# Patient Record
Sex: FEMALE | Race: WHITE | NOT HISPANIC OR LATINO | Employment: OTHER | URBAN - METROPOLITAN AREA
[De-identification: names, ages, dates, MRNs, and addresses within clinical notes are randomized per-mention and may not be internally consistent; named-entity substitution may affect disease eponyms.]

---

## 2017-05-31 ENCOUNTER — TRANSCRIBE ORDERS (OUTPATIENT)
Dept: ADMINISTRATIVE | Facility: HOSPITAL | Age: 57
End: 2017-05-31

## 2017-05-31 DIAGNOSIS — Z12.31 VISIT FOR SCREENING MAMMOGRAM: Primary | ICD-10-CM

## 2017-08-18 ENCOUNTER — HOSPITAL ENCOUNTER (OUTPATIENT)
Dept: MAMMOGRAPHY | Facility: HOSPITAL | Age: 57
Discharge: HOME | End: 2017-08-18
Admitting: OBSTETRICS & GYNECOLOGY

## 2017-08-18 DIAGNOSIS — Z12.31 VISIT FOR SCREENING MAMMOGRAM: ICD-10-CM

## 2017-08-18 PROCEDURE — 77063 BREAST TOMOSYNTHESIS BI: CPT | Performed by: RADIOLOGY

## 2017-08-18 PROCEDURE — 77067 SCR MAMMO BI INCL CAD: CPT | Performed by: RADIOLOGY

## 2017-08-18 PROCEDURE — 77063 BREAST TOMOSYNTHESIS BI: CPT

## 2017-08-18 PROCEDURE — G0202 SCR MAMMO BI INCL CAD: HCPCS

## 2018-08-06 ENCOUNTER — TRANSCRIBE ORDERS (OUTPATIENT)
Dept: ADMINISTRATIVE | Facility: HOSPITAL | Age: 58
End: 2018-08-06

## 2018-08-06 DIAGNOSIS — Z12.31 VISIT FOR SCREENING MAMMOGRAM: Primary | ICD-10-CM

## 2018-09-13 ENCOUNTER — HOSPITAL ENCOUNTER (OUTPATIENT)
Dept: MAMMOGRAPHY | Facility: HOSPITAL | Age: 58
Discharge: HOME | End: 2018-09-13
Admitting: OBSTETRICS & GYNECOLOGY

## 2018-09-13 DIAGNOSIS — Z12.31 VISIT FOR SCREENING MAMMOGRAM: ICD-10-CM

## 2018-09-13 PROCEDURE — 77063 BREAST TOMOSYNTHESIS BI: CPT | Performed by: RADIOLOGY

## 2018-09-13 PROCEDURE — 77067 SCR MAMMO BI INCL CAD: CPT | Performed by: RADIOLOGY

## 2018-09-13 PROCEDURE — 77067 SCR MAMMO BI INCL CAD: CPT

## 2018-09-13 PROCEDURE — 77063 BREAST TOMOSYNTHESIS BI: CPT

## 2019-05-23 ENCOUNTER — APPOINTMENT (OUTPATIENT)
Dept: PREADMISSION TESTING | Facility: HOSPITAL | Age: 59
End: 2019-05-23

## 2019-05-23 VITALS — BODY MASS INDEX: 23.85 KG/M2 | HEIGHT: 66 IN | WEIGHT: 148.4 LBS

## 2019-05-23 RX ORDER — OMEPRAZOLE 40 MG/1
40 CAPSULE, DELAYED RELEASE ORAL DAILY
COMMUNITY
End: 2022-04-23

## 2019-05-23 RX ORDER — METFORMIN HYDROCHLORIDE 500 MG/1
500 TABLET, EXTENDED RELEASE ORAL 2 TIMES DAILY
COMMUNITY
End: 2020-11-03 | Stop reason: SDUPTHER

## 2019-05-23 RX ORDER — HYDROCODONE BITARTRATE AND ACETAMINOPHEN 7.5; 325 MG/1; MG/1
1 TABLET ORAL DAILY
COMMUNITY
End: 2019-05-24 | Stop reason: HOSPADM

## 2019-05-23 RX ORDER — GABAPENTIN 600 MG/1
600 TABLET ORAL NIGHTLY
COMMUNITY
End: 2020-01-15

## 2019-05-23 RX ORDER — ASPIRIN 81 MG/1
81 TABLET ORAL DAILY
COMMUNITY
End: 2020-01-15

## 2019-05-24 ENCOUNTER — HOSPITAL ENCOUNTER (OUTPATIENT)
Facility: HOSPITAL | Age: 59
Setting detail: HOSPITAL OUTPATIENT SURGERY
Discharge: HOME | End: 2019-05-24
Attending: ORTHOPAEDIC SURGERY | Admitting: ORTHOPAEDIC SURGERY

## 2019-05-24 ENCOUNTER — APPOINTMENT (OUTPATIENT)
Dept: GENERAL RADIOLOGY | Facility: HOSPITAL | Age: 59
End: 2019-05-24

## 2019-05-24 ENCOUNTER — ANESTHESIA EVENT (OUTPATIENT)
Dept: PERIOP | Facility: HOSPITAL | Age: 59
End: 2019-05-24

## 2019-05-24 ENCOUNTER — ANESTHESIA (OUTPATIENT)
Dept: PERIOP | Facility: HOSPITAL | Age: 59
End: 2019-05-24

## 2019-05-24 VITALS
HEART RATE: 88 BPM | OXYGEN SATURATION: 97 % | SYSTOLIC BLOOD PRESSURE: 121 MMHG | RESPIRATION RATE: 18 BRPM | WEIGHT: 148 LBS | HEIGHT: 66 IN | DIASTOLIC BLOOD PRESSURE: 84 MMHG | BODY MASS INDEX: 23.78 KG/M2 | TEMPERATURE: 97.5 F

## 2019-05-24 LAB
GLUCOSE BLDC GLUCOMTR-MCNC: 137 MG/DL (ref 70–130)
GLUCOSE BLDC GLUCOMTR-MCNC: 146 MG/DL (ref 70–130)

## 2019-05-24 PROCEDURE — 25010000002 PHENYLEPHRINE PER 1 ML: Performed by: NURSE ANESTHETIST, CERTIFIED REGISTERED

## 2019-05-24 PROCEDURE — 25010000002 PROPOFOL 10 MG/ML EMULSION: Performed by: NURSE ANESTHETIST, CERTIFIED REGISTERED

## 2019-05-24 PROCEDURE — 72020 X-RAY EXAM OF SPINE 1 VIEW: CPT

## 2019-05-24 PROCEDURE — 25010000002 FENTANYL CITRATE (PF) 100 MCG/2ML SOLUTION: Performed by: NURSE ANESTHETIST, CERTIFIED REGISTERED

## 2019-05-24 PROCEDURE — 25010000002 VANCOMYCIN PER 500 MG: Performed by: ORTHOPAEDIC SURGERY

## 2019-05-24 PROCEDURE — 25010000002 DEXAMETHASONE PER 1 MG: Performed by: NURSE ANESTHETIST, CERTIFIED REGISTERED

## 2019-05-24 PROCEDURE — 82962 GLUCOSE BLOOD TEST: CPT

## 2019-05-24 PROCEDURE — 25010000002 MIDAZOLAM PER 1 MG: Performed by: NURSE ANESTHETIST, CERTIFIED REGISTERED

## 2019-05-24 PROCEDURE — 25010000002 NEOSTIGMINE 10 MG/10ML SOLUTION: Performed by: NURSE ANESTHETIST, CERTIFIED REGISTERED

## 2019-05-24 PROCEDURE — 25010000002 ONDANSETRON PER 1 MG: Performed by: NURSE ANESTHETIST, CERTIFIED REGISTERED

## 2019-05-24 PROCEDURE — 25010000002 HYDROMORPHONE PER 4 MG: Performed by: NURSE ANESTHETIST, CERTIFIED REGISTERED

## 2019-05-24 RX ORDER — BUPIVACAINE HYDROCHLORIDE AND EPINEPHRINE 2.5; 5 MG/ML; UG/ML
INJECTION, SOLUTION EPIDURAL; INFILTRATION; INTRACAUDAL; PERINEURAL AS NEEDED
Status: DISCONTINUED | OUTPATIENT
Start: 2019-05-24 | End: 2019-05-24 | Stop reason: HOSPADM

## 2019-05-24 RX ORDER — HYDROMORPHONE HYDROCHLORIDE 1 MG/ML
0.5 INJECTION, SOLUTION INTRAMUSCULAR; INTRAVENOUS; SUBCUTANEOUS
Status: DISCONTINUED | OUTPATIENT
Start: 2019-05-24 | End: 2019-05-24 | Stop reason: HOSPADM

## 2019-05-24 RX ORDER — SODIUM CHLORIDE 0.9 % (FLUSH) 0.9 %
3 SYRINGE (ML) INJECTION EVERY 12 HOURS SCHEDULED
Status: DISCONTINUED | OUTPATIENT
Start: 2019-05-24 | End: 2019-05-24 | Stop reason: HOSPADM

## 2019-05-24 RX ORDER — PREGABALIN 75 MG/1
75 CAPSULE ORAL ONCE
Status: COMPLETED | OUTPATIENT
Start: 2019-05-24 | End: 2019-05-24

## 2019-05-24 RX ORDER — PROPOFOL 10 MG/ML
VIAL (ML) INTRAVENOUS AS NEEDED
Status: DISCONTINUED | OUTPATIENT
Start: 2019-05-24 | End: 2019-05-24 | Stop reason: SURG

## 2019-05-24 RX ORDER — MIDAZOLAM HYDROCHLORIDE 1 MG/ML
INJECTION INTRAMUSCULAR; INTRAVENOUS AS NEEDED
Status: DISCONTINUED | OUTPATIENT
Start: 2019-05-24 | End: 2019-05-24 | Stop reason: SURG

## 2019-05-24 RX ORDER — OXYCODONE HCL 10 MG/1
10 TABLET, FILM COATED, EXTENDED RELEASE ORAL ONCE
Status: COMPLETED | OUTPATIENT
Start: 2019-05-24 | End: 2019-05-24

## 2019-05-24 RX ORDER — DEXAMETHASONE SODIUM PHOSPHATE 10 MG/ML
INJECTION INTRAMUSCULAR; INTRAVENOUS AS NEEDED
Status: DISCONTINUED | OUTPATIENT
Start: 2019-05-24 | End: 2019-05-24 | Stop reason: SURG

## 2019-05-24 RX ORDER — ACETAMINOPHEN 500 MG
1000 TABLET ORAL ONCE
Status: COMPLETED | OUTPATIENT
Start: 2019-05-24 | End: 2019-05-24

## 2019-05-24 RX ORDER — FAMOTIDINE 10 MG/ML
20 INJECTION, SOLUTION INTRAVENOUS ONCE
Status: DISCONTINUED | OUTPATIENT
Start: 2019-05-24 | End: 2019-05-24

## 2019-05-24 RX ORDER — FENTANYL CITRATE 50 UG/ML
INJECTION, SOLUTION INTRAMUSCULAR; INTRAVENOUS AS NEEDED
Status: DISCONTINUED | OUTPATIENT
Start: 2019-05-24 | End: 2019-05-24 | Stop reason: SURG

## 2019-05-24 RX ORDER — NEOSTIGMINE METHYLSULFATE 1 MG/ML
INJECTION, SOLUTION INTRAVENOUS AS NEEDED
Status: DISCONTINUED | OUTPATIENT
Start: 2019-05-24 | End: 2019-05-24 | Stop reason: SURG

## 2019-05-24 RX ORDER — ONDANSETRON 2 MG/ML
4 INJECTION INTRAMUSCULAR; INTRAVENOUS ONCE AS NEEDED
Status: DISCONTINUED | OUTPATIENT
Start: 2019-05-24 | End: 2019-05-24 | Stop reason: HOSPADM

## 2019-05-24 RX ORDER — ONDANSETRON 4 MG/1
4 TABLET, FILM COATED ORAL ONCE AS NEEDED
Status: DISCONTINUED | OUTPATIENT
Start: 2019-05-24 | End: 2019-05-24 | Stop reason: HOSPADM

## 2019-05-24 RX ORDER — PROMETHAZINE HYDROCHLORIDE 25 MG/1
25 SUPPOSITORY RECTAL ONCE AS NEEDED
Status: DISCONTINUED | OUTPATIENT
Start: 2019-05-24 | End: 2019-05-24 | Stop reason: HOSPADM

## 2019-05-24 RX ORDER — SODIUM CHLORIDE 0.9 % (FLUSH) 0.9 %
3-10 SYRINGE (ML) INJECTION AS NEEDED
Status: DISCONTINUED | OUTPATIENT
Start: 2019-05-24 | End: 2019-05-24 | Stop reason: HOSPADM

## 2019-05-24 RX ORDER — GLYCOPYRROLATE 0.2 MG/ML
INJECTION INTRAMUSCULAR; INTRAVENOUS AS NEEDED
Status: DISCONTINUED | OUTPATIENT
Start: 2019-05-24 | End: 2019-05-24 | Stop reason: SURG

## 2019-05-24 RX ORDER — LIDOCAINE HYDROCHLORIDE 10 MG/ML
0.5 INJECTION, SOLUTION EPIDURAL; INFILTRATION; INTRACAUDAL; PERINEURAL ONCE AS NEEDED
Status: COMPLETED | OUTPATIENT
Start: 2019-05-24 | End: 2019-05-24

## 2019-05-24 RX ORDER — VANCOMYCIN HYDROCHLORIDE 1 G/200ML
1000 INJECTION, SOLUTION INTRAVENOUS ONCE
Status: COMPLETED | OUTPATIENT
Start: 2019-05-24 | End: 2019-05-24

## 2019-05-24 RX ORDER — SODIUM CHLORIDE, SODIUM LACTATE, POTASSIUM CHLORIDE, CALCIUM CHLORIDE 600; 310; 30; 20 MG/100ML; MG/100ML; MG/100ML; MG/100ML
9 INJECTION, SOLUTION INTRAVENOUS CONTINUOUS
Status: DISCONTINUED | OUTPATIENT
Start: 2019-05-24 | End: 2019-05-24 | Stop reason: HOSPADM

## 2019-05-24 RX ORDER — PROMETHAZINE HYDROCHLORIDE 25 MG/1
25 TABLET ORAL ONCE AS NEEDED
Status: DISCONTINUED | OUTPATIENT
Start: 2019-05-24 | End: 2019-05-24 | Stop reason: HOSPADM

## 2019-05-24 RX ORDER — HYDROCODONE BITARTRATE AND ACETAMINOPHEN 7.5; 325 MG/1; MG/1
1 TABLET ORAL ONCE AS NEEDED
Status: COMPLETED | OUTPATIENT
Start: 2019-05-24 | End: 2019-05-24

## 2019-05-24 RX ORDER — MELOXICAM 15 MG/1
15 TABLET ORAL ONCE
Status: COMPLETED | OUTPATIENT
Start: 2019-05-24 | End: 2019-05-24

## 2019-05-24 RX ORDER — PROMETHAZINE HYDROCHLORIDE 25 MG/ML
6.25 INJECTION, SOLUTION INTRAMUSCULAR; INTRAVENOUS ONCE AS NEEDED
Status: DISCONTINUED | OUTPATIENT
Start: 2019-05-24 | End: 2019-05-24 | Stop reason: HOSPADM

## 2019-05-24 RX ORDER — FAMOTIDINE 20 MG/1
20 TABLET, FILM COATED ORAL ONCE
Status: COMPLETED | OUTPATIENT
Start: 2019-05-24 | End: 2019-05-24

## 2019-05-24 RX ORDER — ROCURONIUM BROMIDE 10 MG/ML
INJECTION, SOLUTION INTRAVENOUS AS NEEDED
Status: DISCONTINUED | OUTPATIENT
Start: 2019-05-24 | End: 2019-05-24 | Stop reason: SURG

## 2019-05-24 RX ORDER — ONDANSETRON 2 MG/ML
INJECTION INTRAMUSCULAR; INTRAVENOUS AS NEEDED
Status: DISCONTINUED | OUTPATIENT
Start: 2019-05-24 | End: 2019-05-24 | Stop reason: SURG

## 2019-05-24 RX ORDER — MAGNESIUM HYDROXIDE 1200 MG/15ML
LIQUID ORAL AS NEEDED
Status: DISCONTINUED | OUTPATIENT
Start: 2019-05-24 | End: 2019-05-24 | Stop reason: HOSPADM

## 2019-05-24 RX ORDER — FENTANYL CITRATE 50 UG/ML
50 INJECTION, SOLUTION INTRAMUSCULAR; INTRAVENOUS
Status: DISCONTINUED | OUTPATIENT
Start: 2019-05-24 | End: 2019-05-24 | Stop reason: HOSPADM

## 2019-05-24 RX ORDER — VECURONIUM BROMIDE 1 MG/ML
INJECTION, POWDER, LYOPHILIZED, FOR SOLUTION INTRAVENOUS AS NEEDED
Status: DISCONTINUED | OUTPATIENT
Start: 2019-05-24 | End: 2019-05-24 | Stop reason: SURG

## 2019-05-24 RX ORDER — LIDOCAINE HYDROCHLORIDE 10 MG/ML
INJECTION, SOLUTION EPIDURAL; INFILTRATION; INTRACAUDAL; PERINEURAL AS NEEDED
Status: DISCONTINUED | OUTPATIENT
Start: 2019-05-24 | End: 2019-05-24 | Stop reason: SURG

## 2019-05-24 RX ADMIN — PHENYLEPHRINE HYDROCHLORIDE 80 MCG: 10 INJECTION INTRAVENOUS at 08:42

## 2019-05-24 RX ADMIN — NEOSTIGMINE METHYLSULFATE 2.5 MG: 1 INJECTION, SOLUTION INTRAVENOUS at 09:46

## 2019-05-24 RX ADMIN — MUPIROCIN 1 APPLICATION: 20 OINTMENT TOPICAL at 07:22

## 2019-05-24 RX ADMIN — FENTANYL CITRATE 50 MCG: 50 INJECTION, SOLUTION INTRAMUSCULAR; INTRAVENOUS at 10:25

## 2019-05-24 RX ADMIN — FAMOTIDINE 20 MG: 20 TABLET ORAL at 07:23

## 2019-05-24 RX ADMIN — LIDOCAINE HYDROCHLORIDE 0.3 ML: 10 INJECTION, SOLUTION EPIDURAL; INFILTRATION; INTRACAUDAL; PERINEURAL at 07:22

## 2019-05-24 RX ADMIN — MIDAZOLAM HYDROCHLORIDE 2 MG: 1 INJECTION, SOLUTION INTRAMUSCULAR; INTRAVENOUS at 07:51

## 2019-05-24 RX ADMIN — FENTANYL CITRATE 50 MCG: 50 INJECTION, SOLUTION INTRAMUSCULAR; INTRAVENOUS at 08:00

## 2019-05-24 RX ADMIN — FENTANYL CITRATE 50 MCG: 50 INJECTION, SOLUTION INTRAMUSCULAR; INTRAVENOUS at 10:15

## 2019-05-24 RX ADMIN — PHENYLEPHRINE HYDROCHLORIDE 80 MCG: 10 INJECTION INTRAVENOUS at 08:21

## 2019-05-24 RX ADMIN — OXYCODONE HYDROCHLORIDE 10 MG: 10 TABLET, FILM COATED, EXTENDED RELEASE ORAL at 07:23

## 2019-05-24 RX ADMIN — SODIUM CHLORIDE, POTASSIUM CHLORIDE, SODIUM LACTATE AND CALCIUM CHLORIDE: 600; 310; 30; 20 INJECTION, SOLUTION INTRAVENOUS at 09:46

## 2019-05-24 RX ADMIN — PHENYLEPHRINE HYDROCHLORIDE 80 MCG: 10 INJECTION INTRAVENOUS at 08:51

## 2019-05-24 RX ADMIN — ROCURONIUM BROMIDE 40 MG: 10 INJECTION INTRAVENOUS at 08:00

## 2019-05-24 RX ADMIN — FENTANYL CITRATE 50 MCG: 50 INJECTION, SOLUTION INTRAMUSCULAR; INTRAVENOUS at 10:08

## 2019-05-24 RX ADMIN — PROPOFOL 200 MG: 10 INJECTION, EMULSION INTRAVENOUS at 08:00

## 2019-05-24 RX ADMIN — GLYCOPYRROLATE 0.4 MG: 0.2 INJECTION, SOLUTION INTRAMUSCULAR; INTRAVENOUS at 09:46

## 2019-05-24 RX ADMIN — MELOXICAM 15 MG: 15 TABLET ORAL at 07:23

## 2019-05-24 RX ADMIN — MIDAZOLAM HYDROCHLORIDE 2 MG: 1 INJECTION, SOLUTION INTRAMUSCULAR; INTRAVENOUS at 07:49

## 2019-05-24 RX ADMIN — VECURONIUM BROMIDE 2 MG: 1 INJECTION, POWDER, LYOPHILIZED, FOR SOLUTION INTRAVENOUS at 09:02

## 2019-05-24 RX ADMIN — SODIUM CHLORIDE, POTASSIUM CHLORIDE, SODIUM LACTATE AND CALCIUM CHLORIDE 9 ML/HR: 600; 310; 30; 20 INJECTION, SOLUTION INTRAVENOUS at 07:22

## 2019-05-24 RX ADMIN — LIDOCAINE HYDROCHLORIDE 50 MG: 10 INJECTION, SOLUTION EPIDURAL; INFILTRATION; INTRACAUDAL; PERINEURAL at 08:00

## 2019-05-24 RX ADMIN — HYDROCODONE BITARTRATE AND ACETAMINOPHEN 1 TABLET: 7.5; 325 TABLET ORAL at 10:50

## 2019-05-24 RX ADMIN — ONDANSETRON 4 MG: 2 INJECTION INTRAMUSCULAR; INTRAVENOUS at 09:46

## 2019-05-24 RX ADMIN — ACETAMINOPHEN 1000 MG: 500 TABLET ORAL at 07:23

## 2019-05-24 RX ADMIN — ROCURONIUM BROMIDE 10 MG: 10 INJECTION INTRAVENOUS at 09:02

## 2019-05-24 RX ADMIN — DEXAMETHASONE SODIUM PHOSPHATE 8 MG: 10 INJECTION INTRAMUSCULAR; INTRAVENOUS at 08:09

## 2019-05-24 RX ADMIN — VANCOMYCIN HYDROCHLORIDE 1000 MG: 1 INJECTION, SOLUTION INTRAVENOUS at 07:52

## 2019-05-24 RX ADMIN — HYDROMORPHONE HYDROCHLORIDE 0.5 MG: 1 INJECTION, SOLUTION INTRAMUSCULAR; INTRAVENOUS; SUBCUTANEOUS at 10:40

## 2019-05-24 RX ADMIN — PREGABALIN 75 MG: 75 CAPSULE ORAL at 07:23

## 2019-05-24 NOTE — ANESTHESIA PROCEDURE NOTES
Airway  Urgency: elective    Airway not difficult    General Information and Staff    Patient location during procedure: OR  CRNA: Tracey White CRNA    Indications and Patient Condition  Indications for airway management: airway protection    Preoxygenated: yes  MILS not maintained throughout  Mask difficulty assessment: 1 - vent by mask    Final Airway Details  Final airway type: endotracheal airway      Successful airway: ETT  Cuffed: yes   Successful intubation technique: direct laryngoscopy  Endotracheal tube insertion site: oral  Blade: Blanca  Blade size: 3  ETT size (mm): 7.0  Cormack-Lehane Classification: grade I - full view of glottis  Placement verified by: chest auscultation and capnometry   Measured from: lips  ETT to lips (cm): 20  Number of attempts at approach: 1    Additional Comments  Negative epigastric sounds, Breath sound equal bilaterally with symmetric chest rise and fall

## 2019-05-24 NOTE — ANESTHESIA POSTPROCEDURE EVALUATION
Patient: Luana Chawla    Procedure Summary     Date:  05/24/19 Room / Location:   [facility] [address] [place] [address] [facility]    Anesthesia Start:  0756 Anesthesia Stop:      Procedure:  LUMBAR LAMINECTOMY L4-5 AND L5-S1 (N/A Spine Lumbar) Diagnosis:      Surgeon:  Hipolito Kahn MD Provider:  Eugene Kruse MD    Anesthesia Type:  general ASA Status:  3          Anesthesia Type: general  Last vitals  BP   110/67   Temp   97.8   Pulse   92   Resp   18   SpO2   98%     Post Anesthesia Care and Evaluation    Patient location during evaluation: PACU  Patient participation: complete - patient participated  Post-procedure mental status: asleep.  Pain management: adequate  Airway patency: patent  Anesthetic complications: No anesthetic complications  PONV Status: none  Cardiovascular status: acceptable  Respiratory status: acceptable  Hydration status: acceptable

## 2019-05-24 NOTE — ANESTHESIA PREPROCEDURE EVALUATION
Anesthesia Evaluation     Patient summary reviewed and Nursing notes reviewed                Airway   Mallampati: II  TM distance: >3 FB  Neck ROM: full  No difficulty expected  Dental - normal exam         Pulmonary - normal exam   (+) a smoker Current,   Cardiovascular - negative cardio ROS and normal exam        Neuro/Psych- negative ROS  GI/Hepatic/Renal/Endo    (+)  GERD,  diabetes mellitus,     Musculoskeletal (-) negative ROS    Abdominal  - normal exam    Bowel sounds: normal.   Substance History - negative use     OB/GYN negative ob/gyn ROS         Other                      Anesthesia Plan    ASA 3     general     intravenous induction   Anesthetic plan, all risks, benefits, and alternatives have been provided, discussed and informed consent has been obtained with: patient.    Plan discussed with CRNA.

## 2019-08-08 ENCOUNTER — TRANSCRIBE ORDERS (OUTPATIENT)
Dept: ADMINISTRATIVE | Facility: HOSPITAL | Age: 59
End: 2019-08-08

## 2019-08-08 DIAGNOSIS — Z12.31 VISIT FOR SCREENING MAMMOGRAM: Primary | ICD-10-CM

## 2019-10-15 ENCOUNTER — HOSPITAL ENCOUNTER (OUTPATIENT)
Dept: MAMMOGRAPHY | Facility: HOSPITAL | Age: 59
Discharge: HOME | End: 2019-10-15
Admitting: OBSTETRICS & GYNECOLOGY

## 2019-10-15 DIAGNOSIS — Z12.31 VISIT FOR SCREENING MAMMOGRAM: ICD-10-CM

## 2019-10-15 PROCEDURE — 77063 BREAST TOMOSYNTHESIS BI: CPT | Performed by: RADIOLOGY

## 2019-10-15 PROCEDURE — 77067 SCR MAMMO BI INCL CAD: CPT

## 2019-10-15 PROCEDURE — 77067 SCR MAMMO BI INCL CAD: CPT | Performed by: RADIOLOGY

## 2019-10-15 PROCEDURE — 77063 BREAST TOMOSYNTHESIS BI: CPT

## 2019-11-22 ENCOUNTER — HOSPITAL ENCOUNTER (OUTPATIENT)
Dept: MAMMOGRAPHY | Facility: HOSPITAL | Age: 59
Discharge: HOME | End: 2019-11-22
Admitting: RADIOLOGY

## 2019-11-22 ENCOUNTER — HOSPITAL ENCOUNTER (OUTPATIENT)
Dept: ULTRASOUND IMAGING | Facility: HOSPITAL | Age: 59
Discharge: HOME | End: 2019-11-22

## 2019-11-22 ENCOUNTER — HOSPITAL ENCOUNTER (OUTPATIENT)
Dept: MAMMOGRAPHY | Facility: HOSPITAL | Age: 59
Discharge: HOME | End: 2019-11-22

## 2019-11-22 DIAGNOSIS — R92.8 ABNORMAL MAMMOGRAM: ICD-10-CM

## 2019-11-22 PROCEDURE — 77065 DX MAMMO INCL CAD UNI: CPT | Performed by: RADIOLOGY

## 2019-11-22 PROCEDURE — G0279 TOMOSYNTHESIS, MAMMO: HCPCS

## 2019-11-22 PROCEDURE — 77065 DX MAMMO INCL CAD UNI: CPT

## 2019-11-22 PROCEDURE — 19083 BX BREAST 1ST LESION US IMAG: CPT | Performed by: RADIOLOGY

## 2019-11-22 PROCEDURE — 76642 ULTRASOUND BREAST LIMITED: CPT

## 2019-11-22 PROCEDURE — 25010000003 LIDOCAINE 1 % SOLUTION: Performed by: RADIOLOGY

## 2019-11-22 PROCEDURE — A4648 IMPLANTABLE TISSUE MARKER: HCPCS

## 2019-11-22 PROCEDURE — 77061 BREAST TOMOSYNTHESIS UNI: CPT | Performed by: RADIOLOGY

## 2019-11-22 PROCEDURE — 76642 ULTRASOUND BREAST LIMITED: CPT | Performed by: RADIOLOGY

## 2019-11-22 PROCEDURE — 88360 TUMOR IMMUNOHISTOCHEM/MANUAL: CPT | Performed by: RADIOLOGY

## 2019-11-22 PROCEDURE — 88305 TISSUE EXAM BY PATHOLOGIST: CPT | Performed by: RADIOLOGY

## 2019-11-22 RX ORDER — LIDOCAINE HYDROCHLORIDE 10 MG/ML
5 INJECTION, SOLUTION INFILTRATION; PERINEURAL ONCE
Status: COMPLETED | OUTPATIENT
Start: 2019-11-22 | End: 2019-11-22

## 2019-11-22 RX ORDER — LIDOCAINE HYDROCHLORIDE AND EPINEPHRINE 10; 10 MG/ML; UG/ML
10 INJECTION, SOLUTION INFILTRATION; PERINEURAL ONCE
Status: COMPLETED | OUTPATIENT
Start: 2019-11-22 | End: 2019-11-22

## 2019-11-22 RX ADMIN — LIDOCAINE HYDROCHLORIDE 2 ML: 10 INJECTION, SOLUTION INFILTRATION; PERINEURAL at 11:20

## 2019-11-22 RX ADMIN — LIDOCAINE HYDROCHLORIDE,EPINEPHRINE BITARTRATE 1 ML: 10; .01 INJECTION, SOLUTION INFILTRATION; PERINEURAL at 11:20

## 2019-11-25 ENCOUNTER — TELEPHONE (OUTPATIENT)
Dept: MAMMOGRAPHY | Facility: HOSPITAL | Age: 59
End: 2019-11-25

## 2019-11-25 NOTE — TELEPHONE ENCOUNTER
Pt called in, requests Dr Howard for surgical consult. Pt will be notified when an appointment is scheduled.

## 2019-11-25 NOTE — TELEPHONE ENCOUNTER
Pt notified of surgical consult appointment with requested surgeon, Dr Howard on 12.4.19 @ 0900/0930 . Told to bring photo ID, list of prescription & OTC medications and insurance information. Pt given office contact information. Encouraged pt to call back or contact surgeon's office with further questions. Verbalized understanding.

## 2019-11-25 NOTE — TELEPHONE ENCOUNTER
Referring provider's office notified pathology returned as cancer & patient will be notified. Pt notified of pathology results and recommendation. Verbalizes understanding. Denies discomfort. Denies signs and symptoms of infection.     Patient desires to research surgeon choice.  Patient is to call back with decision; an appointment will then be scheduled and she will be notified.  Reviewed what would be discussed at surgical consult visit, including detailed explanation of pathology report & imaging reports; treatment options & pros/cons, availability of nurse navigator. Patient encouraged to call back or contact AR Hahn RN, JARRETN, Breast Navigator, with any questions or concerns.  Pt information sent to AR Hahn RN, EDWIGE, Breast Navigator for evaluation & referral for genetic counseling. Breast cancer information packet offered and accepted. Patient verbalized understanding.

## 2019-11-30 LAB
CYTO UR: NORMAL
DX PRELIMINARY: NORMAL
LAB AP CASE REPORT: NORMAL
LAB AP CLINICAL INFORMATION: NORMAL
LAB AP DIAGNOSIS COMMENT: NORMAL
LAB AP SPECIAL STAINS: NORMAL
PATH REPORT.FINAL DX SPEC: NORMAL
PATH REPORT.GROSS SPEC: NORMAL

## 2019-12-04 ENCOUNTER — CLINICAL SUPPORT (OUTPATIENT)
Dept: GENETICS | Facility: HOSPITAL | Age: 59
End: 2019-12-04

## 2019-12-04 ENCOUNTER — APPOINTMENT (OUTPATIENT)
Dept: LAB | Facility: HOSPITAL | Age: 59
End: 2019-12-04

## 2019-12-04 ENCOUNTER — DOCUMENTATION (OUTPATIENT)
Dept: ONCOLOGY | Facility: CLINIC | Age: 59
End: 2019-12-04

## 2019-12-04 DIAGNOSIS — Z13.79 GENETIC TESTING: Primary | ICD-10-CM

## 2019-12-04 DIAGNOSIS — Z80.3 FAMILY HISTORY OF BREAST CANCER: ICD-10-CM

## 2019-12-04 DIAGNOSIS — Z17.0 MALIGNANT NEOPLASM OF RIGHT BREAST IN FEMALE, ESTROGEN RECEPTOR POSITIVE, UNSPECIFIED SITE OF BREAST (HCC): Primary | ICD-10-CM

## 2019-12-04 DIAGNOSIS — Z13.79 ENCOUNTER FOR GENETIC SCREENING: ICD-10-CM

## 2019-12-04 DIAGNOSIS — C50.911 MALIGNANT NEOPLASM OF RIGHT BREAST IN FEMALE, ESTROGEN RECEPTOR POSITIVE, UNSPECIFIED SITE OF BREAST (HCC): Primary | ICD-10-CM

## 2019-12-04 PROCEDURE — 96040: CPT | Performed by: GENETIC COUNSELOR, MS

## 2019-12-04 NOTE — PROGRESS NOTES
I saw patient with Dr ELLIS and patients son. Dr ELLIS reviewed the pathology report and surgical/treatment options- Stage IA, IG IDC, ER 5% positive, OR positive 50% and HER2 positive. Dr ELLIS discussed treatment with Herceptin and Hormone blocking agent plus or minus chemo pending surgical pathology. The patient will see genetic today and an MRI has been ordered- Educational and Supportive materials were given and reviewed.

## 2019-12-05 NOTE — PROGRESS NOTES
Luana Chawla is a 58-year-old female who was seen for genetic counseling due to a personal history of breast cancer.  Ms. Chawla was recently diagnosed with a triple positive breast cancer of the right breast at age 58. She is making a surgical decision. She had a hysterectomy at age 41; she retains her ovaries.  Ms. Chawla was interested in discussing her risk for a hereditary cancer syndrome, and decided to pursue genetic testing.   Ms. Chawla opted to pursue comprehensive testing via the CancerNext panel ordered through [employer] which includes BRCA1/2 and 32 additional genes associated with an increased risk of breast cancer or other cancers (APC, VIVIANE, BARD1, BMPR1A, BRCA1, BRCA2, BRIP1, CDH1, CDK4, CDKN2A, CHEK2, EPCAM, GREM1, MLH1, MRE11A, MSH2, MSH6, MUTYH, NBN, NF1, PALB2, PMS2, POLD1, POLE, PTEN, RAD50, RAD51C, RAD51D, SMAD4, SMARCA4, STK11, and TP53). Results are expected in 2-3 weeks.      PERTINENT FAMILY HISTORY:  Mother:  Breast cancer, 70  Mat. Grandmother: Breast cancer, 65    RISK ASSESSMENT:  Ms. Chawla’s personal and family history of breast cancer raises the question of a hereditary cancer syndrome.  Ms. Chawla meets NCCN guidelines criteria for BRCA1/2 testing based on her personal and family history of breast cancer. We discussed that the standard approach to BRCA1/2 testing is via a multigene panel that evaluates BRCA1/2 and multiple other genes known to impact cancer risk.  This risk assessment is based on the family history information provided at the time of the appointment.  The assessment could change in the future should new information be obtained.     GENETIC COUNSELING: (30 minutes)  We reviewed the family history information in detail.  Cases of breast cancer follow three general patterns: sporadic, familial, and hereditary.  While most cancer is sporadic, some cases appear to occur in family clusters.  These cases are said to be familial and account for 10-20%  of breast cancer cases.  Familial cases may be due to a combination of shared genes and environmental factors among family members.  In even fewer families, the cancer is said to be inherited, and the genes responsible for the cancer are known.       Family histories typical of hereditary cancer syndromes usually include multiple first- and second-degree relatives diagnosed with cancer types that define a syndrome.  These cases tend to be diagnosed at younger-than-expected ages and can be bilateral or multifocal.  The cancer in these families follows an autosomal dominant inheritance pattern, which indicates the likely presence of a mutation in a cancer susceptibility gene.  Children and siblings of an individual believed to carry this mutation have a 50% chance of inheriting that mutation, thereby inheriting the increased risk to develop cancer.  These mutations can be passed down from the maternal or the paternal lineage.     Hereditary breast cancer accounts for 5-10% of all cases of breast cancer.  A significant proportion of hereditary breast cancer can be attributed to mutations in the BRCA1 and BRCA2 genes.  Mutations in these genes confer an increased risk for breast cancer, ovarian cancer, male breast cancer, prostate cancer and pancreatic cancer. Women with a BRCA1 or BRCA2 mutation who have already been diagnosed with breast cancer have a 40-60% lifetime risk of a second breast cancer. Women with a BRCA1 or BRCA2 mutation have up to a 44% risk of ovarian cancer. There are other genes that are known to be associated with an increased risk for breast cancer and other cancers. Based on Ms. Chawla’s desire to get as much information as possible regarding her personal risks and potential risks for her family, she opted to pursue testing through a panel that would look at several other genes known to increase the risk for cancer.     GENETIC TESTING:  The risks, benefits and limitations of genetic testing  and implications for clinical management following testing were reviewed.  DNA test results can influence decisions regarding screening, prevention and surgical management.  Genetic testing can have significant psychological implications for both individuals and families.  Also discussed was the possibility of insurance discrimination based on genetic test results and the laws ([place]) in place to prevent this.     We discussed panel testing, which would involve testing for BRCA1/2 as well as several other cancer susceptibility genes at the same time.  The benefits and limitations of genetic testing were discussed and Ms. Chawla decided to pursue testing. The implications of a positive or negative test result were discussed. We discussed the possibility that, in some cases, genetic test results may be uninformative due to the identification of a genetic variant. These variants may or may not be associated with an increased cancer risk.  Given her personal history, a negative test result would not eliminate all breast cancer risk to her relatives, although the risk would not be as high as it would with positive genetic testing.       PLAN: Results are expected in 2-3 weeks, and the patient will be contacted by telephone at that time.  Genetic counseling remains available to Ms. Chawla and her family in the future, and she is welcome to contact us at [phone] with any questions she may have.        Samaria Ash MS, [facility] [place], formerly [facility]  Licensed Certified Genetic Counselor

## 2019-12-13 ENCOUNTER — DOCUMENTATION (OUTPATIENT)
Dept: GENETICS | Facility: HOSPITAL | Age: 59
End: 2019-12-13

## 2019-12-13 ENCOUNTER — HOSPITAL ENCOUNTER (OUTPATIENT)
Dept: MRI IMAGING | Facility: HOSPITAL | Age: 59
Discharge: HOME | End: 2019-12-13
Admitting: SURGERY

## 2019-12-13 ENCOUNTER — DOCUMENTATION (OUTPATIENT)
Dept: ONCOLOGY | Facility: CLINIC | Age: 59
End: 2019-12-13

## 2019-12-13 DIAGNOSIS — C50.919 BREAST CANCER IN FEMALE (HCC): ICD-10-CM

## 2019-12-13 LAB — CREAT BLDA-MCNC: 0.5 MG/DL (ref 0.6–1.3)

## 2019-12-13 PROCEDURE — 0 GADOBENATE DIMEGLUMINE 529 MG/ML SOLUTION: Performed by: SURGERY

## 2019-12-13 PROCEDURE — 82565 ASSAY OF CREATININE: CPT

## 2019-12-13 PROCEDURE — A9577 INJ MULTIHANCE: HCPCS | Performed by: SURGERY

## 2019-12-13 PROCEDURE — 77049 MRI BREAST C-+ W/CAD BI: CPT

## 2019-12-13 PROCEDURE — 77049 MRI BREAST C-+ W/CAD BI: CPT | Performed by: RADIOLOGY

## 2019-12-13 RX ADMIN — GADOBENATE DIMEGLUMINE 15 ML: 529 INJECTION, SOLUTION INTRAVENOUS at 09:55

## 2019-12-13 NOTE — PROGRESS NOTES
Patient called to ask about when she would see Dr ELLIS again- that her genetics was back negative and her MRI was done today pending result. I explained that when her MRI result comes back she can talk to Dr ELLIS on the phone that he does not need to see her unless she needs or wants to see him. She verbalized understanding

## 2019-12-13 NOTE — PROGRESS NOTES
Luana Chawla is a 58-year-old female who was seen for genetic counseling due to a personal history of breast cancer.  Ms. Chawla was recently diagnosed with a triple positive breast cancer of the right breast at age 58. She is making a surgical decision. She had a hysterectomy at age 41; she retains her ovaries.  Ms. Chawla was interested in discussing her risk for a hereditary cancer syndrome, and decided to pursue genetic testing.   Ms. Chawla opted to pursue comprehensive testing via the CancerNext panel ordered through [employer] which includes BRCA1/2 and 32 additional genes associated with an increased risk of breast cancer or other cancers (APC, VIVIANE, BARD1, BMPR1A, BRCA1, BRCA2, BRIP1, CDH1, CDK4, CDKN2A, CHEK2, EPCAM, GREM1, MLH1, MRE11A, MSH2, MSH6, MUTYH, NBN, NF1, PALB2, PMS2, POLD1, POLE, PTEN, RAD50, RAD51C, RAD51D, SMAD4, SMARCA4, STK11, and TP53). Genetic testing was negative for pathogenic mutations in BRCA1/2 and 32 additional genes on the CancerNext panel.  These normal results were discussed with Ms. Chawla by telephone on 12/13/19.       PERTINENT FAMILY HISTORY:  Mother:  Breast cancer, 70  Mat. Grandmother: Breast cancer, 65    RISK ASSESSMENT:  Ms. Chawla’s personal and family history of breast cancer raises the question of a hereditary cancer syndrome.  Ms. Chawla meets NCCN guidelines criteria for BRCA1/2 testing based on her personal and family history of breast cancer. We discussed that the standard approach to BRCA1/2 testing is via a multigene panel that evaluates BRCA1/2 and multiple other genes known to impact cancer risk.  This risk assessment is based on the family history information provided at the time of the appointment.  The assessment could change in the future should new information be obtained.     GENETIC COUNSELING:  We reviewed the family history information in detail.  Cases of breast cancer follow three general patterns: sporadic, familial, and  hereditary.  While most cancer is sporadic, some cases appear to occur in family clusters.  These cases are said to be familial and account for 10-20% of breast cancer cases.  Familial cases may be due to a combination of shared genes and environmental factors among family members.  In even fewer families, the cancer is said to be inherited, and the genes responsible for the cancer are known.       Family histories typical of hereditary cancer syndromes usually include multiple first- and second-degree relatives diagnosed with cancer types that define a syndrome.  These cases tend to be diagnosed at younger-than-expected ages and can be bilateral or multifocal.  The cancer in these families follows an autosomal dominant inheritance pattern, which indicates the likely presence of a mutation in a cancer susceptibility gene.  Children and siblings of an individual believed to carry this mutation have a 50% chance of inheriting that mutation, thereby inheriting the increased risk to develop cancer.  These mutations can be passed down from the maternal or the paternal lineage.     Hereditary breast cancer accounts for 5-10% of all cases of breast cancer.  A significant proportion of hereditary breast cancer can be attributed to mutations in the BRCA1 and BRCA2 genes.  Mutations in these genes confer an increased risk for breast cancer, ovarian cancer, male breast cancer, prostate cancer and pancreatic cancer. Women with a BRCA1 or BRCA2 mutation who have already been diagnosed with breast cancer have a 40-60% lifetime risk of a second breast cancer. Women with a BRCA1 or BRCA2 mutation have up to a 44% risk of ovarian cancer. There are other genes that are known to be associated with an increased risk for breast cancer and other cancers. Based on Ms. Chawla’s desire to get as much information as possible regarding her personal risks and potential risks for her family, she opted to pursue testing through a panel that  would look at several other genes known to increase the risk for cancer.     GENETIC TESTING:  The risks, benefits and limitations of genetic testing and implications for clinical management following testing were reviewed.  DNA test results can influence decisions regarding screening, prevention and surgical management.  Genetic testing can have significant psychological implications for both individuals and families.  Also discussed was the possibility of insurance discrimination based on genetic test results and the laws ([place]) in place to prevent this.     We discussed panel testing, which would involve testing for BRCA1/2 as well as several other cancer susceptibility genes at the same time.  The benefits and limitations of genetic testing were discussed and Ms. Chawla decided to pursue testing. The implications of a positive or negative test result were discussed. We discussed the possibility that, in some cases, genetic test results may be uninformative due to the identification of a genetic variant. These variants may or may not be associated with an increased cancer risk.  Given her personal history, a negative test result would not eliminate all breast cancer risk to her relatives, although the risk would not be as high as it would with positive genetic testing.       TEST RESULTS:  Genetic testing was negative for known pathogenic mutations by sequencing and rearrangement testing for the genes on the CancerNext panel (see attached results).    This negative result greatly lowers but does not eliminate the risk of a hereditary cancer syndrome for Ms. Chawla. This assessment is based on the information provided at time of consultation.    CANCER SCREENING: Ms. Chawla’s surveillance and management should be determined by her oncology team. Despite the negative genetic test results, Ms. Chawla’s female relatives may have a somewhat increased lifetime risk for breast cancer based on family history.   Given the increased risk, options available to individuals with a high lifetime risk for breast cancer were briefly discussed.  This includes increased surveillance and chemoprevention.     Surveillance for individuals with a high lifetime risk of breast cancer (>20%, versus the average risk of 12%), based on NCCN guidelines, would consist of semi-annual clinical breast exams and monthly self-breast exams starting by age 18 and annual mammography starting 10 years younger than the earliest diagnosis in a close relative, or starting by age 40, whichever is earliest.  According to an American Cancer Society expert panel, annual breast MRI should be offered to women whose lifetime risk of breast cancer is 20-25 percent or more, also starting by age 40 or earlier if indicated by family history.  Female relatives could have a risk assessment performed using a family history-based model, such as the Tyrer-Cuzick model, to determine their individual risks.      PLAN: Genetic counseling remains available to Ms. Chawla and her family. She is welcome to contact our office with any questions or concerns at [phone].       Samaria Ash MS, [facility] [place], [facility]  Licensed Certified Genetic Counselor      Cc: Luana Howard MD

## 2019-12-17 ENCOUNTER — TELEPHONE (OUTPATIENT)
Dept: MRI IMAGING | Facility: HOSPITAL | Age: 59
End: 2019-12-17

## 2019-12-17 ENCOUNTER — DOCUMENTATION (OUTPATIENT)
Dept: ONCOLOGY | Facility: CLINIC | Age: 59
End: 2019-12-17

## 2019-12-17 NOTE — PROGRESS NOTES
Patient called to ask about her MRI result - we reviewed it and she states that she would like to speak to Dr ELLIS about her options - I told her I would ask him to call her and that it might be Thursday - she verbalized understanding

## 2019-12-17 NOTE — TELEPHONE ENCOUNTER
Called pt with Breast MRI results. Recommended 2 MRI guided biopsies. Pt considering veronica mast and is going to talk with Clara/the surgeon and make a decision. Pt was then transferred to Clara after she expressed verbal understanding. She will call with her decision about the biopsies. She is tentatively on the schedule for Dec 26th at 8:00.

## 2019-12-19 ENCOUNTER — TELEPHONE (OUTPATIENT)
Dept: MRI IMAGING | Facility: HOSPITAL | Age: 59
End: 2019-12-19

## 2019-12-19 NOTE — TELEPHONE ENCOUNTER
Pt called to say she is having bilateral mastectomy and will not need the recommended MRI Biopsy(s).

## 2019-12-24 ENCOUNTER — TRANSCRIBE ORDERS (OUTPATIENT)
Dept: ADMINISTRATIVE | Facility: HOSPITAL | Age: 59
End: 2019-12-24

## 2019-12-24 DIAGNOSIS — Z40.01 PROPHYLACTIC BREAST REMOVAL: ICD-10-CM

## 2019-12-24 DIAGNOSIS — C50.411 MALIGNANT NEOPLASM OF UPPER-OUTER QUADRANT OF RIGHT FEMALE BREAST, UNSPECIFIED ESTROGEN RECEPTOR STATUS (HCC): Primary | ICD-10-CM

## 2020-01-15 ENCOUNTER — APPOINTMENT (OUTPATIENT)
Dept: PREADMISSION TESTING | Facility: HOSPITAL | Age: 60
End: 2020-01-15

## 2020-01-15 VITALS — HEIGHT: 66 IN | BODY MASS INDEX: 24.16 KG/M2 | WEIGHT: 150.35 LBS

## 2020-01-15 LAB
ALBUMIN SERPL-MCNC: 4.5 G/DL (ref 3.5–5.2)
ALBUMIN/GLOB SERPL: 2.1 G/DL
ALP SERPL-CCNC: 82 U/L (ref 39–117)
ALT SERPL W P-5'-P-CCNC: 16 U/L (ref 1–33)
ANION GAP SERPL CALCULATED.3IONS-SCNC: 12 MMOL/L (ref 5–15)
AST SERPL-CCNC: 16 U/L (ref 1–32)
BILIRUB SERPL-MCNC: 0.2 MG/DL (ref 0.2–1.2)
BUN BLD-MCNC: 12 MG/DL (ref 8–23)
BUN/CREAT SERPL: 18.2 (ref 7–25)
CALCIUM SPEC-SCNC: 9.8 MG/DL (ref 8.6–10.5)
CHLORIDE SERPL-SCNC: 100 MMOL/L (ref 98–107)
CO2 SERPL-SCNC: 28 MMOL/L (ref 22–29)
CREAT BLD-MCNC: 0.66 MG/DL (ref 0.57–1)
DEPRECATED RDW RBC AUTO: 44.5 FL (ref 37–54)
ERYTHROCYTE [DISTWIDTH] IN BLOOD BY AUTOMATED COUNT: 13.7 % (ref 12.3–15.4)
GFR SERPL CREATININE-BSD FRML MDRD: 91 ML/MIN/1.73
GLOBULIN UR ELPH-MCNC: 2.1 GM/DL
GLUCOSE BLD-MCNC: 200 MG/DL (ref 65–99)
HCT VFR BLD AUTO: 32.8 % (ref 34–46.6)
HGB BLD-MCNC: 10.6 G/DL (ref 12–15.9)
MCH RBC QN AUTO: 28.9 PG (ref 26.6–33)
MCHC RBC AUTO-ENTMCNC: 32.3 G/DL (ref 31.5–35.7)
MCV RBC AUTO: 89.4 FL (ref 79–97)
PLATELET # BLD AUTO: 336 10*3/MM3 (ref 140–450)
PMV BLD AUTO: 9.1 FL (ref 6–12)
POTASSIUM BLD-SCNC: 4.2 MMOL/L (ref 3.5–5.2)
PROT SERPL-MCNC: 6.6 G/DL (ref 6–8.5)
RBC # BLD AUTO: 3.67 10*6/MM3 (ref 3.77–5.28)
SODIUM BLD-SCNC: 140 MMOL/L (ref 136–145)
WBC NRBC COR # BLD: 8.43 10*3/MM3 (ref 3.4–10.8)

## 2020-01-15 PROCEDURE — 93010 ELECTROCARDIOGRAM REPORT: CPT | Performed by: INTERNAL MEDICINE

## 2020-01-15 PROCEDURE — 36415 COLL VENOUS BLD VENIPUNCTURE: CPT

## 2020-01-15 PROCEDURE — 85027 COMPLETE CBC AUTOMATED: CPT | Performed by: SURGERY

## 2020-01-15 PROCEDURE — 93005 ELECTROCARDIOGRAM TRACING: CPT

## 2020-01-15 PROCEDURE — 80053 COMPREHEN METABOLIC PANEL: CPT | Performed by: SURGERY

## 2020-01-15 RX ORDER — CETIRIZINE HYDROCHLORIDE 10 MG/1
10 TABLET ORAL DAILY
COMMUNITY
End: 2020-06-02 | Stop reason: ALTCHOICE

## 2020-01-15 RX ORDER — ESCITALOPRAM OXALATE 10 MG/1
10 TABLET ORAL DAILY
COMMUNITY
End: 2021-09-15 | Stop reason: ALTCHOICE

## 2020-01-15 NOTE — DISCHARGE INSTRUCTIONS
The following information and instructions were given:    Do not eat, drink, smoke or chew gum after midnight the night before surgery. This also includes no mints.  Take all routine, prescribed medications including heart and blood pressure medicines with a sip of water unless otherwise instructed by your physician.   Do NOT take diabetic medication unless instructed by your physician.    DO NOT shave for two days before your procedure.  Do not wear makeup.      DO NOT wear fingernail polish (gel/regular) and/or acrylic/artificial nails on the day of surgery.   If you have had a recent manicure and would rather not remove polish or artificial nails, then the minimum requirement is that the polish/artificial nails must be removed from the middle finger on each hand.      If you are having surgery/procedure on an upper extremity, then fingernail polish/artificial fingernails must be removed for surgery.  NO EXCEPTIONS.      If you are having surgery/procedure on a lower extremity, then toenail polish on both extremities must be removed for surgery.  NO EXCEPTIONS.    Remove all jewelry (advise to go to jeweler if unable to remove).  Jewelry, especially rings, can no longer be taped for surgery.    Leave anything you consider valuable at home.    Leave your suitcase in the car until after your surgery.    Bring the following with you the day of your procedure (when applicable)       -picture ID and insurance cards       -Co-pay/deductible required by insurance       -Medications in the original bottles (not a list) including all over-the-counter medications if not brought to PAT       -Copy of advance directive, living will or power of  documents if not brought to PAT       -CPAP or BIPAP mask and tubing (do not bring machine)       -Skin prep instruction(s) sheet       -PAT Pass    Education booklet, brochure, handout or binder related to procedure given to patient.  Education booklet also includes general  information related to their recovery that mentions signs and symptoms of infection and when to call the doctor.    When applicable, an ERAS booklet was given to patient.    Pain Control After Surgery handout given to patient.    Respirex use (handout given to patient) and pneumonia prevention education provided.    Signs and Symptoms of infection were discussed with patient in Pre Admission testing.  Patient instructed to call their doctor if any of the following symptoms are noted during recovery:  fever of 100.4 F or higher, incision that is warm or has increasing bleeding, redness, or drainage.    DVT Prevention instructions given verbally during Pre Admission Testing appointment that stressed the importance of ambulation to improve blood circulation.  Also encouraged patient to perform foot exercises when in bed and that the application of a sequential device might be applied to lower extremities to improve circulation.      Please apply Chlorhexadine wipes to surgical area (if instructed) the night before procedure and the AM of procedure and document date/time of applications on skin prep instruction sheet.        Patient instructed to drink 20 ounces (or until full) of Gatorade and it needs to be completed 1 hour before given arrival time for procedure (NO RED Gatorade)    Patient verbalized understanding.

## 2020-01-20 ENCOUNTER — ANESTHESIA EVENT (OUTPATIENT)
Dept: PERIOP | Facility: HOSPITAL | Age: 60
End: 2020-01-20

## 2020-01-20 RX ORDER — FAMOTIDINE 10 MG/ML
20 INJECTION, SOLUTION INTRAVENOUS ONCE
Status: CANCELLED | OUTPATIENT
Start: 2020-01-20 | End: 2020-01-20

## 2020-01-21 ENCOUNTER — HOSPITAL ENCOUNTER (OUTPATIENT)
Facility: HOSPITAL | Age: 60
Discharge: HOME | End: 2020-01-22
Attending: SURGERY | Admitting: SURGERY

## 2020-01-21 ENCOUNTER — HOSPITAL ENCOUNTER (OUTPATIENT)
Dept: NUCLEAR MEDICINE | Facility: HOSPITAL | Age: 60
Discharge: HOME | End: 2020-01-21

## 2020-01-21 ENCOUNTER — ANESTHESIA (OUTPATIENT)
Dept: PERIOP | Facility: HOSPITAL | Age: 60
End: 2020-01-21

## 2020-01-21 DIAGNOSIS — Z40.01 PROPHYLACTIC BREAST REMOVAL: ICD-10-CM

## 2020-01-21 DIAGNOSIS — C50.411 MALIGNANT NEOPLASM OF UPPER-OUTER QUADRANT OF RIGHT FEMALE BREAST, UNSPECIFIED ESTROGEN RECEPTOR STATUS (HCC): ICD-10-CM

## 2020-01-21 DIAGNOSIS — C50.411 MALIGNANT NEOPLASM OF UPPER-OUTER QUADRANT OF RIGHT FEMALE BREAST (HCC): ICD-10-CM

## 2020-01-21 LAB
GLUCOSE BLDC GLUCOMTR-MCNC: 133 MG/DL (ref 70–130)
GLUCOSE BLDC GLUCOMTR-MCNC: 147 MG/DL (ref 70–130)
GLUCOSE BLDC GLUCOMTR-MCNC: 295 MG/DL (ref 70–130)
GLUCOSE BLDC GLUCOMTR-MCNC: 309 MG/DL (ref 70–130)

## 2020-01-21 PROCEDURE — 88305 TISSUE EXAM BY PATHOLOGIST: CPT | Performed by: SURGERY

## 2020-01-21 PROCEDURE — A9541 TC99M SULFUR COLLOID: HCPCS | Performed by: SURGERY

## 2020-01-21 PROCEDURE — 25010000002 BUPRENORPHINE PER 0.1 MG: Performed by: ANESTHESIOLOGY

## 2020-01-21 PROCEDURE — 38792 RA TRACER ID OF SENTINL NODE: CPT

## 2020-01-21 PROCEDURE — 25010000002 ONDANSETRON PER 1 MG: Performed by: NURSE ANESTHETIST, CERTIFIED REGISTERED

## 2020-01-21 PROCEDURE — 25010000002 NEOSTIGMINE 10 MG/10ML SOLUTION: Performed by: NURSE ANESTHETIST, CERTIFIED REGISTERED

## 2020-01-21 PROCEDURE — 25010000003 CEFAZOLIN IN DEXTROSE 2-4 GM/100ML-% SOLUTION: Performed by: SURGERY

## 2020-01-21 PROCEDURE — 0 TECHNETIUM FILTERED SULFUR COLLOID: Performed by: SURGERY

## 2020-01-21 PROCEDURE — 25010000002 DEXAMETHASONE SODIUM PHOSPHATE 10 MG/ML SOLUTION: Performed by: ANESTHESIOLOGY

## 2020-01-21 PROCEDURE — 88331 PATH CONSLTJ SURG 1 BLK 1SPC: CPT | Performed by: PATHOLOGY

## 2020-01-21 PROCEDURE — 82962 GLUCOSE BLOOD TEST: CPT

## 2020-01-21 PROCEDURE — 88307 TISSUE EXAM BY PATHOLOGIST: CPT | Performed by: SURGERY

## 2020-01-21 PROCEDURE — 25010000002 FENTANYL CITRATE (PF) 100 MCG/2ML SOLUTION: Performed by: NURSE ANESTHETIST, CERTIFIED REGISTERED

## 2020-01-21 PROCEDURE — 25010000002 PROPOFOL 10 MG/ML EMULSION: Performed by: NURSE ANESTHETIST, CERTIFIED REGISTERED

## 2020-01-21 PROCEDURE — 25010000002 DEXAMETHASONE SODIUM PHOSPHATE 10 MG/ML SOLUTION: Performed by: NURSE ANESTHETIST, CERTIFIED REGISTERED

## 2020-01-21 PROCEDURE — 63710000001 INSULIN LISPRO (HUMAN) PER 5 UNITS: Performed by: SURGERY

## 2020-01-21 PROCEDURE — 88333 PATH CONSLTJ SURG CYTO XM 1: CPT | Performed by: PATHOLOGY

## 2020-01-21 RX ORDER — DEXTROSE MONOHYDRATE 25 G/50ML
25 INJECTION, SOLUTION INTRAVENOUS
Status: DISCONTINUED | OUTPATIENT
Start: 2020-01-21 | End: 2020-01-22 | Stop reason: HOSPADM

## 2020-01-21 RX ORDER — SCOLOPAMINE TRANSDERMAL SYSTEM 1 MG/1
1 PATCH, EXTENDED RELEASE TRANSDERMAL CONTINUOUS
Status: DISCONTINUED | OUTPATIENT
Start: 2020-01-21 | End: 2020-01-22 | Stop reason: HOSPADM

## 2020-01-21 RX ORDER — HYDROMORPHONE HYDROCHLORIDE 1 MG/ML
0.5 INJECTION, SOLUTION INTRAMUSCULAR; INTRAVENOUS; SUBCUTANEOUS
Status: DISCONTINUED | OUTPATIENT
Start: 2020-01-21 | End: 2020-01-21 | Stop reason: HOSPADM

## 2020-01-21 RX ORDER — SODIUM CHLORIDE 0.9 % (FLUSH) 0.9 %
10 SYRINGE (ML) INJECTION AS NEEDED
Status: DISCONTINUED | OUTPATIENT
Start: 2020-01-21 | End: 2020-01-21 | Stop reason: HOSPADM

## 2020-01-21 RX ORDER — LIDOCAINE HYDROCHLORIDE 10 MG/ML
INJECTION, SOLUTION EPIDURAL; INFILTRATION; INTRACAUDAL; PERINEURAL AS NEEDED
Status: DISCONTINUED | OUTPATIENT
Start: 2020-01-21 | End: 2020-01-21 | Stop reason: SURG

## 2020-01-21 RX ORDER — FENTANYL CITRATE 50 UG/ML
50 INJECTION, SOLUTION INTRAMUSCULAR; INTRAVENOUS
Status: DISCONTINUED | OUTPATIENT
Start: 2020-01-21 | End: 2020-01-21 | Stop reason: HOSPADM

## 2020-01-21 RX ORDER — PROPOFOL 10 MG/ML
VIAL (ML) INTRAVENOUS CONTINUOUS PRN
Status: DISCONTINUED | OUTPATIENT
Start: 2020-01-21 | End: 2020-01-21 | Stop reason: SURG

## 2020-01-21 RX ORDER — FAMOTIDINE 20 MG/1
20 TABLET, FILM COATED ORAL ONCE
Status: COMPLETED | OUTPATIENT
Start: 2020-01-21 | End: 2020-01-21

## 2020-01-21 RX ORDER — CEFAZOLIN SODIUM 2 G/100ML
2 INJECTION, SOLUTION INTRAVENOUS ONCE
Status: COMPLETED | OUTPATIENT
Start: 2020-01-21 | End: 2020-01-21

## 2020-01-21 RX ORDER — PANTOPRAZOLE SODIUM 40 MG/1
40 TABLET, DELAYED RELEASE ORAL EVERY MORNING
Status: DISCONTINUED | OUTPATIENT
Start: 2020-01-22 | End: 2020-01-22 | Stop reason: HOSPADM

## 2020-01-21 RX ORDER — CEFAZOLIN SODIUM 2 G/100ML
2 INJECTION, SOLUTION INTRAVENOUS EVERY 8 HOURS
Status: COMPLETED | OUTPATIENT
Start: 2020-01-21 | End: 2020-01-22

## 2020-01-21 RX ORDER — LABETALOL HYDROCHLORIDE 5 MG/ML
5 INJECTION, SOLUTION INTRAVENOUS
Status: DISCONTINUED | OUTPATIENT
Start: 2020-01-21 | End: 2020-01-21 | Stop reason: HOSPADM

## 2020-01-21 RX ORDER — DEXAMETHASONE SODIUM PHOSPHATE 10 MG/ML
INJECTION, SOLUTION INTRAMUSCULAR; INTRAVENOUS AS NEEDED
Status: DISCONTINUED | OUTPATIENT
Start: 2020-01-21 | End: 2020-01-21 | Stop reason: SURG

## 2020-01-21 RX ORDER — SODIUM CHLORIDE, SODIUM LACTATE, POTASSIUM CHLORIDE, CALCIUM CHLORIDE 600; 310; 30; 20 MG/100ML; MG/100ML; MG/100ML; MG/100ML
9 INJECTION, SOLUTION INTRAVENOUS CONTINUOUS
Status: DISCONTINUED | OUTPATIENT
Start: 2020-01-21 | End: 2020-01-22 | Stop reason: HOSPADM

## 2020-01-21 RX ORDER — ESCITALOPRAM OXALATE 10 MG/1
10 TABLET ORAL DAILY
Status: DISCONTINUED | OUTPATIENT
Start: 2020-01-22 | End: 2020-01-22 | Stop reason: HOSPADM

## 2020-01-21 RX ORDER — DEXAMETHASONE SODIUM PHOSPHATE 10 MG/ML
INJECTION, SOLUTION INTRAMUSCULAR; INTRAVENOUS
Status: COMPLETED | OUTPATIENT
Start: 2020-01-21 | End: 2020-01-21

## 2020-01-21 RX ORDER — ONDANSETRON 2 MG/ML
INJECTION INTRAMUSCULAR; INTRAVENOUS AS NEEDED
Status: DISCONTINUED | OUTPATIENT
Start: 2020-01-21 | End: 2020-01-21 | Stop reason: SURG

## 2020-01-21 RX ORDER — ACETAMINOPHEN 500 MG
1000 TABLET ORAL ONCE
Status: COMPLETED | OUTPATIENT
Start: 2020-01-21 | End: 2020-01-21

## 2020-01-21 RX ORDER — MAGNESIUM HYDROXIDE 1200 MG/15ML
LIQUID ORAL AS NEEDED
Status: DISCONTINUED | OUTPATIENT
Start: 2020-01-21 | End: 2020-01-21 | Stop reason: HOSPADM

## 2020-01-21 RX ORDER — ONDANSETRON 2 MG/ML
4 INJECTION INTRAMUSCULAR; INTRAVENOUS ONCE AS NEEDED
Status: DISCONTINUED | OUTPATIENT
Start: 2020-01-21 | End: 2020-01-21 | Stop reason: HOSPADM

## 2020-01-21 RX ORDER — MELOXICAM 15 MG/1
15 TABLET ORAL ONCE
Status: COMPLETED | OUTPATIENT
Start: 2020-01-21 | End: 2020-01-21

## 2020-01-21 RX ORDER — SODIUM CHLORIDE 9 MG/ML
50 INJECTION, SOLUTION INTRAVENOUS CONTINUOUS
Status: DISCONTINUED | OUTPATIENT
Start: 2020-01-21 | End: 2020-01-22 | Stop reason: HOSPADM

## 2020-01-21 RX ORDER — MELOXICAM 7.5 MG/1
15 TABLET ORAL DAILY
Status: DISCONTINUED | OUTPATIENT
Start: 2020-01-22 | End: 2020-01-22 | Stop reason: HOSPADM

## 2020-01-21 RX ORDER — PROPOFOL 10 MG/ML
VIAL (ML) INTRAVENOUS AS NEEDED
Status: DISCONTINUED | OUTPATIENT
Start: 2020-01-21 | End: 2020-01-21 | Stop reason: SURG

## 2020-01-21 RX ORDER — LIDOCAINE HYDROCHLORIDE 10 MG/ML
0.5 INJECTION, SOLUTION EPIDURAL; INFILTRATION; INTRACAUDAL; PERINEURAL ONCE AS NEEDED
Status: COMPLETED | OUTPATIENT
Start: 2020-01-21 | End: 2020-01-21

## 2020-01-21 RX ORDER — DIAZEPAM 2 MG/1
2 TABLET ORAL EVERY 8 HOURS PRN
Status: DISCONTINUED | OUTPATIENT
Start: 2020-01-21 | End: 2020-01-22 | Stop reason: HOSPADM

## 2020-01-21 RX ORDER — GLYCOPYRROLATE 0.2 MG/ML
INJECTION INTRAMUSCULAR; INTRAVENOUS AS NEEDED
Status: DISCONTINUED | OUTPATIENT
Start: 2020-01-21 | End: 2020-01-21 | Stop reason: SURG

## 2020-01-21 RX ORDER — OXYCODONE HYDROCHLORIDE 5 MG/1
5 TABLET ORAL EVERY 4 HOURS PRN
Status: DISCONTINUED | OUTPATIENT
Start: 2020-01-21 | End: 2020-01-22 | Stop reason: HOSPADM

## 2020-01-21 RX ORDER — PROMETHAZINE HYDROCHLORIDE 25 MG/ML
6.25 INJECTION, SOLUTION INTRAMUSCULAR; INTRAVENOUS EVERY 6 HOURS PRN
Status: DISCONTINUED | OUTPATIENT
Start: 2020-01-21 | End: 2020-01-22 | Stop reason: HOSPADM

## 2020-01-21 RX ORDER — ONDANSETRON 2 MG/ML
4 INJECTION INTRAMUSCULAR; INTRAVENOUS EVERY 6 HOURS PRN
Status: DISCONTINUED | OUTPATIENT
Start: 2020-01-21 | End: 2020-01-22 | Stop reason: HOSPADM

## 2020-01-21 RX ORDER — BUPRENORPHINE HYDROCHLORIDE 0.32 MG/ML
INJECTION INTRAMUSCULAR; INTRAVENOUS
Status: COMPLETED | OUTPATIENT
Start: 2020-01-21 | End: 2020-01-21

## 2020-01-21 RX ORDER — NICOTINE POLACRILEX 4 MG
15 LOZENGE BUCCAL
Status: DISCONTINUED | OUTPATIENT
Start: 2020-01-21 | End: 2020-01-22 | Stop reason: HOSPADM

## 2020-01-21 RX ORDER — PREGABALIN 75 MG/1
75 CAPSULE ORAL ONCE
Status: COMPLETED | OUTPATIENT
Start: 2020-01-21 | End: 2020-01-21

## 2020-01-21 RX ORDER — ACETAMINOPHEN 500 MG
1000 TABLET ORAL EVERY 6 HOURS
Status: DISCONTINUED | OUTPATIENT
Start: 2020-01-21 | End: 2020-01-22 | Stop reason: HOSPADM

## 2020-01-21 RX ORDER — ROCURONIUM BROMIDE 10 MG/ML
INJECTION, SOLUTION INTRAVENOUS AS NEEDED
Status: DISCONTINUED | OUTPATIENT
Start: 2020-01-21 | End: 2020-01-21 | Stop reason: SURG

## 2020-01-21 RX ORDER — NEOSTIGMINE METHYLSULFATE 1 MG/ML
INJECTION, SOLUTION INTRAVENOUS AS NEEDED
Status: DISCONTINUED | OUTPATIENT
Start: 2020-01-21 | End: 2020-01-21 | Stop reason: SURG

## 2020-01-21 RX ORDER — SODIUM CHLORIDE 0.9 % (FLUSH) 0.9 %
10 SYRINGE (ML) INJECTION EVERY 12 HOURS SCHEDULED
Status: DISCONTINUED | OUTPATIENT
Start: 2020-01-21 | End: 2020-01-21 | Stop reason: HOSPADM

## 2020-01-21 RX ORDER — DIPHENHYDRAMINE HYDROCHLORIDE 50 MG/ML
12.5 INJECTION INTRAMUSCULAR; INTRAVENOUS EVERY 6 HOURS PRN
Status: DISCONTINUED | OUTPATIENT
Start: 2020-01-21 | End: 2020-01-22 | Stop reason: HOSPADM

## 2020-01-21 RX ORDER — NALOXONE HCL 0.4 MG/ML
0.1 VIAL (ML) INJECTION
Status: DISCONTINUED | OUTPATIENT
Start: 2020-01-21 | End: 2020-01-22 | Stop reason: HOSPADM

## 2020-01-21 RX ORDER — BUPIVACAINE HYDROCHLORIDE 2.5 MG/ML
INJECTION, SOLUTION EPIDURAL; INFILTRATION; INTRACAUDAL
Status: COMPLETED | OUTPATIENT
Start: 2020-01-21 | End: 2020-01-21

## 2020-01-21 RX ADMIN — INSULIN LISPRO 7 UNITS: 100 INJECTION, SOLUTION INTRAVENOUS; SUBCUTANEOUS at 22:03

## 2020-01-21 RX ADMIN — SCOPALAMINE 1 PATCH: 1 PATCH, EXTENDED RELEASE TRANSDERMAL at 07:48

## 2020-01-21 RX ADMIN — BUPIVACAINE HYDROCHLORIDE 60 ML: 2.5 INJECTION, SOLUTION EPIDURAL; INFILTRATION; INTRACAUDAL; PERINEURAL at 09:15

## 2020-01-21 RX ADMIN — ROCURONIUM BROMIDE 50 MG: 10 INJECTION INTRAVENOUS at 09:13

## 2020-01-21 RX ADMIN — SODIUM CHLORIDE 50 ML/HR: 9 INJECTION, SOLUTION INTRAVENOUS at 15:01

## 2020-01-21 RX ADMIN — DEXAMETHASONE SODIUM PHOSPHATE 4 MG: 10 INJECTION INTRAMUSCULAR; INTRAVENOUS at 09:15

## 2020-01-21 RX ADMIN — ACETAMINOPHEN 1000 MG: 500 TABLET, FILM COATED ORAL at 20:00

## 2020-01-21 RX ADMIN — BUPRENORPHINE HYDROCHLORIDE 0.3 MG: 0.32 INJECTION INTRAMUSCULAR; INTRAVENOUS at 09:15

## 2020-01-21 RX ADMIN — ACETAMINOPHEN 1000 MG: 500 TABLET, FILM COATED ORAL at 15:01

## 2020-01-21 RX ADMIN — SODIUM CHLORIDE, POTASSIUM CHLORIDE, SODIUM LACTATE AND CALCIUM CHLORIDE: 600; 310; 30; 20 INJECTION, SOLUTION INTRAVENOUS at 11:34

## 2020-01-21 RX ADMIN — OXYCODONE HYDROCHLORIDE 5 MG: 5 TABLET ORAL at 12:57

## 2020-01-21 RX ADMIN — LIDOCAINE HYDROCHLORIDE 50 MG: 10 INJECTION, SOLUTION EPIDURAL; INFILTRATION; INTRACAUDAL; PERINEURAL at 09:13

## 2020-01-21 RX ADMIN — LIDOCAINE HYDROCHLORIDE 0.2 ML: 10 INJECTION, SOLUTION EPIDURAL; INFILTRATION; INTRACAUDAL; PERINEURAL at 07:29

## 2020-01-21 RX ADMIN — PROPOFOL 200 MG: 10 INJECTION, EMULSION INTRAVENOUS at 09:13

## 2020-01-21 RX ADMIN — OXYCODONE HYDROCHLORIDE 5 MG: 5 TABLET ORAL at 15:02

## 2020-01-21 RX ADMIN — PREGABALIN 75 MG: 75 CAPSULE ORAL at 07:46

## 2020-01-21 RX ADMIN — GLYCOPYRROLATE 0.4 MG: 0.2 INJECTION, SOLUTION INTRAMUSCULAR; INTRAVENOUS at 11:29

## 2020-01-21 RX ADMIN — EPHEDRINE SULFATE 10 MG: 50 INJECTION INTRAMUSCULAR; INTRAVENOUS; SUBCUTANEOUS at 10:07

## 2020-01-21 RX ADMIN — CEFAZOLIN SODIUM 2 G: 2 INJECTION, SOLUTION INTRAVENOUS at 09:13

## 2020-01-21 RX ADMIN — SODIUM CHLORIDE, POTASSIUM CHLORIDE, SODIUM LACTATE AND CALCIUM CHLORIDE 9 ML/HR: 600; 310; 30; 20 INJECTION, SOLUTION INTRAVENOUS at 07:29

## 2020-01-21 RX ADMIN — FENTANYL CITRATE 50 MCG: 0.05 INJECTION, SOLUTION INTRAMUSCULAR; INTRAVENOUS at 12:13

## 2020-01-21 RX ADMIN — MELOXICAM 15 MG: 15 TABLET ORAL at 07:46

## 2020-01-21 RX ADMIN — DEXAMETHASONE SODIUM PHOSPHATE 6 MG: 10 INJECTION INTRAMUSCULAR; INTRAVENOUS at 09:17

## 2020-01-21 RX ADMIN — ACETAMINOPHEN 1000 MG: 500 TABLET ORAL at 07:46

## 2020-01-21 RX ADMIN — OXYCODONE HYDROCHLORIDE 5 MG: 5 TABLET ORAL at 19:33

## 2020-01-21 RX ADMIN — FAMOTIDINE 20 MG: 20 TABLET ORAL at 07:46

## 2020-01-21 RX ADMIN — TECHNETIUM TC 99M SULFUR COLLOID 1 DOSE: KIT at 09:20

## 2020-01-21 RX ADMIN — FENTANYL CITRATE 50 MCG: 0.05 INJECTION, SOLUTION INTRAMUSCULAR; INTRAVENOUS at 12:05

## 2020-01-21 RX ADMIN — PROPOFOL 25 MCG/KG/MIN: 10 INJECTION, EMULSION INTRAVENOUS at 09:17

## 2020-01-21 RX ADMIN — NEOSTIGMINE METHYLSULFATE 3 MG: 1 INJECTION, SOLUTION INTRAVENOUS at 11:29

## 2020-01-21 RX ADMIN — INSULIN LISPRO 6 UNITS: 100 INJECTION, SOLUTION INTRAVENOUS; SUBCUTANEOUS at 17:58

## 2020-01-21 RX ADMIN — ONDANSETRON 4 MG: 2 INJECTION INTRAMUSCULAR; INTRAVENOUS at 11:28

## 2020-01-21 RX ADMIN — CEFAZOLIN SODIUM 2 G: 2 INJECTION, SOLUTION INTRAVENOUS at 17:57

## 2020-01-21 NOTE — ANESTHESIA PREPROCEDURE EVALUATION
Anesthesia Evaluation     Patient summary reviewed and Nursing notes reviewed   no history of anesthetic complications:  NPO Solid Status: > 8 hours  NPO Liquid Status: > 2 hours           Airway   Mallampati: II  TM distance: >3 FB  Neck ROM: full  No difficulty expected  Dental - normal exam     Pulmonary - normal exam    breath sounds clear to auscultation  (+) a smoker (vapes) Current,   Cardiovascular - negative cardio ROS and normal exam    ECG reviewed  Rhythm: regular  Rate: normal        Neuro/Psych- negative ROS  GI/Hepatic/Renal/Endo    (+)  GERD well controlled,  diabetes mellitus type 2 using insulin,     Musculoskeletal     Abdominal    Substance History      OB/GYN          Other      history of cancer (Breast ca)                    Anesthesia Plan    ASA 3     general with block   (Bilateral PECS I&II blocks post-induction for post-operative analgesia per request of Dr. ELLIS)  intravenous induction     Anesthetic plan, all risks, benefits, and alternatives have been provided, discussed and informed consent has been obtained with: patient.    Plan discussed with CRNA.

## 2020-01-21 NOTE — ANESTHESIA PROCEDURE NOTES
Peripheral Block      Patient reassessed immediately prior to procedure    Patient location during procedure: OR  Reason for block: at surgeon's request and post-op pain management  Performed by  Anesthesiologist: Eliud Smith MD  Preanesthetic Checklist  Completed: patient identified, site marked, surgical consent, pre-op evaluation, timeout performed, IV checked, risks and benefits discussed and monitors and equipment checked  Prep:  Pt Position: supine  Sterile barriers:cap, gloves, gown and mask  Prep: ChloraPrep  Patient monitoring: blood pressure monitoring, continuous pulse oximetry and EKG  Procedure  Performed under: general  Guidance:ultrasound guided and landmark technique  Images:still images obtained, printed/placed on chart    Laterality:Bilateral  Block Type:PECS I and PECS II  Injection Technique:single-shot  Needle Type:short-bevel  Needle Gauge:20 G  Resistance on Injection: none    Medications Used: buprenorphine (BUPRENEX) injection, 0.3 mg  dexamethasone sodium phosphate injection, 4 mg  bupivacaine PF (MARCAINE) 0.25 % injection, 60 mL  Med admintered at 1/21/2020 9:15 AM      Medications  Preservative Free Saline:10ml  Comment:Block Injection:  Total volume of LA divided between Right and Left sided blocks         Post Assessment  Injection Assessment: negative aspiration for heme and incremental injection  Patient Tolerance:comfortable throughout block  Complications:no  Additional Notes  The pt. Was placed in the Supine Position and GA was induced     The insertion site was prepped with CHG and Ultrasound guidance with In-Plane techniquewas  a 4inch BBraun 360 degree echogenic needle was visualized.  Normal Saline PSF was  utilized for hydrodissection of tissue. PECS 1 Block- Pectoralis Major and Minor where identified and LA was injected between PMM and PmM at the level of the 3rd Rib(10ml),  PECS 2-  Pectoralis Minor and Serratus muscle where identified and the needle was advanced  laterally in-plane with the 4th rib as a backstop, pleura was monitored.  LA was injected between SA and PmM at the level of 4th rib( 20ml).  LA injection spread was visualized, injection was incremental 1-5ml, normal or low injection pressure, no intravascular injection, no pneumothorax appreciated.  Thank You.

## 2020-01-21 NOTE — ANESTHESIA PROCEDURE NOTES
Airway  Urgency: elective    Date/Time: 1/21/2020 9:16 AM  Airway not difficult    General Information and Staff    Patient location during procedure: OR  CRNA: Gaor Arana CRNA    Indications and Patient Condition  Indications for airway management: airway protection    Preoxygenated: yes  MILS not maintained throughout  Mask difficulty assessment: 1 - vent by mask    Final Airway Details  Final airway type: endotracheal airway      Successful airway: ETT  Cuffed: yes   Successful intubation technique: direct laryngoscopy  Facilitating devices/methods: Bougie  Endotracheal tube insertion site: oral  Blade: Mike  Blade size: 2  ETT size (mm): 7.0  Cormack-Lehane Classification: grade I - full view of glottis  Placement verified by: chest auscultation and capnometry   Cuff volume (mL): 5  Measured from: lips  ETT/EBT  to lips (cm): 21  Number of attempts at approach: 2  Assessment: lips, teeth, and gum same as pre-op and atraumatic intubation    Additional Comments  Negative epigastric sounds, Breath sound equal bilaterally with symmetric chest rise and fall

## 2020-01-21 NOTE — ANESTHESIA POSTPROCEDURE EVALUATION
Patient: Luana Chawla    Procedure Summary     Date:  01/21/20 Room / [facility]:   [facility]    Anesthesia Start:  0908 Anesthesia Stop:      Procedure:  SKIN SPARING MASTECTOMIES BILATERAL, SENTINEL NODE BIOPSY RIGHT (Bilateral Breast) Diagnosis:  Malignant neoplasm of upper-outer quadrant of right female breast (CMS/HCC)    Surgeon:  Silvio Howard MD Provider:  Eliud Smith MD    Anesthesia Type:  general with block ASA Status:  3          Anesthesia Type: general with block    Vitals  Vitals Value Taken Time   /52 1/21/2020 11:49 AM   Temp     Pulse     Resp     SpO2 92 % 1/21/2020 11:51 AM   Vitals shown include unvalidated device data.        Post Anesthesia Care and Evaluation    Patient location during evaluation: PACU  Patient participation: complete - patient participated  Level of consciousness: awake and awake and alert  Pain score: 0  Pain management: adequate  Airway patency: patent  Anesthetic complications: No anesthetic complications  PONV Status: none  Cardiovascular status: acceptable and stable  Respiratory status: nasal cannula, unassisted, acceptable and spontaneous ventilation  Hydration status: acceptable

## 2020-01-22 VITALS
OXYGEN SATURATION: 93 % | HEART RATE: 71 BPM | BODY MASS INDEX: 23.14 KG/M2 | RESPIRATION RATE: 18 BRPM | WEIGHT: 144 LBS | TEMPERATURE: 97.8 F | HEIGHT: 66 IN | SYSTOLIC BLOOD PRESSURE: 133 MMHG | DIASTOLIC BLOOD PRESSURE: 90 MMHG

## 2020-01-22 LAB — GLUCOSE BLDC GLUCOMTR-MCNC: 162 MG/DL (ref 70–130)

## 2020-01-22 PROCEDURE — 82962 GLUCOSE BLOOD TEST: CPT

## 2020-01-22 PROCEDURE — 25010000003 CEFAZOLIN IN DEXTROSE 2-4 GM/100ML-% SOLUTION: Performed by: SURGERY

## 2020-01-22 RX ORDER — OXYCODONE HYDROCHLORIDE 5 MG/1
5 TABLET ORAL EVERY 6 HOURS PRN
Qty: 12 TABLET | Refills: 0 | Status: SHIPPED | OUTPATIENT
Start: 2020-01-22 | End: 2020-01-31

## 2020-01-22 RX ADMIN — ESCITALOPRAM OXALATE 10 MG: 10 TABLET ORAL at 09:05

## 2020-01-22 RX ADMIN — PANTOPRAZOLE SODIUM 40 MG: 40 TABLET, DELAYED RELEASE ORAL at 06:01

## 2020-01-22 RX ADMIN — ACETAMINOPHEN 1000 MG: 500 TABLET, FILM COATED ORAL at 01:25

## 2020-01-22 RX ADMIN — MELOXICAM 15 MG: 7.5 TABLET ORAL at 09:04

## 2020-01-22 RX ADMIN — ACETAMINOPHEN 1000 MG: 500 TABLET, FILM COATED ORAL at 09:04

## 2020-01-22 RX ADMIN — OXYCODONE HYDROCHLORIDE 5 MG: 5 TABLET ORAL at 10:18

## 2020-01-22 RX ADMIN — OXYCODONE HYDROCHLORIDE 5 MG: 5 TABLET ORAL at 01:26

## 2020-01-22 RX ADMIN — CEFAZOLIN SODIUM 2 G: 2 INJECTION, SOLUTION INTRAVENOUS at 01:27

## 2020-01-22 RX ADMIN — OXYCODONE HYDROCHLORIDE 5 MG: 5 TABLET ORAL at 05:58

## 2020-01-28 ENCOUNTER — TELEPHONE (OUTPATIENT)
Dept: ONCOLOGY | Facility: CLINIC | Age: 60
End: 2020-01-28

## 2020-01-28 NOTE — TELEPHONE ENCOUNTER
Miguelina in phone room has rescheduled the patient already for 2/11/20 with Dr. Gomez in [place].

## 2020-01-28 NOTE — TELEPHONE ENCOUNTER
Patient needs to reschedule NP appt for 1/29.  She has an appt with surgeon to have drains removed at 9:00 and can't do both.  Also, she would like to schedule with Dr. Gomez in [place] if possible, it is closer to her home.    [phone]

## 2020-01-29 LAB
CYTO UR: NORMAL
LAB AP CASE REPORT: NORMAL
LAB AP CLINICAL INFORMATION: NORMAL
Lab: NORMAL
PATH REPORT.FINAL DX SPEC: NORMAL
PATH REPORT.GROSS SPEC: NORMAL

## 2020-02-11 ENCOUNTER — CONSULT (OUTPATIENT)
Dept: ONCOLOGY | Facility: CLINIC | Age: 60
End: 2020-02-11

## 2020-02-11 VITALS
RESPIRATION RATE: 18 BRPM | TEMPERATURE: 97.8 F | DIASTOLIC BLOOD PRESSURE: 91 MMHG | HEIGHT: 66 IN | WEIGHT: 144 LBS | BODY MASS INDEX: 23.14 KG/M2 | HEART RATE: 90 BPM | SYSTOLIC BLOOD PRESSURE: 149 MMHG

## 2020-02-11 DIAGNOSIS — C50.411 MALIGNANT NEOPLASM OF UPPER-OUTER QUADRANT OF RIGHT BREAST IN FEMALE, ESTROGEN RECEPTOR POSITIVE (HCC): Primary | ICD-10-CM

## 2020-02-11 DIAGNOSIS — Z17.0 MALIGNANT NEOPLASM OF UPPER-OUTER QUADRANT OF RIGHT BREAST IN FEMALE, ESTROGEN RECEPTOR POSITIVE (HCC): Primary | ICD-10-CM

## 2020-02-11 DIAGNOSIS — Z51.11 ENCOUNTER FOR ANTINEOPLASTIC CHEMOTHERAPY: ICD-10-CM

## 2020-02-11 PROCEDURE — 99205 OFFICE O/P NEW HI 60 MIN: CPT | Performed by: INTERNAL MEDICINE

## 2020-02-11 RX ORDER — ONDANSETRON HYDROCHLORIDE 8 MG/1
8 TABLET, FILM COATED ORAL 3 TIMES DAILY PRN
Qty: 30 TABLET | Refills: 5 | Status: SHIPPED | OUTPATIENT
Start: 2020-02-11 | End: 2020-08-27 | Stop reason: ALTCHOICE

## 2020-02-11 RX ORDER — LIDOCAINE AND PRILOCAINE 25; 25 MG/G; MG/G
CREAM TOPICAL AS NEEDED
Qty: 30 G | Refills: 3 | Status: SHIPPED | OUTPATIENT
Start: 2020-02-11 | End: 2020-03-06 | Stop reason: HOSPADM

## 2020-02-11 NOTE — PROGRESS NOTES
CHIEF COMPLAINT: Adjuvant therapy breast cancer discussion    REASON FOR REFERRAL: Same      RECORDS OBTAINED  Records of the patients history including those obtained from Dr. Santiago were reviewed and summarized in detail.    Oncology/Hematology History    1. 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  Negative cancer next panel    Oncology history timeline:  -5/24/2019 Dr. Garcia saw for spinal stenosis with radiculopathy and left foot drop due to herniated disc and performed decompressive laminectomy, L4-5 and L5-S1 discectomy and medial facetectomy  -10/15/2019 mammogram BI-RADS 0  -11/22/2019 right mammogram/ultrasound BI-RADS 4 with ultrasound-guided core biopsy showing invasive ductal carcinoma Cory score 6 out of 9 grade 2, ER 5% 1+ positive ID 50% 1-2+ positive, HER-2/ashley 100% 3+ positive.  -12/13/2019 MRI breasts revealed 2.2 cm right breast mass consistent with biopsy proven cancer with unsuspected adjacent area's of non-mass enhancement worrisome for DCIS.  Non-mass enhancement in the subareolar left breast worrisome for DCIS.  Dominant focus left central portion left breast indeterminate BI-RADS 4.  Cancer next panel negative  -1/15/2020 CMP unremarkable save for glucose 200 with hemoglobin 10.6 normochromic normocytic indices  -1/21/2020 right skin sparing mastectomy with right sentinel lymph node injection and right deep subfascial sentinel lymph node biopsy with contralateral prophylactic left skin sparing mastectomy.  Right breast 1.8 cm Bloom Dias 8 out of 9, total of 4 lymph nodes examined 2 sentinel nodes all negative.  Pathological T1 cN0 M0 stage Ia.  Left breast benign with sclerosing adenosis.        Malignant neoplasm of upper-outer quadrant of right female breast (CMS/HCC)    1/16/2019 Cancer Staged     Staging form: Breast, AJCC  8th Edition  - Clinical stage from 1/16/2019: Stage IB (cT2, cN0, cM0, G2, ER+, NY+, HER2+) - Signed by Lisa Herman MD on 1/27/2020 1/21/2020 Initial Diagnosis     Malignant neoplasm of upper-outer quadrant of right female breast (CMS/HCC)      2/11/2020 Cancer Staged     Staging form: Breast, AJCC 8th Edition  - Pathologic: Stage IA (pT1c, pN0, cM0, G3, ER+, NY+, HER2+) - Signed by Hero Gomez MD on 2/11/2020 2/26/2020 -  Chemotherapy     OP BREAST PACLitaxel / Trastuzumab-anns (Weekly X 12)      5/20/2020 -  Chemotherapy     OP BREAST Trastuzumab-anns Q21D (maintenance)         HISTORY OF PRESENT ILLNESS:  The patient is a 59 y.o.  female, referred for adjuvant therapy of breast cancer status post bilateral mastectomies healing well.    REVIEW OF SYSTEMS:  A 14 point review of systems was performed and is negative except as noted above.    Past Medical History:   Diagnosis Date   • Diabetes mellitus (CMS/HCC)    • Diverticulitis    • GERD (gastroesophageal reflux disease)    • Kidney stone    • Malignant neoplasm of upper-outer quadrant of right female breast (CMS/HCC) 1/21/2020   • Sciatica    • Wears contact lenses      Past Surgical History:   Procedure Laterality Date   • APPENDECTOMY     • BREAST BIOPSY Right    • BREAST EXCISIONAL BIOPSY Right    • CHOLECYSTECTOMY     • COLON SURGERY     • COLONOSCOPY     • HYSTERECTOMY     • LUMBAR LAMINECTOMY DISCECTOMY DECOMPRESSION N/A 5/24/2019    Procedure: LUMBAR LAMINECTOMY L4-5 AND L5-S1;  Surgeon: Hipolito Kahn MD;  Location:  [facility];  Service: Orthopedic Spine   • MASTECTOMY W/ SENTINEL NODE BIOPSY Bilateral 1/21/2020    Procedure: SKIN SPARING MASTECTOMIES BILATERAL, SENTINEL NODE BIOPSY RIGHT;  Surgeon: Silvio Howard MD;  Location:  [facility];  Service: General       Current Outpatient Medications on File Prior to Visit   Medication Sig Dispense Refill   • cetirizine (zyrTEC) 10 MG tablet Take 10 mg by mouth Daily.     •  "escitalopram (LEXAPRO) 10 MG tablet Take 10 mg by mouth Daily.     • Insulin Glargine (BASAGLAR KWIKPEN SC) Inject 30 Units under the skin into the appropriate area as directed Every Night.     • metFORMIN ER (GLUCOPHAGE-XR) 500 MG 24 hr tablet Take 500 mg by mouth 2 (Two) Times a Day.     • omeprazole (priLOSEC) 40 MG capsule Take 40 mg by mouth Daily.     • Semaglutide (OZEMPIC) 0.25 or 0.5 MG/DOSE solution pen-injector Inject 1 mg under the skin into the appropriate area as directed 1 (One) Time Per Week.       No current facility-administered medications on file prior to visit.        Allergies   Allergen Reactions   • Bactrim [Sulfamethoxazole-Trimethoprim] Rash     RASH, SKIN PEELING        Social History     Socioeconomic History   • Marital status: Single     Spouse name: Not on file   • Number of children: Not on file   • Years of education: Not on file   • Highest education level: Not on file   Tobacco Use   • Smoking status: Former Smoker     Types: Cigarettes, Electronic Cigarette     Last attempt to quit:      Years since quittin.1   • Smokeless tobacco: Never Used   • Tobacco comment: currently use nicotine e-cig.   Substance and Sexual Activity   • Alcohol use: No     Frequency: Never   • Drug use: No   • Sexual activity: Defer       Family History   Problem Relation Age of Onset   • Breast cancer Mother 70   • Ovarian cancer Neg Hx        PHYSICAL EXAM:    /91   Pulse 90   Temp 97.8 °F (36.6 °C)   Resp 18   Ht 167.6 cm (66\")   Wt 65.3 kg (144 lb)   BMI 23.24 kg/m²     ECOG: (0) Fully Active - Able to Carry On All Pre-disease Performance Without Restriction  General: well appearing female in no acute distress  HEENT: sclera anicteric, oropharynx clear  Lymphatics: no cervical, supraclavicular, inguinal, or axillary adenopathy  Cardiovascular: regular rate and rhythm, no murmurs  Neck: Supple; No thyromegaly  Lungs: clear to auscultation bilaterally. No respiratory distress. "   Abdomen: soft, nontender, nondistended.  No palpable organomegaly  Extremities: no cyanosis, clubbing, edema, or cords  Skin: no rashes, lesions, bruising, or petechiae  Neuro: Alert and oriented x 4; Moving all extremities.  Psych: No anxiety or depression    Lab Results   Component Value Date    HGB 10.6 (L) 01/15/2020    HCT 32.8 (L) 01/15/2020    MCV 89.4 01/15/2020     01/15/2020    WBC 8.43 01/15/2020     Lab Results   Component Value Date    GLUCOSE 200 (H) 01/15/2020    BUN 12 01/15/2020    CREATININE 0.66 01/15/2020     01/15/2020    K 4.2 01/15/2020     01/15/2020    CO2 28.0 01/15/2020    CALCIUM 9.8 01/15/2020    PROTEINTOT 6.6 01/15/2020    ALBUMIN 4.50 01/15/2020    BILITOT 0.2 01/15/2020    ALKPHOS 82 01/15/2020    AST 16 01/15/2020    ALT 16 01/15/2020           Assessment/Plan     1. 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  Negative cancer next panel  2. Significant diabetes with predating neuropathy due to spinal stenosis status post laminectomy 5/24/2019 with persistent neuropathy dorsum left foot  3. Anemia:    Oncology history timeline:  -5/24/2019 Dr. Garcia saw for spinal stenosis with radiculopathy and left foot drop due to herniated disc and performed decompressive laminectomy, L4-5 and L5-S1 discectomy and medial facetectomy  -10/15/2019 mammogram BI-RADS 0  -11/22/2019 right mammogram/ultrasound BI-RADS 4 with ultrasound-guided core biopsy showing invasive ductal carcinoma Revere score 6 out of 9 grade 2, ER 5% 1+ positive SD 50% 1-2+ positive, HER-2/ashley 100% 3+ positive.  -12/13/2019 MRI breasts revealed 2.2 cm right breast mass consistent with biopsy proven cancer with unsuspected adjacent area's of non-mass enhancement worrisome for DCIS.  Non-mass enhancement in the subareolar left breast worrisome for DCIS.   Dominant focus left central portion left breast indeterminate BI-RADS 4.  Cancer next panel negative  -1/15/2020 CMP unremarkable save for glucose 200 with hemoglobin 10.6 normochromic normocytic indices  -1/21/2020 right skin sparing mastectomy with right sentinel lymph node injection and right deep subfascial sentinel lymph node biopsy with contralateral prophylactic left skin sparing mastectomy.  Right breast 1.8 cm Bloom Dias 8 out of 9, total of 4 lymph nodes examined 2 sentinel nodes all negative.  Pathological T1 cN0 M0 stage Ia.  Left breast benign with sclerosing adenosis.    Discussion: Based on the APT trial results, I would suggest Taxol Herceptin weekly x12 followed by Herceptin every 3 weeks x 13.  I would not give additional HER-2/ashley directed drugs nor additional chemotherapy.   She will get a port with Dr. ELLIS.  We will get ejection fraction on echocardiogram and monitor that quarterly.  She will get a chemo preparation visit before start of TH.  Following the Taxol she will take at least 5 years of Arimidex though she is not strongly ER positive but is fairly WA positive.  She will need to have her bone densities watched by her primary care at least every couple of years.  We discussed the side effects including neuropathy, allergic reactions, decreased ejection fraction, etc. as well as myelosuppression and risk of infection among many other potentials.  With her anemia I will repeat her CBC as that needs further evaluation but would not get in the way of our chemotherapy plans as above.  Discussed with patient face-to-face for 1 hour greater than 50% spent counseling regarding this plan as outlined above.    Hero Gomez MD    2/11/2020

## 2020-02-13 ENCOUNTER — TRANSCRIBE ORDERS (OUTPATIENT)
Dept: GENERAL RADIOLOGY | Facility: HOSPITAL | Age: 60
End: 2020-02-13

## 2020-02-13 DIAGNOSIS — C50.411 MALIGNANT NEOPLASM OF UPPER-OUTER QUADRANT OF RIGHT FEMALE BREAST, UNSPECIFIED ESTROGEN RECEPTOR STATUS (HCC): Primary | ICD-10-CM

## 2020-02-14 ENCOUNTER — HOSPITAL ENCOUNTER (OUTPATIENT)
Dept: GENERAL RADIOLOGY | Facility: HOSPITAL | Age: 60
Discharge: HOME | End: 2020-02-14

## 2020-02-14 DIAGNOSIS — C50.411 MALIGNANT NEOPLASM OF UPPER-OUTER QUADRANT OF RIGHT FEMALE BREAST, UNSPECIFIED ESTROGEN RECEPTOR STATUS (HCC): ICD-10-CM

## 2020-02-14 PROCEDURE — 71045 X-RAY EXAM CHEST 1 VIEW: CPT

## 2020-02-18 ENCOUNTER — HOSPITAL ENCOUNTER (OUTPATIENT)
Dept: CARDIOLOGY | Facility: HOSPITAL | Age: 60
Discharge: HOME | End: 2020-02-18
Admitting: INTERNAL MEDICINE

## 2020-02-18 VITALS — WEIGHT: 144 LBS | HEIGHT: 66 IN | BODY MASS INDEX: 23.14 KG/M2

## 2020-02-18 DIAGNOSIS — C50.411 MALIGNANT NEOPLASM OF UPPER-OUTER QUADRANT OF RIGHT BREAST IN FEMALE, ESTROGEN RECEPTOR POSITIVE (HCC): ICD-10-CM

## 2020-02-18 DIAGNOSIS — Z17.0 MALIGNANT NEOPLASM OF UPPER-OUTER QUADRANT OF RIGHT BREAST IN FEMALE, ESTROGEN RECEPTOR POSITIVE (HCC): ICD-10-CM

## 2020-02-18 DIAGNOSIS — Z51.11 ENCOUNTER FOR ANTINEOPLASTIC CHEMOTHERAPY: ICD-10-CM

## 2020-02-18 LAB
BH CV ECHO MEAS - AO MAX PG (FULL): 4.5 MMHG
BH CV ECHO MEAS - AO MAX PG: 8 MMHG
BH CV ECHO MEAS - AO MEAN PG (FULL): 2 MMHG
BH CV ECHO MEAS - AO MEAN PG: 4 MMHG
BH CV ECHO MEAS - AO V2 MAX: 138 CM/SEC
BH CV ECHO MEAS - AO V2 MEAN: 91.6 CM/SEC
BH CV ECHO MEAS - AO V2 VTI: 25.1 CM
BH CV ECHO MEAS - AVA(I,A): 2.3 CM^2
BH CV ECHO MEAS - AVA(I,D): 2.3 CM^2
BH CV ECHO MEAS - AVA(V,A): 2.1 CM^2
BH CV ECHO MEAS - AVA(V,D): 2.1 CM^2
BH CV ECHO MEAS - EDV(CUBED): 75.2 ML
BH CV ECHO MEAS - EDV(TEICH): 79.5 ML
BH CV ECHO MEAS - EF(CUBED): 67.2 %
BH CV ECHO MEAS - EF(MOD-BP): 57 %
BH CV ECHO MEAS - EF(TEICH): 59.1 %
BH CV ECHO MEAS - ESV(CUBED): 24.6 ML
BH CV ECHO MEAS - ESV(TEICH): 32.5 ML
BH CV ECHO MEAS - FS: 31 %
BH CV ECHO MEAS - IVS/LVPW: 0.88
BH CV ECHO MEAS - IVSD: 0.81 CM
BH CV ECHO MEAS - LA DIMENSION: 2.7 CM
BH CV ECHO MEAS - LAD MAJOR: 3.6 CM
BH CV ECHO MEAS - LAT PEAK E' VEL: 12.2 CM/SEC
BH CV ECHO MEAS - LATERAL E/E' RATIO: 5.4
BH CV ECHO MEAS - LV MASS(C)D: 113.7 GRAMS
BH CV ECHO MEAS - LV MAX PG: 3.5 MMHG
BH CV ECHO MEAS - LV MEAN PG: 2 MMHG
BH CV ECHO MEAS - LV V1 MAX: 93.2 CM/SEC
BH CV ECHO MEAS - LV V1 MEAN: 65 CM/SEC
BH CV ECHO MEAS - LV V1 VTI: 18.1 CM
BH CV ECHO MEAS - LVIDD: 4.2 CM
BH CV ECHO MEAS - LVIDS: 2.9 CM
BH CV ECHO MEAS - LVOT AREA (M): 3.1 CM^2
BH CV ECHO MEAS - LVOT AREA: 3.1 CM^2
BH CV ECHO MEAS - LVOT DIAM: 2 CM
BH CV ECHO MEAS - LVPWD: 0.92 CM
BH CV ECHO MEAS - MED PEAK E' VEL: 8.2 CM/SEC
BH CV ECHO MEAS - MEDIAL E/E' RATIO: 8
BH CV ECHO MEAS - MV A MAX VEL: 83.9 CM/SEC
BH CV ECHO MEAS - MV DEC TIME: 0.18 SEC
BH CV ECHO MEAS - MV E MAX VEL: 65.6 CM/SEC
BH CV ECHO MEAS - MV E/A: 0.78
BH CV ECHO MEAS - PA ACC SLOPE: 505 CM/SEC^2
BH CV ECHO MEAS - PA ACC TIME: 0.14 SEC
BH CV ECHO MEAS - PA MAX PG: 3.4 MMHG
BH CV ECHO MEAS - PA PR(ACCEL): 15.6 MMHG
BH CV ECHO MEAS - PA V2 MAX: 92.3 CM/SEC
BH CV ECHO MEAS - SV(CUBED): 50.5 ML
BH CV ECHO MEAS - SV(LVOT): 56.9 ML
BH CV ECHO MEAS - SV(TEICH): 47 ML
BH CV ECHO MEASUREMENTS AVERAGE E/E' RATIO: 6.43
BH CV VAS BP RIGHT ARM: NORMAL MMHG
LEFT ATRIUM VOLUME: 21 ML
LV EF 2D ECHO EST: 55 %

## 2020-02-18 PROCEDURE — 93356 MYOCRD STRAIN IMG SPCKL TRCK: CPT | Performed by: INTERNAL MEDICINE

## 2020-02-18 PROCEDURE — 93306 TTE W/DOPPLER COMPLETE: CPT | Performed by: INTERNAL MEDICINE

## 2020-02-18 PROCEDURE — 93356 MYOCRD STRAIN IMG SPCKL TRCK: CPT

## 2020-02-18 PROCEDURE — 93306 TTE W/DOPPLER COMPLETE: CPT

## 2020-02-20 ENCOUNTER — OFFICE VISIT (OUTPATIENT)
Dept: ONCOLOGY | Facility: CLINIC | Age: 60
End: 2020-02-20

## 2020-02-20 VITALS
SYSTOLIC BLOOD PRESSURE: 147 MMHG | TEMPERATURE: 98.2 F | WEIGHT: 147 LBS | HEIGHT: 66 IN | BODY MASS INDEX: 23.63 KG/M2 | RESPIRATION RATE: 18 BRPM | DIASTOLIC BLOOD PRESSURE: 80 MMHG | HEART RATE: 87 BPM

## 2020-02-20 DIAGNOSIS — Z17.0 MALIGNANT NEOPLASM OF UPPER-OUTER QUADRANT OF RIGHT BREAST IN FEMALE, ESTROGEN RECEPTOR POSITIVE (HCC): Primary | ICD-10-CM

## 2020-02-20 DIAGNOSIS — Z51.11 ENCOUNTER FOR ANTINEOPLASTIC CHEMOTHERAPY: ICD-10-CM

## 2020-02-20 DIAGNOSIS — C50.411 MALIGNANT NEOPLASM OF UPPER-OUTER QUADRANT OF RIGHT BREAST IN FEMALE, ESTROGEN RECEPTOR POSITIVE (HCC): Primary | ICD-10-CM

## 2020-02-20 PROCEDURE — 99215 OFFICE O/P EST HI 40 MIN: CPT | Performed by: NURSE PRACTITIONER

## 2020-02-20 NOTE — PROGRESS NOTES
CHEMOTHERAPY PREPARATION    Luana Chawla  [id number]  1960    Chief Complaint: Chemotherapy preparation    History of present illness:  Luana Chawla is a 59 y.o. year old female who is here today for chemotherapy preparation and needs assessment. The patient has been diagnosed with right breast cancer and is scheduled to begin treatment with Taxol and Herceptin.     Oncology History:    Oncology/Hematology History    1. 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  Negative cancer next panel  2. Significant diabetes with predating neuropathy due to spinal stenosis status post laminectomy 5/24/2019 with persistent neuropathy dorsum left foot    Oncology history timeline:  -5/24/2019 Dr. Garcia saw for spinal stenosis with radiculopathy and left foot drop due to herniated disc and performed decompressive laminectomy, L4-5 and L5-S1 discectomy and medial facetectomy  -10/15/2019 mammogram BI-RADS 0  -11/22/2019 right mammogram/ultrasound BI-RADS 4 with ultrasound-guided core biopsy showing invasive ductal carcinoma [place] score 6 out of 9 grade 2, ER 5% 1+ positive SC 50% 1-2+ positive, HER-2/ashley 100% 3+ positive.  -12/13/2019 MRI breasts revealed 2.2 cm right breast mass consistent with biopsy proven cancer with unsuspected adjacent area's of non-mass enhancement worrisome for DCIS.  Non-mass enhancement in the subareolar left breast worrisome for DCIS.  Dominant focus left central portion left breast indeterminate BI-RADS 4.  Cancer next panel negative  -1/15/2020 CMP unremarkable save for glucose 200 with hemoglobin 10.6 normochromic normocytic indices  -1/21/2020 right skin sparing mastectomy with right sentinel lymph node injection and right deep subfascial sentinel lymph node biopsy with contralateral prophylactic left skin sparing  mastectomy.  Right breast 1.8 cm Bloom Dias 8 out of 9, total of 4 lymph nodes examined 2 sentinel nodes all negative.  Pathological T1 cN0 M0 stage Ia.  Left breast benign with sclerosing adenosis.        Malignant neoplasm of upper-outer quadrant of right female breast (CMS/HCC)    1/16/2019 Cancer Staged     Staging form: Breast, AJCC 8th Edition  - Clinical stage from 1/16/2019: Stage IB (cT2, cN0, cM0, G2, ER+, MI+, HER2+) - Signed by Lisa Herman MD on 1/27/2020 1/21/2020 Initial Diagnosis     Malignant neoplasm of upper-outer quadrant of right female breast (CMS/HCC)      2/11/2020 Cancer Staged     Staging form: Breast, AJCC 8th Edition  - Pathologic: Stage IA (pT1c, pN0, cM0, G3, ER+, MI+, HER2+) - Signed by Hero Gomez MD on 2/11/2020 2/18/2020 -  Other Event     Echocardiogram  · Left ventricular systolic function is normal. Estimated EF = 55%.  · The global longitudinal strain was -19.5%.      2/26/2020 -  Chemotherapy     OP BREAST PACLitaxel / Trastuzumab-anns (Weekly X 12)      5/20/2020 -  Chemotherapy     OP BREAST Trastuzumab-anns Q21D (maintenance)         Past Medical History:   Diagnosis Date   • Diabetes mellitus (CMS/HCC)    • Diverticulitis    • GERD (gastroesophageal reflux disease)    • Kidney stone    • Malignant neoplasm of upper-outer quadrant of right female breast (CMS/HCC) 1/21/2020   • Sciatica    • Wears contact lenses        Past Surgical History:   Procedure Laterality Date   • APPENDECTOMY     • BREAST BIOPSY Right    • BREAST EXCISIONAL BIOPSY Right    • CHOLECYSTECTOMY     • COLON SURGERY     • COLONOSCOPY     • HYSTERECTOMY     • LUMBAR LAMINECTOMY DISCECTOMY DECOMPRESSION N/A 5/24/2019    Procedure: LUMBAR LAMINECTOMY L4-5 AND L5-S1;  Surgeon: Hipolito Kahn MD;  Location: [facility];  Service: Orthopedic Spine   • MASTECTOMY W/ SENTINEL NODE BIOPSY Bilateral 1/21/2020    Procedure: SKIN SPARING MASTECTOMIES BILATERAL, SENTINEL NODE BIOPSY RIGHT;   "Surgeon: Silvio Howard MD;  Location: [facility];  Service: General       MEDICATIONS: The current medication list was reviewed and reconciled.     Allergies:  is allergic to bactrim [sulfamethoxazole-trimethoprim].    Family History   Problem Relation Age of Onset   • Breast cancer Mother 70   • Ovarian cancer Neg Hx          Review of Systems   Constitutional: Negative for fatigue, fever and unexpected weight change.   HENT: Negative for congestion, hearing loss, sore throat and trouble swallowing.    Eyes: Negative for visual disturbance.   Respiratory: Negative for cough, shortness of breath and wheezing.    Cardiovascular: Negative for chest pain and leg swelling.   Gastrointestinal: Negative for abdominal distention, abdominal pain, constipation, diarrhea, nausea and vomiting.   Endocrine: Negative.    Genitourinary: Negative.    Musculoskeletal: Negative for arthralgias, back pain and gait problem.   Skin: Negative.    Allergic/Immunologic: Negative.    Neurological: Negative for dizziness, weakness, numbness and headaches.   Hematological: Negative for adenopathy. Does not bruise/bleed easily.   Psychiatric/Behavioral: Negative.    All other systems reviewed and are negative.      Physical Exam  Vital Signs: /80 Comment: LEFT ANKLE  Pulse 87   Temp 98.2 °F (36.8 °C)   Resp 18   Ht 167.6 cm (66\")   Wt 66.7 kg (147 lb)   BMI 23.73 kg/m²    General Appearance:  alert, cooperative, no apparent distress, appears stated age and normal weight   Neurologic/Psychiatric: A&O x 3, gait steady, appropriate affect   HEENT:  Normocephalic, without obvious abnormality, mucous membranes moist   Lungs:   Clear to auscultation bilaterally; respirations regular, even, and unlabored bilaterally   Heart:  Regular rate and rhythm, no murmurs appreciated   Extremities: Normal, atraumatic; no clubbing, cyanosis, or edema    Skin: No rashes, lesions, or abnormal coloration noted     ECOG Performance Status: (1) " Restricted in Physically Strenuous Activity, Ambulatory & Able to Do Work of Light Nature          NEEDS ASSESSMENTS    Genetics  The patient's new diagnosis and family history have been reviewed for genetic counseling needs. A genetic referral is not recommended.     Psychosocial  The patient has completed a PHQ-9 Depression Screening and the Distress Thermometer (DT) today.   PHQ-9 results show 5-9 (Mild Depression). The patient scored their distress today as 3 on a scale of 0-10 with 0 being no distress and 10 being extreme distress.   Problems marked by the patient as being an issue for them within the last week include practical problems.   Results were reviewed along with psychosocial resources offered by our cancer center. Our oncology social worker will be flagged for a DT score of 4 or above, and a same day call will be made for a score of 9 or 10. A mental health referral is not recommended at this time. The patient is not accepting of a referral to [facility].   Copies of patient's questionnaires will be scanned into EMR for details and further reference.    Barriers to care  A barriers form was also completed by the patient today. We discussed services offered by our facility to help her have adequate access to care. The patient was given the name and card for our Oncology Social Worker, Bea Fisher. Based upon barriers assessment today, the patient will not require a follow-up call from the  to further discuss needs.   A copy of the barriers form will also be scanned into EMR for details and further reference.     VAD Assessment  The patient and I discussed planned intervenous chemotherapy as well as other IV treatments that are often needed throughout the course of treatment. These may include, but are not limited to blood transfusions, antibiotics, and IV hydration. The vasculature does not appear to be adequate for multiple peripheral IVs throughout their treatment course.  "Discussed risks and benefits of VADs. The patient would like to pursue Port-A-Cath insertion prior to initiation of treatment.  Port is in place in the left chest wall.    Advance Care Planning   Advance Care Planning Discussion:  Advanced Care Planning  The patient and I discussed advanced care planning, \"Conversations that Matter\".   This service was offered, free of charge, for development of advance directives with a certified ACP facilitator.  The patient does have an up-to-date advanced directive. This document is on file with our office. The patient is not interested in an appointment with one of our facilitators to create or update their advanced directives.              Palliative Care  The patient and I discussed palliative care services. Palliative care is not the same as Hospice care. This is specialized medical care for people living with serious illness with the goal of improving quality of life for the patient and their family. [facility] has partnered with [facility] to offer our patients outpatient palliative care early along with their treatment to assist in coordination of care, symptom management, pain management, and medical decision making.  Oncology criteria for palliative care referral is not met at this time. The patient is not interested in a palliative care consultation.     Additional Referral needs  none      CHEMOTHERAPY EDUCATION    Booklets Given: Chemotherapy and You [x]  Eating Hints [x]    Sexuality/Fertility Books []      Chemotherapy/Biotherapy Education Sheets: (list all that apply)  nausea management, acid reflux management, diarrhea management, Cancer resourse contacts information, skin and mouth care and vaccination information                                                                                                                                                                 Chemotherapy Regimen:   Treatment Plans     Name Type Plan dates Plan Provider   "       Active    OP BREAST PACLitaxel / Trastuzumab-anns (Weekly X 12) ONCOLOGY TREATMENT  2/25/2020 - Present Hero Gomez MD                    TOPICS EDUCATION PROVIDED COMMENTS   ANEMIA:  role of RBC, cause, s/s, ways to manage, role of transfusion [x]    THROMBOCYTOPENIA:  role of platelet, cause, s/s, ways to prevent bleeding, things to avoid, when to seek help [x]    NEUTROPENIA:  role of WBC, cause, infection precautions, s/s of infection, when to call MD [x]    NUTRITION & APPETITE CHANGES:  importance of maintaining healthy diet & weight, ways to manage to improve intake, dietary consult, exercise regimen [x]    DIARRHEA:  causes, s/s of dehydration, ways to manage, dietary changes, when to call MD [x]    CONSTIPATION:  causes, ways to manage, dietary changes, when to call MD [x]    NAUSEA & VOMITING:  cause, use of antiemetics, dietary changes, when to call MD [x]    MOUTH SORES:  causes, oral care, ways to manage [x]    ALOPECIA:  cause, ways to manage, resources [x]    INFERTILITY & SEXUALITY:  causes, fertility preservation options, sexuality changes, ways to manage, importance of birth control []    NERVOUS SYSTEM CHANGES:  causes, s/s, neuropathies, cognitive changes, ways to manage [x]    PAIN:  causes, ways to manage [x] ????   SKIN & NAIL CHANGES:  cause, s/s, ways to manage [x]    ORGAN TOXICITIES:  cause, s/s, need for diagnostic tests, labs, when to notify MD [x]    HOME CARE:  use of spill kits, storing of PO chemo, how to manage bodily fluids [x]    MISCELLANEOUS:  drug interactions, administration, vesicant, et [x]        Assessment and Plan:    Diagnoses and all orders for this visit:    Malignant neoplasm of upper-outer quadrant of right breast in female, estrogen receptor positive (CMS/HCC)  -     Provider communication    Encounter for antineoplastic chemotherapy  -     Provider communication        This was a 50 minute face-to-face visit with 45 minutes spent in  counseling and  coordination of care as documented above.   The patient and I have reviewed their new cancer diagnosis and scheduled treatment plan. Needs assessment was completed including genetics, psychosocial needs, barriers to care, VAD evaluation, advanced care planning, and palliative care services. Referrals have been ordered as appropriate based upon our evaluation and patient desires.     Chemotherapy teaching was also completed today as documented above. Adequate time was given to answer all questions to her satisfaction. Patient and family are aware of their care team members and contact information if they have questions or problems throughout the treatment course. Needs assessments and education has been completed. The patient is adequately prepared to begin treatment as scheduled.       Almita Pruitt, APRN    02/20/2020

## 2020-02-25 ENCOUNTER — OFFICE VISIT (OUTPATIENT)
Dept: ONCOLOGY | Facility: CLINIC | Age: 60
End: 2020-02-25

## 2020-02-25 ENCOUNTER — INFUSION (OUTPATIENT)
Dept: ONCOLOGY | Facility: HOSPITAL | Age: 60
End: 2020-02-25

## 2020-02-25 VITALS
RESPIRATION RATE: 14 BRPM | DIASTOLIC BLOOD PRESSURE: 112 MMHG | HEIGHT: 66 IN | SYSTOLIC BLOOD PRESSURE: 186 MMHG | BODY MASS INDEX: 23.63 KG/M2 | WEIGHT: 147 LBS | HEART RATE: 78 BPM | TEMPERATURE: 99 F | OXYGEN SATURATION: 99 %

## 2020-02-25 DIAGNOSIS — C50.411 MALIGNANT NEOPLASM OF UPPER-OUTER QUADRANT OF RIGHT BREAST IN FEMALE, ESTROGEN RECEPTOR POSITIVE (HCC): ICD-10-CM

## 2020-02-25 DIAGNOSIS — Z17.0 MALIGNANT NEOPLASM OF UPPER-OUTER QUADRANT OF RIGHT BREAST IN FEMALE, ESTROGEN RECEPTOR POSITIVE (HCC): ICD-10-CM

## 2020-02-25 DIAGNOSIS — C50.411 MALIGNANT NEOPLASM OF UPPER-OUTER QUADRANT OF RIGHT BREAST IN FEMALE, ESTROGEN RECEPTOR POSITIVE (HCC): Primary | ICD-10-CM

## 2020-02-25 DIAGNOSIS — Z45.2 ENCOUNTER FOR CARE RELATED TO VASCULAR ACCESS PORT: Primary | ICD-10-CM

## 2020-02-25 DIAGNOSIS — Z17.0 MALIGNANT NEOPLASM OF UPPER-OUTER QUADRANT OF RIGHT BREAST IN FEMALE, ESTROGEN RECEPTOR POSITIVE (HCC): Primary | ICD-10-CM

## 2020-02-25 PROCEDURE — 36591 DRAW BLOOD OFF VENOUS DEVICE: CPT

## 2020-02-25 PROCEDURE — 99214 OFFICE O/P EST MOD 30 MIN: CPT | Performed by: INTERNAL MEDICINE

## 2020-02-25 PROCEDURE — 25010000003 HEPARIN LOCK FLUCH PER 10 UNITS: Performed by: INTERNAL MEDICINE

## 2020-02-25 RX ORDER — FAMOTIDINE 10 MG/ML
20 INJECTION, SOLUTION INTRAVENOUS AS NEEDED
Status: CANCELLED | OUTPATIENT
Start: 2020-03-11

## 2020-02-25 RX ORDER — FAMOTIDINE 10 MG/ML
20 INJECTION, SOLUTION INTRAVENOUS ONCE
Status: CANCELLED | OUTPATIENT
Start: 2020-02-26

## 2020-02-25 RX ORDER — HEPARIN SODIUM (PORCINE) LOCK FLUSH IV SOLN 100 UNIT/ML 100 UNIT/ML
500 SOLUTION INTRAVENOUS AS NEEDED
Status: CANCELLED | OUTPATIENT
Start: 2020-02-25

## 2020-02-25 RX ORDER — FAMOTIDINE 10 MG/ML
20 INJECTION, SOLUTION INTRAVENOUS ONCE
Status: CANCELLED | OUTPATIENT
Start: 2020-03-11

## 2020-02-25 RX ORDER — DIPHENHYDRAMINE HYDROCHLORIDE 50 MG/ML
50 INJECTION INTRAMUSCULAR; INTRAVENOUS AS NEEDED
Status: CANCELLED | OUTPATIENT
Start: 2020-02-26

## 2020-02-25 RX ORDER — DIPHENHYDRAMINE HYDROCHLORIDE 50 MG/ML
50 INJECTION INTRAMUSCULAR; INTRAVENOUS AS NEEDED
Status: CANCELLED | OUTPATIENT
Start: 2020-03-11

## 2020-02-25 RX ORDER — HEPARIN SODIUM (PORCINE) LOCK FLUSH IV SOLN 100 UNIT/ML 100 UNIT/ML
500 SOLUTION INTRAVENOUS AS NEEDED
Status: DISCONTINUED | OUTPATIENT
Start: 2020-02-25 | End: 2020-02-25 | Stop reason: HOSPADM

## 2020-02-25 RX ORDER — SODIUM CHLORIDE 9 MG/ML
250 INJECTION, SOLUTION INTRAVENOUS ONCE
Status: CANCELLED | OUTPATIENT
Start: 2020-03-11

## 2020-02-25 RX ORDER — FAMOTIDINE 10 MG/ML
20 INJECTION, SOLUTION INTRAVENOUS AS NEEDED
Status: CANCELLED | OUTPATIENT
Start: 2020-02-26

## 2020-02-25 RX ORDER — SODIUM CHLORIDE 9 MG/ML
250 INJECTION, SOLUTION INTRAVENOUS ONCE
Status: CANCELLED | OUTPATIENT
Start: 2020-02-26

## 2020-02-25 RX ADMIN — HEPARIN SODIUM (PORCINE) LOCK FLUSH IV SOLN 100 UNIT/ML 500 UNITS: 100 SOLUTION at 09:45

## 2020-02-25 NOTE — PROGRESS NOTES
CHIEF COMPLAINT: Adjuvant therapy of breast cancer.    Problem List:  Oncology/Hematology History    1. 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  Negative cancer next panel  2. Significant diabetes with predating neuropathy due to spinal stenosis status post laminectomy 5/24/2019 with persistent neuropathy dorsum left foot    Oncology history timeline:  -5/24/2019 Dr. Garcia saw for spinal stenosis with radiculopathy and left foot drop due to herniated disc and performed decompressive laminectomy, L4-5 and L5-S1 discectomy and medial facetectomy  -10/15/2019 mammogram BI-RADS 0  -11/22/2019 right mammogram/ultrasound BI-RADS 4 with ultrasound-guided core biopsy showing invasive ductal carcinoma [place] score 6 out of 9 grade 2, ER 5% 1+ positive MO 50% 1-2+ positive, HER-2/ashley 100% 3+ positive.  -12/13/2019 MRI breasts revealed 2.2 cm right breast mass consistent with biopsy proven cancer with unsuspected adjacent area's of non-mass enhancement worrisome for DCIS.  Non-mass enhancement in the subareolar left breast worrisome for DCIS.  Dominant focus left central portion left breast indeterminate BI-RADS 4.  Cancer next panel negative  -1/15/2020 CMP unremarkable save for glucose 200 with hemoglobin 10.6 normochromic normocytic indices  -1/21/2020 right skin sparing mastectomy with right sentinel lymph node injection and right deep subfascial sentinel lymph node biopsy with contralateral prophylactic left skin sparing mastectomy.  Right breast 1.8 cm Bloom Dias 8 out of 9, total of 4 lymph nodes examined 2 sentinel nodes all negative.  Pathological T1 cN0 M0 stage Ia.  Left breast benign with sclerosing adenosis.        Malignant neoplasm of upper-outer quadrant of right female breast (CMS/HCC)    1/16/2019 Cancer Staged     Staging form: Breast, AJCC 8th  Edition  - Clinical stage from 1/16/2019: Stage IB (cT2, cN0, cM0, G2, ER+, TN+, HER2+) - Signed by Lisa Herman MD on 1/27/2020 1/21/2020 Initial Diagnosis     Malignant neoplasm of upper-outer quadrant of right female breast (CMS/HCC)      2/11/2020 Cancer Staged     Staging form: Breast, AJCC 8th Edition  - Pathologic: Stage IA (pT1c, pN0, cM0, G3, ER+, TN+, HER2+) - Signed by Hero Gomez MD on 2/11/2020 2/18/2020 -  Other Event     Echocardiogram  · Left ventricular systolic function is normal. Estimated EF = 55%.  · The global longitudinal strain was -19.5%.      2/26/2020 -  Chemotherapy     OP BREAST PACLitaxel / Trastuzumab-anns (Weekly X 12)      5/20/2020 -  Chemotherapy     OP BREAST Trastuzumab-anns Q21D (maintenance)         HISTORY OF PRESENT ILLNESS:  The patient is a 59 y.o. female, here for follow up on management of adjuvant therapy of breast cancer.  Ejection fraction 57%.  Port in place.  Patient did not want any labs drawn until today when she has EMLA cream in place.  We will get labs today for treatment in [place] tomorrow and then subsequently follow-up March 11 with my nurse practitioner for week 3 of 12 of Herceptin Taxol with plans to follow that with a year's worth of Herceptin and Arimidex.      Past Medical History:   Diagnosis Date   • Diabetes mellitus (CMS/HCC)    • Diverticulitis    • GERD (gastroesophageal reflux disease)    • Kidney stone    • Malignant neoplasm of upper-outer quadrant of right female breast (CMS/HCC) 1/21/2020   • Sciatica    • Wears contact lenses      Past Surgical History:   Procedure Laterality Date   • APPENDECTOMY     • BREAST BIOPSY Right    • BREAST EXCISIONAL BIOPSY Right    • CHOLECYSTECTOMY     • COLON SURGERY     • COLONOSCOPY     • HYSTERECTOMY     • LUMBAR LAMINECTOMY DISCECTOMY DECOMPRESSION N/A 5/24/2019    Procedure: LUMBAR LAMINECTOMY L4-5 AND L5-S1;  Surgeon: Hipolito Kahn MD;  Location: [facility];  Service: Orthopedic  "Spine   • MASTECTOMY W/ SENTINEL NODE BIOPSY Bilateral 1/21/2020    Procedure: SKIN SPARING MASTECTOMIES BILATERAL, SENTINEL NODE BIOPSY RIGHT;  Surgeon: Silvio Howard MD;  Location: [facility] OR;  Service: General       Allergies   Allergen Reactions   • Bactrim [Sulfamethoxazole-Trimethoprim] Rash     RASH, SKIN PEELING        Family History and Social History reviewed and changed as necessary      REVIEW OF SYSTEM:   Review of Systems   Constitutional: Negative for appetite change, chills, diaphoresis, fatigue, fever and unexpected weight change.   HENT:   Negative for mouth sores, sore throat and trouble swallowing.    Eyes: Negative for icterus.   Respiratory: Negative for cough, hemoptysis and shortness of breath.    Cardiovascular: Negative for chest pain, leg swelling and palpitations.   Gastrointestinal: Negative for abdominal distention, abdominal pain, blood in stool, constipation, diarrhea, nausea and vomiting.   Endocrine: Negative for hot flashes.   Genitourinary: Negative for bladder incontinence, difficulty urinating, dysuria, frequency and hematuria.    Musculoskeletal: Negative for gait problem, neck pain and neck stiffness.   Skin: Negative for rash.   Neurological: Negative for dizziness, gait problem, headaches, light-headedness and numbness.   Hematological: Negative for adenopathy. Does not bruise/bleed easily.   Psychiatric/Behavioral: Negative for depression. The patient is not nervous/anxious.    All other systems reviewed and are negative.       PHYSICAL EXAM    Vitals:    02/25/20 0907   BP: (!) 186/112   Pulse: 78   Resp: 14   Temp: 99 °F (37.2 °C)   SpO2: 99%   Weight: 66.7 kg (147 lb)   Height: 167.6 cm (66\")     Constitutional: Appears well-developed and well-nourished. No distress.   ECOG: (0) Fully Active - Able to Carry On All Pre-disease Performance Without Restriction  HENT:   Head: Normocephalic.   Mouth/Throat: Oropharynx is clear and moist.   Eyes: Conjunctivae are " normal. Pupils are equal, round, and reactive to light. No scleral icterus.   Neck: Neck supple. No JVD present. No thyromegaly present.   Cardiovascular: Normal rate, regular rhythm and normal heart sounds.    Pulmonary/Chest: Breath sounds normal. No respiratory distress.   Abdominal: Soft. Exhibits no distension and no mass. There is no hepatosplenomegaly. There is no tenderness. There is no rebound and no guarding.   Musculoskeletal:Exhibits no edema, tenderness or deformity.   Neurological: Alert and oriented to person, place, and time. Exhibits normal muscle tone.   Skin: No ecchymosis, no petechiae and no rash noted. Not diaphoretic. No cyanosis. Nails show no clubbing.   Psychiatric: Normal mood and affect.   Vitals reviewed.      Lab Results   Component Value Date    HGB 10.6 (L) 01/15/2020    HCT 32.8 (L) 01/15/2020    MCV 89.4 01/15/2020     01/15/2020    WBC 8.43 01/15/2020       Lab Results   Component Value Date    GLUCOSE 200 (H) 01/15/2020    BUN 12 01/15/2020    CREATININE 0.66 01/15/2020     01/15/2020    K 4.2 01/15/2020     01/15/2020    CO2 28.0 01/15/2020    CALCIUM 9.8 01/15/2020    PROTEINTOT 6.6 01/15/2020    ALBUMIN 4.50 01/15/2020    BILITOT 0.2 01/15/2020    ALKPHOS 82 01/15/2020    AST 16 01/15/2020    ALT 16 01/15/2020                   ASSESSMENT & PLAN:    1. 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  Negative cancer next panel  2. Significant diabetes with predating neuropathy due to spinal stenosis status post laminectomy 5/24/2019 with persistent neuropathy dorsum left foot  3. Anemia:    Discussion: She will get her labs I previously ordered to work-up her anemia today with her pretreatment labs.  Ejection fraction 57%.  Port in place.  Patient did not want any labs drawn until today when she has EMLA cream in  place.  We will get labs today for treatment in [place] tomorrow and then subsequently follow-up March 11 with my nurse practitioner for week 3 of 12 of Herceptin Taxol with plans to follow that with a year's worth of Herceptin and Arimidex.  Given small node-negative tumor with bilateral mastectomies, she will not need radiation or serial mammography.  Following Taxol we will start her on at least 5 years of Arimidex though she is not strongly ER positive but is fairly MT positive.  She will need to have bone densities watched by primary care at least every couple of years especially while on aromatase inhibitors.  She will see my nurse practitioner back to go over her anemia work-up and if iron deficient she will need treatment thereof as well as follow-up colonoscopy and EGD once we are done with Taxol.  I discussed with patient face-to-face 25 minutes greater than 50% spent counseling regarding this plan.        Hero Gomez MD    02/25/2020

## 2020-02-26 ENCOUNTER — EDUCATION (OUTPATIENT)
Dept: ONCOLOGY | Facility: HOSPITAL | Age: 60
End: 2020-02-26

## 2020-02-26 ENCOUNTER — DOCUMENTATION (OUTPATIENT)
Dept: NUTRITION | Facility: HOSPITAL | Age: 60
End: 2020-02-26

## 2020-02-26 ENCOUNTER — HOSPITAL ENCOUNTER (OUTPATIENT)
Dept: ONCOLOGY | Facility: HOSPITAL | Age: 60
Setting detail: INFUSION SERIES
Discharge: HOME | End: 2020-02-26

## 2020-02-26 VITALS
WEIGHT: 145 LBS | BODY MASS INDEX: 23.3 KG/M2 | HEIGHT: 66 IN | SYSTOLIC BLOOD PRESSURE: 167 MMHG | RESPIRATION RATE: 18 BRPM | TEMPERATURE: 98.7 F | DIASTOLIC BLOOD PRESSURE: 96 MMHG | HEART RATE: 82 BPM

## 2020-02-26 DIAGNOSIS — C50.411 MALIGNANT NEOPLASM OF UPPER-OUTER QUADRANT OF RIGHT BREAST IN FEMALE, ESTROGEN RECEPTOR POSITIVE (HCC): ICD-10-CM

## 2020-02-26 DIAGNOSIS — Z17.0 MALIGNANT NEOPLASM OF UPPER-OUTER QUADRANT OF RIGHT BREAST IN FEMALE, ESTROGEN RECEPTOR POSITIVE (HCC): ICD-10-CM

## 2020-02-26 DIAGNOSIS — Z45.2 ENCOUNTER FOR CARE RELATED TO VASCULAR ACCESS PORT: Primary | ICD-10-CM

## 2020-02-26 LAB
ALBUMIN SERPL-MCNC: 4.1 G/DL (ref 3.5–5.2)
ALBUMIN SERPL-MCNC: 4.1 G/DL (ref 3.8–4.9)
ALBUMIN/GLOB SERPL: 1.6 G/DL
ALBUMIN/GLOB SERPL: 1.7 {RATIO} (ref 1.2–2.2)
ALP SERPL-CCNC: 67 U/L (ref 39–117)
ALP SERPL-CCNC: 74 IU/L (ref 39–117)
ALT SERPL W P-5'-P-CCNC: 16 U/L (ref 1–33)
ALT SERPL-CCNC: 18 IU/L (ref 0–32)
ANION GAP SERPL CALCULATED.3IONS-SCNC: 13 MMOL/L (ref 5–15)
AST SERPL-CCNC: 16 U/L (ref 1–32)
AST SERPL-CCNC: 17 IU/L (ref 0–40)
BASOPHILS # BLD AUTO: 0 X10E3/UL (ref 0–0.2)
BASOPHILS NFR BLD AUTO: 1 %
BILIRUB SERPL-MCNC: 0.3 MG/DL (ref 0–1.2)
BILIRUB SERPL-MCNC: 0.4 MG/DL (ref 0.2–1.2)
BUN BLD-MCNC: 11 MG/DL (ref 8–23)
BUN SERPL-MCNC: 12 MG/DL (ref 8–27)
BUN/CREAT SERPL: 15.9 (ref 7–25)
BUN/CREAT SERPL: 18 (ref 12–28)
CALCIUM SERPL-MCNC: 9.1 MG/DL (ref 8.7–10.3)
CALCIUM SPEC-SCNC: 8.8 MG/DL (ref 8.6–10.5)
CHLORIDE SERPL-SCNC: 100 MMOL/L (ref 96–106)
CHLORIDE SERPL-SCNC: 101 MMOL/L (ref 98–107)
CO2 SERPL-SCNC: 22 MMOL/L (ref 20–29)
CO2 SERPL-SCNC: 27 MMOL/L (ref 22–29)
CREAT BLD-MCNC: 0.69 MG/DL (ref 0.57–1)
CREAT SERPL-MCNC: 0.68 MG/DL (ref 0.57–1)
EOSINOPHIL # BLD AUTO: 0.3 X10E3/UL (ref 0–0.4)
EOSINOPHIL NFR BLD AUTO: 4 %
ERYTHROCYTE [DISTWIDTH] IN BLOOD BY AUTOMATED COUNT: 13.6 % (ref 11.7–15.4)
ERYTHROCYTE [DISTWIDTH] IN BLOOD BY AUTOMATED COUNT: 14.8 % (ref 12.3–15.4)
FERRITIN SERPL-MCNC: 11 NG/ML (ref 15–150)
FOLATE SERPL-MCNC: 11 NG/ML
GFR SERPL CREATININE-BSD FRML MDRD: 87 ML/MIN/1.73
GLOBULIN SER CALC-MCNC: 2.4 G/DL (ref 1.5–4.5)
GLOBULIN UR ELPH-MCNC: 2.5 GM/DL
GLUCOSE BLD-MCNC: 143 MG/DL (ref 65–99)
GLUCOSE SERPL-MCNC: 138 MG/DL (ref 65–99)
HCT VFR BLD AUTO: 32.8 % (ref 34–46.6)
HCT VFR BLD AUTO: 33.3 % (ref 34–46.6)
HGB BLD-MCNC: 10.4 G/DL (ref 12–15.9)
HGB BLD-MCNC: 10.7 G/DL (ref 11.1–15.9)
IMM GRANULOCYTES # BLD AUTO: 0 X10E3/UL (ref 0–0.1)
IMM GRANULOCYTES NFR BLD AUTO: 0 %
IRON SATN MFR SERPL: 9 % (ref 15–55)
IRON SERPL-MCNC: 32 UG/DL (ref 27–159)
LYMPHOCYTES # BLD AUTO: 1.7 X10E3/UL (ref 0.7–3.1)
LYMPHOCYTES # BLD AUTO: 1.9 10*3/MM3 (ref 0.7–3.1)
LYMPHOCYTES NFR BLD AUTO: 25 %
LYMPHOCYTES NFR BLD AUTO: 26.7 % (ref 19.6–45.3)
MCH RBC QN AUTO: 28.1 PG (ref 26.6–33)
MCH RBC QN AUTO: 28.1 PG (ref 26.6–33)
MCHC RBC AUTO-ENTMCNC: 31.9 G/DL (ref 31.5–35.7)
MCHC RBC AUTO-ENTMCNC: 32.1 G/DL (ref 31.5–35.7)
MCV RBC AUTO: 87 FL (ref 79–97)
MCV RBC AUTO: 88.2 FL (ref 79–97)
MONOCYTES # BLD AUTO: 0.6 10*3/MM3 (ref 0.1–0.9)
MONOCYTES # BLD AUTO: 0.7 X10E3/UL (ref 0.1–0.9)
MONOCYTES NFR BLD AUTO: 10 %
MONOCYTES NFR BLD AUTO: 8.2 % (ref 5–12)
NEUTROPHILS # BLD AUTO: 4.2 X10E3/UL (ref 1.4–7)
NEUTROPHILS # BLD AUTO: 4.8 10*3/MM3 (ref 1.7–7)
NEUTROPHILS NFR BLD AUTO: 60 %
NEUTROPHILS NFR BLD AUTO: 65.1 % (ref 42.7–76)
PLATELET # BLD AUTO: 349 10*3/MM3 (ref 140–450)
PLATELET # BLD AUTO: 351 X10E3/UL (ref 150–450)
PMV BLD AUTO: 6.4 FL (ref 6–12)
POTASSIUM BLD-SCNC: 4.1 MMOL/L (ref 3.5–5.2)
POTASSIUM SERPL-SCNC: 4.2 MMOL/L (ref 3.5–5.2)
PROT SERPL-MCNC: 6.5 G/DL (ref 6–8.5)
PROT SERPL-MCNC: 6.6 G/DL (ref 6–8.5)
RBC # BLD AUTO: 3.71 10*6/MM3 (ref 3.77–5.28)
RBC # BLD AUTO: 3.81 X10E6/UL (ref 3.77–5.28)
SODIUM BLD-SCNC: 141 MMOL/L (ref 136–145)
SODIUM SERPL-SCNC: 141 MMOL/L (ref 134–144)
TIBC SERPL-MCNC: 358 UG/DL (ref 250–450)
UIBC SERPL-MCNC: 326 UG/DL (ref 131–425)
VIT B12 SERPL-MCNC: 740 PG/ML (ref 232–1245)
WBC # BLD AUTO: 6.9 X10E3/UL (ref 3.4–10.8)
WBC NRBC COR # BLD: 7.3 10*3/MM3 (ref 3.4–10.8)

## 2020-02-26 PROCEDURE — 83550 IRON BINDING TEST: CPT | Performed by: INTERNAL MEDICINE

## 2020-02-26 PROCEDURE — 25010000002 PACLITAXEL PER 30 MG: Performed by: INTERNAL MEDICINE

## 2020-02-26 PROCEDURE — 80053 COMPREHEN METABOLIC PANEL: CPT | Performed by: INTERNAL MEDICINE

## 2020-02-26 PROCEDURE — 25010000002 DEXAMETHASONE PER 1 MG: Performed by: INTERNAL MEDICINE

## 2020-02-26 PROCEDURE — 96413 CHEMO IV INFUSION 1 HR: CPT

## 2020-02-26 PROCEDURE — 25010000003 HEPARIN LOCK FLUCH PER 10 UNITS: Performed by: INTERNAL MEDICINE

## 2020-02-26 PROCEDURE — 25010000002 DIPHENHYDRAMINE PER 50 MG: Performed by: INTERNAL MEDICINE

## 2020-02-26 PROCEDURE — 25010000002 TRASTUZUMAB-ANNS 420 MG RECONSTITUTED SOLUTION 1 EACH VIAL: Performed by: INTERNAL MEDICINE

## 2020-02-26 PROCEDURE — 85025 COMPLETE CBC W/AUTO DIFF WBC: CPT | Performed by: INTERNAL MEDICINE

## 2020-02-26 PROCEDURE — 96375 TX/PRO/DX INJ NEW DRUG ADDON: CPT

## 2020-02-26 PROCEDURE — 83540 ASSAY OF IRON: CPT | Performed by: INTERNAL MEDICINE

## 2020-02-26 PROCEDURE — 96417 CHEMO IV INFUS EACH ADDL SEQ: CPT

## 2020-02-26 PROCEDURE — 82728 ASSAY OF FERRITIN: CPT | Performed by: INTERNAL MEDICINE

## 2020-02-26 PROCEDURE — 96415 CHEMO IV INFUSION ADDL HR: CPT

## 2020-02-26 PROCEDURE — 82607 VITAMIN B-12: CPT | Performed by: INTERNAL MEDICINE

## 2020-02-26 PROCEDURE — 82746 ASSAY OF FOLIC ACID SERUM: CPT | Performed by: INTERNAL MEDICINE

## 2020-02-26 RX ORDER — HEPARIN SODIUM (PORCINE) LOCK FLUSH IV SOLN 100 UNIT/ML 100 UNIT/ML
500 SOLUTION INTRAVENOUS AS NEEDED
Status: DISCONTINUED | OUTPATIENT
Start: 2020-02-26 | End: 2020-02-27 | Stop reason: HOSPADM

## 2020-02-26 RX ORDER — HEPARIN SODIUM (PORCINE) LOCK FLUSH IV SOLN 100 UNIT/ML 100 UNIT/ML
500 SOLUTION INTRAVENOUS AS NEEDED
Status: CANCELLED | OUTPATIENT
Start: 2020-02-26

## 2020-02-26 RX ORDER — SODIUM CHLORIDE 9 MG/ML
250 INJECTION, SOLUTION INTRAVENOUS ONCE
Status: COMPLETED | OUTPATIENT
Start: 2020-02-26 | End: 2020-02-26

## 2020-02-26 RX ORDER — FAMOTIDINE 10 MG/ML
20 INJECTION, SOLUTION INTRAVENOUS ONCE
Status: COMPLETED | OUTPATIENT
Start: 2020-02-26 | End: 2020-02-26

## 2020-02-26 RX ADMIN — FAMOTIDINE 20 MG: 10 INJECTION, SOLUTION INTRAVENOUS at 10:55

## 2020-02-26 RX ADMIN — DIPHENHYDRAMINE HYDROCHLORIDE 50 MG: 50 INJECTION INTRAMUSCULAR; INTRAVENOUS at 10:58

## 2020-02-26 RX ADMIN — TRASTUZUMAB-ANNS 260 MG: 420 INJECTION, POWDER, LYOPHILIZED, FOR SOLUTION INTRAVENOUS at 12:30

## 2020-02-26 RX ADMIN — HEPARIN 500 UNITS: 100 SYRINGE at 14:13

## 2020-02-26 RX ADMIN — SODIUM CHLORIDE 250 ML: 9 INJECTION, SOLUTION INTRAVENOUS at 10:55

## 2020-02-26 RX ADMIN — SODIUM CHLORIDE 140 MG: 9 INJECTION, SOLUTION INTRAVENOUS at 11:25

## 2020-02-26 RX ADMIN — DEXAMETHASONE SODIUM PHOSPHATE 12 MG: 4 INJECTION, SOLUTION INTRAMUSCULAR; INTRAVENOUS at 10:58

## 2020-02-26 NOTE — PLAN OF CARE
Outpatient Infusion • [address] [facility] • [address] • [place] [id number] • [phone]      CHEMOTHERAPY EDUCATION SHEET    NAME:  Luana Chawla      : 1960           DATE: 20    Booklets Given: Chemotherapy and You []  Eating Hints []    Sexuality/Fertility Books []     Chemotherapy/Biotherapy Education Sheets: (list all that apply)    [facility] information provided for Paclitaxel (Taxol) and Trastuzumab-anns (Kanjinti)    Chemotherapy Regimen:  Paclitaxel (Taxol) + Trastuzumab-anns (Kanjinti) weekly for 12 cycles    TOPICS EDUCATION PROVIDED EDUCATION REINFORCED COMMENTS   ANEMIA:  role of RBC, cause, s/s, ways to manage, role of transfusion [x] [] Discussed that labs will be collected before each chemotherapy session to assess how she is tolerating the medication.   THROMBOCYTOPENIA:  role of platelet, cause, s/s, ways to prevent bleeding, things to avoid, when to seek help [x] [] Informed patient that these labs will also be collected before each infusion session.   NEUTROPENIA:  role of WBC, cause, infection precautions, s/s of infection, when to call MD [x] [] Informed patient that her WBC can be low with chemotherapy. Discussed that these labs will be collected prior to each session.   NUTRITION & APPETITE CHANGES:  importance of maintaining healthy diet & weight, ways to manage to improve intake, dietary consult, exercise regimen [x] [] Informed patient that she may have a loss of appetite while receiving her infusions.    DIARRHEA:  causes, s/s of dehydration, ways to manage, dietary changes, when to call MD [x] [] Patient is aware that she may experience diarrhea and that she can take Imodium to help with these symptoms.   CONSTIPATION:  causes, ways to manage, dietary changes, when to call MD [] []    NAUSEA & VOMITING:  cause, use of antiemetics, dietary changes, when to call MD [x] [] Discussed with the patient the possibility of having nausea and vomiting. She has Zofran to  help with these symptoms   MOUTH SORES:  causes, oral care, ways to manage [] []    ALOPECIA:  cause, ways to manage, resources [x] [] Patient is aware of possible hair loss with medication.   INFERTILITY & SEXUALITY:  causes, fertility preservation options, sexuality changes, ways to manage, importance of birth control [x] [] Discussed the importance of protection during intercourse.   NERVOUS SYSTEM CHANGES:  causes, s/s, neuropathies, cognitive changes, ways to manage [x] [] Discussed potential side effect of peripheral neuropathy with patient and when to call the doctor.   PAIN:  causes, ways to manage [x] [] Discussed potential for arthralgia/myalgia and how to manage at home   SKIN & NAIL CHANGES:  cause, s/s, ways to manage [] []    ORGAN TOXICITIES:  cause, s/s, need for diagnostic tests, labs, when to notify MD [] []    SURVIVORSHIP:  distress, distress assessment, secondary malignancies, early/late effects, follow-up, social issues, social support [] []    HOME CARE:  use of spill kits, storing of PO chemo, how to manage bodily fluids [x] [] Discussed how to manage body fluids at home.   MISCELLANEOUS:  drug interactions, administration, vesicant, et [x] [] Upon chart review did not find any current medication interactions. Patient has pepcid, benadryl and decadron as pre-medicationsto help with infusion reaction.      Notes: Discussed aforementioned material with patient in clinic. All questions and concerns addressed. Provided patient with my contact information and instructions to call should additional questions arise. Obtained signed consent. Provided patient with personalized treatment calendar.

## 2020-02-27 LAB
FERRITIN SERPL-MCNC: 10 NG/ML (ref 15–150)
FOLATE SERPL-MCNC: 10.1 NG/ML
IRON SATN MFR SERPL: 12 % (ref 15–55)
IRON SERPL-MCNC: 39 UG/DL (ref 27–159)
LOPINAVIR ISLT PHENOTYP: 288 UG/DL (ref 131–425)
TIBC SERPL-MCNC: 327 UG/DL (ref 250–450)
VIT B12 SERPL-MCNC: 604 PG/ML (ref 232–1245)

## 2020-02-27 NOTE — PROGRESS NOTES
Oncology Nutrition Screening    Patient Name:  Luana Chawla  YOB: 1960  MRN: [id number]  Date:  02/27/20  Physician:  Dr. Gomez    Type of Cancer Treatment:   Surgery: bilateral mastectomy (1/21/20)  Chemotherapy: Taxol / Kanjinti - weekly x 12 followed by Kanjinti - every 21 days     Patient Active Problem List   Diagnosis   • Malignant neoplasm of upper-outer quadrant of right female breast (CMS/HCC)   • Encounter for antineoplastic chemotherapy   • Encounter for care related to vascular access port       Current Outpatient Medications   Medication Sig Dispense Refill   • cetirizine (zyrTEC) 10 MG tablet Take 10 mg by mouth Daily.     • escitalopram (LEXAPRO) 10 MG tablet Take 10 mg by mouth Daily.     • Insulin Glargine (BASAGLAR KWIKPEN SC) Inject 30 Units under the skin into the appropriate area as directed Every Night.     • lidocaine-prilocaine (EMLA) 2.5-2.5 % cream Apply  topically to the appropriate area as directed As Needed (45-60 minutes prior to port access.  Cover with saran/plastic wrap.). 30 g 3   • metFORMIN ER (GLUCOPHAGE-XR) 500 MG 24 hr tablet Take 500 mg by mouth 2 (Two) Times a Day.     • omeprazole (priLOSEC) 40 MG capsule Take 40 mg by mouth Daily.     • ondansetron (ZOFRAN) 8 MG tablet Take 1 tablet by mouth 3 (Three) Times a Day As Needed for Nausea or Vomiting. 30 tablet 5   • Semaglutide (OZEMPIC) 0.25 or 0.5 MG/DOSE solution pen-injector Inject 1 mg under the skin into the appropriate area as directed 1 (One) Time Per Week.       No current facility-administered medications for this visit.        Glycemic Risk:   IDDM    Weight:   Height: 66 inches  Weight: 145 lbs.  Usual Body Weight: same lbs.   BMI: 23.4  Normal  Weight has been stable    Oral Food Intake:  Special Diet Restrictions: Diabetes    Hydration Status:   How many 8 ounce glass of water of fluid do you drink per day?  Patient reports drinking water throughout the day.    Enteral Feeding:  "  n/a    Nutrition Symptoms:   No Problems with Eating    Activity:   Normal with no limitations     reports that she quit smoking about 5 years ago. Her smoking use included cigarettes and electronic cigarette. She has never used smokeless tobacco. She reports that she does not drink alcohol or use drugs.    Evaluation of Nutritional Risk:   Patient is not at nutritional risk at time of screening but will be seen for nutrition education.  Met with patient and her son during her initial chemotherapy infusion appointment.  Patient reports her appetite has been normal; states her weight has been stable; and denies significant nutritional complaints at this time.    Discussed her history of diabetes.  She reports checking her blood sugars regularly and that her last HgbA1c was 7.2. Reviewed the effects of steroids on blood sugar levels.  Encouraged her to continue checking her blood sugars and to consume consistent carbohydrates at each meal / snack.     Also discussed the importance of good nutrition during her treatment course focusing on adequate calorie, protein, nutrient and fluid intake.  Advised her to be consuming smaller more frequent meals/snacks throughout the day to aid with potential nausea management.  Emphasized the importance of protein and its role in the diet; reviewed high protein foods; and recommended she have a protein source at each meal/snack.  Also emphasized the importance of hydration; reviewed good hydrating fluid options; and recommended she drink at least 64 ounces daily.  Briefly discussed nutritional supplements and their role in the diet and patient is not interested in those at this time.  Reviewed the ACS nutrition booklet and suggested she use it for symptom management as needed.  Provided and reviewed written diet material \"Nutritional Considerations in Breast Cancer\".    Answered their questions and both voiced understanding of information discussed.  RD's contact information " provided and encouraged to call with questions.  Will follow up as indicated.    Electronically signed by:  Yolande Draper RD  9:28 AM

## 2020-03-02 ENCOUNTER — APPOINTMENT (OUTPATIENT)
Dept: GENERAL RADIOLOGY | Facility: HOSPITAL | Age: 60
End: 2020-03-02

## 2020-03-02 ENCOUNTER — HOSPITAL ENCOUNTER (INPATIENT)
Facility: HOSPITAL | Age: 60
LOS: 4 days | Discharge: HOME HEALTH | End: 2020-03-06
Attending: FAMILY MEDICINE | Admitting: INTERNAL MEDICINE

## 2020-03-02 ENCOUNTER — TELEPHONE (OUTPATIENT)
Dept: ONCOLOGY | Facility: CLINIC | Age: 60
End: 2020-03-02

## 2020-03-02 DIAGNOSIS — Z78.9 IMPAIRED MOBILITY AND ACTIVITIES OF DAILY LIVING: Primary | ICD-10-CM

## 2020-03-02 DIAGNOSIS — T80.219A INFECTION OF VENOUS ACCESS PORT, INITIAL ENCOUNTER: ICD-10-CM

## 2020-03-02 DIAGNOSIS — Z17.0 MALIGNANT NEOPLASM OF UPPER-OUTER QUADRANT OF RIGHT BREAST IN FEMALE, ESTROGEN RECEPTOR POSITIVE (HCC): ICD-10-CM

## 2020-03-02 DIAGNOSIS — M46.46 SEPTIC DISCITIS OF LUMBAR REGION: ICD-10-CM

## 2020-03-02 DIAGNOSIS — Z74.09 IMPAIRED MOBILITY AND ACTIVITIES OF DAILY LIVING: Primary | ICD-10-CM

## 2020-03-02 DIAGNOSIS — T80.212A PORT OR RESERVOIR INFECTION: ICD-10-CM

## 2020-03-02 DIAGNOSIS — M54.42 ACUTE BILATERAL LOW BACK PAIN WITH LEFT-SIDED SCIATICA: ICD-10-CM

## 2020-03-02 DIAGNOSIS — C50.411 MALIGNANT NEOPLASM OF UPPER-OUTER QUADRANT OF RIGHT BREAST IN FEMALE, ESTROGEN RECEPTOR POSITIVE (HCC): ICD-10-CM

## 2020-03-02 PROBLEM — R78.81 BACTEREMIA: Status: ACTIVE | Noted: 2020-03-02

## 2020-03-02 PROBLEM — K21.9 GERD (GASTROESOPHAGEAL REFLUX DISEASE): Status: ACTIVE | Noted: 2020-03-02

## 2020-03-02 PROBLEM — M54.9 BACK PAIN: Status: ACTIVE | Noted: 2020-03-02

## 2020-03-02 PROBLEM — E11.9 TYPE 2 DIABETES MELLITUS (HCC): Status: ACTIVE | Noted: 2020-03-02

## 2020-03-02 LAB
ALBUMIN SERPL-MCNC: 3.5 G/DL (ref 3.5–5.2)
ALBUMIN/GLOB SERPL: 1.3 G/DL
ALP SERPL-CCNC: 66 U/L (ref 39–117)
ALT SERPL W P-5'-P-CCNC: 44 U/L (ref 1–33)
ANION GAP SERPL CALCULATED.3IONS-SCNC: 15 MMOL/L (ref 5–15)
AST SERPL-CCNC: 36 U/L (ref 1–32)
BACTERIA UR QL AUTO: ABNORMAL /HPF
BASOPHILS # BLD MANUAL: 0 10*3/MM3 (ref 0–0.2)
BASOPHILS NFR BLD AUTO: 0 % (ref 0–1.5)
BILIRUB SERPL-MCNC: 0.4 MG/DL (ref 0.2–1.2)
BILIRUB UR QL STRIP: NEGATIVE
BUN BLD-MCNC: 11 MG/DL (ref 8–23)
BUN/CREAT SERPL: 20 (ref 7–25)
CALCIUM SPEC-SCNC: 8.6 MG/DL (ref 8.6–10.5)
CHLORIDE SERPL-SCNC: 88 MMOL/L (ref 98–107)
CLARITY UR: CLEAR
CO2 SERPL-SCNC: 24 MMOL/L (ref 22–29)
COLOR UR: YELLOW
CREAT BLD-MCNC: 0.55 MG/DL (ref 0.57–1)
CRP SERPL-MCNC: 36.03 MG/DL (ref 0–0.5)
DEPRECATED RDW RBC AUTO: 41.6 FL (ref 37–54)
EOSINOPHIL # BLD MANUAL: 0 10*3/MM3 (ref 0–0.4)
EOSINOPHIL NFR BLD MANUAL: 0 % (ref 0.3–6.2)
ERYTHROCYTE [DISTWIDTH] IN BLOOD BY AUTOMATED COUNT: 13.4 % (ref 12.3–15.4)
ERYTHROCYTE [SEDIMENTATION RATE] IN BLOOD: 41 MM/HR (ref 0–30)
GFR SERPL CREATININE-BSD FRML MDRD: 113 ML/MIN/1.73
GLOBULIN UR ELPH-MCNC: 2.8 GM/DL
GLUCOSE BLD-MCNC: 329 MG/DL (ref 65–99)
GLUCOSE BLDC GLUCOMTR-MCNC: 286 MG/DL (ref 70–130)
GLUCOSE BLDC GLUCOMTR-MCNC: 316 MG/DL (ref 70–130)
GLUCOSE UR STRIP-MCNC: ABNORMAL MG/DL
HCT VFR BLD AUTO: 27.2 % (ref 34–46.6)
HGB BLD-MCNC: 9.2 G/DL (ref 12–15.9)
HGB UR QL STRIP.AUTO: NEGATIVE
HYALINE CASTS UR QL AUTO: ABNORMAL /LPF
INR PPP: 1.04 (ref 0.85–1.16)
KETONES UR QL STRIP: ABNORMAL
LARGE PLATELETS: ABNORMAL
LEUKOCYTE ESTERASE UR QL STRIP.AUTO: NEGATIVE
LYMPHOCYTES # BLD MANUAL: 0.66 10*3/MM3 (ref 0.7–3.1)
LYMPHOCYTES NFR BLD MANUAL: 12 % (ref 19.6–45.3)
LYMPHOCYTES NFR BLD MANUAL: 4 % (ref 5–12)
MCH RBC QN AUTO: 29 PG (ref 26.6–33)
MCHC RBC AUTO-ENTMCNC: 33.8 G/DL (ref 31.5–35.7)
MCV RBC AUTO: 85.8 FL (ref 79–97)
METAMYELOCYTES NFR BLD MANUAL: 4 % (ref 0–0)
MONOCYTES # BLD AUTO: 0.22 10*3/MM3 (ref 0.1–0.9)
NEUTROPHILS # BLD AUTO: 4.27 10*3/MM3 (ref 1.7–7)
NEUTROPHILS NFR BLD MANUAL: 56 % (ref 42.7–76)
NEUTS BAND NFR BLD MANUAL: 22 % (ref 0–5)
NITRITE UR QL STRIP: NEGATIVE
PH UR STRIP.AUTO: 6 [PH] (ref 5–8)
PLATELET # BLD AUTO: 146 10*3/MM3 (ref 140–450)
PMV BLD AUTO: 10.6 FL (ref 6–12)
POTASSIUM BLD-SCNC: 2.9 MMOL/L (ref 3.5–5.2)
PROCALCITONIN SERPL-MCNC: 0.66 NG/ML (ref 0.1–0.25)
PROLYMPHOCYTES NFR BLD MANUAL: 1 % (ref 0–0)
PROT SERPL-MCNC: 6.3 G/DL (ref 6–8.5)
PROT UR QL STRIP: ABNORMAL
PROTHROMBIN TIME: 13.3 SECONDS (ref 11.5–14)
RBC # BLD AUTO: 3.17 10*6/MM3 (ref 3.77–5.28)
RBC # UR: ABNORMAL /HPF
RBC MORPH BLD: NORMAL
REF LAB TEST METHOD: ABNORMAL
SODIUM BLD-SCNC: 127 MMOL/L (ref 136–145)
SP GR UR STRIP: 1.01 (ref 1–1.03)
SQUAMOUS #/AREA URNS HPF: ABNORMAL /HPF
UROBILINOGEN UR QL STRIP: ABNORMAL
VARIANT LYMPHS NFR BLD MANUAL: 1 % (ref 0–5)
WBC MORPH BLD: NORMAL
WBC NRBC COR # BLD: 5.48 10*3/MM3 (ref 3.4–10.8)
WBC UR QL AUTO: ABNORMAL /HPF

## 2020-03-02 PROCEDURE — 87147 CULTURE TYPE IMMUNOLOGIC: CPT | Performed by: INTERNAL MEDICINE

## 2020-03-02 PROCEDURE — 80053 COMPREHEN METABOLIC PANEL: CPT | Performed by: INTERNAL MEDICINE

## 2020-03-02 PROCEDURE — 85025 COMPLETE CBC W/AUTO DIFF WBC: CPT | Performed by: INTERNAL MEDICINE

## 2020-03-02 PROCEDURE — 83735 ASSAY OF MAGNESIUM: CPT | Performed by: INTERNAL MEDICINE

## 2020-03-02 PROCEDURE — 87040 BLOOD CULTURE FOR BACTERIA: CPT | Performed by: INTERNAL MEDICINE

## 2020-03-02 PROCEDURE — 25010000002 CEFTRIAXONE PER 250 MG

## 2020-03-02 PROCEDURE — 63710000001 INSULIN LISPRO (HUMAN) PER 5 UNITS: Performed by: NURSE PRACTITIONER

## 2020-03-02 PROCEDURE — 25010000002 DAPTOMYCIN PER 1 MG

## 2020-03-02 PROCEDURE — 63710000001 DIPHENHYDRAMINE PER 50 MG: Performed by: PHYSICIAN ASSISTANT

## 2020-03-02 PROCEDURE — 85652 RBC SED RATE AUTOMATED: CPT | Performed by: INTERNAL MEDICINE

## 2020-03-02 PROCEDURE — 81001 URINALYSIS AUTO W/SCOPE: CPT | Performed by: INTERNAL MEDICINE

## 2020-03-02 PROCEDURE — 25010000002 HYDROMORPHONE 1 MG/ML SOLUTION: Performed by: INTERNAL MEDICINE

## 2020-03-02 PROCEDURE — 99223 1ST HOSP IP/OBS HIGH 75: CPT | Performed by: INTERNAL MEDICINE

## 2020-03-02 PROCEDURE — 85007 BL SMEAR W/DIFF WBC COUNT: CPT | Performed by: INTERNAL MEDICINE

## 2020-03-02 PROCEDURE — 87150 DNA/RNA AMPLIFIED PROBE: CPT | Performed by: INTERNAL MEDICINE

## 2020-03-02 PROCEDURE — 82962 GLUCOSE BLOOD TEST: CPT

## 2020-03-02 PROCEDURE — 71045 X-RAY EXAM CHEST 1 VIEW: CPT

## 2020-03-02 PROCEDURE — 63710000001 INSULIN DETEMIR PER 5 UNITS: Performed by: NURSE PRACTITIONER

## 2020-03-02 PROCEDURE — 86140 C-REACTIVE PROTEIN: CPT | Performed by: INTERNAL MEDICINE

## 2020-03-02 PROCEDURE — 87186 SC STD MICRODIL/AGAR DIL: CPT | Performed by: INTERNAL MEDICINE

## 2020-03-02 PROCEDURE — 85610 PROTHROMBIN TIME: CPT | Performed by: INTERNAL MEDICINE

## 2020-03-02 PROCEDURE — 99253 IP/OBS CNSLTJ NEW/EST LOW 45: CPT | Performed by: INTERNAL MEDICINE

## 2020-03-02 PROCEDURE — 25010000002 DIAZEPAM PER 5 MG: Performed by: INTERNAL MEDICINE

## 2020-03-02 PROCEDURE — 25010000002 HYDROMORPHONE PER 4 MG: Performed by: INTERNAL MEDICINE

## 2020-03-02 PROCEDURE — 84145 PROCALCITONIN (PCT): CPT | Performed by: INTERNAL MEDICINE

## 2020-03-02 RX ORDER — POTASSIUM CHLORIDE 1.5 G/1.77G
40 POWDER, FOR SOLUTION ORAL AS NEEDED
Status: DISCONTINUED | OUTPATIENT
Start: 2020-03-02 | End: 2020-03-06 | Stop reason: HOSPADM

## 2020-03-02 RX ORDER — SODIUM CHLORIDE 0.9 % (FLUSH) 0.9 %
10 SYRINGE (ML) INJECTION EVERY 12 HOURS SCHEDULED
Status: DISCONTINUED | OUTPATIENT
Start: 2020-03-02 | End: 2020-03-06 | Stop reason: HOSPADM

## 2020-03-02 RX ORDER — AMOXICILLIN 250 MG
2 CAPSULE ORAL 2 TIMES DAILY PRN
Status: DISCONTINUED | OUTPATIENT
Start: 2020-03-02 | End: 2020-03-06 | Stop reason: HOSPADM

## 2020-03-02 RX ORDER — ACETAMINOPHEN 650 MG/1
650 SUPPOSITORY RECTAL EVERY 4 HOURS PRN
Status: DISCONTINUED | OUTPATIENT
Start: 2020-03-02 | End: 2020-03-06 | Stop reason: HOSPADM

## 2020-03-02 RX ORDER — SODIUM CHLORIDE 0.9 % (FLUSH) 0.9 %
10 SYRINGE (ML) INJECTION AS NEEDED
Status: DISCONTINUED | OUTPATIENT
Start: 2020-03-02 | End: 2020-03-06 | Stop reason: HOSPADM

## 2020-03-02 RX ORDER — CYCLOBENZAPRINE HCL 10 MG
5 TABLET ORAL EVERY 8 HOURS SCHEDULED
Status: DISCONTINUED | OUTPATIENT
Start: 2020-03-02 | End: 2020-03-06 | Stop reason: HOSPADM

## 2020-03-02 RX ORDER — BISACODYL 10 MG
10 SUPPOSITORY, RECTAL RECTAL DAILY PRN
Status: DISCONTINUED | OUTPATIENT
Start: 2020-03-02 | End: 2020-03-06 | Stop reason: HOSPADM

## 2020-03-02 RX ORDER — DEXTROSE MONOHYDRATE 25 G/50ML
25 INJECTION, SOLUTION INTRAVENOUS
Status: DISCONTINUED | OUTPATIENT
Start: 2020-03-02 | End: 2020-03-06 | Stop reason: HOSPADM

## 2020-03-02 RX ORDER — NALOXONE HCL 0.4 MG/ML
0.4 VIAL (ML) INJECTION
Status: DISCONTINUED | OUTPATIENT
Start: 2020-03-02 | End: 2020-03-06 | Stop reason: HOSPADM

## 2020-03-02 RX ORDER — DIAZEPAM 5 MG/ML
2.5 INJECTION, SOLUTION INTRAMUSCULAR; INTRAVENOUS ONCE
Status: COMPLETED | OUTPATIENT
Start: 2020-03-02 | End: 2020-03-02

## 2020-03-02 RX ORDER — SODIUM CHLORIDE 9 MG/ML
50 INJECTION, SOLUTION INTRAVENOUS CONTINUOUS
Status: DISCONTINUED | OUTPATIENT
Start: 2020-03-02 | End: 2020-03-05

## 2020-03-02 RX ORDER — HYDROMORPHONE HYDROCHLORIDE 1 MG/ML
0.5 INJECTION, SOLUTION INTRAMUSCULAR; INTRAVENOUS; SUBCUTANEOUS
Status: DISCONTINUED | OUTPATIENT
Start: 2020-03-02 | End: 2020-03-06 | Stop reason: HOSPADM

## 2020-03-02 RX ORDER — MAGNESIUM SULFATE HEPTAHYDRATE 40 MG/ML
2 INJECTION, SOLUTION INTRAVENOUS AS NEEDED
Status: DISCONTINUED | OUTPATIENT
Start: 2020-03-02 | End: 2020-03-06 | Stop reason: HOSPADM

## 2020-03-02 RX ORDER — ESCITALOPRAM OXALATE 10 MG/1
10 TABLET ORAL DAILY
Status: DISCONTINUED | OUTPATIENT
Start: 2020-03-02 | End: 2020-03-06 | Stop reason: HOSPADM

## 2020-03-02 RX ORDER — ONDANSETRON 2 MG/ML
4 INJECTION INTRAMUSCULAR; INTRAVENOUS EVERY 6 HOURS PRN
Status: DISCONTINUED | OUTPATIENT
Start: 2020-03-02 | End: 2020-03-06 | Stop reason: HOSPADM

## 2020-03-02 RX ORDER — POTASSIUM CHLORIDE 7.45 MG/ML
10 INJECTION INTRAVENOUS
Status: DISCONTINUED | OUTPATIENT
Start: 2020-03-02 | End: 2020-03-06 | Stop reason: HOSPADM

## 2020-03-02 RX ORDER — ONDANSETRON 4 MG/1
4 TABLET, FILM COATED ORAL EVERY 6 HOURS PRN
Status: DISCONTINUED | OUTPATIENT
Start: 2020-03-02 | End: 2020-03-06 | Stop reason: HOSPADM

## 2020-03-02 RX ORDER — MAGNESIUM SULFATE HEPTAHYDRATE 40 MG/ML
4 INJECTION, SOLUTION INTRAVENOUS AS NEEDED
Status: DISCONTINUED | OUTPATIENT
Start: 2020-03-02 | End: 2020-03-06 | Stop reason: HOSPADM

## 2020-03-02 RX ORDER — POTASSIUM CHLORIDE 750 MG/1
40 CAPSULE, EXTENDED RELEASE ORAL AS NEEDED
Status: DISCONTINUED | OUTPATIENT
Start: 2020-03-02 | End: 2020-03-06 | Stop reason: HOSPADM

## 2020-03-02 RX ORDER — ACETAMINOPHEN 325 MG/1
650 TABLET ORAL EVERY 4 HOURS PRN
Status: DISCONTINUED | OUTPATIENT
Start: 2020-03-02 | End: 2020-03-06 | Stop reason: HOSPADM

## 2020-03-02 RX ORDER — BISACODYL 5 MG/1
5 TABLET, DELAYED RELEASE ORAL DAILY PRN
Status: DISCONTINUED | OUTPATIENT
Start: 2020-03-02 | End: 2020-03-06 | Stop reason: HOSPADM

## 2020-03-02 RX ORDER — PANTOPRAZOLE SODIUM 40 MG/1
40 TABLET, DELAYED RELEASE ORAL EVERY MORNING
Status: DISCONTINUED | OUTPATIENT
Start: 2020-03-03 | End: 2020-03-06 | Stop reason: HOSPADM

## 2020-03-02 RX ORDER — ACETAMINOPHEN 160 MG/5ML
650 SOLUTION ORAL EVERY 4 HOURS PRN
Status: DISCONTINUED | OUTPATIENT
Start: 2020-03-02 | End: 2020-03-06 | Stop reason: HOSPADM

## 2020-03-02 RX ORDER — OXYCODONE HYDROCHLORIDE AND ACETAMINOPHEN 5; 325 MG/1; MG/1
1 TABLET ORAL EVERY 4 HOURS PRN
Status: DISCONTINUED | OUTPATIENT
Start: 2020-03-02 | End: 2020-03-06 | Stop reason: HOSPADM

## 2020-03-02 RX ORDER — MAGNESIUM SULFATE 1 G/100ML
1 INJECTION INTRAVENOUS AS NEEDED
Status: DISCONTINUED | OUTPATIENT
Start: 2020-03-02 | End: 2020-03-06 | Stop reason: HOSPADM

## 2020-03-02 RX ORDER — CETIRIZINE HYDROCHLORIDE 10 MG/1
10 TABLET ORAL DAILY
Status: DISCONTINUED | OUTPATIENT
Start: 2020-03-02 | End: 2020-03-06 | Stop reason: HOSPADM

## 2020-03-02 RX ORDER — DIPHENHYDRAMINE HCL 25 MG
25 CAPSULE ORAL EVERY 6 HOURS PRN
Status: DISCONTINUED | OUTPATIENT
Start: 2020-03-02 | End: 2020-03-06 | Stop reason: HOSPADM

## 2020-03-02 RX ORDER — NICOTINE POLACRILEX 4 MG
15 LOZENGE BUCCAL
Status: DISCONTINUED | OUTPATIENT
Start: 2020-03-02 | End: 2020-03-06 | Stop reason: HOSPADM

## 2020-03-02 RX ADMIN — CETIRIZINE HYDROCHLORIDE 10 MG: 10 TABLET, FILM COATED ORAL at 18:15

## 2020-03-02 RX ADMIN — INSULIN LISPRO 5 UNITS: 100 INJECTION, SOLUTION INTRAVENOUS; SUBCUTANEOUS at 18:17

## 2020-03-02 RX ADMIN — INSULIN LISPRO 4 UNITS: 100 INJECTION, SOLUTION INTRAVENOUS; SUBCUTANEOUS at 20:50

## 2020-03-02 RX ADMIN — CYCLOBENZAPRINE HYDROCHLORIDE 5 MG: 10 TABLET, FILM COATED ORAL at 20:49

## 2020-03-02 RX ADMIN — ESCITALOPRAM OXALATE 10 MG: 10 TABLET ORAL at 18:15

## 2020-03-02 RX ADMIN — DAPTOMYCIN 350 MG: 500 INJECTION, POWDER, LYOPHILIZED, FOR SOLUTION INTRAVENOUS at 20:49

## 2020-03-02 RX ADMIN — DIPHENHYDRAMINE HYDROCHLORIDE 25 MG: 25 CAPSULE ORAL at 22:33

## 2020-03-02 RX ADMIN — INSULIN DETEMIR 10 UNITS: 100 INJECTION, SOLUTION SUBCUTANEOUS at 21:53

## 2020-03-02 RX ADMIN — HYDROMORPHONE HYDROCHLORIDE 0.5 MG: 1 INJECTION, SOLUTION INTRAMUSCULAR; INTRAVENOUS; SUBCUTANEOUS at 20:02

## 2020-03-02 RX ADMIN — CEFTRIAXONE 2 G: 2 INJECTION, POWDER, FOR SOLUTION INTRAMUSCULAR; INTRAVENOUS at 20:49

## 2020-03-02 RX ADMIN — SENNOSIDES AND DOCUSATE SODIUM 2 TABLET: 8.6; 5 TABLET ORAL at 21:53

## 2020-03-02 RX ADMIN — DIAZEPAM 2.5 MG: 5 INJECTION, SOLUTION INTRAMUSCULAR; INTRAVENOUS at 21:52

## 2020-03-02 RX ADMIN — HYDROMORPHONE HYDROCHLORIDE 0.5 MG: 1 INJECTION, SOLUTION INTRAMUSCULAR; INTRAVENOUS; SUBCUTANEOUS at 23:02

## 2020-03-02 RX ADMIN — SODIUM CHLORIDE 75 ML/HR: 9 INJECTION, SOLUTION INTRAVENOUS at 18:04

## 2020-03-02 RX ADMIN — OXYCODONE HYDROCHLORIDE AND ACETAMINOPHEN 1 TABLET: 5; 325 TABLET ORAL at 21:53

## 2020-03-02 RX ADMIN — HYDROMORPHONE HYDROCHLORIDE 0.5 MG: 1 INJECTION, SOLUTION INTRAMUSCULAR; INTRAVENOUS; SUBCUTANEOUS at 20:04

## 2020-03-02 RX ADMIN — SODIUM CHLORIDE 1000 ML: 9 INJECTION, SOLUTION INTRAVENOUS at 17:03

## 2020-03-02 NOTE — PLAN OF CARE
Problem: Patient Care Overview  Goal: Plan of Care Review  Outcome: Ongoing (interventions implemented as appropriate)  Flowsheets (Taken 3/2/2020 1540)  Progress: no change  Plan of Care Reviewed With: patient; family  Outcome Summary: Pleasant patient. VSS. R/A. A&O*4. Direct admit. Pt had first chemo therapy on 2/26/2020 and has had functional decline since. Acute pain located in lower back pain lateral to laminectomy that started yesterday. Skin around port looks red/irritated. Awaiting on orders. Will continue to monitor and reassess.  Goal: Individualization and Mutuality  Outcome: Ongoing (interventions implemented as appropriate)  Goal: Discharge Needs Assessment  Outcome: Ongoing (interventions implemented as appropriate)  Goal: Interprofessional Rounds/Family Conf  Outcome: Ongoing (interventions implemented as appropriate)     Problem: Fall Risk (Adult)  Goal: Identify Related Risk Factors and Signs and Symptoms  Outcome: Ongoing (interventions implemented as appropriate)  Goal: Absence of Fall  Outcome: Ongoing (interventions implemented as appropriate)     Problem: Chemotherapy Effects (Adult)  Goal: Signs and Symptoms of Listed Potential Problems Will be Absent, Minimized or Managed (Chemotherapy Effects)  Outcome: Ongoing (interventions implemented as appropriate)     Problem: Pain, Acute (Adult)  Goal: Identify Related Risk Factors and Signs and Symptoms  Outcome: Ongoing (interventions implemented as appropriate)  Goal: Acceptable Pain Control/Comfort Level  Outcome: Ongoing (interventions implemented as appropriate)     Problem: Skin and Soft Tissue Infection (Adult)  Goal: Signs and Symptoms of Listed Potential Problems Will be Absent, Minimized or Managed (Skin and Soft Tissue Infection)  Outcome: Ongoing (interventions implemented as appropriate)

## 2020-03-02 NOTE — TELEPHONE ENCOUNTER
Patient needs to cancel appointment for 03/03/20 and 03/04/20. Patient is in the hospital      Patient phone number [phone]

## 2020-03-02 NOTE — H&P
[facility] Medicine Services  HISTORY AND PHYSICAL    Patient Name: Luana Chawla  : 1960  MRN: [id number]  Primary Care Physician: Bree Lantigua APRN  Date of admission: 3/2/2020      Subjective   Subjective     Chief Complaint:  Infected port + blood cultures     HPI:  Luana Chawla is a 59 y.o. female with PMH of DM, GERD, Diverticulitis, hyst, mastectomy bilateral, lumbar laminectomy discectomy decompression. Patient was a direct admit from Dr. Chávez office for bacteremia possibly from port. Patient had her port placed within the last month and had her first chemo treatment on Wednesday. Since her treatment she has had N/V, fever, and chills.  her temp was 102 and she went to the local ER and they noticed her port had erythema  And edema and had some pus when needle was removed. They wanted to keep patient but she requested to go home and follow up with Dr ELLIS today who did her surgery. Her back started hurting yesterday and increased today in the lower lumbar region. Patient states pain is sharp with muscle spasms. Pain has been 10/10 with activity and patient has had difficulty walking. At rest pain does improve but usually does not go completely away. She has had some urinary incontinence from not being able to get to bathroom in time. She can feel that she needs to go and denies any saddle anesthesia.      Review of Systems   Constitutional: Positive for activity change, appetite change, chills and fever.   HENT: Negative for mouth sores.    Respiratory: Negative for chest tightness and shortness of breath.    Gastrointestinal: Positive for nausea and vomiting. Negative for abdominal pain and diarrhea.   Genitourinary: Negative for dysuria, urgency and vaginal bleeding.   Musculoskeletal: Positive for back pain.   Psychiatric/Behavioral: Negative for behavioral problems, self-injury and sleep disturbance.        All other systems reviewed and are  01/19/18 1130   Group 1   Start Time 1030   Stop Time 1130   Length (min) 61 Min   Group Name group psychotherapy   Focus of Group Coping Skills   Attendance Present   Group Notes Pt participated in an education coping skills group. Pt was educated on the four categories of coping skills. Pt was encouraged to identify their primary coping skills as well as their skills of interest to work on their stressors. negative.     Personal History     Past Medical History:   Diagnosis Date   • Diabetes mellitus (CMS/HCC)    • Diverticulitis    • GERD (gastroesophageal reflux disease)    • Kidney stone    • Malignant neoplasm of upper-outer quadrant of right female breast (CMS/HCC) 1/21/2020   • Sciatica    • Wears contact lenses        Past Surgical History:   Procedure Laterality Date   • APPENDECTOMY     • BREAST BIOPSY Right    • BREAST EXCISIONAL BIOPSY Right    • CHOLECYSTECTOMY     • COLON SURGERY     • COLONOSCOPY     • HYSTERECTOMY     • LUMBAR LAMINECTOMY DISCECTOMY DECOMPRESSION N/A 5/24/2019    Procedure: LUMBAR LAMINECTOMY L4-5 AND L5-S1;  Surgeon: Hipolito Kahn MD;  Location: [facility] OR;  Service: Orthopedic Spine   • MASTECTOMY W/ SENTINEL NODE BIOPSY Bilateral 1/21/2020    Procedure: SKIN SPARING MASTECTOMIES BILATERAL, SENTINEL NODE BIOPSY RIGHT;  Surgeon: Silvio Howard MD;  Location: [facility] OR;  Service: General       Family History: family history includes Breast cancer (age of onset: 70) in her mother. Otherwise pertinent FHx was reviewed and unremarkable.     Social History:  reports that she quit smoking about 5 years ago. Her smoking use included cigarettes and electronic cigarette. She has never used smokeless tobacco. She reports that she does not drink alcohol or use drugs.  Social History     Social History Narrative    Lives at home indendently with [facility]'s        Medications:  Available home medication information reviewed.  Medications Prior to Admission   Medication Sig Dispense Refill Last Dose   • cetirizine (zyrTEC) 10 MG tablet Take 10 mg by mouth Daily.   Taking   • escitalopram (LEXAPRO) 10 MG tablet Take 10 mg by mouth Daily.   Taking   • Insulin Glargine (BASAGLAR KWIKPEN SC) Inject 30 Units under the skin into the appropriate area as directed Every Night.   Taking   • lidocaine-prilocaine (EMLA) 2.5-2.5 % cream Apply  topically to the appropriate area as directed As Needed (45-60  minutes prior to port access.  Cover with saran/plastic wrap.). 30 g 3 Taking   • metFORMIN ER (GLUCOPHAGE-XR) 500 MG 24 hr tablet Take 500 mg by mouth 2 (Two) Times a Day.   Taking   • omeprazole (priLOSEC) 40 MG capsule Take 40 mg by mouth Daily.   Taking   • ondansetron (ZOFRAN) 8 MG tablet Take 1 tablet by mouth 3 (Three) Times a Day As Needed for Nausea or Vomiting. 30 tablet 5 Taking   • Semaglutide (OZEMPIC) 0.25 or 0.5 MG/DOSE solution pen-injector Inject 1 mg under the skin into the appropriate area as directed 1 (One) Time Per Week.   Taking       Allergies   Allergen Reactions   • Bactrim [Sulfamethoxazole-Trimethoprim] Rash     RASH, SKIN PEELING        Objective   Objective     Vital Signs:   Temp:  [99.1 °F (37.3 °C)] 99.1 °F (37.3 °C)  Heart Rate:  [81] 81  Resp:  [16] 16  BP: (133)/(82) 133/82        Physical Exam   Constitutional: Awake, alert  Eyes: PERRLA, sclerae anicteric, no conjunctival injection  HENT: NCAT, mucous membranes moist  Neck: Supple, trachea midline  Respiratory: Clear to auscultation bilaterally, nonlabored respirations room air 95%  Cardiovascular: RRR, no murmurs, rubs, or gallops, palpable pedal pulses bilaterally  Gastrointestinal: Positive bowel sounds, soft, nontender, nondistended  Musculoskeletal: No bilateral ankle edema, no clubbing or cyanosis to extremities, lower back pain with movement of veronica legs. Strength equal   Psychiatric: Appropriate affect, cooperative  Neurologic: Oriented x 3, strength symmetric in all extremities, Cranial Nerves grossly intact to confrontation, speech clear  Skin: No rashes, left chest port with erythema and edema no drainage noted       Results Reviewed:  I have personally reviewed current lab and radiology data.    Results from last 7 days   Lab Units 03/02/20  1622 02/26/20  1042   WBC 10*3/mm3  --  7.30   HEMOGLOBIN g/dL  --  10.4*   HEMATOCRIT %  --  32.8*   PLATELETS 10*3/mm3  --  349   INR  1.04  --      Results from last 7 days    Lab Units 03/02/20  1622   SODIUM mmol/L 127*   POTASSIUM mmol/L 2.9*   CHLORIDE mmol/L 88*   CO2 mmol/L 24.0   BUN mg/dL 11   CREATININE mg/dL 0.55*   GLUCOSE mg/dL 329*   CALCIUM mg/dL 8.6   ALT (SGPT) U/L 44*   AST (SGOT) U/L 36*   PROCALCITONIN ng/mL 0.66*     Estimated Creatinine Clearance: 113 mL/min (A) (by C-G formula based on SCr of 0.55 mg/dL (L)).  Brief Urine Lab Results     None        Imaging Results (Last 24 Hours)     Procedure Component Value Units Date/Time    XR Chest 1 View [[id number]] Collected:  03/02/20 1655     Updated:  03/02/20 1703    Narrative:          EXAMINATION: XR CHEST 1 VW-      INDICATION: fever      COMPARISON: 02/14/2020     FINDINGS: Portable chest reveals portacatheter identified on the left  tip in the SVC. Emphysematous and chronic changes identified within the  lung fields. Degenerative changes seen within the spine. No pleural  effusion or pneumothorax. Vascular calcification seen within the  thoracic aorta.           Impression:       Chronic and emphysematous changes seen diffusely throughout  the lung fields. Portacatheter identified left tip in the SVC.  Degenerative changes seen within the spine. No evidence of pneumothorax.              Results for orders placed during the hospital encounter of 02/18/20   Adult Transthoracic Echo Complete W/ Cont if Necessary Per Protocol    Narrative · Left ventricular systolic function is normal. Estimated EF = 55%.  · The global longitudinal strain was -19.5%.          Assessment/Plan   Assessment & Plan     Active Hospital Problems    Diagnosis POA   • Infected venous access port [T80.219A] Yes   • Type 2 diabetes mellitus (CMS/HCC) [E11.9] Yes   • GERD (gastroesophageal reflux disease) [K21.9] Yes   • Back pain [M54.9] Yes   • Bacteremia [R78.81] Yes         Bacteremia  Infected access port  --consult ID  --repeat Blood cultures and check UA  -was seen in Dr. Chávez office 3/2, admitted from his office  -- Daptomycin and  taye   -- consult Dr. ELLIS for possible port removal   -- Iv bolus and IVF's     Back pain  Hx of discectomy  -- MRI of back to rule out discitis   -- pain meds IV and oral and muscle relaxer's       DM  --  Hold home meds  -- SSI insulin and low dose basal 10 units pt takes 30 units at home   -- check A1C    GERD  -- cont PPI     Hx Right invasive ductal Carcinoma , previous bilateral mastectomy  Mastectomy   -- s/p first dose of chemotherapy on 2/26  -- follows with Dr Gomez courtesy consult     --------------------------------------------  Plan:  D/w Dr. Chávez, zosyn & daptomycin  -follow cultures  -Dr. ELLIS aware, he likely to remove port-a-cath        DVT prophylaxis:  [facility]     CODE STATUS:    There are no questions and answers to display.       Admission Status:  I believe this patient meets inpatient status due to need for iv antibiotics, probable port-a-cath removal and expected stay > 2 midnights  Electronically signed by CECI Agudelo, 03/02/20, 4:17 PM.      Attending   Admission Attestation       I have seen and examined the patient, performing an independent face-to-face diagnostic evaluation with plan of care reviewed and developed with the advanced practice clinician (APC).      Brief Summary Statement:   Luana Chawla is a 59 y.o. female w/ dm, previous lumbar laminecomy, breast cancer (previous mastectomy w/ subsequent port-a-cath insertion and chemotherapy recently initiated). Patient presented to outside hospital w/ n/v and fever. The port-a-cath site was noted erythematous purulence was apparently noted. Blood cultures were drawn and patient apparently declined admission at local hospital and instead opted to follow up at Dr. Chávez (infectious disease) clinic today. Per discussion w/ Dr. Chávez, outside cultures growing gram positive chains. Due to port-site erythema & tenderness concerning for infection patient was admitted to hospitalist service.  Remainder of detailed HPI is  as noted by APC and has been reviewed and/or edited by me for completeness.    Attending Physical Exam:  Constitutional:Alert, oriented x 3, nontoxic appearing  Psych:Normal/appropriate affect  HEENT:Ncat, oroph clear  Neck: neck supple, full range of motion  Neuro: Face symmetric, speech clear, equal , moves all extremities  Cardiac: Rrr; No pretibial pitting edema  Resp: Ctab, normal effort  GI: abd soft, nontender  Skin: left chest wall erythema/warmth over the insertion site  Musculoskeletal/extremities: no cyanosis extremities; no significant ankle edema            Brief Assessment/Plan :  See detailed assessment and plan developed with APC which I have reviewed and/or edited for completeness.    Electronically signed by Ish Leach MD, 03/02/20, 11:46 PM.

## 2020-03-03 ENCOUNTER — ANESTHESIA EVENT (OUTPATIENT)
Dept: PERIOP | Facility: HOSPITAL | Age: 60
End: 2020-03-03

## 2020-03-03 ENCOUNTER — ANESTHESIA (OUTPATIENT)
Dept: PERIOP | Facility: HOSPITAL | Age: 60
End: 2020-03-03

## 2020-03-03 LAB
ANION GAP SERPL CALCULATED.3IONS-SCNC: 10 MMOL/L (ref 5–15)
BACTERIA BLD CULT: ABNORMAL
BASOPHILS # BLD MANUAL: 0 10*3/MM3 (ref 0–0.2)
BASOPHILS NFR BLD AUTO: 0 % (ref 0–1.5)
BUN BLD-MCNC: 7 MG/DL (ref 8–23)
BUN/CREAT SERPL: 17.5 (ref 7–25)
CALCIUM SPEC-SCNC: 8.2 MG/DL (ref 8.6–10.5)
CHLORIDE SERPL-SCNC: 97 MMOL/L (ref 98–107)
CO2 SERPL-SCNC: 28 MMOL/L (ref 22–29)
CREAT BLD-MCNC: 0.4 MG/DL (ref 0.57–1)
DEPRECATED RDW RBC AUTO: 43.9 FL (ref 37–54)
EOSINOPHIL # BLD MANUAL: 0.06 10*3/MM3 (ref 0–0.4)
EOSINOPHIL NFR BLD MANUAL: 1 % (ref 0.3–6.2)
ERYTHROCYTE [DISTWIDTH] IN BLOOD BY AUTOMATED COUNT: 13.6 % (ref 12.3–15.4)
GFR SERPL CREATININE-BSD FRML MDRD: >150 ML/MIN/1.73
GLUCOSE BLD-MCNC: 98 MG/DL (ref 65–99)
GLUCOSE BLDC GLUCOMTR-MCNC: 193 MG/DL (ref 70–130)
GLUCOSE BLDC GLUCOMTR-MCNC: 70 MG/DL (ref 70–130)
GLUCOSE BLDC GLUCOMTR-MCNC: 80 MG/DL (ref 70–130)
GLUCOSE BLDC GLUCOMTR-MCNC: 91 MG/DL (ref 70–130)
GLUCOSE BLDC GLUCOMTR-MCNC: 94 MG/DL (ref 70–130)
HBA1C MFR BLD: 7.7 % (ref 4.8–5.6)
HCT VFR BLD AUTO: 25.6 % (ref 34–46.6)
HGB BLD-MCNC: 8.5 G/DL (ref 12–15.9)
LYMPHOCYTES # BLD MANUAL: 0.62 10*3/MM3 (ref 0.7–3.1)
LYMPHOCYTES NFR BLD MANUAL: 11 % (ref 19.6–45.3)
LYMPHOCYTES NFR BLD MANUAL: 6 % (ref 5–12)
MAGNESIUM SERPL-MCNC: 1.4 MG/DL (ref 1.6–2.4)
MCH RBC QN AUTO: 29.5 PG (ref 26.6–33)
MCHC RBC AUTO-ENTMCNC: 33.2 G/DL (ref 31.5–35.7)
MCV RBC AUTO: 88.9 FL (ref 79–97)
MONOCYTES # BLD AUTO: 0.34 10*3/MM3 (ref 0.1–0.9)
NEUTROPHILS # BLD AUTO: 4.59 10*3/MM3 (ref 1.7–7)
NEUTROPHILS NFR BLD MANUAL: 67 % (ref 42.7–76)
NEUTS BAND NFR BLD MANUAL: 15 % (ref 0–5)
PLAT MORPH BLD: NORMAL
PLATELET # BLD AUTO: 146 10*3/MM3 (ref 140–450)
PMV BLD AUTO: 10.3 FL (ref 6–12)
POTASSIUM BLD-SCNC: 3.4 MMOL/L (ref 3.5–5.2)
RBC # BLD AUTO: 2.88 10*6/MM3 (ref 3.77–5.28)
RBC MORPH BLD: NORMAL
SODIUM BLD-SCNC: 135 MMOL/L (ref 136–145)
WBC MORPH BLD: NORMAL
WBC NRBC COR # BLD: 5.6 10*3/MM3 (ref 3.4–10.8)

## 2020-03-03 PROCEDURE — 25010000002 FENTANYL CITRATE (PF) 100 MCG/2ML SOLUTION: Performed by: NURSE ANESTHETIST, CERTIFIED REGISTERED

## 2020-03-03 PROCEDURE — 82962 GLUCOSE BLOOD TEST: CPT

## 2020-03-03 PROCEDURE — 87206 SMEAR FLUORESCENT/ACID STAI: CPT | Performed by: SURGERY

## 2020-03-03 PROCEDURE — 63710000001 INSULIN DETEMIR PER 5 UNITS: Performed by: SURGERY

## 2020-03-03 PROCEDURE — 87205 SMEAR GRAM STAIN: CPT | Performed by: FAMILY MEDICINE

## 2020-03-03 PROCEDURE — 83036 HEMOGLOBIN GLYCOSYLATED A1C: CPT | Performed by: NURSE PRACTITIONER

## 2020-03-03 PROCEDURE — 25010000002 MAGNESIUM SULFATE IN D5W 1G/100ML (PREMIX) 1-5 GM/100ML-% SOLUTION: Performed by: INTERNAL MEDICINE

## 2020-03-03 PROCEDURE — 25010000002 HEPARIN (PORCINE) PER 1000 UNITS: Performed by: NURSE PRACTITIONER

## 2020-03-03 PROCEDURE — 25010000002 CEFTAROLINE FOSAMIL PER 10 MG: Performed by: SURGERY

## 2020-03-03 PROCEDURE — 25010000002 DAPTOMYCIN PER 1 MG: Performed by: SURGERY

## 2020-03-03 PROCEDURE — 25010000002 KETOROLAC TROMETHAMINE PER 15 MG: Performed by: NURSE PRACTITIONER

## 2020-03-03 PROCEDURE — 87075 CULTR BACTERIA EXCEPT BLOOD: CPT | Performed by: SURGERY

## 2020-03-03 PROCEDURE — 87070 CULTURE OTHR SPECIMN AEROBIC: CPT | Performed by: FAMILY MEDICINE

## 2020-03-03 PROCEDURE — 0JPT0WZ REMOVAL OF TOTALLY IMPLANTABLE VASCULAR ACCESS DEVICE FROM TRUNK SUBCUTANEOUS TISSUE AND FASCIA, OPEN APPROACH: ICD-10-PCS | Performed by: SURGERY

## 2020-03-03 PROCEDURE — 80048 BASIC METABOLIC PNL TOTAL CA: CPT | Performed by: NURSE PRACTITIONER

## 2020-03-03 PROCEDURE — 85007 BL SMEAR W/DIFF WBC COUNT: CPT | Performed by: NURSE PRACTITIONER

## 2020-03-03 PROCEDURE — 25010000002 PROPOFOL 10 MG/ML EMULSION: Performed by: NURSE ANESTHETIST, CERTIFIED REGISTERED

## 2020-03-03 PROCEDURE — 85025 COMPLETE CBC W/AUTO DIFF WBC: CPT | Performed by: NURSE PRACTITIONER

## 2020-03-03 PROCEDURE — 87116 MYCOBACTERIA CULTURE: CPT | Performed by: SURGERY

## 2020-03-03 PROCEDURE — 87147 CULTURE TYPE IMMUNOLOGIC: CPT | Performed by: FAMILY MEDICINE

## 2020-03-03 PROCEDURE — 99233 SBSQ HOSP IP/OBS HIGH 50: CPT | Performed by: NURSE PRACTITIONER

## 2020-03-03 PROCEDURE — 87186 SC STD MICRODIL/AGAR DIL: CPT | Performed by: FAMILY MEDICINE

## 2020-03-03 PROCEDURE — 25010000002 HYDROMORPHONE PER 4 MG: Performed by: INTERNAL MEDICINE

## 2020-03-03 PROCEDURE — 87102 FUNGUS ISOLATION CULTURE: CPT | Performed by: SURGERY

## 2020-03-03 RX ORDER — KETOROLAC TROMETHAMINE 15 MG/ML
15 INJECTION, SOLUTION INTRAMUSCULAR; INTRAVENOUS EVERY 6 HOURS PRN
Status: COMPLETED | OUTPATIENT
Start: 2020-03-03 | End: 2020-03-03

## 2020-03-03 RX ORDER — DOCUSATE SODIUM 100 MG/1
100 CAPSULE, LIQUID FILLED ORAL 2 TIMES DAILY
Status: DISCONTINUED | OUTPATIENT
Start: 2020-03-03 | End: 2020-03-06 | Stop reason: HOSPADM

## 2020-03-03 RX ORDER — SODIUM CHLORIDE 0.9 % (FLUSH) 0.9 %
10 SYRINGE (ML) INJECTION AS NEEDED
Status: CANCELLED | OUTPATIENT
Start: 2020-03-03

## 2020-03-03 RX ORDER — PROPOFOL 10 MG/ML
VIAL (ML) INTRAVENOUS AS NEEDED
Status: DISCONTINUED | OUTPATIENT
Start: 2020-03-03 | End: 2020-03-03 | Stop reason: SURG

## 2020-03-03 RX ORDER — HEPARIN SODIUM 5000 [USP'U]/ML
5000 INJECTION, SOLUTION INTRAVENOUS; SUBCUTANEOUS EVERY 8 HOURS SCHEDULED
Status: DISCONTINUED | OUTPATIENT
Start: 2020-03-03 | End: 2020-03-06

## 2020-03-03 RX ORDER — PROMETHAZINE HYDROCHLORIDE 25 MG/1
25 SUPPOSITORY RECTAL ONCE AS NEEDED
Status: DISCONTINUED | OUTPATIENT
Start: 2020-03-03 | End: 2020-03-03 | Stop reason: HOSPADM

## 2020-03-03 RX ORDER — BISACODYL 10 MG
10 SUPPOSITORY, RECTAL RECTAL DAILY
Status: DISCONTINUED | OUTPATIENT
Start: 2020-03-03 | End: 2020-03-04

## 2020-03-03 RX ORDER — ONDANSETRON 2 MG/ML
4 INJECTION INTRAMUSCULAR; INTRAVENOUS ONCE AS NEEDED
Status: DISCONTINUED | OUTPATIENT
Start: 2020-03-03 | End: 2020-03-03 | Stop reason: HOSPADM

## 2020-03-03 RX ORDER — SODIUM CHLORIDE, SODIUM LACTATE, POTASSIUM CHLORIDE, CALCIUM CHLORIDE 600; 310; 30; 20 MG/100ML; MG/100ML; MG/100ML; MG/100ML
9 INJECTION, SOLUTION INTRAVENOUS CONTINUOUS
Status: DISCONTINUED | OUTPATIENT
Start: 2020-03-03 | End: 2020-03-05

## 2020-03-03 RX ORDER — FENTANYL CITRATE 50 UG/ML
50 INJECTION, SOLUTION INTRAMUSCULAR; INTRAVENOUS
Status: DISCONTINUED | OUTPATIENT
Start: 2020-03-03 | End: 2020-03-03 | Stop reason: HOSPADM

## 2020-03-03 RX ORDER — PROMETHAZINE HYDROCHLORIDE 25 MG/1
25 TABLET ORAL ONCE AS NEEDED
Status: DISCONTINUED | OUTPATIENT
Start: 2020-03-03 | End: 2020-03-03 | Stop reason: HOSPADM

## 2020-03-03 RX ORDER — PROMETHAZINE HYDROCHLORIDE 25 MG/ML
6.25 INJECTION, SOLUTION INTRAMUSCULAR; INTRAVENOUS ONCE AS NEEDED
Status: DISCONTINUED | OUTPATIENT
Start: 2020-03-03 | End: 2020-03-03 | Stop reason: HOSPADM

## 2020-03-03 RX ORDER — LIDOCAINE HYDROCHLORIDE 10 MG/ML
0.5 INJECTION, SOLUTION EPIDURAL; INFILTRATION; INTRACAUDAL; PERINEURAL ONCE AS NEEDED
Status: CANCELLED | OUTPATIENT
Start: 2020-03-03

## 2020-03-03 RX ORDER — LIDOCAINE HYDROCHLORIDE AND EPINEPHRINE 10; 10 MG/ML; UG/ML
INJECTION, SOLUTION INFILTRATION; PERINEURAL AS NEEDED
Status: DISCONTINUED | OUTPATIENT
Start: 2020-03-03 | End: 2020-03-03 | Stop reason: HOSPADM

## 2020-03-03 RX ORDER — ESMOLOL HYDROCHLORIDE 10 MG/ML
INJECTION INTRAVENOUS AS NEEDED
Status: DISCONTINUED | OUTPATIENT
Start: 2020-03-03 | End: 2020-03-03 | Stop reason: SURG

## 2020-03-03 RX ORDER — FENTANYL CITRATE 50 UG/ML
INJECTION, SOLUTION INTRAMUSCULAR; INTRAVENOUS AS NEEDED
Status: DISCONTINUED | OUTPATIENT
Start: 2020-03-03 | End: 2020-03-03

## 2020-03-03 RX ORDER — SODIUM CHLORIDE 0.9 % (FLUSH) 0.9 %
10 SYRINGE (ML) INJECTION EVERY 12 HOURS SCHEDULED
Status: CANCELLED | OUTPATIENT
Start: 2020-03-03

## 2020-03-03 RX ORDER — HYDROXYZINE HYDROCHLORIDE 25 MG/1
25 TABLET, FILM COATED ORAL 3 TIMES DAILY PRN
Status: DISCONTINUED | OUTPATIENT
Start: 2020-03-03 | End: 2020-03-06 | Stop reason: HOSPADM

## 2020-03-03 RX ORDER — FENTANYL CITRATE 50 UG/ML
INJECTION, SOLUTION INTRAMUSCULAR; INTRAVENOUS AS NEEDED
Status: DISCONTINUED | OUTPATIENT
Start: 2020-03-03 | End: 2020-03-03 | Stop reason: SURG

## 2020-03-03 RX ORDER — HYDROMORPHONE HYDROCHLORIDE 1 MG/ML
0.5 INJECTION, SOLUTION INTRAMUSCULAR; INTRAVENOUS; SUBCUTANEOUS
Status: DISCONTINUED | OUTPATIENT
Start: 2020-03-03 | End: 2020-03-03 | Stop reason: HOSPADM

## 2020-03-03 RX ADMIN — OXYCODONE HYDROCHLORIDE AND ACETAMINOPHEN 1 TABLET: 5; 325 TABLET ORAL at 03:17

## 2020-03-03 RX ADMIN — SODIUM CHLORIDE, PRESERVATIVE FREE 10 ML: 5 INJECTION INTRAVENOUS at 21:10

## 2020-03-03 RX ADMIN — PANTOPRAZOLE SODIUM 40 MG: 40 TABLET, DELAYED RELEASE ORAL at 06:39

## 2020-03-03 RX ADMIN — BISACODYL 5 MG: 5 TABLET, COATED ORAL at 17:50

## 2020-03-03 RX ADMIN — INSULIN DETEMIR 10 UNITS: 100 INJECTION, SOLUTION SUBCUTANEOUS at 21:12

## 2020-03-03 RX ADMIN — OXYCODONE HYDROCHLORIDE AND ACETAMINOPHEN 1 TABLET: 5; 325 TABLET ORAL at 16:52

## 2020-03-03 RX ADMIN — HEPARIN SODIUM 5000 UNITS: 5000 INJECTION, SOLUTION INTRAVENOUS; SUBCUTANEOUS at 21:07

## 2020-03-03 RX ADMIN — ESMOLOL HYDROCHLORIDE 10 MG: 10 INJECTION, SOLUTION INTRAVENOUS at 15:04

## 2020-03-03 RX ADMIN — PROPOFOL 30 MG: 10 INJECTION, EMULSION INTRAVENOUS at 15:06

## 2020-03-03 RX ADMIN — HYDROMORPHONE HYDROCHLORIDE 0.5 MG: 1 INJECTION, SOLUTION INTRAMUSCULAR; INTRAVENOUS; SUBCUTANEOUS at 06:39

## 2020-03-03 RX ADMIN — MAGNESIUM SULFATE HEPTAHYDRATE 1 G: 1 INJECTION, SOLUTION INTRAVENOUS at 03:17

## 2020-03-03 RX ADMIN — HYDROMORPHONE HYDROCHLORIDE 0.5 MG: 1 INJECTION, SOLUTION INTRAMUSCULAR; INTRAVENOUS; SUBCUTANEOUS at 04:01

## 2020-03-03 RX ADMIN — POLYETHYLENE GLYCOL 3350 17 G: 17 POWDER, FOR SOLUTION ORAL at 21:18

## 2020-03-03 RX ADMIN — OXYCODONE HYDROCHLORIDE AND ACETAMINOPHEN 1 TABLET: 5; 325 TABLET ORAL at 20:57

## 2020-03-03 RX ADMIN — CYCLOBENZAPRINE HYDROCHLORIDE 5 MG: 10 TABLET, FILM COATED ORAL at 21:07

## 2020-03-03 RX ADMIN — HYDROMORPHONE HYDROCHLORIDE 0.5 MG: 1 INJECTION, SOLUTION INTRAMUSCULAR; INTRAVENOUS; SUBCUTANEOUS at 01:31

## 2020-03-03 RX ADMIN — MAGNESIUM SULFATE HEPTAHYDRATE 1 G: 1 INJECTION, SOLUTION INTRAVENOUS at 01:31

## 2020-03-03 RX ADMIN — SODIUM CHLORIDE, PRESERVATIVE FREE 10 ML: 5 INJECTION INTRAVENOUS at 09:06

## 2020-03-03 RX ADMIN — CEFTAROLINE FOSAMIL 600 MG: 600 POWDER, FOR SOLUTION INTRAVENOUS at 17:41

## 2020-03-03 RX ADMIN — DAPTOMYCIN 500 MG: 500 INJECTION, POWDER, LYOPHILIZED, FOR SOLUTION INTRAVENOUS at 17:50

## 2020-03-03 RX ADMIN — CYCLOBENZAPRINE HYDROCHLORIDE 5 MG: 10 TABLET, FILM COATED ORAL at 06:40

## 2020-03-03 RX ADMIN — POTASSIUM CHLORIDE 40 MEQ: 750 CAPSULE, EXTENDED RELEASE ORAL at 09:05

## 2020-03-03 RX ADMIN — FENTANYL CITRATE 50 MCG: 50 INJECTION, SOLUTION INTRAMUSCULAR; INTRAVENOUS at 15:06

## 2020-03-03 RX ADMIN — ESCITALOPRAM OXALATE 10 MG: 10 TABLET ORAL at 09:05

## 2020-03-03 RX ADMIN — FENTANYL CITRATE 50 MCG: 50 INJECTION, SOLUTION INTRAMUSCULAR; INTRAVENOUS at 14:49

## 2020-03-03 RX ADMIN — SODIUM CHLORIDE, POTASSIUM CHLORIDE, SODIUM LACTATE AND CALCIUM CHLORIDE 9 ML/HR: 600; 310; 30; 20 INJECTION, SOLUTION INTRAVENOUS at 14:03

## 2020-03-03 RX ADMIN — CYCLOBENZAPRINE HYDROCHLORIDE 5 MG: 10 TABLET, FILM COATED ORAL at 16:52

## 2020-03-03 RX ADMIN — PROPOFOL 50 MG: 10 INJECTION, EMULSION INTRAVENOUS at 14:52

## 2020-03-03 RX ADMIN — MAGNESIUM SULFATE HEPTAHYDRATE 1 G: 1 INJECTION, SOLUTION INTRAVENOUS at 00:23

## 2020-03-03 RX ADMIN — POTASSIUM CHLORIDE 40 MEQ: 750 CAPSULE, EXTENDED RELEASE ORAL at 00:23

## 2020-03-03 RX ADMIN — POTASSIUM CHLORIDE 40 MEQ: 750 CAPSULE, EXTENDED RELEASE ORAL at 04:01

## 2020-03-03 RX ADMIN — CETIRIZINE HYDROCHLORIDE 10 MG: 10 TABLET, FILM COATED ORAL at 09:05

## 2020-03-03 RX ADMIN — INSULIN LISPRO 2 UNITS: 100 INJECTION, SOLUTION INTRAVENOUS; SUBCUTANEOUS at 21:06

## 2020-03-03 RX ADMIN — HYDROXYZINE HYDROCHLORIDE 25 MG: 25 TABLET, FILM COATED ORAL at 20:57

## 2020-03-03 RX ADMIN — KETOROLAC TROMETHAMINE 15 MG: 15 INJECTION, SOLUTION INTRAMUSCULAR; INTRAVENOUS at 09:09

## 2020-03-03 RX ADMIN — PROPOFOL 30 MG: 10 INJECTION, EMULSION INTRAVENOUS at 14:57

## 2020-03-03 NOTE — ANESTHESIA PREPROCEDURE EVALUATION
Anesthesia Evaluation                  Airway   Mallampati: I  TM distance: >3 FB  Neck ROM: full  No difficulty expected  Dental - normal exam     Pulmonary - normal exam   Cardiovascular - normal exam        Neuro/Psych  (+) numbness,     GI/Hepatic/Renal/Endo    (+)  GERD,  renal disease stones, diabetes mellitus,     Musculoskeletal     (+) back pain,   Abdominal  - normal exam    Bowel sounds: normal.   Substance History      OB/GYN          Other      history of cancer (BREAST CA)                    Anesthesia Plan    ASA 3     general   (PROPOFOL)  intravenous induction     Anesthetic plan, all risks, benefits, and alternatives have been provided, discussed and informed consent has been obtained with: patient.    Plan discussed with CRNA.

## 2020-03-03 NOTE — BRIEF OP NOTE
REMOVAL VENOUS ACCESS DEVICE  Progress Note    Luana Chawla  3/3/2020    Pre-op Diagnosis:   Infected Port-A-Cath       Post-Op Diagnosis Codes:     * Infection due to Port-A-Cath [T80.219A]    Procedure/CPT® Codes:      Procedure(s):  REMOVAL PORT    Surgeon(s):  Silvio Howard MD    Anesthesia: Monitored Anesthesia Care    Staff:   Circulator: Lisette Justin RN  Scrub Person: Kelvin Quijano RN    Estimated Blood Loss: none    Urine Voided: * No values recorded between 3/3/2020  2:46 PM and 3/3/2020  3:09 PM *    Specimens:                A: Tip of catheter and culture sent          Drains:   External Urinary Catheter (Active)   Site Assessment Skin intact 3/3/2020  8:00 AM   Application/Removal no longer indicated 3/3/2020 12:00 PM   Collection Container Wall suction 3/3/2020  8:00 AM   Wall suction (mmHG) 80 mmHG 3/3/2020  8:00 AM   Securement Method Securing device 3/3/2020  8:00 AM   Output (mL) 800 mL 3/3/2020 10:58 AM       [REMOVED] Closed/Suction Drain 1 Right Breast Bulb 15 Fr. (Removed)       [REMOVED] Closed/Suction Drain 1 Left Breast Bulb 15 Fr. (Removed)       Findings: Purulence noted    Complications: None      Silvio Howard MD     Date: 3/3/2020  Time: 3:12 PM

## 2020-03-03 NOTE — PLAN OF CARE
Problem: Patient Care Overview  Goal: Plan of Care Review  Outcome: Ongoing (interventions implemented as appropriate)  Flowsheets (Taken 3/3/2020 1257)  Progress: improving  Outcome Summary: VSS. R/A while awake. 2LNC @HS. A&O*4. Family at bedside. Added Toradol for pain which pt states has helped tremendously in her functional ability. Dr Back removed infected port today. Will continue to monitor and assess  Goal: Individualization and Mutuality  Outcome: Ongoing (interventions implemented as appropriate)  Goal: Discharge Needs Assessment  Outcome: Ongoing (interventions implemented as appropriate)  Goal: Interprofessional Rounds/Family Conf  Outcome: Ongoing (interventions implemented as appropriate)     Problem: Fall Risk (Adult)  Goal: Identify Related Risk Factors and Signs and Symptoms  Outcome: Ongoing (interventions implemented as appropriate)  Goal: Absence of Fall  Outcome: Ongoing (interventions implemented as appropriate)     Problem: Chemotherapy Effects (Adult)  Goal: Signs and Symptoms of Listed Potential Problems Will be Absent, Minimized or Managed (Chemotherapy Effects)  Outcome: Ongoing (interventions implemented as appropriate)     Problem: Pain, Acute (Adult)  Goal: Identify Related Risk Factors and Signs and Symptoms  Outcome: Ongoing (interventions implemented as appropriate)  Goal: Acceptable Pain Control/Comfort Level  Outcome: Ongoing (interventions implemented as appropriate)     Problem: Skin and Soft Tissue Infection (Adult)  Goal: Signs and Symptoms of Listed Potential Problems Will be Absent, Minimized or Managed (Skin and Soft Tissue Infection)  Outcome: Ongoing (interventions implemented as appropriate)

## 2020-03-03 NOTE — PROGRESS NOTES
[facility] Medicine Services  Short Stay Unit  PROGRESS NOTE    Patient Name: Luana Torres  : 1960  MRN: [id number]    Admission Date and Time: 3/2/2020  2:35 PM  Primary Care Physician: Bree Lantigua APRN    Subjective   Subjective     CC:  Back pain, port infection     HPI:  Resting in bed, family in room. NAD. NPO currently waiting to have port removed today. Since receiving Toradol her back pain has resolved today, and she is able to pull herself up in bed without pain or muscle spasms. She occasionally feels SOA, but not hypoxic, and not in distress. Not having pain in left chest port. Has not had a BM in 4 days.     Review of Systems  Gen- No fevers, chills  CV- No chest pain, palpitations  Resp- No cough, dyspnea  GI- No N/V/D, abd pain       Objective   Objective     Vital Signs:   Temp:  [97.7 °F (36.5 °C)-99.2 °F (37.3 °C)] 98.7 °F (37.1 °C)  Heart Rate:  [] 117  Resp:  [16-18] 18  BP: (113-144)/(60-92) 133/91        Physical Exam:  Constitutional: No acute distress, awake, alert  HENT: NCAT, mucous membranes moist  Respiratory: Clear to auscultation bilaterally, respiratory effort normal on RA  Cardiovascular: Tachycardic, no murmurs, rubs, or gallops, palpable pedal pulses bilaterally  Gastrointestinal: Positive bowel sounds, soft, nontender, mildly distended/ rounded without guarding or rebound tenderness  Musculoskeletal: No bilateral ankle edema  Psychiatric: Appropriate affect, cooperative  Neurologic: Oriented x 3, strength symmetric in all extremities, Cranial Nerves grossly intact to confrontation, speech clear  Skin: left anterior chest wall with circular erythema surrounding port with mild induration, no drainage, nontender     Results Reviewed:  Results for LUANA TORRES (MRN [id number]) as of 3/3/2020 18:30   Ref. Range 3/3/2020 06:39   Potassium Latest Ref Range: 3.5 - 5.2 mmol/L 3.4 (L)   CO2 Latest Ref Range: 22.0 - 29.0  mmol/L 28.0   Chloride Latest Ref Range: 98 - 107 mmol/L 97 (L)   Anion Gap Latest Ref Range: 5.0 - 15.0 mmol/L 10.0   Creatinine Latest Ref Range: 0.57 - 1.00 mg/dL 0.40 (L)   BUN Latest Ref Range: 8 - 23 mg/dL 7 (L)   BUN/Creatinine Ratio Latest Ref Range: 7.0 - 25.0  17.5   Calcium Latest Ref Range: 8.6 - 10.5 mg/dL 8.2 (L)   eGFR Non  Am Latest Ref Range: >60 mL/min/1.73 >150   Hemoglobin A1C Latest Ref Range: 4.80 - 5.60 % 7.70 (H)   WBC Latest Ref Range: 3.40 - 10.80 10*3/mm3 5.60   RBC Latest Ref Range: 3.77 - 5.28 10*6/mm3 2.88 (L)   Hemoglobin Latest Ref Range: 12.0 - 15.9 g/dL 8.5 (L)   Hematocrit Latest Ref Range: 34.0 - 46.6 % 25.6 (L)   RDW Latest Ref Range: 12.3 - 15.4 % 13.6   MCV Latest Ref Range: 79.0 - 97.0 fL 88.9   MCH Latest Ref Range: 26.6 - 33.0 pg 29.5   MCHC Latest Ref Range: 31.5 - 35.7 g/dL 33.2   MPV Latest Ref Range: 6.0 - 12.0 fL 10.3   Platelets Latest Ref Range: 140 - 450 10*3/mm3 146     Blood cultures   Aerobic Bottle Gram positive cocci in groups      Anaerobic Bottle Gram positive cocci in groups     Staphylococcus aureus. mecA (methicillin resistance gene) detected. Identification by BCID PCR.Critical     Results for YVON TORRES (MRN [id number]) as of 3/3/2020 18:30   Ref. Range 3/2/2020 18:04   Color, UA Latest Ref Range: Yellow, Straw  Yellow   Appearance, UA Latest Ref Range: Clear  Clear   Specific Nahma, UA Latest Ref Range: 1.001 - 1.030  1.013   pH, UA Latest Ref Range: 5.0 - 8.0  6.0   Glucose Latest Ref Range: Negative  >=1000 mg/dL (3+) (A)   Ketones, UA Latest Ref Range: Negative  15 mg/dL (1+) (A)   Blood, UA Latest Ref Range: Negative  Negative   Nitrite, UA Latest Ref Range: Negative  Negative   Leukocytes, UA Latest Ref Range: Negative  Negative   Protein, UA Latest Ref Range: Negative  30 mg/dL (1+) (A)   Bilirubin, UA Latest Ref Range: Negative  Negative   Urobilinogen, UA Latest Ref Range: 0.2 - 1.0 E.U./dL  0.2 E.U./dL   RBC, UA Latest Ref  Range: None Seen, 0-2 /HPF None Seen   WBC, UA Latest Ref Range: None Seen, 0-2 /HPF 0-2   Bacteria, UA Latest Ref Range: None Seen, Trace /HPF 1+ (A)   Squamous Epithelial Cells, UA Latest Ref Range: None Seen, 0-2 /HPF 3-6 (A)   Hyaline Casts, UA Latest Ref Range: 0 - 6 /LPF None Seen   Methodology: Unknown Manual Light Microscopy     Assessment/Plan   Assessment / Plan     Active Hospital Problems    Diagnosis  POA   • Infected venous access port [T80.219A]  Yes   • Type 2 diabetes mellitus (CMS/HCC) [E11.9]  Yes   • GERD (gastroesophageal reflux disease) [K21.9]  Yes   • Back pain [M54.9]  Yes   • Bacteremia [R78.81]  Yes      Resolved Hospital Problems   No resolved problems to display.        Brief course to date:  Luana Chawla is a 59 y.o. female  female w/ dm, previous lumbar laminecomy, breast cancer (previous mastectomy w/ subsequent port-a-cath insertion and chemotherapy recently initiated). Patient presented to outside hospital w/ n/v and fever. The port-a-cath site was noted erythematous purulence was apparently noted. Blood cultures were drawn and patient apparently declined admission at local hospital and instead opted to follow up at Dr. Chávez (infectious disease) clinic today, and was sent as direct admit to [facility] to Hospital medicine     Plan:  Bacteremia  Infected access port  --consult ID--added teflaro today   -- Daptomycin and rocephin   -- Dr. ELLIS with port removal today, tip cultured   -- blood cultures with positive MRSA bacteremia, repeat blood cultures will be drawn tomorrow per ID  --UA unremarkable  --Echo in a.m.     Back pain  Hx of discectomy  -- MRI of back to rule out discitis, has not been negative MRI yet  -- pain meds IV and oral and muscle relaxer's   --Added Toradol today and it has helped with pain, need to monitor creatinine and any risk for bleeding        DM  --  Hold home meds  -- SSI insulin and low dose basal 10 units pt takes 30 units at home   --  A1C  7.7     GERD  -- cont PPI      Hx Right invasive ductal Carcinoma , previous bilateral mastectomy  Mastectomy   -- s/p first dose of chemotherapy on 2/26  --  Dr Gomez courtesy consult, has moved next chemo back scheduled for 3/11 for now, but patient will need to complete antibiotics prior to new port being placed     DVT prophylaxis: Heparin SQ added tonight, refusing SCDS    CODE STATUS:    Code Status and Medical Interventions:   Ordered at: 03/02/20 1758     Level Of Support Discussed With:    Patient     Code Status:    CPR     Medical Interventions (Level of Support Prior to Arrest):    Full         Discharge Blueprint:   Removal of infected port  Vital signs remain stable  Normalizing of labs, K/ Na/ Mag  Obtain recommendations from ID for duration of antibiotic therapy  MRI for lower back pain   ECHO     Electronically signed by CECI Juárez, 03/03/20, 6:21 PM.

## 2020-03-03 NOTE — CONSULTS
HEMATOLOGY/ONCOLOGY INPATIENT CONSULT    REASON FOR CONSULT: Port infection    Subjective   HISTORY OF PRESENT ILLNESS; I am asked to see this 59 y.o.  female, referred for port infection.  She has a stage Ia ER weakly positive NC positive HER-2/ashley strongly positive breast cancer status post course #1 of Taxotere Herceptin adjuvant therapy.  Now admitted with purulence coming from her port.  Was due for cycle 2 of treatment on 3/4/2020.      Past Medical History:   Diagnosis Date   • Diabetes mellitus (CMS/HCC)    • Diverticulitis    • GERD (gastroesophageal reflux disease)    • Kidney stone    • Malignant neoplasm of upper-outer quadrant of right female breast (CMS/HCC) 1/21/2020   • Sciatica    • Wears contact lenses      Past Surgical History:   Procedure Laterality Date   • APPENDECTOMY     • BREAST BIOPSY Right    • BREAST EXCISIONAL BIOPSY Right    • CHOLECYSTECTOMY     • COLON SURGERY     • COLONOSCOPY     • HYSTERECTOMY     • LUMBAR LAMINECTOMY DISCECTOMY DECOMPRESSION N/A 5/24/2019    Procedure: LUMBAR LAMINECTOMY L4-5 AND L5-S1;  Surgeon: Hipolito Kahn MD;  Location:  [facility];  Service: Orthopedic Spine   • MASTECTOMY W/ SENTINEL NODE BIOPSY Bilateral 1/21/2020    Procedure: SKIN SPARING MASTECTOMIES BILATERAL, SENTINEL NODE BIOPSY RIGHT;  Surgeon: Silvio Howard MD;  Location:  [facility];  Service: General       No current facility-administered medications on file prior to encounter.      Current Outpatient Medications on File Prior to Encounter   Medication Sig Dispense Refill   • cetirizine (zyrTEC) 10 MG tablet Take 10 mg by mouth Daily.     • escitalopram (LEXAPRO) 10 MG tablet Take 10 mg by mouth Daily.     • Insulin Glargine (BASAGLAR KWIKPEN SC) Inject 30 Units under the skin into the appropriate area as directed Every Night.     • lidocaine-prilocaine (EMLA) 2.5-2.5 % cream Apply  topically to the appropriate area as directed As Needed (45-60 minutes prior to port access.  Cover with  "saran/plastic wrap.). 30 g 3   • metFORMIN ER (GLUCOPHAGE-XR) 500 MG 24 hr tablet Take 500 mg by mouth 2 (Two) Times a Day.     • omeprazole (priLOSEC) 40 MG capsule Take 40 mg by mouth Daily.     • ondansetron (ZOFRAN) 8 MG tablet Take 1 tablet by mouth 3 (Three) Times a Day As Needed for Nausea or Vomiting. 30 tablet 5   • Semaglutide (OZEMPIC) 0.25 or 0.5 MG/DOSE solution pen-injector Inject 1 mg under the skin into the appropriate area as directed 1 (One) Time Per Week.         Allergies   Allergen Reactions   • Bactrim [Sulfamethoxazole-Trimethoprim] Rash     RASH, SKIN PEELING        Social History     Socioeconomic History   • Marital status: Single     Spouse name: Not on file   • Number of children: Not on file   • Years of education: Not on file   • Highest education level: Not on file   Tobacco Use   • Smoking status: Former Smoker     Types: Cigarettes, Electronic Cigarette     Last attempt to quit:      Years since quittin.1   • Smokeless tobacco: Never Used   • Tobacco comment: currently use nicotine e-cig.   Substance and Sexual Activity   • Alcohol use: No     Frequency: Never   • Drug use: No   • Sexual activity: Defer   Social History Narrative    Lives at home indendently with adl's        Family History   Problem Relation Age of Onset   • Breast cancer Mother 70   • Ovarian cancer Neg Hx          REVIEW OF SYSTEMS:  A 14 point review of systems was performed and is negative except as noted above.    Objective   PHYSICAL EXAM:    /82 (BP Location: Right leg, Patient Position: Lying)   Pulse 81   Temp 99.1 °F (37.3 °C) (Oral)   Resp 16   Ht 167.6 cm (65.98\")   Wt 65.8 kg (145 lb 1 oz)   SpO2 98%   BMI 23.43 kg/m²     ECOG: (0) Fully Active - Able to Carry On All Pre-disease Performance Without Restriction  General: well appearing female in no acute distress  HEENT: sclerae anicteric, oropharynx clear  Lymphatics: no cervical, supraclavicular, inguinal, or axillary " adenopathy  Neck: Supple. No thyromegaly.  Cardiovascular: regular rate and rhythm, no murmurs  Lungs: clear to auscultation bilaterally. No respiratory distress  Abdomen: soft, nontender, nondistended.  No palpable organomegaly  Extremities: no lower extremity edema, cyanosis, or clubbing  Skin: no rashes, lesions, bruising, or petechiae  Neuro: Alert and oriented x3. Moves all extremities.    Results:    Results from last 7 days   Lab Units 03/02/20  1622 02/26/20  1042 02/25/20  0945   WBC 10*3/mm3 5.48 7.30 6.9   HEMOGLOBIN g/dL 9.2* 10.4* 10.7*   PLATELETS 10*3/mm3 146 349 351     Results from last 7 days   Lab Units 03/02/20  1622 02/26/20  1021 02/25/20  0000   SODIUM mmol/L 127* 141 141   POTASSIUM mmol/L 2.9* 4.1 4.2   TOTAL CO2 mmol/L  --   --  22   CO2 mmol/L 24.0 27.0  --    BUN mg/dL 11 11 12   CREATININE mg/dL 0.55* 0.69 0.68   GLUCOSE mg/dL 329* 143*  --      Results from last 7 days   Lab Units 03/02/20  1622 02/26/20  1021 02/25/20  0000   AST (SGOT) U/L 36* 16 17   ALT (SGPT) U/L 44* 16 18   BILIRUBIN mg/dL 0.4 0.4 0.3   ALK PHOS U/L 66 67 74         Xr Chest 1 View    Result Date: 3/2/2020  Chronic and emphysematous changes seen diffusely throughout the lung fields. Portacatheter identified left tip in the SVC. Degenerative changes seen within the spine. No evidence of pneumothorax.         Assessment    ASSESSMENT & PLAN:    1. Purulent port infection  2. HER-2 positive breast cancer status post 1 cycle of adjuvant Taxotere Herceptin    Discussion: She is going to need a port as we just started her treatments adjuvantly and she will need more.  Hopefully we can salvage the port but if not she will need to be on antibiotics long enough to be able to get a second port put in.  I will defer to Dr. ELLIS and infectious disease to sort through this.  She was due for course #2 of her Taxotere Herceptin on 3/4/2020.  I will move that to 3/11/2020 and I will check back Wednesday or Thursday to see how we  are doing.  Britt with patient face-to-face 30 minutes greater than 50% spent counseling regarding this plan.  Have spoken with Dr. ELLIS today.      Hero Gomez MD    3/2/2020

## 2020-03-03 NOTE — CONSULTS
INFECTIOUS DISEASE CONSULT/INITIAL HOSPITAL VISIT    Luana Chawla  1960  [id number]    Date of Consult: 3/3/2020    Admission Date: 3/2/2020      Requesting Provider: Jenifer Perales MD  Evaluating Physician: Dutch Chávez MD    Reason for Consultation: Bacteremia, Mediport infection    History of present illness:    Patient is a 59 y.o. female with breast cancer status post bilateral mastectomy has had mid port and chemotherapy and steroids now has positive blood cultures fevers chills and purulent expressible drainage from port.  I saw patient in the office yesterday recommend admission to the hospital patient he is being scheduled for Mediport removal today.  Patient is started on IV antibiotics daptomycin and ceftriaxone.  Patient received Toradol and has improvement in back pain.  Patient does have some cramping of her lower ankles and calves.  Patient denies fevers shaking chills denies bladder incontinence denies dysuria denies diarrhea denies rash.    Is feeling better        Past Medical History:   Diagnosis Date   • Diabetes mellitus (CMS/[facility])    • Diverticulitis    • GERD (gastroesophageal reflux disease)    • Kidney stone    • Malignant neoplasm of upper-outer quadrant of right female breast (CMS/[facility]) 1/21/2020   • Sciatica    • Wears contact lenses        Past Surgical History:   Procedure Laterality Date   • APPENDECTOMY     • BREAST BIOPSY Right    • BREAST EXCISIONAL BIOPSY Right    • CHOLECYSTECTOMY     • COLON SURGERY     • COLONOSCOPY     • HYSTERECTOMY     • LUMBAR LAMINECTOMY DISCECTOMY DECOMPRESSION N/A 5/24/2019    Procedure: LUMBAR LAMINECTOMY L4-5 AND L5-S1;  Surgeon: Hipolito Kahn MD;  Location:  [facility];  Service: Orthopedic Spine   • MASTECTOMY W/ SENTINEL NODE BIOPSY Bilateral 1/21/2020    Procedure: SKIN SPARING MASTECTOMIES BILATERAL, SENTINEL NODE BIOPSY RIGHT;  Surgeon: Silvio Howard MD;  Location:  [facility];  Service: General       Family History    Problem Relation Age of Onset   • Breast cancer Mother 70   • Ovarian cancer Neg Hx        Social History     Socioeconomic History   • Marital status: Single     Spouse name: Not on file   • Number of children: Not on file   • Years of education: Not on file   • Highest education level: Not on file   Tobacco Use   • Smoking status: Former Smoker     Types: Cigarettes, Electronic Cigarette     Last attempt to quit:      Years since quittin.1   • Smokeless tobacco: Never Used   • Tobacco comment: currently use nicotine e-cig.   Substance and Sexual Activity   • Alcohol use: No     Frequency: Never   • Drug use: No   • Sexual activity: Defer   Social History Narrative    Lives at home indendently with adl's        Allergies   Allergen Reactions   • Bactrim [Sulfamethoxazole-Trimethoprim] Rash     RASH, SKIN PEELING          Medication:    Current Facility-Administered Medications:   •  [MAR Hold] acetaminophen (TYLENOL) tablet 650 mg, 650 mg, Oral, Q4H PRN **OR** [MAR Hold] acetaminophen (TYLENOL) 160 MG/5ML solution 650 mg, 650 mg, Oral, Q4H PRN **OR** [MAR Hold] acetaminophen (TYLENOL) suppository 650 mg, 650 mg, Rectal, Q4H PRN, Francesca Menendez, APRN  •  [MAR Hold] bisacodyl (DULCOLAX) EC tablet 5 mg, 5 mg, Oral, Daily PRN, Francesca Menendez, APRN  •  [MAR Hold] bisacodyl (DULCOLAX) suppository 10 mg, 10 mg, Rectal, Daily PRN, Francesca Menendez, APRN  •  [MAR Hold] cefTRIAXone (ROCEPHIN) 1 g/100 mL 0.9% NS (MBP), 1 g, Intravenous, Q24H, Ish Leach MD  •  [MAR Hold] cetirizine (zyrTEC) tablet 10 mg, 10 mg, Oral, Daily, Francesca Menendez, APRN, 10 mg at 20 0905  •  [MAR Hold] cyclobenzaprine (FLEXERIL) tablet 5 mg, 5 mg, Oral, Q8H, Francesca Menendez, APRN, 5 mg at 20 0640  •  [MAR Hold] DAPTOmycin (CUBICIN) 350 mg in sodium chloride 0.9 % 50 mL IVPB, 6 mg/kg (Adjusted), Intravenous, Q24H, Vanessa Mims, [facility], Last Rate: 100 mL/hr at 20, 350 mg at 20  •   [MAR Hold] dextrose (D50W) 25 g/ 50mL Intravenous Solution 25 g, 25 g, Intravenous, Q15 Min PRN, Francesca Menendez, APRN  •  [MAR Hold] dextrose (GLUTOSE) oral gel 15 g, 15 g, Oral, Q15 Min PRN, Francesca Menendez, APRN  •  [MAR Hold] diphenhydrAMINE (BENADRYL) capsule 25 mg, 25 mg, Oral, Q6H PRN, Lydia Gresham PA, 25 mg at 03/02/20 2233  •  [MAR Hold] escitalopram (LEXAPRO) tablet 10 mg, 10 mg, Oral, Daily, Francesca Menendez, APRN, 10 mg at 03/03/20 0905  •  [MAR Hold] glucagon (human recombinant) (GLUCAGEN DIAGNOSTIC) injection 1 mg, 1 mg, Subcutaneous, Q15 Min PRN, Francesca Menendez, APRN  •  [MAR Hold] HYDROmorphone (DILAUDID) injection 0.5 mg, 0.5 mg, Intravenous, Q2H PRN, 0.5 mg at 03/03/20 0639 **AND** [MAR Hold] naloxone (NARCAN) injection 0.4 mg, 0.4 mg, Intravenous, Q5 Min PRN, Ish Leach MD  •  [MAR Hold] insulin detemir (LEVEMIR) injection 10 Units, 10 Units, Subcutaneous, Nightly, Francesca Menendez, APRN, 10 Units at 03/02/20 2153  •  [MAR Hold] insulin lispro (humaLOG) injection 0-7 Units, 0-7 Units, Subcutaneous, 4x Daily With Meals & Nightly, Francesca Menendez, APRN, 4 Units at 03/02/20 2050  •  [MAR Hold] magnesium sulfate 4 gram infusion - Mg less than or equal to 1mg/dL, 4 g, Intravenous, PRN **OR** [MAR Hold] magnesium sulfate 3 gram infusion (1gm x 3) - Mg 1.1 - 1.5 mg/dL, 1 g, Intravenous, PRN, Last Rate: 100 mL/hr at 03/03/20 0317, 1 g at 03/03/20 0317 **OR** [MAR Hold] Magnesium Sulfate 2 gram infusion- Mg 1.6 - 1.9 mg/dL, 2 g, Intravenous, PRN, Ish Leach MD  •  [MAR Hold] ondansetron (ZOFRAN) tablet 4 mg, 4 mg, Oral, Q6H PRN **OR** [MAR Hold] ondansetron (ZOFRAN) injection 4 mg, 4 mg, Intravenous, Q6H PRN, Francesca Menendez, APRN  •  [MAR Hold] oxyCODONE-acetaminophen (PERCOCET) 5-325 MG per tablet 1 tablet, 1 tablet, Oral, Q4H PRN, Ish Leach MD, 1 tablet at 03/03/20 0317  •  [MAR Hold] pantoprazole (PROTONIX) EC tablet 40 mg, 40 mg, Oral, QAM,  Francesca Menendez APRN, 40 mg at 03/03/20 0639  •  Pharmacy Consult - Pharmacy to dose, , Does not apply, Continuous PRN, Ish Leach MD  •  potassium chloride (MICRO-K) CR capsule 40 mEq, 40 mEq, Oral, PRN, 40 mEq at 03/03/20 0905 **OR** potassium chloride (KLOR-CON) packet 40 mEq, 40 mEq, Oral, PRN **OR** potassium chloride 10 mEq in 100 mL IVPB, 10 mEq, Intravenous, Q1H PRN, Ish Leach MD  •  [MAR Hold] sennosides-docusate (PERICOLACE) 8.6-50 MG per tablet 2 tablet, 2 tablet, Oral, BID PRN, Francesca Menendez APRN, 2 tablet at 03/02/20 2153  •  [MAR Hold] sodium chloride 0.9 % flush 10 mL, 10 mL, Intravenous, Q12H, Francesca Menendez APRN, 10 mL at 03/03/20 0906  •  [MAR Hold] sodium chloride 0.9 % flush 10 mL, 10 mL, Intravenous, PRN, Francesca Menendez APRN  •  sodium chloride 0.9 % infusion, 75 mL/hr, Intravenous, Continuous, Ish Leach MD, Last Rate: 75 mL/hr at 03/02/20 1804, 75 mL/hr at 03/02/20 1804    Antibiotics:  Anti-Infectives (From admission, onward)    Ordered     Dose/Rate Route Frequency Start Stop    03/02/20 1611  [MAR Hold]  cefTRIAXone (ROCEPHIN) 1 g/100 mL 0.9% NS (MBP)     (MAR Hold since Tue 3/3/2020 at 1356. Reason: Unreviewed Transfer Orders.)   Ordering Provider:  Ish Leach MD    1 g  over 30 Minutes Intravenous Every 24 Hours 03/03/20 1800 03/08/20 1759 03/02/20 1928  cefTRIAXone (ROCEPHIN) 2 g/100 mL 0.9% NS VTB (BRIAN)     Note to Pharmacy:  After blood cultures   Ordering Provider:  Vanessa Mims, DAYANA    2 g  over 30 Minutes Intravenous Once 03/02/20 2015 03/02/20 2119 03/02/20 1928  [MAR Hold]  DAPTOmycin (CUBICIN) 350 mg in sodium chloride 0.9 % 50 mL IVPB  Review   (MAR Hold since Tue 3/3/2020 at 1356. Reason: Unreviewed Transfer Orders.)   Ordering Provider:  Vanessa Mims RPH    6 mg/kg × 61.9 kg (Adjusted)  100 mL/hr over 30 Minutes Intravenous Every 24 Hours Scheduled 03/02/20 1928 03/06/20 3693            Review of  Systems:  Remarkable for chest wall pain, activity change, chills, fever, nausea, vomiting, back pain    Rest of review of systems were reviewed and were unremarkable    Physical Exam:   Vital Signs  Temp (24hrs), Av.3 °F (36.8 °C), Min:97.7 °F (36.5 °C), Max:99.2 °F (37.3 °C)    Temp  Min: 97.7 °F (36.5 °C)  Max: 99.2 °F (37.3 °C)  BP  Min: 117/92  Max: 144/67  Pulse  Min: 81  Max: 109  Resp  Min: 16  Max: 18  SpO2  Min: 91 %  Max: 98 %    GENERAL: Awake and alert, in no acute distress.  Appears more comfortable  HEENT: Normocephalic, atraumatic.  PERRL. EOMI. No conjunctival injection. No icterus. Oropharynx clear without evidence of thrush or exudate.  Some erythema of the posterior pharynx no white patches seen    HEART: RRR; No murmur, rubs, gallops.   LUNGS: Clear to auscultation bilaterally   ABDOMEN: Soft, nontender, nondistended.   EXT:  No cyanosis, clubbing or edema.   :  Without Sotomayor catheter.  MSK: No joint deformity 4 out of 5 lower extremity strength  SKIN: Warm and dry without cutaneous eruptions on Inspection/palpation.    NEURO: Oriented to PPT.  PSYCHIATRIC: Normal insight and judgement. Cooperative with PE    Laboratory Data    Results from last 7 days   Lab Units 20  0639 20  1622 20  1042   WBC 10*3/mm3 5.60 5.48 7.30   HEMOGLOBIN g/dL 8.5* 9.2* 10.4*   HEMATOCRIT % 25.6* 27.2* 32.8*   PLATELETS 10*3/mm3 146 146 349     Results from last 7 days   Lab Units 20  0639   SODIUM mmol/L 135*   POTASSIUM mmol/L 3.4*   CHLORIDE mmol/L 97*   CO2 mmol/L 28.0   BUN mg/dL 7*   CREATININE mg/dL 0.40*   GLUCOSE mg/dL 98   CALCIUM mg/dL 8.2*     Results from last 7 days   Lab Units 20  1622   ALK PHOS U/L 66   BILIRUBIN mg/dL 0.4   ALT (SGPT) U/L 44*   AST (SGOT) U/L 36*     Results from last 7 days   Lab Units 20  1622   SED RATE mm/hr 41*     Results from last 7 days   Lab Units 20  1622   CRP mg/dL 36.03*                 Estimated Creatinine Clearance: 155.4  mL/min (A) (by C-G formula based on SCr of 0.4 mg/dL (L)).      Microbiology:  Blood Culture   Date Value Ref Range Status   03/02/2020 Abnormal Stain  Preliminary     BCID, PCR   Date Value Ref Range Status   03/02/2020 (C) No organism detected by BCID PCR. Final    Staphylococcus aureus. mecA (methicillin resistance gene) detected. Identification by BCID PCR.     No results found for: CULTURES, HSVCX, URCX  No results found for: EYECULTURE, GCCX, LABHSV  No results found for: LEGIONELLA, MRSACX, MUMPSCX, MYCOPLASCX  No results found for: NOCARDIACX, STOOLCX  No results found for: THROATCX, UNSTIMCULT, URINECX, CULTURE, VZVCULTUR  No results found for: VIRALCULTU, WOUNDCX        Radiology:  Imaging Results (Last 72 Hours)     Procedure Component Value Units Date/Time    XR Chest 1 View [[id number]] Collected:  03/02/20 1655     Updated:  03/03/20 1057    Narrative:       EXAMINATION: XR CHEST 1 VW-03/02/2020:      INDICATION: Fever.      COMPARISON: 02/14/2020.     FINDINGS: Portable chest reveals Port-A-Catheter identified on the left  with tip in the SVC. Emphysematous and chronic changes identified within  the lung fields. Degenerative changes seen within the spine. No pleural  effusion or pneumothorax. Vascular calcification seen within the  thoracic aorta.           Impression:       Chronic and emphysematous changes seen diffusely throughout  the lung fields. Port-A-Catheter identified on the left with tip in the  SVC. Degenerative changes seen within the spine. No evidence of  pneumothorax.     D:  03/02/2020  E:  03/03/2020                     Impression:   MRSA bacteremia  Medport infection  Fevers, chills  Back pain improving  Diabetes mellitus  Recent steroids with chemotherapy  Cramps of bilateral legs  Elevated procalcitonin level    PLAN/RECOMMENDATIONS:   Thank you for asking us to see Luana Chawla, I recommend the following:  I have discussed the case with Dr. ELLIS and agree with Lizy  removal.  MRSA has propensity to spread to heart as well as the vertebral spine or large weightbearing joints at this point would prefer to remove any focus of infection in hopes of reducing chances of hematogenous spread.    Back pain is better.  I had mentioned yesterday to do an MRI of the lumbar spine.  Patient's back pain is better patient has improvement of leg pain as well we probably can hold off on this for now.    Continue daptomycin will dose of 8 mg/kg IV every 24 hours    Discontinue ceftriaxone  startTeflaro 6 mg every 12 hours  repeat blood cultures tomorrow    Consider transesophageal echocardiogram/ MRI of lower back     D/w family  Dutch Chávez MD  3/3/2020  2:20 PM

## 2020-03-03 NOTE — PROGRESS NOTES
Discharge Planning Assessment  [facility]     Patient Name: Luana Chawla  MRN: [id number]  Today's Date: 3/3/2020    Admit Date: 3/2/2020    Discharge Needs Assessment     Row Name 03/03/20 1023       Living Environment    Lives With  alone Pt resides in [facility]    Current Living Arrangements  home/apartment/condo    Primary Care Provided by  self    Provides Primary Care For  no one    Family Caregiver if Needed  child(vijay), adult;sibling(s)    Family Caregiver Names  Son- Hipolito daughters- Monica and Summer sister Татьяна Baugh    Quality of Family Relationships  helpful;involved;supportive    Able to Return to Prior Arrangements  yes       Resource/Environmental Concerns    Resource/Environmental Concerns  none       Transition Planning    Patient/Family Anticipates Transition to  home with help/services    Patient/Family Anticipated Services at Transition  home health care    Transportation Anticipated  family or friend will provide       Discharge Needs Assessment    Readmission Within the Last 30 Days  no previous admission in last 30 days    Concerns to be Addressed  discharge planning    Equipment Currently Used at Home  none    Anticipated Changes Related to Illness  none    Equipment Needed After Discharge  none    Outpatient/Agency/Support Group Needs  homecare agency    Discharge Facility/Level of Care Needs  home with home health    Patient's Choice of Community Agency(s)  will decide prior to discharge if she wants home health services    Current Discharge Risk  lives alone        Discharge Plan     Row Name 03/03/20 1025       Plan    Plan  home vs home with home health    Provided Post Acute Provider List?  N/A    N/A Provider List Comment  CM will provide home health list prior to discharge and arrange if pt decided she wants HH services    Provided Post Acute Provider Quality & Resource List?  N/A    Patient/Family in Agreement with Plan  yes    Plan Comments  CM spoke with pt at  bedside. Pt resides alone in [facility]. Pt has a son Hipolito and 2 daughters cintia and Janelle that can assist as needed. Pt reports she is independent of adls and denies use of DME. Pt is not current with home health at this time, pt is receiving chemo thrapy as next does was scheduled on 3/4 per MD notes. Pt reports she may be interested in home health services at time of discharge. CM explained what home health is and what services can be arranged, pt is going to think about her options and CM will provide list of Home Health agencies that services [facility] and arrange prior to discharge if services are warrented. Pt denies additional needs, CM will continue to follow.     Final Discharge Disposition Code  30 - still a patient        Destination      Coordination has not been started for this encounter.      Durable Medical Equipment      Coordination has not been started for this encounter.      Dialysis/Infusion      Coordination has not been started for this encounter.      Home Medical Care      Coordination has not been started for this encounter.      Therapy      Coordination has not been started for this encounter.      Community Resources      Coordination has not been started for this encounter.          Demographic Summary     Row Name 03/03/20 1022       General Information    Referral Source  admission list    Reason for Consult  discharge planning    Preferred Language  English    General Information Comments  PCP- CECI Huynh       Contact Information    Permission Granted to Share Info With      Contact Information Comments  [phone]        Functional Status     Row Name 03/03/20 1023       Functional Status    Usual Activity Tolerance  good    Current Activity Tolerance  moderate       Functional Status, IADL    Medications  independent    Meal Preparation  independent    Housekeeping  independent    Laundry  independent    Shopping  independent        Employment/    Employment/ Comments  Pt confirms she has [employer], denies concerns or disruption in coverage. Pt has prescription drug coverage and denies issues obtaining or affording current medications.         Psychosocial    No documentation.       Abuse/Neglect    No documentation.       Legal    No documentation.       Substance Abuse    No documentation.       Patient Forms    No documentation.           Laya Benavidez RN

## 2020-03-03 NOTE — PROGRESS NOTES
"Luana [facility]  1960  [id number]    Surgery Progress Note    Date of visit: 3/3/2020    Subjective: Complaining of back pain    Objective:    /67 (BP Location: Right leg, Patient Position: Lying)   Pulse 109   Temp 99.2 °F (37.3 °C) (Temporal)   Resp 18   Ht 167.6 cm (65.98\")   Wt 65.8 kg (145 lb 1 oz)   SpO2 94%   Breastfeeding No   BMI 23.43 kg/m²     Intake/Output Summary (Last 24 hours) at 3/3/2020 1423  Last data filed at 3/3/2020 1058  Gross per 24 hour   Intake --   Output 1600 ml   Net -1600 ml       CV: Regular rate and rhythm  L: normal air entry  BREAST: Bilateral incisions are intact  Left subclavian port site with overlying erythema and tenderness        LABS:    Results from last 7 days   Lab Units 03/03/20  0639   WBC 10*3/mm3 5.60   HEMOGLOBIN g/dL 8.5*   HEMATOCRIT % 25.6*   PLATELETS 10*3/mm3 146     Results from last 7 days   Lab Units 03/03/20  0639 03/02/20  1622   SODIUM mmol/L 135* 127*   POTASSIUM mmol/L 3.4* 2.9*   CHLORIDE mmol/L 97* 88*   CO2 mmol/L 28.0 24.0   BUN mg/dL 7* 11   CREATININE mg/dL 0.40* 0.55*   CALCIUM mg/dL 8.2* 8.6   BILIRUBIN mg/dL  --  0.4   ALK PHOS U/L  --  66   ALT (SGPT) U/L  --  44*   AST (SGOT) U/L  --  36*   GLUCOSE mg/dL 98 329*     Results from last 7 days   Lab Units 03/03/20  0639   SODIUM mmol/L 135*   POTASSIUM mmol/L 3.4*   CHLORIDE mmol/L 97*   CO2 mmol/L 28.0   BUN mg/dL 7*   CREATININE mg/dL 0.40*   GLUCOSE mg/dL 98   CALCIUM mg/dL 8.2*     No results found for: LIPASE      Assessment/ Plan: Patient admitted with left subclavian port infection  She has uncontrolled diabetes mellitus and received 1 dose of adjuvant chemotherapy last week  Plan to remove the Port-A-Cath today  Continue with IV antibiotics per ID  Control blood sugar    Problem List Items Addressed This Visit     None            Silvio Howard MD  3/3/2020  2:23 PM    "

## 2020-03-03 NOTE — OP NOTE
Operative Note    Date of Surgery:  3/3/2020    Pre-Operative Diagnosis: Infected Port-A-Cath    Post-Operative Diagnosis: Infected Port-A-Cath    Procedure: Removal of infected Port-A-Cath    Anesthesia: Local/MAC    Surgeon:  Silvio Howard MD    Estimated Blood Loss: None    Findings: Purulence noted                    Cultures were sent as well as the tip of the catheter    Complications: None      Indication for Procedure: Ms. Chawla is a pleasant 59 years old lady with a history of diabetes mellitus who underwent bilateral mastectomies secondary to right breast malignancy.  She also underwent left subclavian port placement.  She started adjuvant chemotherapy this past week and few days later developed infection at the site with high fever and concern about bacteremia.  She was admitted to the hospital yesterday and has been on IV antibiotics.  She is taken to the operating today to remove the Port-A-Cath.    Procedure: Patient was taken to the operating by anesthesia and placed supine on the table.  Following adequate IV sedation the chest and neck were prepped and draped in a sterile fashion.  Timeout was observed.  Xylocaine 1% with epinephrine was injected in the port site and a small incision was made at the previous scar.  Dissection was carried to expose the port at which time we noted a pocket of purulence.  Cultures were sent including aerobic and anaerobic for Gram stain, C&S.  We then proceeded by removing the catheter and sending the tip for culture as well.  No bleeding was noted.  The wound was irrigated with water with clear return fluid noted.  Few 3-0 Vicryl sutures subcutaneous sutures were placed on either side of the incision and the wound was then packed with half an inch iodoform gauze.  Sterile dressing was applied.  The patient tolerated the procedure well with no complications.  She was taken to the recovery room in stable condition.  Sponge count and needle count were correct  at the end of the procedure.    Silvio Howard MD  03/03/20  3:38 PM

## 2020-03-03 NOTE — ANESTHESIA POSTPROCEDURE EVALUATION
Patient: Luana Chawla    Procedure Summary     Date:  03/03/20 [facility] / [facility]:   [facility]    Anesthesia Start:  1446 Anesthesia Stop:      Procedure:  REMOVAL PORT (N/A ) Diagnosis:  Infection due to Port-A-Cath    Surgeon:  Silvio Howard MD Provider:  Timbo Tinajero MD    Anesthesia Type:  general ASA Status:  3          Anesthesia Type: general    Vitals  No vitals data found for the desired time range.          Post Anesthesia Care and Evaluation    Patient location during evaluation: PACU  Patient participation: complete - patient participated  Level of consciousness: awake and alert  Pain score: 0  Pain management: adequate  Airway patency: patent  Anesthetic complications: No anesthetic complications  PONV Status: none  Cardiovascular status: hemodynamically stable and acceptable  Respiratory status: nonlabored ventilation, acceptable and nasal cannula  Hydration status: acceptable

## 2020-03-03 NOTE — PLAN OF CARE
Patient's VSS, UOP adequate.  Patient has c/o pain throughout shift, barely tolerable with both IV and PO medication.  Pain is in lumbar back.  Magnesium replaced.  One more dose of K+ 40 meQ @0800, and electrolytes will be replaced. Refuses SCD's Daughter at bedside.  Will continue to monitor.   dentures/eyeglasses

## 2020-03-04 ENCOUNTER — APPOINTMENT (OUTPATIENT)
Dept: CARDIOLOGY | Facility: HOSPITAL | Age: 60
End: 2020-03-04

## 2020-03-04 ENCOUNTER — APPOINTMENT (OUTPATIENT)
Dept: MRI IMAGING | Facility: HOSPITAL | Age: 60
End: 2020-03-04

## 2020-03-04 PROBLEM — A41.9 SEPSIS (HCC): Status: ACTIVE | Noted: 2020-03-04

## 2020-03-04 PROBLEM — B95.62 BACTEREMIA DUE TO METHICILLIN RESISTANT STAPHYLOCOCCUS AUREUS: Status: ACTIVE | Noted: 2020-03-02

## 2020-03-04 LAB
ANION GAP SERPL CALCULATED.3IONS-SCNC: 12 MMOL/L (ref 5–15)
BASOPHILS # BLD AUTO: 0.02 10*3/MM3 (ref 0–0.2)
BASOPHILS NFR BLD AUTO: 0.3 % (ref 0–1.5)
BUN BLD-MCNC: 8 MG/DL (ref 8–23)
BUN/CREAT SERPL: 20 (ref 7–25)
CALCIUM SPEC-SCNC: 8.3 MG/DL (ref 8.6–10.5)
CHLORIDE SERPL-SCNC: 94 MMOL/L (ref 98–107)
CK SERPL-CCNC: 77 U/L (ref 20–180)
CO2 SERPL-SCNC: 26 MMOL/L (ref 22–29)
CREAT BLD-MCNC: 0.4 MG/DL (ref 0.57–1)
DEPRECATED RDW RBC AUTO: 45.8 FL (ref 37–54)
EOSINOPHIL # BLD AUTO: 0.14 10*3/MM3 (ref 0–0.4)
EOSINOPHIL NFR BLD AUTO: 2.2 % (ref 0.3–6.2)
ERYTHROCYTE [DISTWIDTH] IN BLOOD BY AUTOMATED COUNT: 14.1 % (ref 12.3–15.4)
GFR SERPL CREATININE-BSD FRML MDRD: >150 ML/MIN/1.73
GLUCOSE BLD-MCNC: 265 MG/DL (ref 65–99)
GLUCOSE BLDC GLUCOMTR-MCNC: 105 MG/DL (ref 70–130)
GLUCOSE BLDC GLUCOMTR-MCNC: 190 MG/DL (ref 70–130)
GLUCOSE BLDC GLUCOMTR-MCNC: 223 MG/DL (ref 70–130)
GLUCOSE BLDC GLUCOMTR-MCNC: 223 MG/DL (ref 70–130)
HCT VFR BLD AUTO: 25.3 % (ref 34–46.6)
HGB BLD-MCNC: 8.2 G/DL (ref 12–15.9)
IMM GRANULOCYTES # BLD AUTO: 0.07 10*3/MM3 (ref 0–0.05)
IMM GRANULOCYTES NFR BLD AUTO: 1.1 % (ref 0–0.5)
LYMPHOCYTES # BLD AUTO: 0.69 10*3/MM3 (ref 0.7–3.1)
LYMPHOCYTES NFR BLD AUTO: 11 % (ref 19.6–45.3)
MCH RBC QN AUTO: 28.9 PG (ref 26.6–33)
MCHC RBC AUTO-ENTMCNC: 32.4 G/DL (ref 31.5–35.7)
MCV RBC AUTO: 89.1 FL (ref 79–97)
MONOCYTES # BLD AUTO: 0.84 10*3/MM3 (ref 0.1–0.9)
MONOCYTES NFR BLD AUTO: 13.4 % (ref 5–12)
NEUTROPHILS # BLD AUTO: 4.51 10*3/MM3 (ref 1.7–7)
NEUTROPHILS NFR BLD AUTO: 72 % (ref 42.7–76)
NRBC BLD AUTO-RTO: 0 /100 WBC (ref 0–0.2)
PLATELET # BLD AUTO: 205 10*3/MM3 (ref 140–450)
PMV BLD AUTO: 10.6 FL (ref 6–12)
POTASSIUM BLD-SCNC: 3.3 MMOL/L (ref 3.5–5.2)
RBC # BLD AUTO: 2.84 10*6/MM3 (ref 3.77–5.28)
SODIUM BLD-SCNC: 132 MMOL/L (ref 136–145)
WBC NRBC COR # BLD: 6.27 10*3/MM3 (ref 3.4–10.8)

## 2020-03-04 PROCEDURE — 25010000002 DAPTOMYCIN PER 1 MG: Performed by: SURGERY

## 2020-03-04 PROCEDURE — C1894 INTRO/SHEATH, NON-LASER: HCPCS

## 2020-03-04 PROCEDURE — 99232 SBSQ HOSP IP/OBS MODERATE 35: CPT | Performed by: NURSE PRACTITIONER

## 2020-03-04 PROCEDURE — A9577 INJ MULTIHANCE: HCPCS | Performed by: FAMILY MEDICINE

## 2020-03-04 PROCEDURE — 25010000002 HEPARIN (PORCINE) PER 1000 UNITS: Performed by: NURSE PRACTITIONER

## 2020-03-04 PROCEDURE — C1751 CATH, INF, PER/CENT/MIDLINE: HCPCS

## 2020-03-04 PROCEDURE — 80048 BASIC METABOLIC PNL TOTAL CA: CPT | Performed by: NURSE PRACTITIONER

## 2020-03-04 PROCEDURE — 25010000002 CEFTAROLINE FOSAMIL PER 10 MG: Performed by: SURGERY

## 2020-03-04 PROCEDURE — 63710000001 INSULIN LISPRO (HUMAN) PER 5 UNITS: Performed by: SURGERY

## 2020-03-04 PROCEDURE — 82962 GLUCOSE BLOOD TEST: CPT

## 2020-03-04 PROCEDURE — 85025 COMPLETE CBC W/AUTO DIFF WBC: CPT | Performed by: NURSE PRACTITIONER

## 2020-03-04 PROCEDURE — 63710000001 INSULIN DETEMIR PER 5 UNITS: Performed by: SURGERY

## 2020-03-04 PROCEDURE — 0 GADOBENATE DIMEGLUMINE 529 MG/ML SOLUTION: Performed by: FAMILY MEDICINE

## 2020-03-04 PROCEDURE — 72158 MRI LUMBAR SPINE W/O & W/DYE: CPT

## 2020-03-04 PROCEDURE — 25010000002 ONDANSETRON PER 1 MG: Performed by: SURGERY

## 2020-03-04 PROCEDURE — 99232 SBSQ HOSP IP/OBS MODERATE 35: CPT | Performed by: INTERNAL MEDICINE

## 2020-03-04 PROCEDURE — 82550 ASSAY OF CK (CPK): CPT | Performed by: SURGERY

## 2020-03-04 RX ORDER — SODIUM CHLORIDE 0.9 % (FLUSH) 0.9 %
10 SYRINGE (ML) INJECTION EVERY 12 HOURS SCHEDULED
Status: DISCONTINUED | OUTPATIENT
Start: 2020-03-04 | End: 2020-03-06 | Stop reason: HOSPADM

## 2020-03-04 RX ORDER — BISACODYL 10 MG
10 SUPPOSITORY, RECTAL RECTAL DAILY PRN
Status: DISCONTINUED | OUTPATIENT
Start: 2020-03-04 | End: 2020-03-06 | Stop reason: HOSPADM

## 2020-03-04 RX ORDER — LIDOCAINE 50 MG/G
2 PATCH TOPICAL
Status: DISCONTINUED | OUTPATIENT
Start: 2020-03-04 | End: 2020-03-06 | Stop reason: HOSPADM

## 2020-03-04 RX ORDER — LORAZEPAM 0.5 MG/1
0.5 TABLET ORAL ONCE
Status: COMPLETED | OUTPATIENT
Start: 2020-03-04 | End: 2020-03-04

## 2020-03-04 RX ORDER — SODIUM CHLORIDE 0.9 % (FLUSH) 0.9 %
10 SYRINGE (ML) INJECTION AS NEEDED
Status: DISCONTINUED | OUTPATIENT
Start: 2020-03-04 | End: 2020-03-06 | Stop reason: HOSPADM

## 2020-03-04 RX ADMIN — SODIUM CHLORIDE 75 ML/HR: 9 INJECTION, SOLUTION INTRAVENOUS at 13:01

## 2020-03-04 RX ADMIN — CETIRIZINE HYDROCHLORIDE 10 MG: 10 TABLET, FILM COATED ORAL at 09:06

## 2020-03-04 RX ADMIN — INSULIN LISPRO 4 UNITS: 100 INJECTION, SOLUTION INTRAVENOUS; SUBCUTANEOUS at 12:59

## 2020-03-04 RX ADMIN — DAPTOMYCIN 500 MG: 500 INJECTION, POWDER, LYOPHILIZED, FOR SOLUTION INTRAVENOUS at 18:01

## 2020-03-04 RX ADMIN — INSULIN LISPRO 2 UNITS: 100 INJECTION, SOLUTION INTRAVENOUS; SUBCUTANEOUS at 20:16

## 2020-03-04 RX ADMIN — PANTOPRAZOLE SODIUM 40 MG: 40 TABLET, DELAYED RELEASE ORAL at 06:51

## 2020-03-04 RX ADMIN — CYCLOBENZAPRINE HYDROCHLORIDE 5 MG: 10 TABLET, FILM COATED ORAL at 06:52

## 2020-03-04 RX ADMIN — SODIUM CHLORIDE, PRESERVATIVE FREE 10 ML: 5 INJECTION INTRAVENOUS at 20:17

## 2020-03-04 RX ADMIN — LIDOCAINE 2 PATCH: 50 PATCH CUTANEOUS at 11:50

## 2020-03-04 RX ADMIN — LORAZEPAM 0.5 MG: 0.5 TABLET ORAL at 05:21

## 2020-03-04 RX ADMIN — POTASSIUM CHLORIDE 40 MEQ: 750 CAPSULE, EXTENDED RELEASE ORAL at 22:07

## 2020-03-04 RX ADMIN — CEFTAROLINE FOSAMIL 600 MG: 600 POWDER, FOR SOLUTION INTRAVENOUS at 06:52

## 2020-03-04 RX ADMIN — OXYCODONE HYDROCHLORIDE AND ACETAMINOPHEN 1 TABLET: 5; 325 TABLET ORAL at 09:06

## 2020-03-04 RX ADMIN — CYCLOBENZAPRINE HYDROCHLORIDE 5 MG: 10 TABLET, FILM COATED ORAL at 15:08

## 2020-03-04 RX ADMIN — ACETAMINOPHEN 650 MG: 325 TABLET, FILM COATED ORAL at 20:16

## 2020-03-04 RX ADMIN — CYCLOBENZAPRINE HYDROCHLORIDE 5 MG: 10 TABLET, FILM COATED ORAL at 22:07

## 2020-03-04 RX ADMIN — HEPARIN SODIUM 5000 UNITS: 5000 INJECTION, SOLUTION INTRAVENOUS; SUBCUTANEOUS at 15:07

## 2020-03-04 RX ADMIN — DOCUSATE SODIUM 100 MG: 100 CAPSULE ORAL at 09:06

## 2020-03-04 RX ADMIN — OXYCODONE HYDROCHLORIDE AND ACETAMINOPHEN 1 TABLET: 5; 325 TABLET ORAL at 01:48

## 2020-03-04 RX ADMIN — POLYETHYLENE GLYCOL 3350 17 G: 17 POWDER, FOR SOLUTION ORAL at 09:07

## 2020-03-04 RX ADMIN — ONDANSETRON 4 MG: 2 INJECTION INTRAMUSCULAR; INTRAVENOUS at 09:18

## 2020-03-04 RX ADMIN — INSULIN LISPRO 3 UNITS: 100 INJECTION, SOLUTION INTRAVENOUS; SUBCUTANEOUS at 18:01

## 2020-03-04 RX ADMIN — INSULIN DETEMIR 10 UNITS: 100 INJECTION, SOLUTION SUBCUTANEOUS at 20:17

## 2020-03-04 RX ADMIN — GADOBENATE DIMEGLUMINE 13 ML: 529 INJECTION, SOLUTION INTRAVENOUS at 07:30

## 2020-03-04 RX ADMIN — CEFTAROLINE FOSAMIL 600 MG: 600 POWDER, FOR SOLUTION INTRAVENOUS at 18:01

## 2020-03-04 RX ADMIN — ESCITALOPRAM OXALATE 10 MG: 10 TABLET ORAL at 09:06

## 2020-03-04 NOTE — PROGRESS NOTES
[facility] Medicine Services  Short Stay Unit  PROGRESS NOTE    Patient Name: Luana Torres  : 1960  MRN: [id number]    Admission Date and Time: 3/2/2020  2:35 PM  Primary Care Physician: Bree Lantigua APRN    Subjective   Subjective     CC:  Back pain, port infection     HPI:  Drowsy, falls asleep during exam. Having more back pain and spasms today, asking if she can have more Toradol. Had BM overnight, and abd feels less distended. No other issues currently.     Review of Systems  Gen- No fevers, chills  CV- No chest pain, palpitations  Resp- No cough, dyspnea  GI- No N/V/D, abd pain       Objective   Objective     Vital Signs:   Temp:  [97.7 °F (36.5 °C)-99.2 °F (37.3 °C)] 97.9 °F (36.6 °C)  Heart Rate:  [] 105  Resp:  [16-18] 18  BP: (101-142)/(60-91) 123/75        Physical Exam:  Constitutional: No acute distress, drowsy, falls asleep in conversation   HENT: NCAT, mucous membranes moist  Respiratory: Clear to auscultation bilaterally, respiratory effort normal on RA  Cardiovascular: Tachycardic, no murmurs, rubs, or gallops, palpable pedal pulses bilaterally  Gastrointestinal: Positive bowel sounds, soft, nontender, mildly distended/ rounded without guarding or rebound tenderness  Musculoskeletal: No bilateral ankle edema  Psychiatric: Appropriate affect, cooperative  Neurologic: Oriented x 3, strength symmetric in all extremities, Cranial Nerves grossly intact to confrontation, speech clear  Skin: left anterior chest wall tender to touch, CDI dressing- not removed     Results Reviewed:    Results for LUANA TORRES (MRN [id number]) as of 3/4/2020 13:12   Ref. Range 3/4/2020 12:02   Sodium Latest Ref Range: 136 - 145 mmol/L 132 (L)   Potassium Latest Ref Range: 3.5 - 5.2 mmol/L 3.3 (L)   CO2 Latest Ref Range: 22.0 - 29.0 mmol/L 26.0   Chloride Latest Ref Range: 98 - 107 mmol/L 94 (L)   Anion Gap Latest Ref Range: 5.0 - 15.0 mmol/L 12.0   Creatinine  Latest Ref Range: 0.57 - 1.00 mg/dL 0.40 (L)   BUN Latest Ref Range: 8 - 23 mg/dL 8   BUN/Creatinine Ratio Latest Ref Range: 7.0 - 25.0  20.0   Calcium Latest Ref Range: 8.6 - 10.5 mg/dL 8.3 (L)   eGFR Non  Am Latest Ref Range: >60 mL/min/1.73 >150   WBC Latest Ref Range: 3.40 - 10.80 10*3/mm3 6.27   RBC Latest Ref Range: 3.77 - 5.28 10*6/mm3 2.84 (L)   Hemoglobin Latest Ref Range: 12.0 - 15.9 g/dL 8.2 (L)   Hematocrit Latest Ref Range: 34.0 - 46.6 % 25.3 (L)   RDW Latest Ref Range: 12.3 - 15.4 % 14.1   MCV Latest Ref Range: 79.0 - 97.0 fL 89.1   MCH Latest Ref Range: 26.6 - 33.0 pg 28.9   MCHC Latest Ref Range: 31.5 - 35.7 g/dL 32.4   MPV Latest Ref Range: 6.0 - 12.0 fL 10.6   Platelets Latest Ref Range: 140 - 450 10*3/mm3 205       Blood cultures   Aerobic Bottle Gram positive cocci in groups      Anaerobic Bottle Gram positive cocci in groups     Staphylococcus aureus. mecA (methicillin resistance gene) detected. Identification by BCID PCR.Critical         Assessment/Plan   Assessment / Plan     Active Hospital Problems    Diagnosis  POA   • Infected venous access port [T80.219A]  Yes   • Type 2 diabetes mellitus (CMS/HCC) [E11.9]  Yes   • GERD (gastroesophageal reflux disease) [K21.9]  Yes   • Back pain [M54.9]  Yes   • Bacteremia [R78.81]  Yes      Resolved Hospital Problems   No resolved problems to display.        Brief course to date:  Luana Chawla is a 59 y.o. female  female w/ dm, previous lumbar laminecomy, breast cancer (previous mastectomy w/ subsequent port-a-cath insertion and chemotherapy recently initiated). Patient presented to outside hospital w/ n/v and fever. The port-a-cath site was noted erythematous purulence was apparently noted. Blood cultures were drawn and patient apparently declined admission at local hospital and instead opted to follow up at Dr. Chávez (infectious disease) clinic today, and was sent as direct admit to [facility] to [facility] medicine      Plan:  Bacteremia  Infected access port  --ID follows   -- Daptomycin and Teflaro  -- Dr. ELLIS with port removal 3/3, tip cultured   -- blood cultures with positive MRSA bacteremia, repeat blood cultures pending  --UA unremarkable  --NAIMA in am, will be NPO after MN      Back pain  Hx of discectomy  -- MRI of back to rule out discitis, MRI results pending   -- pain meds IV and oral and muscle relaxer's   --Lidoderm patches for pain     Hypokalemia/ Hyponatremia  --replace potassium  --decreased IVF to slower rate, possibly diluted      DM  --  Hold home meds  -- SSI insulin and low dose basal 10 units pt takes 30 units at home   --  A1C 7.7     GERD  -- cont PPI      Hx Right invasive ductal Carcinoma , previous bilateral mastectomy  Mastectomy   -- s/p first dose of chemotherapy on 2/26  --  Dr Gomez courtesy consult, chemo has been discontinued until completion of antibiotic therapy     DVT prophylaxis: Heparin SQ     CODE STATUS:    Code Status and Medical Interventions:   Ordered at: 03/02/20 7573     Level Of Support Discussed With:    Patient     Code Status:    CPR     Medical Interventions (Level of Support Prior to Arrest):    Full         Discharge Blueprint:   Removal of infected port-- DONE  Vital signs remain stable  Normalizing of labs, K/ Na/ Mag  Obtain recommendations from ID for duration of antibiotic therapy  MRI for lower back pain --Pending   NAIMA    Electronically signed by CECI Juárez, 03/04/20, 10:42 AM.

## 2020-03-04 NOTE — PROGRESS NOTES
"Luana Harris [facility]  1960  [id number]    Surgery Progress Note    Date of visit: 3/4/2020    Subjective: Feels better  Eating breakfast    Objective:    /75 (BP Location: Right leg, Patient Position: Lying)   Pulse 84   Temp 97.9 °F (36.6 °C) (Oral)   Resp 18   Ht 167.6 cm (65.98\")   Wt 65.8 kg (145 lb 1 oz)   SpO2 (!) 86%   Breastfeeding No   BMI 23.43 kg/m²     Intake/Output Summary (Last 24 hours) at 3/4/2020 0855  Last data filed at 3/4/2020 0522  Gross per 24 hour   Intake 3718.51 ml   Output 1225 ml   Net 2493.51 ml       CV: Regular rate and rhythm  L: normal air entry  Left port site wound is repacked with iodoform gauze  Erythema is resolving      LABS:    Results from last 7 days   Lab Units 03/03/20  0639   WBC 10*3/mm3 5.60   HEMOGLOBIN g/dL 8.5*   HEMATOCRIT % 25.6*   PLATELETS 10*3/mm3 146     Results from last 7 days   Lab Units 03/03/20  0639 03/02/20  1622   SODIUM mmol/L 135* 127*   POTASSIUM mmol/L 3.4* 2.9*   CHLORIDE mmol/L 97* 88*   CO2 mmol/L 28.0 24.0   BUN mg/dL 7* 11   CREATININE mg/dL 0.40* 0.55*   CALCIUM mg/dL 8.2* 8.6   BILIRUBIN mg/dL  --  0.4   ALK PHOS U/L  --  66   ALT (SGPT) U/L  --  44*   AST (SGOT) U/L  --  36*   GLUCOSE mg/dL 98 329*     Results from last 7 days   Lab Units 03/03/20  0639   SODIUM mmol/L 135*   POTASSIUM mmol/L 3.4*   CHLORIDE mmol/L 97*   CO2 mmol/L 28.0   BUN mg/dL 7*   CREATININE mg/dL 0.40*   GLUCOSE mg/dL 98   CALCIUM mg/dL 8.2*     No results found for: LIPASE      Assessment/ Plan: Patient status post removal of left subclavian Port-A-Cath hamlet to infection  Blood culture from 3/2/2020 with MRSA noted  Continue with IV antibiotics per ID  Daily wound care      Problem List Items Addressed This Visit     None      Visit Diagnoses     Port or reservoir infection        Relevant Orders    Anaerobic Culture - Swab, Chest, Left    Fungus Culture - Swab, Chest, Left    Wound Culture - Wound, Chest, Left    AFB Culture - Swab, Chest, Left "            Silvio Howard MD  3/4/2020  8:55 AM

## 2020-03-04 NOTE — PLAN OF CARE
Problem: Patient Care Overview  Goal: Plan of Care Review  Outcome: Ongoing (interventions implemented as appropriate)  Flowsheets (Taken 3/4/2020 0221)  Progress: improving  Plan of Care Reviewed With: patient  Outcome Summary: VSS. Complaints of lower back pain and discomfort. PRNs provided. BM during this shift. Will continue to monitor.

## 2020-03-04 NOTE — PROGRESS NOTES
Continued Stay Note  [facility]     Patient Name: Luana Chawla  MRN: [id number]  Today's Date: 3/4/2020    Admit Date: 3/2/2020    Discharge Plan     Row Name 03/04/20 1354       Plan    Plan  home vs home with home health vs rehab    Plan Comments  CM spoke with pt at bedside. Per discussion in rounds pt may need short term rehab. CM discussed this with pt, she does not want to make any decisions without speaking with her children. CM provided list of SNF facilities in [facility]. Pt would like to return home if able, awaiting recs from ID and PICC line placement. Per notes pt's chemo is on hold until abx therapy complete. CM provided phone number to pt and requested pt to notify CM when she has spoke with her family and decision has been made in regards to referrals. CM will continue to follow and assist with needs.          Discharge Codes    No documentation.             Laya Benavidez RN

## 2020-03-04 NOTE — PLAN OF CARE
Patient's vital signs are stable.  Dr. ELLIS changed wound dressing at bedside.  Double lumen picc line placed for IV antibiotic therapy. Patient complains of chronic back and given a percocet and lidoderm patches applied. Patient states back pain improved and denies pain at wound from port removal.

## 2020-03-04 NOTE — PROGRESS NOTES
HEMATOLOGY/ONCOLOGY PROGRESS NOTE    Subjective      CC: MRSA bacteremia    SUBJECTIVE: No fevers or chills post port removal for MRSA bacteremia with port infection.        Past Medical History, Past Surgical History, Social History, Family History have been reviewed and are without significant changes except as mentioned.      Medications:  The current medication list was reviewed in the EMR    ALLERGIES:   Allergies   Allergen Reactions   • Bactrim [Sulfamethoxazole-Trimethoprim] Rash     RASH, SKIN PEELING        ROS:  A comprehensive 14 point review of systems was performed and was negative except as mentioned.      Objective      Vitals:    03/04/20 0412 03/04/20 0500 03/04/20 0703 03/04/20 0900   BP: 101/68  123/75    BP Location: Right leg  Right leg    Patient Position: Lying  Lying    Pulse: 92 91 84 105   Resp: 18  18    Temp: 98.7 °F (37.1 °C)  97.9 °F (36.6 °C)    TempSrc: Oral  Oral    SpO2:   (!) 86%    Weight:       Height:            ECOG: (0) Fully Active - Able to Carry On All Pre-disease Performance Without Restriction    General: well appearing, in no acute distress  HEENT: sclerae anicteric, oropharynx clear  Lymphatics: no cervical, supraclavicular, inguinal, or axillary adenopathy  Cardiovascular: regular rate and rhythm, no murmurs  Lungs: clear to auscultation bilaterally  Abdomen: soft, nontender, nondistended.  No palpable organomegaly  Extremities: no lower extremity edema  Skin: no rashes, lesions, bruising, or petechiae  Neuro: Alert and oriented x 3. Moves all extremities.    RECENT LABS:    Results from last 7 days   Lab Units 03/03/20  0639 03/02/20  1622 02/26/20  1042   WBC 10*3/mm3 5.60 5.48 7.30   HEMOGLOBIN g/dL 8.5* 9.2* 10.4*   PLATELETS 10*3/mm3 146 146 349     Results from last 7 days   Lab Units 03/03/20  0639 03/02/20  1622   SODIUM mmol/L 135* 127*   POTASSIUM mmol/L 3.4* 2.9*   CO2 mmol/L 28.0 24.0   BUN mg/dL 7* 11   CREATININE mg/dL 0.40* 0.55*   GLUCOSE mg/dL 98 329*      Results from last 7 days   Lab Units 03/02/20  1622   AST (SGOT) U/L 36*   ALT (SGPT) U/L 44*   BILIRUBIN mg/dL 0.4   ALK PHOS U/L 66         Xr Chest 1 View    Result Date: 3/3/2020  Chronic and emphysematous changes seen diffusely throughout the lung fields. Port-A-Catheter identified on the left with tip in the SVC. Degenerative changes seen within the spine. No evidence of pneumothorax.  D:  03/02/2020 E:  03/03/2020                  Assessment   ASSESSMENT & PLAN:      1. 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  Negative cancer next panel  2. Significant diabetes with predating neuropathy due to spinal stenosis status post laminectomy 5/24/2019 with persistent neuropathy dorsum left foot  3. MRSA bacteremia due to port infection March 2020 after course 1 day 1 of Herceptin Taxol    Discussion: We will hold further chemotherapy until Dr. Chávez gives us the go ahead from the infectious disease standpoint.  From my standpoint, whenever her antibiotic regimen, route, duration, etc. is arranged by Dr. Chávez she can go home.  I will cancel her current planned chemotherapy until we get further notice from Dr. Chávez.  Ultimately she will likely need a deep line to get through the rest of her weekly Taxol Herceptin.  Discussed with patient face-to-face 30 minutes greater than 50% spent counseling regarding this plan as outlined above.      Addendum: I spoke with Dr. Chávez who wants her on IV antibiotics for her MRSA for 3 weeks before any further chemotherapy.  He is going to place a PICC line for this.          Hero Gomez MD    3/4/2020

## 2020-03-04 NOTE — PROGRESS NOTES
INFECTIOUS DISEASE CONSULT/INITIAL HOSPITAL VISIT    Luana Chawla  1960  [id number]    Date of Consult: 3/4/2020    Admission Date: 3/2/2020      Requesting Provider: Jenifer Perales MD  Evaluating Physician: Dutch Chávez MD    Reason for Consultation: Bacteremia, Mediport infection    History of present illness:    Patient is a 59 y.o. female with breast cancer status post bilateral mastectomy has had mid port and chemotherapy and steroids now has positive blood cultures fevers chills and purulent expressible drainage from port.  I saw patient in the office yesterday recommend admission to the hospital patient he is being scheduled for Mediport removal today.  Patient is started on IV antibiotics daptomycin and ceftriaxone.  Patient received Toradol and has improvement in back pain.  Patient does have some cramping of her lower ankles and calves.  Patient denies fevers shaking chills denies bladder incontinence denies dysuria denies diarrhea denies rash.    Is feeling better      3/4/2020; patient is doing fairly well has had Mediport removed is tolerating IV antibiotics is sad when phlebotomy came in to draw more blood today.  Patient reports provement of fever has active back pain is gotten Lidoderm patches applied does not report pain in legs.  Denies bowel bladder incontinence denies diarrhea or rash      Past Medical History:   Diagnosis Date   • Diabetes mellitus (CMS/[facility])    • Diverticulitis    • GERD (gastroesophageal reflux disease)    • Kidney stone    • Malignant neoplasm of upper-outer quadrant of right female breast (CMS/HCC) 1/21/2020   • Sciatica    • Wears contact lenses        Past Surgical History:   Procedure Laterality Date   • APPENDECTOMY     • BREAST BIOPSY Right    • BREAST EXCISIONAL BIOPSY Right    • CHOLECYSTECTOMY     • COLON SURGERY     • COLONOSCOPY     • HYSTERECTOMY     • LUMBAR LAMINECTOMY DISCECTOMY DECOMPRESSION N/A 5/24/2019    Procedure: LUMBAR LAMINECTOMY  L4-5 AND L5-S1;  Surgeon: Hipolito Kahn MD;  Location:  [facility];  Service: Orthopedic Spine   • MASTECTOMY W/ SENTINEL NODE BIOPSY Bilateral 2020    Procedure: SKIN SPARING MASTECTOMIES BILATERAL, SENTINEL NODE BIOPSY RIGHT;  Surgeon: Silvio Howard MD;  Location:  [facility];  Service: General   • VENOUS ACCESS DEVICE (PORT) REMOVAL N/A 3/3/2020    Procedure: REMOVAL PORT;  Surgeon: Silvio Howard MD;  Location:  [facility];  Service: General;  Laterality: N/A;       Family History   Problem Relation Age of Onset   • Breast cancer Mother 70   • Ovarian cancer Neg Hx        Social History     Socioeconomic History   • Marital status: Single     Spouse name: Not on file   • Number of children: Not on file   • Years of education: Not on file   • Highest education level: Not on file   Tobacco Use   • Smoking status: Former Smoker     Types: Cigarettes, Electronic Cigarette     Last attempt to quit:      Years since quittin.1   • Smokeless tobacco: Never Used   • Tobacco comment: currently use nicotine e-cig.   Substance and Sexual Activity   • Alcohol use: No     Frequency: Never   • Drug use: No   • Sexual activity: Defer   Social History Narrative    Lives at home indendently with adl's        Allergies   Allergen Reactions   • Bactrim [Sulfamethoxazole-Trimethoprim] Rash     RASH, SKIN PEELING          Medication:    Current Facility-Administered Medications:   •  acetaminophen (TYLENOL) tablet 650 mg, 650 mg, Oral, Q4H PRN **OR** acetaminophen (TYLENOL) 160 MG/5ML solution 650 mg, 650 mg, Oral, Q4H PRN **OR** acetaminophen (TYLENOL) suppository 650 mg, 650 mg, Rectal, Q4H PRN, Silvio Howard MD  •  bisacodyl (DULCOLAX) EC tablet 5 mg, 5 mg, Oral, Daily PRN, Silvio Howard MD, 5 mg at 20 1750  •  bisacodyl (DULCOLAX) suppository 10 mg, 10 mg, Rectal, Daily PRN, Silvio Howard MD  •  bisacodyl (DULCOLAX) suppository 10 mg, 10 mg, Rectal, Daily PRN, Dara Hayes  DENISE, APRN  •  ceftaroline (TEFLARO) 600 mg/100 mL 0.9% NS (mbp), 600 mg, Intravenous, Q12H, Silvio Howard MD, 600 mg at 03/04/20 0652  •  cetirizine (zyrTEC) tablet 10 mg, 10 mg, Oral, Daily, Silvio Howard MD, 10 mg at 03/04/20 0906  •  cyclobenzaprine (FLEXERIL) tablet 5 mg, 5 mg, Oral, Q8H, Silvio Howard MD, 5 mg at 03/04/20 0652  •  DAPTOmycin (CUBICIN) 500 mg in sodium chloride 0.9 % 50 mL IVPB, 8 mg/kg, Intravenous, Q24H, Silvio Howard MD, Last Rate: 100 mL/hr at 03/03/20 1750, 500 mg at 03/03/20 1750  •  dextrose (D50W) 25 g/ 50mL Intravenous Solution 25 g, 25 g, Intravenous, Q15 Min PRN, Silvio Howard MD  •  dextrose (GLUTOSE) oral gel 15 g, 15 g, Oral, Q15 Min PRN, Silvio Howard MD  •  diphenhydrAMINE (BENADRYL) capsule 25 mg, 25 mg, Oral, Q6H PRN, Silvio Howard MD, 25 mg at 03/02/20 2233  •  docusate sodium (COLACE) capsule 100 mg, 100 mg, Oral, BID, Dara Hayes, APRN, 100 mg at 03/04/20 0906  •  escitalopram (LEXAPRO) tablet 10 mg, 10 mg, Oral, Daily, Silvio Howard MD, 10 mg at 03/04/20 0906  •  glucagon (human recombinant) (GLUCAGEN DIAGNOSTIC) injection 1 mg, 1 mg, Subcutaneous, Q15 Min PRN, Silvio Howard MD  •  heparin (porcine) 5000 UNIT/ML injection 5,000 Units, 5,000 Units, Subcutaneous, Q8H, Dara Hayes, APRN, 5,000 Units at 03/03/20 2107  •  HYDROmorphone (DILAUDID) injection 0.5 mg, 0.5 mg, Intravenous, Q2H PRN, 0.5 mg at 03/03/20 0639 **AND** naloxone (NARCAN) injection 0.4 mg, 0.4 mg, Intravenous, Q5 Min PRN, Silvio Howard MD  •  hydrOXYzine (ATARAX) tablet 25 mg, 25 mg, Oral, TID PRN, Mariana Mijares PA-C, 25 mg at 03/03/20 2057  •  insulin detemir (LEVEMIR) injection 10 Units, 10 Units, Subcutaneous, Nightly, Silvio Howard MD, 10 Units at 03/03/20 2112  •  insulin lispro (humaLOG) injection 0-7 Units, 0-7 Units, Subcutaneous, 4x Daily With Meals & Nightly, Silvio Howard MD, 2 Units at  03/03/20 2106  •  lactated ringers infusion, 9 mL/hr, Intravenous, Continuous, Silvio Howard MD, Stopped at 03/03/20 1644  •  lidocaine (LIDODERM) 5 % 2 patch, 2 patch, Transdermal, Q24H, Dara Hayes, APRN, 2 patch at 03/04/20 1150  •  magnesium sulfate 4 gram infusion - Mg less than or equal to 1mg/dL, 4 g, Intravenous, PRN **OR** magnesium sulfate 3 gram infusion (1gm x 3) - Mg 1.1 - 1.5 mg/dL, 1 g, Intravenous, PRN, Last Rate: 100 mL/hr at 03/03/20 0317, 1 g at 03/03/20 0317 **OR** Magnesium Sulfate 2 gram infusion- Mg 1.6 - 1.9 mg/dL, 2 g, Intravenous, PRN, Silvio Howard MD  •  ondansetron (ZOFRAN) tablet 4 mg, 4 mg, Oral, Q6H PRN **OR** ondansetron (ZOFRAN) injection 4 mg, 4 mg, Intravenous, Q6H PRN, Silvio Howard MD, 4 mg at 03/04/20 0918  •  oxyCODONE-acetaminophen (PERCOCET) 5-325 MG per tablet 1 tablet, 1 tablet, Oral, Q4H PRN, Silvio Howard MD, 1 tablet at 03/04/20 0906  •  pantoprazole (PROTONIX) EC tablet 40 mg, 40 mg, Oral, QAM, Silvio Howard MD, 40 mg at 03/04/20 0651  •  polyethylene glycol 3350 powder (packet), 17 g, Oral, Daily, Dara Hayes, APRN, 17 g at 03/04/20 0907  •  potassium chloride (MICRO-K) CR capsule 40 mEq, 40 mEq, Oral, PRN, 40 mEq at 03/03/20 0905 **OR** potassium chloride (KLOR-CON) packet 40 mEq, 40 mEq, Oral, PRN **OR** potassium chloride 10 mEq in 100 mL IVPB, 10 mEq, Intravenous, Q1H PRN, Silvio Howard MD  •  sennosides-docusate (PERICOLACE) 8.6-50 MG per tablet 2 tablet, 2 tablet, Oral, BID PRN, Silvio Howard MD, 2 tablet at 03/02/20 2153  •  sodium chloride 0.9 % flush 10 mL, 10 mL, Intravenous, Q12H, Silvio Howard MD, 10 mL at 03/03/20 2110  •  sodium chloride 0.9 % flush 10 mL, 10 mL, Intravenous, PRN, Silvio Howard MD  •  sodium chloride 0.9 % infusion, 75 mL/hr, Intravenous, Continuous, Silvio Howard MD, Last Rate: 75 mL/hr at 03/02/20 1804, 75 mL/hr at 03/02/20  1804    Antibiotics:  Anti-Infectives (From admission, onward)    Ordered     Dose/Rate Route Frequency Start Stop    20 1427  DAPTOmycin (CUBICIN) 500 mg in sodium chloride 0.9 % 50 mL IVPB  Review   Ordering Provider:  Silvio Howard MD    8 mg/kg × 65.8 kg  100 mL/hr over 30 Minutes Intravenous Every 24 Hours 20 1800 20 1759    20 1427  ceftaroline (TEFLARO) 600 mg/100 mL 0.9% NS (mbp)  Review   Ordering Provider:  Silvio Howard MD    600 mg Intravenous Every 12 Hours 20 1800 03/10/20 17520 1928  cefTRIAXone (ROCEPHIN) 2 g/100 mL 0.9% NS VTB (BRIAN)     Note to Pharmacy:  After blood cultures   Ordering Provider:  Vanessa Mims, RPH    2 g  over 30 Minutes Intravenous Once 20            Review of systems: See HPI    Physical Exam:   Vital Signs  Temp (24hrs), Av.5 °F (36.9 °C), Min:97.9 °F (36.6 °C), Max:99.2 °F (37.3 °C)    Temp  Min: 97.9 °F (36.6 °C)  Max: 99.2 °F (37.3 °C)  BP  Min: 101/68  Max: 142/82  Pulse  Min: 84  Max: 117  Resp  Min: 16  Max: 18  SpO2  Min: 86 %  Max: 97 %    GENERAL: Awake and alert, in no acute distress.  Appears more comfortable  HEENT: Normocephalic, atraumatic.  PERRL. EOMI. No conjunctival injection. No icterus. Oropharynx clear without evidence of thrush or exudate.    HEART: RRR;   LUNGS: Clear to auscultation bilaterally   ABDOMEN: Soft, nontender, nondistended.   EXT:  No cyanosis, clubbing or edema.   :  Without Sotomayor catheter.  MSK: No joint deformity strength  SKIN: Warm and dry without cutaneous eruptions on Inspection/palpation.    NEURO: Oriented to PPT.  PSYCHIATRIC: Normal insight and judgement. Cooperative with PE    Laboratory Data    Results from last 7 days   Lab Units 20  1202 20  0639 20  1622   WBC 10*3/mm3 6.27 5.60 5.48   HEMOGLOBIN g/dL 8.2* 8.5* 9.2*   HEMATOCRIT % 25.3* 25.6* 27.2*   PLATELETS 10*3/mm3 205 146 146     Results from last 7 days   Lab Units  03/03/20  0639   SODIUM mmol/L 135*   POTASSIUM mmol/L 3.4*   CHLORIDE mmol/L 97*   CO2 mmol/L 28.0   BUN mg/dL 7*   CREATININE mg/dL 0.40*   GLUCOSE mg/dL 98   CALCIUM mg/dL 8.2*     Results from last 7 days   Lab Units 03/02/20  1622   ALK PHOS U/L 66   BILIRUBIN mg/dL 0.4   ALT (SGPT) U/L 44*   AST (SGOT) U/L 36*     Results from last 7 days   Lab Units 03/02/20  1622   SED RATE mm/hr 41*     Results from last 7 days   Lab Units 03/02/20  1622   CRP mg/dL 36.03*                 Estimated Creatinine Clearance: 155.4 mL/min (A) (by C-G formula based on SCr of 0.4 mg/dL (L)).      Microbiology:  Blood Culture   Date Value Ref Range Status   03/02/2020 Abnormal Stain  Preliminary     BCID, PCR   Date Value Ref Range Status   03/02/2020 (C) No organism detected by BCID PCR. Final    Staphylococcus aureus. mecA (methicillin resistance gene) detected. Identification by BCID PCR.     No results found for: CULTURES, HSVCX, URCX  No results found for: EYECULTURE, GCCX, LABHSV  No results found for: LEGIONELLA, MRSACX, MUMPSCX, MYCOPLASCX  No results found for: NOCARDIACX, STOOLCX  No results found for: THROATCX, UNSTIMCULT, URINECX, CULTURE, VZVCULTUR  No results found for: VIRALCULTU, WOUNDCX        Radiology:  Imaging Results (Last 72 Hours)     Procedure Component Value Units Date/Time    MRI Lumbar Spine With & Without Contrast [[id number]] Resulted:  03/04/20 0627     Updated:  03/04/20 0636    XR Chest 1 View [[id number]] Collected:  03/02/20 1655     Updated:  03/03/20 1057    Narrative:       EXAMINATION: XR CHEST 1 VW-03/02/2020:      INDICATION: Fever.      COMPARISON: 02/14/2020.     FINDINGS: Portable chest reveals Port-A-Catheter identified on the left  with tip in the SVC. Emphysematous and chronic changes identified within  the lung fields. Degenerative changes seen within the spine. No pleural  effusion or pneumothorax. Vascular calcification seen within the  thoracic aorta.           Impression:        Chronic and emphysematous changes seen diffusely throughout  the lung fields. Port-A-Catheter identified on the left with tip in the  SVC. Degenerative changes seen within the spine. No evidence of  pneumothorax.     D:  03/02/2020  E:  03/03/2020                     Impression:   MRSA bacteremia  Medport infection  Fevers, chills  Back pain improving  Diabetes mellitus  Recent steroids with chemotherapy  Cramps of bilateral legs  Elevated procalcitonin level    PLAN/RECOMMENDATIONS:   Thank you for asking us to see Luana Chawla, I recommend the following:  I have discussed the case with Dr. ELLIS and agree with Mediport removal.  MRSA has propensity to spread to heart as well as the vertebral spine or large weightbearing joints at this point would prefer to remove any focus of infection in hopes of reducing chances of hematogenous spread.    Back pain is better.  I had mentioned yesterday to do an MRI of the lumbar spine.  Patient's back pain is better patient has improvement of leg pain as well we probably can hold off on this for now.    Continue daptomycin will dose of 8 mg/kg IV every 24 hours      contTeflaro 6 mg every 12 hours  repeat blood cultures tomorrow    Check transesophageal echocardiogram/  Obtain MRI of lower back     I have discussed the case with Dr. Hero Gomez.  We will hold chemotherapy while patient getting aggressive IV antibiotic therapy.    A PICC line is probably unavoidable at this point patient needs access for any infusions; patient had sentinel lymph node biopsy on right side as well as 2 lymph nodes removed from the right side.  Patient also had 2 lymph nodes removed from left side.    I discussed case with Dr. ELLIS who  felt a PICC line left side is best option  D/w family Dutch Chávez MD  3/4/2020  12:46 PM

## 2020-03-04 NOTE — PROGRESS NOTES
"Patient Name:  Luana Chawla  YOB: 1960  [id number]    Surgery Post - Operative Note    Date of visit: 3/3/2020    Subjective   Subjective: Feels OK       Objective     Objective:    /91 (BP Location: Right leg, Patient Position: Lying)   Pulse 117   Temp 98.7 °F (37.1 °C) (Oral)   Resp 18   Ht 167.6 cm (65.98\")   Wt 65.8 kg (145 lb 1 oz)   SpO2 90%   Breastfeeding No   BMI 23.43 kg/m²     CV:  Rate  regular and rhythm  regular  L:  Clear  to auscultation bilaterally , Dressing c/d/i  ABD:  Soft, nontender  EXT:  No cyanosis, clubbing or edema         Assessment/Plan     Assessment/ Plan: Doing well after Port removal. Continue Pulmonary toilet    Hospital Problem List     Infected venous access port    Type 2 diabetes mellitus (CMS/HCC)        GERD (gastroesophageal reflux disease)    Back pain    Bacteremia              Jesse Hinson MD  3/3/2020  7:30 PM    "

## 2020-03-05 PROBLEM — M46.46 SEPTIC DISCITIS OF LUMBAR REGION: Status: ACTIVE | Noted: 2020-03-05

## 2020-03-05 LAB
BACTERIA SPEC AEROBE CULT: ABNORMAL
BACTERIA SPEC AEROBE CULT: ABNORMAL
GLUCOSE BLDC GLUCOMTR-MCNC: 169 MG/DL (ref 70–130)
GLUCOSE BLDC GLUCOMTR-MCNC: 181 MG/DL (ref 70–130)
GLUCOSE BLDC GLUCOMTR-MCNC: 213 MG/DL (ref 70–130)
GLUCOSE BLDC GLUCOMTR-MCNC: 311 MG/DL (ref 70–130)
GRAM STN SPEC: ABNORMAL
ISOLATED FROM: ABNORMAL
ISOLATED FROM: ABNORMAL
POTASSIUM BLD-SCNC: 3.4 MMOL/L (ref 3.5–5.2)

## 2020-03-05 PROCEDURE — 97535 SELF CARE MNGMENT TRAINING: CPT

## 2020-03-05 PROCEDURE — 97162 PT EVAL MOD COMPLEX 30 MIN: CPT

## 2020-03-05 PROCEDURE — 25010000002 DAPTOMYCIN PER 1 MG: Performed by: SURGERY

## 2020-03-05 PROCEDURE — 97166 OT EVAL MOD COMPLEX 45 MIN: CPT

## 2020-03-05 PROCEDURE — 63710000001 INSULIN DETEMIR PER 5 UNITS: Performed by: SURGERY

## 2020-03-05 PROCEDURE — 82962 GLUCOSE BLOOD TEST: CPT

## 2020-03-05 PROCEDURE — 99232 SBSQ HOSP IP/OBS MODERATE 35: CPT | Performed by: FAMILY MEDICINE

## 2020-03-05 PROCEDURE — 87040 BLOOD CULTURE FOR BACTERIA: CPT | Performed by: INTERNAL MEDICINE

## 2020-03-05 PROCEDURE — 25010000002 DEXAMETHASONE PER 1 MG: Performed by: FAMILY MEDICINE

## 2020-03-05 PROCEDURE — 84132 ASSAY OF SERUM POTASSIUM: CPT | Performed by: FAMILY MEDICINE

## 2020-03-05 PROCEDURE — 99232 SBSQ HOSP IP/OBS MODERATE 35: CPT | Performed by: INTERNAL MEDICINE

## 2020-03-05 PROCEDURE — 25010000002 CEFTAROLINE FOSAMIL PER 10 MG: Performed by: SURGERY

## 2020-03-05 RX ORDER — DEXAMETHASONE SODIUM PHOSPHATE 4 MG/ML
4 INJECTION, SOLUTION INTRA-ARTICULAR; INTRALESIONAL; INTRAMUSCULAR; INTRAVENOUS; SOFT TISSUE ONCE
Status: COMPLETED | OUTPATIENT
Start: 2020-03-05 | End: 2020-03-05

## 2020-03-05 RX ADMIN — DEXAMETHASONE SODIUM PHOSPHATE 4 MG: 4 INJECTION, SOLUTION INTRAMUSCULAR; INTRAVENOUS at 15:43

## 2020-03-05 RX ADMIN — CYCLOBENZAPRINE HYDROCHLORIDE 5 MG: 10 TABLET, FILM COATED ORAL at 06:04

## 2020-03-05 RX ADMIN — INSULIN LISPRO 2 UNITS: 100 INJECTION, SOLUTION INTRAVENOUS; SUBCUTANEOUS at 08:21

## 2020-03-05 RX ADMIN — INSULIN LISPRO 5 UNITS: 100 INJECTION, SOLUTION INTRAVENOUS; SUBCUTANEOUS at 21:04

## 2020-03-05 RX ADMIN — SODIUM CHLORIDE, PRESERVATIVE FREE 10 ML: 5 INJECTION INTRAVENOUS at 08:19

## 2020-03-05 RX ADMIN — POTASSIUM CHLORIDE 40 MEQ: 1.5 POWDER, FOR SOLUTION ORAL at 15:43

## 2020-03-05 RX ADMIN — CYCLOBENZAPRINE HYDROCHLORIDE 5 MG: 10 TABLET, FILM COATED ORAL at 21:04

## 2020-03-05 RX ADMIN — INSULIN LISPRO 2 UNITS: 100 INJECTION, SOLUTION INTRAVENOUS; SUBCUTANEOUS at 11:40

## 2020-03-05 RX ADMIN — LIDOCAINE 2 PATCH: 50 PATCH CUTANEOUS at 08:18

## 2020-03-05 RX ADMIN — INSULIN DETEMIR 10 UNITS: 100 INJECTION, SOLUTION SUBCUTANEOUS at 21:06

## 2020-03-05 RX ADMIN — CEFTAROLINE FOSAMIL 600 MG: 600 POWDER, FOR SOLUTION INTRAVENOUS at 17:32

## 2020-03-05 RX ADMIN — HYDROXYZINE HYDROCHLORIDE 25 MG: 25 TABLET, FILM COATED ORAL at 01:53

## 2020-03-05 RX ADMIN — OXYCODONE HYDROCHLORIDE AND ACETAMINOPHEN 1 TABLET: 5; 325 TABLET ORAL at 08:18

## 2020-03-05 RX ADMIN — ESCITALOPRAM OXALATE 10 MG: 10 TABLET ORAL at 08:18

## 2020-03-05 RX ADMIN — CETIRIZINE HYDROCHLORIDE 10 MG: 10 TABLET, FILM COATED ORAL at 08:18

## 2020-03-05 RX ADMIN — POTASSIUM CHLORIDE 40 MEQ: 750 CAPSULE, EXTENDED RELEASE ORAL at 02:50

## 2020-03-05 RX ADMIN — CYCLOBENZAPRINE HYDROCHLORIDE 5 MG: 10 TABLET, FILM COATED ORAL at 13:09

## 2020-03-05 RX ADMIN — INSULIN LISPRO 3 UNITS: 100 INJECTION, SOLUTION INTRAVENOUS; SUBCUTANEOUS at 16:37

## 2020-03-05 RX ADMIN — DAPTOMYCIN 500 MG: 500 INJECTION, POWDER, LYOPHILIZED, FOR SOLUTION INTRAVENOUS at 16:37

## 2020-03-05 RX ADMIN — SODIUM CHLORIDE, PRESERVATIVE FREE 10 ML: 5 INJECTION INTRAVENOUS at 21:06

## 2020-03-05 RX ADMIN — CEFTAROLINE FOSAMIL 600 MG: 600 POWDER, FOR SOLUTION INTRAVENOUS at 06:04

## 2020-03-05 RX ADMIN — ACETAMINOPHEN 650 MG: 325 TABLET, FILM COATED ORAL at 16:45

## 2020-03-05 RX ADMIN — PANTOPRAZOLE SODIUM 40 MG: 40 TABLET, DELAYED RELEASE ORAL at 06:04

## 2020-03-05 RX ADMIN — POTASSIUM CHLORIDE 40 MEQ: 750 CAPSULE, EXTENDED RELEASE ORAL at 16:36

## 2020-03-05 RX ADMIN — SODIUM CHLORIDE, PRESERVATIVE FREE 10 ML: 5 INJECTION INTRAVENOUS at 21:05

## 2020-03-05 RX ADMIN — HYDROXYZINE HYDROCHLORIDE 25 MG: 25 TABLET, FILM COATED ORAL at 22:32

## 2020-03-05 NOTE — PLAN OF CARE
Problem: Patient Care Overview  Goal: Plan of Care Review  Outcome: Ongoing (interventions implemented as appropriate)  Flowsheets (Taken 3/5/2020 1043)  Plan of Care Reviewed With: patient  Outcome Summary: PT rhea completed this date.  Pt reports 7-8/10 pain at baseline.  Performed bed mobility at CGA.  She transfered from Bed initially with Min A followed by CGA.  Ambulated approximately 30 feet with FWW and CGA and chair follow for safety.  Pt presents with deficits in strength, functional mobility, and safety.  Recommend skilled PT to address.  Recommended DC location is home with assist and HH PT.

## 2020-03-05 NOTE — PROGRESS NOTES
"    [facility] Medicine Services  Short Stay Unit  PROGRESS NOTE    Patient Name: Luana Chawla  : 1960  MRN: [id number]    Admission Date and Time: 3/2/2020  2:35 PM  Primary Care Physician: Bree Lantigua APRN    Subjective   Subjective     CC:  Lumber osteomyelitis, port infection, MRSA bacteremia    HPI:  Alert sitting up in bed.  Patient is eager for discharge home, NAIMA was to be performed today but has been changed to tomorrow based on issues with scheduling.  Dr. Chávez and myself have is discussed with the patient the new diagnosis of lumbar osteomyelitis in addition to her known MRSA bacteremia related to her port infection, she is aware that this will entail 6 weeks of IV antibiotic therapy which she plans to do at home via PICC line.    Patient does complain of intermittent uncontrolled lumbar pain related to osteomyelitis.  Toradol seems to give her the best relief, narcotic pain medication makes her feel \"goofy\".  It seems to come and go spontaneously, not necessarily related to motion.  Patient does state however that the pain overall is showing some improvement in the last 48 hours with IV antibiotic therapy.    Review of Systems  Gen- No fevers, chills, HA, (+) intermittent uncontrolled pain lumbar pain  CV- No chest pain, palpitations, new edema  Resp- No cough, dyspnea  GI- No N/V/D, abd pain, constipation  Skin - No rash         Objective   Objective     Vital Signs:   Temp:  [98 °F (36.7 °C)-99.6 °F (37.6 °C)] 98 °F (36.7 °C)  Heart Rate:  [] 97  Resp:  [16-18] 16  BP: (125-162)/(63-92) 125/63        Physical Exam:  Constitutional: No acute distress, awake, alert, nontoxic, normal body habitus  Respiratory: Clear to auscultation bilaterally, good effort, nonlabored respirations   Cardiovascular: RRR, no murmur  Musculoskeletal: No peripheral edema, normal muscle tone for age  Psychiatric: Appropriate affect, good insight and judgement, " cooperative  Skin: Port removal site with dressing CDI    Results Reviewed:    Results for YVON TORRES (MRN [id number]) as of 3/4/2020 13:12   Ref. Range 3/4/2020 12:02   Sodium Latest Ref Range: 136 - 145 mmol/L 132 (L)   Potassium Latest Ref Range: 3.5 - 5.2 mmol/L 3.3 (L)   CO2 Latest Ref Range: 22.0 - 29.0 mmol/L 26.0   Chloride Latest Ref Range: 98 - 107 mmol/L 94 (L)   Anion Gap Latest Ref Range: 5.0 - 15.0 mmol/L 12.0   Creatinine Latest Ref Range: 0.57 - 1.00 mg/dL 0.40 (L)   BUN Latest Ref Range: 8 - 23 mg/dL 8   BUN/Creatinine Ratio Latest Ref Range: 7.0 - 25.0  20.0   Calcium Latest Ref Range: 8.6 - 10.5 mg/dL 8.3 (L)   eGFR Non  Am Latest Ref Range: >60 mL/min/1.73 >150   WBC Latest Ref Range: 3.40 - 10.80 10*3/mm3 6.27   RBC Latest Ref Range: 3.77 - 5.28 10*6/mm3 2.84 (L)   Hemoglobin Latest Ref Range: 12.0 - 15.9 g/dL 8.2 (L)   Hematocrit Latest Ref Range: 34.0 - 46.6 % 25.3 (L)   RDW Latest Ref Range: 12.3 - 15.4 % 14.1   MCV Latest Ref Range: 79.0 - 97.0 fL 89.1   MCH Latest Ref Range: 26.6 - 33.0 pg 28.9   MCHC Latest Ref Range: 31.5 - 35.7 g/dL 32.4   MPV Latest Ref Range: 6.0 - 12.0 fL 10.6   Platelets Latest Ref Range: 140 - 450 10*3/mm3 205       Blood cultures   Aerobic Bottle Gram positive cocci in groups      Anaerobic Bottle Gram positive cocci in groups     Staphylococcus aureus. mecA (methicillin resistance gene) detected. Identification by BCID PCR.Critical         Assessment/Plan   Assessment / Plan     Active Hospital Problems    Diagnosis  POA   • Septic discitis of lumbar region [M46.46]  Yes   • Sepsis due to infected port-a-cath access (CMS/[facility]) [A41.9]  Yes   • MRSA infection of venous access port  [T80.219A]  Yes   • Type 2 diabetes mellitus (CMS/HCC) [E11.9]  Yes   • GERD (gastroesophageal reflux disease) [K21.9]  Yes   • Back pain [M54.9]  Yes   • Bacteremia due to methicillin resistant Staphylococcus aureus [R78.81]  Yes   • Malignant neoplasm of upper-outer  quadrant of right female breast (CMS/[facility]) [C50.411]  Yes      Resolved Hospital Problems   No resolved problems to display.        Brief course to date:  Luana Chawla is a 59 y.o. female  female w/ dm, previous lumbar laminecomy, breast cancer (previous mastectomy w/ subsequent port-a-cath insertion and chemotherapy recently initiated). Patient presented to outside hospital w/ n/v and fever. The port-a-cath site was noted erythematous purulence was apparently noted. Blood cultures were drawn and patient apparently declined admission at local hospital and instead opted to follow up at Dr. 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 ELLIS removed port 3/3  -- blood cultures with positive MRSA bacteremia, repeat blood cultures taken 3/5 to confirm clearance  --NAIMA in am, will be NPO after MN      Lumbar pain with sciatic features due to inflammation of osteomyelitis  --Toradol has given best relief --> will trial one-time IV 4 mg Decadron to see if steroids can help ease pain, if works well would consider sending patient home with 20 mg prednisone daily for 3 to 5-day burst dose while inflammation improved with IV antibiotic administration  --Patient would like to take some narcotics home in case she needs them but would prefer to avoid as main option due to sedating side effects    Hypokalemia  Hyponatremia, better  --replace potassium per protocol     DM2  --  Hold home meds  -- SSI insulin and low dose basal 10 units pt takes 30 units at home   --  A1C 7.7%     GERD  -- cont PPI      Hx Right invasive ductal Carcinoma , previous bilateral mastectomy  Mastectomy   -- s/p first dose of chemotherapy on 2/26  --  Dr Gomez courtesy consult,  chemo has been discontinued until completion of antibiotic therapy     DVT prophylaxis: Heparin SQ     CODE STATUS:    Code Status and Medical Interventions:   Ordered at: 03/02/20 8007     Level Of Support Discussed With:    Patient     Code Status:    CPR     Medical Interventions (Level of Support Prior to Arrest):    Full         Discharge Blueprint:   Removal of infected port--> DONE  Vital signs remain stable -->  GOAL MET  Stablization of labs, K/ Na/ Mag  Obtain recommendations from ID for duration of antibiotic therapy -->  GOAL MET  NAIMA    **GOAL IS D/C HOME TOMORROW AFTER NAIMA --  has arranged home abx for 6wk via PICC and weekly ID office visits for dressing changes**    Electronically signed by Jenifer Perales MD, 03/05/20, 2:43 PM.

## 2020-03-05 NOTE — PROGRESS NOTES
Continued Stay Note  [facility]     Patient Name: Luana Chawla  MRN: [id number]  Today's Date: 3/5/2020    Admit Date: 3/2/2020    Discharge Plan     Row Name 03/05/20 5219       Plan    Plan Comments  CM continues to follow. Pt has not reached out to CM today regarding rehab referrals and pt was crying when therapy was in room working with her as she wants to return home. Therapy evals today and recommend pt is safe to return home with assistance and home health services.  CM reviewed ID note and PICC line has been placed and will consider pt going home if  no endocarditis with IV daptomycin and Teflaro through the PICC line. Discussed in rounds with Dr. Perales and Sushila PA and CM will address with pt again tomorrow and allow her to rest and think about her options today per Dr. Perales.         Discharge Codes    No documentation.             Laya Benavidez RN

## 2020-03-05 NOTE — PROGRESS NOTES
INFECTIOUS DISEASE CONSULT/INITIAL HOSPITAL VISIT    Luana Chawla  1960  [id number]    Date of Consult: 3/5/2020    Admission Date: 3/2/2020      Requesting Provider: Jenifer Perales MD  Evaluating Physician: Dutch Chávez MD    Reason for Consultation: Bacteremia, Mediport infection    History of present illness:    Patient is a 59 y.o. female with breast cancer status post bilateral mastectomy has had mid port and chemotherapy and steroids now has positive blood cultures fevers chills and purulent expressible drainage from port.  I saw patient in the office yesterday recommend admission to the hospital patient he is being scheduled for Mediport removal today.  Patient is started on IV antibiotics daptomycin and ceftriaxone.  Patient received Toradol and has improvement in back pain.  Patient does have some cramping of her lower ankles and calves.  Patient denies fevers shaking chills denies bladder incontinence denies dysuria denies diarrhea denies rash.    Is feeling better      3/4/2020; patient is doing fairly well has had Mediport removed is tolerating IV antibiotics is sad when phlebotomy came in to draw more blood today.  Patient reports provement of fever has active back pain is gotten Lidoderm patches applied does not report pain in legs.  Denies bowel bladder incontinence denies diarrhea or rash    3/5/2020 patient is doing well has improvement of back pain denies rash sore throat or diarrhea can move legs denies bowel or bladder incontinence      Past Medical History:   Diagnosis Date   • Diabetes mellitus (CMS/HCC)    • Diverticulitis    • GERD (gastroesophageal reflux disease)    • Kidney stone    • Malignant neoplasm of upper-outer quadrant of right female breast (CMS/HCC) 1/21/2020   • Sciatica    • Wears contact lenses        Past Surgical History:   Procedure Laterality Date   • APPENDECTOMY     • BREAST BIOPSY Right    • BREAST EXCISIONAL BIOPSY Right    • CHOLECYSTECTOMY     •  COLON SURGERY     • COLONOSCOPY     • HYSTERECTOMY     • LUMBAR LAMINECTOMY DISCECTOMY DECOMPRESSION N/A 2019    Procedure: LUMBAR LAMINECTOMY L4-5 AND L5-S1;  Surgeon: Hipolito Kahn MD;  Location:  [facility];  Service: Orthopedic Spine   • MASTECTOMY W/ SENTINEL NODE BIOPSY Bilateral 2020    Procedure: SKIN SPARING MASTECTOMIES BILATERAL, SENTINEL NODE BIOPSY RIGHT;  Surgeon: Silvio Howard MD;  Location:  [facility];  Service: General   • VENOUS ACCESS DEVICE (PORT) REMOVAL N/A 3/3/2020    Procedure: REMOVAL PORT;  Surgeon: Silvio Howard MD;  Location:  [facility];  Service: General;  Laterality: N/A;       Family History   Problem Relation Age of Onset   • Breast cancer Mother 70   • Ovarian cancer Neg Hx        Social History     Socioeconomic History   • Marital status: Single     Spouse name: Not on file   • Number of children: Not on file   • Years of education: Not on file   • Highest education level: Not on file   Tobacco Use   • Smoking status: Former Smoker     Types: Cigarettes, Electronic Cigarette     Last attempt to quit:      Years since quittin.1   • Smokeless tobacco: Never Used   • Tobacco comment: currently use nicotine e-cig.   Substance and Sexual Activity   • Alcohol use: No     Frequency: Never   • Drug use: No   • Sexual activity: Defer   Social History Narrative    Lives at home indendently with adl's        Allergies   Allergen Reactions   • Bactrim [Sulfamethoxazole-Trimethoprim] Rash     RASH, SKIN PEELING          Medication:    Current Facility-Administered Medications:   •  acetaminophen (TYLENOL) tablet 650 mg, 650 mg, Oral, Q4H PRN, 650 mg at 20 **OR** acetaminophen (TYLENOL) 160 MG/5ML solution 650 mg, 650 mg, Oral, Q4H PRN **OR** acetaminophen (TYLENOL) suppository 650 mg, 650 mg, Rectal, Q4H PRN, Silvio Howard MD  •  bisacodyl (DULCOLAX) EC tablet 5 mg, 5 mg, Oral, Daily PRN, Silvio Howard MD, 5 mg at 20 1750  •   bisacodyl (DULCOLAX) suppository 10 mg, 10 mg, Rectal, Daily PRN, Silvio Howard MD  •  bisacodyl (DULCOLAX) suppository 10 mg, 10 mg, Rectal, Daily PRN, Dara Hayes, APRN  •  ceftaroline (TEFLARO) 600 mg/100 mL 0.9% NS (mbp), 600 mg, Intravenous, Q12H, Silvio Howard MD, 600 mg at 03/05/20 0604  •  cetirizine (zyrTEC) tablet 10 mg, 10 mg, Oral, Daily, Silvio Howard MD, 10 mg at 03/05/20 0818  •  cyclobenzaprine (FLEXERIL) tablet 5 mg, 5 mg, Oral, Q8H, Silvio Howard MD, 5 mg at 03/05/20 1309  •  DAPTOmycin (CUBICIN) 500 mg in sodium chloride 0.9 % 50 mL IVPB, 8 mg/kg, Intravenous, Q24H, Silvio Howard MD, Last Rate: 100 mL/hr at 03/04/20 1801, 500 mg at 03/04/20 1801  •  dextrose (D50W) 25 g/ 50mL Intravenous Solution 25 g, 25 g, Intravenous, Q15 Min PRN, Silvio Howard MD  •  dextrose (GLUTOSE) oral gel 15 g, 15 g, Oral, Q15 Min PRN, Silvio Howard MD  •  diphenhydrAMINE (BENADRYL) capsule 25 mg, 25 mg, Oral, Q6H PRN, Silvio Howard MD, 25 mg at 03/02/20 2233  •  docusate sodium (COLACE) capsule 100 mg, 100 mg, Oral, BID, Dara Hayes, APRN, 100 mg at 03/04/20 0906  •  escitalopram (LEXAPRO) tablet 10 mg, 10 mg, Oral, Daily, Silvio Howard MD, 10 mg at 03/05/20 0818  •  glucagon (human recombinant) (GLUCAGEN DIAGNOSTIC) injection 1 mg, 1 mg, Subcutaneous, Q15 Min PRN, Silvio Howard MD  •  heparin (porcine) 5000 UNIT/ML injection 5,000 Units, 5,000 Units, Subcutaneous, Q8H, Dara Hayes, APRN, 5,000 Units at 03/04/20 1507  •  HYDROmorphone (DILAUDID) injection 0.5 mg, 0.5 mg, Intravenous, Q2H PRN, 0.5 mg at 03/03/20 0639 **AND** naloxone (NARCAN) injection 0.4 mg, 0.4 mg, Intravenous, Q5 Min PRN, Silvio Howard MD  •  hydrOXYzine (ATARAX) tablet 25 mg, 25 mg, Oral, TID PRN, Mariana Mijares PA-C, 25 mg at 03/05/20 0153  •  insulin detemir (LEVEMIR) injection 10 Units, 10 Units, Subcutaneous, Nightly, Silvio Howard  MD, 10 Units at 03/04/20 2017  •  insulin lispro (humaLOG) injection 0-7 Units, 0-7 Units, Subcutaneous, 4x Daily With Meals & Nightly, Silvio Howard MD, 2 Units at 03/05/20 1140  •  lidocaine (LIDODERM) 5 % 2 patch, 2 patch, Transdermal, Q24H, Dara Hayes, APRN, 2 patch at 03/05/20 0818  •  magnesium sulfate 4 gram infusion - Mg less than or equal to 1mg/dL, 4 g, Intravenous, PRN **OR** magnesium sulfate 3 gram infusion (1gm x 3) - Mg 1.1 - 1.5 mg/dL, 1 g, Intravenous, PRN, Last Rate: 100 mL/hr at 03/03/20 0317, 1 g at 03/03/20 0317 **OR** Magnesium Sulfate 2 gram infusion- Mg 1.6 - 1.9 mg/dL, 2 g, Intravenous, PRN, Silvio Howard MD  •  ondansetron (ZOFRAN) tablet 4 mg, 4 mg, Oral, Q6H PRN **OR** ondansetron (ZOFRAN) injection 4 mg, 4 mg, Intravenous, Q6H PRN, Silvio Howard MD, 4 mg at 03/04/20 0918  •  oxyCODONE-acetaminophen (PERCOCET) 5-325 MG per tablet 1 tablet, 1 tablet, Oral, Q4H PRN, Silvio Howard MD, 1 tablet at 03/05/20 0818  •  pantoprazole (PROTONIX) EC tablet 40 mg, 40 mg, Oral, QAM, Silvio Howard MD, 40 mg at 03/05/20 0604  •  polyethylene glycol 3350 powder (packet), 17 g, Oral, Daily, Dara Hayes, APRN, 17 g at 03/04/20 0907  •  potassium chloride (MICRO-K) CR capsule 40 mEq, 40 mEq, Oral, PRN, 40 mEq at 03/05/20 0250 **OR** potassium chloride (KLOR-CON) packet 40 mEq, 40 mEq, Oral, PRN **OR** potassium chloride 10 mEq in 100 mL IVPB, 10 mEq, Intravenous, Q1H PRN, Silvio Howard MD  •  sennosides-docusate (PERICOLACE) 8.6-50 MG per tablet 2 tablet, 2 tablet, Oral, BID PRN, Silvio Howard MD, 2 tablet at 03/02/20 2153  •  sodium chloride 0.9 % flush 10 mL, 10 mL, Intravenous, Q12H, Silvio Howard MD, 10 mL at 03/05/20 0819  •  sodium chloride 0.9 % flush 10 mL, 10 mL, Intravenous, PRN, Silvio Howard MD  •  sodium chloride 0.9 % flush 10 mL, 10 mL, Intravenous, Q12H, Dutch Chávez MD, 10 mL at 03/04/20 2017  •   sodium chloride 0.9 % flush 10 mL, 10 mL, Intravenous, PRN, Dutch Chávez MD    Antibiotics:  Anti-Infectives (From admission, onward)    Ordered     Dose/Rate Route Frequency Start Stop    20 1427  DAPTOmycin (CUBICIN) 500 mg in sodium chloride 0.9 % 50 mL IVPB  Review   Ordering Provider:  Silvio Howard MD    8 mg/kg × 65.8 kg  100 mL/hr over 30 Minutes Intravenous Every 24 Hours 20 1800 20 17520 142  ceftaroline (TEFLARO) 600 mg/100 mL 0.9% NS (mbp)  Review   Ordering Provider:  Silvio Howard MD    600 mg Intravenous Every 12 Hours 20 1800 03/10/20 17520 1928  cefTRIAXone (ROCEPHIN) 2 g/100 mL 0.9% NS VTB (BRIAN)     Note to Pharmacy:  After blood cultures   Ordering Provider:  Vanessa Mims, RPH    2 g  over 30 Minutes Intravenous Once 20            Review of systems: See HPI    Physical Exam:   Vital Signs  Temp (24hrs), Av.8 °F (37.1 °C), Min:98 °F (36.7 °C), Max:99.6 °F (37.6 °C)    Temp  Min: 98 °F (36.7 °C)  Max: 99.6 °F (37.6 °C)  BP  Min: 125/63  Max: 162/81  Pulse  Min: 84  Max: 108  Resp  Min: 16  Max: 18  SpO2  Min: 90 %  Max: 97 %    GENERAL: Awake and alert, in no acute distress.  Appears more comfortable  HEENT: Normocephalic, atraumatic.  PERRL. EOMI. No conjunctival injection. No icterus. Oropharynx clear without evidence of thrush or exudate.    HEART: RRR;   LUNGS: Clear to auscultation bilaterally   ABDOMEN: Soft, nontender, nondistended.   EXT:  No cyanosis, clubbing or edema.   :  Without Sotomayor catheter.  MSK: No joint deformity strength has 5 out of 5 lower extremity strength  SKIN: Warm and dry without cutaneous eruptions on Inspection/palpation.    NEURO: Oriented to PPT.  PSYCHIATRIC: Normal insight and judgement. Cooperative with PE    Laboratory Data    Results from last 7 days   Lab Units 20  1202 20  0639 20  1622   WBC 10*3/mm3 6.27 5.60 5.48   HEMOGLOBIN g/dL 8.2*  8.5* 9.2*   HEMATOCRIT % 25.3* 25.6* 27.2*   PLATELETS 10*3/mm3 205 146 146     Results from last 7 days   Lab Units 03/05/20  1310 03/04/20  1202   SODIUM mmol/L  --  132*   POTASSIUM mmol/L 3.4* 3.3*   CHLORIDE mmol/L  --  94*   CO2 mmol/L  --  26.0   BUN mg/dL  --  8   CREATININE mg/dL  --  0.40*   GLUCOSE mg/dL  --  265*   CALCIUM mg/dL  --  8.3*     Results from last 7 days   Lab Units 03/02/20  1622   ALK PHOS U/L 66   BILIRUBIN mg/dL 0.4   ALT (SGPT) U/L 44*   AST (SGOT) U/L 36*     Results from last 7 days   Lab Units 03/02/20  1622   SED RATE mm/hr 41*     Results from last 7 days   Lab Units 03/02/20  1622   CRP mg/dL 36.03*         Results from last 7 days   Lab Units 03/04/20  1202   CK TOTAL U/L 77         Estimated Creatinine Clearance: 155.4 mL/min (A) (by C-G formula based on SCr of 0.4 mg/dL (L)).      Microbiology:  Blood Culture   Date Value Ref Range Status   03/02/2020 Abnormal Stain  Preliminary     BCID, PCR   Date Value Ref Range Status   03/02/2020 (C) No organism detected by BCID PCR. Final    Staphylococcus aureus. mecA (methicillin resistance gene) detected. Identification by BCID PCR.     No results found for: CULTURES, HSVCX, URCX  No results found for: EYECULTURE, GCCX, LABHSV  No results found for: LEGIONELLA, MRSACX, MUMPSCX, MYCOPLASCX  No results found for: NOCARDIACX, STOOLCX  No results found for: THROATCX, UNSTIMCULT, URINECX, CULTURE, VZVCULTUR  No results found for: VIRALCULTU, WOUNDCX        Radiology:  Imaging Results (Last 72 Hours)     Procedure Component Value Units Date/Time    MRI Lumbar Spine With & Without Contrast [[id number]] Collected:  03/04/20 1550     Updated:  03/04/20 1952    Narrative:       EXAMINATION: MRI LUMBAR SPINE WWO CONTRAST - 03/04/2020     INDICATION:  T80.212A-Local infection due to central venous catheter,  initial encounter. Infection, back pain, history of breast cancer.     TECHNIQUE: Routine multiplanar imaging is obtained of the lumbar  spine  with and without the administration of gadolinium contrast.     COMPARISON: None.     FINDINGS: There is fluid identified within the disc space at the L3/L4  level with degenerative changes identified and signal changes at the  endplates bilaterally. Findings suggestive of a discitis and  osteomyelitis involving the L3 and L4 levels. There is a posterior disc  osteophyte complex seen at the L3/L4 level creating some mass effect  anteriorly on the thecal sac and moderate to severe central spinal canal  stenosis. There are minimal perivertebral inflammatory changes  identified surrounding the L3/L4 disc space. There is enhancement  identified of the endplates to suggest evidence of osteomyelitis.     Axial imaging reveals at the L2/L3 level a broad-based disc bulge with  mass effect anteriorly on the thecal sac. There is some facet  hypertrophy with some thickening of posterior ligament of flavum. There  is no significant central spinal canal stenosis.     At the L3/L4 level, there is a broad-based disc bulge with fluid seen in  the disc space and irregularity again seen at the endplates suggesting  discitis and osteomyelitis. No significant inflammatory changes seen  surrounding the paravertebral soft tissues. There is broad-based disc  bulge creating narrowing of the neuroforamina with moderate to severe  central spinal canal narrowing.     At the L4/L5 level, there is mild anterolisthesis identified of L4 on L5  with degenerative changes seen in the broad-based disc bulge with  lateralization to the left. There is a central protrusion creating mass  effect on the thecal sac and severe narrowing of the left neuroforamina.  Moderate to severe central spinal canal stenosis.     At the L5/S1, level there is broad-based disc bulge creating some  narrowing of the neuroforamina. No definite nerve root contact or  compromise.       Impression:       1. There is severe narrowing of the left neuroforamina at the  L4/L5  level where there is mild anterolisthesis of L4 on L5 and moderate to  severe central spinal canal stenosis.  2. Fluid identified in the disc space with enhancement identified of the  endplates suggesting a discitis and osteomyelitis with moderate central  spinal canal stenosis and narrowing of the neuroforamina bilaterally. No  significant inflammatory changes seen in the paravertebral soft tissues.     DICTATED:   03/04/2020  EDITED/ls :   03/04/2020            XR Chest 1 View [[id number]] Collected:  03/02/20 1655     Updated:  03/04/20 1842    Narrative:       EXAMINATION: XR CHEST 1 VW-03/02/2020:      INDICATION: Fever.      COMPARISON: 02/14/2020.     FINDINGS: Portable chest reveals Port-A-Catheter identified on the left  with tip in the SVC. Emphysematous and chronic changes identified within  the lung fields. Degenerative changes seen within the spine. No pleural  effusion or pneumothorax. Vascular calcification seen within the  thoracic aorta.           Impression:       Chronic and emphysematous changes seen diffusely throughout  the lung fields. Port-A-Catheter identified on the left with tip in the  SVC. Degenerative changes seen within the spine. No evidence of  pneumothorax.     D:  03/02/2020  E:  03/03/2020     This report was finalized on 3/4/2020 6:39 PM by Dr. Yolande Catalan MD.               Impression:   MRSA bacteremia  Medport infection  Fevers, chills    Diabetes mellitus  Recent steroids with chemotherapy    Elevated procalcitonin level  L4-L5 discitis/perivertebral edema  Spinal stenosis    PLAN/RECOMMENDATIONS:   Thank you for asking us to see Luana Chawla, I recommend the following:  I have discussed the case with Dr. ELLIS and agree with Mediport removal.  MRSA has propensity to spread to heart as well as the vertebral spine or large weightbearing joints at this point would prefer to remove any focus of infection in hopes of reducing chances of hematogenous  spread.    Back pain is better.  I had mentioned yesterday to do an MRI of the lumbar spine.  Patient's back pain is better patient has improvement of leg pain as well we probably can hold off on this for now.    Continue daptomycin will dose of 8 mg/kg IV every 24 hours      contTeflaro 6 mg every 12 hour    Await transesophageal echocardiogram/    If no endocarditis the patient probably can go home with IV daptomycin and Teflaro through the PICC line    A complicated case of discussed this at length with Dr. Jenifer Chávez MD  3/5/2020  2:15 PM

## 2020-03-05 NOTE — PROGRESS NOTES
HEMATOLOGY/ONCOLOGY PROGRESS NOTE    Subjective      CC: MRSA bacteremia due to port infection    SUBJECTIVE: Feeling stronger.        Past Medical History, Past Surgical History, Social History, Family History have been reviewed and are without significant changes except as mentioned.      Medications:  The current medication list was reviewed in the EMR    ALLERGIES:   Allergies   Allergen Reactions   • Bactrim [Sulfamethoxazole-Trimethoprim] Rash     RASH, SKIN PEELING        ROS:  A comprehensive 14 point review of systems was performed and was negative except as mentioned.      Objective      Vitals:    03/05/20 0600 03/05/20 0745 03/05/20 1113 03/05/20 1607   BP:  162/81 125/63 139/82   BP Location:  Right leg Right leg Right leg   Patient Position:  Lying Sitting Lying   Pulse: 95 93 97 94   Resp:  16 16 16   Temp:  99.4 °F (37.4 °C) 98 °F (36.7 °C) 98.4 °F (36.9 °C)   TempSrc:  Oral Oral Oral   SpO2: 92% 92%  93%   Weight:       Height:            ECOG: (1) Restricted in Physically Strenuous Activity, Ambulatory & Able to Do Work of Light Nature    General: well appearing, in no acute distress  HEENT: sclerae anicteric, oropharynx clear  Lymphatics: no cervical, supraclavicular, inguinal, or axillary adenopathy  Cardiovascular: regular rate and rhythm, no murmurs  Lungs: clear to auscultation bilaterally  Abdomen: soft, nontender, nondistended.  No palpable organomegaly  Extremities: no lower extremity edema  Skin: no rashes, lesions, bruising, or petechiae  Neuro: Alert and oriented x 3. Moves all extremities.    RECENT LABS:    Results from last 7 days   Lab Units 03/04/20  1202 03/03/20  0639 03/02/20  1622   WBC 10*3/mm3 6.27 5.60 5.48   HEMOGLOBIN g/dL 8.2* 8.5* 9.2*   PLATELETS 10*3/mm3 205 146 146     Results from last 7 days   Lab Units 03/05/20  1310 03/04/20  1202 03/03/20  0639 03/02/20  1622   SODIUM mmol/L  --  132* 135* 127*   POTASSIUM mmol/L 3.4* 3.3* 3.4* 2.9*   CO2 mmol/L  --  26.0 28.0  24.0   BUN mg/dL  --  8 7* 11   CREATININE mg/dL  --  0.40* 0.40* 0.55*   GLUCOSE mg/dL  --  265* 98 329*     Results from last 7 days   Lab Units 03/02/20  1622   AST (SGOT) U/L 36*   ALT (SGPT) U/L 44*   BILIRUBIN mg/dL 0.4   ALK PHOS U/L 66         Xr Chest 1 View    Result Date: 3/4/2020  Chronic and emphysematous changes seen diffusely throughout the lung fields. Port-A-Catheter identified on the left with tip in the SVC. Degenerative changes seen within the spine. No evidence of pneumothorax.  D:  03/02/2020 E:  03/03/2020  This report was finalized on 3/4/2020 6:39 PM by Dr. Yolande Catalan MD.      Mri Lumbar Spine With & Without Contrast    Result Date: 3/4/2020  1. There is severe narrowing of the left neuroforamina at the L4/L5 level where there is mild anterolisthesis of L4 on L5 and moderate to severe central spinal canal stenosis. 2. Fluid identified in the disc space with enhancement identified of the endplates suggesting a discitis and osteomyelitis with moderate central spinal canal stenosis and narrowing of the neuroforamina bilaterally. No significant inflammatory changes seen in the paravertebral soft tissues.  DICTATED:   03/04/2020 EDITED/ls :   03/04/2020                  Assessment   ASSESSMENT & PLAN:    1. 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  Negative cancer next panel  2. Significant diabetes with predating neuropathy due to spinal stenosis status post laminectomy 5/24/2019 with persistent neuropathy dorsum left foot  3. MRSA bacteremia due to port infection March 2020 after course 1 day 1 of Herceptin Taxol    Discussion: She will probably need at least 6 weeks of IV antibiotics according to Dr. Chávez and we may need to hold chemo that entire time to avoid MRSA-induced osteomyelitis or endocarditis etc.  She gets an echocardiogram  tomorrow and assuming that is negative then the plan appears to be for her to go home on protracted antibiotics.  Whether we can treat her before the end of her antibiotics I will defer to Dr. Chávez but I would imagine at least a 3-week delay would be necessary if not longer.  Discussed with patient face-to-face 30 minutes greater than 50% spent counseling regarding this plan as outlined above.                  Hero Gomez MD    3/5/2020

## 2020-03-05 NOTE — PLAN OF CARE
Problem: Patient Care Overview  Goal: Plan of Care Review  Outcome: Ongoing (interventions implemented as appropriate)  Flowsheets (Taken 3/5/2020 0800)  Outcome Summary: VSS; Pt presents with fxl decline from PLOF, deficits in ADL performance, fxl mobility, occupational endurance. Pt limited by pain with mobility, generalized weakness, decreased balance. Pt required SBA with bed mobility, CGA STS; able to march in place with CGA/RW; Min A UBD; Max A LBD. AE issued for LB ADLs and ADL retraining initiated. Will benefit from skilled OT services to address deficits, facilitate increased fxl I. Pt states she has friends available to assist at home. Recommend home with assist and HHOT.

## 2020-03-05 NOTE — THERAPY EVALUATION
Acute Care - Occupational Therapy Initial Evaluation   [place]     Patient Name: Luana Chawla  : 1960  MRN: [id number]  Today's Date: 3/5/2020  Onset of Illness/Injury or Date of Surgery: 20  Date of Referral to OT: 20       Admit Date: 3/2/2020       ICD-10-CM ICD-9-CM   1. Port or reservoir infection T80.212A 999.33     Patient Active Problem List   Diagnosis   • Malignant neoplasm of upper-outer quadrant of right female breast (CMS/HCC)   • Encounter for antineoplastic chemotherapy   • Encounter for care related to vascular access port   • MRSA infection of venous access port    • Type 2 diabetes mellitus (CMS/HCC)   • GERD (gastroesophageal reflux disease)   • Back pain   • Bacteremia due to methicillin resistant Staphylococcus aureus   • Sepsis due to infected port-a-cath access (CMS/HCC)   • Septic discitis of lumbar region     Past Medical History:   Diagnosis Date   • Diabetes mellitus (CMS/HCC)    • Diverticulitis    • GERD (gastroesophageal reflux disease)    • Kidney stone    • Malignant neoplasm of upper-outer quadrant of right female breast (CMS/HCC) 2020   • Sciatica    • Wears contact lenses      Past Surgical History:   Procedure Laterality Date   • APPENDECTOMY     • BREAST BIOPSY Right    • BREAST EXCISIONAL BIOPSY Right    • CHOLECYSTECTOMY     • COLON SURGERY     • COLONOSCOPY     • HYSTERECTOMY     • LUMBAR LAMINECTOMY DISCECTOMY DECOMPRESSION N/A 2019    Procedure: LUMBAR LAMINECTOMY L4-5 AND L5-S1;  Surgeon: Hipolito Kahn MD;  Location:  [facility];  Service: Orthopedic Spine   • MASTECTOMY W/ SENTINEL NODE BIOPSY Bilateral 2020    Procedure: SKIN SPARING MASTECTOMIES BILATERAL, SENTINEL NODE BIOPSY RIGHT;  Surgeon: Silvio Howard MD;  Location:  [facility];  Service: General   • VENOUS ACCESS DEVICE (PORT) REMOVAL N/A 3/3/2020    Procedure: REMOVAL PORT;  Surgeon: Silvio Howard MD;  Location:  [facility];  Service: General;   Laterality: N/A;          OT ASSESSMENT FLOWSHEET (last 12 hours)      Occupational Therapy Evaluation     Row Name 03/05/20 0800                   OT Evaluation Time/Intention    Subjective Information  complains of;weakness;pain  -TA        Document Type  evaluation  -TA        Mode of Treatment  occupational therapy  -TA        Patient Effort  good  -TA        Symptoms Noted During/After Treatment  fatigue  -TA           General Information    Patient Profile Reviewed?  yes  -TA        Onset of Illness/Injury or Date of Surgery  03/02/20  -TA        Patient Observations  alert;cooperative;agree to therapy  -TA        Patient/Family Observations  No family present in room  -TA        General Observations of Patient  Pt supine, RA, tele, PICC intact LUE  -TA        Prior Level of Function  independent:;all household mobility;gait;transfer;bed mobility;ADL's  -TA        Equipment Currently Used at Home  shower chair;walker, rolling Has DME but did not use  -TA        Pertinent History of Current Functional Problem  Pt is a direct admit from Dr. Chávez for infected port, N/V, back pain, difficulty walking; has started chemo recently. H/o Subhash mastectomies and lumbar laminectomy discectomy decompression.   -TA        Existing Precautions/Restrictions  fall;other (see comments) SUBHASH back pain  -TA        Limitations/Impairments  safety/cognitive  -TA        Equipment Issued to Patient  bathing equipment;dressing equipment  -TA        Risks Reviewed  patient:;LOB;dizziness;increased discomfort;change in vital signs;lines disloged  -TA        Benefits Reviewed  patient:;improve function;increase independence;increase strength;increase balance;decrease pain;increase knowledge  -TA        Barriers to Rehab  medically complex  -TA           Relationship/Environment    Primary Source of Support/Comfort  child(vijay)  -TA        Lives With  alone  -TA        Family Caregiver if Needed  child(vijay), adult Pt states she has friends  available to assist at home  -TA           Resource/Environmental Concerns    Current Living Arrangements  home/apartment/condo  -TA        Resource/Environmental Concerns  none  -TA           Home Main Entrance    Number of Stairs, Main Entrance  two  -TA           Cognitive Assessment/Intervention- PT/OT    Orientation Status (Cognition)  oriented x 4  -TA        Follows Commands (Cognition)  WNL  -TA        Safety Deficit (Cognitive)  mild deficit;awareness of need for assistance;safety precautions awareness;safety precautions follow-through/compliance  -TA           Safety Issues, Functional Mobility    Safety Issues Affecting Function (Mobility)  awareness of need for assistance;safety precaution awareness;safety precautions follow-through/compliance  -TA        Impairments Affecting Function (Mobility)  balance;endurance/activity tolerance;pain;postural/trunk control;strength  -TA           Bed Mobility Assessment/Treatment    Bed Mobility Assessment/Treatment  supine-sit;sit-supine;scooting/bridging  -TA        Scooting/Bridging [place] (Bed Mobility)  conditional independence  -TA        Supine-Sit [place] (Bed Mobility)  contact guard  -TA        Sit-Supine [place] (Bed Mobility)  contact guard  -TA        Bed Mobility, Safety Issues  decreased use of legs for bridging/pushing;impaired trunk control for bed mobility  -TA        Assistive Device (Bed Mobility)  bed rails;head of bed elevated  -TA        Comment (Bed Mobility)  Pt with 10/10 pain with transitions  -TA           Functional Mobility    Functional Mobility- Comment  Defer to PT  -TA           Transfer Assessment/Treatment    Transfer Assessment/Treatment  sit-stand transfer;stand-sit transfer  -TA           Sit-Stand Transfer    Sit-Stand [place] (Transfers)  contact guard  -TA        Assistive Device (Sit-Stand Transfers)  walker, front-wheeled  -TA           Stand-Sit Transfer    Stand-Sit [place] (Transfers)  contact  guard  -TA        Assistive Device (Stand-Sit Transfers)  walker, front-wheeled  -TA           ADL Assessment/Intervention    03279 - OT Self Care/Mgmt Minutes  8  -TA        BADL Assessment/Intervention  upper body dressing;lower body dressing  -TA           Upper Body Dressing Assessment/Training    Upper Body Dressing [place] Level  don;doff;front opening garment;minimum assist (75% patient effort)  -TA        Upper Body Dressing Position  edge of bed sitting  -TA           Lower Body Dressing Assessment/Training    Lower Body Dressing [place] Level  don;doff;pants/bottoms;shoes/slippers;socks;maximum assist (25% patient effort)  -TA        Lower Body Dressing Position  edge of bed sitting  -TA        Comment (Lower Body Dressing)  AE issued and ADL retraining initiated  -TA           BADL Safety/Performance    Impairments, BADL Safety/Performance  balance;endurance/activity tolerance;pain;strength;trunk/postural control  -TA        Skilled BADL Treatment/Intervention  BADL process/adaptation training;adaptive equipment training  -TA           General ROM    GENERAL ROM COMMENTS  BUE AROM WFL  -TA           MMT (Manual Muscle Testing)    General MMT Comments  BUE 4/5  -TA           Motor Assessment/Interventions    Additional Documentation  Balance (Group);Balance Interventions (Group)  -TA           Balance    Balance  static sitting balance;dynamic standing balance  -TA           Static Sitting Balance    Level of [place] (Unsupported Sitting, Static Balance)  supervision  -TA        Sitting Position (Unsupported Sitting, Static Balance)  sitting on edge of bed  -TA           Dynamic Standing Balance    Level of [place], Reaches Outside Midline (Standing, Dynamic Balance)  contact guard assist  -TA        Time Able to Maintain Position, Reaches Outside Midline (Standing, Dynamic Balance)  1 to 2 minutes  -TA        Assistive Device Utilized (Supported Standing, Dynamic Balance)  walker,  rolling  -TA           Sensory Assessment/Intervention    Sensory General Assessment  no sensation deficits identified;other (see comments) BUE intact  -TA           Positioning and Restraints    Pre-Treatment Position  in bed  -TA        Post Treatment Position  bed  -TA        In Bed  notified nsg;fowlers;call light within reach;encouraged to call for assist;exit alarm on;side rails up x2;legs elevated  -TA           Pain Assessment    Additional Documentation  Pain Scale: Numbers Pre/Post-Treatment (Group)  -TA           Pain Scale: Numbers Pre/Post-Treatment    Pain Scale: Numbers, Pretreatment  10/10  -TA        Pain Scale: Numbers, Post-Treatment  7/10  -TA        Pain Location - Side  Right  -TA        Pain Location - Orientation  proximal  -TA        Pain Location  hip  -TA        Pre/Post Treatment Pain Comment  Premedicated for tx, improved with rest  -TA        Pain Intervention(s)  Ambulation/increased activity;Repositioned;Medication (See MAR)  -TA           Wound 03/03/20 Left upper chest Incision    Wound - Properties Group Date first assessed: 03/03/20  -DK Present on Hospital Admission: Y  -DK Side: Left  -DK Orientation: upper  -DK Location: chest  -DK Primary Wound Type: Incision  -DK Additional Comments: --  -DK, 4 x 4 covaderm        Coping    Observed Emotional State  calm;cooperative  -TA        Verbalized Emotional State  acceptance  -TA           Plan of Care Review    Plan of Care Reviewed With  patient  -TA           Clinical Impression (OT)    Date of Referral to OT  03/04/20  -TA        OT Diagnosis  Impaired mobility and ADLs  -TA        Functional Level at Time of Evaluation (OT Eval)  fxl decline from PLOF  -TA        Patient/Family Goals Statement (OT Eval)  return home  -TA        Criteria for Skilled Therapeutic Interventions Met (OT Eval)  yes;treatment indicated  -TA        Rehab Potential (OT Eval)  good, to achieve stated therapy goals  -TA        Therapy Frequency (OT Eval)   daily  -TA        Care Plan Review (OT)  evaluation/treatment results reviewed;care plan/treatment goals reviewed;risks/benefits reviewed;patient/other agree to care plan  -TA        Anticipated Discharge Disposition (OT)  home with assist;home with home health  -TA           Vital Signs    Pre Systolic BP Rehab  -- VSS; RN cleared pt for tx  -TA        O2 Delivery Pre Treatment  room air  -TA        O2 Delivery Post Treatment  room air  -TA        Pre Patient Position  Supine  -TA        Intra Patient Position  Standing  -TA        Post Patient Position  Supine  -TA           Planned OT Interventions    Planned Therapy Interventions (OT Eval)  activity tolerance training;adaptive equipment training;BADL retraining;functional balance retraining;occupation/activity based interventions;ROM/therapeutic exercise;strengthening exercise;transfer/mobility retraining  -TA           OT Goals    Transfer Goal Selection (OT)  transfer, OT goal 1  -TA        Dressing Goal Selection (OT)  dressing, OT goal 1  -TA        Toileting Goal Selection (OT)  toileting, OT goal 1  -TA        Strength Goal Selection (OT)  strength, OT goal 1  -TA        Additional Documentation  Strength Goal Selection (OT) (Row)  -TA           Transfer Goal 1 (OT)    Activity/Assistive Device (Transfer Goal 1, OT)  sit-to-stand/stand-to-sit;bed-to-chair/chair-to-bed;toilet;walker, rolling  -TA        [place] Level/Cues Needed (Transfer Goal 1, OT)  conditional independence  -TA        Time Frame (Transfer Goal 1, OT)  by discharge  -TA        Progress/Outcome (Transfer Goal 1, OT)  goal ongoing  -TA           Dressing Goal 1 (OT)    Activity/Assistive Device (Dressing Goal 1, OT)  lower body dressing;long handled shoe horn;reacher;sock-aid  -TA        [place]/Cues Needed (Dressing Goal 1, OT)  conditional independence  -TA        Time Frame (Dressing Goal 1, OT)  by discharge  -TA        Progress/Outcome (Dressing Goal 1, OT)  goal ongoing  -TA            Toileting Goal 1 (OT)    Activity/Device (Toileting Goal 1, OT)  toileting skills, all  -TA        [place] Level/Cues Needed (Toileting Goal 1, OT)  conditional independence  -TA        Time Frame (Toileting Goal 1, OT)  by discharge  -TA        Progress/Outcome (Toileting Goal 1, OT)  goal ongoing  -TA           Strength Goal 1 (OT)    Strength Goal 1 (OT)  Pt will increase BUE MMS by 1/2 MMG to support fxl I with ADLs  -TA        Time Frame (Strength Goal 1, OT)  by discharge  -TA        Progress/Outcome (Strength Goal 1, OT)  goal ongoing  -TA           Living Environment    Home Accessibility  stairs to enter home  -TA          User Key  (r) = Recorded By, (t) = Taken By, (c) = Cosigned By    Initials Name Effective Dates    Lisette Schrader RN 06/16/16 -     TA Ralf Mckoy OT 03/14/16 -                OT Recommendation and Plan  Outcome Summary/Treatment Plan (OT)  Anticipated Discharge Disposition (OT): home with assist, home with home health  Planned Therapy Interventions (OT Eval): activity tolerance training, adaptive equipment training, BADL retraining, functional balance retraining, occupation/activity based interventions, ROM/therapeutic exercise, strengthening exercise, transfer/mobility retraining  Therapy Frequency (OT Eval): daily  Plan of Care Review  Plan of Care Reviewed With: patient  Plan of Care Reviewed With: patient  Outcome Summary: VSS; Pt presents with fxl decline from PLOF, deficits in ADL performance, fxl mobility, occupational endurance. Pt limited by pain with mobility, generalized weakness, decreased balance. Pt required SBA with bed mobility, CGA STS; able to march in place with CGA/RW; Min A UBD; Max A LBD. AE issued for LB ADLs and ADL retraining initiated. Will benefit from skilled OT services to address deficits, facilitate increased fxl I. Pt states she has friends available to assist at home. Recommend home with assist and HHOT.    Outcome  Measures     Row Name 03/05/20 0800             How much help from another is currently needed...    Putting on and taking off regular lower body clothing?  2  -TA      Bathing (including washing, rinsing, and drying)  2  -TA      Toileting (which includes using toilet bed pan or urinal)  3  -TA      Putting on and taking off regular upper body clothing  3  -TA      Taking care of personal grooming (such as brushing teeth)  3  -TA      Eating meals  4  -TA      AM-PAC 6 Clicks Score (OT)  17  -TA         Functional Assessment    Outcome Measure Options  AM-PAC 6 Clicks Daily Activity (OT)  -TA        User Key  (r) = Recorded By, (t) = Taken By, (c) = Cosigned By    Initials Name Provider Type    Ralf rBown OT Occupational Therapist          Time Calculation:   Time Calculation- OT     Row Name 03/05/20 0907 03/05/20 0800          Time Calculation- OT    OT Start Time  0800 ttc 8 minutes  -TA  --     Total Timed Code Minutes- OT  8 minute(s)  -TA  --     OT Received On  03/05/20  -TA  --     OT Goal Re-Cert Due Date  03/15/20  -TA  --        Timed Charges    84361 - OT Self Care/Mgmt Minutes  --  8  -TA       User Key  (r) = Recorded By, (t) = Taken By, (c) = Cosigned By    Initials Name Provider Type    Ralf Brown OT Occupational Therapist        Therapy Charges for Today     Code Description Service Date Service Provider Modifiers Qty    56108398799 HC OT EVAL MOD COMPLEXITY 4 3/5/2020 Ralf Mckoy OT GO 1    82423818639 HC OT SELF CARE/MGMT/TRAIN EA 15 MIN 3/5/2020 Ralf Mckoy OT GO 1               Ralf Mckoy OT  3/5/2020

## 2020-03-05 NOTE — PROGRESS NOTES
"[facility]  1960  [id number]    Surgery Progress Note    Date of visit: 3/5/2020    Subjective: Complaining of back pain and spasms    Objective:    /74 (BP Location: Right leg, Patient Position: Lying)   Pulse 95   Temp 99.6 °F (37.6 °C) (Oral)   Resp 16   Ht 167.6 cm (65.98\")   Wt 65.8 kg (145 lb 1 oz)   SpO2 92%   Breastfeeding No   BMI 23.43 kg/m²     Intake/Output Summary (Last 24 hours) at 3/5/2020 0726  Last data filed at 3/5/2020 0608  Gross per 24 hour   Intake 2248.33 ml   Output --   Net 2248.33 ml       CV: Regular rate and rhythm  L: normal air entry  BREAST: Bilateral mastectomy incisions are intact and healing well  Left port site erythema is resolving  Wound is clean  packed with iodoform gauze          LABS:    Results from last 7 days   Lab Units 03/04/20  1202   WBC 10*3/mm3 6.27   HEMOGLOBIN g/dL 8.2*   HEMATOCRIT % 25.3*   PLATELETS 10*3/mm3 205     Results from last 7 days   Lab Units 03/04/20  1202  03/02/20  1622   SODIUM mmol/L 132*   < > 127*   POTASSIUM mmol/L 3.3*   < > 2.9*   CHLORIDE mmol/L 94*   < > 88*   CO2 mmol/L 26.0   < > 24.0   BUN mg/dL 8   < > 11   CREATININE mg/dL 0.40*   < > 0.55*   CALCIUM mg/dL 8.3*   < > 8.6   BILIRUBIN mg/dL  --   --  0.4   ALK PHOS U/L  --   --  66   ALT (SGPT) U/L  --   --  44*   AST (SGOT) U/L  --   --  36*   GLUCOSE mg/dL 265*   < > 329*    < > = values in this interval not displayed.     Results from last 7 days   Lab Units 03/04/20  1202   SODIUM mmol/L 132*   POTASSIUM mmol/L 3.3*   CHLORIDE mmol/L 94*   CO2 mmol/L 26.0   BUN mg/dL 8   CREATININE mg/dL 0.40*   GLUCOSE mg/dL 265*   CALCIUM mg/dL 8.3*     No results found for: LIPASE      Assessment/ Plan: Patient admitted with MRSA bacteremia secondary to port infection  Status post removal of port  Continue with IV antibiotics per ID   wound care          Problem List Items Addressed This Visit     None      Visit Diagnoses     Port or reservoir infection        " Relevant Orders    Anaerobic Culture - Swab, Chest, Left    Fungus Culture - Swab, Chest, Left    Wound Culture - Wound, Chest, Left    AFB Culture - Swab, Chest, Left (Completed)            Silvio Howard MD  3/5/2020  7:26 AM

## 2020-03-05 NOTE — THERAPY EVALUATION
Patient Name: Luana Chawla  : 1960    MRN: [id number]                              Today's Date: 3/5/2020       Admit Date: 3/2/2020    Visit Dx:     ICD-10-CM ICD-9-CM   1. Port or reservoir infection T80.212A 999.33     Patient Active Problem List   Diagnosis   • Malignant neoplasm of upper-outer quadrant of right female breast (CMS/HCC)   • Encounter for antineoplastic chemotherapy   • Encounter for care related to vascular access port   • MRSA infection of venous access port    • Type 2 diabetes mellitus (CMS/HCC)   • GERD (gastroesophageal reflux disease)   • Back pain   • Bacteremia due to methicillin resistant Staphylococcus aureus   • Sepsis due to infected port-a-cath access (CMS/HCC)   • Septic discitis of lumbar region     Past Medical History:   Diagnosis Date   • Diabetes mellitus (CMS/HCC)    • Diverticulitis    • GERD (gastroesophageal reflux disease)    • Kidney stone    • Malignant neoplasm of upper-outer quadrant of right female breast (CMS/HCC) 2020   • Sciatica    • Wears contact lenses      Past Surgical History:   Procedure Laterality Date   • APPENDECTOMY     • BREAST BIOPSY Right    • BREAST EXCISIONAL BIOPSY Right    • CHOLECYSTECTOMY     • COLON SURGERY     • COLONOSCOPY     • HYSTERECTOMY     • LUMBAR LAMINECTOMY DISCECTOMY DECOMPRESSION N/A 2019    Procedure: LUMBAR LAMINECTOMY L4-5 AND L5-S1;  Surgeon: Hipolito Kahn MD;  Location:  [facility];  Service: Orthopedic Spine   • MASTECTOMY W/ SENTINEL NODE BIOPSY Bilateral 2020    Procedure: SKIN SPARING MASTECTOMIES BILATERAL, SENTINEL NODE BIOPSY RIGHT;  Surgeon: Silvio Howard MD;  Location:  [facility];  Service: General   • VENOUS ACCESS DEVICE (PORT) REMOVAL N/A 3/3/2020    Procedure: REMOVAL PORT;  Surgeon: Silvio Howard MD;  Location:  [facility];  Service: General;  Laterality: N/A;     General Information     Row Name 20 1000          PT Evaluation Time/Intention    Document Type   evaluation  (Pended)   -KM     Mode of Treatment  individual therapy;physical therapy  (Pended)   -     Row Name 03/05/20 1000          General Information    Patient Profile Reviewed?  yes  (Pended)   -KM     Prior Level of Function  independent:;gait;transfer;bed mobility;ADL's  (Pended)   -KM     Existing Precautions/Restrictions  fall  (Pended)   -KM     Barriers to Rehab  medically complex  (Pended)   -     Row Name 03/05/20 1000          Relationship/Environment    Lives With  alone  (Pended)   -     Row Name 03/05/20 1000          Resource/Environmental Concerns    Current Living Arrangements  home/apartment/condo  (Pended)   -     Row Name 03/05/20 1000          Home Main Entrance    Number of Stairs, Main Entrance  two  (Pended)   -KM     Stair Railings, Main Entrance  none  (Pended)   -     Row Name 03/05/20 1000          Stairs Within Home, Primary    Number of Stairs, Within Home, Primary  none  (Pended)   -     Row Name 03/05/20 1000          Cognitive Assessment/Intervention- PT/OT    Orientation Status (Cognition)  oriented x 4  (Pended)   -     Row Name 03/05/20 1000          Safety Issues, Functional Mobility    Safety Issues Affecting Function (Mobility)  awareness of need for assistance;safety precaution awareness;sequencing abilities  (Pended)   -KM     Impairments Affecting Function (Mobility)  balance;endurance/activity tolerance;pain;postural/trunk control;strength  (Pended)   -KM       User Key  (r) = Recorded By, (t) = Taken By, (c) = Cosigned By    Initials Name Provider Type     Sobia Alberto, PT Student PT Student        Mobility     Row Name 03/05/20 1000          Bed Mobility Assessment/Treatment    Bed Mobility Assessment/Treatment  supine-sit;rolling right  (Pended)   -KM     Rolling Right [place] (Bed Mobility)  contact guard;verbal cues;1 person assist  (Pended)   -KM     Supine-Sit [place] (Bed Mobility)  contact guard;verbal cues;1 person assist   (Pended)   -     Assistive Device (Bed Mobility)  bed rails;head of bed elevated  (Pended)   -     Row Name 03/05/20 1000          Transfer Assessment/Treatment    Comment (Transfers)  Min A x 1first attempt STS, CGA second attempts STS  (Pended)   -     Row Name 03/05/20 1000          Sit-Stand Transfer    Sit-Stand [place] (Transfers)  minimum assist (75% patient effort);1 person assist;contact guard  (Pended)   -     Assistive Device (Sit-Stand Transfers)  walker, front-wheeled  (Pended)   -     Row Name 03/05/20 1000          Gait/Stairs Assessment/Training    Gait/Stairs Assessment/Training  gait/ambulation independence;gait/ambulation assistive device;distance ambulated  (Pended)   -KM     [place] Level (Gait)  contact guard;verbal cues;1 person assist  (Pended)   -     Assistive Device (Gait)  walker, front-wheeled  (Pended)   -KM     Distance in Feet (Gait)  30 feet  (Pended)   -KM     Pattern (Gait)  step-to  (Pended)   -KM     Deviations/Abnormal Patterns (Gait)  eunice decreased;gait speed decreased;stride length decreased;antalgic;base of support, narrow  (Pended)   -KM     Comment (Gait/Stairs)  Chair follow during gait for safety.  VC for upright posture.    (Pended)   -KM       User Key  (r) = Recorded By, (t) = Taken By, (c) = Cosigned By    Initials Name Provider Type     Sobia Alberto PT Student PT Student        Obj/Interventions     Row Name 03/05/20 1000          General ROM    GENERAL ROM COMMENTS  BLE AROM WFL  (Pended)   -     Row Name 03/05/20 1000          MMT (Manual Muscle Testing)    General MMT Comments  BLE MMT Gross[place] 4/5; painful (low back)  (Pended)   -     Row Name 03/05/20 1000          Static Sitting Balance    Level of [place] (Unsupported Sitting, Static Balance)  supervision  (Pended)   -KM     Sitting Position (Unsupported Sitting, Static Balance)  sitting on edge of bed  (Pended)   -KM     Time Able to Maintain Position (Unsupported  Sitting, Static Balance)  45 to 60 seconds  (Pended)   -     Row Name 03/05/20 1000          Static Standing Balance    Level of [place] (Supported Standing, Static Balance)  contact guard assist;1 person assist;1 person to manage equipment  (Pended)   -KM     Time Able to Maintain Position (Supported Standing, Static Balance)  30 to 45 seconds  (Pended)   -     Row Name 03/05/20 1000          Dynamic Standing Balance    Level of [place], Reaches Outside Midline (Standing, Dynamic Balance)  contact guard assist  (Pended)   -KM     Time Able to Maintain Position, Reaches Outside Midline (Standing, Dynamic Balance)  1 to 2 minutes  (Pended)   -     Assistive Device Utilized (Supported Standing, Dynamic Balance)  walker, rolling  (Pended)   -     Row Name 03/05/20 1000          Sensory Assessment/Intervention    Sensory General Assessment  no sensation deficits identified  (Pended)  [facility]  -       User Key  (r) = Recorded By, (t) = Taken By, (c) = Cosigned By    Initials Name Provider Type     Sobia Alebrto, PT Student PT Student        Goals/Plan     Row Name 03/05/20 1000          Bed Mobility Goal 1 (PT)    Activity/Assistive Device (Bed Mobility Goal 1, PT)  bed mobility activities, all  (Pended)   -KM     [place] Level/Cues Needed (Bed Mobility Goal 1, PT)  independent  (Pended)   -KM     Time Frame (Bed Mobility Goal 1, PT)  10 days  (Pended)   -KM     Progress/Outcomes (Bed Mobility Goal 1, PT)  goal ongoing  (Pended)   -[facility] Name 03/05/20 1000          Transfer Goal 1 (PT)    Activity/Assistive Device (Transfer Goal 1, PT)  sit-to-stand/stand-to-sit;bed-to-chair/chair-to-bed;walker, rolling  (Pended)   -KM     [place] Level/Cues Needed (Transfer Goal 1, PT)  conditional independence  (Pended)   -KM     Time Frame (Transfer Goal 1, PT)  10 days  (Pended)   -KM     Progress/Outcome (Transfer Goal 1, PT)  goal ongoing  (Pended)   -[facility] Name 03/05/20 1000          Gait  Training Goal 1 (PT)    Activity/Assistive Device (Gait Training Goal 1, PT)  gait (walking locomotion);assistive device use;decrease fall risk;walker, rolling  (Pended)   -KM     [place] Level (Gait Training Goal 1, PT)  conditional independence  (Pended)   -KM     Distance (Gait Goal 1, PT)  100 feet  (Pended)   -KM     Time Frame (Gait Training Goal 1, PT)  10 days  (Pended)   -KM     Progress/Outcome (Gait Training Goal 1, PT)  goal ongoing  (Pended)   -KM     Row Name 03/05/20 1000          Stairs Goal 1 (PT)    Activity/Assistive Device (Stairs Goal 1, PT)  stairs, all skills  (Pended)   -KM     [place] Level/Cues Needed (Stairs Goal 1, PT)  independent  (Pended)   -KM     Number of Stairs (Stairs Goal 1, PT)  2  (Pended)   -KM     Time Frame (Stairs Goal 1, PT)  10 days  (Pended)   -KM       User Key  (r) = Recorded By, (t) = Taken By, (c) = Cosigned By    Initials Name Provider Type    Sobia Malloy, PT Student PT Student        Clinical Impression     Row Name 03/05/20 1000          Pain Assessment    Additional Documentation  Pain Scale: FACES Pre/Post-Treatment (Group)  (Pended)   -KM     Row Name 03/05/20 1000          Pain Scale: Numbers Pre/Post-Treatment    Pain Scale: Numbers, Pretreatment  7/10  (Pended)   -KM     Pain Scale: Numbers, Post-Treatment  8/10  (Pended)   -KM     Pain Location - Side  Bilateral  (Pended)   -KM     Pain Location - Orientation  generalized  (Pended)   -KM     Pain Location  back  (Pended)   -KM     Pain Intervention(s)  Repositioned;Ambulation/increased activity  (Pended)   -KM     Row Name 03/05/20 1000          Plan of Care Review    Plan of Care Reviewed With  patient  (Pended)   -KM     Progress  improving  (Pended)   -KM     Row Name 03/05/20 1000          Physical Therapy Clinical Impression    Patient/Family Goals Statement (PT Clinical Impression)  To return home.    (Pended)   -KM     Criteria for Skilled Interventions Met (PT Clinical Impression)   yes;treatment indicated  (Pended)   -KM     Rehab Potential (PT Clinical Summary)  good, to achieve stated therapy goals  (Pended)   -KM     Row Name 03/05/20 1000          Vital Signs    Pre Systolic BP Rehab  150  (Pended)   -KM     Pre Treatment Diastolic BP  93  (Pended)   -KM     Pretreatment Heart Rate (beats/min)  88  (Pended)   -KM     Pre SpO2 (%)  95  (Pended)   -KM     O2 Delivery Pre Treatment  room air  (Pended)   -KM     Post SpO2 (%)  96  (Pended)   -KM     O2 Delivery Post Treatment  room air  (Pended)   -KM     Pre Patient Position  Supine  (Pended)   -KM     Post Patient Position  Sitting  (Pended)   -KM     Row Name 03/05/20 1000          Positioning and Restraints    Pre-Treatment Position  in bed  (Pended)   -KM     Post Treatment Position  chair  (Pended)   -KM     In Chair  call light within reach;encouraged to call for assist;reclined;exit alarm on;notified nsg;waffle cushion;LLE elevated  (Pended)   -KM       User Key  (r) = Recorded By, (t) = Taken By, (c) = Cosigned By    Initials Name Provider Type    Sobia Malloy, PT Student PT Student        Outcome Measures     Row Name 03/05/20 1000          How much help from another person do you currently need...    Turning from your back to your side while in flat bed without using bedrails?  3  (Pended)   -KM     Moving from lying on back to sitting on the side of a flat bed without bedrails?  3  (Pended)   -KM     Moving to and from a bed to a chair (including a wheelchair)?  3  (Pended)   -KM     Standing up from a chair using your arms (e.g., wheelchair, bedside chair)?  3  (Pended)   -KM     Climbing 3-5 steps with a railing?  2  (Pended)   -KM     To walk in hospital room?  3  (Pended)   -KM     AM-PAC 6 Clicks Score (PT)  17  (Pended)   -KM     Row Name 03/05/20 1000          Functional Assessment    Outcome Measure Options  AM-PAC 6 Clicks Basic Mobility (PT)  (Pended)   -KM       User Key  (r) = Recorded By, (t) = Taken By, (c) =  Cosigned By    Initials Name Provider Type    Sobia Malloy PT Student PT Student          PT Recommendation and Plan  Planned Therapy Interventions (PT Eval): (P) balance training, bed mobility training, gait training, home exercise program, manual therapy techniques, patient/family education, postural re-education, ROM (range of motion), stair training, strengthening  Outcome Summary/Treatment Plan (PT)  Anticipated Discharge Disposition (PT): (P) home, home with home health, home with assist  Plan of Care Reviewed With: (P) patient  Progress: (P) improving  Outcome Summary: (P) PT eval completed this date.  Pt reports 7-8/10 pain at baseline.  Performed bed mobility at C[facility].  She transfered from Bed initially with Min A followed by CGA.  Ambulated approximately 30 feet with FWW and CGA and chair follow for safety.  Pt presents with deficits in strength, functional mobility, and safety.  Recommend skilled PT to address.  Recommended DC location is home with assist and HH PT.     Time Calculation:   PT Charges     Row Name 03/05/20 1000             Time Calculation    Start Time  1000  (Pended)   -KM      PT Received On  03/05/20  (Pended)   -KM      PT Goal Re-Cert Due Date  03/15/20  (Pended)   -KM        User Key  (r) = Recorded By, (t) = Taken By, (c) = Cosigned By    Initials Name Provider Type    Sobia Malloy, PT Student PT Student        Therapy Charges for Today     Code Description Service Date Service Provider Modifiers Qty    [id number] HC PT EVAL MOD COMPLEXITY 4 3/5/2020 Sobia Alberto, PT Student GP 1          PT G-Codes  Outcome Measure Options: (P) AM-PAC 6 Clicks Basic Mobility (PT)  AM-PAC 6 Clicks Score (PT): (P) 17  AM-PAC 6 Clicks Score (OT): 17    KARLEY Ritter  3/5/2020

## 2020-03-05 NOTE — PLAN OF CARE
Problem: Patient Care Overview  Goal: Plan of Care Review  Outcome: Ongoing (interventions implemented as appropriate)  Flowsheets (Taken 3/5/2020 0257)  Progress: improving  Plan of Care Reviewed With: patient  Outcome Summary: VSS. RA. No complaints at this time. Will continue to monitor.

## 2020-03-06 ENCOUNTER — APPOINTMENT (OUTPATIENT)
Dept: CARDIOLOGY | Facility: HOSPITAL | Age: 60
End: 2020-03-06

## 2020-03-06 VITALS
HEART RATE: 82 BPM | SYSTOLIC BLOOD PRESSURE: 150 MMHG | RESPIRATION RATE: 18 BRPM | HEIGHT: 66 IN | TEMPERATURE: 98.2 F | OXYGEN SATURATION: 94 % | WEIGHT: 145 LBS | BODY MASS INDEX: 23.3 KG/M2 | DIASTOLIC BLOOD PRESSURE: 86 MMHG

## 2020-03-06 PROBLEM — I82.90 THROMBUS: Status: ACTIVE | Noted: 2020-03-06

## 2020-03-06 PROBLEM — A41.9 SEPSIS (HCC): Status: RESOLVED | Noted: 2020-03-04 | Resolved: 2020-03-06

## 2020-03-06 LAB
AORTIC ROOT ANNULUS: 3.3 CM
BACTERIA SPEC AEROBE CULT: ABNORMAL
BH CV ECHO MEAS - BSA(HAYCOCK): 1.8 M^2
BH CV ECHO MEAS - BSA: 1.7 M^2
BH CV ECHO MEAS - BZI_BMI: 23.6 KILOGRAMS/M^2
BH CV ECHO MEAS - BZI_METRIC_HEIGHT: 167 CM
BH CV ECHO MEAS - BZI_METRIC_WEIGHT: 65.8 KG
CATHETER CULTURE: ABNORMAL
GLUCOSE BLDC GLUCOMTR-MCNC: 156 MG/DL (ref 70–130)
GLUCOSE BLDC GLUCOMTR-MCNC: 158 MG/DL (ref 70–130)
GLUCOSE BLDC GLUCOMTR-MCNC: 237 MG/DL (ref 70–130)
GRAM STN SPEC: ABNORMAL
GRAM STN SPEC: ABNORMAL

## 2020-03-06 PROCEDURE — 93321 DOPPLER ECHO F-UP/LMTD STD: CPT | Performed by: INTERNAL MEDICINE

## 2020-03-06 PROCEDURE — 93321 DOPPLER ECHO F-UP/LMTD STD: CPT

## 2020-03-06 PROCEDURE — 25010000002 MIDAZOLAM PER 1 MG: Performed by: INTERNAL MEDICINE

## 2020-03-06 PROCEDURE — 82962 GLUCOSE BLOOD TEST: CPT

## 2020-03-06 PROCEDURE — 93325 DOPPLER ECHO COLOR FLOW MAPG: CPT | Performed by: INTERNAL MEDICINE

## 2020-03-06 PROCEDURE — 93325 DOPPLER ECHO COLOR FLOW MAPG: CPT

## 2020-03-06 PROCEDURE — 25010000002 CEFTAROLINE FOSAMIL PER 10 MG: Performed by: SURGERY

## 2020-03-06 PROCEDURE — 93312 ECHO TRANSESOPHAGEAL: CPT

## 2020-03-06 PROCEDURE — 25010000002 ALTEPLASE 2 MG RECONSTITUTED SOLUTION: Performed by: PHYSICIAN ASSISTANT

## 2020-03-06 PROCEDURE — 25010000002 DAPTOMYCIN PER 1 MG: Performed by: SURGERY

## 2020-03-06 PROCEDURE — 99239 HOSP IP/OBS DSCHRG MGMT >30: CPT | Performed by: PHYSICIAN ASSISTANT

## 2020-03-06 PROCEDURE — 25010000002 ALTEPLASE 2 MG RECONSTITUTED SOLUTION 1 EACH VIAL: Performed by: PHYSICIAN ASSISTANT

## 2020-03-06 PROCEDURE — 93312 ECHO TRANSESOPHAGEAL: CPT | Performed by: INTERNAL MEDICINE

## 2020-03-06 PROCEDURE — 99231 SBSQ HOSP IP/OBS SF/LOW 25: CPT | Performed by: INTERNAL MEDICINE

## 2020-03-06 RX ORDER — HEPARIN SODIUM (PORCINE) LOCK FLUSH IV SOLN 100 UNIT/ML 100 UNIT/ML
500 SOLUTION INTRAVENOUS AS NEEDED
Status: DISCONTINUED | OUTPATIENT
Start: 2020-03-06 | End: 2020-03-06

## 2020-03-06 RX ORDER — LIDOCAINE 50 MG/G
1 PATCH TOPICAL EVERY 24 HOURS
Qty: 30 PATCH | Refills: 0 | Status: SHIPPED | OUTPATIENT
Start: 2020-03-06 | End: 2020-04-05

## 2020-03-06 RX ORDER — ACETAMINOPHEN 325 MG/1
650 TABLET ORAL EVERY 6 HOURS PRN
Start: 2020-03-06 | End: 2020-06-02 | Stop reason: ALTCHOICE

## 2020-03-06 RX ORDER — BISACODYL 5 MG/1
5 TABLET, DELAYED RELEASE ORAL DAILY PRN
Start: 2020-03-06 | End: 2020-06-02 | Stop reason: ALTCHOICE

## 2020-03-06 RX ORDER — HEPARIN SODIUM,PORCINE 10 UNIT/ML
50 VIAL (ML) INTRAVENOUS AS NEEDED
Status: DISCONTINUED | OUTPATIENT
Start: 2020-03-06 | End: 2020-03-06

## 2020-03-06 RX ORDER — CYCLOBENZAPRINE HCL 5 MG
5 TABLET ORAL EVERY 8 HOURS SCHEDULED
Qty: 45 TABLET | Refills: 0 | Status: CANCELLED | OUTPATIENT
Start: 2020-03-06 | End: 2020-03-21

## 2020-03-06 RX ORDER — OXYCODONE HYDROCHLORIDE AND ACETAMINOPHEN 5; 325 MG/1; MG/1
1 TABLET ORAL EVERY 8 HOURS PRN
Qty: 9 TABLET | Refills: 0 | Status: SHIPPED | OUTPATIENT
Start: 2020-03-06 | End: 2020-03-06

## 2020-03-06 RX ORDER — CYCLOBENZAPRINE HCL 5 MG
5 TABLET ORAL 3 TIMES DAILY PRN
Qty: 20 TABLET | Refills: 0 | Status: SHIPPED | OUTPATIENT
Start: 2020-03-06 | End: 2020-06-02 | Stop reason: ALTCHOICE

## 2020-03-06 RX ORDER — MIDAZOLAM HYDROCHLORIDE 1 MG/ML
INJECTION INTRAMUSCULAR; INTRAVENOUS
Status: COMPLETED | OUTPATIENT
Start: 2020-03-06 | End: 2020-03-06

## 2020-03-06 RX ORDER — OXYCODONE HYDROCHLORIDE AND ACETAMINOPHEN 5; 325 MG/1; MG/1
1 TABLET ORAL EVERY 8 HOURS PRN
Qty: 9 TABLET | Refills: 0 | Status: SHIPPED | OUTPATIENT
Start: 2020-03-06 | End: 2020-03-09

## 2020-03-06 RX ADMIN — LIDOCAINE 2 PATCH: 50 PATCH CUTANEOUS at 08:06

## 2020-03-06 RX ADMIN — CYCLOBENZAPRINE HYDROCHLORIDE 5 MG: 10 TABLET, FILM COATED ORAL at 05:46

## 2020-03-06 RX ADMIN — METHOHEXITAL SODIUM 30 MG: 500 INJECTION, POWDER, LYOPHILIZED, FOR SOLUTION INTRAMUSCULAR; INTRAVENOUS; RECTAL at 13:55

## 2020-03-06 RX ADMIN — MIDAZOLAM 1 MG: 1 INJECTION INTRAMUSCULAR; INTRAVENOUS at 13:56

## 2020-03-06 RX ADMIN — MIDAZOLAM 1 MG: 1 INJECTION INTRAMUSCULAR; INTRAVENOUS at 13:55

## 2020-03-06 RX ADMIN — PANTOPRAZOLE SODIUM 40 MG: 40 TABLET, DELAYED RELEASE ORAL at 08:07

## 2020-03-06 RX ADMIN — SODIUM CHLORIDE, PRESERVATIVE FREE 10 ML: 5 INJECTION INTRAVENOUS at 08:08

## 2020-03-06 RX ADMIN — CETIRIZINE HYDROCHLORIDE 10 MG: 10 TABLET, FILM COATED ORAL at 08:07

## 2020-03-06 RX ADMIN — MIDAZOLAM 2 MG: 1 INJECTION INTRAMUSCULAR; INTRAVENOUS at 14:04

## 2020-03-06 RX ADMIN — WATER: 1 INJECTION INTRAMUSCULAR; INTRAVENOUS; SUBCUTANEOUS at 15:04

## 2020-03-06 RX ADMIN — CEFTAROLINE FOSAMIL 600 MG: 600 POWDER, FOR SOLUTION INTRAVENOUS at 16:42

## 2020-03-06 RX ADMIN — WATER: 1 INJECTION INTRAMUSCULAR; INTRAVENOUS; SUBCUTANEOUS at 10:49

## 2020-03-06 RX ADMIN — INSULIN LISPRO 2 UNITS: 100 INJECTION, SOLUTION INTRAVENOUS; SUBCUTANEOUS at 17:34

## 2020-03-06 RX ADMIN — INSULIN LISPRO 2 UNITS: 100 INJECTION, SOLUTION INTRAVENOUS; SUBCUTANEOUS at 12:18

## 2020-03-06 RX ADMIN — CEFTAROLINE FOSAMIL 600 MG: 600 POWDER, FOR SOLUTION INTRAVENOUS at 05:47

## 2020-03-06 RX ADMIN — APIXABAN 10 MG: 5 TABLET, FILM COATED ORAL at 14:59

## 2020-03-06 RX ADMIN — SODIUM CHLORIDE, PRESERVATIVE FREE 10 ML: 5 INJECTION INTRAVENOUS at 08:10

## 2020-03-06 RX ADMIN — OXYCODONE HYDROCHLORIDE AND ACETAMINOPHEN 1 TABLET: 5; 325 TABLET ORAL at 03:41

## 2020-03-06 RX ADMIN — DAPTOMYCIN 500 MG: 500 INJECTION, POWDER, LYOPHILIZED, FOR SOLUTION INTRAVENOUS at 15:40

## 2020-03-06 RX ADMIN — INSULIN LISPRO 3 UNITS: 100 INJECTION, SOLUTION INTRAVENOUS; SUBCUTANEOUS at 08:07

## 2020-03-06 RX ADMIN — ESCITALOPRAM OXALATE 10 MG: 10 TABLET ORAL at 08:08

## 2020-03-06 RX ADMIN — WATER: 1 INJECTION INTRAMUSCULAR; INTRAVENOUS; SUBCUTANEOUS at 10:50

## 2020-03-06 RX ADMIN — CYCLOBENZAPRINE HYDROCHLORIDE 5 MG: 10 TABLET, FILM COATED ORAL at 15:00

## 2020-03-06 RX ADMIN — METHOHEXITAL SODIUM 10 MG: 500 INJECTION, POWDER, LYOPHILIZED, FOR SOLUTION INTRAMUSCULAR; INTRAVENOUS; RECTAL at 13:57

## 2020-03-06 RX ADMIN — METHOHEXITAL SODIUM 20 MG: 500 INJECTION, POWDER, LYOPHILIZED, FOR SOLUTION INTRAMUSCULAR; INTRAVENOUS; RECTAL at 14:01

## 2020-03-06 RX ADMIN — POTASSIUM CHLORIDE 40 MEQ: 750 CAPSULE, EXTENDED RELEASE ORAL at 03:37

## 2020-03-06 NOTE — PROGRESS NOTES
Continued Stay Note  [facility]     Patient Name: Luana Chawla  MRN: [id number]  Today's Date: 3/6/2020    Admit Date: 3/2/2020    Discharge Plan     Row Name 03/06/20 1356       Plan    Plan  home with home health services    Provided Post Acute Provider List?  Yes    Post Acute Provider List  Home Health    N/A Provider List Comment  Pt would like to use [facility]    Provided Post Acute Provider Quality & Resource List?  Yes    Post Acute Provider Quality and Resource List  Home Health    Delivered To  Patient    Method of Delivery  In person    Plan Comments  CM spoke with pt at bedside and pt reports her plan is to go home with home health services and her mother will be staying with her. CM reviewed list of home health agencies with pt and she would like to use [facility]. Pt is ambulating with a walker and she is agreeable to have a rollator delivered to bedside. Pt requires rollator for need for frequent rest breaks while ambulating. Pt has no preference to [employer], CM contacted [employer] and they will deliver rollator to bedside today prior to discharge. Pt will have an appointment on Sunday 03/08/2020 at 0945 to see Dr. Chávez. [facility] will provide pt's antibiotics to bedside and review teaching. Pt will have PICC line care and lab draws at [facility] office weekly and this will be arranged at her appointment on Sunday. Pt has not further needs at this time and she will have private transportation home.     Final Discharge Disposition Code  06 - home with home health care        Discharge Codes    No documentation.             Laya Benavidez RN

## 2020-03-06 NOTE — DISCHARGE SUMMARY
[facility] Medicine Services  DISCHARGE SUMMARY    Patient Name: Luana Chawla  : 1960  MRN: [id number]    Date of Admission: 3/2/2020  2:35 PM  Date of Discharge:  3/6/2020  Primary Care Physician: Bree Lantigua APRN    Consults     Date and Time Order Name Status Description    3/3/2020 0030 Inpatient Hematology & Oncology Consult      3/3/2020 0030 Inpatient Infectious Diseases Consult Completed           Hospital Course     Presenting Problem:   Infected venous access port [T80.219A]    Active Hospital Problems    Diagnosis  POA   • Thrombus [I82.90]  Clinically Undetermined   • Septic discitis of lumbar region [M46.46]  Yes   • MRSA infection of venous access port  [T80.219A]  Yes   • Type 2 diabetes mellitus (CMS/HCC) [E11.9]  Yes   • GERD (gastroesophageal reflux disease) [K21.9]  Yes   • Back pain [M54.9]  Yes   • Bacteremia due to methicillin resistant Staphylococcus aureus [R78.81]  Yes   • Malignant neoplasm of upper-outer quadrant of right female breast (CMS/HCC) [C50.411]  Yes      Resolved Hospital Problems    Diagnosis Date Resolved POA   • Sepsis due to infected port-a-cath access (CMS/HCC) [A41.9] 2020 Yes          Hospital Course:  Luana Chawla is a 59 y.o. female with past medical history of DM, GERD,spinal stenosis status post laminectomy 2019 with persistent neuropathy dorsum left foot, diverticulitis, breast cancer s/p previous bilateral mastectomy, who was admitted on 3/2/2020 with fever and chills.  Prior to admission here, the patient had presented to OSH and had blood cultures drawn, however declined admission and wanted to f/u with  with ID.  Upon arrival here, she was found to have SIRS criteria with suspected sepsis bacteremia from port.  She had a port-a-cath placed 1 month prior for chemotherapy.  The patient underwent removal of infected port-a-cath on 3/3/2020 with .  The patient was evaluated  "by infectious disease and started on Daptomycin 8mg/kg every 24 hours.  She was also started on Ceftriaxone and then changed to Ceftaroline 600mg BID.   Blood cultures positive for MRSA.  She underwent lumbar MRI which showed \"fluid identified in the disc space with enhancement identified of the endplates suggesting a discitis and osteomyelitis with moderate central spinal canal stenosis and narrowing of the neuroforamina bilaterally and severe narrowing of the left neuroforamina at L4/L5 level where is mild anterolisthesis of L4 on L5 and moderate to severe central spinal canal stenosis.  She underwent NAIMA on 3/6/2020 which showed no valve abnormalities, however revealed 1 x 3 cm thrombus of the SVC with mobile component suggestive of bacterial vegetation.  She was started on Eliquis 10mg BID for 7 days and then advised to take Eliquis 5mg BID for the remaining course.  She will need follow up for refill of medications.  She was very eager for discharge and did not want to stay inpatient or go to rehab.      She is to continue IV antibiotic  therapy for 6 weeks.  She is going to follow-up with [facility] clinic in 2 days.     Dr. Gomez was consulted and recommend holding off on chemotherapy for now.  Needs f/u in clinic.      Discharge Follow Up Recommendations for outpatient labs/diagnostics:  Follow up with [facility] 3/8/2020  Follow-up on pending blood cultures.  Follow up with Oncology.    Day of Discharge     HPI:   Ms. Chawla reports feeling only mild lower back pain.  She denies fever, chills, shortness of breath, chest pain.  She is eager for discharge.  Going to have family member stay with her.    Review of Systems  Gen- No fevers, chills  CV- No chest pain, palpitations  Resp- No cough, dyspnea  GI- No N/V/D, abd pain  MSK- positive for back pain    Vital Signs:   Temp:  [97.4 °F (36.3 °C)-98.7 °F (37.1 °C)] 98.3 °F (36.8 °C)  Heart Rate:  [74-97] 82  Resp:  [16-22] 20  BP: (113-168)/(58-93) 145/72 "     Physical Exam:  Constitutional: No acute distress, awake, alert  HENT: NCAT, mucous membranes moist  Respiratory: Clear to auscultation bilaterally, respiratory effort normal   Cardiovascular: RRR, no murmurs, rubs, or gallops, palpable pedal pulses bilaterally  Gastrointestinal: Positive bowel sounds, soft, nontender, nondistended  Musculoskeletal: No bilateral ankle edema  Psychiatric: Appropriate affect, cooperative  Neurologic: Oriented x 3, strength symmetric in all extremities, Cranial Nerves grossly intact to confrontation, speech clear         Pertinent  and/or Most Recent Results     Results from last 7 days   Lab Units 03/05/20  1310 03/04/20  1202 03/03/20  0639 03/02/20  1622   WBC 10*3/mm3  --  6.27 5.60 5.48   HEMOGLOBIN g/dL  --  8.2* 8.5* 9.2*   HEMATOCRIT %  --  25.3* 25.6* 27.2*   PLATELETS 10*3/mm3  --  205 146 146   SODIUM mmol/L  --  132* 135* 127*   POTASSIUM mmol/L 3.4* 3.3* 3.4* 2.9*   CHLORIDE mmol/L  --  94* 97* 88*   CO2 mmol/L  --  26.0 28.0 24.0   BUN mg/dL  --  8 7* 11   CREATININE mg/dL  --  0.40* 0.40* 0.55*   GLUCOSE mg/dL  --  265* 98 329*   CALCIUM mg/dL  --  8.3* 8.2* 8.6     Results from last 7 days   Lab Units 03/02/20  1622   BILIRUBIN mg/dL 0.4   ALK PHOS U/L 66   ALT (SGPT) U/L 44*   AST (SGOT) U/L 36*   PROTIME Seconds 13.3   INR  1.04           Invalid input(s): TG, LDLCALC, LDLREALC  Results from last 7 days   Lab Units 03/03/20  0639 03/02/20  1622   HEMOGLOBIN A1C % 7.70*  --    PROCALCITONIN ng/mL  --  0.66*       Brief Urine Lab Results  (Last result in the past 365 days)      Color   Clarity   Blood   Leuk Est   Nitrite   Protein   CREAT   Urine HCG        03/02/20 1804 Yellow Clear Negative Negative Negative 30 mg/dL (1+)               Microbiology Results Abnormal     Procedure Component Value - Date/Time    Blood Culture - Blood, Arm, Left [[id number]] Collected:  03/05/20 1530    Lab Status:  Preliminary result Specimen:  Blood from Arm, Left Updated:  03/06/20  1630     Blood Culture No growth at 24 hours    Blood Culture - Blood, Blood, PICC Line [[id number]] Collected:  03/05/20 1530    Lab Status:  Preliminary result Specimen:  Blood, PICC Line Updated:  03/06/20 1615     Blood Culture No growth at 24 hours    Anaerobic Culture - Swab, Chest, Left [[id number]] Collected:  03/03/20 1519    Lab Status:  Preliminary result Specimen:  Swab from Chest, Left Updated:  03/06/20 1331     Anaerobic Culture No anaerobes isolated at 3 days    Catheter Culture - Cath Tip, Chest, Left [[id number]]  (Abnormal) Collected:  03/03/20 1522    Lab Status:  Final result Specimen:  Cath Tip from Chest, Left Updated:  03/06/20 0722     CATHETER CULTURE >15 CFU/mL - Indicative of infection. Staphylococcus aureus, MRSA     Comment:   Methicillin resistant Staphylococcus aureus, Patient may be an isolation risk.       Narrative:       Refer to previous wound culture collected on 3/3/2020 1519 for MICs.    Wound Culture - Wound, Chest, Left [[id number]]  (Abnormal)  (Susceptibility) Collected:  03/03/20 1519    Lab Status:  Final result Specimen:  Wound from Chest, Left Updated:  03/06/20 0722     Wound Culture Scant growth (1+) Staphylococcus aureus, MRSA     Comment:   Methicillin resistant Staphylococcus aureus, Patient may be an isolation risk.        Gram Stain Rare (1+) WBCs seen      No organisms seen    Susceptibility      Staphylococcus aureus, MRSA     HECTOR     Clindamycin Susceptible     Erythromycin Resistant     Inducible Clindamycin Resistance Negative     Oxacillin Resistant     Penicillin G Resistant     Rifampin Susceptible     Tetracycline Susceptible     Trimethoprim + Sulfamethoxazole Susceptible     Vancomycin Susceptible                Susceptibility Comments     Staphylococcus aureus, MRSA    This isolate does not demonstrate inducible clindamycin resistance in vitro.               Blood Culture - Blood, Arm, Left [[id number]]  (Abnormal) Collected:  03/02/20 1622    Lab Status:   Final result Specimen:  Blood from Arm, Left Updated:  03/05/20 0610     Blood Culture Staphylococcus aureus, MRSA     Comment:   Infectious disease consultation is highly recommended to rule out distant foci of infection.  Methicillin resistant Staphylococcus aureus, Patient may be an isolation risk.        Isolated from Aerobic Bottle     Gram Stain Aerobic Bottle Gram positive cocci in groups    Narrative:       Refer to previous blood culture collected on 3/2/2020 1835    Blood Culture - Blood, Blood, Central Line [[id number]]  (Abnormal)  (Susceptibility) Collected:  03/02/20 1835    Lab Status:  Final result Specimen:  Blood, Central Line Updated:  03/05/20 0610     Blood Culture Staphylococcus aureus, MRSA     Comment:   Infectious disease consultation is highly recommended to rule out distant foci of infection.  Methicillin resistant Staphylococcus aureus, Patient may be an isolation risk.        Isolated from Aerobic and Anaerobic Bottles     Gram Stain Aerobic Bottle Gram positive cocci in groups      Anaerobic Bottle Gram positive cocci in groups    Susceptibility      Staphylococcus aureus, MRSA     HECTOR     Gentamicin Susceptible     Oxacillin Resistant     Rifampin Susceptible     Vancomycin Susceptible                Susceptibility Comments     Staphylococcus aureus, MRSA    This isolate does not demonstrate inducible clindamycin resistance in vitro.               AFB Culture - Swab, Chest, Left [[id number]] Collected:  03/03/20 1519    Lab Status:  Preliminary result Specimen:  Swab from Chest, Left Updated:  03/04/20 1322     AFB Stain No acid fast bacilli seen on concentrated smear    Blood Culture ID, PCR - Blood, Blood, Central Line [[id number]]  (Abnormal) Collected:  03/02/20 1835    Lab Status:  Final result Specimen:  Blood, Central Line Updated:  03/03/20 1127     BCID, PCR Staphylococcus aureus. mecA (methicillin resistance gene) detected. Identification by BCID PCR.          Imaging Results  (All)     Procedure Component Value Units Date/Time    MRI Lumbar Spine With & Without Contrast [[id number]] Collected:  03/04/20 1550     Updated:  03/06/20 1141    Narrative:       EXAMINATION: MRI LUMBAR SPINE WWO CONTRAST - 03/04/2020     INDICATION:  T80.212A-Local infection due to central venous catheter,  initial encounter. Infection, back pain, history of breast cancer.     TECHNIQUE: Routine multiplanar imaging is obtained of the lumbar spine  with and without the administration of gadolinium contrast.     COMPARISON: None.     FINDINGS: There is fluid identified within the disc space at the L3/L4  level with degenerative changes identified and signal changes at the  endplates bilaterally. Findings suggestive of a discitis and  osteomyelitis involving the L3 and L4 levels. There is a posterior disc  osteophyte complex seen at the L3/L4 level creating some mass effect  anteriorly on the thecal sac and moderate to severe central spinal canal  stenosis. There are minimal perivertebral inflammatory changes  identified surrounding the L3/L4 disc space. There is enhancement  identified of the endplates to suggest evidence of osteomyelitis.     Axial imaging reveals at the L2/L3 level a broad-based disc bulge with  mass effect anteriorly on the thecal sac. There is some facet  hypertrophy with some thickening of posterior ligament of flavum. There  is no significant central spinal canal stenosis.     At the L3/L4 level, there is a broad-based disc bulge with fluid seen in  the disc space and irregularity again seen at the endplates suggesting  discitis and osteomyelitis. No significant inflammatory changes seen  surrounding the paravertebral soft tissues. There is broad-based disc  bulge creating narrowing of the neuroforamina with moderate to severe  central spinal canal narrowing.     At the L4/L5 level, there is mild anterolisthesis identified of L4 on L5  with degenerative changes seen in the broad-based disc  bulge with  lateralization to the left. There is a central protrusion creating mass  effect on the thecal sac and severe narrowing of the left neuroforamina.  Moderate to severe central spinal canal stenosis.     At the L5/S1, level there is broad-based disc bulge creating some  narrowing of the neuroforamina. No definite nerve root contact or  compromise.       Impression:       1. There is severe narrowing of the left neuroforamina at the L4/L5  level where there is mild anterolisthesis of L4 on L5 and moderate to  severe central spinal canal stenosis.  2. Fluid identified in the disc space with enhancement identified of the  endplates suggesting a discitis and osteomyelitis with moderate central  spinal canal stenosis and narrowing of the neuroforamina bilaterally. No  significant inflammatory changes seen in the paravertebral soft tissues.     DICTATED:   03/04/2020  EDITED/ls :   03/04/2020          This report was finalized on 3/6/2020 11:38 AM by Dr. Yolande Catalan MD.       XR Chest 1 View [[id number]] Collected:  03/02/20 1655     Updated:  03/04/20 1842    Narrative:       EXAMINATION: XR CHEST 1 VW-03/02/2020:      INDICATION: Fever.      COMPARISON: 02/14/2020.     FINDINGS: Portable chest reveals Port-A-Catheter identified on the left  with tip in the SVC. Emphysematous and chronic changes identified within  the lung fields. Degenerative changes seen within the spine. No pleural  effusion or pneumothorax. Vascular calcification seen within the  thoracic aorta.           Impression:       Chronic and emphysematous changes seen diffusely throughout  the lung fields. Port-A-Catheter identified on the left with tip in the  SVC. Degenerative changes seen within the spine. No evidence of  pneumothorax.     D:  03/02/2020  E:  03/03/2020     This report was finalized on 3/4/2020 6:39 PM by Dr. Yolande Catalan MD.                       Results for orders placed during the hospital encounter of 03/02/20    Adult Transesophageal Echo (NAIMA) W/ Cont if Necessary Per Protocol    Narrative · Within the SVC there is a 1 x 3 cm thrombus with mobile components   suggesting component of bacterial vegetation  · Left ventricular systolic function is normal. Estimated EF 56-60  · The cardiac valves are free of vegetations.  · Mild mitral regurgitation.  · There is mild (grade 2) plaque in the aortic arch present.          Plan for Follow-up of Pending Labs/Results:    Order Current Status    Fungus Culture - Swab, Chest, Left In process    AFB Culture - Swab, Chest, Left Preliminary result    Anaerobic Culture - Swab, Chest, Left Preliminary result    Blood Culture - Blood, Arm, Left Preliminary result    Blood Culture - Blood, Blood, PICC Line Preliminary result        Discharge Details        Discharge Medications      New Medications      Instructions Start Date   acetaminophen 325 MG tablet  Commonly known as:  TYLENOL   650 mg, Oral, Every 6 Hours PRN      apixaban 5 MG tablet tablet  Commonly known as:  ELIQUIS   Take 10mg by mouth twice daily until March 13th. Starting 3/14/20 Take 5mg by mouth twice daily.      bisacodyl 5 MG EC tablet  Commonly known as:  DULCOLAX   5 mg, Oral, Daily PRN      ceftaroline  Commonly known as:  TEFLARO   600 mg, Intravenous, Every 12 Hours      cyclobenzaprine 5 MG tablet  Commonly known as:  FLEXERIL   5 mg, Oral, 3 Times Daily PRN      DAPTOmycin 550 mg in sodium chloride 0.9 % 50 mL   8 mg/kg (550 mg), Intravenous, Every 24 Hours      lidocaine 5 %  Commonly known as:  LIDODERM   1 patch, Transdermal, Every 24 Hours, Remove & Discard patch within 12 hours or as directed by MD      oxyCODONE-acetaminophen 5-325 MG per tablet  Commonly known as:  PERCOCET   1 tablet, Oral, Every 8 Hours PRN         Continue These Medications      Instructions Start Date   BASAGLAR KWIKPEN SC   30 Units, Subcutaneous, Nightly      cetirizine 10 MG tablet  Commonly known as:  zyrTEC   10 mg, Oral, Daily       escitalopram 10 MG tablet  Commonly known as:  LEXAPRO   10 mg, Oral, Daily      metFORMIN  MG 24 hr tablet  Commonly known as:  GLUCOPHAGE-XR   500 mg, Oral, 2 Times Daily      omeprazole 40 MG capsule  Commonly known as:  priLOSEC   40 mg, Oral, Daily      ondansetron 8 MG tablet  Commonly known as:  ZOFRAN   8 mg, Oral, 3 Times Daily PRN      OZEMPIC (0.25 OR 0.5 MG/DOSE) 2 MG/1.5ML solution pen-injector  Generic drug:  Semaglutide(0.25 or 0.5MG/DOS)   1 mg, Subcutaneous, Weekly         Stop These Medications    lidocaine-prilocaine 2.5-2.5 % cream  Commonly known as:  EMLA            Allergies   Allergen Reactions   • Bactrim [Sulfamethoxazole-Trimethoprim] Rash     RASH, SKIN PEELING          Discharge Disposition:  Home or Self Care    Diet:  Hospital:  Diet Order   Procedures   • Diet Regular       Activity:  Activity Instructions     Activity as Tolerated            Restrictions or Other Recommendations: none       CODE STATUS:    Code Status and Medical Interventions:   Ordered at: 03/02/20 4561     Level Of Support Discussed With:    Patient     Code Status:    CPR     Medical Interventions (Level of Support Prior to Arrest):    Full       Future Appointments   Date Time Provider Department Center   3/24/2020  2:45 PM Hero Gomez MD [facility]   3/24/2020  3:15 PM  [facility]  [facility]   3/25/2020 10:45 AM  [facility] 4  [facility]       Additional Instructions for the Follow-ups that You Need to Schedule     Ambulatory Referral to Home Health   As directed      Face to Face Visit Date:  3/6/2020    Follow-up provider for Plan of Care?:  I treated the patient in an acute care facility and will not continue treatment after discharge.    Follow-up provider:  ROCAEL LADD [[id number]]    Reason/Clinical Findings:  Malignant neoplasm of upper-outer quadrant of right female breast,  MRSA bacteremia, spetic discitits, Type II DM, impaired mobility and adls    Describe mobility  limitations that make leaving home difficult:  Malignant neoplasm of upper-outer quadrant of right female breast,  MRSA bacteremia, spetic discitits, Type II DM, impaired mobility and adls    Nursing/Therapeutic Services Requested:  Physical Therapy Occupational Therapy    PT orders:  Strengthening Home safety assessment    Occupational orders:  Activities of daily living Strengthening Home safety assessment    Frequency:  Other         Discharge Follow-up with Specified Provider: Follow up as scheduled with [facility].; 1 Week   As directed      To:  Follow up as scheduled with [facility].    Follow Up:  1 Week         Discharge Follow-up with Specified Provider: Follow up with Hematology/Oncology   As directed      To:  Follow up with Hematology/Oncology               Time Spent on Discharge:  60 minutes    Electronically signed by Syeda Lord PA-C, 03/06/20, 4:02 PM.

## 2020-03-06 NOTE — PLAN OF CARE
Problem: Patient Care Overview  Goal: Plan of Care Review  Outcome: Ongoing (interventions implemented as appropriate)  Flowsheets (Taken 3/6/2020 6293)  Progress: improving  Plan of Care Reviewed With: patient  Outcome Summary: VSS. Pt anxious to go home. NAIMA planned for AM. Will continue to monitor.

## 2020-03-06 NOTE — PROGRESS NOTES
"INFECTIOUS DISEASE PROGRESS NOTE     Luana Chawla  1960  [id number]    Admission Date: 3/2/2020      Requesting Provider: Jenifer Perales MD  Evaluating Physician: CECI Segura    Reason for Consultation: Bacteremia, Mediport infection    History of present illness:    Patient is a 59 y.o. female with breast cancer status post bilateral mastectomy has had mid port and chemotherapy and steroids now has positive blood cultures fevers chills and purulent expressible drainage from port.  I saw patient in the office yesterday recommend admission to the hospital patient he is being scheduled for Mediport removal today.  Patient is started on IV antibiotics daptomycin and ceftriaxone.  Patient received Toradol and has improvement in back pain.  Patient does have some cramping of her lower ankles and calves.  Patient denies fevers shaking chills denies bladder incontinence denies dysuria denies diarrhea denies rash.  Is feeling better      3/4/2020; patient is doing fairly well has had Mediport removed is tolerating IV antibiotics is sad when phlebotomy came in to draw more blood today.  Patient reports provement of fever has active back pain is gotten Lidoderm patches applied does not report pain in legs.  Denies bowel bladder incontinence denies diarrhea or rash    3/5/2020 patient is doing well has improvement of back pain denies rash sore throat or diarrhea can move legs denies bowel or bladder incontinence    3/6/20:  One of her ports on PICC has clotted.  Back pain has markedly improved. She is to have a NAIMA at 1pm and then \" I'm going home\".  No n/v/d.       Past Medical History:   Diagnosis Date   • Diabetes mellitus (CMS/HCC)    • Diverticulitis    • GERD (gastroesophageal reflux disease)    • Kidney stone    • Malignant neoplasm of upper-outer quadrant of right female breast (CMS/HCC) 1/21/2020   • Sciatica    • Wears contact lenses        Past Surgical History:   Procedure Laterality Date "   • APPENDECTOMY     • BREAST BIOPSY Right    • BREAST EXCISIONAL BIOPSY Right    • CHOLECYSTECTOMY     • COLON SURGERY     • COLONOSCOPY     • HYSTERECTOMY     • LUMBAR LAMINECTOMY DISCECTOMY DECOMPRESSION N/A 2019    Procedure: LUMBAR LAMINECTOMY L4-5 AND L5-S1;  Surgeon: Hipolito Kahn MD;  Location:  [facility];  Service: Orthopedic Spine   • MASTECTOMY W/ SENTINEL NODE BIOPSY Bilateral 2020    Procedure: SKIN SPARING MASTECTOMIES BILATERAL, SENTINEL NODE BIOPSY RIGHT;  Surgeon: Silvio Howard MD;  Location:  [facility];  Service: General   • VENOUS ACCESS DEVICE (PORT) REMOVAL N/A 3/3/2020    Procedure: REMOVAL PORT;  Surgeon: Silvio Howard MD;  Location:  [facility];  Service: General;  Laterality: N/A;       Family History   Problem Relation Age of Onset   • Breast cancer Mother 70   • Ovarian cancer Neg Hx        Social History     Socioeconomic History   • Marital status: Single     Spouse name: Not on file   • Number of children: Not on file   • Years of education: Not on file   • Highest education level: Not on file   Tobacco Use   • Smoking status: Former Smoker     Types: Cigarettes, Electronic Cigarette     Last attempt to quit: 2015     Years since quittin.1   • Smokeless tobacco: Never Used   • Tobacco comment: currently use nicotine e-cig.   Substance and Sexual Activity   • Alcohol use: No     Frequency: Never   • Drug use: No   • Sexual activity: Defer   Social History Narrative    Lives at home indendently with adl's        Allergies   Allergen Reactions   • Bactrim [Sulfamethoxazole-Trimethoprim] Rash     RASH, SKIN PEELING          Medication:    Current Facility-Administered Medications:   •  acetaminophen (TYLENOL) tablet 650 mg, 650 mg, Oral, Q4H PRN, 650 mg at 20 1645 **OR** acetaminophen (TYLENOL) 160 MG/5ML solution 650 mg, 650 mg, Oral, Q4H PRN **OR** acetaminophen (TYLENOL) suppository 650 mg, 650 mg, Rectal, Q4H PRN, Silvio Howard MD  •  bisacodyl  (DULCOLAX) EC tablet 5 mg, 5 mg, Oral, Daily PRN, Silvio Howard MD, 5 mg at 03/03/20 1750  •  bisacodyl (DULCOLAX) suppository 10 mg, 10 mg, Rectal, Daily PRN, Silvio Howard MD  •  bisacodyl (DULCOLAX) suppository 10 mg, 10 mg, Rectal, Daily PRN, Dara Hayes, APRN  •  ceftaroline (TEFLARO) 600 mg/100 mL 0.9% NS (mbp), 600 mg, Intravenous, Q12H, Silvio Howard MD, 600 mg at 03/06/20 0547  •  cetirizine (zyrTEC) tablet 10 mg, 10 mg, Oral, Daily, Silvio Howard MD, 10 mg at 03/06/20 0807  •  cyclobenzaprine (FLEXERIL) tablet 5 mg, 5 mg, Oral, Q8H, Silvio Howard MD, 5 mg at 03/06/20 0546  •  DAPTOmycin (CUBICIN) 500 mg in sodium chloride 0.9 % 50 mL IVPB, 8 mg/kg, Intravenous, Q24H, Silvio Howard MD, Last Rate: 100 mL/hr at 03/05/20 1637, 500 mg at 03/05/20 1637  •  dextrose (D50W) 25 g/ 50mL Intravenous Solution 25 g, 25 g, Intravenous, Q15 Min PRN, Silvio Howard MD  •  dextrose (GLUTOSE) oral gel 15 g, 15 g, Oral, Q15 Min PRN, Silvio Howard MD  •  diphenhydrAMINE (BENADRYL) capsule 25 mg, 25 mg, Oral, Q6H PRN, Silvio Howard MD, 25 mg at 03/02/20 2233  •  docusate sodium (COLACE) capsule 100 mg, 100 mg, Oral, BID, Dara Hayes, APRN, 100 mg at 03/04/20 0906  •  escitalopram (LEXAPRO) tablet 10 mg, 10 mg, Oral, Daily, Silvio Howard MD, 10 mg at 03/06/20 0808  •  glucagon (human recombinant) (GLUCAGEN DIAGNOSTIC) injection 1 mg, 1 mg, Subcutaneous, Q15 Min PRN, Silvio Howard MD  •  heparin (porcine) 5000 UNIT/ML injection 5,000 Units, 5,000 Units, Subcutaneous, Q8H, Dara Hayes APRN, 5,000 Units at 03/04/20 1507  •  HYDROmorphone (DILAUDID) injection 0.5 mg, 0.5 mg, Intravenous, Q2H PRN, 0.5 mg at 03/03/20 0639 **AND** naloxone (NARCAN) injection 0.4 mg, 0.4 mg, Intravenous, Q5 Min PRN, Silvio Howard MD  •  hydrOXYzine (ATARAX) tablet 25 mg, 25 mg, Oral, TID PRN, Mariana Mijares PA-C, 25 mg at 03/05/20 2232  •   insulin detemir (LEVEMIR) injection 10 Units, 10 Units, Subcutaneous, Nightly, Silvio Howard MD, 10 Units at 03/05/20 2106  •  insulin lispro (humaLOG) injection 0-7 Units, 0-7 Units, Subcutaneous, 4x Daily With Meals & Nightly, Silvio Howard MD, 3 Units at 03/06/20 0807  •  lidocaine (LIDODERM) 5 % 2 patch, 2 patch, Transdermal, Q24H, Dara Hayes, APRN, 2 patch at 03/06/20 0806  •  magnesium sulfate 4 gram infusion - Mg less than or equal to 1mg/dL, 4 g, Intravenous, PRN **OR** magnesium sulfate 3 gram infusion (1gm x 3) - Mg 1.1 - 1.5 mg/dL, 1 g, Intravenous, PRN, Last Rate: 100 mL/hr at 03/03/20 0317, 1 g at 03/03/20 0317 **OR** Magnesium Sulfate 2 gram infusion- Mg 1.6 - 1.9 mg/dL, 2 g, Intravenous, PRN, Silvio Howard MD  •  ondansetron (ZOFRAN) tablet 4 mg, 4 mg, Oral, Q6H PRN **OR** ondansetron (ZOFRAN) injection 4 mg, 4 mg, Intravenous, Q6H PRN, Silvio Howard MD, 4 mg at 03/04/20 0918  •  oxyCODONE-acetaminophen (PERCOCET) 5-325 MG per tablet 1 tablet, 1 tablet, Oral, Q4H PRN, Silvio Howard MD, 1 tablet at 03/06/20 0341  •  pantoprazole (PROTONIX) EC tablet 40 mg, 40 mg, Oral, QAM, Silvio Howard MD, 40 mg at 03/06/20 0807  •  polyethylene glycol 3350 powder (packet), 17 g, Oral, Daily, Dara Hayes, APRN, 17 g at 03/04/20 0907  •  potassium chloride (MICRO-K) CR capsule 40 mEq, 40 mEq, Oral, PRN, 40 mEq at 03/06/20 0337 **OR** potassium chloride (KLOR-CON) packet 40 mEq, 40 mEq, Oral, PRN, 40 mEq at 03/05/20 1543 **OR** potassium chloride 10 mEq in 100 mL IVPB, 10 mEq, Intravenous, Q1H PRN, Silvio Howard MD  •  sennosides-docusate (PERICOLACE) 8.6-50 MG per tablet 2 tablet, 2 tablet, Oral, BID PRN, Silvio Howard MD, 2 tablet at 03/02/20 2153  •  sodium chloride 0.9 % flush 10 mL, 10 mL, Intravenous, Q12H, Silvio Howard MD, 10 mL at 03/06/20 0810  •  sodium chloride 0.9 % flush 10 mL, 10 mL, Intravenous, PRN, Silvio Howard  MD  •  sodium chloride 0.9 % flush 10 mL, 10 mL, Intravenous, Q12H, Dutch Chávez MD, 10 mL at 20 0808  •  sodium chloride 0.9 % flush 10 mL, 10 mL, Intravenous, PRN, Dutch Chávez MD    Antibiotics:  Anti-Infectives (From admission, onward)    Ordered     Dose/Rate Route Frequency Start Stop    20 1427  DAPTOmycin (CUBICIN) 500 mg in sodium chloride 0.9 % 50 mL IVPB  Review   Ordering Provider:  Silvio Howard MD    8 mg/kg × 65.8 kg  100 mL/hr over 30 Minutes Intravenous Every 24 Hours 20 1800 20 1759    20 1427  ceftaroline (TEFLARO) 600 mg/100 mL 0.9% NS (mbp)  Review   Ordering Provider:  Silvio Howard MD    600 mg Intravenous Every 12 Hours 20 1800 03/10/20 1759    20 1928  cefTRIAXone (ROCEPHIN) 2 g/100 mL 0.9% NS VTB (BRIAN)     Note to Pharmacy:  After blood cultures   Ordering Provider:  Vanessa Mims RP    2 g  over 30 Minutes Intravenous Once 20            Review of systems: See HPI    Physical Exam:   Vital Signs  Temp (24hrs), Av.3 °F (36.8 °C), Min:97.4 °F (36.3 °C), Max:98.7 °F (37.1 °C)    Temp  Min: 97.4 °F (36.3 °C)  Max: 98.7 °F (37.1 °C)  BP  Min: 139/82  Max: 147/82  Pulse  Min: 74  Max: 97  Resp  Min: 16  Max: 20  SpO2  Min: 90 %  Max: 97 %    GENERAL: Awake and alert, in no acute distress.  Appears more comfortable  HEENT: Normocephalic, atraumatic.  PERRL. EOMI. No conjunctival injection. No icterus. Oropharynx clear without evidence of thrush or exudate.    HEART: RRR; no murmur   LUNGS: Clear to auscultation  ABDOMEN: Soft, nontender, nondistended.   EXT:  No cyanosis, clubbing or edema.   :  Without Sotomayor catheter.  MSK: No joint deformity strength has 5 out of 5 lower extremity strength  SKIN: Warm and dry without cutaneous eruptions on Inspection/palpation.    NEURO: Oriented to PPT.  PSYCHIATRIC: Normal insight and judgement. Cooperative with PE  Left PICC without redness      Laboratory Data    Results from last 7 days   Lab Units 03/04/20  1202 03/03/20  0639 03/02/20  1622   WBC 10*3/mm3 6.27 5.60 5.48   HEMOGLOBIN g/dL 8.2* 8.5* 9.2*   HEMATOCRIT % 25.3* 25.6* 27.2*   PLATELETS 10*3/mm3 205 146 146     Results from last 7 days   Lab Units 03/05/20  1310 03/04/20  1202   SODIUM mmol/L  --  132*   POTASSIUM mmol/L 3.4* 3.3*   CHLORIDE mmol/L  --  94*   CO2 mmol/L  --  26.0   BUN mg/dL  --  8   CREATININE mg/dL  --  0.40*   GLUCOSE mg/dL  --  265*   CALCIUM mg/dL  --  8.3*     Results from last 7 days   Lab Units 03/02/20  1622   ALK PHOS U/L 66   BILIRUBIN mg/dL 0.4   ALT (SGPT) U/L 44*   AST (SGOT) U/L 36*     Results from last 7 days   Lab Units 03/02/20  1622   SED RATE mm/hr 41*     Results from last 7 days   Lab Units 03/02/20  1622   CRP mg/dL 36.03*         Results from last 7 days   Lab Units 03/04/20  1202   CK TOTAL U/L 77         Estimated Creatinine Clearance: 155.4 mL/min (A) (by C-G formula based on SCr of 0.4 mg/dL (L)).      Microbiology:  Blood Culture   Date Value Ref Range Status   03/02/2020 Abnormal Stain  Preliminary     BCID, PCR   Date Value Ref Range Status   03/02/2020 (C) No organism detected by BCID PCR. Final    Staphylococcus aureus. mecA (methicillin resistance gene) detected. Identification by BCID PCR.             Radiology:  Imaging Results (Last 72 Hours)     Procedure Component Value Units Date/Time    MRI Lumbar Spine With & Without Contrast [[id number]] Collected:  03/04/20 1550     Updated:  03/06/20 1141    Narrative:       EXAMINATION: MRI LUMBAR SPINE WWO CONTRAST - 03/04/2020     INDICATION:  T80.212A-Local infection due to central venous catheter,  initial encounter. Infection, back pain, history of breast cancer.     TECHNIQUE: Routine multiplanar imaging is obtained of the lumbar spine  with and without the administration of gadolinium contrast.     COMPARISON: None.     FINDINGS: There is fluid identified within the disc space at the  L3/L4  level with degenerative changes identified and signal changes at the  endplates bilaterally. Findings suggestive of a discitis and  osteomyelitis involving the L3 and L4 levels. There is a posterior disc  osteophyte complex seen at the L3/L4 level creating some mass effect  anteriorly on the thecal sac and moderate to severe central spinal canal  stenosis. There are minimal perivertebral inflammatory changes  identified surrounding the L3/L4 disc space. There is enhancement  identified of the endplates to suggest evidence of osteomyelitis.     Axial imaging reveals at the L2/L3 level a broad-based disc bulge with  mass effect anteriorly on the thecal sac. There is some facet  hypertrophy with some thickening of posterior ligament of flavum. There  is no significant central spinal canal stenosis.     At the L3/L4 level, there is a broad-based disc bulge with fluid seen in  the disc space and irregularity again seen at the endplates suggesting  discitis and osteomyelitis. No significant inflammatory changes seen  surrounding the paravertebral soft tissues. There is broad-based disc  bulge creating narrowing of the neuroforamina with moderate to severe  central spinal canal narrowing.     At the L4/L5 level, there is mild anterolisthesis identified of L4 on L5  with degenerative changes seen in the broad-based disc bulge with  lateralization to the left. There is a central protrusion creating mass  effect on the thecal sac and severe narrowing of the left neuroforamina.  Moderate to severe central spinal canal stenosis.     At the L5/S1, level there is broad-based disc bulge creating some  narrowing of the neuroforamina. No definite nerve root contact or  compromise.       Impression:       1. There is severe narrowing of the left neuroforamina at the L4/L5  level where there is mild anterolisthesis of L4 on L5 and moderate to  severe central spinal canal stenosis.  2. Fluid identified in the disc space with  enhancement identified of the  endplates suggesting a discitis and osteomyelitis with moderate central  spinal canal stenosis and narrowing of the neuroforamina bilaterally. No  significant inflammatory changes seen in the paravertebral soft tissues.     DICTATED:   03/04/2020  EDITED/ls :   03/04/2020          This report was finalized on 3/6/2020 11:38 AM by Dr. Yolande Catalan MD.       XR Chest 1 View [[id number]] Collected:  03/02/20 1655     Updated:  03/04/20 1842    Narrative:       EXAMINATION: XR CHEST 1 VW-03/02/2020:      INDICATION: Fever.      COMPARISON: 02/14/2020.     FINDINGS: Portable chest reveals Port-A-Catheter identified on the left  with tip in the SVC. Emphysematous and chronic changes identified within  the lung fields. Degenerative changes seen within the spine. No pleural  effusion or pneumothorax. Vascular calcification seen within the  thoracic aorta.           Impression:       Chronic and emphysematous changes seen diffusely throughout  the lung fields. Port-A-Catheter identified on the left with tip in the  SVC. Degenerative changes seen within the spine. No evidence of  pneumothorax.     D:  03/02/2020  E:  03/03/2020     This report was finalized on 3/4/2020 6:39 PM by Dr. Yolande Catalan MD.           Impression:     1. There is severe narrowing of the left neuroforamina at the L4/L5  level where there is mild anterolisthesis of L4 on L5 and moderate to  severe central spinal canal stenosis.  2. Fluid identified in the disc space with enhancement identified of the  endplates suggesting a discitis and osteomyelitis with moderate central  spinal canal stenosis and narrowing of the neuroforamina bilaterally. No  significant inflammatory changes seen in the paravertebral soft tissues.     DICTATED:   03/04/2020  EDITED/ls :   03/04/2020            Impression:   MRSA bacteremia, NAIMA scheduled for today  Medport infection  Fevers, chills    Diabetes mellitus  Recent steroids with  chemotherapy    Elevated procalcitonin level  L4-L5 discitis/perivertebral edema  Spinal stenosis    PLAN/RECOMMENDATIONS:     I have discussed the case with Dr. ELLIS and agree with Mediport removal.  MRSA has propensity to spread to heart as well as the vertebral spine or large weightbearing joints at this point would prefer to remove any focus of infection in hopes of reducing chances of hematogenous spread.      Continue daptomycin will dose of 8 mg/kg IV every 24 hours      contTeflaro 6 mg every 12 hour      If no endocarditis the patient probably can go home with IV daptomycin and Teflaro through the PICC line       Dr. MACIEL  has obtained the history, performed the physical exam and formulated the above treatment plan.   I have seen and examined patient and agree with above    I discussed with cardiology the NAIMA there is no vegetation of cardiac valves but there is thrombus from previous Mediport which may be a septic thrombophlebitis.    I agree with offering anticoagulation with IV antibiotics for now    I discussed case with Dr. Marry Zaman    Anticipate discharge today if Eliquis or other blood thinner can be approved at home    Follow-up in office on Sunday    I have discussed the case with the hospitalist and the [facility] office  DP/cm time 30 minutes  Kala Calderón, CECI  3/6/2020  11:52 AM

## 2020-03-06 NOTE — PROGRESS NOTES
Patient is on Apixaban.  Education provided on 3/6 verbally and in writing.  Discussed effects of medication,  drug-drug and drug-food interactions, and signs/symptoms of bleeding and clotting.  Patient verbalized understanding through teach back.  All pertinent questions were answered.      Thanks  Chris Altamirano, PharmD  PGY1 Resident  3/6/2020  3:23 PM

## 2020-03-06 NOTE — NURSING NOTE
Instructed patient/family on self administration of IV antibiotics via PICC line for home.  Patient returned demonstration with proficiency.  Reviewed side effects, PICC line care, and 24hr. RN availability.  [facility] to provide all IV antibiotics and supplies.  Weekly PICC care and labs to be done in [facility] office.  Please contact [facility] nurse with any questions at [phone]

## 2020-03-06 NOTE — PROGRESS NOTES
"Luana [facility]  1960  [id number]    Surgery Progress Note    Date of visit: 3/6/2020    Subjective: Better  Awaiting discharge today    Objective:    /83 (BP Location: Right leg, Patient Position: Lying)   Pulse 83   Temp 98.3 °F (36.8 °C) (Oral)   Resp 16   Ht 167.6 cm (65.98\")   Wt 65.8 kg (145 lb 1 oz)   SpO2 95%   Breastfeeding No   BMI 23.43 kg/m²     Intake/Output Summary (Last 24 hours) at 3/6/2020 0938  Last data filed at 3/6/2020 0604  Gross per 24 hour   Intake 1777.4 ml   Output 1400 ml   Net 377.4 ml       CV: Regular rate and rhythm  L: normal air entry  BREAST: Bilateral mastectomy incisions are intact and healing well  Left chest wall wound with no erythema  Much     LABS:    Results from last 7 days   Lab Units 03/04/20  1202   WBC 10*3/mm3 6.27   HEMOGLOBIN g/dL 8.2*   HEMATOCRIT % 25.3*   PLATELETS 10*3/mm3 205     Results from last 7 days   Lab Units 03/05/20  1310 03/04/20  1202  03/02/20  1622   SODIUM mmol/L  --  132*   < > 127*   POTASSIUM mmol/L 3.4* 3.3*   < > 2.9*   CHLORIDE mmol/L  --  94*   < > 88*   CO2 mmol/L  --  26.0   < > 24.0   BUN mg/dL  --  8   < > 11   CREATININE mg/dL  --  0.40*   < > 0.55*   CALCIUM mg/dL  --  8.3*   < > 8.6   BILIRUBIN mg/dL  --   --   --  0.4   ALK PHOS U/L  --   --   --  66   ALT (SGPT) U/L  --   --   --  44*   AST (SGOT) U/L  --   --   --  36*   GLUCOSE mg/dL  --  265*   < > 329*    < > = values in this interval not displayed.     Results from last 7 days   Lab Units 03/05/20  1310 03/04/20  1202   SODIUM mmol/L  --  132*   POTASSIUM mmol/L 3.4* 3.3*   CHLORIDE mmol/L  --  94*   CO2 mmol/L  --  26.0   BUN mg/dL  --  8   CREATININE mg/dL  --  0.40*   GLUCOSE mg/dL  --  265*   CALCIUM mg/dL  --  8.3*     No results found for: LIPASE      Assessment/ Plan: Patient status post removal of infected Port-A-Cath with MRSA bacteremia  She is responding to IV antibiotics and wound care  Advance iodoform packing out daily  home " today  Follow-up in the office in 1 week      Problem List Items Addressed This Visit     None      Visit Diagnoses     Port or reservoir infection        Relevant Orders    Anaerobic Culture - Swab, Chest, Left    Fungus Culture - Swab, Chest, Left    Wound Culture - Wound, Chest, Left    AFB Culture - Swab, Chest, Left (Completed)            Silvio Howard MD  3/6/2020  9:38 AM

## 2020-03-06 NOTE — PROGRESS NOTES
"HEMATOLOGY/ONCOLOGY PROGRESS NOTE    Subjective      CC: MRSA sepsis due to deep line    SUBJECTIVE: MRSA sepsis due to deep line.  Feeling better        Past Medical History, Past Surgical History, Social History, Family History have been reviewed and are without significant changes except as mentioned.      Medications:  The current medication list was reviewed in the EMR    ALLERGIES:   Allergies   Allergen Reactions   • Bactrim [Sulfamethoxazole-Trimethoprim] Rash     RASH, SKIN PEELING        ROS:  A comprehensive 14 point review of systems was performed and was negative except as mentioned.      Objective      Vitals:    03/06/20 1422 03/06/20 1427 03/06/20 1431 03/06/20 1532   BP: 132/71 132/71 145/72    BP Location:       Patient Position:       Pulse: 82 85 82    Resp: 18 18 20    Temp:       TempSrc:       SpO2: 95% 95% 94%    Weight:    65.8 kg (145 lb)   Height:    167.6 cm (65.98\")        ECOG: (1) Restricted in Physically Strenuous Activity, Ambulatory & Able to Do Work of Light Nature    General: well appearing, in no acute distress  HEENT: sclerae anicteric, oropharynx clear  Lymphatics: no cervical, supraclavicular, inguinal, or axillary adenopathy  Cardiovascular: regular rate and rhythm, no murmurs  Lungs: clear to auscultation bilaterally  Abdomen: soft, nontender, nondistended.  No palpable organomegaly  Extremities: no lower extremity edema  Skin: no rashes, lesions, bruising, or petechiae  Neuro: Alert and oriented x 3. Moves all extremities.    RECENT LABS:    Results from last 7 days   Lab Units 03/04/20  1202 03/03/20  0639 03/02/20  1622   WBC 10*3/mm3 6.27 5.60 5.48   HEMOGLOBIN g/dL 8.2* 8.5* 9.2*   PLATELETS 10*3/mm3 205 146 146     Results from last 7 days   Lab Units 03/05/20  1310 03/04/20  1202 03/03/20  0639 03/02/20  1622   SODIUM mmol/L  --  132* 135* 127*   POTASSIUM mmol/L 3.4* 3.3* 3.4* 2.9*   CO2 mmol/L  --  26.0 28.0 24.0   BUN mg/dL  --  8 7* 11   CREATININE mg/dL  --  " 0.40* 0.40* 0.55*   GLUCOSE mg/dL  --  265* 98 329*     Results from last 7 days   Lab Units 03/02/20  1622   AST (SGOT) U/L 36*   ALT (SGPT) U/L 44*   BILIRUBIN mg/dL 0.4   ALK PHOS U/L 66         Xr Chest 1 View    Result Date: 3/4/2020  Chronic and emphysematous changes seen diffusely throughout the lung fields. Port-A-Catheter identified on the left with tip in the SVC. Degenerative changes seen within the spine. No evidence of pneumothorax.  D:  03/02/2020 E:  03/03/2020  This report was finalized on 3/4/2020 6:39 PM by Dr. Yolande Catalan MD.      Mri Lumbar Spine With & Without Contrast    Result Date: 3/6/2020  1. There is severe narrowing of the left neuroforamina at the L4/L5 level where there is mild anterolisthesis of L4 on L5 and moderate to severe central spinal canal stenosis. 2. Fluid identified in the disc space with enhancement identified of the endplates suggesting a discitis and osteomyelitis with moderate central spinal canal stenosis and narrowing of the neuroforamina bilaterally. No significant inflammatory changes seen in the paravertebral soft tissues.  DICTATED:   03/04/2020 EDITED/ls :   03/04/2020    This report was finalized on 3/6/2020 11:38 AM by Dr. Yolande Catalan MD.                Assessment   ASSESSMENT & PLAN:  1. 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  Negative cancer next panel  2. Significant diabetes with predating neuropathy due to spinal stenosis status post laminectomy 5/24/2019 with persistent neuropathy dorsum left foot  3. MRSA bacteremia due to port infection March 2020 after course 1 day 1 of Herceptin Taxol  Discussion: Dr. Chávez plans at least 6 weeks of IV antibiotics and I would imagine we would need to delay systemic chemotherapy at least 3 weeks if not longer depending on what Dr. Chávez tells us to avoid  MRSA induced osteomyelitis or endocarditis.  NAIMA ordered for today.  Result not available at time of rounding.                    Hero Gomez MD    3/6/2020

## 2020-03-07 ENCOUNTER — READMISSION MANAGEMENT (OUTPATIENT)
Dept: CALL CENTER | Facility: HOSPITAL | Age: 60
End: 2020-03-07

## 2020-03-07 NOTE — OUTREACH NOTE
Prep Survey      Responses   [facility] patient discharged from?  [place]   Is LACE score < 7 ?  No   Eligibility  Readm Mgmt   Discharge diagnosis  INfected venous access port, septic discitis of lumbar region, MRSA infection of venous access port, DM II, GERD, Back pain, bacteremia d/t MRSA, malignant neoplasm of female breast.   Does the patient have one of the following disease processes/diagnoses(primary or secondary)?  Sepsis   Does the patient have Home health ordered?  Yes   What is the Home health agency?   [facility]   Is there a DME ordered?  Yes   What DME was ordered?  [employer]   Comments regarding appointments  See AVS   Prep survey completed?  Yes          Hetal Pinedo RN

## 2020-03-08 LAB — BACTERIA SPEC ANAEROBE CULT: NORMAL

## 2020-03-09 ENCOUNTER — READMISSION MANAGEMENT (OUTPATIENT)
Dept: CALL CENTER | Facility: HOSPITAL | Age: 60
End: 2020-03-09

## 2020-03-09 NOTE — OUTREACH NOTE
Sepsis Week 1 Survey      Responses   [facility] patient discharged from?  [place]   Does the patient have one of the following disease processes/diagnoses(primary or secondary)?  Sepsis   Is there a successful TCM telephone encounter documented?  No   Week 1 attempt successful?  No   Unsuccessful attempts  Attempt 1          Krys Ritter, RN

## 2020-03-10 LAB
BACTERIA SPEC AEROBE CULT: NORMAL
BACTERIA SPEC AEROBE CULT: NORMAL

## 2020-03-11 ENCOUNTER — LAB (OUTPATIENT)
Dept: LAB | Facility: HOSPITAL | Age: 60
End: 2020-03-11

## 2020-03-11 ENCOUNTER — TRANSCRIBE ORDERS (OUTPATIENT)
Dept: LAB | Facility: HOSPITAL | Age: 60
End: 2020-03-11

## 2020-03-11 DIAGNOSIS — M46.36 PYOGENIC INFECTION OF LUMBAR INTERVERTEBRAL DISC (HCC): ICD-10-CM

## 2020-03-11 DIAGNOSIS — A41.02 METHICILLIN RESISTANT STAPHYLOCOCCUS AUREUS SEPTICEMIA (HCC): ICD-10-CM

## 2020-03-11 DIAGNOSIS — B95.62 CELLULITIS DUE TO MRSA: Primary | ICD-10-CM

## 2020-03-11 DIAGNOSIS — L03.90 CELLULITIS DUE TO MRSA: ICD-10-CM

## 2020-03-11 DIAGNOSIS — M46.26 OSTEOMYELITIS OF VERTEBRA OF LUMBAR REGION (HCC): ICD-10-CM

## 2020-03-11 DIAGNOSIS — L03.313 CELLULITIS OF CHEST WALL: ICD-10-CM

## 2020-03-11 DIAGNOSIS — T80.211A CATHETER-RELATED BLOODSTREAM INFECTION, INITIAL ENCOUNTER: ICD-10-CM

## 2020-03-11 DIAGNOSIS — B95.62 CELLULITIS DUE TO MRSA: ICD-10-CM

## 2020-03-11 DIAGNOSIS — L03.90 CELLULITIS DUE TO MRSA: Primary | ICD-10-CM

## 2020-03-11 LAB
ALBUMIN SERPL-MCNC: 3.5 G/DL (ref 3.5–5.2)
ALBUMIN/GLOB SERPL: 1 G/DL
ALP SERPL-CCNC: 139 U/L (ref 39–117)
ALT SERPL W P-5'-P-CCNC: 26 U/L (ref 1–33)
ANION GAP SERPL CALCULATED.3IONS-SCNC: 11 MMOL/L (ref 5–15)
AST SERPL-CCNC: 17 U/L (ref 1–32)
BASOPHILS # BLD AUTO: 0.05 10*3/MM3 (ref 0–0.2)
BASOPHILS NFR BLD AUTO: 0.4 % (ref 0–1.5)
BILIRUB SERPL-MCNC: 0.4 MG/DL (ref 0.2–1.2)
BUN BLD-MCNC: 10 MG/DL (ref 8–23)
BUN/CREAT SERPL: 20.8 (ref 7–25)
CALCIUM SPEC-SCNC: 9.1 MG/DL (ref 8.6–10.5)
CHLORIDE SERPL-SCNC: 95 MMOL/L (ref 98–107)
CK SERPL-CCNC: 42 U/L (ref 20–180)
CO2 SERPL-SCNC: 28 MMOL/L (ref 22–29)
CREAT BLD-MCNC: 0.48 MG/DL (ref 0.57–1)
CRP SERPL-MCNC: 4.58 MG/DL (ref 0–0.5)
DEPRECATED RDW RBC AUTO: 46.7 FL (ref 37–54)
EOSINOPHIL # BLD AUTO: 0.12 10*3/MM3 (ref 0–0.4)
EOSINOPHIL NFR BLD AUTO: 1 % (ref 0.3–6.2)
ERYTHROCYTE [DISTWIDTH] IN BLOOD BY AUTOMATED COUNT: 14.4 % (ref 12.3–15.4)
ERYTHROCYTE [SEDIMENTATION RATE] IN BLOOD: 55 MM/HR (ref 0–30)
GFR SERPL CREATININE-BSD FRML MDRD: 132 ML/MIN/1.73
GLOBULIN UR ELPH-MCNC: 3.4 GM/DL
GLUCOSE BLD-MCNC: 201 MG/DL (ref 65–99)
HCT VFR BLD AUTO: 29.2 % (ref 34–46.6)
HGB BLD-MCNC: 9.2 G/DL (ref 12–15.9)
IMM GRANULOCYTES # BLD AUTO: 0.1 10*3/MM3 (ref 0–0.05)
IMM GRANULOCYTES NFR BLD AUTO: 0.8 % (ref 0–0.5)
LYMPHOCYTES # BLD AUTO: 1.03 10*3/MM3 (ref 0.7–3.1)
LYMPHOCYTES NFR BLD AUTO: 8.3 % (ref 19.6–45.3)
MCH RBC QN AUTO: 28.4 PG (ref 26.6–33)
MCHC RBC AUTO-ENTMCNC: 31.5 G/DL (ref 31.5–35.7)
MCV RBC AUTO: 90.1 FL (ref 79–97)
MONOCYTES # BLD AUTO: 0.89 10*3/MM3 (ref 0.1–0.9)
MONOCYTES NFR BLD AUTO: 7.1 % (ref 5–12)
NEUTROPHILS # BLD AUTO: 10.29 10*3/MM3 (ref 1.7–7)
NEUTROPHILS NFR BLD AUTO: 82.4 % (ref 42.7–76)
NRBC BLD AUTO-RTO: 0 /100 WBC (ref 0–0.2)
PLATELET # BLD AUTO: 606 10*3/MM3 (ref 140–450)
PMV BLD AUTO: 8.9 FL (ref 6–12)
POTASSIUM BLD-SCNC: 3.9 MMOL/L (ref 3.5–5.2)
PROT SERPL-MCNC: 6.9 G/DL (ref 6–8.5)
RBC # BLD AUTO: 3.24 10*6/MM3 (ref 3.77–5.28)
SODIUM BLD-SCNC: 134 MMOL/L (ref 136–145)
WBC NRBC COR # BLD: 12.48 10*3/MM3 (ref 3.4–10.8)

## 2020-03-11 PROCEDURE — 82550 ASSAY OF CK (CPK): CPT | Performed by: INTERNAL MEDICINE

## 2020-03-11 PROCEDURE — 86140 C-REACTIVE PROTEIN: CPT

## 2020-03-11 PROCEDURE — 85652 RBC SED RATE AUTOMATED: CPT

## 2020-03-11 PROCEDURE — 80053 COMPREHEN METABOLIC PANEL: CPT

## 2020-03-11 PROCEDURE — 85025 COMPLETE CBC W/AUTO DIFF WBC: CPT

## 2020-03-11 PROCEDURE — 36415 COLL VENOUS BLD VENIPUNCTURE: CPT

## 2020-03-12 ENCOUNTER — READMISSION MANAGEMENT (OUTPATIENT)
Dept: CALL CENTER | Facility: HOSPITAL | Age: 60
End: 2020-03-12

## 2020-03-12 NOTE — OUTREACH NOTE
Sepsis Week 1 Survey      Responses   [facility] patient discharged from?  [place]   Does the patient have one of the following disease processes/diagnoses(primary or secondary)?  Sepsis   Is there a successful TCM telephone encounter documented?  No   Week 1 attempt successful?  No   Unsuccessful attempts  Attempt 2          Brionna Hollingsworth RN

## 2020-03-16 ENCOUNTER — TELEPHONE (OUTPATIENT)
Dept: ONCOLOGY | Facility: CLINIC | Age: 60
End: 2020-03-16

## 2020-03-16 NOTE — TELEPHONE ENCOUNTER
Received call from pt on triage line. She has been home from the hospital for about one week now. Pt receiving home abx infusions for MRSA. She reports that she had previously been experiencing back pain and ID had started her on gabapentin- which helped. However, about 1-2 days after starting gabapentin she developed pain across her shoulders and down her arms. I told her I would let Dr Gomez know, but in the meantime she needs to call Dr. Chávez's office and let him know because it might be a medication reaction.     Discussed with Dr Gomez. Pt cannot take ibuprofen due to current eliquis use. Called pt back and let her know he recommends she take tylenol for the pain and let Dr Chávez know about her symptoms. Pt said she was able to reach him in between our phone calls and was instructed to stop the medication.

## 2020-03-17 ENCOUNTER — READMISSION MANAGEMENT (OUTPATIENT)
Dept: CALL CENTER | Facility: HOSPITAL | Age: 60
End: 2020-03-17

## 2020-03-17 NOTE — OUTREACH NOTE
Sepsis Week 1 Survey      Responses   [facility] patient discharged from?  [place]   Does the patient have one of the following disease processes/diagnoses(primary or secondary)?  Sepsis   Is there a successful TCM telephone encounter documented?  No   Week 1 attempt successful?  No   Unsuccessful attempts  Attempt 3          Shahida Vickers RN

## 2020-03-18 ENCOUNTER — LAB (OUTPATIENT)
Dept: LAB | Facility: HOSPITAL | Age: 60
End: 2020-03-18

## 2020-03-18 ENCOUNTER — TRANSCRIBE ORDERS (OUTPATIENT)
Dept: LAB | Facility: HOSPITAL | Age: 60
End: 2020-03-18

## 2020-03-18 DIAGNOSIS — M46.26 OSTEOMYELITIS OF VERTEBRA OF LUMBAR REGION (HCC): ICD-10-CM

## 2020-03-18 DIAGNOSIS — L03.313 CELLULITIS OF CHEST WALL: ICD-10-CM

## 2020-03-18 DIAGNOSIS — B95.62 CELLULITIS DUE TO MRSA: Primary | ICD-10-CM

## 2020-03-18 DIAGNOSIS — L03.90 CELLULITIS DUE TO MRSA: ICD-10-CM

## 2020-03-18 DIAGNOSIS — T80.211A CATHETER-RELATED BLOODSTREAM INFECTION, INITIAL ENCOUNTER: ICD-10-CM

## 2020-03-18 DIAGNOSIS — A41.02 METHICILLIN RESISTANT STAPHYLOCOCCUS AUREUS SEPTICEMIA (HCC): ICD-10-CM

## 2020-03-18 DIAGNOSIS — L03.90 CELLULITIS DUE TO MRSA: Primary | ICD-10-CM

## 2020-03-18 DIAGNOSIS — B95.62 CELLULITIS DUE TO MRSA: ICD-10-CM

## 2020-03-18 LAB
ALBUMIN SERPL-MCNC: 3.8 G/DL (ref 3.5–5.2)
ALBUMIN/GLOB SERPL: 1 G/DL
ALP SERPL-CCNC: 116 U/L (ref 39–117)
ALT SERPL W P-5'-P-CCNC: 174 U/L (ref 1–33)
ANION GAP SERPL CALCULATED.3IONS-SCNC: 17 MMOL/L (ref 5–15)
AST SERPL-CCNC: 461 U/L (ref 1–32)
BASOPHILS # BLD AUTO: 0.09 10*3/MM3 (ref 0–0.2)
BASOPHILS NFR BLD AUTO: 0.9 % (ref 0–1.5)
BILIRUB SERPL-MCNC: 0.3 MG/DL (ref 0.2–1.2)
BUN BLD-MCNC: 11 MG/DL (ref 8–23)
BUN/CREAT SERPL: 26.2 (ref 7–25)
CALCIUM SPEC-SCNC: 9.3 MG/DL (ref 8.6–10.5)
CHLORIDE SERPL-SCNC: 92 MMOL/L (ref 98–107)
CK SERPL-CCNC: ABNORMAL U/L (ref 20–180)
CO2 SERPL-SCNC: 24 MMOL/L (ref 22–29)
CREAT BLD-MCNC: 0.42 MG/DL (ref 0.57–1)
CRP SERPL-MCNC: 2.87 MG/DL (ref 0–0.5)
DEPRECATED RDW RBC AUTO: 44.8 FL (ref 37–54)
EOSINOPHIL # BLD AUTO: 0.2 10*3/MM3 (ref 0–0.4)
EOSINOPHIL NFR BLD AUTO: 2 % (ref 0.3–6.2)
ERYTHROCYTE [DISTWIDTH] IN BLOOD BY AUTOMATED COUNT: 13.9 % (ref 12.3–15.4)
ERYTHROCYTE [SEDIMENTATION RATE] IN BLOOD: 85 MM/HR (ref 0–30)
GFR SERPL CREATININE-BSD FRML MDRD: >150 ML/MIN/1.73
GLOBULIN UR ELPH-MCNC: 3.8 GM/DL
GLUCOSE BLD-MCNC: 286 MG/DL (ref 65–99)
HCT VFR BLD AUTO: 34.4 % (ref 34–46.6)
HGB BLD-MCNC: 10.5 G/DL (ref 12–15.9)
IMM GRANULOCYTES # BLD AUTO: 0.05 10*3/MM3 (ref 0–0.05)
IMM GRANULOCYTES NFR BLD AUTO: 0.5 % (ref 0–0.5)
LYMPHOCYTES # BLD AUTO: 0.92 10*3/MM3 (ref 0.7–3.1)
LYMPHOCYTES NFR BLD AUTO: 9.4 % (ref 19.6–45.3)
MCH RBC QN AUTO: 27.1 PG (ref 26.6–33)
MCHC RBC AUTO-ENTMCNC: 30.5 G/DL (ref 31.5–35.7)
MCV RBC AUTO: 88.9 FL (ref 79–97)
MONOCYTES # BLD AUTO: 0.8 10*3/MM3 (ref 0.1–0.9)
MONOCYTES NFR BLD AUTO: 8.2 % (ref 5–12)
NEUTROPHILS # BLD AUTO: 7.74 10*3/MM3 (ref 1.7–7)
NEUTROPHILS NFR BLD AUTO: 79 % (ref 42.7–76)
NRBC BLD AUTO-RTO: 0 /100 WBC (ref 0–0.2)
PLATELET # BLD AUTO: 662 10*3/MM3 (ref 140–450)
PMV BLD AUTO: 8.6 FL (ref 6–12)
POTASSIUM BLD-SCNC: 4.1 MMOL/L (ref 3.5–5.2)
PROT SERPL-MCNC: 7.6 G/DL (ref 6–8.5)
RBC # BLD AUTO: 3.87 10*6/MM3 (ref 3.77–5.28)
SODIUM BLD-SCNC: 133 MMOL/L (ref 136–145)
WBC NRBC COR # BLD: 9.8 10*3/MM3 (ref 3.4–10.8)

## 2020-03-18 PROCEDURE — 85652 RBC SED RATE AUTOMATED: CPT

## 2020-03-18 PROCEDURE — 85025 COMPLETE CBC W/AUTO DIFF WBC: CPT

## 2020-03-18 PROCEDURE — 86140 C-REACTIVE PROTEIN: CPT

## 2020-03-18 PROCEDURE — 80053 COMPREHEN METABOLIC PANEL: CPT

## 2020-03-18 PROCEDURE — 82550 ASSAY OF CK (CPK): CPT | Performed by: INTERNAL MEDICINE

## 2020-03-18 PROCEDURE — 36415 COLL VENOUS BLD VENIPUNCTURE: CPT

## 2020-03-23 ENCOUNTER — TELEPHONE (OUTPATIENT)
Dept: ONCOLOGY | Facility: CLINIC | Age: 60
End: 2020-03-23

## 2020-03-23 ENCOUNTER — TRANSCRIBE ORDERS (OUTPATIENT)
Dept: LAB | Facility: HOSPITAL | Age: 60
End: 2020-03-23

## 2020-03-23 ENCOUNTER — LAB (OUTPATIENT)
Dept: LAB | Facility: HOSPITAL | Age: 60
End: 2020-03-23

## 2020-03-23 DIAGNOSIS — T80.211A CATHETER-RELATED BLOODSTREAM INFECTION, INITIAL ENCOUNTER: ICD-10-CM

## 2020-03-23 DIAGNOSIS — A41.02 METHICILLIN RESISTANT STAPHYLOCOCCUS AUREUS SEPTICEMIA (HCC): ICD-10-CM

## 2020-03-23 DIAGNOSIS — L03.90 CELLULITIS DUE TO MRSA: ICD-10-CM

## 2020-03-23 DIAGNOSIS — B95.62 CELLULITIS DUE TO MRSA: Primary | ICD-10-CM

## 2020-03-23 DIAGNOSIS — M46.26 OSTEOMYELITIS OF VERTEBRA OF LUMBAR REGION (HCC): ICD-10-CM

## 2020-03-23 DIAGNOSIS — L03.90 CELLULITIS DUE TO MRSA: Primary | ICD-10-CM

## 2020-03-23 DIAGNOSIS — M62.82 NON-TRAUMATIC RHABDOMYOLYSIS: Primary | ICD-10-CM

## 2020-03-23 DIAGNOSIS — B95.62 CELLULITIS DUE TO MRSA: ICD-10-CM

## 2020-03-23 DIAGNOSIS — M46.36 PYOGENIC INFECTION OF LUMBAR INTERVERTEBRAL DISC (HCC): ICD-10-CM

## 2020-03-23 LAB
ALBUMIN SERPL-MCNC: 4 G/DL (ref 3.5–5.2)
ALBUMIN/GLOB SERPL: 1.1 G/DL
ALP SERPL-CCNC: 89 U/L (ref 39–117)
ALT SERPL W P-5'-P-CCNC: 139 U/L (ref 1–33)
ANION GAP SERPL CALCULATED.3IONS-SCNC: 16 MMOL/L (ref 5–15)
AST SERPL-CCNC: 55 U/L (ref 1–32)
BASOPHILS # BLD AUTO: 0.07 10*3/MM3 (ref 0–0.2)
BASOPHILS NFR BLD AUTO: 1.3 % (ref 0–1.5)
BILIRUB SERPL-MCNC: 0.3 MG/DL (ref 0.2–1.2)
BUN BLD-MCNC: 10 MG/DL (ref 8–23)
BUN/CREAT SERPL: 19.2 (ref 7–25)
CALCIUM SPEC-SCNC: 9.6 MG/DL (ref 8.6–10.5)
CHLORIDE SERPL-SCNC: 96 MMOL/L (ref 98–107)
CK SERPL-CCNC: 521 U/L (ref 20–180)
CO2 SERPL-SCNC: 25 MMOL/L (ref 22–29)
CREAT BLD-MCNC: 0.52 MG/DL (ref 0.57–1)
CRP SERPL-MCNC: 0.49 MG/DL (ref 0–0.5)
DEPRECATED RDW RBC AUTO: 43.2 FL (ref 37–54)
EOSINOPHIL # BLD AUTO: 0.47 10*3/MM3 (ref 0–0.4)
EOSINOPHIL NFR BLD AUTO: 8.4 % (ref 0.3–6.2)
ERYTHROCYTE [DISTWIDTH] IN BLOOD BY AUTOMATED COUNT: 13.3 % (ref 12.3–15.4)
ERYTHROCYTE [SEDIMENTATION RATE] IN BLOOD: 46 MM/HR (ref 0–30)
GFR SERPL CREATININE-BSD FRML MDRD: 120 ML/MIN/1.73
GLOBULIN UR ELPH-MCNC: 3.5 GM/DL
GLUCOSE BLD-MCNC: 213 MG/DL (ref 65–99)
HCT VFR BLD AUTO: 32.7 % (ref 34–46.6)
HGB BLD-MCNC: 10 G/DL (ref 12–15.9)
IMM GRANULOCYTES # BLD AUTO: 0.02 10*3/MM3 (ref 0–0.05)
IMM GRANULOCYTES NFR BLD AUTO: 0.4 % (ref 0–0.5)
LYMPHOCYTES # BLD AUTO: 0.86 10*3/MM3 (ref 0.7–3.1)
LYMPHOCYTES NFR BLD AUTO: 15.4 % (ref 19.6–45.3)
MCH RBC QN AUTO: 26.8 PG (ref 26.6–33)
MCHC RBC AUTO-ENTMCNC: 30.6 G/DL (ref 31.5–35.7)
MCV RBC AUTO: 87.7 FL (ref 79–97)
MONOCYTES # BLD AUTO: 0.95 10*3/MM3 (ref 0.1–0.9)
MONOCYTES NFR BLD AUTO: 17 % (ref 5–12)
NEUTROPHILS # BLD AUTO: 3.22 10*3/MM3 (ref 1.7–7)
NEUTROPHILS NFR BLD AUTO: 57.5 % (ref 42.7–76)
NRBC BLD AUTO-RTO: 0 /100 WBC (ref 0–0.2)
PLATELET # BLD AUTO: 406 10*3/MM3 (ref 140–450)
PMV BLD AUTO: 8.7 FL (ref 6–12)
POTASSIUM BLD-SCNC: 4.3 MMOL/L (ref 3.5–5.2)
PROT SERPL-MCNC: 7.5 G/DL (ref 6–8.5)
RBC # BLD AUTO: 3.73 10*6/MM3 (ref 3.77–5.28)
SODIUM BLD-SCNC: 137 MMOL/L (ref 136–145)
WBC NRBC COR # BLD: 5.59 10*3/MM3 (ref 3.4–10.8)

## 2020-03-23 PROCEDURE — 86140 C-REACTIVE PROTEIN: CPT

## 2020-03-23 PROCEDURE — 85652 RBC SED RATE AUTOMATED: CPT

## 2020-03-23 PROCEDURE — 85025 COMPLETE CBC W/AUTO DIFF WBC: CPT

## 2020-03-23 PROCEDURE — 36415 COLL VENOUS BLD VENIPUNCTURE: CPT

## 2020-03-23 PROCEDURE — 80053 COMPREHEN METABOLIC PANEL: CPT

## 2020-03-23 PROCEDURE — 82550 ASSAY OF CK (CPK): CPT | Performed by: INTERNAL MEDICINE

## 2020-03-23 NOTE — TELEPHONE ENCOUNTER
Pre-visit screening: patient voiced no fever, chills or cough and has not been anywhere or around anyone positive for COVID-19.   Also, notified patient of no visitors. Patient voiced verbal understanding.

## 2020-03-25 ENCOUNTER — READMISSION MANAGEMENT (OUTPATIENT)
Dept: CALL CENTER | Facility: HOSPITAL | Age: 60
End: 2020-03-25

## 2020-03-25 NOTE — OUTREACH NOTE
Sepsis Week 2 Survey      Responses   [facility] patient discharged from?  [place]   Does the patient have one of the following disease processes/diagnoses(primary or secondary)?  Sepsis   Week 2 attempt successful?  Yes   Call start time  1037   Call end time  1048   Discharge diagnosis  Infected venous access port, septic discitis of lumbar region, MRSA infection of venous access port, DM II, GERD, Back pain, bacteremia d/t MRSA, malignant neoplasm of female breast.   Is patient permission given to speak with other caregiver?  Yes   List who call center can speak with  dee dee Mcmillan   Person spoke with today (if not patient) and relationship  Hipolito, dee dee   Meds reviewed with patient/caregiver?  Yes   Is the patient having any side effects they believe may be caused by any medication additions or changes?  Yes   Side effects comments   Itching from antibiotic   Does the patient have all medications related to this admission filled (includes all antibiotics, inhalers, nebulizers,steroids,etc.)  Yes   Is the patient taking all medications as directed (includes completed medication regime)?  Yes   Does the patient have a primary care provider?   Yes   Comments regarding PCP  Bree Lantigua APRN   Has the patient kept scheduled appointments due by today?  No   What is preventing the patient from keeping their appointments?  -- [Appts cancelled due to prevention of COVID-19 exposure. ]   Nursing Interventions  -- [Advised to set up My Chart and to stay in contact with physicians. ]   What is the Home health agency?   [facility]   Has home health visited the patient within 72 hours of discharge?  Yes   What DME was ordered?  [employer]   Has all DME been delivered?  Yes   Psychosocial issues?  No   Did the patient receive a copy of their discharge instructions?  Yes   Nursing interventions  Reviewed instructions with patient [son]   What is the patient's perception of their health status since discharge?  Improving    Is the patient/caregiver able to teach back Sepsis?  S - Shivering,fever or very cold, E - Extreme pain or generalized discomfort (worst ever,especially abdomen)   Nursing interventions  Nurse provided patient education   Is patient/caregiver able to teach back steps to recovery at home?  Rest and regain strength, Set small, achievable goals for return to baseline health   Is the patient/caregiver able to teach back signs and symptoms of worsening condition:  Altered mental status(confusion/coma), Fever   Is the patient/caregiver able to teach back the hierarchy of who to call/visit for symptoms/problems? PCP, Specialist, Home health nurse, Urgent Care, ED, 911  Yes   Week 2 call completed?  Yes          Hetal Betancur RN

## 2020-03-30 ENCOUNTER — LAB (OUTPATIENT)
Dept: LAB | Facility: HOSPITAL | Age: 60
End: 2020-03-30

## 2020-03-30 ENCOUNTER — TRANSCRIBE ORDERS (OUTPATIENT)
Dept: LAB | Facility: HOSPITAL | Age: 60
End: 2020-03-30

## 2020-03-30 DIAGNOSIS — M46.36 INFECTION OF INTERVERTEBRAL DISC (PYOGENIC), LUMBAR REGION (HCC): ICD-10-CM

## 2020-03-30 DIAGNOSIS — M46.26 OSTEOMYELITIS OF VERTEBRA OF LUMBAR REGION (HCC): ICD-10-CM

## 2020-03-30 DIAGNOSIS — T80.211A BLOODSTREAM INFECTION ASSOCIATED WITH CENTRAL VENOUS CATHETER, INITIAL ENCOUNTER: ICD-10-CM

## 2020-03-30 DIAGNOSIS — L03.313 CELLULITIS OF CHEST WALL: ICD-10-CM

## 2020-03-30 DIAGNOSIS — A49.02 MRSA INFECTION: Primary | ICD-10-CM

## 2020-03-30 DIAGNOSIS — A49.02 MRSA INFECTION: ICD-10-CM

## 2020-03-30 LAB
ALBUMIN SERPL-MCNC: 4.2 G/DL (ref 3.5–5.2)
ALBUMIN/GLOB SERPL: 1.4 G/DL
ALP SERPL-CCNC: 83 U/L (ref 39–117)
ALT SERPL W P-5'-P-CCNC: 25 U/L (ref 1–33)
ANION GAP SERPL CALCULATED.3IONS-SCNC: 18 MMOL/L (ref 5–15)
AST SERPL-CCNC: 16 U/L (ref 1–32)
BASOPHILS # BLD MANUAL: 0.06 10*3/MM3 (ref 0–0.2)
BASOPHILS NFR BLD AUTO: 2 % (ref 0–1.5)
BILIRUB SERPL-MCNC: 0.3 MG/DL (ref 0.2–1.2)
BUN BLD-MCNC: 17 MG/DL (ref 8–23)
BUN/CREAT SERPL: 31.5 (ref 7–25)
CALCIUM SPEC-SCNC: 9.4 MG/DL (ref 8.6–10.5)
CHLORIDE SERPL-SCNC: 94 MMOL/L (ref 98–107)
CK SERPL-CCNC: 41 U/L (ref 20–180)
CO2 SERPL-SCNC: 24 MMOL/L (ref 22–29)
CREAT BLD-MCNC: 0.54 MG/DL (ref 0.57–1)
CRP SERPL-MCNC: 1.87 MG/DL (ref 0–0.5)
DEPRECATED RDW RBC AUTO: 41.3 FL (ref 37–54)
EOSINOPHIL # BLD MANUAL: 1.13 10*3/MM3 (ref 0–0.4)
EOSINOPHIL NFR BLD MANUAL: 36 % (ref 0.3–6.2)
ERYTHROCYTE [DISTWIDTH] IN BLOOD BY AUTOMATED COUNT: 13.2 % (ref 12.3–15.4)
ERYTHROCYTE [SEDIMENTATION RATE] IN BLOOD: 40 MM/HR (ref 0–30)
GFR SERPL CREATININE-BSD FRML MDRD: 115 ML/MIN/1.73
GLOBULIN UR ELPH-MCNC: 2.9 GM/DL
GLUCOSE BLD-MCNC: 311 MG/DL (ref 65–99)
HCT VFR BLD AUTO: 32.6 % (ref 34–46.6)
HGB BLD-MCNC: 10.2 G/DL (ref 12–15.9)
LYMPHOCYTES # BLD MANUAL: 0.69 10*3/MM3 (ref 0.7–3.1)
LYMPHOCYTES NFR BLD MANUAL: 22 % (ref 19.6–45.3)
LYMPHOCYTES NFR BLD MANUAL: 38 % (ref 5–12)
MCH RBC QN AUTO: 26.9 PG (ref 26.6–33)
MCHC RBC AUTO-ENTMCNC: 31.3 G/DL (ref 31.5–35.7)
MCV RBC AUTO: 86 FL (ref 79–97)
MONOCYTES # BLD AUTO: 1.19 10*3/MM3 (ref 0.1–0.9)
NEUTROPHILS # BLD AUTO: 0.06 10*3/MM3 (ref 1.7–7)
NEUTROPHILS NFR BLD MANUAL: 2 % (ref 42.7–76)
PLAT MORPH BLD: NORMAL
PLATELET # BLD AUTO: 436 10*3/MM3 (ref 140–450)
PMV BLD AUTO: 8.9 FL (ref 6–12)
POTASSIUM BLD-SCNC: 4.2 MMOL/L (ref 3.5–5.2)
PROT SERPL-MCNC: 7.1 G/DL (ref 6–8.5)
RBC # BLD AUTO: 3.79 10*6/MM3 (ref 3.77–5.28)
RBC MORPH BLD: NORMAL
SODIUM BLD-SCNC: 136 MMOL/L (ref 136–145)
WBC MORPH BLD: NORMAL
WBC NRBC COR # BLD: 3.14 10*3/MM3 (ref 3.4–10.8)

## 2020-03-30 PROCEDURE — 80053 COMPREHEN METABOLIC PANEL: CPT

## 2020-03-30 PROCEDURE — 85007 BL SMEAR W/DIFF WBC COUNT: CPT

## 2020-03-30 PROCEDURE — 85025 COMPLETE CBC W/AUTO DIFF WBC: CPT

## 2020-03-30 PROCEDURE — 86140 C-REACTIVE PROTEIN: CPT

## 2020-03-30 PROCEDURE — 82550 ASSAY OF CK (CPK): CPT

## 2020-03-30 PROCEDURE — 36415 COLL VENOUS BLD VENIPUNCTURE: CPT

## 2020-03-30 PROCEDURE — 85652 RBC SED RATE AUTOMATED: CPT

## 2020-03-31 ENCOUNTER — TELEMEDICINE (OUTPATIENT)
Dept: ONCOLOGY | Facility: CLINIC | Age: 60
End: 2020-03-31

## 2020-03-31 DIAGNOSIS — Z51.11 ENCOUNTER FOR ANTINEOPLASTIC CHEMOTHERAPY: ICD-10-CM

## 2020-03-31 DIAGNOSIS — Z17.0 MALIGNANT NEOPLASM OF UPPER-OUTER QUADRANT OF RIGHT BREAST IN FEMALE, ESTROGEN RECEPTOR POSITIVE (HCC): Primary | ICD-10-CM

## 2020-03-31 DIAGNOSIS — I82.90 THROMBUS: ICD-10-CM

## 2020-03-31 DIAGNOSIS — C50.411 MALIGNANT NEOPLASM OF UPPER-OUTER QUADRANT OF RIGHT BREAST IN FEMALE, ESTROGEN RECEPTOR POSITIVE (HCC): Primary | ICD-10-CM

## 2020-03-31 PROCEDURE — 99215 OFFICE O/P EST HI 40 MIN: CPT | Performed by: INTERNAL MEDICINE

## 2020-03-31 RX ORDER — ANASTROZOLE 1 MG/1
1 TABLET ORAL DAILY
Qty: 30 TABLET | Refills: 11 | Status: SHIPPED | OUTPATIENT
Start: 2020-03-31 | End: 2021-03-29

## 2020-03-31 RX ORDER — SODIUM CHLORIDE 9 MG/ML
250 INJECTION, SOLUTION INTRAVENOUS ONCE
Status: CANCELLED | OUTPATIENT
Start: 2020-05-14

## 2020-03-31 RX ORDER — SODIUM CHLORIDE 9 MG/ML
250 INJECTION, SOLUTION INTRAVENOUS ONCE
Status: CANCELLED | OUTPATIENT
Start: 2020-04-02

## 2020-03-31 RX ORDER — SODIUM CHLORIDE 9 MG/ML
250 INJECTION, SOLUTION INTRAVENOUS ONCE
Status: CANCELLED | OUTPATIENT
Start: 2020-04-23

## 2020-03-31 NOTE — PROGRESS NOTES
Telehealth visit:  Attempted video connection. Failed.  Converted to telephone visit.    CHIEF COMPLAINT: Adj rx breast ca cx by MRSA port infection    Problem List:  Oncology/Hematology History    1. 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  ER weakly 5% 1+ positive, OK 50% 1-2+ positive, HER-2/ashley 100% 3+ positive.  Negative cancer next panel  2. Significant diabetes with predating neuropathy due to spinal stenosis status post laminectomy 5/24/2019 with persistent neuropathy dorsum left foot  3. MRSA bacteremia due to port infection March 2020 after course 1 day 1 of Herceptin Taxol    Oncology history timeline:  -5/24/2019 Dr. Garcia saw for spinal stenosis with radiculopathy and left foot drop due to herniated disc and performed decompressive laminectomy, L4-5 and L5-S1 discectomy and medial facetectomy  -10/15/2019 mammogram BI-RADS 0  -11/22/2019 right mammogram/ultrasound BI-RADS 4 with ultrasound-guided core biopsy showing invasive ductal carcinoma Thelma score 6 out of 9 grade 2, ER 5% 1+ positive OK 50% 1-2+ positive, HER-2/ashley 100% 3+ positive.  -12/13/2019 MRI breasts revealed 2.2 cm right breast mass consistent with biopsy proven cancer with unsuspected adjacent area's of non-mass enhancement worrisome for DCIS.  Non-mass enhancement in the subareolar left breast worrisome for DCIS.  Dominant focus left central portion left breast indeterminate BI-RADS 4.  Cancer next panel negative  -1/15/2020 CMP unremarkable save for glucose 200 with hemoglobin 10.6 normochromic normocytic indices  -1/21/2020 right skin sparing mastectomy with right sentinel lymph node injection and right deep subfascial sentinel lymph node biopsy with contralateral prophylactic left skin sparing mastectomy.  Right breast 1.8 cm Bloom Dias 8 out of 9, total of 4 lymph  nodes examined 2 sentinel nodes all negative.  Pathological T1 cN0 M0 stage Ia.  Left breast benign with sclerosing adenosis.  -2/26/2020 started Herceptin Taxol weekly  -3/2/2020 port removed due to MRSA bacteremia with port infection/purulence.  -3/2/2020 through 3/6/2020 hospitalized for MRSA bacteremia from port.        Malignant neoplasm of upper-outer quadrant of right female breast (CMS/HCC)    1/16/2019 Cancer Staged     Staging form: Breast, AJCC 8th Edition  - Clinical stage from 1/16/2019: Stage IB (cT2, cN0, cM0, G2, ER+, DE+, HER2+) - Signed by Lisa Herman MD on 1/27/2020 1/21/2020 Initial Diagnosis     Malignant neoplasm of upper-outer quadrant of right female breast (CMS/HCC)      2/11/2020 Cancer Staged     Staging form: Breast, AJCC 8th Edition  - Pathologic: Stage IA (pT1c, pN0, cM0, G3, ER+, DE+, HER2+) - Signed by Hero Gomez MD on 2/11/2020 2/18/2020 -  Other Event     Echocardiogram  · Left ventricular systolic function is normal. Estimated EF = 55%.  · The global longitudinal strain was -19.5%.      2/25/2020 -  Chemotherapy     OP CENTRAL VENOUS ACCESS DEVICE ACCESS, CARE, AND MAINTENANCE (CVAD)      2/26/2020 -  Chemotherapy     OP BREAST PACLitaxel / Trastuzumab-anns (Weekly X 12)      3/2/2020 Adverse Reaction     MRSA bacteremia due to port infection with port removal after day 1 course 1 Herceptin Taxol      5/20/2020 -  Chemotherapy     OP BREAST Trastuzumab-anns Q21D (maintenance)         HISTORY OF PRESENT ILLNESS:  The patient is a 59 y.o. female, here for follow up on management of had MRSA port infection after C1 taxol kanjinit weekly dose.  Now recovering but will require 3 more weeks of IV atbtx and a few months of oral ATBTX.  Anc still low with WBC nearing normal with increased eos and monos. No Covid sx.  PE found on work up of possible endocarditis. Now on eliquis.      Past Medical History:   Diagnosis Date   • Diabetes mellitus (CMS/HCC)    •  Diverticulitis    • GERD (gastroesophageal reflux disease)    • Kidney stone    • Malignant neoplasm of upper-outer quadrant of right female breast (CMS/HCC) 1/21/2020   • Sciatica    • Wears contact lenses      Past Surgical History:   Procedure Laterality Date   • APPENDECTOMY     • BREAST BIOPSY Right    • BREAST EXCISIONAL BIOPSY Right    • CHOLECYSTECTOMY     • COLON SURGERY     • COLONOSCOPY     • HYSTERECTOMY     • LUMBAR LAMINECTOMY DISCECTOMY DECOMPRESSION N/A 5/24/2019    Procedure: LUMBAR LAMINECTOMY L4-5 AND L5-S1;  Surgeon: Hipolito Kahn MD;  Location:  [facility];  Service: Orthopedic Spine   • MASTECTOMY W/ SENTINEL NODE BIOPSY Bilateral 1/21/2020    Procedure: SKIN SPARING MASTECTOMIES BILATERAL, SENTINEL NODE BIOPSY RIGHT;  Surgeon: Silvio Howard MD;  Location:  [facility];  Service: General   • VENOUS ACCESS DEVICE (PORT) REMOVAL N/A 3/3/2020    Procedure: REMOVAL PORT;  Surgeon: Silvio Howard MD;  Location:  [facility];  Service: General;  Laterality: N/A;       Allergies   Allergen Reactions   • Bactrim [Sulfamethoxazole-Trimethoprim] Rash     RASH, SKIN PEELING        Family History and Social History reviewed and changed as necessary      REVIEW OF SYSTEM:   Review of Systems   Constitutional: Negative for appetite change, chills, diaphoresis, fatigue, fever and unexpected weight change.   HENT:   Negative for mouth sores, sore throat and trouble swallowing.    Eyes: Negative for icterus.   Respiratory: Negative for cough, hemoptysis and shortness of breath.    Cardiovascular: Negative for chest pain, leg swelling and palpitations.   Gastrointestinal: Negative for abdominal distention, abdominal pain, blood in stool, constipation, diarrhea, nausea and vomiting.   Endocrine: Negative for hot flashes.   Genitourinary: Negative for bladder incontinence, difficulty urinating, dysuria, frequency and hematuria.    Musculoskeletal: Negative for gait problem, neck pain and neck stiffness.    Skin: Negative for rash.   Neurological: Negative for dizziness, gait problem, headaches, light-headedness and numbness.   Hematological: Negative for adenopathy. Does not bruise/bleed easily.   Psychiatric/Behavioral: Negative for depression. The patient is not nervous/anxious.    All other systems reviewed and are negative.       PHYSICAL EXAM    There were no vitals filed for this visit.  There were no vitals filed for this visit.     Constitutional: Appears well-developed and well-nourished. No distress.   ECOG: (1) Restricted in Physically Strenuous Activity, Ambulatory & Able to Do Work of Light Nature        Lab Results   Component Value Date    HGB 10.2 (L) 03/30/2020    HCT 32.6 (L) 03/30/2020    MCV 86.0 03/30/2020     03/30/2020    WBC 3.14 (L) 03/30/2020    NEUTROABS 0.06 (L) 03/30/2020    LYMPHSABS 0.86 03/23/2020    MONOSABS 0.95 (H) 03/23/2020    EOSABS 1.13 (H) 03/30/2020    BASOSABS 0.06 03/30/2020       Lab Results   Component Value Date    GLUCOSE 311 (H) 03/30/2020    BUN 17 03/30/2020    CREATININE 0.54 (L) 03/30/2020     03/30/2020    K 4.2 03/30/2020    CL 94 (L) 03/30/2020    CO2 24.0 03/30/2020    CALCIUM 9.4 03/30/2020    PROTEINTOT 7.1 03/30/2020    ALBUMIN 4.20 03/30/2020    BILITOT 0.3 03/30/2020    ALKPHOS 83 03/30/2020    AST 16 03/30/2020    ALT 25 03/30/2020                   ASSESSMENT & PLAN:    1. Adjuvant Rx her2 positive weakly HR positive Stage IA   2. MRSA complicating port after C1 Week1  Taxol herceptin. NAIMA showed no vegetation.  3. PE on eliquis  4. COVID pandemic without known exposure or sx.    Discussion: With protracted MRSA cx still requiring weeks of antbtx and still with low ANC Go withKanjinti alone every 3 weeks starting this week along with Arimidex I ordered. Check EF PTR in June visit. On eliquis with PE.  Discussed with patient 9:30 to 10:30 for converted video visit to phone visit.       Hero Gomez MD    03/31/2020

## 2020-04-01 ENCOUNTER — READMISSION MANAGEMENT (OUTPATIENT)
Dept: CALL CENTER | Facility: HOSPITAL | Age: 60
End: 2020-04-01

## 2020-04-01 NOTE — OUTREACH NOTE
Sepsis Week 3 Survey      Responses   [facility] patient discharged from?  [place]   Does the patient have one of the following disease processes/diagnoses(primary or secondary)?  Sepsis   Week 3 attempt successful?  No   Unsuccessful attempts  Attempt 1          Genny Kern RN

## 2020-04-02 ENCOUNTER — INFUSION (OUTPATIENT)
Dept: ONCOLOGY | Facility: HOSPITAL | Age: 60
End: 2020-04-02

## 2020-04-02 VITALS
RESPIRATION RATE: 16 BRPM | BODY MASS INDEX: 22.35 KG/M2 | SYSTOLIC BLOOD PRESSURE: 155 MMHG | HEART RATE: 99 BPM | DIASTOLIC BLOOD PRESSURE: 69 MMHG | TEMPERATURE: 98.7 F | WEIGHT: 138.4 LBS

## 2020-04-02 DIAGNOSIS — C50.411 MALIGNANT NEOPLASM OF UPPER-OUTER QUADRANT OF RIGHT BREAST IN FEMALE, ESTROGEN RECEPTOR POSITIVE (HCC): Primary | ICD-10-CM

## 2020-04-02 DIAGNOSIS — Z17.0 MALIGNANT NEOPLASM OF UPPER-OUTER QUADRANT OF RIGHT BREAST IN FEMALE, ESTROGEN RECEPTOR POSITIVE (HCC): Primary | ICD-10-CM

## 2020-04-02 PROCEDURE — 96413 CHEMO IV INFUSION 1 HR: CPT

## 2020-04-02 PROCEDURE — 96415 CHEMO IV INFUSION ADDL HR: CPT

## 2020-04-02 PROCEDURE — 25010000002 TRASTUZUMAB-ANNS 420 MG RECONSTITUTED SOLUTION 1 EACH VIAL: Performed by: NURSE PRACTITIONER

## 2020-04-02 PROCEDURE — G0463 HOSPITAL OUTPT CLINIC VISIT: HCPCS

## 2020-04-02 RX ORDER — SODIUM CHLORIDE 9 MG/ML
250 INJECTION, SOLUTION INTRAVENOUS ONCE
Status: COMPLETED | OUTPATIENT
Start: 2020-04-02 | End: 2020-04-02

## 2020-04-02 RX ADMIN — TRASTUZUMAB-ANNS 520 MG: 420 INJECTION, POWDER, LYOPHILIZED, FOR SOLUTION INTRAVENOUS at 13:55

## 2020-04-02 RX ADMIN — SODIUM CHLORIDE 250 ML: 9 INJECTION, SOLUTION INTRAVENOUS at 13:53

## 2020-04-06 ENCOUNTER — READMISSION MANAGEMENT (OUTPATIENT)
Dept: CALL CENTER | Facility: HOSPITAL | Age: 60
End: 2020-04-06

## 2020-04-06 ENCOUNTER — TRANSCRIBE ORDERS (OUTPATIENT)
Dept: LAB | Facility: HOSPITAL | Age: 60
End: 2020-04-06

## 2020-04-06 ENCOUNTER — LAB (OUTPATIENT)
Dept: LAB | Facility: HOSPITAL | Age: 60
End: 2020-04-06

## 2020-04-06 DIAGNOSIS — A41.02 METHICILLIN RESISTANT STAPHYLOCOCCUS AUREUS SEPTICEMIA (HCC): ICD-10-CM

## 2020-04-06 DIAGNOSIS — B95.62 CELLULITIS DUE TO MRSA: ICD-10-CM

## 2020-04-06 DIAGNOSIS — M46.36 PYOGENIC INFECTION OF LUMBAR INTERVERTEBRAL DISC (HCC): ICD-10-CM

## 2020-04-06 DIAGNOSIS — L03.90 CELLULITIS DUE TO MRSA: ICD-10-CM

## 2020-04-06 DIAGNOSIS — L03.313 CELLULITIS OF CHEST WALL: ICD-10-CM

## 2020-04-06 DIAGNOSIS — T80.211A CATHETER-RELATED BLOODSTREAM INFECTION, INITIAL ENCOUNTER: ICD-10-CM

## 2020-04-06 DIAGNOSIS — L03.313 CELLULITIS OF CHEST WALL: Primary | ICD-10-CM

## 2020-04-06 LAB
ALBUMIN SERPL-MCNC: 4.5 G/DL (ref 3.5–5.2)
ALBUMIN/GLOB SERPL: 1.8 G/DL
ALP SERPL-CCNC: 76 U/L (ref 39–117)
ALT SERPL W P-5'-P-CCNC: 23 U/L (ref 1–33)
ANION GAP SERPL CALCULATED.3IONS-SCNC: 17 MMOL/L (ref 5–15)
AST SERPL-CCNC: 21 U/L (ref 1–32)
BASOPHILS # BLD AUTO: 0.06 10*3/MM3 (ref 0–0.2)
BASOPHILS NFR BLD AUTO: 1.2 % (ref 0–1.5)
BILIRUB SERPL-MCNC: 0.4 MG/DL (ref 0.2–1.2)
BUN BLD-MCNC: 18 MG/DL (ref 8–23)
BUN/CREAT SERPL: 31 (ref 7–25)
CALCIUM SPEC-SCNC: 9.4 MG/DL (ref 8.6–10.5)
CHLORIDE SERPL-SCNC: 96 MMOL/L (ref 98–107)
CK SERPL-CCNC: 51 U/L (ref 20–180)
CO2 SERPL-SCNC: 24 MMOL/L (ref 22–29)
CREAT BLD-MCNC: 0.58 MG/DL (ref 0.57–1)
CRP SERPL-MCNC: 0.26 MG/DL (ref 0–0.5)
DEPRECATED RDW RBC AUTO: 41.1 FL (ref 37–54)
EOSINOPHIL # BLD AUTO: 0.84 10*3/MM3 (ref 0–0.4)
EOSINOPHIL NFR BLD AUTO: 17.1 % (ref 0.3–6.2)
ERYTHROCYTE [DISTWIDTH] IN BLOOD BY AUTOMATED COUNT: 13.4 % (ref 12.3–15.4)
ERYTHROCYTE [SEDIMENTATION RATE] IN BLOOD: 29 MM/HR (ref 0–30)
GFR SERPL CREATININE-BSD FRML MDRD: 106 ML/MIN/1.73
GLOBULIN UR ELPH-MCNC: 2.5 GM/DL
GLUCOSE BLD-MCNC: 230 MG/DL (ref 65–99)
HCT VFR BLD AUTO: 31.5 % (ref 34–46.6)
HGB BLD-MCNC: 9.7 G/DL (ref 12–15.9)
IMM GRANULOCYTES # BLD AUTO: 0.02 10*3/MM3 (ref 0–0.05)
IMM GRANULOCYTES NFR BLD AUTO: 0.4 % (ref 0–0.5)
LYMPHOCYTES # BLD AUTO: 0.8 10*3/MM3 (ref 0.7–3.1)
LYMPHOCYTES NFR BLD AUTO: 16.3 % (ref 19.6–45.3)
MCH RBC QN AUTO: 25.9 PG (ref 26.6–33)
MCHC RBC AUTO-ENTMCNC: 30.8 G/DL (ref 31.5–35.7)
MCV RBC AUTO: 84 FL (ref 79–97)
MONOCYTES # BLD AUTO: 1.32 10*3/MM3 (ref 0.1–0.9)
MONOCYTES NFR BLD AUTO: 26.8 % (ref 5–12)
NEUTROPHILS # BLD AUTO: 1.88 10*3/MM3 (ref 1.7–7)
NEUTROPHILS NFR BLD AUTO: 38.2 % (ref 42.7–76)
NRBC BLD AUTO-RTO: 0 /100 WBC (ref 0–0.2)
PLATELET # BLD AUTO: 463 10*3/MM3 (ref 140–450)
PMV BLD AUTO: 8.8 FL (ref 6–12)
POTASSIUM BLD-SCNC: 4 MMOL/L (ref 3.5–5.2)
PROT SERPL-MCNC: 7 G/DL (ref 6–8.5)
RBC # BLD AUTO: 3.75 10*6/MM3 (ref 3.77–5.28)
SODIUM BLD-SCNC: 137 MMOL/L (ref 136–145)
WBC NRBC COR # BLD: 4.92 10*3/MM3 (ref 3.4–10.8)

## 2020-04-06 PROCEDURE — 85652 RBC SED RATE AUTOMATED: CPT

## 2020-04-06 PROCEDURE — 82550 ASSAY OF CK (CPK): CPT

## 2020-04-06 PROCEDURE — 86140 C-REACTIVE PROTEIN: CPT

## 2020-04-06 PROCEDURE — 36415 COLL VENOUS BLD VENIPUNCTURE: CPT

## 2020-04-06 PROCEDURE — 85025 COMPLETE CBC W/AUTO DIFF WBC: CPT

## 2020-04-06 PROCEDURE — 80053 COMPREHEN METABOLIC PANEL: CPT

## 2020-04-06 NOTE — OUTREACH NOTE
Sepsis Week 3 Survey      Responses   [facility] patient discharged from?  [place]   COVID-19 Test Status  Not tested   Does the patient have one of the following disease processes/diagnoses(primary or secondary)?  Sepsis   Week 3 attempt successful?  Yes   Call start time  1349   Call end time  1353   Discharge diagnosis  Infected venous access port, septic discitis of lumbar region, MRSA infection of venous access port, DM II, GERD, Back pain, bacteremia d/t MRSA, malignant neoplasm of female breast.   Person spoke with today (if not patient) and relationship  dee dee Mcmillan   Meds reviewed with patient/caregiver?  Yes   Is the patient having any side effects they believe may be caused by any medication additions or changes?  No   Does the patient have all medications related to this admission filled (includes all antibiotics, inhalers, nebulizers,steroids,etc.)  Yes   Is the patient taking all medications as directed (includes completed medication regime)?  Yes   Comments regarding appointments  had IV infusion appt on 4/6/20   Does the patient have a primary care provider?   Yes   Does the patient have an appointment with their PCP within 7 days of discharge?  Yes   Has the patient kept scheduled appointments due by today?  Yes   What is the Home health agency?   [facility]   Has home health visited the patient within 72 hours of discharge?  Yes   Psychosocial issues?  No   Did the patient receive a copy of their discharge instructions?  Yes   Nursing interventions  Reviewed instructions with patient   What is the patient's perception of their health status since discharge?  Improving   Nursing interventions  Nurse provided patient education   Is the patient/caregiver able to teach back Sepsis?  S - Shivering,fever or very cold, E - Extreme pain or generalized discomfort (worst ever,especially abdomen), P - Pale or discolored skin, S - Sleepy, difficult to arouse,confused, I -   I feel like I might die-a feeling of  hopelessness, S - Short of breath   Nursing interventions  Nurse provided reassurance to patient, Nurse provided patient education   Is patient/caregiver able to teach back steps to recovery at home?  Set small, achievable goals for return to baseline health, Rest and regain strength   Is the patient/caregiver able to teach back signs and symptoms of worsening condition:  Fever, Hyperthermia, Rapid heart rate (>90), Shortness of breath/rapid respiratory rate, Altered mental status(confusion/coma), Edema   Is the patient/caregiver able to teach back the hierarchy of who to call/visit for symptoms/problems? PCP, Specialist, Home health nurse, Urgent Care, ED, 911  Yes   Additional teach back comments  advised son to have pt check temp daily as well as look for signs of sepsis, son agreed with plan   Week 3 call completed?  Yes          Idania Leonardo RN

## 2020-04-07 ENCOUNTER — TELEPHONE (OUTPATIENT)
Dept: ONCOLOGY | Facility: CLINIC | Age: 60
End: 2020-04-07

## 2020-04-07 NOTE — TELEPHONE ENCOUNTER
Patients letter was emailed to patient. Called and LM on patients voice mail that the letter had been emailed to her and to call if she had any further questions.

## 2020-04-07 NOTE — TELEPHONE ENCOUNTER
Patient would like to speak to Maria D in the [place] office regarding intermittent LA    [phone]

## 2020-04-13 ENCOUNTER — READMISSION MANAGEMENT (OUTPATIENT)
Dept: CALL CENTER | Facility: HOSPITAL | Age: 60
End: 2020-04-13

## 2020-04-13 ENCOUNTER — TELEPHONE (OUTPATIENT)
Dept: ONCOLOGY | Facility: CLINIC | Age: 60
End: 2020-04-13

## 2020-04-13 NOTE — TELEPHONE ENCOUNTER
Spoke with [place].  PICC dressing will be added to the appointment for 4-21-20.  Ayse notified.  Infusion number given to Ayse to add patient on for when she needs to have PICC dressing.

## 2020-04-13 NOTE — TELEPHONE ENCOUNTER
Tato from infection disease wants to know if Patient can get her pic line dressing there when she comes in for her appt on 04/21/20      Call tato back at [phone]

## 2020-04-13 NOTE — OUTREACH NOTE
Sepsis Week 4 Survey      Responses   [facility] patient discharged from?  [place]   COVID-19 Test Status  Not tested   Does the patient have one of the following disease processes/diagnoses(primary or secondary)?  Sepsis   Week 4 attempt successful?  No          Suyapa Lynch RN

## 2020-04-14 LAB
FUNGUS WND CULT: NORMAL
MYCOBACTERIUM SPEC CULT: NORMAL
NIGHT BLUE STAIN TISS: NORMAL

## 2020-04-21 ENCOUNTER — LAB (OUTPATIENT)
Dept: ONCOLOGY | Facility: HOSPITAL | Age: 60
End: 2020-04-21

## 2020-04-21 VITALS
WEIGHT: 137.8 LBS | DIASTOLIC BLOOD PRESSURE: 81 MMHG | TEMPERATURE: 98.9 F | RESPIRATION RATE: 16 BRPM | HEART RATE: 91 BPM | BODY MASS INDEX: 22.25 KG/M2 | SYSTOLIC BLOOD PRESSURE: 134 MMHG

## 2020-04-21 DIAGNOSIS — Z17.0 MALIGNANT NEOPLASM OF UPPER-OUTER QUADRANT OF RIGHT BREAST IN FEMALE, ESTROGEN RECEPTOR POSITIVE (HCC): ICD-10-CM

## 2020-04-21 DIAGNOSIS — C50.411 MALIGNANT NEOPLASM OF UPPER-OUTER QUADRANT OF RIGHT BREAST IN FEMALE, ESTROGEN RECEPTOR POSITIVE (HCC): ICD-10-CM

## 2020-04-21 PROCEDURE — 96523 IRRIG DRUG DELIVERY DEVICE: CPT

## 2020-04-21 PROCEDURE — 36415 COLL VENOUS BLD VENIPUNCTURE: CPT

## 2020-04-21 PROCEDURE — G0463 HOSPITAL OUTPT CLINIC VISIT: HCPCS

## 2020-04-22 LAB
BASOPHILS # BLD AUTO: 0.05 10*3/MM3 (ref 0–0.2)
BASOPHILS NFR BLD AUTO: 0.6 % (ref 0–1.5)
EOSINOPHIL # BLD AUTO: 0.2 10*3/MM3 (ref 0–0.4)
EOSINOPHIL NFR BLD AUTO: 2.3 % (ref 0.3–6.2)
ERYTHROCYTE [DISTWIDTH] IN BLOOD BY AUTOMATED COUNT: 14.6 % (ref 12.3–15.4)
HCT VFR BLD AUTO: 30.7 % (ref 34–46.6)
HGB BLD-MCNC: 9.7 G/DL (ref 12–15.9)
IMM GRANULOCYTES # BLD AUTO: 0.02 10*3/MM3 (ref 0–0.05)
IMM GRANULOCYTES NFR BLD AUTO: 0.2 % (ref 0–0.5)
LYMPHOCYTES # BLD AUTO: 1.86 10*3/MM3 (ref 0.7–3.1)
LYMPHOCYTES NFR BLD AUTO: 21 % (ref 19.6–45.3)
MCH RBC QN AUTO: 25.2 PG (ref 26.6–33)
MCHC RBC AUTO-ENTMCNC: 31.6 G/DL (ref 31.5–35.7)
MCV RBC AUTO: 79.7 FL (ref 79–97)
MONOCYTES # BLD AUTO: 0.8 10*3/MM3 (ref 0.1–0.9)
MONOCYTES NFR BLD AUTO: 9 % (ref 5–12)
NEUTROPHILS # BLD AUTO: 5.93 10*3/MM3 (ref 1.7–7)
NEUTROPHILS NFR BLD AUTO: 66.9 % (ref 42.7–76)
NRBC BLD AUTO-RTO: 0 /100 WBC (ref 0–0.2)
PLATELET # BLD AUTO: 403 10*3/MM3 (ref 140–450)
RBC # BLD AUTO: 3.85 10*6/MM3 (ref 3.77–5.28)
WBC # BLD AUTO: 8.86 10*3/MM3 (ref 3.4–10.8)

## 2020-04-23 ENCOUNTER — INFUSION (OUTPATIENT)
Dept: ONCOLOGY | Facility: HOSPITAL | Age: 60
End: 2020-04-23

## 2020-04-23 VITALS
DIASTOLIC BLOOD PRESSURE: 79 MMHG | BODY MASS INDEX: 21.9 KG/M2 | HEART RATE: 84 BPM | SYSTOLIC BLOOD PRESSURE: 121 MMHG | RESPIRATION RATE: 17 BRPM | WEIGHT: 135.6 LBS | TEMPERATURE: 98.1 F

## 2020-04-23 DIAGNOSIS — C50.411 MALIGNANT NEOPLASM OF UPPER-OUTER QUADRANT OF RIGHT BREAST IN FEMALE, ESTROGEN RECEPTOR POSITIVE (HCC): Primary | ICD-10-CM

## 2020-04-23 DIAGNOSIS — Z17.0 MALIGNANT NEOPLASM OF UPPER-OUTER QUADRANT OF RIGHT BREAST IN FEMALE, ESTROGEN RECEPTOR POSITIVE (HCC): Primary | ICD-10-CM

## 2020-04-23 PROCEDURE — 25010000002 TRASTUZUMAB-ANNS 420 MG RECONSTITUTED SOLUTION 1 EACH VIAL: Performed by: INTERNAL MEDICINE

## 2020-04-23 PROCEDURE — 96413 CHEMO IV INFUSION 1 HR: CPT

## 2020-04-23 RX ADMIN — TRASTUZUMAB-ANNS 390 MG: 420 INJECTION, POWDER, LYOPHILIZED, FOR SOLUTION INTRAVENOUS at 13:21

## 2020-04-28 ENCOUNTER — LAB (OUTPATIENT)
Dept: LAB | Facility: HOSPITAL | Age: 60
End: 2020-04-28

## 2020-04-28 ENCOUNTER — TRANSCRIBE ORDERS (OUTPATIENT)
Dept: LAB | Facility: HOSPITAL | Age: 60
End: 2020-04-28

## 2020-04-28 DIAGNOSIS — L03.90 CELLULITIS DUE TO MRSA: ICD-10-CM

## 2020-04-28 DIAGNOSIS — B95.62 CELLULITIS DUE TO MRSA: ICD-10-CM

## 2020-04-28 DIAGNOSIS — M46.26 OSTEOMYELITIS OF VERTEBRA OF LUMBAR REGION (HCC): ICD-10-CM

## 2020-04-28 DIAGNOSIS — A41.02 METHICILLIN RESISTANT STAPHYLOCOCCUS AUREUS SEPTICEMIA (HCC): Primary | ICD-10-CM

## 2020-04-28 DIAGNOSIS — A41.02 METHICILLIN RESISTANT STAPHYLOCOCCUS AUREUS SEPTICEMIA (HCC): ICD-10-CM

## 2020-04-28 LAB — ERYTHROCYTE [SEDIMENTATION RATE] IN BLOOD: 17 MM/HR (ref 0–30)

## 2020-04-28 PROCEDURE — 85652 RBC SED RATE AUTOMATED: CPT

## 2020-04-28 PROCEDURE — 36415 COLL VENOUS BLD VENIPUNCTURE: CPT

## 2020-05-12 ENCOUNTER — LAB (OUTPATIENT)
Dept: ONCOLOGY | Facility: HOSPITAL | Age: 60
End: 2020-05-12

## 2020-05-12 VITALS
BODY MASS INDEX: 22.32 KG/M2 | WEIGHT: 138.2 LBS | SYSTOLIC BLOOD PRESSURE: 140 MMHG | DIASTOLIC BLOOD PRESSURE: 67 MMHG | TEMPERATURE: 97.9 F | RESPIRATION RATE: 18 BRPM | HEART RATE: 91 BPM

## 2020-05-12 DIAGNOSIS — C50.411 MALIGNANT NEOPLASM OF UPPER-OUTER QUADRANT OF RIGHT BREAST IN FEMALE, ESTROGEN RECEPTOR POSITIVE (HCC): ICD-10-CM

## 2020-05-12 DIAGNOSIS — Z17.0 MALIGNANT NEOPLASM OF UPPER-OUTER QUADRANT OF RIGHT BREAST IN FEMALE, ESTROGEN RECEPTOR POSITIVE (HCC): ICD-10-CM

## 2020-05-12 PROCEDURE — 36415 COLL VENOUS BLD VENIPUNCTURE: CPT

## 2020-05-12 PROCEDURE — G0463 HOSPITAL OUTPT CLINIC VISIT: HCPCS

## 2020-05-13 LAB
BASOPHILS # BLD AUTO: 0.06 10*3/MM3 (ref 0–0.2)
BASOPHILS NFR BLD AUTO: 0.8 % (ref 0–1.5)
EOSINOPHIL # BLD AUTO: 0.17 10*3/MM3 (ref 0–0.4)
EOSINOPHIL NFR BLD AUTO: 2.2 % (ref 0.3–6.2)
ERYTHROCYTE [DISTWIDTH] IN BLOOD BY AUTOMATED COUNT: 15.9 % (ref 12.3–15.4)
HCT VFR BLD AUTO: 34.5 % (ref 34–46.6)
HGB BLD-MCNC: 10.9 G/DL (ref 12–15.9)
IMM GRANULOCYTES # BLD AUTO: 0.02 10*3/MM3 (ref 0–0.05)
IMM GRANULOCYTES NFR BLD AUTO: 0.3 % (ref 0–0.5)
LYMPHOCYTES # BLD AUTO: 1.73 10*3/MM3 (ref 0.7–3.1)
LYMPHOCYTES NFR BLD AUTO: 22.6 % (ref 19.6–45.3)
MCH RBC QN AUTO: 24.4 PG (ref 26.6–33)
MCHC RBC AUTO-ENTMCNC: 31.6 G/DL (ref 31.5–35.7)
MCV RBC AUTO: 77.4 FL (ref 79–97)
MONOCYTES # BLD AUTO: 0.66 10*3/MM3 (ref 0.1–0.9)
MONOCYTES NFR BLD AUTO: 8.6 % (ref 5–12)
NEUTROPHILS # BLD AUTO: 5 10*3/MM3 (ref 1.7–7)
NEUTROPHILS NFR BLD AUTO: 65.5 % (ref 42.7–76)
NRBC BLD AUTO-RTO: 0 /100 WBC (ref 0–0.2)
PLATELET # BLD AUTO: 449 10*3/MM3 (ref 140–450)
RBC # BLD AUTO: 4.46 10*6/MM3 (ref 3.77–5.28)
WBC # BLD AUTO: 7.64 10*3/MM3 (ref 3.4–10.8)

## 2020-05-14 ENCOUNTER — INFUSION (OUTPATIENT)
Dept: ONCOLOGY | Facility: HOSPITAL | Age: 60
End: 2020-05-14

## 2020-05-14 VITALS
RESPIRATION RATE: 17 BRPM | TEMPERATURE: 98.4 F | SYSTOLIC BLOOD PRESSURE: 126 MMHG | HEART RATE: 89 BPM | BODY MASS INDEX: 22.45 KG/M2 | WEIGHT: 139 LBS | DIASTOLIC BLOOD PRESSURE: 90 MMHG

## 2020-05-14 DIAGNOSIS — Z17.0 MALIGNANT NEOPLASM OF UPPER-OUTER QUADRANT OF RIGHT BREAST IN FEMALE, ESTROGEN RECEPTOR POSITIVE (HCC): Primary | ICD-10-CM

## 2020-05-14 DIAGNOSIS — C50.411 MALIGNANT NEOPLASM OF UPPER-OUTER QUADRANT OF RIGHT BREAST IN FEMALE, ESTROGEN RECEPTOR POSITIVE (HCC): Primary | ICD-10-CM

## 2020-05-14 PROCEDURE — 25010000002 TRASTUZUMAB-ANNS 420 MG RECONSTITUTED SOLUTION 1 EACH VIAL: Performed by: INTERNAL MEDICINE

## 2020-05-14 PROCEDURE — 96413 CHEMO IV INFUSION 1 HR: CPT

## 2020-05-14 RX ADMIN — TRASTUZUMAB-ANNS 390 MG: 420 INJECTION, POWDER, LYOPHILIZED, FOR SOLUTION INTRAVENOUS at 08:43

## 2020-05-19 ENCOUNTER — TELEPHONE (OUTPATIENT)
Dept: ONCOLOGY | Facility: CLINIC | Age: 60
End: 2020-05-19

## 2020-05-19 NOTE — TELEPHONE ENCOUNTER
Patient needs an appt for pic line dressing would like to go to [place] office and wants to know if she can come in on 05/26/20      Call patient back at [phone]

## 2020-05-26 ENCOUNTER — INFUSION (OUTPATIENT)
Dept: ONCOLOGY | Facility: HOSPITAL | Age: 60
End: 2020-05-26

## 2020-05-26 VITALS
DIASTOLIC BLOOD PRESSURE: 71 MMHG | RESPIRATION RATE: 18 BRPM | TEMPERATURE: 96.9 F | SYSTOLIC BLOOD PRESSURE: 139 MMHG | BODY MASS INDEX: 22.45 KG/M2 | WEIGHT: 139 LBS | HEART RATE: 76 BPM

## 2020-05-26 DIAGNOSIS — C50.411 MALIGNANT NEOPLASM OF UPPER-OUTER QUADRANT OF RIGHT FEMALE BREAST, UNSPECIFIED ESTROGEN RECEPTOR STATUS (HCC): ICD-10-CM

## 2020-05-26 PROCEDURE — 96523 IRRIG DRUG DELIVERY DEVICE: CPT

## 2020-05-27 ENCOUNTER — HOSPITAL ENCOUNTER (OUTPATIENT)
Dept: CARDIOLOGY | Facility: HOSPITAL | Age: 60
Discharge: HOME | End: 2020-05-27
Admitting: INTERNAL MEDICINE

## 2020-05-27 VITALS — HEIGHT: 66 IN | WEIGHT: 139 LBS | BODY MASS INDEX: 22.34 KG/M2

## 2020-05-27 DIAGNOSIS — I82.90 THROMBUS: ICD-10-CM

## 2020-05-27 DIAGNOSIS — Z51.11 ENCOUNTER FOR ANTINEOPLASTIC CHEMOTHERAPY: ICD-10-CM

## 2020-05-27 DIAGNOSIS — Z17.0 MALIGNANT NEOPLASM OF UPPER-OUTER QUADRANT OF RIGHT BREAST IN FEMALE, ESTROGEN RECEPTOR POSITIVE (HCC): ICD-10-CM

## 2020-05-27 DIAGNOSIS — C50.411 MALIGNANT NEOPLASM OF UPPER-OUTER QUADRANT OF RIGHT BREAST IN FEMALE, ESTROGEN RECEPTOR POSITIVE (HCC): ICD-10-CM

## 2020-05-27 LAB
BH CV ECHO MEAS - AO ROOT AREA (BSA CORRECTED): 1.8
BH CV ECHO MEAS - AO ROOT AREA: 7.5 CM^2
BH CV ECHO MEAS - AO ROOT DIAM: 3.1 CM
BH CV ECHO MEAS - BSA(HAYCOCK): 1.7 M^2
BH CV ECHO MEAS - BSA: 1.7 M^2
BH CV ECHO MEAS - BZI_BMI: 21.8 KILOGRAMS/M^2
BH CV ECHO MEAS - BZI_METRIC_HEIGHT: 167.6 CM
BH CV ECHO MEAS - BZI_METRIC_WEIGHT: 61.2 KG
BH CV ECHO MEAS - EDV(CUBED): 89.9 ML
BH CV ECHO MEAS - EDV(MOD-SP2): 24 ML
BH CV ECHO MEAS - EDV(MOD-SP4): 38 ML
BH CV ECHO MEAS - EDV(TEICH): 91.5 ML
BH CV ECHO MEAS - EF(CUBED): 63.9 %
BH CV ECHO MEAS - EF(MOD-BP): 55 %
BH CV ECHO MEAS - EF(MOD-SP2): 41.7 %
BH CV ECHO MEAS - EF(MOD-SP4): 57.9 %
BH CV ECHO MEAS - EF(TEICH): 55.6 %
BH CV ECHO MEAS - ESV(CUBED): 32.5 ML
BH CV ECHO MEAS - ESV(MOD-SP2): 14 ML
BH CV ECHO MEAS - ESV(MOD-SP4): 16 ML
BH CV ECHO MEAS - ESV(TEICH): 40.6 ML
BH CV ECHO MEAS - FS: 28.8 %
BH CV ECHO MEAS - IVS/LVPW: 1
BH CV ECHO MEAS - IVSD: 1 CM
BH CV ECHO MEAS - LA DIMENSION: 3.1 CM
BH CV ECHO MEAS - LA/AO: 1
BH CV ECHO MEAS - LV DIASTOLIC VOL/BSA (35-75): 22.5 ML/M^2
BH CV ECHO MEAS - LV MASS(C)D: 148.9 GRAMS
BH CV ECHO MEAS - LV MASS(C)DI: 88 GRAMS/M^2
BH CV ECHO MEAS - LV SYSTOLIC VOL/BSA (12-30): 9.5 ML/M^2
BH CV ECHO MEAS - LVIDD: 4.5 CM
BH CV ECHO MEAS - LVIDS: 3.2 CM
BH CV ECHO MEAS - LVLD AP2: 5.6 CM
BH CV ECHO MEAS - LVLD AP4: 6.6 CM
BH CV ECHO MEAS - LVLS AP2: 5.6 CM
BH CV ECHO MEAS - LVLS AP4: 5.6 CM
BH CV ECHO MEAS - LVPWD: 0.97 CM
BH CV ECHO MEAS - SI(CUBED): 33.9 ML/M^2
BH CV ECHO MEAS - SI(MOD-SP2): 5.9 ML/M^2
BH CV ECHO MEAS - SI(MOD-SP4): 13 ML/M^2
BH CV ECHO MEAS - SI(TEICH): 30 ML/M^2
BH CV ECHO MEAS - SV(CUBED): 57.5 ML
BH CV ECHO MEAS - SV(MOD-SP2): 10 ML
BH CV ECHO MEAS - SV(MOD-SP4): 22 ML
BH CV ECHO MEAS - SV(TEICH): 50.8 ML
BH CV VAS BP LEFT ARM: NORMAL MMHG
BH CV VAS BP RIGHT ARM: NORMAL MMHG
LV EF 2D ECHO EST: 55 %

## 2020-05-27 PROCEDURE — 93356 MYOCRD STRAIN IMG SPCKL TRCK: CPT

## 2020-05-27 PROCEDURE — 93308 TTE F-UP OR LMTD: CPT

## 2020-05-27 PROCEDURE — 93308 TTE F-UP OR LMTD: CPT | Performed by: INTERNAL MEDICINE

## 2020-05-27 PROCEDURE — 93356 MYOCRD STRAIN IMG SPCKL TRCK: CPT | Performed by: INTERNAL MEDICINE

## 2020-06-02 ENCOUNTER — LAB (OUTPATIENT)
Dept: ONCOLOGY | Facility: HOSPITAL | Age: 60
End: 2020-06-02

## 2020-06-02 ENCOUNTER — OFFICE VISIT (OUTPATIENT)
Dept: ONCOLOGY | Facility: CLINIC | Age: 60
End: 2020-06-02

## 2020-06-02 VITALS
HEIGHT: 66 IN | HEART RATE: 83 BPM | SYSTOLIC BLOOD PRESSURE: 153 MMHG | DIASTOLIC BLOOD PRESSURE: 95 MMHG | WEIGHT: 138 LBS | BODY MASS INDEX: 22.18 KG/M2 | TEMPERATURE: 98.7 F | RESPIRATION RATE: 18 BRPM

## 2020-06-02 DIAGNOSIS — Z51.11 ENCOUNTER FOR ANTINEOPLASTIC CHEMOTHERAPY: Primary | ICD-10-CM

## 2020-06-02 DIAGNOSIS — C50.411 MALIGNANT NEOPLASM OF UPPER-OUTER QUADRANT OF RIGHT BREAST IN FEMALE, ESTROGEN RECEPTOR POSITIVE (HCC): ICD-10-CM

## 2020-06-02 DIAGNOSIS — C50.411 MALIGNANT NEOPLASM OF UPPER-OUTER QUADRANT OF RIGHT FEMALE BREAST, UNSPECIFIED ESTROGEN RECEPTOR STATUS (HCC): ICD-10-CM

## 2020-06-02 DIAGNOSIS — Z17.0 MALIGNANT NEOPLASM OF UPPER-OUTER QUADRANT OF RIGHT BREAST IN FEMALE, ESTROGEN RECEPTOR POSITIVE (HCC): ICD-10-CM

## 2020-06-02 PROCEDURE — 36415 COLL VENOUS BLD VENIPUNCTURE: CPT

## 2020-06-02 PROCEDURE — 99214 OFFICE O/P EST MOD 30 MIN: CPT | Performed by: INTERNAL MEDICINE

## 2020-06-02 RX ORDER — SODIUM CHLORIDE 9 MG/ML
250 INJECTION, SOLUTION INTRAVENOUS ONCE
Status: CANCELLED | OUTPATIENT
Start: 2020-06-04

## 2020-06-02 NOTE — PROGRESS NOTES
CHIEF COMPLAINT: On Herceptin with ejection fraction 55% 5/27/2020    Problem List:  Oncology/Hematology History    1. 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  ER weakly 5% 1+ positive, OH 50% 1-2+ positive, HER-2/ashley 100% 3+ positive.  Negative cancer next panel  2. Significant diabetes with predating neuropathy due to spinal stenosis status post laminectomy 5/24/2019 with persistent neuropathy dorsum left foot  3. MRSA bacteremia due to port infection March 2020 after course 1 day 1 of Herceptin Taxol    Oncology history timeline:  -5/24/2019 Dr. Garcia saw for spinal stenosis with radiculopathy and left foot drop due to herniated disc and performed decompressive laminectomy, L4-5 and L5-S1 discectomy and medial facetectomy  -10/15/2019 mammogram BI-RADS 0  -11/22/2019 right mammogram/ultrasound BI-RADS 4 with ultrasound-guided core biopsy showing invasive ductal carcinoma [place] score 6 out of 9 grade 2, ER 5% 1+ positive OH 50% 1-2+ positive, HER-2/ashley 100% 3+ positive.  -12/13/2019 MRI breasts revealed 2.2 cm right breast mass consistent with biopsy proven cancer with unsuspected adjacent area's of non-mass enhancement worrisome for DCIS.  Non-mass enhancement in the subareolar left breast worrisome for DCIS.  Dominant focus left central portion left breast indeterminate BI-RADS 4.  Cancer next panel negative  -1/15/2020 CMP unremarkable save for glucose 200 with hemoglobin 10.6 normochromic normocytic indices  -1/21/2020 right skin sparing mastectomy with right sentinel lymph node injection and right deep subfascial sentinel lymph node biopsy with contralateral prophylactic left skin sparing mastectomy.  Right breast 1.8 cm Bloom Dias 8 out of 9, total of 4 lymph nodes examined 2 sentinel nodes all negative.  Pathological T1 cN0 M0 stage Ia.  Left  breast benign with sclerosing adenosis.  -2/26/2020 started Herceptin Taxol weekly  -3/2/2020 port removed due to MRSA bacteremia with port infection/purulence.  -3/2/2020 through 3/6/2020 hospitalized for MRSA bacteremia from port.  -3/31/2020 [facility] tele-visit: Patient still on IV antibiotics for MRSA port infection.  Decision made to go with Kanjinti alone every 3 weeks along with Arimidex.  -4/13/2020 per ID, patient has completed IV antibiotics and is now on oral antibiotics.        Malignant neoplasm of upper-outer quadrant of right female breast (CMS/HCC)    1/16/2019 Cancer Staged     Staging form: Breast, AJCC 8th Edition  - Clinical stage from 1/16/2019: Stage IB (cT2, cN0, cM0, G2, ER+, LA+, HER2+) - Signed by Lisa Herman MD on 1/27/2020 1/21/2020 Initial Diagnosis     Malignant neoplasm of upper-outer quadrant of right female breast (CMS/HCC)      2/11/2020 Cancer Staged     Staging form: Breast, AJCC 8th Edition  - Pathologic: Stage IA (pT1c, pN0, cM0, G3, ER+, LA+, HER2+) - Signed by Hero Gomez MD on 2/11/2020 2/18/2020 -  Other Event     Echocardiogram  · Left ventricular systolic function is normal. Estimated EF = 55%.  · The global longitudinal strain was -19.5%.      2/25/2020 - 2/26/2020 Chemotherapy     OP CENTRAL VENOUS ACCESS DEVICE ACCESS, CARE, AND MAINTENANCE (CVAD)      2/26/2020 - 3/10/2020 Chemotherapy     OP BREAST PACLitaxel / Trastuzumab-anns (Weekly X 12)      3/2/2020 Adverse Reaction     MRSA bacteremia due to port infection with port removal after day 1 course 1 Herceptin Taxol      4/2/2020 -  Chemotherapy     OP BREAST Trastuzumab-anns Q21D (maintenance)      5/27/2020 -  Other Event     5/27/2020 echocardiogram EF 55%          HISTORY OF PRESENT ILLNESS:  The patient is a 59 y.o. female, here for follow up on management of breast cancer on Herceptin with ejection fraction 55% 5/27/2020.  On Kanjinti alone in the midst of infection with MRSA back  in March and now with Covid 19 pandemic.  Tolerating well.      Past Medical History:   Diagnosis Date   • Diabetes mellitus (CMS/HCC)    • Diverticulitis    • GERD (gastroesophageal reflux disease)    • Kidney stone    • Malignant neoplasm of upper-outer quadrant of right female breast (CMS/HCC) 1/21/2020   • Sciatica    • Wears contact lenses      Past Surgical History:   Procedure Laterality Date   • APPENDECTOMY     • BREAST BIOPSY Right    • BREAST EXCISIONAL BIOPSY Right    • CHOLECYSTECTOMY     • COLON SURGERY     • COLONOSCOPY     • HYSTERECTOMY     • LUMBAR LAMINECTOMY DISCECTOMY DECOMPRESSION N/A 5/24/2019    Procedure: LUMBAR LAMINECTOMY L4-5 AND L5-S1;  Surgeon: Hipolito Kahn MD;  Location:  [facility];  Service: Orthopedic Spine   • MASTECTOMY W/ SENTINEL NODE BIOPSY Bilateral 1/21/2020    Procedure: SKIN SPARING MASTECTOMIES BILATERAL, SENTINEL NODE BIOPSY RIGHT;  Surgeon: Silvio Howard MD;  Location:  [facility];  Service: General   • VENOUS ACCESS DEVICE (PORT) REMOVAL N/A 3/3/2020    Procedure: REMOVAL PORT;  Surgeon: Silvio Howard MD;  Location:  [facility];  Service: General;  Laterality: N/A;       Allergies   Allergen Reactions   • Bactrim [Sulfamethoxazole-Trimethoprim] Rash     RASH, SKIN PEELING        Family History and Social History reviewed and changed as necessary      REVIEW OF SYSTEM:   Review of Systems   Constitutional: Negative for appetite change, chills, diaphoresis, fatigue, fever and unexpected weight change.   HENT:   Negative for mouth sores, sore throat and trouble swallowing.    Eyes: Negative for icterus.   Respiratory: Negative for cough, hemoptysis and shortness of breath.    Cardiovascular: Negative for chest pain, leg swelling and palpitations.   Gastrointestinal: Negative for abdominal distention, abdominal pain, blood in stool, constipation, diarrhea, nausea and vomiting.   Endocrine: Negative for hot flashes.   Genitourinary: Negative for bladder  "incontinence, difficulty urinating, dysuria, frequency and hematuria.    Musculoskeletal: Negative for gait problem, neck pain and neck stiffness.   Skin: Negative for rash.   Neurological: Negative for dizziness, gait problem, headaches, light-headedness and numbness.   Hematological: Negative for adenopathy. Does not bruise/bleed easily.   Psychiatric/Behavioral: Negative for depression. The patient is not nervous/anxious.    All other systems reviewed and are negative.       PHYSICAL EXAM    Vitals:    06/02/20 1017   BP: 153/95   Pulse: 83   Resp: 18   Temp: 98.7 °F (37.1 °C)   Weight: 62.6 kg (138 lb)   Height: 167 cm (65.75\")     Vitals:    06/02/20 1017   PainSc: 0-No pain        Constitutional: Appears well-developed and well-nourished. No distress.   ECOG: (0) Fully Active - Able to Carry On All Pre-disease Performance Without Restriction  HENT:   Head: Normocephalic.   Mouth/Throat: Oropharynx is clear and moist.   Eyes: Conjunctivae are normal. Pupils are equal, round, and reactive to light. No scleral icterus.   Neck: Neck supple. No JVD present. No thyromegaly present.   Cardiovascular: Normal rate, regular rhythm and normal heart sounds.    Pulmonary/Chest: Breath sounds normal. No respiratory distress.   Abdominal: Soft. Exhibits no distension and no mass. There is no hepatosplenomegaly. There is no tenderness. There is no rebound and no guarding.   Musculoskeletal:Exhibits no edema, tenderness or deformity.   Neurological: Alert and oriented to person, place, and time. Exhibits normal muscle tone.   Skin: No ecchymosis, no petechiae and no rash noted. Not diaphoretic. No cyanosis. Nails show no clubbing.   Psychiatric: Normal mood and affect.   Vitals reviewed.      Lab Results   Component Value Date    HGB 10.9 (L) 05/12/2020    HCT 34.5 05/12/2020    MCV 77.4 (L) 05/12/2020     05/12/2020    WBC 7.64 05/12/2020    NEUTROABS 5.00 05/12/2020    LYMPHSABS 1.73 05/12/2020    MONOSABS 0.66 " 05/12/2020    EOSABS 0.17 05/12/2020    BASOSABS 0.06 05/12/2020       Lab Results   Component Value Date    GLUCOSE 230 (H) 04/06/2020    BUN 18 04/06/2020    CREATININE 0.58 04/06/2020     04/06/2020    K 4.0 04/06/2020    CL 96 (L) 04/06/2020    CO2 24.0 04/06/2020    CALCIUM 9.4 04/06/2020    PROTEINTOT 7.0 04/06/2020    ALBUMIN 4.50 04/06/2020    BILITOT 0.4 04/06/2020    ALKPHOS 76 04/06/2020    AST 21 04/06/2020    ALT 23 04/06/2020                   ASSESSMENT & PLAN:    1. Adjuvant therapy breast cancer  2. Chemo induced anemia persistingWith MCV down to 77.4.  Will check iron indices with her labs today.  3. On Eliquis for PE March 2020 found on work-up for possible endocarditis.  Would continue this through September.  4. Diabetes with neuropathy and chronic diarrhea with metformin    Discussion: In the face of the COVID pandemic with MRSA infection complications I would not add back the chemotherapy but would continue the Herceptin alone through February.  With the dropping MCV we will check her iron indices and if she is iron deficient we will certainly need to move ahead with getting her screening colonoscopy sooner than later albeit the Eliquis increases her risk of blood loss as well but it should not be spontaneous.  Her echocardiogram is adequate and we will repeat that in 3 months.  She will follow with my nurse practitioner.  She is also taking Arimidex and she will talk with primary care about getting her bone density checked.  Discussed with patient face-to-face 25 minutes greater than 50% spent counseling regarding this plan as outlined above.      Hero Gomez MD    06/02/2020

## 2020-06-03 ENCOUNTER — OFFICE VISIT (OUTPATIENT)
Dept: ONCOLOGY | Facility: CLINIC | Age: 60
End: 2020-06-03

## 2020-06-03 VITALS
HEIGHT: 66 IN | TEMPERATURE: 98.4 F | RESPIRATION RATE: 18 BRPM | SYSTOLIC BLOOD PRESSURE: 150 MMHG | DIASTOLIC BLOOD PRESSURE: 92 MMHG | BODY MASS INDEX: 22.18 KG/M2 | HEART RATE: 84 BPM | WEIGHT: 138 LBS

## 2020-06-03 DIAGNOSIS — Z17.0 MALIGNANT NEOPLASM OF UPPER-OUTER QUADRANT OF RIGHT BREAST IN FEMALE, ESTROGEN RECEPTOR POSITIVE (HCC): Primary | ICD-10-CM

## 2020-06-03 DIAGNOSIS — C50.411 MALIGNANT NEOPLASM OF UPPER-OUTER QUADRANT OF RIGHT BREAST IN FEMALE, ESTROGEN RECEPTOR POSITIVE (HCC): Primary | ICD-10-CM

## 2020-06-03 DIAGNOSIS — D50.0 IRON DEFICIENCY ANEMIA DUE TO CHRONIC BLOOD LOSS: ICD-10-CM

## 2020-06-03 LAB
BASOPHILS # BLD AUTO: 0.04 10*3/MM3 (ref 0–0.2)
BASOPHILS NFR BLD AUTO: 0.5 % (ref 0–1.5)
EOSINOPHIL # BLD AUTO: 0.11 10*3/MM3 (ref 0–0.4)
EOSINOPHIL NFR BLD AUTO: 1.4 % (ref 0.3–6.2)
ERYTHROCYTE [DISTWIDTH] IN BLOOD BY AUTOMATED COUNT: 17.8 % (ref 12.3–15.4)
FERRITIN SERPL-MCNC: 7.12 NG/ML (ref 13–150)
HCT VFR BLD AUTO: 33.1 % (ref 34–46.6)
HGB BLD-MCNC: 10.7 G/DL (ref 12–15.9)
IMM GRANULOCYTES # BLD AUTO: 0.02 10*3/MM3 (ref 0–0.05)
IMM GRANULOCYTES NFR BLD AUTO: 0.3 % (ref 0–0.5)
IRON SATN MFR SERPL: 5 % (ref 20–50)
IRON SERPL-MCNC: 26 MCG/DL (ref 37–145)
LYMPHOCYTES # BLD AUTO: 1.65 10*3/MM3 (ref 0.7–3.1)
LYMPHOCYTES NFR BLD AUTO: 21.3 % (ref 19.6–45.3)
MCH RBC QN AUTO: 25 PG (ref 26.6–33)
MCHC RBC AUTO-ENTMCNC: 32.3 G/DL (ref 31.5–35.7)
MCV RBC AUTO: 77.3 FL (ref 79–97)
MONOCYTES # BLD AUTO: 0.7 10*3/MM3 (ref 0.1–0.9)
MONOCYTES NFR BLD AUTO: 9 % (ref 5–12)
NEUTROPHILS # BLD AUTO: 5.22 10*3/MM3 (ref 1.7–7)
NEUTROPHILS NFR BLD AUTO: 67.5 % (ref 42.7–76)
NRBC BLD AUTO-RTO: 0 /100 WBC (ref 0–0.2)
PLATELET # BLD AUTO: 422 10*3/MM3 (ref 140–450)
RBC # BLD AUTO: 4.28 10*6/MM3 (ref 3.77–5.28)
TIBC SERPL-MCNC: 476 MCG/DL
UIBC SERPL-MCNC: 450 MCG/DL (ref 112–346)
WBC # BLD AUTO: 7.74 10*3/MM3 (ref 3.4–10.8)

## 2020-06-03 PROCEDURE — 99214 OFFICE O/P EST MOD 30 MIN: CPT | Performed by: NURSE PRACTITIONER

## 2020-06-03 RX ORDER — SODIUM CHLORIDE 9 MG/ML
250 INJECTION, SOLUTION INTRAVENOUS ONCE
Status: COMPLETED | OUTPATIENT
Start: 2020-06-04 | End: 2020-06-04

## 2020-06-03 RX ORDER — FERROUS SULFATE 325(65) MG
TABLET ORAL
Qty: 30 TABLET | Refills: 11 | Status: SHIPPED | OUTPATIENT
Start: 2020-06-03 | End: 2021-09-15

## 2020-06-03 NOTE — PROGRESS NOTES
CHIEF COMPLAINT: wanting clarification of plan of care    Problem List:  Oncology/Hematology History    1. 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  ER weakly 5% 1+ positive, VT 50% 1-2+ positive, HER-2/ashley 100% 3+ positive.  Negative cancer next panel  2. Significant diabetes with predating neuropathy due to spinal stenosis status post laminectomy 5/24/2019 with persistent neuropathy dorsum left foot  3. MRSA bacteremia due to port infection March 2020 after course 1 day 1 of Herceptin Taxol    Oncology history timeline:  -5/24/2019 Dr. Garcia saw for spinal stenosis with radiculopathy and left foot drop due to herniated disc and performed decompressive laminectomy, L4-5 and L5-S1 discectomy and medial facetectomy  -10/15/2019 mammogram BI-RADS 0  -11/22/2019 right mammogram/ultrasound BI-RADS 4 with ultrasound-guided core biopsy showing invasive ductal carcinoma [place] score 6 out of 9 grade 2, ER 5% 1+ positive VT 50% 1-2+ positive, HER-2/ashley 100% 3+ positive.  -12/13/2019 MRI breasts revealed 2.2 cm right breast mass consistent with biopsy proven cancer with unsuspected adjacent area's of non-mass enhancement worrisome for DCIS.  Non-mass enhancement in the subareolar left breast worrisome for DCIS.  Dominant focus left central portion left breast indeterminate BI-RADS 4.  Cancer next panel negative  -1/15/2020 CMP unremarkable save for glucose 200 with hemoglobin 10.6 normochromic normocytic indices  -1/21/2020 right skin sparing mastectomy with right sentinel lymph node injection and right deep subfascial sentinel lymph node biopsy with contralateral prophylactic left skin sparing mastectomy.  Right breast 1.8 cm Bloom Dias 8 out of 9, total of 4 lymph nodes examined 2 sentinel nodes all negative.  Pathological T1 cN0 M0 stage Ia.  Left breast  benign with sclerosing adenosis.  -2/26/2020 started Herceptin Taxol weekly  -3/2/2020 port removed due to MRSA bacteremia with port infection/purulence.  -3/2/2020 through 3/6/2020 hospitalized for MRSA bacteremia from port.  -3/31/2020 [facility] tele-visit: Patient still on IV antibiotics for MRSA port infection.  Decision made to go with Kanjinti alone every 3 weeks along with Arimidex.  -4/13/2020 per ID, patient has completed IV antibiotics and is now on oral antibiotics.        Malignant neoplasm of upper-outer quadrant of right breast in female, estrogen receptor positive (CMS/HCC)    1/16/2019 Cancer Staged     Staging form: Breast, AJCC 8th Edition  - Clinical stage from 1/16/2019: Stage IB (cT2, cN0, cM0, G2, ER+, ID+, HER2+) - Signed by Lisa Herman MD on 1/27/2020 1/21/2020 Initial Diagnosis     Malignant neoplasm of upper-outer quadrant of right female breast (CMS/HCC)      2/11/2020 Cancer Staged     Staging form: Breast, AJCC 8th Edition  - Pathologic: Stage IA (pT1c, pN0, cM0, G3, ER+, ID+, HER2+) - Signed by Hero Gomez MD on 2/11/2020 2/18/2020 -  Other Event     Echocardiogram  · Left ventricular systolic function is normal. Estimated EF = 55%.  · The global longitudinal strain was -19.5%.      2/25/2020 - 2/26/2020 Chemotherapy     OP CENTRAL VENOUS ACCESS DEVICE ACCESS, CARE, AND MAINTENANCE (CVAD)      2/26/2020 - 3/10/2020 Chemotherapy     OP BREAST PACLitaxel / Trastuzumab-anns (Weekly X 12)      3/2/2020 Adverse Reaction     MRSA bacteremia due to port infection with port removal after day 1 course 1 Herceptin Taxol      4/2/2020 -  Chemotherapy     OP BREAST Trastuzumab-anns Q21D (maintenance)      5/27/2020 -  Other Event     5/27/2020 echocardiogram EF 55%          HISTORY OF PRESENT ILLNESS:  The patient is a 59 y.o. female, here earlier than previous scheduled follow up.  She had a clinic visit yesterday with Dr. Gomez but returns today as she still had  "questions regarding plan of care going forward and decision to forego further chemotherapy and proceed with HER-2 directed therapy only.      Past Medical History:   Diagnosis Date   • Diabetes mellitus (CMS/HCC)    • Diverticulitis    • GERD (gastroesophageal reflux disease)    • Kidney stone    • Malignant neoplasm of upper-outer quadrant of right female breast (CMS/HCC) 1/21/2020   • Sciatica    • Wears contact lenses      Past Surgical History:   Procedure Laterality Date   • APPENDECTOMY     • BREAST BIOPSY Right    • BREAST EXCISIONAL BIOPSY Right    • CHOLECYSTECTOMY     • COLON SURGERY     • COLONOSCOPY     • HYSTERECTOMY     • LUMBAR LAMINECTOMY DISCECTOMY DECOMPRESSION N/A 5/24/2019    Procedure: LUMBAR LAMINECTOMY L4-5 AND L5-S1;  Surgeon: Hipolito Kahn MD;  Location:  [facility];  Service: Orthopedic Spine   • MASTECTOMY W/ SENTINEL NODE BIOPSY Bilateral 1/21/2020    Procedure: SKIN SPARING MASTECTOMIES BILATERAL, SENTINEL NODE BIOPSY RIGHT;  Surgeon: Silvio Howard MD;  Location:  [facility];  Service: General   • VENOUS ACCESS DEVICE (PORT) REMOVAL N/A 3/3/2020    Procedure: REMOVAL PORT;  Surgeon: Silvio Howard MD;  Location:  [facility];  Service: General;  Laterality: N/A;       Allergies   Allergen Reactions   • Bactrim [Sulfamethoxazole-Trimethoprim] Rash     RASH, SKIN PEELING        Family History and Social History reviewed and changed as necessary      REVIEW OF SYSTEM:   Review of Systems   Respiratory: Negative for cough, hemoptysis and shortness of breath.    Gastrointestinal: Positive for intermittent diarrhea and occasional bleeding.   Psychiatric/Behavioral: Negative for depression. The patient is not nervous/anxious.    All other systems reviewed and are negative.       PHYSICAL EXAM    Vitals:    06/03/20 1031   BP: 150/92   Pulse: 84   Resp: 18   Temp: 98.4 °F (36.9 °C)   Weight: 62.6 kg (138 lb)   Height: 167 cm (65.75\")     Vitals:    06/03/20 1031   PainSc: 0-No pain "        Constitutional: Appears well-developed and well-nourished. No distress.   ECOG: (0) Fully Active - Able to Carry On All Pre-disease Performance Without Restriction  HENT:   Head: Normocephalic.   Mouth/Throat: Oropharynx is clear and moist.   Eyes: Conjunctivae are normal. Pupils are equal, round, and reactive to light. No scleral icterus.   Neck: Neck supple. No JVD present.   Pulmonary/Chest: No respiratory distress.   Neurological: Alert and oriented to person, place, and time. Exhibits normal muscle tone.   Skin: No ecchymosis, no petechiae and no rash noted. Not diaphoretic. No cyanosis. Nails show no clubbing.   Psychiatric: Normal mood and affect.   Vitals reviewed.  Labs reviewed.    Lab Results   Component Value Date    HGB 10.7 (L) 06/02/2020    HCT 33.1 (L) 06/02/2020    MCV 77.3 (L) 06/02/2020     06/02/2020    WBC 7.74 06/02/2020    NEUTROABS 5.22 06/02/2020    LYMPHSABS 1.65 06/02/2020    MONOSABS 0.70 06/02/2020    EOSABS 0.11 06/02/2020    BASOSABS 0.04 06/02/2020       Lab Results   Component Value Date    GLUCOSE 230 (H) 04/06/2020    BUN 18 04/06/2020    CREATININE 0.58 04/06/2020     04/06/2020    K 4.0 04/06/2020    CL 96 (L) 04/06/2020    CO2 24.0 04/06/2020    CALCIUM 9.4 04/06/2020    PROTEINTOT 7.0 04/06/2020    ALBUMIN 4.50 04/06/2020    BILITOT 0.4 04/06/2020    ALKPHOS 76 04/06/2020    AST 21 04/06/2020    ALT 23 04/06/2020         ASSESSMENT & PLAN:    1.  Right breast cancer, stage Ia, T1 cN0 M0 ER weakly +1+, HER-2/ashley positive  2.  Iron deficiency anemia  3.  History of pulmonary embolus March 2020 found on work-up for possible endocarditis, currently on Eliquis until September 2020  4.  Complications with MRSA infection after first cycle of chemotherapy with Taxol and Herceptin    Discussion: I discussed with the patient at length the reasoning behind the decision to proceed with Herceptin alone, we would not want to add back chemotherapy in the face of COVID  pandemic and also with the MRSA infection complications that she has been through.  The patient had a list of questions and we went through those, all questions were answered to her satisfaction.  I also will let Dr. Gomez know that the patient still had some concerns so that he may address these with her himself.  I also discussed with the patient that she was welcome to get a second opinion with another provider if she was not comfortable with this decision.  She stated that she was comfortable proceeding with Herceptin alone and will continue on unchanged.  We also reviewed labs from yesterday which show her to be iron deficient, ferritin of 7.12, serum iron of 26 with iron saturation of 5%, TIBC 476, CBC with hemoglobin of 10.7 and hematocrit 33.1 MCV 77.3.  She has a history of having had colon surgery, states that she does have periodic bleeding and now that she is on Eliquis that has increased some.  I will start her on ferrous sulfate 1 tablet twice a day every other day, I will also get her back to Dr. Laya Marte for consideration of EGD and colonoscopy, she has seen her previously and it has been she thinks about 4 years since her last colonoscopy.  Plan to continue Eliquis for previous PE until September 2020.  I will see the patient back in 6 weeks for follow-up.    I spent a total of 25 minutes in direct patient care, greater than 20  minutes face to face, time was spent in coordination of care, and counseling the patient regarding plan of care and answering questions patient had regarding options also reviewed lab results from anemia and discussed the need for iron and endoscopy.  Answered any questions patient had regarding medications and plan of care.     Almita Pruitt, APRN    06/03/2020

## 2020-06-04 ENCOUNTER — INFUSION (OUTPATIENT)
Dept: ONCOLOGY | Facility: HOSPITAL | Age: 60
End: 2020-06-04

## 2020-06-04 VITALS
HEART RATE: 89 BPM | DIASTOLIC BLOOD PRESSURE: 73 MMHG | TEMPERATURE: 98.3 F | RESPIRATION RATE: 18 BRPM | SYSTOLIC BLOOD PRESSURE: 145 MMHG

## 2020-06-04 DIAGNOSIS — Z17.0 MALIGNANT NEOPLASM OF UPPER-OUTER QUADRANT OF RIGHT BREAST IN FEMALE, ESTROGEN RECEPTOR POSITIVE (HCC): Primary | ICD-10-CM

## 2020-06-04 DIAGNOSIS — C50.411 MALIGNANT NEOPLASM OF UPPER-OUTER QUADRANT OF RIGHT BREAST IN FEMALE, ESTROGEN RECEPTOR POSITIVE (HCC): Primary | ICD-10-CM

## 2020-06-04 PROCEDURE — 96413 CHEMO IV INFUSION 1 HR: CPT

## 2020-06-04 PROCEDURE — 25010000002 TRASTUZUMAB-ANNS 420 MG RECONSTITUTED SOLUTION 1 EACH VIAL: Performed by: INTERNAL MEDICINE

## 2020-06-04 RX ADMIN — SODIUM CHLORIDE 250 ML: 9 INJECTION, SOLUTION INTRAVENOUS at 10:07

## 2020-06-04 RX ADMIN — TRASTUZUMAB-ANNS 390 MG: 420 INJECTION, POWDER, LYOPHILIZED, FOR SOLUTION INTRAVENOUS at 10:07

## 2020-06-09 ENCOUNTER — TELEPHONE (OUTPATIENT)
Dept: ONCOLOGY | Facility: CLINIC | Age: 60
End: 2020-06-09

## 2020-06-09 NOTE — TELEPHONE ENCOUNTER
Called and tt patient about the points. Patient verbally understood all orders faxed to gastro and sent electronic script for stacyqupepper per CECI Recio. Patient understands diarrhea is probably not related to kanjinti but in fact was chronic prior to this.

## 2020-06-09 NOTE — TELEPHONE ENCOUNTER
----- Message from CECI Venegas sent at 6/9/2020  9:43 AM EDT -----  Lizett,    1.  I had put in the referral to Dr. Marte when I saw her last week, check with Jeffrey.  2.  She told me the diarrhea was chronic since prior colorectal surgery, doubt the Kanjinti.  3.  Yes, we will manage her Eliquis.      Thank you,  Almita  ----- Message -----  From: Lizett Jin MA  Sent: 6/9/2020   9:28 AM EDT  To: CECI Venegas,   She called the office and got LORRIE Chery and she took this message and gave to me. I'll call her back if you will let me know about the following issues:  1) you recommended her to have endo as well as colonoscopy - she needs an order for that.  2) had diarrhea since 6/4 beginning of Kanjinti - is this the cause. States hydrated, etc but still has please advise.  3) can we fill and manage her Eloquis?    thanks

## 2020-06-24 DIAGNOSIS — C50.411 MALIGNANT NEOPLASM OF UPPER-OUTER QUADRANT OF RIGHT BREAST IN FEMALE, ESTROGEN RECEPTOR POSITIVE (HCC): ICD-10-CM

## 2020-06-24 DIAGNOSIS — Z17.0 MALIGNANT NEOPLASM OF UPPER-OUTER QUADRANT OF RIGHT BREAST IN FEMALE, ESTROGEN RECEPTOR POSITIVE (HCC): ICD-10-CM

## 2020-06-24 RX ORDER — SODIUM CHLORIDE 9 MG/ML
250 INJECTION, SOLUTION INTRAVENOUS ONCE
Status: COMPLETED | OUTPATIENT
Start: 2020-06-25 | End: 2020-06-25

## 2020-06-24 RX ORDER — SODIUM CHLORIDE 9 MG/ML
250 INJECTION, SOLUTION INTRAVENOUS ONCE
Status: CANCELLED | OUTPATIENT
Start: 2020-07-16

## 2020-06-24 RX ORDER — SODIUM CHLORIDE 9 MG/ML
250 INJECTION, SOLUTION INTRAVENOUS ONCE
Status: CANCELLED | OUTPATIENT
Start: 2020-06-25

## 2020-06-25 ENCOUNTER — INFUSION (OUTPATIENT)
Dept: ONCOLOGY | Facility: HOSPITAL | Age: 60
End: 2020-06-25

## 2020-06-25 VITALS
DIASTOLIC BLOOD PRESSURE: 76 MMHG | WEIGHT: 139.8 LBS | SYSTOLIC BLOOD PRESSURE: 152 MMHG | HEART RATE: 79 BPM | BODY MASS INDEX: 22.74 KG/M2 | RESPIRATION RATE: 17 BRPM | TEMPERATURE: 98.2 F

## 2020-06-25 DIAGNOSIS — C50.411 MALIGNANT NEOPLASM OF UPPER-OUTER QUADRANT OF RIGHT BREAST IN FEMALE, ESTROGEN RECEPTOR POSITIVE (HCC): Primary | ICD-10-CM

## 2020-06-25 DIAGNOSIS — Z17.0 MALIGNANT NEOPLASM OF UPPER-OUTER QUADRANT OF RIGHT BREAST IN FEMALE, ESTROGEN RECEPTOR POSITIVE (HCC): Primary | ICD-10-CM

## 2020-06-25 PROCEDURE — 25010000002 TRASTUZUMAB-ANNS 420 MG RECONSTITUTED SOLUTION 1 EACH VIAL: Performed by: NURSE PRACTITIONER

## 2020-06-25 PROCEDURE — 96413 CHEMO IV INFUSION 1 HR: CPT

## 2020-06-25 RX ADMIN — SODIUM CHLORIDE 250 ML: 9 INJECTION, SOLUTION INTRAVENOUS at 13:52

## 2020-06-25 RX ADMIN — TRASTUZUMAB-ANNS 390 MG: 420 INJECTION, POWDER, LYOPHILIZED, FOR SOLUTION INTRAVENOUS at 13:52

## 2020-06-26 LAB
BASOPHILS # BLD AUTO: 0.1 X10E3/UL (ref 0–0.2)
BASOPHILS NFR BLD AUTO: 1 %
EOSINOPHIL # BLD AUTO: 0.1 X10E3/UL (ref 0–0.4)
EOSINOPHIL NFR BLD AUTO: 1 %
ERYTHROCYTE [DISTWIDTH] IN BLOOD BY AUTOMATED COUNT: 18.5 % (ref 11.7–15.4)
HCT VFR BLD AUTO: 34.3 % (ref 34–46.6)
HGB BLD-MCNC: 10.7 G/DL (ref 11.1–15.9)
IMM GRANULOCYTES # BLD AUTO: 0 X10E3/UL (ref 0–0.1)
IMM GRANULOCYTES NFR BLD AUTO: 0 %
LYMPHOCYTES # BLD AUTO: 1.9 X10E3/UL (ref 0.7–3.1)
LYMPHOCYTES NFR BLD AUTO: 22 %
MCH RBC QN AUTO: 24.9 PG (ref 26.6–33)
MCHC RBC AUTO-ENTMCNC: 31.2 G/DL (ref 31.5–35.7)
MCV RBC AUTO: 80 FL (ref 79–97)
MONOCYTES # BLD AUTO: 0.7 X10E3/UL (ref 0.1–0.9)
MONOCYTES NFR BLD AUTO: 8 %
NEUTROPHILS # BLD AUTO: 5.8 X10E3/UL (ref 1.4–7)
NEUTROPHILS NFR BLD AUTO: 68 %
PLATELET # BLD AUTO: 389 X10E3/UL (ref 150–450)
RBC # BLD AUTO: 4.29 X10E6/UL (ref 3.77–5.28)
WBC # BLD AUTO: 8.5 X10E3/UL (ref 3.4–10.8)

## 2020-07-16 ENCOUNTER — OFFICE VISIT (OUTPATIENT)
Dept: ONCOLOGY | Facility: CLINIC | Age: 60
End: 2020-07-16

## 2020-07-16 ENCOUNTER — INFUSION (OUTPATIENT)
Dept: ONCOLOGY | Facility: HOSPITAL | Age: 60
End: 2020-07-16

## 2020-07-16 VITALS
HEIGHT: 66 IN | SYSTOLIC BLOOD PRESSURE: 150 MMHG | HEART RATE: 77 BPM | TEMPERATURE: 97.8 F | DIASTOLIC BLOOD PRESSURE: 86 MMHG | WEIGHT: 143 LBS | BODY MASS INDEX: 22.98 KG/M2 | RESPIRATION RATE: 18 BRPM

## 2020-07-16 DIAGNOSIS — Z17.0 MALIGNANT NEOPLASM OF UPPER-OUTER QUADRANT OF RIGHT BREAST IN FEMALE, ESTROGEN RECEPTOR POSITIVE (HCC): ICD-10-CM

## 2020-07-16 DIAGNOSIS — Z17.0 MALIGNANT NEOPLASM OF UPPER-OUTER QUADRANT OF RIGHT BREAST IN FEMALE, ESTROGEN RECEPTOR POSITIVE (HCC): Primary | ICD-10-CM

## 2020-07-16 DIAGNOSIS — C50.411 MALIGNANT NEOPLASM OF UPPER-OUTER QUADRANT OF RIGHT FEMALE BREAST, UNSPECIFIED ESTROGEN RECEPTOR STATUS (HCC): ICD-10-CM

## 2020-07-16 DIAGNOSIS — Z51.11 ENCOUNTER FOR ANTINEOPLASTIC CHEMOTHERAPY: ICD-10-CM

## 2020-07-16 DIAGNOSIS — C50.411 MALIGNANT NEOPLASM OF UPPER-OUTER QUADRANT OF RIGHT BREAST IN FEMALE, ESTROGEN RECEPTOR POSITIVE (HCC): ICD-10-CM

## 2020-07-16 DIAGNOSIS — C50.411 MALIGNANT NEOPLASM OF UPPER-OUTER QUADRANT OF RIGHT BREAST IN FEMALE, ESTROGEN RECEPTOR POSITIVE (HCC): Primary | ICD-10-CM

## 2020-07-16 DIAGNOSIS — Z00.00 HEALTH CARE MAINTENANCE: Primary | ICD-10-CM

## 2020-07-16 PROCEDURE — 25010000002 TRASTUZUMAB-ANNS 420 MG RECONSTITUTED SOLUTION 1 EACH VIAL: Performed by: NURSE PRACTITIONER

## 2020-07-16 PROCEDURE — 99214 OFFICE O/P EST MOD 30 MIN: CPT | Performed by: NURSE PRACTITIONER

## 2020-07-16 PROCEDURE — 96413 CHEMO IV INFUSION 1 HR: CPT

## 2020-07-16 RX ORDER — SODIUM CHLORIDE 9 MG/ML
250 INJECTION, SOLUTION INTRAVENOUS ONCE
Status: CANCELLED | OUTPATIENT
Start: 2020-08-27

## 2020-07-16 RX ORDER — SODIUM CHLORIDE 9 MG/ML
250 INJECTION, SOLUTION INTRAVENOUS ONCE
Status: CANCELLED | OUTPATIENT
Start: 2020-08-06

## 2020-07-16 RX ADMIN — TRASTUZUMAB-ANNS 390 MG: 420 INJECTION, POWDER, LYOPHILIZED, FOR SOLUTION INTRAVENOUS at 11:29

## 2020-07-16 NOTE — PROGRESS NOTES
CHIEF COMPLAINT: Breast cancer    Problem List:  Oncology/Hematology History    1. 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  ER weakly 5% 1+ positive, FL 50% 1-2+ positive, HER-2/ashley 100% 3+ positive.  Negative cancer next panel  2. Significant diabetes with predating neuropathy due to spinal stenosis status post laminectomy 5/24/2019 with persistent neuropathy dorsum left foot  3. MRSA bacteremia due to port infection March 2020 after course 1 day 1 of Herceptin Taxol    Oncology history timeline:  -5/24/2019 Dr. Garcia saw for spinal stenosis with radiculopathy and left foot drop due to herniated disc and performed decompressive laminectomy, L4-5 and L5-S1 discectomy and medial facetectomy  -10/15/2019 mammogram BI-RADS 0  -11/22/2019 right mammogram/ultrasound BI-RADS 4 with ultrasound-guided core biopsy showing invasive ductal carcinoma Cory score 6 out of 9 grade 2, ER 5% 1+ positive FL 50% 1-2+ positive, HER-2/ashley 100% 3+ positive.  -12/13/2019 MRI breasts revealed 2.2 cm right breast mass consistent with biopsy proven cancer with unsuspected adjacent area's of non-mass enhancement worrisome for DCIS.  Non-mass enhancement in the subareolar left breast worrisome for DCIS.  Dominant focus left central portion left breast indeterminate BI-RADS 4.  Cancer next panel negative  -1/15/2020 CMP unremarkable save for glucose 200 with hemoglobin 10.6 normochromic normocytic indices  -1/21/2020 right skin sparing mastectomy with right sentinel lymph node injection and right deep subfascial sentinel lymph node biopsy with contralateral prophylactic left skin sparing mastectomy.  Right breast 1.8 cm Bloom Dias 8 out of 9, total of 4 lymph nodes examined 2 sentinel nodes all negative.  Pathological T1 cN0 M0 stage Ia.  Left breast benign with sclerosing  adenosis.  -2/26/2020 started Herceptin Taxol weekly  -3/2/2020 port removed due to MRSA bacteremia with port infection/purulence.  -3/2/2020 through 3/6/2020 hospitalized for MRSA bacteremia from port.  -3/31/2020 [facility] tele-visit: Patient still on IV antibiotics for MRSA port infection.  Decision made to go with Kanjinti alone every 3 weeks along with Arimidex.  -4/13/2020 per ID, patient has completed IV antibiotics and is now on oral antibiotics.        Malignant neoplasm of upper-outer quadrant of right breast in female, estrogen receptor positive (CMS/HCC)    1/16/2019 Cancer Staged     Staging form: Breast, AJCC 8th Edition  - Clinical stage from 1/16/2019: Stage IB (cT2, cN0, cM0, G2, ER+, MS+, HER2+) - Signed by Lisa Herman MD on 1/27/2020 1/21/2020 Initial Diagnosis     Malignant neoplasm of upper-outer quadrant of right female breast (CMS/HCC)      2/11/2020 Cancer Staged     Staging form: Breast, AJCC 8th Edition  - Pathologic: Stage IA (pT1c, pN0, cM0, G3, ER+, MS+, HER2+) - Signed by Hero Gomez MD on 2/11/2020 2/18/2020 -  Other Event     Echocardiogram  · Left ventricular systolic function is normal. Estimated EF = 55%.  · The global longitudinal strain was -19.5%.      2/25/2020 - 2/26/2020 Chemotherapy     OP CENTRAL VENOUS ACCESS DEVICE ACCESS, CARE, AND MAINTENANCE (CVAD)      2/26/2020 - 3/10/2020 Chemotherapy     OP BREAST PACLitaxel / Trastuzumab-anns (Weekly X 12)      3/2/2020 Adverse Reaction     MRSA bacteremia due to port infection with port removal after day 1 course 1 Herceptin Taxol      4/2/2020 -  Chemotherapy     OP BREAST Trastuzumab-anns Q21D (maintenance)      5/27/2020 -  Other Event     5/27/2020 echocardiogram EF 55%          HISTORY OF PRESENT ILLNESS:  The patient is a 59 y.o. female, here for follow up on management of right breast cancer.  Overall the patient is doing well, she is getting more stamina the farther she is out from  chemotherapy.  Has occasional diarrhea but also has occasional normal bowel movements, reports very little bleeding.  She has been able to be a little more active, she is trying to walk a little in the morning and in the evening when it is cooler.  She is asking for a change in her activity level for [facility] where she thinks she is able to work a few more hours weekly, she continues to work from home.  She has an appointment next week for upper and lower endoscopy.  Requesting a referral to gynecology, states that she has lichen sclerosis and was previously followed by gynecologist at the [facility] who has since moved.  She would like to stay in the [facility].      Past Medical History:   Diagnosis Date   • Diabetes mellitus (CMS/[facility])    • Diverticulitis    • GERD (gastroesophageal reflux disease)    • Kidney stone    • Malignant neoplasm of upper-outer quadrant of right female breast (CMS/HCC) 1/21/2020   • Sciatica    • Wears contact lenses      Past Surgical History:   Procedure Laterality Date   • APPENDECTOMY     • BREAST BIOPSY Right    • BREAST EXCISIONAL BIOPSY Right    • CHOLECYSTECTOMY     • COLON SURGERY     • COLONOSCOPY     • HYSTERECTOMY     • LUMBAR LAMINECTOMY DISCECTOMY DECOMPRESSION N/A 5/24/2019    Procedure: LUMBAR LAMINECTOMY L4-5 AND L5-S1;  Surgeon: Hipolito Kahn MD;  Location:  [facility];  Service: Orthopedic Spine   • MASTECTOMY W/ SENTINEL NODE BIOPSY Bilateral 1/21/2020    Procedure: SKIN SPARING MASTECTOMIES BILATERAL, SENTINEL NODE BIOPSY RIGHT;  Surgeon: Silvio Howard MD;  Location:  [facility];  Service: General   • VENOUS ACCESS DEVICE (PORT) REMOVAL N/A 3/3/2020    Procedure: REMOVAL PORT;  Surgeon: Silvio Howard MD;  Location:  [facility];  Service: General;  Laterality: N/A;       Allergies   Allergen Reactions   • Bactrim [Sulfamethoxazole-Trimethoprim] Rash     RASH, SKIN PEELING        Family History and Social History reviewed and changed as  "necessary      REVIEW OF SYSTEM:   Review of Systems   Constitutional: Negative for appetite change, chills, diaphoresis, fever and unexpected weight change.  Positive for mild fatigue.  HENT:   Negative for mouth sores, sore throat and trouble swallowing.    Eyes: Negative for icterus.   Respiratory: Negative for cough, hemoptysis and shortness of breath.    Cardiovascular: Negative for chest pain, leg swelling and palpitations.   Gastrointestinal: Negative for abdominal distention, abdominal pain, nausea and vomiting.  Positive for intermittent blood in stool, constipation, diarrhea,   Endocrine: Negative for hot flashes.   Genitourinary: Negative for bladder incontinence, difficulty urinating, dysuria, frequency and hematuria.    Musculoskeletal: Negative for gait problem, neck pain and neck stiffness.   Skin: Negative for rash.   Neurological: Negative for dizziness, gait problem, headaches, light-headedness and numbness.   Hematological: Negative for adenopathy. Does not bruise/bleed easily.   Psychiatric/Behavioral: Negative for depression. The patient is not nervous/anxious.    All other systems reviewed and are negative.       PHYSICAL EXAM    Vitals:    07/16/20 1033   BP: 150/86   Pulse: 77   Resp: 18   Temp: 97.8 °F (36.6 °C)   Weight: 64.9 kg (143 lb)   Height: 167 cm (65.75\")     Vitals:    07/16/20 1033   PainSc: 0-No pain        Constitutional: Appears well-developed and well-nourished. No distress.   ECOG: (1) Restricted in Physically Strenuous Activity, Ambulatory & Able to Do Work of Light Nature  HENT:   Head: Normocephalic.   Mouth/Throat: Oropharynx is clear and moist.   Eyes: Conjunctivae are normal. Pupils are equal, round, and reactive to light. No scleral icterus.   Neck: Neck supple. No JVD present. No thyromegaly present.   Cardiovascular: Normal rate, regular rhythm and normal heart sounds.    Pulmonary/Chest: Breath sounds normal. No respiratory distress.   Abdominal: Soft. Exhibits no " distension. There is no tenderness.    Musculoskeletal:Exhibits no edema, tenderness or deformity.   Neurological: Alert and oriented to person, place, and time. Exhibits normal muscle tone.   Skin: No ecchymosis, no petechiae and no rash noted. Not diaphoretic. No cyanosis.   Psychiatric: Normal mood and affect.   Vitals reviewed.  Labs reviewed.    Lab Results   Component Value Date    HGB 10.7 (L) 06/25/2020    HCT 34.3 06/25/2020    MCV 80 06/25/2020     06/25/2020    WBC 8.5 06/25/2020    NEUTROABS 5.8 06/25/2020    LYMPHSABS 1.9 06/25/2020    MONOSABS 0.7 06/25/2020    EOSABS 0.1 06/25/2020    BASOSABS 0.1 06/25/2020       Lab Results   Component Value Date    GLUCOSE 230 (H) 04/06/2020    BUN 18 04/06/2020    CREATININE 0.58 04/06/2020     04/06/2020    K 4.0 04/06/2020    CL 96 (L) 04/06/2020    CO2 24.0 04/06/2020    CALCIUM 9.4 04/06/2020    PROTEINTOT 7.0 04/06/2020    ALBUMIN 4.50 04/06/2020    BILITOT 0.4 04/06/2020    ALKPHOS 76 04/06/2020    AST 21 04/06/2020    ALT 23 04/06/2020         ASSESSMENT & PLAN:    1.  Right breast cancer, stage Ia, T1c N0 M0 ER weakly positive 1+, HER-2/ashley positive, had complications after first cycle of chemotherapy with Taxol and Herceptin therefore discontinued.  Currently on adjuvant therapy with Kanjinti: Overall the patient is doing well.  Tolerating therapy with Herceptin bio similar with no unusual side effects.  We plan on 1 year adjuvant therapy through February 2021.  5/27/2020 echocardiogram was normal with EF of 55%, we will repeat that late August prior to return and I have ordered that today.  She is also on anastrozole and tolerating without any unusual side effects, we plan at least 5 years adjuvant therapy if tolerated, she was only weakly ER positive.  She will need periodic bone density testing, she states she typically gets this through her gynecologist.    2.  Iron deficiency anemia: Her anemia is likely multifactorial, some could be  treatment effect from previous chemotherapy, she also is on Eliquis for pulmonary embolus and has periodic rectal bleeding that she state has occurred since prior colon surgery but has happened more often on Eliquis.  She is tolerating ferrous sulfate 1 tablet twice daily every other day.  She is scheduled for EGD and colonoscopy next week with Dr. Marte.      3.  History of pulmonary embolus March 2020 found on work-up for possible endocarditis, currently on Eliquis until September 2020: Patient doing well on Eliquis, no further clotting events.  We will continue until September 2020 and then discuss cessation.    4.  Need to establish care for routine gynecological care: The patient states that her gynecologist at the [facility] has moved, she would like a referral to someone in [facility].  She states that she has a history of lichen sclerosis.  I will refer her to CECI Tucker, she does not need to be seen until after October 15 for annual exam.      CECI Rosenberg    07/16/2020

## 2020-07-17 LAB
BASOPHILS # BLD AUTO: 0.1 X10E3/UL (ref 0–0.2)
BASOPHILS NFR BLD AUTO: 1 %
EOSINOPHIL # BLD AUTO: 0.1 X10E3/UL (ref 0–0.4)
EOSINOPHIL NFR BLD AUTO: 2 %
ERYTHROCYTE [DISTWIDTH] IN BLOOD BY AUTOMATED COUNT: 20 % (ref 11.7–15.4)
HCT VFR BLD AUTO: 34.3 % (ref 34–46.6)
HGB BLD-MCNC: 11 G/DL (ref 11.1–15.9)
IMM GRANULOCYTES # BLD AUTO: 0 X10E3/UL (ref 0–0.1)
IMM GRANULOCYTES NFR BLD AUTO: 0 %
LYMPHOCYTES # BLD AUTO: 1.7 X10E3/UL (ref 0.7–3.1)
LYMPHOCYTES NFR BLD AUTO: 22 %
MCH RBC QN AUTO: 26.1 PG (ref 26.6–33)
MCHC RBC AUTO-ENTMCNC: 32.1 G/DL (ref 31.5–35.7)
MCV RBC AUTO: 82 FL (ref 79–97)
MONOCYTES # BLD AUTO: 0.6 X10E3/UL (ref 0.1–0.9)
MONOCYTES NFR BLD AUTO: 8 %
NEUTROPHILS # BLD AUTO: 5 X10E3/UL (ref 1.4–7)
NEUTROPHILS NFR BLD AUTO: 67 %
PLATELET # BLD AUTO: 386 X10E3/UL (ref 150–450)
RBC # BLD AUTO: 4.21 X10E6/UL (ref 3.77–5.28)
WBC # BLD AUTO: 7.5 X10E3/UL (ref 3.4–10.8)

## 2020-08-06 ENCOUNTER — RESULTS ENCOUNTER (OUTPATIENT)
Dept: ONCOLOGY | Facility: CLINIC | Age: 60
End: 2020-08-06

## 2020-08-06 ENCOUNTER — INFUSION (OUTPATIENT)
Dept: ONCOLOGY | Facility: HOSPITAL | Age: 60
End: 2020-08-06

## 2020-08-06 VITALS
TEMPERATURE: 97.9 F | SYSTOLIC BLOOD PRESSURE: 149 MMHG | BODY MASS INDEX: 23.35 KG/M2 | WEIGHT: 143.6 LBS | RESPIRATION RATE: 16 BRPM | HEART RATE: 71 BPM | DIASTOLIC BLOOD PRESSURE: 61 MMHG

## 2020-08-06 DIAGNOSIS — C50.411 MALIGNANT NEOPLASM OF UPPER-OUTER QUADRANT OF RIGHT BREAST IN FEMALE, ESTROGEN RECEPTOR POSITIVE (HCC): ICD-10-CM

## 2020-08-06 DIAGNOSIS — Z17.0 MALIGNANT NEOPLASM OF UPPER-OUTER QUADRANT OF RIGHT BREAST IN FEMALE, ESTROGEN RECEPTOR POSITIVE (HCC): Primary | ICD-10-CM

## 2020-08-06 DIAGNOSIS — C50.411 MALIGNANT NEOPLASM OF UPPER-OUTER QUADRANT OF RIGHT BREAST IN FEMALE, ESTROGEN RECEPTOR POSITIVE (HCC): Primary | ICD-10-CM

## 2020-08-06 DIAGNOSIS — Z17.0 MALIGNANT NEOPLASM OF UPPER-OUTER QUADRANT OF RIGHT BREAST IN FEMALE, ESTROGEN RECEPTOR POSITIVE (HCC): ICD-10-CM

## 2020-08-06 PROCEDURE — 25010000002 TRASTUZUMAB-ANNS 420 MG RECONSTITUTED SOLUTION 1 EACH VIAL: Performed by: NURSE PRACTITIONER

## 2020-08-06 PROCEDURE — 96413 CHEMO IV INFUSION 1 HR: CPT

## 2020-08-06 RX ADMIN — TRASTUZUMAB-ANNS 390 MG: 420 INJECTION, POWDER, LYOPHILIZED, FOR SOLUTION INTRAVENOUS at 11:30

## 2020-08-07 LAB
BASOPHILS # BLD AUTO: 0 X10E3/UL (ref 0–0.2)
BASOPHILS NFR BLD AUTO: 0 %
EOSINOPHIL # BLD AUTO: 0.1 X10E3/UL (ref 0–0.4)
EOSINOPHIL NFR BLD AUTO: 2 %
ERYTHROCYTE [DISTWIDTH] IN BLOOD BY AUTOMATED COUNT: 18.8 % (ref 11.7–15.4)
HCT VFR BLD AUTO: 35.2 % (ref 34–46.6)
HGB BLD-MCNC: 11.4 G/DL (ref 11.1–15.9)
IMM GRANULOCYTES # BLD AUTO: 0 X10E3/UL (ref 0–0.1)
IMM GRANULOCYTES NFR BLD AUTO: 0 %
LYMPHOCYTES # BLD AUTO: 1.5 X10E3/UL (ref 0.7–3.1)
LYMPHOCYTES NFR BLD AUTO: 17 %
MCH RBC QN AUTO: 27.1 PG (ref 26.6–33)
MCHC RBC AUTO-ENTMCNC: 32.4 G/DL (ref 31.5–35.7)
MCV RBC AUTO: 84 FL (ref 79–97)
MONOCYTES # BLD AUTO: 0.6 X10E3/UL (ref 0.1–0.9)
MONOCYTES NFR BLD AUTO: 7 %
NEUTROPHILS # BLD AUTO: 6.3 X10E3/UL (ref 1.4–7)
NEUTROPHILS NFR BLD AUTO: 74 %
PLATELET # BLD AUTO: 399 X10E3/UL (ref 150–450)
RBC # BLD AUTO: 4.2 X10E6/UL (ref 3.77–5.28)
WBC # BLD AUTO: 8.5 X10E3/UL (ref 3.4–10.8)

## 2020-08-26 ENCOUNTER — HOSPITAL ENCOUNTER (OUTPATIENT)
Dept: CARDIOLOGY | Facility: HOSPITAL | Age: 60
Discharge: HOME | End: 2020-08-26
Admitting: NURSE PRACTITIONER

## 2020-08-26 VITALS — BODY MASS INDEX: 22.98 KG/M2 | WEIGHT: 143 LBS | HEIGHT: 66 IN

## 2020-08-26 DIAGNOSIS — C50.411 MALIGNANT NEOPLASM OF UPPER-OUTER QUADRANT OF RIGHT BREAST IN FEMALE, ESTROGEN RECEPTOR POSITIVE (HCC): ICD-10-CM

## 2020-08-26 DIAGNOSIS — Z51.11 ENCOUNTER FOR ANTINEOPLASTIC CHEMOTHERAPY: ICD-10-CM

## 2020-08-26 DIAGNOSIS — Z17.0 MALIGNANT NEOPLASM OF UPPER-OUTER QUADRANT OF RIGHT BREAST IN FEMALE, ESTROGEN RECEPTOR POSITIVE (HCC): ICD-10-CM

## 2020-08-26 LAB
BH CV ECHO MEAS - AO ROOT AREA (BSA CORRECTED): 1.8
BH CV ECHO MEAS - AO ROOT AREA: 7.5 CM^2
BH CV ECHO MEAS - AO ROOT DIAM: 3.1 CM
BH CV ECHO MEAS - BSA(HAYCOCK): 1.7 M^2
BH CV ECHO MEAS - BSA: 1.7 M^2
BH CV ECHO MEAS - BZI_BMI: 23.3 KILOGRAMS/M^2
BH CV ECHO MEAS - BZI_METRIC_HEIGHT: 167 CM
BH CV ECHO MEAS - BZI_METRIC_WEIGHT: 64.9 KG
BH CV ECHO MEAS - EDV(CUBED): 97.3 ML
BH CV ECHO MEAS - EDV(MOD-SP2): 52.6 ML
BH CV ECHO MEAS - EDV(MOD-SP4): 53.6 ML
BH CV ECHO MEAS - EDV(TEICH): 97.3 ML
BH CV ECHO MEAS - EF(CUBED): 69.4 %
BH CV ECHO MEAS - EF(MOD-BP): 55.1 %
BH CV ECHO MEAS - EF(MOD-SP2): 50.2 %
BH CV ECHO MEAS - EF(MOD-SP4): 57.1 %
BH CV ECHO MEAS - EF(TEICH): 61 %
BH CV ECHO MEAS - ESV(CUBED): 29.8 ML
BH CV ECHO MEAS - ESV(MOD-SP2): 26.2 ML
BH CV ECHO MEAS - ESV(MOD-SP4): 23 ML
BH CV ECHO MEAS - ESV(TEICH): 37.9 ML
BH CV ECHO MEAS - FS: 32.6 %
BH CV ECHO MEAS - IVS/LVPW: 1
BH CV ECHO MEAS - IVSD: 0.8 CM
BH CV ECHO MEAS - LA DIMENSION: 3 CM
BH CV ECHO MEAS - LA/AO: 0.97
BH CV ECHO MEAS - LV DIASTOLIC VOL/BSA (35-75): 31 ML/M^2
BH CV ECHO MEAS - LV MASS(C)D: 117.9 GRAMS
BH CV ECHO MEAS - LV MASS(C)DI: 68.2 GRAMS/M^2
BH CV ECHO MEAS - LV SYSTOLIC VOL/BSA (12-30): 13.3 ML/M^2
BH CV ECHO MEAS - LVIDD: 4.6 CM
BH CV ECHO MEAS - LVIDS: 3.1 CM
BH CV ECHO MEAS - LVLD AP2: 6.7 CM
BH CV ECHO MEAS - LVLD AP4: 7.3 CM
BH CV ECHO MEAS - LVLS AP2: 6 CM
BH CV ECHO MEAS - LVLS AP4: 6.2 CM
BH CV ECHO MEAS - LVOT AREA (M): 3.1 CM^2
BH CV ECHO MEAS - LVOT AREA: 3.1 CM^2
BH CV ECHO MEAS - LVOT DIAM: 2 CM
BH CV ECHO MEAS - LVPWD: 0.8 CM
BH CV ECHO MEAS - SI(CUBED): 39.1 ML/M^2
BH CV ECHO MEAS - SI(MOD-SP2): 15.3 ML/M^2
BH CV ECHO MEAS - SI(MOD-SP4): 17.7 ML/M^2
BH CV ECHO MEAS - SI(TEICH): 34.4 ML/M^2
BH CV ECHO MEAS - SV(CUBED): 67.5 ML
BH CV ECHO MEAS - SV(MOD-SP2): 26.4 ML
BH CV ECHO MEAS - SV(MOD-SP4): 30.6 ML
BH CV ECHO MEAS - SV(TEICH): 59.4 ML
LV EF 2D ECHO EST: 55 %
MAXIMAL PREDICTED HEART RATE: 160 BPM
STRESS TARGET HR: 136 BPM

## 2020-08-26 PROCEDURE — 93308 TTE F-UP OR LMTD: CPT

## 2020-08-26 PROCEDURE — 93325 DOPPLER ECHO COLOR FLOW MAPG: CPT | Performed by: INTERNAL MEDICINE

## 2020-08-26 PROCEDURE — 93308 TTE F-UP OR LMTD: CPT | Performed by: INTERNAL MEDICINE

## 2020-08-26 PROCEDURE — 93325 DOPPLER ECHO COLOR FLOW MAPG: CPT

## 2020-08-26 RX ORDER — SODIUM CHLORIDE 9 MG/ML
250 INJECTION, SOLUTION INTRAVENOUS ONCE
Status: COMPLETED | OUTPATIENT
Start: 2020-08-27 | End: 2020-08-27

## 2020-08-27 ENCOUNTER — INFUSION (OUTPATIENT)
Dept: ONCOLOGY | Facility: HOSPITAL | Age: 60
End: 2020-08-27

## 2020-08-27 ENCOUNTER — OFFICE VISIT (OUTPATIENT)
Dept: ONCOLOGY | Facility: CLINIC | Age: 60
End: 2020-08-27

## 2020-08-27 VITALS
TEMPERATURE: 97.8 F | HEIGHT: 66 IN | SYSTOLIC BLOOD PRESSURE: 194 MMHG | WEIGHT: 146 LBS | RESPIRATION RATE: 18 BRPM | BODY MASS INDEX: 23.46 KG/M2 | HEART RATE: 86 BPM | DIASTOLIC BLOOD PRESSURE: 96 MMHG

## 2020-08-27 VITALS — SYSTOLIC BLOOD PRESSURE: 127 MMHG | DIASTOLIC BLOOD PRESSURE: 73 MMHG

## 2020-08-27 DIAGNOSIS — Z51.11 ENCOUNTER FOR ANTINEOPLASTIC CHEMOTHERAPY: Primary | ICD-10-CM

## 2020-08-27 DIAGNOSIS — Z17.0 MALIGNANT NEOPLASM OF UPPER-OUTER QUADRANT OF RIGHT BREAST IN FEMALE, ESTROGEN RECEPTOR POSITIVE (HCC): ICD-10-CM

## 2020-08-27 DIAGNOSIS — C50.411 MALIGNANT NEOPLASM OF UPPER-OUTER QUADRANT OF RIGHT BREAST IN FEMALE, ESTROGEN RECEPTOR POSITIVE (HCC): Primary | ICD-10-CM

## 2020-08-27 DIAGNOSIS — Z17.0 MALIGNANT NEOPLASM OF UPPER-OUTER QUADRANT OF RIGHT BREAST IN FEMALE, ESTROGEN RECEPTOR POSITIVE (HCC): Primary | ICD-10-CM

## 2020-08-27 DIAGNOSIS — C50.411 MALIGNANT NEOPLASM OF UPPER-OUTER QUADRANT OF RIGHT BREAST IN FEMALE, ESTROGEN RECEPTOR POSITIVE (HCC): ICD-10-CM

## 2020-08-27 PROCEDURE — 96413 CHEMO IV INFUSION 1 HR: CPT

## 2020-08-27 PROCEDURE — 99213 OFFICE O/P EST LOW 20 MIN: CPT | Performed by: NURSE PRACTITIONER

## 2020-08-27 PROCEDURE — 25010000002 TRASTUZUMAB-ANNS 420 MG RECONSTITUTED SOLUTION 1 EACH VIAL: Performed by: NURSE PRACTITIONER

## 2020-08-27 RX ORDER — SODIUM CHLORIDE 9 MG/ML
250 INJECTION, SOLUTION INTRAVENOUS ONCE
Status: CANCELLED | OUTPATIENT
Start: 2020-09-17

## 2020-08-27 RX ORDER — SODIUM CHLORIDE 9 MG/ML
250 INJECTION, SOLUTION INTRAVENOUS ONCE
Status: CANCELLED | OUTPATIENT
Start: 2020-10-08

## 2020-08-27 RX ADMIN — TRASTUZUMAB-ANNS 390 MG: 420 INJECTION, POWDER, LYOPHILIZED, FOR SOLUTION INTRAVENOUS at 10:20

## 2020-08-27 RX ADMIN — SODIUM CHLORIDE 250 ML: 9 INJECTION, SOLUTION INTRAVENOUS at 10:19

## 2020-08-27 NOTE — PROGRESS NOTES
CHIEF COMPLAINT: Breast cancer    Problem List:  Oncology/Hematology History    1. 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  ER weakly 5% 1+ positive, CT 50% 1-2+ positive, HER-2/ashley 100% 3+ positive.  Negative cancer next panel  2. Significant diabetes with predating neuropathy due to spinal stenosis status post laminectomy 5/24/2019 with persistent neuropathy dorsum left foot  3. MRSA bacteremia due to port infection March 2020 after course 1 day 1 of Herceptin Taxol    Oncology history timeline:  -5/24/2019 Dr. Garcia saw for spinal stenosis with radiculopathy and left foot drop due to herniated disc and performed decompressive laminectomy, L4-5 and L5-S1 discectomy and medial facetectomy  -10/15/2019 mammogram BI-RADS 0  -11/22/2019 right mammogram/ultrasound BI-RADS 4 with ultrasound-guided core biopsy showing invasive ductal carcinoma Cory score 6 out of 9 grade 2, ER 5% 1+ positive CT 50% 1-2+ positive, HER-2/ashley 100% 3+ positive.  -12/13/2019 MRI breasts revealed 2.2 cm right breast mass consistent with biopsy proven cancer with unsuspected adjacent area's of non-mass enhancement worrisome for DCIS.  Non-mass enhancement in the subareolar left breast worrisome for DCIS.  Dominant focus left central portion left breast indeterminate BI-RADS 4.  Cancer next panel negative  -1/15/2020 CMP unremarkable save for glucose 200 with hemoglobin 10.6 normochromic normocytic indices  -1/21/2020 right skin sparing mastectomy with right sentinel lymph node injection and right deep subfascial sentinel lymph node biopsy with contralateral prophylactic left skin sparing mastectomy.  Right breast 1.8 cm Bloom Dias 8 out of 9, total of 4 lymph nodes examined 2 sentinel nodes all negative.  Pathological T1 cN0 M0 stage Ia.  Left breast benign with sclerosing  adenosis.  -2/26/2020 started Herceptin Taxol weekly  -3/2/2020 port removed due to MRSA bacteremia with port infection/purulence.  -3/2/2020 through 3/6/2020 hospitalized for MRSA bacteremia from port.  -3/31/2020 [facility] tele-visit: Patient still on IV antibiotics for MRSA port infection.  Decision made to go with Kanjinti alone every 3 weeks along with Arimidex.  -4/13/2020 per ID, patient has completed IV antibiotics and is now on oral antibiotics.        Malignant neoplasm of upper-outer quadrant of right breast in female, estrogen receptor positive (CMS/HCC)    1/16/2019 Cancer Staged     Staging form: Breast, AJCC 8th Edition  - Clinical stage from 1/16/2019: Stage IB (cT2, cN0, cM0, G2, ER+, AK+, HER2+) - Signed by Lisa Herman MD on 1/27/2020 1/21/2020 Initial Diagnosis     Malignant neoplasm of upper-outer quadrant of right female breast (CMS/HCC)      2/11/2020 Cancer Staged     Staging form: Breast, AJCC 8th Edition  - Pathologic: Stage IA (pT1c, pN0, cM0, G3, ER+, AK+, HER2+) - Signed by Hero Gomez MD on 2/11/2020 2/18/2020 -  Other Event     Echocardiogram  · Left ventricular systolic function is normal. Estimated EF = 55%.  · The global longitudinal strain was -19.5%.      2/25/2020 - 2/26/2020 Chemotherapy     OP CENTRAL VENOUS ACCESS DEVICE ACCESS, CARE, AND MAINTENANCE (CVAD)      2/26/2020 - 3/10/2020 Chemotherapy     OP BREAST PACLitaxel / Trastuzumab-anns (Weekly X 12)      3/2/2020 Adverse Reaction     MRSA bacteremia due to port infection with port removal after day 1 course 1 Herceptin Taxol      3/31/2020 -  Hormonal Therapy     Arimidex for planned 5 years      4/2/2020 -  Chemotherapy     OP BREAST Trastuzumab-anns Q21D (maintenance)      5/27/2020 -  Other Event     5/27/2020 echocardiogram EF 55%       7/23/2020 Procedure     Colonoscopy with Dr. Marte, normal      8/26/2020 -  Other Event     Echocardiogram   · This was a limited echocardiogram to assess  left ventricular ejection fraction in the setting of chemotherapy.  · Left ventricular systolic function is normal. Calculated EF = 55.1%. Estimated EF = 55%. Global Longitudinal LV strain = -18.2%.  · Estimated EF = 55%.         HISTORY OF PRESENT ILLNESS:  The patient is a 59 y.o. female, here for follow up on management of triple positive right breast cancer.  She continues to do well overall.  She is getting more stamina the farther she is out from chemotherapy, states that she is walking at least 1 mile daily.  She continues to work from home.  Underwent colonoscopy on 7/23/2020 that was normal other than for moderate sigmoid diverticulosis and internal hemorrhoids.    Past Medical History:   Diagnosis Date   • Diabetes mellitus (CMS/HCC)    • Diverticulitis    • GERD (gastroesophageal reflux disease)    • Kidney stone    • Malignant neoplasm of upper-outer quadrant of right female breast (CMS/HCC) 1/21/2020   • Sciatica    • Wears contact lenses      Past Surgical History:   Procedure Laterality Date   • APPENDECTOMY     • BREAST BIOPSY Right    • BREAST EXCISIONAL BIOPSY Right    • CHOLECYSTECTOMY     • COLON SURGERY     • COLONOSCOPY     • HYSTERECTOMY     • LUMBAR LAMINECTOMY DISCECTOMY DECOMPRESSION N/A 5/24/2019    Procedure: LUMBAR LAMINECTOMY L4-5 AND L5-S1;  Surgeon: Hipolito Kahn MD;  Location:  [facility];  Service: Orthopedic Spine   • MASTECTOMY W/ SENTINEL NODE BIOPSY Bilateral 1/21/2020    Procedure: SKIN SPARING MASTECTOMIES BILATERAL, SENTINEL NODE BIOPSY RIGHT;  Surgeon: Silvio Howard MD;  Location:  [facility];  Service: General   • VENOUS ACCESS DEVICE (PORT) REMOVAL N/A 3/3/2020    Procedure: REMOVAL PORT;  Surgeon: Silvio Howard MD;  Location:  [facility];  Service: General;  Laterality: N/A;       Allergies   Allergen Reactions   • Bactrim [Sulfamethoxazole-Trimethoprim] Rash     RASH, SKIN PEELING        Family History and Social History reviewed and changed as  "necessary      REVIEW OF SYSTEM:   Review of Systems   Constitutional: Negative for appetite change, chills, diaphoresis, fever and unexpected weight change.  Positive for mild fatigue.  HENT:   Negative for mouth sores, sore throat and trouble swallowing.    Eyes: Negative for icterus.   Respiratory: Negative for cough, hemoptysis and shortness of breath.    Cardiovascular: Negative for chest pain, leg swelling and palpitations.   Gastrointestinal: Negative for abdominal distention, abdominal pain, nausea and vomiting.     Endocrine: Negative for hot flashes.   Genitourinary: Negative for bladder incontinence, difficulty urinating, dysuria, frequency and hematuria.    Musculoskeletal: Negative for gait problem, neck pain and neck stiffness.   Skin: Negative for rash.   Neurological: Negative for dizziness, gait problem, headaches, light-headedness and numbness.   Hematological: Negative for adenopathy. Does not bruise/bleed easily.   Psychiatric/Behavioral: Negative for depression. The patient is not nervous/anxious.    All other systems reviewed and are negative.       PHYSICAL EXAM    Vitals:    08/27/20 0928   BP: (!) 194/96   Pulse: 86   Resp: 18   Temp: 97.8 °F (36.6 °C)   Weight: 66.2 kg (146 lb)   Height: 167 cm (65.75\")     Vitals:    08/27/20 0928   PainSc: 0-No pain        Constitutional: Appears well-developed and well-nourished. No distress.   ECOG: (1) Restricted in Physically Strenuous Activity, Ambulatory & Able to Do Work of Light Nature  HENT:   Head: Normocephalic.   Mouth/Throat: Oropharynx is clear and moist.   Eyes: Conjunctivae are normal. Pupils are equal, round, and reactive to light. No scleral icterus.   Neck: Neck supple. No JVD present. No thyromegaly present.   Cardiovascular: Normal rate, regular rhythm and normal heart sounds.    Pulmonary/Chest: Breath sounds normal. No respiratory distress.   Abdominal: Soft. Exhibits no distension. There is no tenderness.  "   Musculoskeletal:Exhibits no edema, tenderness or deformity.   Neurological: Alert and oriented to person, place, and time. Exhibits normal muscle tone.   Skin: No ecchymosis, no petechiae and no rash noted. Not diaphoretic. No cyanosis.   Psychiatric: Normal mood and affect.   Vitals reviewed.  Labs reviewed.    Lab Results   Component Value Date    HGB 11.4 08/06/2020    HCT 35.2 08/06/2020    MCV 84 08/06/2020     08/06/2020    WBC 8.5 08/06/2020    NEUTROABS 6.3 08/06/2020    LYMPHSABS 1.5 08/06/2020    MONOSABS 0.6 08/06/2020    EOSABS 0.1 08/06/2020    BASOSABS 0.0 08/06/2020       Lab Results   Component Value Date    GLUCOSE 230 (H) 04/06/2020    BUN 18 04/06/2020    CREATININE 0.58 04/06/2020     04/06/2020    K 4.0 04/06/2020    CL 96 (L) 04/06/2020    CO2 24.0 04/06/2020    CALCIUM 9.4 04/06/2020    PROTEINTOT 7.0 04/06/2020    ALBUMIN 4.50 04/06/2020    BILITOT 0.4 04/06/2020    ALKPHOS 76 04/06/2020    AST 21 04/06/2020    ALT 23 04/06/2020         ASSESSMENT & PLAN:    1.  Right breast cancer, stage Ia, T1c N0 M0 ER weakly positive 1+, HER-2/ashley positive, had complications after first cycle of chemotherapy with Taxol and Herceptin therefore discontinued.  Currently on adjuvant therapy with Kanjinti: She continues to do well with no current complaints.  Tolerating therapy with Herceptin bio similar with no unusual side effects.  We plan on 1 year adjuvant therapy through February 2021.  Current echocardiogram 8/26/2020 was normal with EF of 55%, we will repeat quarterly while on Herceptin, next one will be due late November.  She is also on anastrozole and tolerating without any unusual side effects, we plan at least 5 years adjuvant therapy if tolerated, she was only weakly ER positive.  She will need periodic bone density testing, she states she typically gets this through her gynecologist, she has an appointment with CECI Tucker in October.    2.  Iron deficiency anemia:  Resolved, CBC on 8/6/2020 was normal.  She had colonoscopy 7/23/2020 with Dr. Marte that was normal.      3.  History of pulmonary embolus March 2020 found on work-up for possible endocarditis, currently on Eliquis until September 2020: Patient doing well on Eliquis, no further clotting events.  She will complete 6 months of treatment through September 2020 and then will discontinue.    I will see her back for follow-up in 6 weeks.    I spent a total of 15 minutes in direct patient care, greater than 10  minutes face to face, time was spent in coordination of care, and counseling the patient regarding reviewing current echocardiogram, reviewing anticoagulation with Eliquis and length of therapy, discussion of plan of care going forward.  Answered any questions patient had regarding medications and plan of care.   Almita Pruitt, APRN    08/27/2020

## 2020-08-28 LAB
BASOPHILS # BLD AUTO: 0.06 10*3/MM3 (ref 0–0.2)
BASOPHILS NFR BLD AUTO: 0.8 % (ref 0–1.5)
EOSINOPHIL # BLD AUTO: 0.17 10*3/MM3 (ref 0–0.4)
EOSINOPHIL NFR BLD AUTO: 2.4 % (ref 0.3–6.2)
ERYTHROCYTE [DISTWIDTH] IN BLOOD BY AUTOMATED COUNT: 17.4 % (ref 12.3–15.4)
HCT VFR BLD AUTO: 36.8 % (ref 34–46.6)
HGB BLD-MCNC: 12.1 G/DL (ref 12–15.9)
IMM GRANULOCYTES # BLD AUTO: 0.03 10*3/MM3 (ref 0–0.05)
IMM GRANULOCYTES NFR BLD AUTO: 0.4 % (ref 0–0.5)
LYMPHOCYTES # BLD AUTO: 1.33 10*3/MM3 (ref 0.7–3.1)
LYMPHOCYTES NFR BLD AUTO: 18.8 % (ref 19.6–45.3)
MCH RBC QN AUTO: 29.2 PG (ref 26.6–33)
MCHC RBC AUTO-ENTMCNC: 32.9 G/DL (ref 31.5–35.7)
MCV RBC AUTO: 88.7 FL (ref 79–97)
MONOCYTES # BLD AUTO: 0.69 10*3/MM3 (ref 0.1–0.9)
MONOCYTES NFR BLD AUTO: 9.7 % (ref 5–12)
NEUTROPHILS # BLD AUTO: 4.8 10*3/MM3 (ref 1.7–7)
NEUTROPHILS NFR BLD AUTO: 67.9 % (ref 42.7–76)
NRBC BLD AUTO-RTO: 0 /100 WBC (ref 0–0.2)
PLATELET # BLD AUTO: 351 10*3/MM3 (ref 140–450)
RBC # BLD AUTO: 4.15 10*6/MM3 (ref 3.77–5.28)
WBC # BLD AUTO: 7.08 10*3/MM3 (ref 3.4–10.8)

## 2020-09-15 RX ORDER — SODIUM CHLORIDE 9 MG/ML
250 INJECTION, SOLUTION INTRAVENOUS ONCE
Status: DISCONTINUED | OUTPATIENT
Start: 2020-09-16 | End: 2020-09-16 | Stop reason: HOSPADM

## 2020-09-16 ENCOUNTER — INFUSION (OUTPATIENT)
Dept: ONCOLOGY | Facility: HOSPITAL | Age: 60
End: 2020-09-16

## 2020-09-16 VITALS
DIASTOLIC BLOOD PRESSURE: 73 MMHG | RESPIRATION RATE: 18 BRPM | BODY MASS INDEX: 23.74 KG/M2 | WEIGHT: 146 LBS | TEMPERATURE: 97.8 F | SYSTOLIC BLOOD PRESSURE: 144 MMHG | HEART RATE: 73 BPM

## 2020-09-16 DIAGNOSIS — Z17.0 MALIGNANT NEOPLASM OF UPPER-OUTER QUADRANT OF RIGHT BREAST IN FEMALE, ESTROGEN RECEPTOR POSITIVE (HCC): Primary | ICD-10-CM

## 2020-09-16 DIAGNOSIS — C50.411 MALIGNANT NEOPLASM OF UPPER-OUTER QUADRANT OF RIGHT BREAST IN FEMALE, ESTROGEN RECEPTOR POSITIVE (HCC): Primary | ICD-10-CM

## 2020-09-16 PROCEDURE — 96413 CHEMO IV INFUSION 1 HR: CPT

## 2020-09-16 PROCEDURE — 25010000002 TRASTUZUMAB-ANNS 420 MG RECONSTITUTED SOLUTION 1 EACH VIAL: Performed by: NURSE PRACTITIONER

## 2020-09-16 RX ADMIN — TRASTUZUMAB-ANNS 390 MG: 420 INJECTION, POWDER, LYOPHILIZED, FOR SOLUTION INTRAVENOUS at 13:21

## 2020-09-17 ENCOUNTER — TRANSCRIBE ORDERS (OUTPATIENT)
Dept: ADMINISTRATIVE | Facility: HOSPITAL | Age: 60
End: 2020-09-17

## 2020-09-17 DIAGNOSIS — I82.90 ACUTE DEEP VEIN THROMBOSIS (DVT) OF NON-EXTREMITY VEIN: Primary | ICD-10-CM

## 2020-09-17 LAB
BASOPHILS # BLD AUTO: 0.07 10*3/MM3 (ref 0–0.2)
BASOPHILS NFR BLD AUTO: 0.9 % (ref 0–1.5)
EOSINOPHIL # BLD AUTO: 0.24 10*3/MM3 (ref 0–0.4)
EOSINOPHIL NFR BLD AUTO: 3.2 % (ref 0.3–6.2)
ERYTHROCYTE [DISTWIDTH] IN BLOOD BY AUTOMATED COUNT: 14.9 % (ref 12.3–15.4)
HCT VFR BLD AUTO: 35.1 % (ref 34–46.6)
HGB BLD-MCNC: 12.2 G/DL (ref 12–15.9)
IMM GRANULOCYTES # BLD AUTO: 0.03 10*3/MM3 (ref 0–0.05)
IMM GRANULOCYTES NFR BLD AUTO: 0.4 % (ref 0–0.5)
LYMPHOCYTES # BLD AUTO: 1.5 10*3/MM3 (ref 0.7–3.1)
LYMPHOCYTES NFR BLD AUTO: 20.1 % (ref 19.6–45.3)
MCH RBC QN AUTO: 30.7 PG (ref 26.6–33)
MCHC RBC AUTO-ENTMCNC: 34.8 G/DL (ref 31.5–35.7)
MCV RBC AUTO: 88.4 FL (ref 79–97)
MONOCYTES # BLD AUTO: 0.75 10*3/MM3 (ref 0.1–0.9)
MONOCYTES NFR BLD AUTO: 10 % (ref 5–12)
NEUTROPHILS # BLD AUTO: 4.89 10*3/MM3 (ref 1.7–7)
NEUTROPHILS NFR BLD AUTO: 65.4 % (ref 42.7–76)
NRBC BLD AUTO-RTO: 0 /100 WBC (ref 0–0.2)
PLATELET # BLD AUTO: 328 10*3/MM3 (ref 140–450)
RBC # BLD AUTO: 3.97 10*6/MM3 (ref 3.77–5.28)
WBC # BLD AUTO: 7.48 10*3/MM3 (ref 3.4–10.8)

## 2020-09-22 ENCOUNTER — HOSPITAL ENCOUNTER (OUTPATIENT)
Dept: CARDIOLOGY | Facility: HOSPITAL | Age: 60
Discharge: HOME | End: 2020-09-22
Admitting: INTERNAL MEDICINE

## 2020-09-22 DIAGNOSIS — I82.90 ACUTE DEEP VEIN THROMBOSIS (DVT) OF NON-EXTREMITY VEIN: ICD-10-CM

## 2020-09-22 LAB
BH CV UPPER VENOUS LEFT AXILLARY AUGMENT: NORMAL
BH CV UPPER VENOUS LEFT AXILLARY COMPRESS: NORMAL
BH CV UPPER VENOUS LEFT AXILLARY PHASIC: NORMAL
BH CV UPPER VENOUS LEFT AXILLARY SPONT: NORMAL
BH CV UPPER VENOUS LEFT BASILIC FOREARM COMPRESS: NORMAL
BH CV UPPER VENOUS LEFT BASILIC UPPER COMPRESS: NORMAL
BH CV UPPER VENOUS LEFT BRACHIAL COMPRESS: NORMAL
BH CV UPPER VENOUS LEFT CEPHALIC FOREARM COMPRESS: NORMAL
BH CV UPPER VENOUS LEFT CEPHALIC UPPER COMPRESS: NORMAL
BH CV UPPER VENOUS LEFT INTERNAL JUGULAR AUGMENT: NORMAL
BH CV UPPER VENOUS LEFT INTERNAL JUGULAR COMPRESS: NORMAL
BH CV UPPER VENOUS LEFT INTERNAL JUGULAR PHASIC: NORMAL
BH CV UPPER VENOUS LEFT INTERNAL JUGULAR SPONT: NORMAL
BH CV UPPER VENOUS LEFT RADIAL COMPRESS: NORMAL
BH CV UPPER VENOUS LEFT SUBCLAVIAN AUGMENT: NORMAL
BH CV UPPER VENOUS LEFT SUBCLAVIAN COMPRESS: NORMAL
BH CV UPPER VENOUS LEFT SUBCLAVIAN PHASIC: NORMAL
BH CV UPPER VENOUS LEFT SUBCLAVIAN SPONT: NORMAL
BH CV UPPER VENOUS LEFT ULNAR COMPRESS: NORMAL
BH CV UPPER VENOUS RIGHT SUBCLAVIAN AUGMENT: NORMAL
BH CV UPPER VENOUS RIGHT SUBCLAVIAN COMPRESS: NORMAL
BH CV UPPER VENOUS RIGHT SUBCLAVIAN PHASIC: NORMAL
BH CV UPPER VENOUS RIGHT SUBCLAVIAN SPONT: NORMAL

## 2020-09-22 PROCEDURE — 93971 EXTREMITY STUDY: CPT | Performed by: INTERNAL MEDICINE

## 2020-09-22 PROCEDURE — 93971 EXTREMITY STUDY: CPT

## 2020-10-07 ENCOUNTER — OFFICE VISIT (OUTPATIENT)
Dept: ONCOLOGY | Facility: CLINIC | Age: 60
End: 2020-10-07

## 2020-10-07 ENCOUNTER — INFUSION (OUTPATIENT)
Dept: ONCOLOGY | Facility: HOSPITAL | Age: 60
End: 2020-10-07

## 2020-10-07 VITALS
HEART RATE: 78 BPM | WEIGHT: 149 LBS | SYSTOLIC BLOOD PRESSURE: 180 MMHG | RESPIRATION RATE: 18 BRPM | DIASTOLIC BLOOD PRESSURE: 98 MMHG | BODY MASS INDEX: 23.95 KG/M2 | HEIGHT: 66 IN | TEMPERATURE: 97.8 F

## 2020-10-07 DIAGNOSIS — C50.411 MALIGNANT NEOPLASM OF UPPER-OUTER QUADRANT OF RIGHT BREAST IN FEMALE, ESTROGEN RECEPTOR POSITIVE (HCC): Primary | ICD-10-CM

## 2020-10-07 DIAGNOSIS — Z17.0 MALIGNANT NEOPLASM OF UPPER-OUTER QUADRANT OF RIGHT BREAST IN FEMALE, ESTROGEN RECEPTOR POSITIVE (HCC): Primary | ICD-10-CM

## 2020-10-07 DIAGNOSIS — Z51.11 ENCOUNTER FOR ANTINEOPLASTIC CHEMOTHERAPY: ICD-10-CM

## 2020-10-07 PROCEDURE — 96413 CHEMO IV INFUSION 1 HR: CPT

## 2020-10-07 PROCEDURE — 99213 OFFICE O/P EST LOW 20 MIN: CPT | Performed by: NURSE PRACTITIONER

## 2020-10-07 PROCEDURE — 25010000002 TRASTUZUMAB-ANNS 420 MG RECONSTITUTED SOLUTION 1 EACH VIAL: Performed by: NURSE PRACTITIONER

## 2020-10-07 RX ORDER — SODIUM CHLORIDE 9 MG/ML
250 INJECTION, SOLUTION INTRAVENOUS ONCE
Status: CANCELLED | OUTPATIENT
Start: 2020-11-19

## 2020-10-07 RX ORDER — SEMAGLUTIDE 1.34 MG/ML
INJECTION, SOLUTION SUBCUTANEOUS
COMMUNITY
Start: 2020-09-21 | End: 2020-12-21 | Stop reason: SDUPTHER

## 2020-10-07 RX ORDER — SODIUM CHLORIDE 9 MG/ML
250 INJECTION, SOLUTION INTRAVENOUS ONCE
Status: CANCELLED | OUTPATIENT
Start: 2020-10-29

## 2020-10-07 RX ADMIN — TRASTUZUMAB-ANNS 390 MG: 420 INJECTION, POWDER, LYOPHILIZED, FOR SOLUTION INTRAVENOUS at 10:25

## 2020-10-07 NOTE — PROGRESS NOTES
CHIEF COMPLAINT: Breast cancer    Problem List:  Oncology/Hematology History Overview Note   1. 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  ER weakly 5% 1+ positive, AK 50% 1-2+ positive, HER-2/ashley 100% 3+ positive.  Negative cancer next panel  2. Significant diabetes with predating neuropathy due to spinal stenosis status post laminectomy 5/24/2019 with persistent neuropathy dorsum left foot  3. MRSA bacteremia due to port infection March 2020 after course 1 day 1 of Herceptin Taxol    Oncology history timeline:  -5/24/2019 Dr. Garcia saw for spinal stenosis with radiculopathy and left foot drop due to herniated disc and performed decompressive laminectomy, L4-5 and L5-S1 discectomy and medial facetectomy  -10/15/2019 mammogram BI-RADS 0  -11/22/2019 right mammogram/ultrasound BI-RADS 4 with ultrasound-guided core biopsy showing invasive ductal carcinoma [place] score 6 out of 9 grade 2, ER 5% 1+ positive AK 50% 1-2+ positive, HER-2/ashley 100% 3+ positive.  -12/13/2019 MRI breasts revealed 2.2 cm right breast mass consistent with biopsy proven cancer with unsuspected adjacent area's of non-mass enhancement worrisome for DCIS.  Non-mass enhancement in the subareolar left breast worrisome for DCIS.  Dominant focus left central portion left breast indeterminate BI-RADS 4.  Cancer next panel negative  -1/15/2020 CMP unremarkable save for glucose 200 with hemoglobin 10.6 normochromic normocytic indices  -1/21/2020 right skin sparing mastectomy with right sentinel lymph node injection and right deep subfascial sentinel lymph node biopsy with contralateral prophylactic left skin sparing mastectomy.  Right breast 1.8 cm Bloom Dias 8 out of 9, total of 4 lymph nodes examined 2 sentinel nodes all negative.  Pathological T1 cN0 M0 stage Ia.  Left breast benign with  sclerosing adenosis.  -2/26/2020 started Herceptin Taxol weekly  -3/2/2020 port removed due to MRSA bacteremia with port infection/purulence.  -3/2/2020 through 3/6/2020 hospitalized for MRSA bacteremia from port.  -3/31/2020 [facility] tele-visit: Patient still on IV antibiotics for MRSA port infection.  Decision made to go with Kanjinti alone every 3 weeks along with Arimidex.  -4/13/2020 per ID, patient has completed IV antibiotics and is now on oral antibiotics.     Malignant neoplasm of upper-outer quadrant of right breast in female, estrogen receptor positive (CMS/HCC)   1/16/2019 Cancer Staged    Staging form: Breast, AJCC 8th Edition  - Clinical stage from 1/16/2019: Stage IB (cT2, cN0, cM0, G2, ER+, CT+, HER2+) - Signed by Lisa Herman MD on 1/27/2020 1/21/2020 Initial Diagnosis    Malignant neoplasm of upper-outer quadrant of right female breast (CMS/HCC)     2/11/2020 Cancer Staged    Staging form: Breast, AJCC 8th Edition  - Pathologic: Stage IA (pT1c, pN0, cM0, G3, ER+, CT+, HER2+) - Signed by Hero Gomez MD on 2/11/2020 2/18/2020 -  Other Event    Echocardiogram  · Left ventricular systolic function is normal. Estimated EF = 55%.  · The global longitudinal strain was -19.5%.     2/25/2020 - 2/26/2020 Chemotherapy    OP CENTRAL VENOUS ACCESS DEVICE ACCESS, CARE, AND MAINTENANCE (CV[facility])     2/26/2020 - 3/10/2020 Chemotherapy    OP BREAST PACLitaxel / Trastuzumab-anns (Weekly X 12)     3/2/2020 Adverse Reaction    MRSA bacteremia due to port infection with port removal after day 1 course 1 Herceptin Taxol     3/31/2020 -  Hormonal Therapy    Arimidex for planned 5 years     4/2/2020 -  Chemotherapy    OP BREAST Trastuzumab-anns Q21D (maintenance)     5/27/2020 -  Other Event    5/27/2020 echocardiogram EF 55%      7/23/2020 Procedure    Colonoscopy with Dr. Marte, normal     8/26/2020 -  Other Event    Echocardiogram   · This was a limited echocardiogram to assess left ventricular  ejection fraction in the setting of chemotherapy.  · Left ventricular systolic function is normal. Calculated EF = 55.1%. Estimated EF = 55%. Global Longitudinal LV strain = -18.2%.  · Estimated EF = 55%.         HISTORY OF PRESENT ILLNESS:  The patient is a 59 y.o. female, here for follow up on management of triple positive right breast cancer currently on adjuvant Kanjinti.  She continues to do well overall.  Has had no unusual shortness of breath, cough or lower extremity swelling.  Continues to work from home.  Emotionally at times can feel isolated.  She has been making it a point to continue to meet with friends monthly outdoors practicing good social distancing, this has been good for her psychologically.  Thinking that maybe support groups for breast cancer patients would be beneficial.  Stopped Eliquis in September and has had no symptoms concerning for clotting events.  States that she saw Dr. Chávez with infectious disease, she had an ultrasound that showed the clot had resolved.  I reviewed her venous Doppler study from 9/22/2020 of the left upper vessels that was normal.    Past Medical History:   Diagnosis Date   • Diabetes mellitus (CMS/HCC)    • Diverticulitis    • GERD (gastroesophageal reflux disease)    • Kidney stone    • Malignant neoplasm of upper-outer quadrant of right female breast (CMS/HCC) 1/21/2020   • Sciatica    • Wears contact lenses      Past Surgical History:   Procedure Laterality Date   • APPENDECTOMY     • BREAST BIOPSY Right    • BREAST EXCISIONAL BIOPSY Right    • CHOLECYSTECTOMY     • COLON SURGERY     • COLONOSCOPY     • HYSTERECTOMY     • LUMBAR LAMINECTOMY DISCECTOMY DECOMPRESSION N/A 5/24/2019    Procedure: LUMBAR LAMINECTOMY L4-5 AND L5-S1;  Surgeon: Hipolito Kahn MD;  Location: [facility];  Service: Orthopedic Spine   • MASTECTOMY W/ SENTINEL NODE BIOPSY Bilateral 1/21/2020    Procedure: SKIN SPARING MASTECTOMIES BILATERAL, SENTINEL NODE BIOPSY RIGHT;  Surgeon:  "Silvio Howard MD;  Location:  [facility];  Service: General   • VENOUS ACCESS DEVICE (PORT) REMOVAL N/A 3/3/2020    Procedure: REMOVAL PORT;  Surgeon: Silvio Howard MD;  Location:  [facility];  Service: General;  Laterality: N/A;       Allergies   Allergen Reactions   • Bactrim [Sulfamethoxazole-Trimethoprim] Rash     RASH, SKIN PEELING        Family History and Social History reviewed and changed as necessary      REVIEW OF SYSTEM:   Review of Systems   Constitutional: Negative for appetite change, chills, diaphoresis, fever and unexpected weight change.  Positive for mild fatigue.  HENT:   Negative for mouth sores, sore throat and trouble swallowing.    Eyes: Negative for icterus.   Respiratory: Negative for cough, hemoptysis and shortness of breath.    Cardiovascular: Negative for chest pain, leg swelling and palpitations.   Gastrointestinal: Negative for abdominal distention, abdominal pain, nausea and vomiting.     Endocrine: Negative for hot flashes.   Genitourinary: Negative for bladder incontinence, difficulty urinating, dysuria, frequency and hematuria.    Musculoskeletal: Negative for gait problem, neck pain and neck stiffness.   Skin: Negative for rash.   Neurological: Negative for dizziness, gait problem, headaches, light-headedness and numbness.   Hematological: Negative for adenopathy. Does not bruise/bleed easily.   Psychiatric/Behavioral: Negative for depression. The patient is not nervous/anxious.    All other systems reviewed and are negative.       PHYSICAL EXAM    Vitals:    10/07/20 0931   BP: 180/98   Pulse: 78   Resp: 18   Temp: 97.8 °F (36.6 °C)   Weight: 67.6 kg (149 lb)   Height: 167 cm (65.75\")     Vitals:    10/07/20 0931   PainSc: 0-No pain        Constitutional: Appears well-developed and well-nourished. No distress.   ECOG: (1) Restricted in Physically Strenuous Activity, Ambulatory & Able to Do Work of Light Nature  HENT:   Head: Normocephalic.  Her hair is regrowing " nicely with even distribution, no areas of alopecia.  Mouth/Throat: Oropharynx is clear and moist.   Eyes: Conjunctivae are normal. Pupils are equal, round, and reactive to light. No scleral icterus.   Neck: Neck supple. No JVD present. No thyromegaly present.   Cardiovascular: Normal rate, regular rhythm and normal heart sounds.    Pulmonary/Chest: Breath sounds normal. No respiratory distress.   Abdominal: Soft. Exhibits no distension. There is no tenderness.    Musculoskeletal:Exhibits no edema, tenderness or deformity.   Neurological: Alert and oriented to person, place, and time. Exhibits normal muscle tone.   Skin: No ecchymosis, no petechiae and no rash noted. Not diaphoretic. No cyanosis.   Psychiatric: Normal mood and affect.   Vitals reviewed.  Labs reviewed.    Lab Results   Component Value Date    HGB 12.2 09/16/2020    HCT 35.1 09/16/2020    MCV 88.4 09/16/2020     09/16/2020    WBC 7.48 09/16/2020    NEUTROABS 4.89 09/16/2020    LYMPHSABS 1.50 09/16/2020    MONOSABS 0.75 09/16/2020    EOSABS 0.24 09/16/2020    BASOSABS 0.07 09/16/2020         ASSESSMENT & PLAN:    1.  Right breast cancer, stage Ia, T1c N0 M0 ER weakly positive 1+, HER-2/ashley positive, had complications after first cycle of chemotherapy with Taxol and Herceptin therefore discontinued.  Currently on adjuvant therapy with Kanjinti: She continues to do well with no current complaints.  Tolerating therapy with Herceptin bio similar with no unusual side effects.  We plan on 1 year adjuvant therapy through February 2021.  Most recent echocardiogram 8/26/2020 was normal with EF of 55%, we will repeat quarterly while on Herceptin, next one will be due November and I have ordered that today.  She is also on anastrozole and tolerating without any unusual side effects, we plan at least 5 years adjuvant therapy if tolerated, she was only weakly ER positive.  She will need periodic bone density testing, she states she typically gets this  through her gynecologist, she has an appointment with CECI Tucker in October.  The patient feels that she may be interested in breast cancer support groups, I have called and left a message for the nurse navigator at [facility], April Day RN for referral.    2.  Iron deficiency anemia: Resolved, CBC on 8/6/2020 was normal.  She had colonoscopy 7/23/2020 with Dr. Marte that was normal.  She states that she has had very little rectal bleeding recently.  She continues on oral iron, we discussed today that if she wanted to back off to 1 tablet every other day that would be fine or she could even hold to see how she does off of iron.    3.  History of pulmonary embolus March 2020 found on work-up for possible endocarditis, treated with Eliquis until September 2020: Patient doing well, no further clotting events since stopping Eliquis.  She completed 6 months of treatment September 2020.  I reviewed her venous Doppler study from 9/22/2020 that was ordered by Dr. Chávez of the left upper vessels that was normal.    I will see her back for follow-up in 6 weeks.    I spent a total of 15 minutes in direct patient care, greater than 10  minutes face to face, time was spent in coordination of care, and counseling the patient regarding reviewing current venous Doppler study from September, lab review, discussion of referral to nurse navigator, discussion of plan of care going forward with repeat echocardiogram in November.  Answered any questions patient had regarding medications and plan of care.   CECI Rosenberg    10/07/2020

## 2020-10-08 LAB
BASOPHILS # BLD AUTO: 0.04 10*3/MM3 (ref 0–0.2)
BASOPHILS NFR BLD AUTO: 0.5 % (ref 0–1.5)
EOSINOPHIL # BLD AUTO: 0.15 10*3/MM3 (ref 0–0.4)
EOSINOPHIL NFR BLD AUTO: 2 % (ref 0.3–6.2)
ERYTHROCYTE [DISTWIDTH] IN BLOOD BY AUTOMATED COUNT: 13.7 % (ref 12.3–15.4)
HCT VFR BLD AUTO: 36.2 % (ref 34–46.6)
HGB BLD-MCNC: 12.8 G/DL (ref 12–15.9)
IMM GRANULOCYTES # BLD AUTO: 0.03 10*3/MM3 (ref 0–0.05)
IMM GRANULOCYTES NFR BLD AUTO: 0.4 % (ref 0–0.5)
LYMPHOCYTES # BLD AUTO: 1.41 10*3/MM3 (ref 0.7–3.1)
LYMPHOCYTES NFR BLD AUTO: 19.1 % (ref 19.6–45.3)
MCH RBC QN AUTO: 31.9 PG (ref 26.6–33)
MCHC RBC AUTO-ENTMCNC: 35.4 G/DL (ref 31.5–35.7)
MCV RBC AUTO: 90.3 FL (ref 79–97)
MONOCYTES # BLD AUTO: 0.66 10*3/MM3 (ref 0.1–0.9)
MONOCYTES NFR BLD AUTO: 9 % (ref 5–12)
NEUTROPHILS # BLD AUTO: 5.08 10*3/MM3 (ref 1.7–7)
NEUTROPHILS NFR BLD AUTO: 69 % (ref 42.7–76)
NRBC BLD AUTO-RTO: 0 /100 WBC (ref 0–0.2)
PLATELET # BLD AUTO: 334 10*3/MM3 (ref 140–450)
RBC # BLD AUTO: 4.01 10*6/MM3 (ref 3.77–5.28)
WBC # BLD AUTO: 7.37 10*3/MM3 (ref 3.4–10.8)

## 2020-10-15 ENCOUNTER — OFFICE VISIT (OUTPATIENT)
Dept: ENDOCRINOLOGY | Facility: CLINIC | Age: 60
End: 2020-10-15

## 2020-10-15 VITALS
BODY MASS INDEX: 23.82 KG/M2 | HEIGHT: 66 IN | WEIGHT: 148.2 LBS | HEART RATE: 84 BPM | TEMPERATURE: 98.4 F | OXYGEN SATURATION: 99 % | SYSTOLIC BLOOD PRESSURE: 170 MMHG | DIASTOLIC BLOOD PRESSURE: 108 MMHG

## 2020-10-15 DIAGNOSIS — Z79.4 CONTROLLED TYPE 2 DIABETES MELLITUS WITHOUT COMPLICATION, WITH LONG-TERM CURRENT USE OF INSULIN (HCC): Primary | ICD-10-CM

## 2020-10-15 DIAGNOSIS — Z79.4 ENCOUNTER FOR LONG-TERM (CURRENT) USE OF INSULIN (HCC): ICD-10-CM

## 2020-10-15 DIAGNOSIS — I10 BENIGN HYPERTENSION: ICD-10-CM

## 2020-10-15 DIAGNOSIS — E11.9 CONTROLLED TYPE 2 DIABETES MELLITUS WITHOUT COMPLICATION, WITH LONG-TERM CURRENT USE OF INSULIN (HCC): Primary | ICD-10-CM

## 2020-10-15 LAB
EXPIRATION DATE: NORMAL
HBA1C MFR BLD: 6.7 %
Lab: NORMAL

## 2020-10-15 PROCEDURE — 99213 OFFICE O/P EST LOW 20 MIN: CPT | Performed by: INTERNAL MEDICINE

## 2020-10-15 PROCEDURE — 83036 HEMOGLOBIN GLYCOSYLATED A1C: CPT | Performed by: INTERNAL MEDICINE

## 2020-10-15 RX ORDER — PROCHLORPERAZINE 25 MG/1
SUPPOSITORY RECTAL
COMMUNITY
Start: 2020-09-21 | End: 2021-03-11 | Stop reason: SDUPTHER

## 2020-10-15 RX ORDER — PEN NEEDLE, DIABETIC 32GX 5/32"
NEEDLE, DISPOSABLE MISCELLANEOUS
COMMUNITY
Start: 2020-08-07 | End: 2021-05-10 | Stop reason: SDUPTHER

## 2020-10-15 RX ORDER — PROCHLORPERAZINE 25 MG/1
SUPPOSITORY RECTAL
COMMUNITY
Start: 2020-08-19 | End: 2021-06-29 | Stop reason: SDUPTHER

## 2020-10-15 RX ORDER — CLOBETASOL PROPIONATE 0.5 MG/G
OINTMENT TOPICAL
COMMUNITY
End: 2021-04-16 | Stop reason: SDUPTHER

## 2020-10-15 RX ORDER — PROCHLORPERAZINE 25 MG/1
SUPPOSITORY RECTAL
COMMUNITY
End: 2022-12-15

## 2020-10-15 NOTE — ASSESSMENT & PLAN NOTE
Diabetes is unchanged.   Continue current treatment regimen.  Diabetes will be reassessed in 3 months.    A1c okay.  Continue current tx.  CGM review shows okay overnight readings but higher during the day.  However, not high enough to add mealtime insulin.

## 2020-10-15 NOTE — PROGRESS NOTES
"     Office Note      Date: 10/15/2020  Patient Name: Luana Chawla  MRN: [id number]  : 1960    Chief Complaint   Patient presents with   • Follow-up   • Diabetes       History of Present Illness:   Luana Chawla is a 59 y.o. female who presents for Diabetes type 2. Diagnosed in: . Treated in past with insulin. Current treatments: insulin. Number of insulin shots per day: 1. Checks blood sugar 1 times a day. Has low blood sugar: occasional. Aspirin use: No - told not to.. Statin use: No - no indication.. ACE-I/ARB use: No - no indication.. Changes in health since last visit: none. Last eye exam .    Subjective      Diabetic Complications:  Eyes: No  Kidneys: No  Feet: No  Heart: No    Diet and Exercise:  Meals per day: 3  Minutes of exercise per week: 150 mins.    Review of Systems:   Review of Systems   Constitutional: Negative for activity change, appetite change, chills, diaphoresis, fatigue, fever and unexpected weight change.   Cardiovascular: Negative for chest pain, palpitations and leg swelling.   Gastrointestinal: Negative for abdominal distention, abdominal pain, constipation, diarrhea, nausea and vomiting.   Endocrine: Negative for cold intolerance, heat intolerance, polydipsia, polyphagia and polyuria.       The following portions of the patient's history were reviewed and updated as appropriate: allergies, current medications, past family history, past medical history, past social history, past surgical history and problem list.    Objective       Visit Vitals  BP (!) 170/108 (BP Location: Left leg, Patient Position: Sitting, Cuff Size: Other (Comment))   Pulse 84   Temp 98.4 °F (36.9 °C)   Ht 167.6 cm (66\")   Wt 67.2 kg (148 lb 3.2 oz)   SpO2 99%   BMI 23.92 kg/m²       Physical Exam:  Physical Exam  Constitutional:       Appearance: Normal appearance.   Neurological:      Mental Status: She is alert.         Labs:    HbA1c  Lab Results   Component Value Date    " HGBA1C 6.7 10/15/2020       CMP  Lab Results   Component Value Date    GLUCOSE 230 (H) 04/06/2020    BUN 18 04/06/2020    CREATININE 0.58 04/06/2020    EGFRIFNONA 106 04/06/2020    EGFRIFAFRI 110 02/25/2020    BCR 31.0 (H) 04/06/2020    K 4.0 04/06/2020    CO2 24.0 04/06/2020    CALCIUM 9.4 04/06/2020    PROTENTOTREF 6.5 02/25/2020    LABIL2 1.7 02/25/2020    AST 21 04/06/2020    ALT 23 04/06/2020        Lipid Panel        TSH  No results found for: TSH, FREET4     Hemoglobin A1C  Lab Results   Component Value Date    HGBA1C 6.7 10/15/2020        Microalbumin/Creatinine  No results found for: MALBCRERATIO, CREATINIURIN, MICROALBUR        Assessment / Plan      Assessment & Plan:  Problem List Items Addressed This Visit        Cardiovascular and Mediastinum    Benign hypertension    Current Assessment & Plan     BP elevated today.  Has been okay previously.  Monitor BP at home.            Other    Encounter for long-term (current) use of insulin (CMS/[facility])      Other Visit Diagnoses     Controlled type 2 diabetes mellitus without complication, with long-term current use of insulin (CMS/[facility])    -  Primary    Relevant Orders    POC Glycosylated Hemoglobin (Hb A1C) (Completed)           Return in about 3 months (around 1/15/2021) for Recheck with A1c..    Jose Cummings MD   10/15/2020

## 2020-10-19 ENCOUNTER — OFFICE VISIT (OUTPATIENT)
Dept: GYNECOLOGIC ONCOLOGY | Facility: CLINIC | Age: 60
End: 2020-10-19

## 2020-10-19 VITALS
WEIGHT: 149.1 LBS | TEMPERATURE: 97.8 F | OXYGEN SATURATION: 96 % | HEART RATE: 75 BPM | SYSTOLIC BLOOD PRESSURE: 171 MMHG | HEIGHT: 66 IN | DIASTOLIC BLOOD PRESSURE: 84 MMHG | BODY MASS INDEX: 23.96 KG/M2 | RESPIRATION RATE: 17 BRPM

## 2020-10-19 DIAGNOSIS — C50.411 MALIGNANT NEOPLASM OF UPPER-OUTER QUADRANT OF RIGHT BREAST IN FEMALE, ESTROGEN RECEPTOR POSITIVE (HCC): ICD-10-CM

## 2020-10-19 DIAGNOSIS — Z79.811 USE OF ANASTROZOLE (ARIMIDEX): ICD-10-CM

## 2020-10-19 DIAGNOSIS — Z17.0 MALIGNANT NEOPLASM OF UPPER-OUTER QUADRANT OF RIGHT BREAST IN FEMALE, ESTROGEN RECEPTOR POSITIVE (HCC): ICD-10-CM

## 2020-10-19 DIAGNOSIS — Z78.0 POST-MENOPAUSE: ICD-10-CM

## 2020-10-19 DIAGNOSIS — N90.4 LICHEN SCLEROSUS OF FEMALE GENITALIA: ICD-10-CM

## 2020-10-19 DIAGNOSIS — Z01.419 WELL WOMAN EXAM WITH ROUTINE GYNECOLOGICAL EXAM: Primary | ICD-10-CM

## 2020-10-19 PROCEDURE — 99396 PREV VISIT EST AGE 40-64: CPT | Performed by: NURSE PRACTITIONER

## 2020-10-19 NOTE — PROGRESS NOTES
GYN ONCOLOGY ANNUAL WELL WOMAN VISIT      Luana Chawla  [id number]  1960      Chief Complaint: Annual Exam        History of present illness:  Luana Chawla is a 59 y.o. year old female who is here today for an annual exam, referred by CECI Grajeda.  Patient has a personal history of ER weakly 5% 1+ positive, ID 50% 1-2+ positive, HER-2/ashley 100% 3+ positive right invasive ductal carcinoma as outlined in cancer history below. She is s/p bilateral mastectomies without reconstruction thus far. She is currently on maintenance Arimidex PO x 5 years and adjuvant Kanjinti (Herceptin biosimilar) q 3 weeks. Her previous gynecologist was Dr. Leidy Farfan at , but MD has moved and patient was in need of ongoing gynecologic care.     Patient is a  with vaginal delivery x1. She has a 36 year old son. She is currently single and not sexually active. She is s/p vaginal hysterectomy (maintains ovaries) about 20 years ago. She reports a history of abnormal pap smears, last pap prior to her breast cancer diagnosis. She reports a history of lichen sclerosus, well controlled with vaginal Estrace cream and clobetasol. She uses both once weekly, but is trying to increase her use of the vaginal estrogen. She denies any current itching, irritation, or complications. She denies vaginal bleeding, pelvic pain, concerning lesions, or changes in bowel or bladder function. Patient has had a complete skin exam with dermatology recently. Colonoscopy completed earlier this year. She is in need of a bone density scan, requests order today.       Cancer History:   Oncology/Hematology History Overview Note   1. 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  ER weakly 5% 1+ positive, ID 50% 1-2+ positive, HER-2/ashley 100% 3+ positive.  Negative cancer next  panel  2. Significant diabetes with predating neuropathy due to spinal stenosis status post laminectomy 5/24/2019 with persistent neuropathy dorsum left foot  3. MRSA bacteremia due to port infection March 2020 after course 1 day 1 of Herceptin Taxol    Oncology history timeline:  -5/24/2019 Dr. Garcia saw for spinal stenosis with radiculopathy and left foot drop due to herniated disc and performed decompressive laminectomy, L4-5 and L5-S1 discectomy and medial facetectomy  -10/15/2019 mammogram BI-RADS 0  -11/22/2019 right mammogram/ultrasound BI-RADS 4 with ultrasound-guided core biopsy showing invasive ductal carcinoma Cory score 6 out of 9 grade 2, ER 5% 1+ positive FL 50% 1-2+ positive, HER-2/ashley 100% 3+ positive.  -12/13/2019 MRI breasts revealed 2.2 cm right breast mass consistent with biopsy proven cancer with unsuspected adjacent area's of non-mass enhancement worrisome for DCIS.  Non-mass enhancement in the subareolar left breast worrisome for DCIS.  Dominant focus left central portion left breast indeterminate BI-RADS 4.  Cancer next panel negative  -1/15/2020 CMP unremarkable save for glucose 200 with hemoglobin 10.6 normochromic normocytic indices  -1/21/2020 right skin sparing mastectomy with right sentinel lymph node injection and right deep subfascial sentinel lymph node biopsy with contralateral prophylactic left skin sparing mastectomy.  Right breast 1.8 cm Bloom Dias 8 out of 9, total of 4 lymph nodes examined 2 sentinel nodes all negative.  Pathological T1 cN0 M0 stage Ia.  Left breast benign with sclerosing adenosis.  -2/26/2020 started Herceptin Taxol weekly  -3/2/2020 port removed due to MRSA bacteremia with port infection/purulence.  -3/2/2020 through 3/6/2020 hospitalized for MRSA bacteremia from port.  -3/31/2020 [facility] tele-visit: Patient still on IV antibiotics for MRSA port infection.  Decision made to go with Kanjinti alone every 3 weeks along with  Arimidex.  -2020 per ID, patient has completed IV antibiotics and is now on oral antibiotics.     Malignant neoplasm of upper-outer quadrant of right breast in female, estrogen receptor positive (CMS/HCC)   2019 Cancer Staged    Staging form: Breast, AJCC 8th Edition  - Clinical stage from 2019: Stage IB (cT2, cN0, cM0, G2, ER+, TN+, HER2+) - Signed by Lisa Herman MD on 2020 Initial Diagnosis    Malignant neoplasm of upper-outer quadrant of right female breast (CMS/HCC)     2020 Cancer Staged    Staging form: Breast, AJCC 8th Edition  - Pathologic: Stage IA (pT1c, pN0, cM0, G3, ER+, TN+, HER2+) - Signed by Hero Gomez MD on 2020 -  Other Event    Echocardiogram  · Left ventricular systolic function is normal. Estimated EF = 55%.  · The global longitudinal strain was -19.5%.     2020 - 2020 Chemotherapy    OP CENTRAL VENOUS ACCESS DEVICE ACCESS, CARE, AND MAINTENANCE (CVAD)     2020 - 3/10/2020 Chemotherapy    OP BREAST PACLitaxel / Trastuzumab-anns (Weekly X 12)     3/2/2020 Adverse Reaction    MRSA bacteremia due to port infection with port removal after day 1 course 1 Herceptin Taxol     3/31/2020 -  Hormonal Therapy    Arimidex for planned 5 years     2020 -  Chemotherapy    OP BREAST Trastuzumab-anns Q21D (maintenance)     2020 -  Other Event    2020 echocardiogram EF 55%      2020 Procedure    Colonoscopy with Dr. Marte, normal     2020 -  Other Event    Echocardiogram   · This was a limited echocardiogram to assess left ventricular ejection fraction in the setting of chemotherapy.  · Left ventricular systolic function is normal. Calculated EF = 55.1%. Estimated EF = 55%. Global Longitudinal LV strain = -18.2%.  · Estimated EF = 55%.         Obstetric History:  OB History        1    Para   1    Term   1            AB        Living           SAB        TAB        Ectopic        Molar         Multiple        Live Births                   Menstrual History:     No LMP recorded. Patient has had a hysterectomy.          Past Medical History:   Diagnosis Date   • Breast cancer (CMS/HCC)    • Diabetes mellitus (CMS/HCC)    • Diverticulitis    • GERD (gastroesophageal reflux disease)    • Hypokalemia    • Kidney stone    • Lichen sclerosus    • Long term (current) use of insulin (CMS/HCC)    • Malignant neoplasm of upper-outer quadrant of right female breast (CMS/HCC) 1/21/2020   • Microalbuminuria    • Nephrolithiasis    • Sciatica    • Type 2 diabetes mellitus, uncontrolled (CMS/HCC)    • Wears contact lenses        Past Surgical History:   Procedure Laterality Date   • APPENDECTOMY     • BREAST BIOPSY Right    • BREAST EXCISIONAL BIOPSY Right    • CHOLECYSTECTOMY     • COLON SURGERY     • COLONOSCOPY     • HYSTERECTOMY     • LEG SURGERY      leg fracture surgery-Abstracted from [employer]   • LUMBAR LAMINECTOMY DISCECTOMY DECOMPRESSION N/A 5/24/2019    Procedure: LUMBAR LAMINECTOMY L4-5 AND L5-S1;  Surgeon: Hipolito Kahn MD;  Location: [facility] OR;  Service: Orthopedic Spine   • MASTECTOMY Bilateral    • MASTECTOMY W/ SENTINEL NODE BIOPSY Bilateral 1/21/2020    Procedure: SKIN SPARING MASTECTOMIES BILATERAL, SENTINEL NODE BIOPSY RIGHT;  Surgeon: Silvio Howard MD;  Location:  [facility];  Service: General   • TUBAL ABDOMINAL LIGATION     • VENOUS ACCESS DEVICE (PORT) REMOVAL N/A 3/3/2020    Procedure: REMOVAL PORT;  Surgeon: Silvio Howard MD;  Location:  [facility];  Service: General;  Laterality: N/A;       MEDICATIONS: The current medication list was reviewed and reconciled.     Allergies:  is allergic to bactrim [sulfamethoxazole-trimethoprim].    Family History   Problem Relation Age of Onset   • Breast cancer Mother 70   • Diabetes Mother    • Heart disease Father    • Hypertension Father    • Diabetes Sister    • Ovarian cancer Neg Hx        Health Maintenance:  Last colonoscopy was  "7/23/2020, with recommended follow-up in 5-10 year(s). Last DEXA was several years ago. Last pap smear was several years ago, results were  normal PAP.. She  does have a history of abnormal pap smears.      Review of Systems   Constitutional: Negative for fatigue, fever and unexpected weight change.   HENT: Negative for congestion, ear pain, hearing loss, sinus pressure and trouble swallowing.    Eyes: Negative for visual disturbance.   Respiratory: Negative for cough, chest tightness, shortness of breath and wheezing.    Cardiovascular: Negative for chest pain, palpitations and leg swelling.   Gastrointestinal: Negative for abdominal distention, abdominal pain, constipation, diarrhea, nausea and vomiting.   Endocrine: Negative for cold intolerance, heat intolerance, polydipsia, polyphagia and polyuria.   Genitourinary: Negative for difficulty urinating, dyspareunia, dysuria, frequency, hematuria, pelvic pain, urgency, vaginal bleeding, vaginal discharge and vaginal pain.   Musculoskeletal: Negative for arthralgias, gait problem, joint swelling and myalgias.   Skin: Negative for color change, pallor and rash.   Neurological: Negative for dizziness, seizures, syncope, weakness, light-headedness, numbness and headaches.   Hematological: Negative for adenopathy. Does not bruise/bleed easily.   Psychiatric/Behavioral: Negative for agitation, confusion, sleep disturbance and suicidal ideas. The patient is not nervous/anxious.          Physical Exam  Vital Signs: /84 Comment: right leg  Pulse 75   Temp 97.8 °F (36.6 °C) (Temporal)   Resp 17   Ht 167.6 cm (65.98\")   Wt 67.6 kg (149 lb 1.6 oz)   SpO2 96%   BMI 24.08 kg/m²   Vitals:    10/19/20 0901   PainSc: 0-No pain           General Appearance:  alert, cooperative, no apparent distress, appears stated age and normal weight   Neurologic/Psychiatric: A&O x 3, gait steady, appropriate affect   HEENT:  Normocephalic, without obvious abnormality, mucous membranes " moist   Neck: Supple, symmetrical, trachea midline, no adenopathy;  No thyromegaly, masses, or tenderness   Back:   Symmetric, no curvature, ROM normal, no CVA tenderness   Lungs:   Clear to auscultation bilaterally; respirations regular, even, and unlabored bilaterally   Heart:  Regular rate and rhythm, no murmurs appreciated   Breasts:  surgically absent bilaterally. Scars healed well. No chest wall masses or abnormalities appreciated.   Abdomen:   Soft, non-tender, non-distended, no organomegaly and blood sugar monitor in place to RLQ   Lymph nodes: No cervical, supraclavicular, inguinal or axillary adenopathy noted   Extremities: Normal, atraumatic; no clubbing, cyanosis, or edema    Skin: No rashes, ulcers, or suspicious lesions noted   Pelvic: External Genitalia  atrophic, without lesions, changes consistent with lichen sclerosus, well controlled  Vagina  is pale, atrophic.   Vaginal Cuff  Female Vaginal Cuff: smooth, intact, without visible lesions and pap obtained  Uterus  surgically absent  Ovaries  without palpable masses or fullness  Parametria  smooth  Rectovaginal  Female rectovaginal: deferred     ECOG Performance Status: (0) Fully Active - Able to Carry On All Pre-disease Performance Without Restriction    Procedure Note:  No notes on file    Assessment and Plan:    Diagnoses and all orders for this visit:    1. Well woman exam with routine gynecological exam (Primary)    2. Malignant neoplasm of upper-outer quadrant of right breast in female, estrogen receptor positive (CMS/HCC)    3. Use of anastrozole (Arimidex)  -     DEXA Bone Density Axial; Future    4. Post-menopause  -     DEXA Bone Density Axial; Future    5. Lichen sclerosus of female genitalia        No chest wall masses or abnormalities appreciated. Keep routine follow-ups and continue plan with oncology team.     Pap was done today. If she does not receive the results of the Pap within 2 weeks  time, she was instructed to call to find out  the results.      Mammograms no longer needed, s/p bilateral mastectomies    Repeat colonoscopy in 5-10 years pre GI recommendations.     Repeat DEXA ordered today. She will need to have bone density scans approx very 2 years while on Arimidex. She will be notified of results upon their return.     Lichen sclerosus appears to be well controlled presently. Continue Estrace vaginal cream twice weekly and clobetasol weekly. My increase topical steroid to once daily or BID for acute flare-ups PRN. She v/u. Call if refills needed on either topical.     Pain assessment was performed today as a part of patient’s care. For patients with pain related to surgery, gynecologic malignancy or cancer treatment, the plan is as noted in the assessment/plan.  For patients with pain not related to these issues, they are to seek any further needed care from a more appropriate provider, such as PCP.      Return to clinic in 1 year for Annual exam.      Electronically signed by CECI Mccullough on 10/19/20 at 09:33 EDT

## 2020-10-22 PROBLEM — I10 BENIGN HYPERTENSION: Status: ACTIVE | Noted: 2020-10-22

## 2020-10-28 ENCOUNTER — INFUSION (OUTPATIENT)
Dept: ONCOLOGY | Facility: HOSPITAL | Age: 60
End: 2020-10-28

## 2020-10-28 VITALS
DIASTOLIC BLOOD PRESSURE: 68 MMHG | RESPIRATION RATE: 18 BRPM | TEMPERATURE: 97.9 F | SYSTOLIC BLOOD PRESSURE: 124 MMHG | HEART RATE: 73 BPM | WEIGHT: 149.4 LBS | BODY MASS INDEX: 24.13 KG/M2

## 2020-10-28 DIAGNOSIS — C50.411 MALIGNANT NEOPLASM OF UPPER-OUTER QUADRANT OF RIGHT BREAST IN FEMALE, ESTROGEN RECEPTOR POSITIVE (HCC): Primary | ICD-10-CM

## 2020-10-28 DIAGNOSIS — Z17.0 MALIGNANT NEOPLASM OF UPPER-OUTER QUADRANT OF RIGHT BREAST IN FEMALE, ESTROGEN RECEPTOR POSITIVE (HCC): Primary | ICD-10-CM

## 2020-10-28 PROCEDURE — 96413 CHEMO IV INFUSION 1 HR: CPT

## 2020-10-28 PROCEDURE — 25010000002 TRASTUZUMAB-ANNS 420 MG RECONSTITUTED SOLUTION 1 EACH VIAL: Performed by: NURSE PRACTITIONER

## 2020-10-28 RX ADMIN — TRASTUZUMAB-ANNS 390 MG: 420 INJECTION, POWDER, LYOPHILIZED, FOR SOLUTION INTRAVENOUS at 11:49

## 2020-10-29 LAB
BASOPHILS # BLD AUTO: 0.05 10*3/MM3 (ref 0–0.2)
BASOPHILS NFR BLD AUTO: 0.6 % (ref 0–1.5)
EOSINOPHIL # BLD AUTO: 0.19 10*3/MM3 (ref 0–0.4)
EOSINOPHIL NFR BLD AUTO: 2.5 % (ref 0.3–6.2)
ERYTHROCYTE [DISTWIDTH] IN BLOOD BY AUTOMATED COUNT: 13.2 % (ref 12.3–15.4)
HCT VFR BLD AUTO: 37.7 % (ref 34–46.6)
HGB BLD-MCNC: 12.6 G/DL (ref 12–15.9)
IMM GRANULOCYTES # BLD AUTO: 0.02 10*3/MM3 (ref 0–0.05)
IMM GRANULOCYTES NFR BLD AUTO: 0.3 % (ref 0–0.5)
LYMPHOCYTES # BLD AUTO: 1.73 10*3/MM3 (ref 0.7–3.1)
LYMPHOCYTES NFR BLD AUTO: 22.5 % (ref 19.6–45.3)
MCH RBC QN AUTO: 31.4 PG (ref 26.6–33)
MCHC RBC AUTO-ENTMCNC: 33.4 G/DL (ref 31.5–35.7)
MCV RBC AUTO: 94 FL (ref 79–97)
MONOCYTES # BLD AUTO: 0.69 10*3/MM3 (ref 0.1–0.9)
MONOCYTES NFR BLD AUTO: 9 % (ref 5–12)
NEUTROPHILS # BLD AUTO: 5.02 10*3/MM3 (ref 1.7–7)
NEUTROPHILS NFR BLD AUTO: 65.1 % (ref 42.7–76)
NRBC BLD AUTO-RTO: 0 /100 WBC (ref 0–0.2)
PLATELET # BLD AUTO: 313 10*3/MM3 (ref 140–450)
RBC # BLD AUTO: 4.01 10*6/MM3 (ref 3.77–5.28)
WBC # BLD AUTO: 7.7 10*3/MM3 (ref 3.4–10.8)

## 2020-11-03 RX ORDER — METFORMIN HYDROCHLORIDE 500 MG/1
500 TABLET, EXTENDED RELEASE ORAL 2 TIMES DAILY
Qty: 180 TABLET | Refills: 3 | Status: SHIPPED | OUTPATIENT
Start: 2020-11-03 | End: 2020-12-21 | Stop reason: SDUPTHER

## 2020-11-09 ENCOUNTER — HOSPITAL ENCOUNTER (OUTPATIENT)
Dept: BONE DENSITY | Facility: HOSPITAL | Age: 60
Discharge: HOME | End: 2020-11-09
Admitting: NURSE PRACTITIONER

## 2020-11-09 DIAGNOSIS — Z78.0 POST-MENOPAUSE: ICD-10-CM

## 2020-11-09 DIAGNOSIS — Z79.811 USE OF ANASTROZOLE (ARIMIDEX): ICD-10-CM

## 2020-11-09 PROCEDURE — 77080 DXA BONE DENSITY AXIAL: CPT

## 2020-11-10 ENCOUNTER — HOSPITAL ENCOUNTER (OUTPATIENT)
Dept: CARDIOLOGY | Facility: HOSPITAL | Age: 60
Discharge: HOME | End: 2020-11-10
Admitting: NURSE PRACTITIONER

## 2020-11-10 ENCOUNTER — TELEPHONE (OUTPATIENT)
Dept: GYNECOLOGIC ONCOLOGY | Facility: CLINIC | Age: 60
End: 2020-11-10

## 2020-11-10 VITALS — WEIGHT: 149 LBS | BODY MASS INDEX: 23.95 KG/M2 | HEIGHT: 66 IN

## 2020-11-10 DIAGNOSIS — C50.411 MALIGNANT NEOPLASM OF UPPER-OUTER QUADRANT OF RIGHT BREAST IN FEMALE, ESTROGEN RECEPTOR POSITIVE (HCC): ICD-10-CM

## 2020-11-10 DIAGNOSIS — Z17.0 MALIGNANT NEOPLASM OF UPPER-OUTER QUADRANT OF RIGHT BREAST IN FEMALE, ESTROGEN RECEPTOR POSITIVE (HCC): ICD-10-CM

## 2020-11-10 DIAGNOSIS — Z51.11 ENCOUNTER FOR ANTINEOPLASTIC CHEMOTHERAPY: ICD-10-CM

## 2020-11-10 LAB
BH CV ECHO MEAS - AO ROOT AREA (BSA CORRECTED): 1.8
BH CV ECHO MEAS - AO ROOT AREA: 7.5 CM^2
BH CV ECHO MEAS - AO ROOT DIAM: 3.1 CM
BH CV ECHO MEAS - BSA(HAYCOCK): 1.8 M^2
BH CV ECHO MEAS - BSA: 1.7 M^2
BH CV ECHO MEAS - BZI_BMI: 23.4 KILOGRAMS/M^2
BH CV ECHO MEAS - BZI_METRIC_HEIGHT: 167.6 CM
BH CV ECHO MEAS - BZI_METRIC_WEIGHT: 65.8 KG
BH CV ECHO MEAS - EDV(CUBED): 96.1 ML
BH CV ECHO MEAS - EDV(MOD-SP2): 47 ML
BH CV ECHO MEAS - EDV(MOD-SP4): 45 ML
BH CV ECHO MEAS - EDV(TEICH): 96.3 ML
BH CV ECHO MEAS - EF(CUBED): 65.6 %
BH CV ECHO MEAS - EF(MOD-BP): 58 %
BH CV ECHO MEAS - EF(MOD-SP2): 57.4 %
BH CV ECHO MEAS - EF(MOD-SP4): 55.6 %
BH CV ECHO MEAS - EF(TEICH): 57.2 %
BH CV ECHO MEAS - ESV(CUBED): 33.1 ML
BH CV ECHO MEAS - ESV(MOD-SP2): 20 ML
BH CV ECHO MEAS - ESV(MOD-SP4): 20 ML
BH CV ECHO MEAS - ESV(TEICH): 41.3 ML
BH CV ECHO MEAS - FS: 29.9 %
BH CV ECHO MEAS - IVS/LVPW: 0.87
BH CV ECHO MEAS - IVSD: 0.89 CM
BH CV ECHO MEAS - LA DIMENSION: 3.2 CM
BH CV ECHO MEAS - LA/AO: 1
BH CV ECHO MEAS - LAD MAJOR: 5.1 CM
BH CV ECHO MEAS - LAT PEAK E' VEL: 10.7 CM/SEC
BH CV ECHO MEAS - LV DIASTOLIC VOL/BSA (35-75): 25.8 ML/M^2
BH CV ECHO MEAS - LV MASS(C)D: 148.2 GRAMS
BH CV ECHO MEAS - LV MASS(C)DI: 85 GRAMS/M^2
BH CV ECHO MEAS - LV SYSTOLIC VOL/BSA (12-30): 11.5 ML/M^2
BH CV ECHO MEAS - LVIDD: 4.6 CM
BH CV ECHO MEAS - LVIDS: 3.2 CM
BH CV ECHO MEAS - LVLD AP2: 6.8 CM
BH CV ECHO MEAS - LVLD AP4: 7.5 CM
BH CV ECHO MEAS - LVLS AP2: 6.1 CM
BH CV ECHO MEAS - LVLS AP4: 6.3 CM
BH CV ECHO MEAS - LVPWD: 1 CM
BH CV ECHO MEAS - MED PEAK E' VEL: 7.5 CM/SEC
BH CV ECHO MEAS - SI(CUBED): 36.1 ML/M^2
BH CV ECHO MEAS - SI(MOD-SP2): 15.5 ML/M^2
BH CV ECHO MEAS - SI(MOD-SP4): 14.3 ML/M^2
BH CV ECHO MEAS - SI(TEICH): 31.6 ML/M^2
BH CV ECHO MEAS - SV(CUBED): 63 ML
BH CV ECHO MEAS - SV(MOD-SP2): 27 ML
BH CV ECHO MEAS - SV(MOD-SP4): 25 ML
BH CV ECHO MEAS - SV(TEICH): 55.1 ML
BH CV VAS BP LEFT ARM: NORMAL MMHG
BH CV VAS BP RIGHT ARM: NORMAL MMHG
LEFT ATRIUM VOLUME INDEX: 17.8 ML/M^2
LEFT ATRIUM VOLUME: 31 ML
MAXIMAL PREDICTED HEART RATE: 160 BPM
STRESS TARGET HR: 136 BPM

## 2020-11-10 PROCEDURE — 93356 MYOCRD STRAIN IMG SPCKL TRCK: CPT

## 2020-11-10 PROCEDURE — 93308 TTE F-UP OR LMTD: CPT

## 2020-11-10 PROCEDURE — 93356 MYOCRD STRAIN IMG SPCKL TRCK: CPT | Performed by: INTERNAL MEDICINE

## 2020-11-10 PROCEDURE — 93308 TTE F-UP OR LMTD: CPT | Performed by: INTERNAL MEDICINE

## 2020-11-10 NOTE — TELEPHONE ENCOUNTER
----- Message from CECI Mccullough sent at 11/10/2020  8:26 AM EST -----  Please call and notify of DEXA results, osteopenia.  on calcium and vitamin D supplementation. Plan to repeat scan in 2-3 years.   Thanks!

## 2020-11-10 NOTE — TELEPHONE ENCOUNTER
Called pt to tell her DEXA showed osteopenia and to take calcium and vitamin d supplements. Plan to repeat in 2-3 years.

## 2020-11-17 RX ORDER — SODIUM CHLORIDE 9 MG/ML
250 INJECTION, SOLUTION INTRAVENOUS ONCE
Status: DISCONTINUED | OUTPATIENT
Start: 2020-11-18 | End: 2020-11-18 | Stop reason: HOSPADM

## 2020-11-18 ENCOUNTER — OFFICE VISIT (OUTPATIENT)
Dept: ONCOLOGY | Facility: CLINIC | Age: 60
End: 2020-11-18

## 2020-11-18 ENCOUNTER — INFUSION (OUTPATIENT)
Dept: ONCOLOGY | Facility: HOSPITAL | Age: 60
End: 2020-11-18

## 2020-11-18 VITALS
SYSTOLIC BLOOD PRESSURE: 182 MMHG | TEMPERATURE: 98.2 F | HEART RATE: 76 BPM | RESPIRATION RATE: 18 BRPM | WEIGHT: 152 LBS | BODY MASS INDEX: 24.43 KG/M2 | DIASTOLIC BLOOD PRESSURE: 108 MMHG | HEIGHT: 66 IN

## 2020-11-18 VITALS — DIASTOLIC BLOOD PRESSURE: 81 MMHG | SYSTOLIC BLOOD PRESSURE: 139 MMHG

## 2020-11-18 DIAGNOSIS — Z17.0 MALIGNANT NEOPLASM OF UPPER-OUTER QUADRANT OF RIGHT BREAST IN FEMALE, ESTROGEN RECEPTOR POSITIVE (HCC): Primary | ICD-10-CM

## 2020-11-18 DIAGNOSIS — C50.411 MALIGNANT NEOPLASM OF UPPER-OUTER QUADRANT OF RIGHT BREAST IN FEMALE, ESTROGEN RECEPTOR POSITIVE (HCC): Primary | ICD-10-CM

## 2020-11-18 PROCEDURE — 99213 OFFICE O/P EST LOW 20 MIN: CPT | Performed by: NURSE PRACTITIONER

## 2020-11-18 PROCEDURE — 25010000002 TRASTUZUMAB-ANNS 420 MG RECONSTITUTED SOLUTION 1 EACH VIAL: Performed by: NURSE PRACTITIONER

## 2020-11-18 PROCEDURE — 96413 CHEMO IV INFUSION 1 HR: CPT

## 2020-11-18 RX ADMIN — TRASTUZUMAB-ANNS 390 MG: 420 INJECTION, POWDER, LYOPHILIZED, FOR SOLUTION INTRAVENOUS at 09:36

## 2020-11-18 NOTE — PROGRESS NOTES
CHIEF COMPLAINT: Breast cancer    Problem List:  Oncology/Hematology History Overview Note   1. 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  ER weakly 5% 1+ positive, MO 50% 1-2+ positive, HER-2/ashley 100% 3+ positive.  Negative cancer next panel  2. Significant diabetes with predating neuropathy due to spinal stenosis status post laminectomy 5/24/2019 with persistent neuropathy dorsum left foot  3. MRSA bacteremia due to port infection March 2020 after course 1 day 1 of Herceptin Taxol    Oncology history timeline:  -5/24/2019 Dr. Garcia saw for spinal stenosis with radiculopathy and left foot drop due to herniated disc and performed decompressive laminectomy, L4-5 and L5-S1 discectomy and medial facetectomy  -10/15/2019 mammogram BI-RADS 0  -11/22/2019 right mammogram/ultrasound BI-RADS 4 with ultrasound-guided core biopsy showing invasive ductal carcinoma [place] score 6 out of 9 grade 2, ER 5% 1+ positive MO 50% 1-2+ positive, HER-2/ashley 100% 3+ positive.  -12/13/2019 MRI breasts revealed 2.2 cm right breast mass consistent with biopsy proven cancer with unsuspected adjacent area's of non-mass enhancement worrisome for DCIS.  Non-mass enhancement in the subareolar left breast worrisome for DCIS.  Dominant focus left central portion left breast indeterminate BI-RADS 4.  Cancer next panel negative  -1/15/2020 CMP unremarkable save for glucose 200 with hemoglobin 10.6 normochromic normocytic indices  -1/21/2020 right skin sparing mastectomy with right sentinel lymph node injection and right deep subfascial sentinel lymph node biopsy with contralateral prophylactic left skin sparing mastectomy.  Right breast 1.8 cm Bloom Dias 8 out of 9, total of 4 lymph nodes examined 2 sentinel nodes all negative.  Pathological T1 cN0 M0 stage Ia.  Left breast benign with  sclerosing adenosis.  -2/26/2020 started Herceptin Taxol weekly  -3/2/2020 port removed due to MRSA bacteremia with port infection/purulence.  -3/2/2020 through 3/6/2020 hospitalized for MRSA bacteremia from port.  -3/31/2020 [facility] tele-visit: Patient still on IV antibiotics for MRSA port infection.  Decision made to go with Kanjinti alone every 3 weeks along with Arimidex.  -4/13/2020 per ID, patient has completed IV antibiotics and is now on oral antibiotics.     Malignant neoplasm of upper-outer quadrant of right breast in female, estrogen receptor positive (CMS/HCC)   1/16/2019 Cancer Staged    Staging form: Breast, AJCC 8th Edition  - Clinical stage from 1/16/2019: Stage IB (cT2, cN0, cM0, G2, ER+, OK+, HER2+) - Signed by Lisa Herman MD on 1/27/2020 1/21/2020 Initial Diagnosis    Malignant neoplasm of upper-outer quadrant of right female breast (CMS/HCC)     2/11/2020 Cancer Staged    Staging form: Breast, AJCC 8th Edition  - Pathologic: Stage IA (pT1c, pN0, cM0, G3, ER+, OK+, HER2+) - Signed by Hero Gomez MD on 2/11/2020 2/18/2020 -  Other Event    Echocardiogram  · Left ventricular systolic function is normal. Estimated EF = 55%.  · The global longitudinal strain was -19.5%.     2/25/2020 - 2/26/2020 Chemotherapy    OP CENTRAL VENOUS ACCESS DEVICE ACCESS, CARE, AND MAINTENANCE (CVAD)     2/26/2020 - 3/10/2020 Chemotherapy    OP BREAST PACLitaxel / Trastuzumab-anns (Weekly X 12)     3/2/2020 Adverse Reaction    MRSA bacteremia due to port infection with port removal after day 1 course 1 Herceptin Taxol     3/31/2020 -  Hormonal Therapy    Arimidex for planned 5 years     4/2/2020 -  Chemotherapy    OP BREAST Trastuzumab-anns Q21D (maintenance)     5/27/2020 -  Other Event    5/27/2020 echocardiogram EF 55%      7/23/2020 Procedure    Colonoscopy with Dr. Marte, normal     8/26/2020 -  Other Event    Echocardiogram   · This was a limited echocardiogram to assess left ventricular  ejection fraction in the setting of chemotherapy.  · Left ventricular systolic function is normal. Calculated EF = 55.1%. Estimated EF = 55%. Global Longitudinal LV strain = -18.2%.  · Estimated EF = 55%.     11/9/2020 Imaging    DEXA bone density IMPRESSION:  Osteopenia of the femoral necks bilaterally, and total hips  bilaterally.     11/10/2020 -  Other Event    Echocardogram   · This was a limited echocardiogram to assess left ventricular function only.  · Left ventricular systolic function is normal. Left ventricular ejection fraction appears to be 56 - 60%.  · Global longitudinal LV strain (GLS) = 19.7%.         HISTORY OF PRESENT ILLNESS:  The patient is a 59 y.o. female, here for follow up on management of triple positive right breast cancer currently on adjuvant Kanjinti.  She continues to do well overall.  Has had no unusual shortness of breath, cough or lower extremity swelling.  Continues to work from home.  Since I saw her last she has had pelvic exam with CECI Tucker along with DEXA bone density scan that showed osteopenia.  No current complaints.  Had echocardiogram last week.    Past Medical History:   Diagnosis Date   • Breast cancer (CMS/HCC)    • Diabetes mellitus (CMS/HCC)    • Diverticulitis    • GERD (gastroesophageal reflux disease)    • Hypokalemia    • Kidney stone    • Lichen sclerosus    • Long term (current) use of insulin (CMS/HCC)    • Malignant neoplasm of upper-outer quadrant of right female breast (CMS/HCC) 1/21/2020   • Microalbuminuria    • Nephrolithiasis    • Sciatica    • Type 2 diabetes mellitus, uncontrolled (CMS/HCC)    • Wears contact lenses      Past Surgical History:   Procedure Laterality Date   • APPENDECTOMY     • BREAST BIOPSY Right    • BREAST EXCISIONAL BIOPSY Right    • CHOLECYSTECTOMY     • COLON SURGERY     • COLONOSCOPY     • HYSTERECTOMY     • LEG SURGERY      leg fracture surgery-Abstracted from [employer]   • LUMBAR LAMINECTOMY DISCECTOMY  DECOMPRESSION N/A 5/24/2019    Procedure: LUMBAR LAMINECTOMY L4-5 AND L5-S1;  Surgeon: Hipolito Kahn MD;  Location: [facility] OR;  Service: Orthopedic Spine   • MASTECTOMY Bilateral    • MASTECTOMY W/ SENTINEL NODE BIOPSY Bilateral 1/21/2020    Procedure: SKIN SPARING MASTECTOMIES BILATERAL, SENTINEL NODE BIOPSY RIGHT;  Surgeon: Silvio Howard MD;  Location:  [facility];  Service: General   • TUBAL ABDOMINAL LIGATION     • VENOUS ACCESS DEVICE (PORT) REMOVAL N/A 3/3/2020    Procedure: REMOVAL PORT;  Surgeon: Silvio Howard MD;  Location:  [facility];  Service: General;  Laterality: N/A;       Allergies   Allergen Reactions   • Bactrim [Sulfamethoxazole-Trimethoprim] Rash     RASH, SKIN PEELING        Family History and Social History reviewed and changed as necessary      REVIEW OF SYSTEM:   Review of Systems   Constitutional: Negative for appetite change, chills, diaphoresis, fever and unexpected weight change.  Positive for mild fatigue.  HENT:   Negative for mouth sores, sore throat and trouble swallowing.    Eyes: Negative for icterus.   Respiratory: Negative for cough, hemoptysis and shortness of breath.    Cardiovascular: Negative for chest pain, leg swelling and palpitations.   Gastrointestinal: Negative for abdominal distention, abdominal pain, nausea and vomiting.     Endocrine: Negative for hot flashes.   Genitourinary: Negative for bladder incontinence, difficulty urinating, dysuria, frequency and hematuria.    Musculoskeletal: Negative for gait problem, neck pain and neck stiffness.   Skin: Negative for rash.   Neurological: Negative for dizziness, gait problem, headaches, light-headedness and numbness.   Hematological: Negative for adenopathy. Does not bruise/bleed easily.   Psychiatric/Behavioral: Negative for depression. The patient is not nervous/anxious.    All other systems reviewed and are negative.       PHYSICAL EXAM    Vitals:    11/18/20 0828   BP: (!) 182/108   Pulse: 76   Resp: 18  "  Temp: 98.2 °F (36.8 °C)   Weight: 68.9 kg (152 lb)   Height: 167 cm (65.75\")     Vitals:    11/18/20 0828   PainSc: 0-No pain        Constitutional: Appears well-developed and well-nourished. No distress.   ECOG: (1) Restricted in Physically Strenuous Activity, Ambulatory & Able to Do Work of Light Nature  HENT:   Head: Normocephalic.  Her hair is regrowing nicely with even distribution, no areas of alopecia.  Mouth/Throat: Oropharynx is clear and moist.   Eyes: Conjunctivae are normal. Pupils are equal, round, and reactive to light. No scleral icterus.   Neck: Neck supple. No JVD present. No thyromegaly present.   Cardiovascular: Normal rate, regular rhythm and normal heart sounds.    Pulmonary/Chest: Breath sounds normal. No respiratory distress.   Abdominal: Soft. Exhibits no distension. There is no tenderness.    Musculoskeletal:Exhibits no edema, tenderness or deformity.   Neurological: Alert and oriented to person, place, and time. Exhibits normal muscle tone.   Skin: No ecchymosis, no petechiae and no rash noted. Not diaphoretic. No cyanosis.   Psychiatric: Normal mood and affect.   Vitals reviewed.  Labs reviewed.    Lab Results   Component Value Date    HGB 12.6 10/28/2020    HCT 37.7 10/28/2020    MCV 94.0 10/28/2020     10/28/2020    WBC 7.70 10/28/2020    NEUTROABS 5.02 10/28/2020    LYMPHSABS 1.73 10/28/2020    MONOSABS 0.69 10/28/2020    EOSABS 0.19 10/28/2020    BASOSABS 0.05 10/28/2020         ASSESSMENT & PLAN:    1.  Right breast cancer, stage Ia, T1c N0 M0 ER weakly positive 1+, HER-2/ashley positive, had complications after first cycle of chemotherapy with Taxol and Herceptin therefore discontinued.  Currently on adjuvant therapy with Kanjinti: She continues to do well with no current complaints.  Tolerating therapy with Herceptin bio similar with no unusual side effects.  We plan on 1 year adjuvant therapy through February 2021.  Most recent echocardiogram 11/10/2020 was normal with EF of " 56-60 %, we will repeat quarterly while on Herceptin, next one will be due in February.  She is also on anastrozole and tolerating without any unusual side effects, we plan at least 5 years adjuvant therapy if tolerated, she was only weakly ER positive.     I will see her back for follow-up in 6 weeks.    I spent a total of 15 minutes in direct patient care, greater than 11  minutes face to face, time was spent in coordination of care, and counseling the patient regarding lab review, symptom assessment, review of echocardiogram and discussion of plan of care going forward.  Answered any questions patient had regarding medications and plan of care.     Almita Pruitt, APRN    11/18/2020

## 2020-11-19 LAB
BASOPHILS # BLD AUTO: 0.05 10*3/MM3 (ref 0–0.2)
BASOPHILS NFR BLD AUTO: 0.7 % (ref 0–1.5)
EOSINOPHIL # BLD AUTO: 0.17 10*3/MM3 (ref 0–0.4)
EOSINOPHIL NFR BLD AUTO: 2.5 % (ref 0.3–6.2)
ERYTHROCYTE [DISTWIDTH] IN BLOOD BY AUTOMATED COUNT: 12.4 % (ref 12.3–15.4)
HCT VFR BLD AUTO: 36.2 % (ref 34–46.6)
HGB BLD-MCNC: 12.5 G/DL (ref 12–15.9)
IMM GRANULOCYTES # BLD AUTO: 0.01 10*3/MM3 (ref 0–0.05)
IMM GRANULOCYTES NFR BLD AUTO: 0.1 % (ref 0–0.5)
LYMPHOCYTES # BLD AUTO: 1.36 10*3/MM3 (ref 0.7–3.1)
LYMPHOCYTES NFR BLD AUTO: 19.8 % (ref 19.6–45.3)
MCH RBC QN AUTO: 31.5 PG (ref 26.6–33)
MCHC RBC AUTO-ENTMCNC: 34.5 G/DL (ref 31.5–35.7)
MCV RBC AUTO: 91.2 FL (ref 79–97)
MONOCYTES # BLD AUTO: 0.64 10*3/MM3 (ref 0.1–0.9)
MONOCYTES NFR BLD AUTO: 9.3 % (ref 5–12)
NEUTROPHILS # BLD AUTO: 4.64 10*3/MM3 (ref 1.7–7)
NEUTROPHILS NFR BLD AUTO: 67.6 % (ref 42.7–76)
NRBC BLD AUTO-RTO: 0 /100 WBC (ref 0–0.2)
PLATELET # BLD AUTO: 354 10*3/MM3 (ref 140–450)
RBC # BLD AUTO: 3.97 10*6/MM3 (ref 3.77–5.28)
WBC # BLD AUTO: 6.87 10*3/MM3 (ref 3.4–10.8)

## 2020-12-08 DIAGNOSIS — C50.411 MALIGNANT NEOPLASM OF UPPER-OUTER QUADRANT OF RIGHT BREAST IN FEMALE, ESTROGEN RECEPTOR POSITIVE (HCC): Primary | ICD-10-CM

## 2020-12-08 DIAGNOSIS — Z17.0 MALIGNANT NEOPLASM OF UPPER-OUTER QUADRANT OF RIGHT BREAST IN FEMALE, ESTROGEN RECEPTOR POSITIVE (HCC): Primary | ICD-10-CM

## 2020-12-08 RX ORDER — SODIUM CHLORIDE 9 MG/ML
250 INJECTION, SOLUTION INTRAVENOUS ONCE
Status: COMPLETED | OUTPATIENT
Start: 2020-12-09 | End: 2020-12-09

## 2020-12-08 RX ORDER — SODIUM CHLORIDE 9 MG/ML
250 INJECTION, SOLUTION INTRAVENOUS ONCE
Status: CANCELLED | OUTPATIENT
Start: 2020-12-10

## 2020-12-09 ENCOUNTER — INFUSION (OUTPATIENT)
Dept: ONCOLOGY | Facility: HOSPITAL | Age: 60
End: 2020-12-09

## 2020-12-09 VITALS
HEART RATE: 70 BPM | RESPIRATION RATE: 18 BRPM | TEMPERATURE: 97.3 F | WEIGHT: 150.8 LBS | SYSTOLIC BLOOD PRESSURE: 144 MMHG | BODY MASS INDEX: 24.53 KG/M2 | DIASTOLIC BLOOD PRESSURE: 78 MMHG

## 2020-12-09 DIAGNOSIS — Z17.0 MALIGNANT NEOPLASM OF UPPER-OUTER QUADRANT OF RIGHT BREAST IN FEMALE, ESTROGEN RECEPTOR POSITIVE (HCC): Primary | ICD-10-CM

## 2020-12-09 DIAGNOSIS — C50.411 MALIGNANT NEOPLASM OF UPPER-OUTER QUADRANT OF RIGHT BREAST IN FEMALE, ESTROGEN RECEPTOR POSITIVE (HCC): Primary | ICD-10-CM

## 2020-12-09 PROCEDURE — 96413 CHEMO IV INFUSION 1 HR: CPT

## 2020-12-09 PROCEDURE — 25010000002 TRASTUZUMAB-ANNS 420 MG RECONSTITUTED SOLUTION 1 EACH VIAL: Performed by: NURSE PRACTITIONER

## 2020-12-09 RX ORDER — INSULIN GLARGINE 100 [IU]/ML
INJECTION, SOLUTION SUBCUTANEOUS
Qty: 10 ML | Refills: 3 | Status: SHIPPED | OUTPATIENT
Start: 2020-12-09 | End: 2020-12-10 | Stop reason: SDUPTHER

## 2020-12-09 RX ADMIN — SODIUM CHLORIDE 250 ML: 9 INJECTION, SOLUTION INTRAVENOUS at 09:15

## 2020-12-09 RX ADMIN — TRASTUZUMAB-ANNS 390 MG: 420 INJECTION, POWDER, LYOPHILIZED, FOR SOLUTION INTRAVENOUS at 09:24

## 2020-12-09 NOTE — TELEPHONE ENCOUNTER
Patient called for a refill for her insulin (Basaglar). Last seen on 10/15/2020. Next appt is on 1/13/21.

## 2020-12-10 LAB
BASOPHILS # BLD AUTO: 0.06 10*3/MM3 (ref 0–0.2)
BASOPHILS NFR BLD AUTO: 0.8 % (ref 0–1.5)
EOSINOPHIL # BLD AUTO: 0.2 10*3/MM3 (ref 0–0.4)
EOSINOPHIL NFR BLD AUTO: 2.6 % (ref 0.3–6.2)
ERYTHROCYTE [DISTWIDTH] IN BLOOD BY AUTOMATED COUNT: 12.1 % (ref 12.3–15.4)
HCT VFR BLD AUTO: 38 % (ref 34–46.6)
HGB BLD-MCNC: 12.9 G/DL (ref 12–15.9)
IMM GRANULOCYTES # BLD AUTO: 0.05 10*3/MM3 (ref 0–0.05)
IMM GRANULOCYTES NFR BLD AUTO: 0.6 % (ref 0–0.5)
LYMPHOCYTES # BLD AUTO: 1.79 10*3/MM3 (ref 0.7–3.1)
LYMPHOCYTES NFR BLD AUTO: 22.9 % (ref 19.6–45.3)
MCH RBC QN AUTO: 31.1 PG (ref 26.6–33)
MCHC RBC AUTO-ENTMCNC: 33.9 G/DL (ref 31.5–35.7)
MCV RBC AUTO: 91.6 FL (ref 79–97)
MONOCYTES # BLD AUTO: 0.73 10*3/MM3 (ref 0.1–0.9)
MONOCYTES NFR BLD AUTO: 9.3 % (ref 5–12)
NEUTROPHILS # BLD AUTO: 4.98 10*3/MM3 (ref 1.7–7)
NEUTROPHILS NFR BLD AUTO: 63.8 % (ref 42.7–76)
NRBC BLD AUTO-RTO: 0 /100 WBC (ref 0–0.2)
PLATELET # BLD AUTO: 395 10*3/MM3 (ref 140–450)
RBC # BLD AUTO: 4.15 10*6/MM3 (ref 3.77–5.28)
WBC # BLD AUTO: 7.81 10*3/MM3 (ref 3.4–10.8)

## 2020-12-10 RX ORDER — INSULIN GLARGINE 100 [IU]/ML
INJECTION, SOLUTION SUBCUTANEOUS
Qty: 15 ML | Refills: 5 | Status: SHIPPED | OUTPATIENT
Start: 2020-12-10 | End: 2021-04-02 | Stop reason: SDUPTHER

## 2020-12-21 RX ORDER — SEMAGLUTIDE 1.34 MG/ML
1 INJECTION, SOLUTION SUBCUTANEOUS WEEKLY
Qty: 9 ML | Refills: 3 | Status: SHIPPED | OUTPATIENT
Start: 2020-12-21 | End: 2021-06-07 | Stop reason: SDUPTHER

## 2020-12-21 RX ORDER — METFORMIN HYDROCHLORIDE 500 MG/1
500 TABLET, EXTENDED RELEASE ORAL 2 TIMES DAILY
Qty: 180 TABLET | Refills: 3 | Status: SHIPPED | OUTPATIENT
Start: 2020-12-21 | End: 2020-12-24 | Stop reason: SDUPTHER

## 2020-12-24 RX ORDER — METFORMIN HYDROCHLORIDE 500 MG/1
TABLET, EXTENDED RELEASE ORAL
Qty: 180 TABLET | Refills: 1 | Status: SHIPPED | OUTPATIENT
Start: 2020-12-24 | End: 2021-03-10

## 2020-12-29 DIAGNOSIS — C50.411 MALIGNANT NEOPLASM OF UPPER-OUTER QUADRANT OF RIGHT BREAST IN FEMALE, ESTROGEN RECEPTOR POSITIVE (HCC): Primary | ICD-10-CM

## 2020-12-29 DIAGNOSIS — Z17.0 MALIGNANT NEOPLASM OF UPPER-OUTER QUADRANT OF RIGHT BREAST IN FEMALE, ESTROGEN RECEPTOR POSITIVE (HCC): Primary | ICD-10-CM

## 2020-12-29 RX ORDER — SODIUM CHLORIDE 9 MG/ML
250 INJECTION, SOLUTION INTRAVENOUS ONCE
Status: CANCELLED | OUTPATIENT
Start: 2020-12-31

## 2020-12-29 RX ORDER — SODIUM CHLORIDE 9 MG/ML
250 INJECTION, SOLUTION INTRAVENOUS ONCE
Status: COMPLETED | OUTPATIENT
Start: 2020-12-30 | End: 2020-12-30

## 2020-12-30 ENCOUNTER — OFFICE VISIT (OUTPATIENT)
Dept: ONCOLOGY | Facility: CLINIC | Age: 60
End: 2020-12-30

## 2020-12-30 ENCOUNTER — INFUSION (OUTPATIENT)
Dept: ONCOLOGY | Facility: HOSPITAL | Age: 60
End: 2020-12-30

## 2020-12-30 VITALS
DIASTOLIC BLOOD PRESSURE: 117 MMHG | HEART RATE: 83 BPM | HEIGHT: 66 IN | RESPIRATION RATE: 18 BRPM | BODY MASS INDEX: 24.43 KG/M2 | SYSTOLIC BLOOD PRESSURE: 190 MMHG | WEIGHT: 152 LBS | TEMPERATURE: 98.6 F | OXYGEN SATURATION: 97 %

## 2020-12-30 VITALS — SYSTOLIC BLOOD PRESSURE: 145 MMHG | DIASTOLIC BLOOD PRESSURE: 52 MMHG

## 2020-12-30 DIAGNOSIS — C50.411 MALIGNANT NEOPLASM OF UPPER-OUTER QUADRANT OF RIGHT BREAST IN FEMALE, ESTROGEN RECEPTOR POSITIVE (HCC): Primary | ICD-10-CM

## 2020-12-30 DIAGNOSIS — Z17.0 MALIGNANT NEOPLASM OF UPPER-OUTER QUADRANT OF RIGHT BREAST IN FEMALE, ESTROGEN RECEPTOR POSITIVE (HCC): Primary | ICD-10-CM

## 2020-12-30 PROCEDURE — 96413 CHEMO IV INFUSION 1 HR: CPT

## 2020-12-30 PROCEDURE — 99213 OFFICE O/P EST LOW 20 MIN: CPT | Performed by: NURSE PRACTITIONER

## 2020-12-30 PROCEDURE — 25010000002 TRASTUZUMAB-ANNS 420 MG RECONSTITUTED SOLUTION 1 EACH VIAL: Performed by: NURSE PRACTITIONER

## 2020-12-30 RX ORDER — SODIUM CHLORIDE 9 MG/ML
250 INJECTION, SOLUTION INTRAVENOUS ONCE
Status: CANCELLED | OUTPATIENT
Start: 2021-01-21

## 2020-12-30 RX ORDER — SODIUM CHLORIDE 9 MG/ML
250 INJECTION, SOLUTION INTRAVENOUS ONCE
Status: CANCELLED | OUTPATIENT
Start: 2021-02-11

## 2020-12-30 RX ADMIN — SODIUM CHLORIDE 250 ML: 9 INJECTION, SOLUTION INTRAVENOUS at 14:50

## 2020-12-30 RX ADMIN — TRASTUZUMAB-ANNS 390 MG: 420 INJECTION, POWDER, LYOPHILIZED, FOR SOLUTION INTRAVENOUS at 14:52

## 2020-12-30 NOTE — PROGRESS NOTES
CHIEF COMPLAINT: Breast cancer    Problem List:  Oncology/Hematology History Overview Note   1. 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  ER weakly 5% 1+ positive, CO 50% 1-2+ positive, HER-2/ashley 100% 3+ positive.  Negative cancer next panel  2. Significant diabetes with predating neuropathy due to spinal stenosis status post laminectomy 5/24/2019 with persistent neuropathy dorsum left foot  3. MRSA bacteremia due to port infection March 2020 after course 1 day 1 of Herceptin Taxol    Oncology history timeline:  -5/24/2019 Dr. Garcia saw for spinal stenosis with radiculopathy and left foot drop due to herniated disc and performed decompressive laminectomy, L4-5 and L5-S1 discectomy and medial facetectomy  -10/15/2019 mammogram BI-RADS 0  -11/22/2019 right mammogram/ultrasound BI-RADS 4 with ultrasound-guided core biopsy showing invasive ductal carcinoma [place] score 6 out of 9 grade 2, ER 5% 1+ positive CO 50% 1-2+ positive, HER-2/ashley 100% 3+ positive.  -12/13/2019 MRI breasts revealed 2.2 cm right breast mass consistent with biopsy proven cancer with unsuspected adjacent area's of non-mass enhancement worrisome for DCIS.  Non-mass enhancement in the subareolar left breast worrisome for DCIS.  Dominant focus left central portion left breast indeterminate BI-RADS 4.  Cancer next panel negative  -1/15/2020 CMP unremarkable save for glucose 200 with hemoglobin 10.6 normochromic normocytic indices  -1/21/2020 right skin sparing mastectomy with right sentinel lymph node injection and right deep subfascial sentinel lymph node biopsy with contralateral prophylactic left skin sparing mastectomy.  Right breast 1.8 cm Bloom Dias 8 out of 9, total of 4 lymph nodes examined 2 sentinel nodes all negative.  Pathological T1 cN0 M0 stage Ia.  Left breast benign with  sclerosing adenosis.  -2/26/2020 started Herceptin Taxol weekly  -3/2/2020 port removed due to MRSA bacteremia with port infection/purulence.  -3/2/2020 through 3/6/2020 hospitalized for MRSA bacteremia from port.  -3/31/2020 [facility] tele-visit: Patient still on IV antibiotics for MRSA port infection.  Decision made to go with Kanjinti alone every 3 weeks along with Arimidex.  -4/13/2020 per ID, patient has completed IV antibiotics and is now on oral antibiotics.     Malignant neoplasm of upper-outer quadrant of right breast in female, estrogen receptor positive (CMS/HCC)   1/16/2019 Cancer Staged    Staging form: Breast, AJCC 8th Edition  - Clinical stage from 1/16/2019: Stage IB (cT2, cN0, cM0, G2, ER+, WA+, HER2+) - Signed by Lisa Herman MD on 1/27/2020 1/21/2020 Initial Diagnosis    Malignant neoplasm of upper-outer quadrant of right female breast (CMS/HCC)     2/11/2020 Cancer Staged    Staging form: Breast, AJCC 8th Edition  - Pathologic: Stage IA (pT1c, pN0, cM0, G3, ER+, WA+, HER2+) - Signed by Hero Gomez MD on 2/11/2020 2/18/2020 -  Other Event    Echocardiogram  · Left ventricular systolic function is normal. Estimated EF = 55%.  · The global longitudinal strain was -19.5%.     2/25/2020 - 2/26/2020 Chemotherapy    OP CENTRAL VENOUS ACCESS DEVICE ACCESS, CARE, AND MAINTENANCE (CVAD)     2/26/2020 - 3/10/2020 Chemotherapy    OP BREAST PACLitaxel / Trastuzumab-anns (Weekly X 12)     3/2/2020 Adverse Reaction    MRSA bacteremia due to port infection with port removal after day 1 course 1 Herceptin Taxol     3/31/2020 -  Hormonal Therapy    Arimidex for planned 5 years     4/2/2020 -  Chemotherapy    OP BREAST Trastuzumab-anns Q21D (maintenance)     5/27/2020 -  Other Event    5/27/2020 echocardiogram EF 55%      7/23/2020 Procedure    Colonoscopy with Dr. Marte, normal     8/26/2020 -  Other Event    Echocardiogram   · This was a limited echocardiogram to assess left ventricular  ejection fraction in the setting of chemotherapy.  · Left ventricular systolic function is normal. Calculated EF = 55.1%. Estimated EF = 55%. Global Longitudinal LV strain = -18.2%.  · Estimated EF = 55%.     11/9/2020 Imaging    DEXA bone density IMPRESSION:  Osteopenia of the femoral necks bilaterally, and total hips  bilaterally.     11/10/2020 -  Other Event    Echocardogram   · This was a limited echocardiogram to assess left ventricular function only.  · Left ventricular systolic function is normal. Left ventricular ejection fraction appears to be 56 - 60%.  · Global longitudinal LV strain (GLS) = 19.7%.         HISTORY OF PRESENT ILLNESS:  The patient is a 60 y.o. female, here for follow up on management of triple positive right breast cancer currently on adjuvant Kanjinti.  She continues to do well with no new concerns.  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  Has had no unusual shortness of breath, cough or lower extremity swelling.  Continues to work from home.  Has mild fatigue, is able to work most days up to 7-1/2 hours but some days does have to work less due to fatigue.    Past Medical History:   Diagnosis Date   • Breast cancer (CMS/HCC)    • Diabetes mellitus (CMS/HCC)    • Diverticulitis    • GERD (gastroesophageal reflux disease)    • Hypokalemia    • Kidney stone    • Lichen sclerosus    • Long term (current) use of insulin (CMS/HCC)    • Malignant neoplasm of upper-outer quadrant of right female breast (CMS/HCC) 1/21/2020   • Microalbuminuria    • Nephrolithiasis    • Sciatica    • Type 2 diabetes mellitus, uncontrolled (CMS/HCC)    • Wears contact lenses      Past Surgical History:   Procedure Laterality Date   • APPENDECTOMY     • BREAST BIOPSY Right    • BREAST EXCISIONAL BIOPSY Right    • CHOLECYSTECTOMY     • COLON SURGERY     • COLONOSCOPY     • HYSTERECTOMY     • LEG SURGERY       leg fracture surgery-Abstracted from [employer]   • LUMBAR LAMINECTOMY DISCECTOMY DECOMPRESSION N/A 5/24/2019    Procedure: LUMBAR LAMINECTOMY L4-5 AND L5-S1;  Surgeon: Hipolito Kahn MD;  Location:  [facility];  Service: Orthopedic Spine   • MASTECTOMY Bilateral    • MASTECTOMY W/ SENTINEL NODE BIOPSY Bilateral 1/21/2020    Procedure: SKIN SPARING MASTECTOMIES BILATERAL, SENTINEL NODE BIOPSY RIGHT;  Surgeon: Silvio Howard MD;  Location:  [facility];  Service: General   • TUBAL ABDOMINAL LIGATION     • VENOUS ACCESS DEVICE (PORT) REMOVAL N/A 3/3/2020    Procedure: REMOVAL PORT;  Surgeon: Silvio Howard MD;  Location:  [facility];  Service: General;  Laterality: N/A;       Allergies   Allergen Reactions   • Bactrim [Sulfamethoxazole-Trimethoprim] Rash     RASH, SKIN PEELING        Family History and Social History reviewed and changed as necessary      REVIEW OF SYSTEM:   Review of Systems   Constitutional: Negative for appetite change, chills, diaphoresis, fever and unexpected weight change.  Positive for mild fatigue.  HENT:   Negative for mouth sores, sore throat and trouble swallowing.    Eyes: Negative for icterus.   Respiratory: Negative for cough, hemoptysis and shortness of breath.    Cardiovascular: Negative for chest pain, leg swelling and palpitations.   Gastrointestinal: Negative for abdominal distention, abdominal pain, nausea and vomiting.     Endocrine: Negative for hot flashes.   Genitourinary: Negative for bladder incontinence, difficulty urinating, dysuria, frequency and hematuria.    Musculoskeletal: Negative for gait problem, neck pain and neck stiffness.   Skin: Negative for rash.   Neurological: Negative for dizziness, gait problem, headaches, light-headedness and numbness.   Hematological: Negative for adenopathy. Does not bruise/bleed easily.   Psychiatric/Behavioral: Negative for depression. The patient is not nervous/anxious.    All other systems reviewed and are negative.    "    PHYSICAL EXAM    Vitals:    12/30/20 1343   BP: (!) 190/117   Pulse: 83   Resp: 18   Temp: 98.6 °F (37 °C)   SpO2: 97%   Weight: 68.9 kg (152 lb)   Height: 167 cm (65.75\")     Vitals:    12/30/20 1343   PainSc: 0-No pain        Constitutional: Appears well-developed and well-nourished. No distress.   ECOG: (1) Restricted in Physically Strenuous Activity, Ambulatory & Able to Do Work of Light Nature  HENT:   Head: Normocephalic.  Her hair is regrowing nicely with even distribution, no areas of alopecia.  Mouth/Throat: Oropharynx is clear and moist.   Eyes: Conjunctivae are normal. Pupils are equal, round, and reactive to light. No scleral icterus.   Neck: Neck supple. No JVD present. No thyromegaly present.   Cardiovascular: Normal rate, regular rhythm and normal heart sounds.    Pulmonary/Chest: Breath sounds normal. No respiratory distress.   Abdominal: Soft. Exhibits no distension. There is no tenderness.    Musculoskeletal:Exhibits no edema, tenderness or deformity.   Neurological: Alert and oriented to person, place, and time. Exhibits normal muscle tone.   Skin: No ecchymosis, no petechiae and no rash noted. Not diaphoretic. No cyanosis.   Psychiatric: Normal mood and affect.   Vitals reviewed.  Labs reviewed.    Lab Results   Component Value Date    HGB 12.9 12/09/2020    HCT 38.0 12/09/2020    MCV 91.6 12/09/2020     12/09/2020    WBC 7.81 12/09/2020    NEUTROABS 4.98 12/09/2020    LYMPHSABS 1.79 12/09/2020    MONOSABS 0.73 12/09/2020    EOSABS 0.20 12/09/2020    BASOSABS 0.06 12/09/2020         ASSESSMENT & PLAN:    1.  Right breast cancer, stage Ia, T1c N0 M0 ER weakly positive 1+, HER-2/ashley positive, had complications after first cycle of chemotherapy with Taxol and Herceptin therefore discontinued.  Currently on adjuvant therapy with Kanjinti: She continues to do well with no current complaints.  Tolerating therapy with Herceptin bio similar with no unusual side effects.  We plan on 1 year " adjuvant therapy through February 2021.  I did reach out to our pharmacist, Selwyn Fernandez.MYNOR. to make sure the number of treatments are correct since we did stop short when she was receiving Taxol and Herceptin due to infection.  I discussed with the patient, if needed to add more cycles beyond February we will.  Most recent echocardiogram 11/10/2020 was normal with EF of 56-60 %, we will repeat quarterly while on Herceptin, if treatment ends in February she does not need any further echocardiograms, if we find out she needs a few more treatments then I will do an echocardiogram in February.  She is on anastrozole and tolerating without any unusual side effects, we plan at least 5 years adjuvant therapy if tolerated, she was only weakly ER positive.  She will need periodic bone density testing, last ordered through gynecology, DEXA scan 11/9/2020 showed osteopenia.    I will see her back for follow-up in 6 weeks.    I spent a total of 15 minutes in direct patient care, greater than 11  minutes face to face, time was spent in coordination of care, and counseling the patient regarding lab review, symptom assessment, review of plan of care going forward.  Answered any questions patient had regarding medications and plan of care.     Almita Pruitt, APRN    12/30/2020    Addendum: I spoke with Selwyn Fernandez.MYNOR.  We will add 2 more courses of Herceptin to run through March to complete 1 year of adjuvant therapy.  Will repeat echocardiogram early February.

## 2020-12-31 LAB
BASOPHILS # BLD AUTO: 0.05 10*3/MM3 (ref 0–0.2)
BASOPHILS NFR BLD AUTO: 0.6 % (ref 0–1.5)
EOSINOPHIL # BLD AUTO: 0.21 10*3/MM3 (ref 0–0.4)
EOSINOPHIL NFR BLD AUTO: 2.6 % (ref 0.3–6.2)
ERYTHROCYTE [DISTWIDTH] IN BLOOD BY AUTOMATED COUNT: 12.6 % (ref 12.3–15.4)
HCT VFR BLD AUTO: 37.1 % (ref 34–46.6)
HGB BLD-MCNC: 12.6 G/DL (ref 12–15.9)
IMM GRANULOCYTES # BLD AUTO: 0.02 10*3/MM3 (ref 0–0.05)
IMM GRANULOCYTES NFR BLD AUTO: 0.2 % (ref 0–0.5)
LYMPHOCYTES # BLD AUTO: 1.88 10*3/MM3 (ref 0.7–3.1)
LYMPHOCYTES NFR BLD AUTO: 23 % (ref 19.6–45.3)
MCH RBC QN AUTO: 32.6 PG (ref 26.6–33)
MCHC RBC AUTO-ENTMCNC: 34 G/DL (ref 31.5–35.7)
MCV RBC AUTO: 96.1 FL (ref 79–97)
MONOCYTES # BLD AUTO: 0.72 10*3/MM3 (ref 0.1–0.9)
MONOCYTES NFR BLD AUTO: 8.8 % (ref 5–12)
NEUTROPHILS # BLD AUTO: 5.31 10*3/MM3 (ref 1.7–7)
NEUTROPHILS NFR BLD AUTO: 64.8 % (ref 42.7–76)
NRBC BLD AUTO-RTO: 0 /100 WBC (ref 0–0.2)
PLATELET # BLD AUTO: 317 10*3/MM3 (ref 140–450)
RBC # BLD AUTO: 3.86 10*6/MM3 (ref 3.77–5.28)
WBC # BLD AUTO: 8.19 10*3/MM3 (ref 3.4–10.8)

## 2021-01-06 DIAGNOSIS — C50.411 MALIGNANT NEOPLASM OF UPPER-OUTER QUADRANT OF RIGHT BREAST IN FEMALE, ESTROGEN RECEPTOR POSITIVE (HCC): ICD-10-CM

## 2021-01-06 DIAGNOSIS — Z51.11 ENCOUNTER FOR ANTINEOPLASTIC CHEMOTHERAPY: Primary | ICD-10-CM

## 2021-01-06 DIAGNOSIS — Z17.0 MALIGNANT NEOPLASM OF UPPER-OUTER QUADRANT OF RIGHT BREAST IN FEMALE, ESTROGEN RECEPTOR POSITIVE (HCC): ICD-10-CM

## 2021-01-13 ENCOUNTER — OFFICE VISIT (OUTPATIENT)
Dept: ENDOCRINOLOGY | Facility: CLINIC | Age: 61
End: 2021-01-13

## 2021-01-13 VITALS
HEIGHT: 66 IN | DIASTOLIC BLOOD PRESSURE: 78 MMHG | BODY MASS INDEX: 24.08 KG/M2 | TEMPERATURE: 96.9 F | SYSTOLIC BLOOD PRESSURE: 132 MMHG | WEIGHT: 149.8 LBS

## 2021-01-13 DIAGNOSIS — E11.65 UNCONTROLLED TYPE 2 DIABETES MELLITUS WITH HYPERGLYCEMIA (HCC): Primary | ICD-10-CM

## 2021-01-13 DIAGNOSIS — Z79.4 INSULIN LONG-TERM USE (HCC): ICD-10-CM

## 2021-01-13 DIAGNOSIS — I10 BENIGN HYPERTENSION: ICD-10-CM

## 2021-01-13 LAB
EXPIRATION DATE: NORMAL
HBA1C MFR BLD: 6.9 %
Lab: NORMAL

## 2021-01-13 PROCEDURE — 83036 HEMOGLOBIN GLYCOSYLATED A1C: CPT | Performed by: INTERNAL MEDICINE

## 2021-01-13 PROCEDURE — 99213 OFFICE O/P EST LOW 20 MIN: CPT | Performed by: INTERNAL MEDICINE

## 2021-01-13 NOTE — PROGRESS NOTES
"     Office Note      Date: 2021  Patient Name: Luana Chawla  MRN: [id number]  : 1960    Chief Complaint   Patient presents with   • Follow-up   • Diabetes       History of Present Illness:     Luana Chawla is a 60 y.o. female who presents for Diabetes type 2. Diagnosed in: . Treated in past with insulin. Current treatments: metformin and basal insulin. Number of insulin shots per day: 1. Checks blood sugar 1 times a day. Also on DexCom CGM.  Has low blood sugar: occasional. Aspirin use: No - told not to.. Statin use: No - no indication.. ACE-I/ARB use: No - no indication.. Changes in health since last visit: none. Last eye exam .    She plans to have glomus tumor removed from fingernail on Friday.    Subjective      Diabetic Complications:  Eyes: No  Kidneys: No  Feet: No  Heart: No    Diet and Exercise:  Meals per day: 3  Minutes of exercise per week: 150 mins.    Review of Systems:   Review of Systems   Constitutional: Negative.    Cardiovascular: Negative.    Gastrointestinal: Negative.    Endocrine: Negative.        The following portions of the patient's history were reviewed and updated as appropriate: allergies, current medications, past family history, past medical history, past social history, past surgical history and problem list.    Objective       Visit Vitals  /78   Temp 96.9 °F (36.1 °C) (Infrared)   Ht 167.6 cm (66\")   Wt 67.9 kg (149 lb 12.8 oz)   BMI 24.18 kg/m²       Physical Exam:  Physical Exam  Constitutional:       Appearance: Normal appearance.   Neurological:      Mental Status: She is alert.         Labs:    HbA1c  Lab Results   Component Value Date    HGBA1C 6.9 2021       CMP  Lab Results   Component Value Date    GLUCOSE 230 (H) 2020    BUN 18 2020    CREATININE 0.58 2020    EGFRIFNONA 106 2020    EGFRIFAFRI 110 2020    BCR 31.0 (H) 2020    K 4.0 2020    CO2 24.0 2020    CALCIUM 9.4 " 04/06/2020    PROTENTOTREF 6.5 02/25/2020    LABIL2 1.7 02/25/2020    AST 21 04/06/2020    ALT 23 04/06/2020        Lipid Panel        TSH  No results found for: TSH, FREET4     Hemoglobin A1C  Lab Results   Component Value Date    HGBA1C 6.9 01/13/2021        Microalbumin/Creatinine  No results found for: MALBCRERATIO, CREATINIURIN, MICROALBUR        Assessment / Plan      Assessment & Plan:  Problem List Items Addressed This Visit        Other    Insulin long-term use (CMS/[facility])    Benign hypertension    Current Assessment & Plan     Hypertension is unchanged.  Continue current treatment regimen.  Blood pressure will be reassessed in 3 months.         Uncontrolled type 2 diabetes mellitus with hyperglycemia (CMS/[facility]) - Primary    Current Assessment & Plan     Diabetes is unchanged.   Continue current treatment regimen.  Diabetes will be reassessed in 3 months.    Set up DexCom for sharing.         Relevant Medications    Insulin Glargine (BASAGLAR KWIKPEN) 100 UNIT/ML injection pen    Ozempic, 1 MG/DOSE, 2 MG/1.5ML solution pen-injector    metFORMIN ER (GLUCOPHAGE-XR) 500 MG 24 hr tablet    Other Relevant Orders    POC Glycosylated Hemoglobin (Hb A1C) (Completed)           Return in about 3 months (around 4/13/2021) for Recheck with A1c, CMP, lipids, TSH, microalbumin.    Jose Cummings MD   01/13/2021

## 2021-01-15 ENCOUNTER — LAB REQUISITION (OUTPATIENT)
Dept: LAB | Facility: HOSPITAL | Age: 61
End: 2021-01-15

## 2021-01-15 DIAGNOSIS — R22.9 LOCALIZED SWELLING, MASS AND LUMP, UNSPECIFIED: ICD-10-CM

## 2021-01-15 PROCEDURE — 88307 TISSUE EXAM BY PATHOLOGIST: CPT | Performed by: PLASTIC SURGERY

## 2021-01-15 PROCEDURE — 88342 IMHCHEM/IMCYTCHM 1ST ANTB: CPT | Performed by: PLASTIC SURGERY

## 2021-01-15 PROCEDURE — 88341 IMHCHEM/IMCYTCHM EA ADD ANTB: CPT | Performed by: PLASTIC SURGERY

## 2021-01-20 ENCOUNTER — INFUSION (OUTPATIENT)
Dept: ONCOLOGY | Facility: HOSPITAL | Age: 61
End: 2021-01-20

## 2021-01-20 VITALS
TEMPERATURE: 97.2 F | DIASTOLIC BLOOD PRESSURE: 68 MMHG | HEART RATE: 81 BPM | RESPIRATION RATE: 18 BRPM | SYSTOLIC BLOOD PRESSURE: 164 MMHG | BODY MASS INDEX: 24.31 KG/M2 | WEIGHT: 150.6 LBS

## 2021-01-20 DIAGNOSIS — Z17.0 MALIGNANT NEOPLASM OF UPPER-OUTER QUADRANT OF RIGHT BREAST IN FEMALE, ESTROGEN RECEPTOR POSITIVE (HCC): Primary | ICD-10-CM

## 2021-01-20 DIAGNOSIS — C50.411 MALIGNANT NEOPLASM OF UPPER-OUTER QUADRANT OF RIGHT BREAST IN FEMALE, ESTROGEN RECEPTOR POSITIVE (HCC): Primary | ICD-10-CM

## 2021-01-20 LAB
CYTO UR: NORMAL
LAB AP CASE REPORT: NORMAL
LAB AP CLINICAL INFORMATION: NORMAL
PATH REPORT.FINAL DX SPEC: NORMAL
PATH REPORT.GROSS SPEC: NORMAL

## 2021-01-20 PROCEDURE — 96413 CHEMO IV INFUSION 1 HR: CPT

## 2021-01-20 PROCEDURE — 25010000002 TRASTUZUMAB-ANNS 420 MG RECONSTITUTED SOLUTION 1 EACH VIAL: Performed by: NURSE PRACTITIONER

## 2021-01-20 RX ORDER — SODIUM CHLORIDE 9 MG/ML
250 INJECTION, SOLUTION INTRAVENOUS ONCE
Status: COMPLETED | OUTPATIENT
Start: 2021-01-20 | End: 2021-01-20

## 2021-01-20 RX ADMIN — TRASTUZUMAB-ANNS 390 MG: 420 INJECTION, POWDER, LYOPHILIZED, FOR SOLUTION INTRAVENOUS at 10:06

## 2021-01-20 RX ADMIN — SODIUM CHLORIDE 250 ML: 0.9 INJECTION, SOLUTION INTRAVENOUS at 10:06

## 2021-01-21 LAB
BASOPHILS # BLD AUTO: 0.1 X10E3/UL (ref 0–0.2)
BASOPHILS NFR BLD AUTO: 1 %
EOSINOPHIL # BLD AUTO: 0.2 X10E3/UL (ref 0–0.4)
EOSINOPHIL NFR BLD AUTO: 2 %
ERYTHROCYTE [DISTWIDTH] IN BLOOD BY AUTOMATED COUNT: 12.3 % (ref 11.7–15.4)
HCT VFR BLD AUTO: 41.1 % (ref 34–46.6)
HGB BLD-MCNC: 14 G/DL (ref 11.1–15.9)
IMM GRANULOCYTES # BLD AUTO: 0 X10E3/UL (ref 0–0.1)
IMM GRANULOCYTES NFR BLD AUTO: 0 %
LYMPHOCYTES # BLD AUTO: 1.5 X10E3/UL (ref 0.7–3.1)
LYMPHOCYTES NFR BLD AUTO: 20 %
MCH RBC QN AUTO: 32.3 PG (ref 26.6–33)
MCHC RBC AUTO-ENTMCNC: 34.1 G/DL (ref 31.5–35.7)
MCV RBC AUTO: 95 FL (ref 79–97)
MONOCYTES # BLD AUTO: 0.7 X10E3/UL (ref 0.1–0.9)
MONOCYTES NFR BLD AUTO: 9 %
NEUTROPHILS # BLD AUTO: 5 X10E3/UL (ref 1.4–7)
NEUTROPHILS NFR BLD AUTO: 68 %
PLATELET # BLD AUTO: 368 X10E3/UL (ref 150–450)
RBC # BLD AUTO: 4.34 X10E6/UL (ref 3.77–5.28)
WBC # BLD AUTO: 7.4 X10E3/UL (ref 3.4–10.8)

## 2021-01-22 ENCOUNTER — TELEPHONE (OUTPATIENT)
Dept: ONCOLOGY | Facility: CLINIC | Age: 61
End: 2021-01-22

## 2021-01-22 DIAGNOSIS — C50.411 MALIGNANT NEOPLASM OF UPPER-OUTER QUADRANT OF RIGHT BREAST IN FEMALE, ESTROGEN RECEPTOR POSITIVE (HCC): Primary | ICD-10-CM

## 2021-01-22 DIAGNOSIS — Z17.0 MALIGNANT NEOPLASM OF UPPER-OUTER QUADRANT OF RIGHT BREAST IN FEMALE, ESTROGEN RECEPTOR POSITIVE (HCC): Primary | ICD-10-CM

## 2021-01-22 PROCEDURE — 85025 COMPLETE CBC W/AUTO DIFF WBC: CPT

## 2021-01-22 NOTE — TELEPHONE ENCOUNTER
Called and spoke to Allison and she needs new order placed because the collecting agency was not changed to [employer] when the order was released.  Entered new order.

## 2021-01-22 NOTE — TELEPHONE ENCOUNTER
Allison Micaela, calling from Epic team (Sh[facility])    On 1/20, pt went to [place] office and had CBC.  The sample was supposed to have gone to  [facility], but somehow was sent to [facility] instead.     [facility] canceled the order.  Allison has results in her error queue for the labwork that went to [facility].    If a new order is placed, she can file these results in the pt chart.    [phone]    She will be out of the office 10-12 today.

## 2021-02-09 ENCOUNTER — HOSPITAL ENCOUNTER (OUTPATIENT)
Dept: CARDIOLOGY | Facility: HOSPITAL | Age: 61
Discharge: HOME | End: 2021-02-09
Admitting: NURSE PRACTITIONER

## 2021-02-09 VITALS — WEIGHT: 150 LBS | HEIGHT: 66 IN | BODY MASS INDEX: 24.11 KG/M2

## 2021-02-09 DIAGNOSIS — Z51.11 ENCOUNTER FOR ANTINEOPLASTIC CHEMOTHERAPY: ICD-10-CM

## 2021-02-09 DIAGNOSIS — Z17.0 MALIGNANT NEOPLASM OF UPPER-OUTER QUADRANT OF RIGHT BREAST IN FEMALE, ESTROGEN RECEPTOR POSITIVE (HCC): ICD-10-CM

## 2021-02-09 DIAGNOSIS — C50.411 MALIGNANT NEOPLASM OF UPPER-OUTER QUADRANT OF RIGHT BREAST IN FEMALE, ESTROGEN RECEPTOR POSITIVE (HCC): ICD-10-CM

## 2021-02-09 LAB
BH CV ECHO MEAS - AO ROOT AREA (BSA CORRECTED): 1.7
BH CV ECHO MEAS - AO ROOT AREA: 6.6 CM^2
BH CV ECHO MEAS - AO ROOT DIAM: 2.9 CM
BH CV ECHO MEAS - BSA(HAYCOCK): 1.8 M^2
BH CV ECHO MEAS - BSA: 1.7 M^2
BH CV ECHO MEAS - BZI_BMI: 23.4 KILOGRAMS/M^2
BH CV ECHO MEAS - BZI_METRIC_HEIGHT: 167.6 CM
BH CV ECHO MEAS - BZI_METRIC_WEIGHT: 65.8 KG
BH CV ECHO MEAS - EDV(CUBED): 64.5 ML
BH CV ECHO MEAS - EDV(MOD-SP2): 64 ML
BH CV ECHO MEAS - EDV(MOD-SP4): 77 ML
BH CV ECHO MEAS - EDV(TEICH): 70.4 ML
BH CV ECHO MEAS - EF(CUBED): 64.5 %
BH CV ECHO MEAS - EF(MOD-BP): 59 %
BH CV ECHO MEAS - EF(MOD-SP2): 62.5 %
BH CV ECHO MEAS - EF(MOD-SP4): 57.1 %
BH CV ECHO MEAS - EF(TEICH): 56.5 %
BH CV ECHO MEAS - ESV(CUBED): 22.9 ML
BH CV ECHO MEAS - ESV(MOD-SP2): 24 ML
BH CV ECHO MEAS - ESV(MOD-SP4): 33 ML
BH CV ECHO MEAS - ESV(TEICH): 30.6 ML
BH CV ECHO MEAS - FS: 29.2 %
BH CV ECHO MEAS - IVS/LVPW: 0.97
BH CV ECHO MEAS - IVSD: 0.97 CM
BH CV ECHO MEAS - LA DIMENSION: 3.1 CM
BH CV ECHO MEAS - LA/AO: 1.1
BH CV ECHO MEAS - LV DIASTOLIC VOL/BSA (35-75): 44.1 ML/M^2
BH CV ECHO MEAS - LV MASS(C)D: 124.8 GRAMS
BH CV ECHO MEAS - LV MASS(C)DI: 71.5 GRAMS/M^2
BH CV ECHO MEAS - LV SYSTOLIC VOL/BSA (12-30): 18.9 ML/M^2
BH CV ECHO MEAS - LVIDD: 4 CM
BH CV ECHO MEAS - LVIDS: 2.8 CM
BH CV ECHO MEAS - LVLD AP2: 6.9 CM
BH CV ECHO MEAS - LVLD AP4: 6.8 CM
BH CV ECHO MEAS - LVLS AP2: 5.5 CM
BH CV ECHO MEAS - LVLS AP4: 5.3 CM
BH CV ECHO MEAS - LVPWD: 0.9 CM
BH CV ECHO MEAS - SI(CUBED): 23.8 ML/M^2
BH CV ECHO MEAS - SI(MOD-SP2): 22.9 ML/M^2
BH CV ECHO MEAS - SI(MOD-SP4): 25.2 ML/M^2
BH CV ECHO MEAS - SI(TEICH): 22.8 ML/M^2
BH CV ECHO MEAS - SV(CUBED): 41.6 ML
BH CV ECHO MEAS - SV(MOD-SP2): 40 ML
BH CV ECHO MEAS - SV(MOD-SP4): 44 ML
BH CV ECHO MEAS - SV(TEICH): 39.8 ML
BH CV VAS BP RIGHT ARM: NORMAL MMHG
LV EF 2D ECHO EST: 55 %
MAXIMAL PREDICTED HEART RATE: 159 BPM
STRESS TARGET HR: 135 BPM

## 2021-02-09 PROCEDURE — 25010000002 SULFUR HEXAFLUORIDE MICROSPH 60.7-25 MG RECONSTITUTED SUSPENSION: Performed by: NURSE PRACTITIONER

## 2021-02-09 PROCEDURE — 93356 MYOCRD STRAIN IMG SPCKL TRCK: CPT | Performed by: INTERNAL MEDICINE

## 2021-02-09 PROCEDURE — 93308 TTE F-UP OR LMTD: CPT

## 2021-02-09 PROCEDURE — 93356 MYOCRD STRAIN IMG SPCKL TRCK: CPT

## 2021-02-09 PROCEDURE — 93308 TTE F-UP OR LMTD: CPT | Performed by: INTERNAL MEDICINE

## 2021-02-09 RX ADMIN — SULFUR HEXAFLUORIDE 2 ML: KIT at 15:41

## 2021-02-10 ENCOUNTER — INFUSION (OUTPATIENT)
Dept: ONCOLOGY | Facility: HOSPITAL | Age: 61
End: 2021-02-10

## 2021-02-10 ENCOUNTER — OFFICE VISIT (OUTPATIENT)
Dept: ONCOLOGY | Facility: CLINIC | Age: 61
End: 2021-02-10

## 2021-02-10 VITALS
HEART RATE: 81 BPM | RESPIRATION RATE: 18 BRPM | BODY MASS INDEX: 23.95 KG/M2 | TEMPERATURE: 98.1 F | DIASTOLIC BLOOD PRESSURE: 67 MMHG | HEIGHT: 66 IN | SYSTOLIC BLOOD PRESSURE: 104 MMHG | WEIGHT: 149 LBS

## 2021-02-10 DIAGNOSIS — Z17.0 MALIGNANT NEOPLASM OF UPPER-OUTER QUADRANT OF RIGHT BREAST IN FEMALE, ESTROGEN RECEPTOR POSITIVE (HCC): Primary | ICD-10-CM

## 2021-02-10 DIAGNOSIS — C50.411 MALIGNANT NEOPLASM OF UPPER-OUTER QUADRANT OF RIGHT BREAST IN FEMALE, ESTROGEN RECEPTOR POSITIVE (HCC): Primary | ICD-10-CM

## 2021-02-10 DIAGNOSIS — M25.552 LEFT HIP PAIN: ICD-10-CM

## 2021-02-10 PROCEDURE — 25010000002 TRASTUZUMAB-ANNS 420 MG RECONSTITUTED SOLUTION 1 EACH VIAL: Performed by: NURSE PRACTITIONER

## 2021-02-10 PROCEDURE — 99214 OFFICE O/P EST MOD 30 MIN: CPT | Performed by: NURSE PRACTITIONER

## 2021-02-10 PROCEDURE — 96413 CHEMO IV INFUSION 1 HR: CPT

## 2021-02-10 RX ORDER — SODIUM CHLORIDE 9 MG/ML
250 INJECTION, SOLUTION INTRAVENOUS ONCE
Status: COMPLETED | OUTPATIENT
Start: 2021-02-10 | End: 2021-02-10

## 2021-02-10 RX ORDER — SODIUM CHLORIDE 9 MG/ML
250 INJECTION, SOLUTION INTRAVENOUS ONCE
Status: CANCELLED | OUTPATIENT
Start: 2021-03-04

## 2021-02-10 RX ADMIN — TRASTUZUMAB-ANNS 390 MG: 420 INJECTION, POWDER, LYOPHILIZED, FOR SOLUTION INTRAVENOUS at 09:43

## 2021-02-10 RX ADMIN — SODIUM CHLORIDE 250 ML: 9 INJECTION, SOLUTION INTRAVENOUS at 09:29

## 2021-02-10 NOTE — PROGRESS NOTES
CHIEF COMPLAINT: 1.  Hot flashes  2.  Left hip pain    Problem List:  Oncology/Hematology History Overview Note   1. 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  ER weakly 5% 1+ positive, VA 50% 1-2+ positive, HER-2/ashley 100% 3+ positive.  Negative cancer next panel  2. Significant diabetes with predating neuropathy due to spinal stenosis status post laminectomy 5/24/2019 with persistent neuropathy dorsum left foot  3. MRSA bacteremia due to port infection March 2020 after course 1 day 1 of Herceptin Taxol    Oncology history timeline:  -5/24/2019 Dr. Garcia saw for spinal stenosis with radiculopathy and left foot drop due to herniated disc and performed decompressive laminectomy, L4-5 and L5-S1 discectomy and medial facetectomy  -10/15/2019 mammogram BI-RADS 0  -11/22/2019 right mammogram/ultrasound BI-RADS 4 with ultrasound-guided core biopsy showing invasive ductal carcinoma [place] score 6 out of 9 grade 2, ER 5% 1+ positive VA 50% 1-2+ positive, HER-2/ashley 100% 3+ positive.  -12/13/2019 MRI breasts revealed 2.2 cm right breast mass consistent with biopsy proven cancer with unsuspected adjacent area's of non-mass enhancement worrisome for DCIS.  Non-mass enhancement in the subareolar left breast worrisome for DCIS.  Dominant focus left central portion left breast indeterminate BI-RADS 4.  Cancer next panel negative  -1/15/2020 CMP unremarkable save for glucose 200 with hemoglobin 10.6 normochromic normocytic indices  -1/21/2020 right skin sparing mastectomy with right sentinel lymph node injection and right deep subfascial sentinel lymph node biopsy with contralateral prophylactic left skin sparing mastectomy.  Right breast 1.8 cm Bloom Dias 8 out of 9, total of 4 lymph nodes examined 2 sentinel nodes all negative.  Pathological T1 cN0 M0 stage Ia.  Left  breast benign with sclerosing adenosis.  -2/26/2020 started Herceptin Taxol weekly  -3/2/2020 port removed due to MRSA bacteremia with port infection/purulence.  -3/2/2020 through 3/6/2020 hospitalized for MRSA bacteremia from port.  -3/31/2020 [facility] tele-visit: Patient still on IV antibiotics for MRSA port infection.  Decision made to go with Kanjinti alone every 3 weeks along with Arimidex.  -4/13/2020 per ID, patient has completed IV antibiotics and is now on oral antibiotics.     Malignant neoplasm of upper-outer quadrant of right breast in female, estrogen receptor positive (CMS/HCC)   1/16/2019 Cancer Staged    Staging form: Breast, AJCC 8th Edition  - Clinical stage from 1/16/2019: Stage IB (cT2, cN0, cM0, G2, ER+, UT+, HER2+) - Signed by Lisa Herman MD on 1/27/2020 1/21/2020 Initial Diagnosis    Malignant neoplasm of upper-outer quadrant of right female breast (CMS/HCC)     2/11/2020 Cancer Staged    Staging form: Breast, AJCC 8th Edition  - Pathologic: Stage IA (pT1c, pN0, cM0, G3, ER+, UT+, HER2+) - Signed by Hero Gomez MD on 2/11/2020 2/18/2020 -  Other Event    Echocardiogram  · Left ventricular systolic function is normal. Estimated EF = 55%.  · The global longitudinal strain was -19.5%.     2/25/2020 - 2/26/2020 Chemotherapy    OP CENTRAL VENOUS ACCESS DEVICE ACCESS, CARE, AND MAINTENANCE (CVAD)     2/26/2020 - 3/10/2020 Chemotherapy    OP BREAST PACLitaxel / Trastuzumab-anns (Weekly X 12)     3/2/2020 Adverse Reaction    MRSA bacteremia due to port infection with port removal after day 1 course 1 Herceptin Taxol     3/31/2020 -  Hormonal Therapy    Arimidex for planned 5 years     4/2/2020 -  Chemotherapy    OP BREAST Trastuzumab-anns Q21D (maintenance)     5/27/2020 -  Other Event    5/27/2020 echocardiogram EF 55%      7/23/2020 Procedure    Colonoscopy with Dr. Marte, normal     8/26/2020 -  Other Event    Echocardiogram   · This was a limited echocardiogram to assess  left ventricular ejection fraction in the setting of chemotherapy.  · Left ventricular systolic function is normal. Calculated EF = 55.1%. Estimated EF = 55%. Global Longitudinal LV strain = -18.2%.  · Estimated EF = 55%.     11/9/2020 Imaging    DEXA bone density IMPRESSION:  Osteopenia of the femoral necks bilaterally, and total hips  bilaterally.     11/10/2020 -  Other Event    Echocardogram   · This was a limited echocardiogram to assess left ventricular function only.  · Left ventricular systolic function is normal. Left ventricular ejection fraction appears to be 56 - 60%.  · Global longitudinal LV strain (GLS) = 19.7%.         HISTORY OF PRESENT ILLNESS:  The patient is a 60 y.o. female, here for follow up on management of  Triple positive right breast cancer currently on adjuvant Kanjinti.  Luana states that she will be ready for therapy to end, she is tired of being poked in parotid on.  She had an echocardiogram yesterday, has a bruise in her left antecubital area from the IV stick.  Reports having hot flashes that she feels are getting more intense over the last few months.  Also feels that her mood has changed since being on Arimidex, states that she is more short tempered.  Has chronic back pain with history of back surgery but states that over the past few weeks she has been having pain in her left hip, it is intermittent, it is described as an ache, worse when she is lying on that side.  She has also had some radiation of discomfort down her left inner thigh.  Also has occasional cramps in her lower leg at night, this was initially helped with magnesium but now seems to be coming back.  She continues to work from home, she is asking for a letter for accommodations to allow her to go into the office for short periods of time to use there equipment such as printers etc.    Past Medical History:   Diagnosis Date   • Breast cancer (CMS/[facility])    • Diabetes mellitus (CMS/HCC)    • Diverticulitis    • GERD  "(gastroesophageal reflux disease)    • Hypokalemia    • Kidney stone    • Lichen sclerosus    • Long term (current) use of insulin (CMS/[facility])    • Malignant neoplasm of upper-outer quadrant of right female breast (CMS/HCC) 1/21/2020   • Microalbuminuria    • Nephrolithiasis    • Sciatica    • Type 2 diabetes mellitus, uncontrolled (CMS/[facility])    • Wears contact lenses      Past Surgical History:   Procedure Laterality Date   • APPENDECTOMY     • BREAST BIOPSY Right    • BREAST EXCISIONAL BIOPSY Right    • CHOLECYSTECTOMY     • COLON SURGERY     • COLONOSCOPY     • HYSTERECTOMY     • LEG SURGERY      leg fracture surgery-Abstracted from [employer]   • LUMBAR LAMINECTOMY DISCECTOMY DECOMPRESSION N/A 5/24/2019    Procedure: LUMBAR LAMINECTOMY L4-5 AND L5-S1;  Surgeon: Hipolito Kahn MD;  Location:  [facility];  Service: Orthopedic Spine   • MASTECTOMY Bilateral    • MASTECTOMY W/ SENTINEL NODE BIOPSY Bilateral 1/21/2020    Procedure: SKIN SPARING MASTECTOMIES BILATERAL, SENTINEL NODE BIOPSY RIGHT;  Surgeon: Silvio Howard MD;  Location:  [facility];  Service: General   • TUBAL ABDOMINAL LIGATION     • VENOUS ACCESS DEVICE (PORT) REMOVAL N/A 3/3/2020    Procedure: REMOVAL PORT;  Surgeon: Silvio Howard MD;  Location:  [facility];  Service: General;  Laterality: N/A;       Allergies   Allergen Reactions   • Bactrim [Sulfamethoxazole-Trimethoprim] Rash     RASH, SKIN PEELING        Family History and Social History reviewed and changed as necessary    REVIEW OF SYSTEM:   Positive for intermittent left hip pain  Positive for hot flashes and mood disturbance on Arimidex    PHYSICAL EXAM:  General: Well-developed, well-nourished appearing female in no distress.  Neurologic: Alert and oriented x3, no deficit.    Vitals:    02/10/21 0821   BP: 104/67   Pulse: 81   Resp: 18   Temp: 98.1 °F (36.7 °C)   Weight: 67.6 kg (149 lb)   Height: 167 cm (65.75\")     Vitals:    02/10/21 0821   PainSc: 0-No pain          ECOG score: " 1     Karnofsky score: 90     Vitals reviewed.  Labs reviewed.      Lab Results   Component Value Date    HGB 14.0 01/22/2021    HCT 41.1 01/22/2021    MCV 95 01/22/2021     01/22/2021    WBC 7.4 01/22/2021    NEUTROABS 5.0 01/22/2021    LYMPHSABS 1.5 01/22/2021    MONOSABS 0.7 01/22/2021    EOSABS 0.2 01/22/2021    BASOSABS 0.1 01/22/2021         ASSESSMENT & PLAN:    1.  Right breast cancer, stage Ia, T1c N0 M0 ER weakly +1+, HER-2/ashley positive, had complications after first cycle of chemotherapy with Taxol and Herceptin therefore discontinued.  Currently on adjuvant therapy with Kanjinti and Arimidex: Continues to do well, when I saw her last a few weeks ago we thought she was going to be finished with treatment in February however after discussing with our pharmacist decision was made to add 2 more courses of Herceptin to run through March to complete 1 year of adjuvant therapy.  We did repeat her echocardiogram yesterday, ejection fraction was normal at 55% with normal left ventricular systolic function.  We will continue treatment today with Kanjinti and again in 3 weeks.  We will see her back for follow-up in 6 weeks which will be her last treatment.  She remains on Arimidex, we plan on at least 5 years adjuvant therapy if tolerated, she was weakly ER positive.  She will need periodic bone density testing, last done through gynecology November 9, 2020 with osteopenia, she was counseled on calcium and vitamin D supplementation.    2.  Left hip pain: She has a history of chronic back pain having had previous discectomy and medial facetectomy, she has been having left hip pain over the last several weeks that she feels is new, occasionally the pain will radiate down her left inner thigh.  She states it feels more musculoskeletal than it does bone pain.  She is going to stop in and speak with her physical therapist about starting back on therapy.  I will go ahead and get an x-ray of her left hip in the  meantime.  If no improvement may need further imaging beyond x-ray.    3.  Hot flashes and mood disturbance on Arimidex: The patient feels that the hot flashes are more intense over the past few months.  We discussed medications to help, she is on Lexapro, we discussed switching her to Effexor.  At this time she wants to wait, she will let us know if she changes her mind.  Could also consider gabapentin but I am not sure if she would like the side effects.  The switch in Effexor may also help with her mood disturbance.  I also offered counseling with [facility]EC[facility] Vega, Luana states she will consider this and let us know but for now did not wish to pursue.    Luana is working from home.  I have given her a letter today as she would like to be able to return to her office periodically as needed for short visits to use the equipment in office such as the printer which is perfectly acceptable.      Return to clinic in 6 weeks for follow-up.    This was a level 4 moderate [facility] visit with stable chronic illness with breast cancer, 1 new problem with uncertain prognosis with left hip pain and ordering of x-ray for evaluation, also management of drug therapy requiring intensive monitoring for toxicity and review of echocardiogram.  CECI Rosenberg    02/10/2021

## 2021-02-11 LAB
BASOPHILS # BLD AUTO: 0.04 10*3/MM3 (ref 0–0.2)
BASOPHILS NFR BLD AUTO: 0.5 % (ref 0–1.5)
EOSINOPHIL # BLD AUTO: 0.15 10*3/MM3 (ref 0–0.4)
EOSINOPHIL NFR BLD AUTO: 2 % (ref 0.3–6.2)
ERYTHROCYTE [DISTWIDTH] IN BLOOD BY AUTOMATED COUNT: 12.3 % (ref 12.3–15.4)
HCT VFR BLD AUTO: 40.8 % (ref 34–46.6)
HGB BLD-MCNC: 14 G/DL (ref 12–15.9)
IMM GRANULOCYTES # BLD AUTO: 0.02 10*3/MM3 (ref 0–0.05)
IMM GRANULOCYTES NFR BLD AUTO: 0.3 % (ref 0–0.5)
LYMPHOCYTES # BLD AUTO: 1.75 10*3/MM3 (ref 0.7–3.1)
LYMPHOCYTES NFR BLD AUTO: 23.6 % (ref 19.6–45.3)
MCH RBC QN AUTO: 32.3 PG (ref 26.6–33)
MCHC RBC AUTO-ENTMCNC: 34.3 G/DL (ref 31.5–35.7)
MCV RBC AUTO: 94.2 FL (ref 79–97)
MONOCYTES # BLD AUTO: 0.72 10*3/MM3 (ref 0.1–0.9)
MONOCYTES NFR BLD AUTO: 9.7 % (ref 5–12)
NEUTROPHILS # BLD AUTO: 4.75 10*3/MM3 (ref 1.7–7)
NEUTROPHILS NFR BLD AUTO: 63.9 % (ref 42.7–76)
NRBC BLD AUTO-RTO: 0 /100 WBC (ref 0–0.2)
PLATELET # BLD AUTO: 367 10*3/MM3 (ref 140–450)
RBC # BLD AUTO: 4.33 10*6/MM3 (ref 3.77–5.28)
WBC # BLD AUTO: 7.43 10*3/MM3 (ref 3.4–10.8)

## 2021-02-25 ENCOUNTER — TELEPHONE (OUTPATIENT)
Dept: ONCOLOGY | Facility: CLINIC | Age: 61
End: 2021-02-25

## 2021-02-25 NOTE — TELEPHONE ENCOUNTER
Caller: YVON TORRES    Relationship to patient: SELF    Best call back number: [phone]    Patient is needing: PT HAS 2 TXS LEFT. PT NEEDS TO KNOW IF SHE CAN GO AHEAD AND RECEIVE COVID-19 VACCINE OR WAIT.

## 2021-02-26 NOTE — TELEPHONE ENCOUNTER
Discussed with Almita and patient can proceed with COVID vaccine at anytime she does not need to wait for her treatment to be done. She also reviewed her hip xray which showed no abnormality and she should follow up with her PCP if she would like to do PT. Called patient and she verbalized understanding.

## 2021-03-03 ENCOUNTER — INFUSION (OUTPATIENT)
Dept: ONCOLOGY | Facility: HOSPITAL | Age: 61
End: 2021-03-03

## 2021-03-03 VITALS
RESPIRATION RATE: 18 BRPM | DIASTOLIC BLOOD PRESSURE: 70 MMHG | WEIGHT: 150.2 LBS | BODY MASS INDEX: 24.43 KG/M2 | HEART RATE: 68 BPM | TEMPERATURE: 97.8 F | SYSTOLIC BLOOD PRESSURE: 115 MMHG

## 2021-03-03 DIAGNOSIS — C50.411 MALIGNANT NEOPLASM OF UPPER-OUTER QUADRANT OF RIGHT BREAST IN FEMALE, ESTROGEN RECEPTOR POSITIVE (HCC): Primary | ICD-10-CM

## 2021-03-03 DIAGNOSIS — Z17.0 MALIGNANT NEOPLASM OF UPPER-OUTER QUADRANT OF RIGHT BREAST IN FEMALE, ESTROGEN RECEPTOR POSITIVE (HCC): Primary | ICD-10-CM

## 2021-03-03 PROCEDURE — 96413 CHEMO IV INFUSION 1 HR: CPT

## 2021-03-03 PROCEDURE — 25010000002 TRASTUZUMAB-ANNS 420 MG RECONSTITUTED SOLUTION 1 EACH VIAL: Performed by: NURSE PRACTITIONER

## 2021-03-03 RX ORDER — SODIUM CHLORIDE 9 MG/ML
250 INJECTION, SOLUTION INTRAVENOUS ONCE
Status: DISCONTINUED | OUTPATIENT
Start: 2021-03-03 | End: 2021-03-03 | Stop reason: HOSPADM

## 2021-03-03 RX ADMIN — TRASTUZUMAB-ANNS 390 MG: 420 INJECTION, POWDER, LYOPHILIZED, FOR SOLUTION INTRAVENOUS at 09:56

## 2021-03-04 LAB
BASOPHILS # BLD AUTO: 0 X10E3/UL (ref 0–0.2)
BASOPHILS NFR BLD AUTO: 0 %
EOSINOPHIL # BLD AUTO: 0.1 X10E3/UL (ref 0–0.4)
EOSINOPHIL NFR BLD AUTO: 1 %
ERYTHROCYTE [DISTWIDTH] IN BLOOD BY AUTOMATED COUNT: 12.4 % (ref 11.7–15.4)
HCT VFR BLD AUTO: 40.2 % (ref 34–46.6)
HGB BLD-MCNC: 13.8 G/DL (ref 11.1–15.9)
IMM GRANULOCYTES # BLD AUTO: 0 X10E3/UL (ref 0–0.1)
IMM GRANULOCYTES NFR BLD AUTO: 0 %
LYMPHOCYTES # BLD AUTO: 1.3 X10E3/UL (ref 0.7–3.1)
LYMPHOCYTES NFR BLD AUTO: 18 %
MCH RBC QN AUTO: 32.7 PG (ref 26.6–33)
MCHC RBC AUTO-ENTMCNC: 34.3 G/DL (ref 31.5–35.7)
MCV RBC AUTO: 95 FL (ref 79–97)
MONOCYTES # BLD AUTO: 0.7 X10E3/UL (ref 0.1–0.9)
MONOCYTES NFR BLD AUTO: 9 %
NEUTROPHILS # BLD AUTO: 5.2 X10E3/UL (ref 1.4–7)
NEUTROPHILS NFR BLD AUTO: 72 %
PLATELET # BLD AUTO: 325 X10E3/UL (ref 150–450)
RBC # BLD AUTO: 4.22 X10E6/UL (ref 3.77–5.28)
WBC # BLD AUTO: 7.4 X10E3/UL (ref 3.4–10.8)

## 2021-03-10 RX ORDER — METFORMIN HYDROCHLORIDE 500 MG/1
TABLET, EXTENDED RELEASE ORAL
Qty: 120 TABLET | Refills: 5 | Status: SHIPPED | OUTPATIENT
Start: 2021-03-10 | End: 2021-03-19 | Stop reason: SDUPTHER

## 2021-03-11 RX ORDER — PROCHLORPERAZINE 25 MG/1
SUPPOSITORY RECTAL
Qty: 3 EACH | Refills: 3 | Status: SHIPPED | OUTPATIENT
Start: 2021-03-11 | End: 2021-06-29 | Stop reason: SDUPTHER

## 2021-03-17 RX ORDER — BLOOD SUGAR DIAGNOSTIC
STRIP MISCELLANEOUS
Qty: 100 EACH | Refills: 1 | Status: SHIPPED | OUTPATIENT
Start: 2021-03-17 | End: 2021-11-08

## 2021-03-19 RX ORDER — METFORMIN HYDROCHLORIDE 500 MG/1
TABLET, EXTENDED RELEASE ORAL
Qty: 120 TABLET | Refills: 5 | Status: SHIPPED | OUTPATIENT
Start: 2021-03-19 | End: 2022-03-22

## 2021-03-19 NOTE — TELEPHONE ENCOUNTER
----- Message from Luana Chawla sent at 3/19/2021 12:11 PM EDT -----  Regarding: Prescription Question  Contact: [phone]  I believe my pharmacy has contacted your office I need my metformin refilled immediately I'm about to run out. Thanks Luana

## 2021-03-19 NOTE — TELEPHONE ENCOUNTER
----- Message from Luana Chawla sent at 3/19/2021 12:11 PM EDT -----  Regarding: Prescription Question  Contact: [phone]  I believe my pharmacy has contacted your office I need my metformin refilled immediately I'm about to run out. Thanks Luana

## 2021-03-23 ENCOUNTER — OFFICE VISIT (OUTPATIENT)
Dept: ONCOLOGY | Facility: CLINIC | Age: 61
End: 2021-03-23

## 2021-03-23 ENCOUNTER — INFUSION (OUTPATIENT)
Dept: ONCOLOGY | Facility: HOSPITAL | Age: 61
End: 2021-03-23

## 2021-03-23 VITALS
TEMPERATURE: 98.6 F | SYSTOLIC BLOOD PRESSURE: 116 MMHG | RESPIRATION RATE: 18 BRPM | WEIGHT: 150 LBS | BODY MASS INDEX: 24.11 KG/M2 | HEIGHT: 66 IN | HEART RATE: 75 BPM | DIASTOLIC BLOOD PRESSURE: 53 MMHG

## 2021-03-23 DIAGNOSIS — C50.411 MALIGNANT NEOPLASM OF UPPER-OUTER QUADRANT OF RIGHT BREAST IN FEMALE, ESTROGEN RECEPTOR POSITIVE (HCC): Primary | ICD-10-CM

## 2021-03-23 DIAGNOSIS — Z17.0 MALIGNANT NEOPLASM OF UPPER-OUTER QUADRANT OF RIGHT BREAST IN FEMALE, ESTROGEN RECEPTOR POSITIVE (HCC): Primary | ICD-10-CM

## 2021-03-23 PROCEDURE — 25010000002 TRASTUZUMAB-ANNS 420 MG RECONSTITUTED SOLUTION 1 EACH VIAL: Performed by: INTERNAL MEDICINE

## 2021-03-23 PROCEDURE — 96413 CHEMO IV INFUSION 1 HR: CPT

## 2021-03-23 PROCEDURE — 99214 OFFICE O/P EST MOD 30 MIN: CPT | Performed by: INTERNAL MEDICINE

## 2021-03-23 RX ORDER — SODIUM CHLORIDE 9 MG/ML
250 INJECTION, SOLUTION INTRAVENOUS ONCE
Status: CANCELLED | OUTPATIENT
Start: 2021-03-23

## 2021-03-23 RX ORDER — SODIUM CHLORIDE 9 MG/ML
250 INJECTION, SOLUTION INTRAVENOUS ONCE
Status: COMPLETED | OUTPATIENT
Start: 2021-03-23 | End: 2021-03-23

## 2021-03-23 RX ADMIN — TRASTUZUMAB-ANNS 390 MG: 420 INJECTION, POWDER, LYOPHILIZED, FOR SOLUTION INTRAVENOUS at 12:37

## 2021-03-23 RX ADMIN — SODIUM CHLORIDE 250 ML: 9 INJECTION, SOLUTION INTRAVENOUS at 12:37

## 2021-03-23 NOTE — PROGRESS NOTES
CHIEF COMPLAINT: Follow-up breast cancer adjuvant therapy    Problem List:  Oncology/Hematology History Overview Note   1. 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  ER weakly 5% 1+ positive, [place] 50% 1-2+ positive, HER-2/ashley 100% 3+ positive.  Negative cancer next panel  2. Significant diabetes with predating neuropathy due to spinal stenosis status post laminectomy 5/24/2019 with persistent neuropathy dorsum left foot  3. MRSA bacteremia due to port infection March 2020 after course 1 day 1 of Herceptin Taxol    Oncology history timeline:  -5/24/2019 Dr. Garcia saw for spinal stenosis with radiculopathy and left foot drop due to herniated disc and performed decompressive laminectomy, L4-5 and L5-S1 discectomy and medial facetectomy  -10/15/2019 mammogram BI-RADS 0  -11/22/2019 right mammogram/ultrasound BI-RADS 4 with ultrasound-guided core biopsy showing invasive ductal carcinoma [place] score 6 out of 9 grade 2, ER 5% 1+ positive AL 50% 1-2+ positive, HER-2/ashley 100% 3+ positive.  -12/13/2019 MRI breasts revealed 2.2 cm right breast mass consistent with biopsy proven cancer with unsuspected adjacent area's of non-mass enhancement worrisome for DCIS.  Non-mass enhancement in the subareolar left breast worrisome for DCIS.  Dominant focus left central portion left breast indeterminate BI-RADS 4.  Cancer next panel negative  -1/15/2020 CMP unremarkable save for glucose 200 with hemoglobin 10.6 normochromic normocytic indices  -1/21/2020 right skin sparing mastectomy with right sentinel lymph node injection and right deep subfascial sentinel lymph node biopsy with contralateral prophylactic left skin sparing mastectomy.  Right breast 1.8 cm Bloom Dias 8 out of 9, total of 4 lymph nodes examined 2 sentinel nodes all negative.  Pathological T1 cN0 M0 stage Ia.   Left breast benign with sclerosing adenosis.  -2/26/2020 started Herceptin Taxol weekly  -3/2/2020 port removed due to MRSA bacteremia with port infection/purulence.  -3/2/2020 through 3/6/2020 hospitalized for MRSA bacteremia from port.  -3/31/2020 [facility] tele-visit: Patient still on IV antibiotics for MRSA port infection.  Decision made to go with Kanjinti alone every 3 weeks along with Arimidex.  -4/13/2020 per ID, patient has completed IV antibiotics and is now on oral antibiotics.  -3/23/2021 completed 1 year Kanjinti.  Recommendation to complete 10 years of Arimidex if can tolerate.  5-7 years if not.     Malignant neoplasm of upper-outer quadrant of right breast in female, estrogen receptor positive (CMS/HCC)   1/16/2019 Cancer Staged    Staging form: Breast, AJCC 8th Edition  - Clinical stage from 1/16/2019: Stage IB (cT2, cN0, cM0, G2, ER+, VA+, HER2+) - Signed by Lisa Herman MD on 1/27/2020 1/21/2020 Initial Diagnosis    Malignant neoplasm of upper-outer quadrant of right female breast (CMS/HCC)     2/11/2020 Cancer Staged    Staging form: Breast, AJCC 8th Edition  - Pathologic: Stage IA (pT1c, pN0, cM0, G3, ER+, VA+, HER2+) - Signed by Hero Gomez MD on 2/11/2020 2/18/2020 -  Other Event    Echocardiogram  · Left ventricular systolic function is normal. Estimated EF = 55%.  · The global longitudinal strain was -19.5%.     2/25/2020 - 2/26/2020 Chemotherapy    OP CENTRAL VENOUS ACCESS DEVICE ACCESS, CARE, AND MAINTENANCE (CVAD)     2/26/2020 - 3/10/2020 Chemotherapy    OP BREAST PACLitaxel / Trastuzumab-anns (Weekly X 12)     3/2/2020 Adverse Reaction    MRSA bacteremia due to port infection with port removal after day 1 course 1 Herceptin Taxol     3/31/2020 -  Hormonal Therapy    Arimidex for planned 5 years     4/2/2020 -  Chemotherapy    Completed 3/23/2021  OP BREAST Trastuzumab-anns Q21D (maintenance)     5/27/2020 -  Other Event    5/27/2020 echocardiogram EF 55%       7/23/2020 Procedure    Colonoscopy with Dr. Marte, normal     8/26/2020 -  Other Event    Echocardiogram   · This was a limited echocardiogram to assess left ventricular ejection fraction in the setting of chemotherapy.  · Left ventricular systolic function is normal. Calculated EF = 55.1%. Estimated EF = 55%. Global Longitudinal LV strain = -18.2%.  · Estimated EF = 55%.     11/9/2020 Imaging    DEXA bone density IMPRESSION:  Osteopenia of the femoral necks bilaterally, and total hips  bilaterally.     11/10/2020 -  Other Event    Echocardogram   · This was a limited echocardiogram to assess left ventricular function only.  · Left ventricular systolic function is normal. Left ventricular ejection fraction appears to be 56 - 60%.  · Global longitudinal LV strain (GLS) = 19.7%.     2/23/2021 Imaging    -2/23/2021 left hip 2 view x-ray negative         HISTORY OF PRESENT ILLNESS:  The patient is a 60 y.o. female, here for follow up on management of adjuvant therapy breast cancer finishing her Kanjinti today and has some hot flashes but manageable on Arimidex.    Past Medical History:   Diagnosis Date   • Breast cancer (CMS/HCC)    • Diabetes mellitus (CMS/HCC)    • Diverticulitis    • GERD (gastroesophageal reflux disease)    • Hypokalemia    • Kidney stone    • Lichen sclerosus    • Long term (current) use of insulin (CMS/HCC)    • Malignant neoplasm of upper-outer quadrant of right female breast (CMS/HCC) 1/21/2020   • Microalbuminuria    • Nephrolithiasis    • Sciatica    • Type 2 diabetes mellitus, uncontrolled (CMS/HCC)    • Wears contact lenses      Past Surgical History:   Procedure Laterality Date   • APPENDECTOMY     • BREAST BIOPSY Right    • BREAST EXCISIONAL BIOPSY Right    • CHOLECYSTECTOMY     • COLON SURGERY     • COLONOSCOPY     • HYSTERECTOMY     • LEG SURGERY      leg fracture surgery-Abstracted from [employer]   • LUMBAR LAMINECTOMY DISCECTOMY DECOMPRESSION N/A 5/24/2019    Procedure: LUMBAR  "LAMINECTOMY L4-5 AND L5-S1;  Surgeon: Hipolito Kahn MD;  Location:  [facility];  Service: Orthopedic Spine   • MASTECTOMY Bilateral    • MASTECTOMY W/ SENTINEL NODE BIOPSY Bilateral 1/21/2020    Procedure: SKIN SPARING MASTECTOMIES BILATERAL, SENTINEL NODE BIOPSY RIGHT;  Surgeon: Silvio Howard MD;  Location:  [facility];  Service: General   • TUBAL ABDOMINAL LIGATION     • VENOUS ACCESS DEVICE (PORT) REMOVAL N/A 3/3/2020    Procedure: REMOVAL PORT;  Surgeon: Silvio Howard MD;  Location:  [facility];  Service: General;  Laterality: N/A;       Allergies   Allergen Reactions   • Bactrim [Sulfamethoxazole-Trimethoprim] Rash     RASH, SKIN PEELING        Family History and Social History reviewed and changed as necessary    REVIEW OF SYSTEM:   No new somatic complaints.  No bone pain    PHYSICAL EXAM:  Lungs are clear    Vitals:    03/23/21 1133   BP: 116/53   Pulse: 75   Resp: 18   Temp: 98.6 °F (37 °C)   Weight: 68 kg (150 lb)   Height: 167 cm (65.75\")     Vitals:    03/23/21 1133   PainSc: 0-No pain                      Vitals reviewed.    ECOG: (0) Fully Active - Able to Carry On All Pre-disease Performance Without Restriction    Lab Results   Component Value Date    HGB 13.8 03/03/2021    HCT 40.2 03/03/2021    MCV 95 03/03/2021     03/03/2021    WBC 7.4 03/03/2021    NEUTROABS 5.2 03/03/2021    LYMPHSABS 1.3 03/03/2021    MONOSABS 0.7 03/03/2021    EOSABS 0.1 03/03/2021    BASOSABS 0.0 03/03/2021       Lab Results   Component Value Date    GLUCOSE 230 (H) 04/06/2020    BUN 18 04/06/2020    CREATININE 0.58 04/06/2020     04/06/2020    K 4.0 04/06/2020    CL 96 (L) 04/06/2020    CO2 24.0 04/06/2020    CALCIUM 9.4 04/06/2020    PROTEINTOT 7.0 04/06/2020    ALBUMIN 4.50 04/06/2020    BILITOT 0.4 04/06/2020    ALKPHOS 76 04/06/2020    AST 21 04/06/2020    ALT 23 04/06/2020             ASSESSMENT & PLAN:  1.  T1 cN0 M0 [place] Di[place] 8 out of 9 ER 5% weakly +1+, MI 50% 1-2+ positive, HER-2/ashley " 100% 3+ positive status post Herceptin Taxol weekly x12 followed by 1 year of Herceptin and on Arimidex with some hot flashes  2.  Chemo induced anemia with microcytosis and iron deficiency anemia with colonoscopy July 2020  3.  PE March 2020 found on work-up for endocarditis treated through September 2020  4.  Diabetic neuropathy  5.  Metformin-induced diarrhea    Discussion: Other than for hot flashes which are manageable, he has no complaints on Arimidex.  She completes her Herceptin today and we will have her back for survivorship visit in 3 months.  Blood counts have normalized.  Off of Eliquis.  Preferably would treat with 10 years of Arimidex though with her estrogen receptor weakly positive and progesterone receptor moderately positive, if the hot flashes are a major ordeal I would be okay with stopping after 5-7 years.  Total time of care reviewing her history of PE, anemia, treatments and chronic potential consequences and plan for follow-up took 30 minutes of patient care time today.  Hero Gomez MD    03/23/2021

## 2021-03-24 LAB
BASOPHILS # BLD AUTO: 0.04 10*3/MM3 (ref 0–0.2)
BASOPHILS NFR BLD AUTO: 0.5 % (ref 0–1.5)
EOSINOPHIL # BLD AUTO: 0.16 10*3/MM3 (ref 0–0.4)
EOSINOPHIL NFR BLD AUTO: 2 % (ref 0.3–6.2)
ERYTHROCYTE [DISTWIDTH] IN BLOOD BY AUTOMATED COUNT: 12.4 % (ref 12.3–15.4)
HCT VFR BLD AUTO: 38 % (ref 34–46.6)
HGB BLD-MCNC: 13.1 G/DL (ref 12–15.9)
IMM GRANULOCYTES # BLD AUTO: 0.02 10*3/MM3 (ref 0–0.05)
IMM GRANULOCYTES NFR BLD AUTO: 0.3 % (ref 0–0.5)
LYMPHOCYTES # BLD AUTO: 1.78 10*3/MM3 (ref 0.7–3.1)
LYMPHOCYTES NFR BLD AUTO: 22.5 % (ref 19.6–45.3)
MCH RBC QN AUTO: 33 PG (ref 26.6–33)
MCHC RBC AUTO-ENTMCNC: 34.5 G/DL (ref 31.5–35.7)
MCV RBC AUTO: 95.7 FL (ref 79–97)
MONOCYTES # BLD AUTO: 0.74 10*3/MM3 (ref 0.1–0.9)
MONOCYTES NFR BLD AUTO: 9.4 % (ref 5–12)
NEUTROPHILS # BLD AUTO: 5.17 10*3/MM3 (ref 1.7–7)
NEUTROPHILS NFR BLD AUTO: 65.3 % (ref 42.7–76)
NRBC BLD AUTO-RTO: 0 /100 WBC (ref 0–0.2)
PLATELET # BLD AUTO: 321 10*3/MM3 (ref 140–450)
RBC # BLD AUTO: 3.97 10*6/MM3 (ref 3.77–5.28)
WBC # BLD AUTO: 7.91 10*3/MM3 (ref 3.4–10.8)

## 2021-03-28 DIAGNOSIS — C50.411 MALIGNANT NEOPLASM OF UPPER-OUTER QUADRANT OF RIGHT BREAST IN FEMALE, ESTROGEN RECEPTOR POSITIVE (HCC): Primary | ICD-10-CM

## 2021-03-28 DIAGNOSIS — Z17.0 MALIGNANT NEOPLASM OF UPPER-OUTER QUADRANT OF RIGHT BREAST IN FEMALE, ESTROGEN RECEPTOR POSITIVE (HCC): Primary | ICD-10-CM

## 2021-03-29 RX ORDER — ANASTROZOLE 1 MG/1
TABLET ORAL
Qty: 30 TABLET | Refills: 11 | Status: SHIPPED | OUTPATIENT
Start: 2021-03-29 | End: 2021-06-16 | Stop reason: ALTCHOICE

## 2021-04-05 RX ORDER — INSULIN GLARGINE 100 [IU]/ML
INJECTION, SOLUTION SUBCUTANEOUS
Qty: 15 ML | Refills: 5 | Status: SHIPPED | OUTPATIENT
Start: 2021-04-05 | End: 2021-06-16

## 2021-04-16 RX ORDER — CLOBETASOL PROPIONATE 0.5 MG/G
OINTMENT TOPICAL 2 TIMES DAILY
Qty: 60 G | Refills: 2 | Status: SHIPPED | OUTPATIENT
Start: 2021-04-16

## 2021-05-05 ENCOUNTER — LAB (OUTPATIENT)
Dept: LAB | Facility: HOSPITAL | Age: 61
End: 2021-05-05

## 2021-05-05 ENCOUNTER — OFFICE VISIT (OUTPATIENT)
Dept: ENDOCRINOLOGY | Facility: CLINIC | Age: 61
End: 2021-05-05

## 2021-05-05 VITALS
HEART RATE: 70 BPM | WEIGHT: 148.2 LBS | TEMPERATURE: 96.9 F | HEIGHT: 66 IN | SYSTOLIC BLOOD PRESSURE: 116 MMHG | OXYGEN SATURATION: 98 % | BODY MASS INDEX: 23.82 KG/M2 | DIASTOLIC BLOOD PRESSURE: 68 MMHG

## 2021-05-05 DIAGNOSIS — E11.65 UNCONTROLLED TYPE 2 DIABETES MELLITUS WITH HYPERGLYCEMIA (HCC): ICD-10-CM

## 2021-05-05 DIAGNOSIS — I10 BENIGN HYPERTENSION: ICD-10-CM

## 2021-05-05 DIAGNOSIS — E11.65 UNCONTROLLED TYPE 2 DIABETES MELLITUS WITH HYPERGLYCEMIA (HCC): Primary | ICD-10-CM

## 2021-05-05 DIAGNOSIS — Z79.4 INSULIN LONG-TERM USE (HCC): ICD-10-CM

## 2021-05-05 LAB
ALBUMIN SERPL-MCNC: 4.4 G/DL (ref 3.5–5.2)
ALBUMIN UR-MCNC: <1.2 MG/DL
ALBUMIN/GLOB SERPL: 2 G/DL
ALP SERPL-CCNC: 79 U/L (ref 39–117)
ALT SERPL W P-5'-P-CCNC: 20 U/L (ref 1–33)
ANION GAP SERPL CALCULATED.3IONS-SCNC: 13.3 MMOL/L (ref 5–15)
AST SERPL-CCNC: 15 U/L (ref 1–32)
BILIRUB SERPL-MCNC: 0.4 MG/DL (ref 0–1.2)
BUN SERPL-MCNC: 12 MG/DL (ref 8–23)
BUN/CREAT SERPL: 22.6 (ref 7–25)
CALCIUM SPEC-SCNC: 9.5 MG/DL (ref 8.6–10.5)
CHLORIDE SERPL-SCNC: 101 MMOL/L (ref 98–107)
CHOLEST SERPL-MCNC: 165 MG/DL (ref 0–200)
CO2 SERPL-SCNC: 27.7 MMOL/L (ref 22–29)
CREAT SERPL-MCNC: 0.53 MG/DL (ref 0.57–1)
CREAT UR-MCNC: 106.4 MG/DL
EXPIRATION DATE: NORMAL
GFR SERPL CREATININE-BSD FRML MDRD: 117 ML/MIN/1.73
GLOBULIN UR ELPH-MCNC: 2.2 GM/DL
GLUCOSE SERPL-MCNC: 81 MG/DL (ref 65–99)
HBA1C MFR BLD: 6.5 %
HDLC SERPL-MCNC: 61 MG/DL (ref 40–60)
LDLC SERPL CALC-MCNC: 91 MG/DL (ref 0–100)
LDLC/HDLC SERPL: 1.49 {RATIO}
Lab: NORMAL
MICROALBUMIN/CREAT UR: NORMAL MG/G{CREAT}
POTASSIUM SERPL-SCNC: 4.3 MMOL/L (ref 3.5–5.2)
PROT SERPL-MCNC: 6.6 G/DL (ref 6–8.5)
SODIUM SERPL-SCNC: 142 MMOL/L (ref 136–145)
TRIGL SERPL-MCNC: 66 MG/DL (ref 0–150)
TSH SERPL DL<=0.05 MIU/L-ACNC: 1.95 UIU/ML (ref 0.27–4.2)
VLDLC SERPL-MCNC: 13 MG/DL (ref 5–40)

## 2021-05-05 PROCEDURE — 82043 UR ALBUMIN QUANTITATIVE: CPT

## 2021-05-05 PROCEDURE — 95251 CONT GLUC MNTR ANALYSIS I&R: CPT | Performed by: INTERNAL MEDICINE

## 2021-05-05 PROCEDURE — 99213 OFFICE O/P EST LOW 20 MIN: CPT | Performed by: INTERNAL MEDICINE

## 2021-05-05 PROCEDURE — 80061 LIPID PANEL: CPT

## 2021-05-05 PROCEDURE — 83036 HEMOGLOBIN GLYCOSYLATED A1C: CPT | Performed by: INTERNAL MEDICINE

## 2021-05-05 PROCEDURE — 82570 ASSAY OF URINE CREATININE: CPT

## 2021-05-05 PROCEDURE — 80053 COMPREHEN METABOLIC PANEL: CPT

## 2021-05-05 PROCEDURE — 84443 ASSAY THYROID STIM HORMONE: CPT

## 2021-05-05 RX ORDER — MULTIVIT-MIN/IRON/FOLIC ACID/K 18-600-40
1 CAPSULE ORAL DAILY
COMMUNITY

## 2021-05-05 RX ORDER — LANOLIN ALCOHOL/MO/W.PET/CERES
1000 CREAM (GRAM) TOPICAL DAILY
COMMUNITY

## 2021-05-05 RX ORDER — CALCIUM CARBONATE 300MG(750)
1 TABLET,CHEWABLE ORAL DAILY
COMMUNITY

## 2021-05-05 RX ORDER — TURMERIC ROOT EXTRACT 500 MG
TABLET ORAL
COMMUNITY

## 2021-05-05 RX ORDER — NICOTINE 14MG/24HR
PATCH, TRANSDERMAL 24 HOURS TRANSDERMAL
COMMUNITY

## 2021-05-05 NOTE — ASSESSMENT & PLAN NOTE
Diabetes is improving with treatment.   Continue current treatment regimen.  Diabetes will be reassessed in 3 months.    A1c okay.  CGM shows some postprandial spikes.  Work on diet/exercise.

## 2021-05-05 NOTE — PROGRESS NOTES
"     Office Note      Date: 2021  Patient Name: Luana Chawla  MRN: [id number]  : 1960    Chief Complaint   Patient presents with   • Follow-up   • Diabetes       History of Present Illness:   Luana Chawla is a 60 y.o. female who presents for Diabetes type 2. Diagnosed in: . Treated in past with insulin. Current treatments: metformin and basal insulin. Number of insulin shots per day: 1. Checks blood sugar 288 times a day - on DexCom CGM.  Has low blood sugar: occasional. Aspirin use: No - told not to.. Statin use: No - no indication.. ACE-I/ARB use: No - no indication.. Changes in health since last visit: finger surgery. Last eye exam 2021.    She is on arimidex.  She c/o hot flashes, muscle aches, HA's.    Subjective      Diabetic Complications:  Eyes: No  Kidneys: No  Feet: No  Heart: No    Diet and Exercise:  Meals per day: 3  Minutes of exercise per week: 150 mins.    Review of Systems:   Review of Systems   Constitutional: Negative.    Cardiovascular: Negative.    Gastrointestinal: Negative.    Endocrine: Negative.        The following portions of the patient's history were reviewed and updated as appropriate: allergies, current medications, past family history, past medical history, past social history, past surgical history and problem list.    Objective       Visit Vitals  /68   Pulse 70   Temp 96.9 °F (36.1 °C) (Infrared)   Ht 167 cm (65.75\")   Wt 67.2 kg (148 lb 3.2 oz)   SpO2 98%   BMI 24.10 kg/m²       Physical Exam:  Physical Exam  Constitutional:       Appearance: Normal appearance.   Cardiovascular:      Pulses:           Dorsalis pedis pulses are 2+ on the right side and 2+ on the left side.        Posterior tibial pulses are 2+ on the right side and 2+ on the left side.   Feet:      Right foot:      Protective Sensation: 5 sites tested. 5 sites sensed.      Skin integrity: Skin integrity normal.      Toenail Condition: Right toenails are normal.      " Left foot:      Protective Sensation: 5 sites tested. 5 sites sensed.      Skin integrity: Skin integrity normal.      Toenail Condition: Left toenails are normal.   Neurological:      Mental Status: She is alert.         Labs:    HbA1c  Lab Results   Component Value Date    HGBA1C 6.5 05/05/2021       CMP  Lab Results   Component Value Date    GLUCOSE 230 (H) 04/06/2020    BUN 18 04/06/2020    CREATININE 0.58 04/06/2020    EGFRIFNONA 106 04/06/2020    EGFRIFAFRI 110 02/25/2020    BCR 31.0 (H) 04/06/2020    K 4.0 04/06/2020    CO2 24.0 04/06/2020    CALCIUM 9.4 04/06/2020    PROTENTOTREF 6.5 02/25/2020    LABIL2 1.7 02/25/2020    AST 21 04/06/2020    ALT 23 04/06/2020        Lipid Panel        TSH  No results found for: TSH, FREET4     Hemoglobin A1C  Lab Results   Component Value Date    HGBA1C 6.5 05/05/2021        Microalbumin/Creatinine  No results found for: MALBCRERATIO, CREATINIURIN, MICROALBUR        Assessment / Plan      Assessment & Plan:  Diagnoses and all orders for this visit:    1. Uncontrolled type 2 diabetes mellitus with hyperglycemia (CMS/HCC) (Primary)  Assessment & Plan:  Diabetes is improving with treatment.   Continue current treatment regimen.  Diabetes will be reassessed in 3 months.    A1c okay.  CGM shows some postprandial spikes.  Work on diet/exercise.    Orders:  -     POC Glycosylated Hemoglobin (Hb A1C)  -     Comprehensive Metabolic Panel; Future  -     TSH; Future  -     Microalbumin / Creatinine Urine Ratio - Urine, Clean Catch; Future  -     Lipid Panel; Future    2. Benign hypertension  Assessment & Plan:  Hypertension is unchanged.  Continue current treatment regimen.  Blood pressure will be reassessed at the next regular appointment.      3. Insulin long-term use (CMS/HCC)      Return in about 3 months (around 8/5/2021) for Recheck with A1c.    Jose Cummings MD   05/05/2021

## 2021-05-10 RX ORDER — PEN NEEDLE, DIABETIC 32GX 5/32"
NEEDLE, DISPOSABLE MISCELLANEOUS
Qty: 100 EACH | Refills: 1 | Status: SHIPPED | OUTPATIENT
Start: 2021-05-10 | End: 2021-06-20 | Stop reason: SDUPTHER

## 2021-06-07 RX ORDER — SEMAGLUTIDE 1.34 MG/ML
1 INJECTION, SOLUTION SUBCUTANEOUS WEEKLY
Qty: 9 ML | Refills: 3 | Status: SHIPPED | OUTPATIENT
Start: 2021-06-07 | End: 2022-06-06 | Stop reason: SDUPTHER

## 2021-06-16 ENCOUNTER — CLINICAL SUPPORT (OUTPATIENT)
Dept: ONCOLOGY | Facility: CLINIC | Age: 61
End: 2021-06-16

## 2021-06-16 VITALS
HEART RATE: 79 BPM | TEMPERATURE: 98 F | OXYGEN SATURATION: 98 % | WEIGHT: 150 LBS | RESPIRATION RATE: 18 BRPM | BODY MASS INDEX: 24.11 KG/M2 | SYSTOLIC BLOOD PRESSURE: 111 MMHG | DIASTOLIC BLOOD PRESSURE: 62 MMHG | HEIGHT: 66 IN

## 2021-06-16 DIAGNOSIS — Z17.0 MALIGNANT NEOPLASM OF UPPER-OUTER QUADRANT OF RIGHT BREAST IN FEMALE, ESTROGEN RECEPTOR POSITIVE (HCC): Primary | ICD-10-CM

## 2021-06-16 DIAGNOSIS — R23.2 HOT FLASHES RELATED TO AROMATASE INHIBITOR THERAPY: ICD-10-CM

## 2021-06-16 DIAGNOSIS — C50.411 MALIGNANT NEOPLASM OF UPPER-OUTER QUADRANT OF RIGHT BREAST IN FEMALE, ESTROGEN RECEPTOR POSITIVE (HCC): Primary | ICD-10-CM

## 2021-06-16 DIAGNOSIS — T45.1X5A HOT FLASHES RELATED TO AROMATASE INHIBITOR THERAPY: ICD-10-CM

## 2021-06-16 PROCEDURE — 99215 OFFICE O/P EST HI 40 MIN: CPT | Performed by: NURSE PRACTITIONER

## 2021-06-16 RX ORDER — INSULIN GLARGINE 100 [IU]/ML
INJECTION, SOLUTION SUBCUTANEOUS
COMMUNITY
Start: 2021-05-09 | End: 2022-03-02

## 2021-06-16 RX ORDER — LETROZOLE 2.5 MG/1
2.5 TABLET, FILM COATED ORAL DAILY
Qty: 30 TABLET | Refills: 11 | Status: SHIPPED | OUTPATIENT
Start: 2021-06-16 | End: 2021-09-15 | Stop reason: ALTCHOICE

## 2021-06-16 NOTE — PROGRESS NOTES
MEDICAL ONCOLOGY CANCER SURVIVORSHIP VISIT    Luana Chawla  [id number]  1960    Chief Complaint:   1.  Hot flashes and mood swings on Arimidex   2.  Right breast cancer      History of present illness:  Luana Chawla is a 60 y.o. year old female who is here today for the Cancer Survivorship visit, see oncology history below. Luana reports she is still having hot flashes and mood swings on Arimidex.  She feels she is more easily agitated.  This makes it difficult to work at times.  She is considering [facility].  Currently with sinus pressure, no fevers.  Taking Advil.  She also still c/o hot flashes.  No new or concerning bony aches or pains, no new or concerning findings on breast self exam.  Reports that she saw her PCP several months ago for headache that was persistent for about 2 weeks, she had CT head that she report was negative.  The headaches resolved and have not returned.  She has an appointment next week to see plastic surgeon for discussion of her options for breast reconstruction.       Cancer History:   Oncology/Hematology History Overview Note   1. 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  ER weakly 5% 1+ positive, UT 50% 1-2+ positive, HER-2/ashley 100% 3+ positive.  Negative cancer next panel  2. Significant diabetes with predating neuropathy due to spinal stenosis status post laminectomy 5/24/2019 with persistent neuropathy dorsum left foot  3. MRSA bacteremia due to port infection March 2020 after course 1 day 1 of Herceptin Taxol    Oncology history timeline:  -5/24/2019 Dr. Garcia saw for spinal stenosis with radiculopathy and left foot drop due to herniated disc and performed decompressive laminectomy, L4-5 and L5-S1 discectomy and medial facetectomy  -10/15/2019 mammogram BI-RADS 0  -11/22/2019 right  mammogram/ultrasound BI-RADS 4 with ultrasound-guided core biopsy showing invasive ductal carcinoma Hampton score 6 out of 9 grade 2, ER 5% 1+ positive CT 50% 1-2+ positive, HER-2/ashley 100% 3+ positive.  -12/13/2019 MRI breasts revealed 2.2 cm right breast mass consistent with biopsy proven cancer with unsuspected adjacent area's of non-mass enhancement worrisome for DCIS.  Non-mass enhancement in the subareolar left breast worrisome for DCIS.  Dominant focus left central portion left breast indeterminate BI-RADS 4.  Cancer next panel negative  -1/15/2020 CMP unremarkable save for glucose 200 with hemoglobin 10.6 normochromic normocytic indices  -1/21/2020 right skin sparing mastectomy with right sentinel lymph node injection and right deep subfascial sentinel lymph node biopsy with contralateral prophylactic left skin sparing mastectomy.  Right breast 1.8 cm Bloom Dias 8 out of 9, total of 4 lymph nodes examined 2 sentinel nodes all negative.  Pathological T1 cN0 M0 stage Ia.  Left breast benign with sclerosing adenosis.  -2/26/2020 started Herceptin Taxol weekly  -3/2/2020 port removed due to MRSA bacteremia with port infection/purulence.  -3/2/2020 through 3/6/2020 hospitalized for MRSA bacteremia from port.  -3/31/2020 [facility] tele-visit: Patient still on IV antibiotics for MRSA port infection.  Decision made to go with Kanjinti alone every 3 weeks along with Arimidex.  -4/13/2020 per ID, patient has completed IV antibiotics and is now on oral antibiotics.  -3/23/2021 completed 1 year Kanjinti.  Recommendation to complete 10 years of Arimidex if can tolerate.  5-7 years if not.     Malignant neoplasm of upper-outer quadrant of right breast in female, estrogen receptor positive (CMS/HCC)   1/16/2019 Cancer Staged    Staging form: Breast, AJCC 8th Edition  - Clinical stage from 1/16/2019: Stage IB (cT2, cN0, cM0, G2, ER+, CT+, HER2+) - Signed by Lisa Herman MD on 1/27/2020 1/21/2020  Initial Diagnosis    Malignant neoplasm of upper-outer quadrant of right female breast (CMS/HCC)     2/11/2020 Cancer Staged    Staging form: Breast, AJCC 8th Edition  - Pathologic: Stage IA (pT1c, pN0, cM0, G3, ER+, SD+, HER2+) - Signed by Hero Gomez MD on 2/11/2020 2/18/2020 -  Other Event    Echocardiogram  · Left ventricular systolic function is normal. Estimated EF = 55%.  · The global longitudinal strain was -19.5%.     2/25/2020 - 2/26/2020 Chemotherapy    OP CENTRAL VENOUS ACCESS DEVICE ACCESS, CARE, AND MAINTENANCE (CVAD)     2/26/2020 - 3/10/2020 Chemotherapy    OP BREAST PACLitaxel / Trastuzumab-anns (Weekly X 12)     3/2/2020 Adverse Reaction    MRSA bacteremia due to port infection with port removal after day 1 course 1 Herceptin Taxol     3/31/2020 -  Hormonal Therapy    Arimidex for planned 5 years     4/2/2020 -  Chemotherapy    Completed 3/23/2021  OP BREAST Trastuzumab-anns Q21D (maintenance)     5/27/2020 -  Other Event    5/27/2020 echocardiogram EF 55%      7/23/2020 Procedure    Colonoscopy with Dr. Marte, normal     8/26/2020 -  Other Event    Echocardiogram   · This was a limited echocardiogram to assess left ventricular ejection fraction in the setting of chemotherapy.  · Left ventricular systolic function is normal. Calculated EF = 55.1%. Estimated EF = 55%. Global Longitudinal LV strain = -18.2%.  · Estimated EF = 55%.     11/9/2020 Imaging    DEXA bone density IMPRESSION:  Osteopenia of the femoral necks bilaterally, and total hips  bilaterally.     11/10/2020 -  Other Event    Echocardogram   · This was a limited echocardiogram to assess left ventricular function only.  · Left ventricular systolic function is normal. Left ventricular ejection fraction appears to be 56 - 60%.  · Global longitudinal LV strain (GLS) = 19.7%.     2/23/2021 Imaging    -2/23/2021 left hip 2 view x-ray negative         Past Medical History:   Diagnosis Date   • Breast cancer (CMS/HCC)    • Diabetes  mellitus (CMS/HCC)    • Diverticulitis    • GERD (gastroesophageal reflux disease)    • Hypokalemia    • Kidney stone    • Lichen sclerosus    • Long term (current) use of insulin (CMS/HCC)    • Malignant neoplasm of upper-outer quadrant of right female breast (CMS/HCC) 1/21/2020   • Microalbuminuria    • Nephrolithiasis    • Sciatica    • Type 2 diabetes mellitus, uncontrolled (CMS/HCC)    • Wears contact lenses        Past Surgical History:   Procedure Laterality Date   • APPENDECTOMY     • BREAST BIOPSY Right    • BREAST EXCISIONAL BIOPSY Right    • CHOLECYSTECTOMY     • COLON SURGERY     • COLONOSCOPY     • HYSTERECTOMY     • LEG SURGERY      leg fracture surgery-Abstracted from [employer]   • LUMBAR LAMINECTOMY DISCECTOMY DECOMPRESSION N/A 5/24/2019    Procedure: LUMBAR LAMINECTOMY L4-5 AND L5-S1;  Surgeon: Hipolito Kahn MD;  Location:  [facility];  Service: Orthopedic Spine   • MASTECTOMY Bilateral    • MASTECTOMY W/ SENTINEL NODE BIOPSY Bilateral 1/21/2020    Procedure: SKIN SPARING MASTECTOMIES BILATERAL, SENTINEL NODE BIOPSY RIGHT;  Surgeon: Silvio Howard MD;  Location:  [facility];  Service: General   • TUBAL ABDOMINAL LIGATION     • VENOUS ACCESS DEVICE (PORT) REMOVAL N/A 3/3/2020    Procedure: REMOVAL PORT;  Surgeon: Silvio Howard MD;  Location:  [facility];  Service: General;  Laterality: N/A;       MEDICATIONS: The current medication list was reviewed and reconciled.     Allergies:  is allergic to bactrim [sulfamethoxazole-trimethoprim].    Family History   Problem Relation Age of Onset   • Breast cancer Mother 70   • Diabetes Mother    • Heart disease Father    • Hypertension Father    • Diabetes Sister    • Ovarian cancer Neg Hx          Review of Systems   Constitutional: Negative.    HENT: Positive for sinus pressure.    Endocrine:        Hot flashes   Psychiatric/Behavioral: Positive for dysphoric mood (feels she is more easily agitated since starting Arimidex).       Physical  "Exam  Vital Signs: /62   Pulse 79   Temp 98 °F (36.7 °C)   Resp 18   Ht 167 cm (65.75\")   Wt 68 kg (150 lb)   SpO2 98%   BMI 24.40 kg/m²    General Appearance:  alert, cooperative, no apparent distress, appears stated age and normal weight   Neurologic/Psychiatric: A&O x 3, gait steady, appropriate affect   HEENT:  Normocephalic, without obvious abnormality, hair has regrown well with no areas of alopecia                                 ECOG Performance Status: (0) Fully Active - Able to Carry On All Pre-disease Performance Without Restriction        Assessment and Plan:  Diagnoses and all orders for this visit:    1. Malignant neoplasm of upper-outer quadrant of right breast in female, estrogen receptor positive (CMS/HCC) (Primary)  -     letrozole (FEMARA) 2.5 MG tablet; Take 1 tablet by mouth Daily.  Dispense: 30 tablet; Refill: 11    2. Hot flashes related to aromatase inhibitor therapy        Discussion:     She is having intolerable side effects with Arimidex with hot flashes and mood swings with agitation.  We discussed options of switching to another AI, could also consider tamoxifen as she has had partial hysterectomy.  She would like to try Femara.  I instructed her to stop Arimidex for 1 week to see if she had improvement in her side effects and then to begin Femara.  I will check on her in a month to see how she is feeling.    She also has hot flashes.  We discussed that she could change her antidepressant to Effexor and this may help.  She wants to think about but did not want to change at this time.  She will discuss with her PCP if she decides to change.     The patient and I have reviewed belen personal Survivorship Care Plan in detail. We discussed diagnosis, pathology, histology, all treatments, and ongoing surveillance recommendations. All questions were answered to her satisfaction. The patient is in agreement with our plan for ongoing surveillance as outlined in the plan. A copy of " this document was provided at the completion of our visit.  A copy has also been sent to the patient's primary care provider.    Return to clinic in 6 months for ongoing cancer surveillance.    I spent 40 minutes caring for Luana on this date of service. This time includes time spent by me in the following activities: preparing for the visit, reviewing tests, counseling and educating the patient/family/caregiver, ordering medications, tests, or procedures and documenting information in the medical record.     Almita Pruitt, APRN  06/16/2021

## 2021-06-21 RX ORDER — PEN NEEDLE, DIABETIC 32GX 5/32"
NEEDLE, DISPOSABLE MISCELLANEOUS
Qty: 100 EACH | Refills: 1 | Status: SHIPPED | OUTPATIENT
Start: 2021-06-21 | End: 2022-05-31

## 2021-06-28 ENCOUNTER — PATIENT MESSAGE (OUTPATIENT)
Dept: ENDOCRINOLOGY | Facility: CLINIC | Age: 61
End: 2021-06-28

## 2021-06-29 RX ORDER — PROCHLORPERAZINE 25 MG/1
1 SUPPOSITORY RECTAL
Qty: 1 EACH | Refills: 3 | Status: SHIPPED | OUTPATIENT
Start: 2021-06-29 | End: 2022-08-08

## 2021-06-29 RX ORDER — PROCHLORPERAZINE 25 MG/1
SUPPOSITORY RECTAL
Qty: 9 EACH | Refills: 3 | Status: SHIPPED | OUTPATIENT
Start: 2021-06-29 | End: 2022-05-31

## 2021-06-29 NOTE — TELEPHONE ENCOUNTER
From: Luana Chawla  To: Jose Cummings MD  Sent: 6/28/2021 4:08 PM EDT  Subject: Prescription Question    I need my Dexcom sensor and transmitter refilled, thanks

## 2021-06-30 RX ORDER — PROCHLORPERAZINE 25 MG/1
1 SUPPOSITORY RECTAL
Qty: 1 EACH | Refills: 3 | OUTPATIENT
Start: 2021-06-30

## 2021-06-30 RX ORDER — PROCHLORPERAZINE 25 MG/1
SUPPOSITORY RECTAL
Qty: 9 EACH | Refills: 3 | OUTPATIENT
Start: 2021-06-30

## 2021-07-01 RX ORDER — AMOXICILLIN 250 MG
2 CAPSULE ORAL 2 TIMES DAILY PRN
Qty: 60 TABLET | Refills: 3 | Status: SHIPPED | OUTPATIENT
Start: 2021-07-01 | End: 2021-08-09

## 2021-08-09 ENCOUNTER — MDT ASSESSMENT (OUTPATIENT)
Dept: ENDOCRINOLOGY | Facility: CLINIC | Age: 61
End: 2021-08-09

## 2021-08-09 ENCOUNTER — MEDICATION THERAPY MANAGEMENT (OUTPATIENT)
Dept: ENDOCRINOLOGY | Facility: CLINIC | Age: 61
End: 2021-08-09

## 2021-08-09 ENCOUNTER — OFFICE VISIT (OUTPATIENT)
Dept: ENDOCRINOLOGY | Facility: CLINIC | Age: 61
End: 2021-08-09

## 2021-08-09 VITALS
OXYGEN SATURATION: 99 % | HEIGHT: 66 IN | SYSTOLIC BLOOD PRESSURE: 122 MMHG | WEIGHT: 148 LBS | HEART RATE: 55 BPM | BODY MASS INDEX: 23.78 KG/M2 | DIASTOLIC BLOOD PRESSURE: 68 MMHG

## 2021-08-09 DIAGNOSIS — I10 BENIGN HYPERTENSION: ICD-10-CM

## 2021-08-09 DIAGNOSIS — E11.65 UNCONTROLLED TYPE 2 DIABETES MELLITUS WITH HYPERGLYCEMIA (HCC): Primary | ICD-10-CM

## 2021-08-09 DIAGNOSIS — Z79.4 INSULIN LONG-TERM USE (HCC): ICD-10-CM

## 2021-08-09 LAB
EXPIRATION DATE: ABNORMAL
EXPIRATION DATE: NORMAL
GLUCOSE BLDC GLUCOMTR-MCNC: 181 MG/DL (ref 70–130)
HBA1C MFR BLD: 6.7 %
Lab: ABNORMAL
Lab: NORMAL

## 2021-08-09 PROCEDURE — 95251 CONT GLUC MNTR ANALYSIS I&R: CPT | Performed by: INTERNAL MEDICINE

## 2021-08-09 PROCEDURE — 83036 HEMOGLOBIN GLYCOSYLATED A1C: CPT | Performed by: INTERNAL MEDICINE

## 2021-08-09 PROCEDURE — 99213 OFFICE O/P EST LOW 20 MIN: CPT | Performed by: INTERNAL MEDICINE

## 2021-08-09 RX ORDER — BUPROPION HYDROCHLORIDE 150 MG/1
150 TABLET ORAL 2 TIMES DAILY
COMMUNITY
End: 2021-09-15 | Stop reason: DRUGHIGH

## 2021-08-09 RX ORDER — LORATADINE 10 MG/1
10 TABLET ORAL DAILY
COMMUNITY

## 2021-08-09 NOTE — PROGRESS NOTES
[facility] Pharmacy Visit [facility] Endocrinology    Luana Chawla is a 60 y.o. female with T2DM seen by Endocrinology and assessed by the Medication Management Clinic Pharmacist at [facility]. This was an Initial MTM visit for Luana Chawla.    Luana Chawla was introduced to the [facility] and allergies, PMH, and medications were reviewed.    Insurance Coverage/Eligibility:  • [facility]  • Patient is Eligible for [facility]    Past Medical History:   Diagnosis Date   • Breast cancer (CMS/[facility])    • Diabetes mellitus (CMS/[facility])    • Diverticulitis    • GERD (gastroesophageal reflux disease)    • Hypokalemia    • Kidney stone    • Lichen sclerosus    • Long term (current) use of insulin (CMS/[facility])    • Malignant neoplasm of upper-outer quadrant of right female breast (CMS/[facility]) 2020   • Microalbuminuria    • Nephrolithiasis    • Sciatica    • Type 2 diabetes mellitus, uncontrolled (CMS/[facility])    • Wears contact lenses      Social History     Socioeconomic History   • Marital status: Single     Spouse name: Not on file   • Number of children: Not on file   • Years of education: Not on file   • Highest education level: Not on file   Tobacco Use   • Smoking status: Former Smoker     Types: Cigarettes, Electronic Cigarette     Quit date:      Years since quittin.6   • Smokeless tobacco: Never Used   • Tobacco comment: currently use nicotine e-cig.   Vaping Use   • Vaping Use: Every day   • Substances: Nicotine   • Devices: Refillable tank   Substance and Sexual Activity   • Alcohol use: No   • Drug use: No   • Sexual activity: Defer     Medication Allergies:  Bactrim [sulfamethoxazole-trimethoprim]    Current Medication List:    Current Outpatient Medications:   •  Ascorbic Acid (Vitamin C) 500 MG capsule, Take 1 capsule by mouth Daily., Disp: , Rfl:   •  BD Pen Needle Radha U/F 32G X 4 MM misc, Use one daily.,  Disp: 100 each, Rfl: 1  •  buPROPion XL (WELLBUTRIN XL) 150 MG 24 hr tablet, Take 150 mg by mouth 2 (two) times a day., Disp: , Rfl:   •  Calcium Carb-Cholecalciferol (CALCIUM 1000 + D PO), Take 1 tablet by mouth Daily., Disp: , Rfl:   •  clobetasol (TEMOVATE) 0.05 % ointment, Apply  topically to the appropriate area as directed 2 (Two) Times a Day., Disp: 60 g, Rfl: 2  •  Continuous Blood Gluc  (Dexcom G6 ) device, Dexcom G6  misc, Disp: , Rfl:   •  Continuous Blood Gluc Sensor (Dexcom G6 Sensor), Inject  under the skin into the appropriate area as directed Every 10 (Ten) Days., Disp: 9 each, Rfl: 3  •  Continuous Blood Gluc Transmit (Dexcom G6 Transmitter) misc, 1 each by Other route Every 3 (Three) Months., Disp: 1 each, Rfl: 3  •  escitalopram (LEXAPRO) 10 MG tablet, Take 10 mg by mouth Daily., Disp: , Rfl:   •  ferrous sulfate 325 (65 FE) MG tablet, 1 tablet by mouth twice daily every other day with meals, Disp: 30 tablet, Rfl: 11  •  glucose blood (Accu-Chek Sasha Plus) test strip, Use 1 strip daily, Disp: 100 each, Rfl: 1  •  Insulin Glargine (BASAGLAR KWIKPEN) 100 UNIT/ML injection pen, , Disp: , Rfl:   •  letrozole (FEMARA) 2.5 MG tablet, Take 1 tablet by mouth Daily., Disp: 30 tablet, Rfl: 11  •  loratadine (Claritin) 10 MG tablet, Take 10 mg by mouth Daily., Disp: , Rfl:   •  Magnesium 400 MG tablet, Take 1 tablet by mouth Daily., Disp: , Rfl:   •  metFORMIN ER (GLUCOPHAGE-XR) 500 MG 24 hr tablet, TAKE TWO TABLETS BY MOUTH TWICE A DAY, Disp: 120 tablet, Rfl: 5  •  omeprazole (priLOSEC) 40 MG capsule, Take 40 mg by mouth Daily., Disp: , Rfl:   •  Ozempic, 1 MG/DOSE, 2 MG/1.5ML solution pen-injector, Inject 1 mg under the skin into the appropriate area as directed 1 (One) Time Per Week., Disp: 9 mL, Rfl: 3  •  Saccharomyces boulardii (Probiotic) 250 MG capsule, Take  by mouth., Disp: , Rfl:   •  Turmeric 500 MG tablet, Take  by mouth., Disp: , Rfl:   •  vitamin B-12 (CYANOCOBALAMIN)  1000 MCG tablet, Take 1,000 mcg by mouth Daily., Disp: , Rfl:     Labs:  BP: (122)/(68) 122/68   Heart Rate:  [55] 55            Lab Results   Component Value Date    HGBA1C 6.7 08/09/2021     Lab Results   Component Value Date    GLUCOSE 81 05/05/2021    CALCIUM 9.5 05/05/2021     05/05/2021    K 4.3 05/05/2021    CO2 27.7 05/05/2021     05/05/2021    BUN 12 05/05/2021    CREATININE 0.53 (L) 05/05/2021    EGFRIFAFRI 110 02/25/2020    EGFRIFNONA 117 05/05/2021    BCR 22.6 05/05/2021    ANIONGAP 13.3 05/05/2021     Lab Results   Component Value Date    CHOL 165 05/05/2021    TRIG 66 05/05/2021    HDL 61 (H) 05/05/2021    LDL 91 05/05/2021     Microalbumin    Microalbumin 5/5/21   Microalbumin, Urine <1.2            Drug-Drug Interactions:   • Patient aware of monitoring for signs/sxs of hypoglycemia d/t multiple diabetes medications   • No other clinically significant DDIs noted at this time    Medication Assessment:   1. Aspirin - Not Indicated  2. Statin - Not Indicated  3. ACEi/ARB - Not Indicated    Medication Education on Specialty Medication:  The patient was provided medication education via verbal and written information on new and/or current target medications. Patient expressed understanding and had no further questions at this time.    · Ozempic  o Discussed the medication's MOA and that it helps you feel full, helps your body release more insulin and helps your body make less sugar after meals.  o Administer by subcutaneous injection into the abdomen, thigh, or upper arm on the same day each week without regard to food. Rotate injection sites weekly if injecting in the same area of the body and use a new needle every time Ozempic is used. Do not mix with other products.   - For each new prefilled pen, prime the needle before the first injection by turning the dose selector to the flow check symbol and injecting into the air (priming is not required for subsequent injections). Once injected,  continue to depress the button until the dial has returned to 0 and for an additional 6 seconds. Then, remove the needle and discard in sharps container.  o Unopened pens should be stored in refrigerator, opened pens may be stored in refrigerator or at room temperature for up to 56 days  o If you miss a dose, take it as soon as you remember and then get back on schedule  - If it has been > 5 days, skip that dose and get back on your regular schedule allowing at least 48 hours between 2 doses.   - Never double up doses to make up for a missed dose  o Nausea, vomiting, and diarrhea are msot common when first starting Ozempic, but they should decrease over time  o Make sure to eat smaller meals throughout the day versus larger meals and this should help with GI symptoms as well.    Assessment & Plan:  1. Provider Changes This Visit: None, Unchanged  2. Therapy Modifications Recommended: None at This Time   3. Provided contact information for the pharmacy team and discussed [facility] services available to patient, including: monitoring of refills, prior authorizations, and copay coupon cards, as applicable, as well as curb-side pick-up or mail-order as needed.  4. At this time, patient to remain with [facility] pharmacy in [place], may reach out to us in the future if medication cost/access becomes an issue.     Cari Leo, PharmD  8/9/2021  14:44 EDT

## 2021-08-09 NOTE — PROGRESS NOTES
"     Office Note      Date: 2021  Patient Name: Luana Chawla  MRN: [id number]  : 1960    Chief Complaint   Patient presents with   • Diabetes       History of Present Illness:   Luana Chawla is a 60 y.o. female who presents for Diabetes type 2. Diagnosed in: . Treated in past with insulin. Current treatments: metformin, ozempic and basal insulin. Number of insulin shots per day: 1. Checks blood sugar 288 times a day - on DexCom CGM.  Has low blood sugar: occasional. Aspirin use: No - told not to.. Statin use: No - no indication.. ACE-I/ARB use: No - no indication.. Changes in health since last visit: none. Last eye exam 2021.     She is on letrozole.  She c/o hot flashes, HA's.    Subjective      Diabetic Complications:  Eyes: No  Kidneys: No  Feet: No  Heart: No    Diet and Exercise:  Meals per day: 3  Minutes of exercise per week: 150 mins.    Review of Systems:   Review of Systems   Constitutional: Negative.    Cardiovascular: Negative.    Gastrointestinal: Negative.    Endocrine: Negative.        The following portions of the patient's history were reviewed and updated as appropriate: allergies, current medications, past family history, past medical history, past social history, past surgical history and problem list.    Objective       Visit Vitals  /68 (BP Location: Left arm, Patient Position: Sitting, Cuff Size: Adult)   Pulse 55   Ht 167.6 cm (66\")   Wt 67.1 kg (148 lb)   SpO2 99%   BMI 23.89 kg/m²       Physical Exam:  Physical Exam  Constitutional:       Appearance: Normal appearance.   Neurological:      Mental Status: She is alert.         Labs:    HbA1c  Lab Results   Component Value Date    HGBA1C 6.7 2021       CMP  Lab Results   Component Value Date    GLUCOSE 81 2021    BUN 12 2021    CREATININE 0.53 (L) 2021    EGFRIFNONA 117 2021    EGFRIFAFRI 110 2020    BCR 22.6 2021    K 4.3 2021    CO2 27.7 " 05/05/2021    CALCIUM 9.5 05/05/2021    PROTENTOTREF 6.5 02/25/2020    LABIL2 1.7 02/25/2020    AST 15 05/05/2021    ALT 20 05/05/2021        Lipid Panel  Lab Results   Component Value Date    HDL 61 (H) 05/05/2021    LDL 91 05/05/2021    TRIG 66 05/05/2021        TSH  Lab Results   Component Value Date    TSH 1.950 05/05/2021        Hemoglobin A1C  Lab Results   Component Value Date    HGBA1C 6.7 08/09/2021        Microalbumin/Creatinine  Lab Results   Component Value Date    MALBCRERATIO  05/05/2021      Comment:      Unable to calculate    MICROALBUR <1.2 05/05/2021           Assessment / Plan      Assessment & Plan:  Diagnoses and all orders for this visit:    1. Uncontrolled type 2 diabetes mellitus with hyperglycemia (CMS/HCC) (Primary)  Assessment & Plan:  Diabetes is unchanged.   Continue current treatment regimen.  Diabetes will be reassessed in 3 months.    CGM shows lower but okay readings overnight but a bit higher during the day.  No patterns for med adjustments.    Orders:  -     POC Glycosylated Hemoglobin (Hb A1C)  -     POC Glucose, Blood    2. Benign hypertension  Assessment & Plan:  Hypertension is unchanged.  Continue current treatment regimen.  Blood pressure will be reassessed at the next regular appointment.      3. Insulin long-term use (CMS/[facility])      Return in about 3 months (around 11/9/2021) for Recheck with A1c.    Jose Cummings MD   08/09/2021

## 2021-08-09 NOTE — CONSULTS
A user error has taken place: encounter opened in error, closed for administrative reasons.  Erroneous Encounter. Please Disregard.

## 2021-08-09 NOTE — ASSESSMENT & PLAN NOTE
Diabetes is unchanged.   Continue current treatment regimen.  Diabetes will be reassessed in 3 months.    CGM shows lower but okay readings overnight but a bit higher during the day.  No patterns for med adjustments.

## 2021-09-15 ENCOUNTER — OFFICE VISIT (OUTPATIENT)
Dept: ONCOLOGY | Facility: CLINIC | Age: 61
End: 2021-09-15

## 2021-09-15 ENCOUNTER — EDUCATION (OUTPATIENT)
Dept: ONCOLOGY | Facility: HOSPITAL | Age: 61
End: 2021-09-15

## 2021-09-15 VITALS
BODY MASS INDEX: 23.95 KG/M2 | OXYGEN SATURATION: 96 % | SYSTOLIC BLOOD PRESSURE: 136 MMHG | DIASTOLIC BLOOD PRESSURE: 65 MMHG | HEIGHT: 66 IN | WEIGHT: 149 LBS | RESPIRATION RATE: 18 BRPM | HEART RATE: 94 BPM | TEMPERATURE: 98.4 F

## 2021-09-15 DIAGNOSIS — Z17.0 MALIGNANT NEOPLASM OF UPPER-OUTER QUADRANT OF RIGHT BREAST IN FEMALE, ESTROGEN RECEPTOR POSITIVE (HCC): Primary | ICD-10-CM

## 2021-09-15 DIAGNOSIS — C50.411 MALIGNANT NEOPLASM OF UPPER-OUTER QUADRANT OF RIGHT BREAST IN FEMALE, ESTROGEN RECEPTOR POSITIVE (HCC): Primary | ICD-10-CM

## 2021-09-15 DIAGNOSIS — F34.1 DYSTHYMIA: ICD-10-CM

## 2021-09-15 PROBLEM — R23.2 HOT FLASHES RELATED TO AROMATASE INHIBITOR THERAPY: Status: ACTIVE | Noted: 2021-09-15

## 2021-09-15 PROBLEM — T45.1X5A HOT FLASHES RELATED TO AROMATASE INHIBITOR THERAPY: Status: ACTIVE | Noted: 2021-09-15

## 2021-09-15 PROCEDURE — 99215 OFFICE O/P EST HI 40 MIN: CPT | Performed by: NURSE PRACTITIONER

## 2021-09-15 RX ORDER — TAMOXIFEN CITRATE 20 MG/1
20 TABLET ORAL DAILY
Qty: 30 TABLET | Refills: 11 | Status: SHIPPED | OUTPATIENT
Start: 2021-09-15 | End: 2021-10-19

## 2021-09-15 RX ORDER — BUPROPION HYDROCHLORIDE 100 MG/1
TABLET, EXTENDED RELEASE ORAL
COMMUNITY
Start: 2021-09-11 | End: 2021-09-15 | Stop reason: ALTCHOICE

## 2021-09-15 RX ORDER — VENLAFAXINE HYDROCHLORIDE 37.5 MG/1
CAPSULE, EXTENDED RELEASE ORAL
Qty: 53 CAPSULE | Refills: 0 | Status: SHIPPED | OUTPATIENT
Start: 2021-09-15 | End: 2021-10-06 | Stop reason: SDUPTHER

## 2021-09-15 NOTE — PLAN OF CARE
Discussed with patient over the phone about change in current medications: letrozole to tamoxifen 20mg po daily, and discontinuing the wellbutrin + lexapro and initiating effexor. Discussed with patient appropriate taper of medications: Patient has already been tapered to 150mg of wellbutrin daily, and plan is to stop that day 1 week 1 of the cross taper with effexor and lexapro. Starting week 1 of cross taper, patient is to start taking half of current lexapro dose (20mg to now 10mg) for the first week, while simultaneously taking effexor 37.5mg tablet once daily. On week 2, patient will take the the lexapro 10mg every other day along with effexor 75mg (2 tablets) once daily. At the end of week 2, patient will stop lexapro, and only be taking effexor 75mg po once daily at that point. We also discussed potential adverse events of this process, and the new medication effexor. Pt did not have additional questions/concerns, and I encouraged her to contact us if she does in the future.

## 2021-09-15 NOTE — PROGRESS NOTES
CHIEF COMPLAINT: 1.  Hot flashes   2.  Mood swings and agitation   3.  Breast cancer    Problem List:  Oncology/Hematology History Overview Note   1. 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  ER weakly 5% 1+ positive, KS 50% 1-2+ positive, HER-2/ashley 100% 3+ positive.  Negative cancer next panel  2. Significant diabetes with predating neuropathy due to spinal stenosis status post laminectomy 5/24/2019 with persistent neuropathy dorsum left foot  3. MRSA bacteremia due to port infection March 2020 after course 1 day 1 of Herceptin Taxol    Oncology history timeline:  -5/24/2019 Dr. Garcia saw for spinal stenosis with radiculopathy and left foot drop due to herniated disc and performed decompressive laminectomy, L4-5 and L5-S1 discectomy and medial facetectomy  -10/15/2019 mammogram BI-RADS 0  -11/22/2019 right mammogram/ultrasound BI-RADS 4 with ultrasound-guided core biopsy showing invasive ductal carcinoma Olney score 6 out of 9 grade 2, ER 5% 1+ positive KS 50% 1-2+ positive, HER-2/ashley 100% 3+ positive.  -12/13/2019 MRI breasts revealed 2.2 cm right breast mass consistent with biopsy proven cancer with unsuspected adjacent area's of non-mass enhancement worrisome for DCIS.  Non-mass enhancement in the subareolar left breast worrisome for DCIS.  Dominant focus left central portion left breast indeterminate BI-RADS 4.  Cancer next panel negative  -1/15/2020 CMP unremarkable save for glucose 200 with hemoglobin 10.6 normochromic normocytic indices  -1/21/2020 right skin sparing mastectomy with right sentinel lymph node injection and right deep subfascial sentinel lymph node biopsy with contralateral prophylactic left skin sparing mastectomy.  Right breast 1.8 cm Bloom Dias 8 out of 9, total of 4 lymph nodes examined 2 sentinel nodes all negative.   Pathological T1 cN0 M0 stage Ia.  Left breast benign with sclerosing adenosis.  -2/26/2020 started Herceptin Taxol weekly  -3/2/2020 port removed due to MRSA bacteremia with port infection/purulence.  -3/2/2020 through 3/6/2020 hospitalized for MRSA bacteremia from port.  -3/31/2020 [facility] tele-visit: Patient still on IV antibiotics for MRSA port infection.  Decision made to go with Kanjinti alone every 3 weeks along with Arimidex.  -4/13/2020 per ID, patient has completed IV antibiotics and is now on oral antibiotics.  -3/23/2021 completed 1 year Kanjinti.  Recommendation to complete 10 years of Arimidex if can tolerate.  5-7 years if not.    -6/16/2021 [facility] follow-up:  Intolerant of Arimidex with hot flashes and mood swings.  Therapy switched to Letrozole.      -9/15/2021 [facility] clinic follow-up:  Tolerating therapy with Letrozole much better than she did with Arimidex but she still is having significant hot flashes and mood disturbance with easy agitation.  She is finding it difficult to work as stress makes these issues worse.  She is going to apply for disability senior living.  She is on Wellbutrin and Lexapro.  We discussed at length her options for hormonal blockade and she wants to continue trying.  At this time I will switch her to tamoxifen.  I spoke with our pharmacist Irma Arechiga, Pharm.D. about changing her antidepressants as her current antidepressants can interfere with tamoxifen.  We will have her stop Wellbutrin, she will start Effexor 37.5 mg daily for 7 days and if tolerated will increase to 75 mg daily, if this is tolerated then we will consider increasing up to 150 mg daily.  She will taper off of her Lexapro over 2 weeks and then stop that also.  We also discussed counseling as I think this will be greatly beneficial to Luana with her dysthymia and helping to deal with stress at work and living with breast cancer.  She did not want me to make the referral but  I did give her a brochure and she assures me that she will call and make an appointment.  I plan to call and check on her in about 3 weeks to see how she is tolerating the change in her antidepressants, we will also see if she has made an appointment with [facility] for counseling and to see how she is tolerating tamoxifen.  She is going to stop her Lexapro and wait about a week before starting tamoxifen so that she can tell if she is going to have any side effects with it.  I will see her back in 2 months for follow-up and sooner if needed.  We had planned on 5-7 years of therapy with hormonal blockade, I discussed with her today that with her ongoing side effects we would likely only do 5 years and hopefully we can get through that.  She was only weakly ER positive.     Malignant neoplasm of upper-outer quadrant of right breast in female, estrogen receptor positive (CMS/HCC)   1/16/2019 Cancer Staged    Staging form: Breast, AJCC 8th Edition  - Clinical stage from 1/16/2019: Stage IB (cT2, cN0, cM0, G2, ER+, PA+, HER2+) - Signed by Lisa Herman MD on 1/27/2020 1/21/2020 Initial Diagnosis    Malignant neoplasm of upper-outer quadrant of right female breast (CMS/HCC)     2/11/2020 Cancer Staged    Staging form: Breast, AJCC 8th Edition  - Pathologic: Stage IA (pT1c, pN0, cM0, G3, ER+, PA+, HER2+) - Signed by Hero Gomez MD on 2/11/2020 2/18/2020 -  Other Event    Echocardiogram  · Left ventricular systolic function is normal. Estimated EF = 55%.  · The global longitudinal strain was -19.5%.     2/25/2020 - 2/26/2020 Chemotherapy    OP CENTRAL VENOUS ACCESS DEVICE ACCESS, CARE, AND MAINTENANCE ([facility])     2/26/2020 - 3/10/2020 Chemotherapy    OP BREAST PACLitaxel / Trastuzumab-anns (Weekly X 12)     3/2/2020 Adverse Reaction    MRSA bacteremia due to port infection with port removal after day 1 course 1 Herceptin Taxol     3/31/2020 - 6/16/2021 Hormonal Therapy    Arimidex for planned 5 years.   She did not tolerate Arimiedex, therapy changed to Letrozole 6/16/2021 4/2/2020 -  Chemotherapy    Completed 3/23/2021  OP BREAST Trastuzumab-anns Q21D (maintenance)     5/27/2020 -  Other Event    5/27/2020 echocardiogram EF 55%      7/23/2020 Procedure    Colonoscopy with Dr. Marte, normal     8/26/2020 -  Other Event    Echocardiogram   · This was a limited echocardiogram to assess left ventricular ejection fraction in the setting of chemotherapy.  · Left ventricular systolic function is normal. Calculated EF = 55.1%. Estimated EF = 55%. Global Longitudinal LV strain = -18.2%.  · Estimated EF = 55%.     11/9/2020 Imaging    DEXA bone density IMPRESSION:  Osteopenia of the femoral necks bilaterally, and total hips  bilaterally.     11/10/2020 -  Other Event    Echocardogram   · This was a limited echocardiogram to assess left ventricular function only.  · Left ventricular systolic function is normal. Left ventricular ejection fraction appears to be 56 - 60%.  · Global longitudinal LV strain (GLS) = 19.7%.     2/23/2021 Imaging    -2/23/2021 left hip 2 view x-ray negative     6/16/2021 - 9/15/2021 Hormonal Therapy    Letrozole started after intolerance to arimidex.  9/15/2021 changed to Tamoxifen kruse to intolerance.           HISTORY OF PRESENT ILLNESS:  The patient is a 60 y.o. female, here for follow up on management of right breast cancer.  When I saw Luana last in June she was having significant side effects and intolerance to Arimidex with hot flashes, joint pain, mood swings and agitation.  I switched her to letrozole and she states that she tolerated this much better however it is still affecting her quality of life.  She is very stressed at work and becomes agitated easily.  She feels that this is due to a combination of the hormonal blockade medication, hot flashes and also with Covid.  She is considering applying for early long-term with disability through her work.  She is here today to discuss any  alternative therapies and management of her side effects.  She has seen her primary care provider, they did change her antidepressant to Wellbutrin, she also remains on Lexapro and has been on that for a very long time.  She cannot really tell much difference in her depression and agitation.    Past Medical History:   Diagnosis Date   • Breast cancer (CMS/HCC)    • Diabetes mellitus (CMS/HCC)    • Diverticulitis    • GERD (gastroesophageal reflux disease)    • Hypokalemia    • Kidney stone    • Lichen sclerosus    • Long term (current) use of insulin (CMS/HCC)    • Malignant neoplasm of upper-outer quadrant of right female breast (CMS/[facility]) 1/21/2020   • Microalbuminuria    • Nephrolithiasis    • Sciatica    • Type 2 diabetes mellitus, uncontrolled (CMS/[facility])    • Wears contact lenses      Past Surgical History:   Procedure Laterality Date   • APPENDECTOMY     • BREAST BIOPSY Right    • BREAST EXCISIONAL BIOPSY Right    • CHOLECYSTECTOMY     • COLON SURGERY     • COLONOSCOPY     • HYSTERECTOMY     • LEG SURGERY      leg fracture surgery-Abstracted from [employer]   • LUMBAR LAMINECTOMY DISCECTOMY DECOMPRESSION N/A 5/24/2019    Procedure: LUMBAR LAMINECTOMY L4-5 AND L5-S1;  Surgeon: Hipolito Kahn MD;  Location:  [facility];  Service: Orthopedic Spine   • MASTECTOMY Bilateral    • MASTECTOMY W/ SENTINEL NODE BIOPSY Bilateral 1/21/2020    Procedure: SKIN SPARING MASTECTOMIES BILATERAL, SENTINEL NODE BIOPSY RIGHT;  Surgeon: Silvio Hwoard MD;  Location:  [facility];  Service: General   • TUBAL ABDOMINAL LIGATION     • VENOUS ACCESS DEVICE (PORT) REMOVAL N/A 3/3/2020    Procedure: REMOVAL PORT;  Surgeon: Silvio Howard MD;  Location:  [facility];  Service: General;  Laterality: N/A;       Allergies   Allergen Reactions   • Bactrim [Sulfamethoxazole-Trimethoprim] Rash     RASH, SKIN PEELING        Family History and Social History reviewed and changed as necessary    REVIEW OF SYSTEM:   Positive for hot  "flashes  Positive for depression and agitation    PHYSICAL EXAM:  General: Well-developed, well-nourished female in no distress  Psychiatric: Conversation is appropriate, affect is normal.    Vitals:    09/15/21 0853   BP: 136/65   Pulse: 94   Resp: 18   Temp: 98.4 °F (36.9 °C)   SpO2: 96%   Weight: 67.6 kg (149 lb)   Height: 167 cm (65.75\")     Vitals:    09/15/21 0853   PainSc: 0-No pain       ECOG score: 0       Vitals reviewed.      ASSESSMENT & PLAN:    1.  T1 cN0 M0 Soria Dias 8 out of 9 ER 5% weakly +1+, SC 50% 1-2+ positive, HER-2/ashley 100% 3+ positive status post Herceptin Taxol weekly x12 followed by 1 year of Herceptin   2.  Hot flashes  3.  Intolerant of Arimidex and letrozole  4.  Dysthymia    Discussion:  She tolerated therapy with Letrozole better than she did with Arimidex but she still is having significant hot flashes and mood disturbance with easy agitation.  She is finding it difficult to work as stress makes these issues worse.  She is going to apply for disability senior care.  She is on Wellbutrin and Lexapro.  We discussed at length her options for hormonal blockade and she wants to continue trying.  At this time I will switch her to tamoxifen.  I spoke with our pharmacist Irma Arechiga, Pharm.D. about changing her antidepressants as her current antidepressants can interfere with tamoxifen.  We will have her stop Wellbutrin, she will start Effexor 37.5 mg daily for 7 days and if tolerated will increase to 75 mg daily, if this is tolerated then we will consider increasing up to 150 mg daily.  I am hoping that Effexor will help mitigate some of her hot flashes along with helping her depression.  She will taper off of her Lexapro over 2 weeks and then stop.  We discussed counseling as I think this will be greatly beneficial to Luana with her dysthymia and helping to deal with stress at work and living with breast cancer.  She did not want me to make the referral but I did give her a brochure and " she assures me that she will call and make an appointment.  I plan to call and check on her in about 3 weeks to see how she is tolerating the change in her antidepressants, we will also see if she has made an appointment with [facility] for counseling and to see how she is tolerating tamoxifen.  She is going to stop her Lexapro and wait about a week before starting tamoxifen so that she can tell if she is going to have any side effects with it.  We discussed potential side effects of tamoxifen treatment including an increased risk of blood clots, hot flashes, and sleep or mood disturbance.  She has had partial hysterectomy so therefore no increased risk of uterine cancer.  I will see her back in 2 months for follow-up and sooner if needed.  We had planned on 5-7 years of therapy with hormonal blockade, I discussed with her today that with her ongoing side effects we would likely only do 5 years and hopefully we can get through that.  She was only weakly ER positive.  She has had Covid 19 vaccine and booster.    Return to clinic in 2 months for follow-up and sooner if needed.    I spent 48 minutes caring for Luana on this date of service. This time includes time spent by me in the following activities: preparing for the visit, performing a medically appropriate examination and/or evaluation, counseling and educating the patient/family/caregiver, ordering medications, tests, or procedures, referring and communicating with other health care professionals and documenting information in the medical record.     CECI Rosenberg    09/15/2021

## 2021-09-30 ENCOUNTER — TELEPHONE (OUTPATIENT)
Dept: GYNECOLOGIC ONCOLOGY | Facility: CLINIC | Age: 61
End: 2021-09-30

## 2021-10-06 ENCOUNTER — OFFICE VISIT (OUTPATIENT)
Dept: PSYCHIATRY | Facility: CLINIC | Age: 61
End: 2021-10-06

## 2021-10-06 ENCOUNTER — TELEPHONE (OUTPATIENT)
Dept: ONCOLOGY | Facility: CLINIC | Age: 61
End: 2021-10-06

## 2021-10-06 DIAGNOSIS — F10.21 ALCOHOL DEPENDENCE IN SUSTAINED FULL REMISSION (HCC): ICD-10-CM

## 2021-10-06 DIAGNOSIS — G47.9 SLEEP DISTURBANCE: ICD-10-CM

## 2021-10-06 DIAGNOSIS — F17.290 VAPING NICOTINE DEPENDENCE, TOBACCO PRODUCT: ICD-10-CM

## 2021-10-06 DIAGNOSIS — F43.23 ADJUSTMENT DISORDER WITH MIXED ANXIETY AND DEPRESSED MOOD: Primary | ICD-10-CM

## 2021-10-06 PROCEDURE — 90792 PSYCH DIAG EVAL W/MED SRVCS: CPT | Performed by: NURSE PRACTITIONER

## 2021-10-06 RX ORDER — VENLAFAXINE HYDROCHLORIDE 75 MG/1
CAPSULE, EXTENDED RELEASE ORAL
Qty: 30 CAPSULE | Refills: 0 | Status: SHIPPED | OUTPATIENT
Start: 2021-10-06 | End: 2021-10-25 | Stop reason: SDUPTHER

## 2021-10-06 NOTE — TELEPHONE ENCOUNTER
I called to check on Luana to see how she was doing with recent medication changes when I had switched her to tamoxifen and changed her antidepressants.  She has been taking tamoxifen for only 2 days now, she had stopped Arimidex 2 weeks prior to that.  She reports off of any AI therapy she felt much better and had less hot flashes.  She is still doing a wean off of her antidepressants.  She met with [facility] today.  She will let us know if she has any concerns otherwise will follow up as scheduled.

## 2021-10-06 NOTE — PROGRESS NOTES
Subjective   Luana Chawla is a  employed 60 y.o. female who is here today for initial appointment in person face to face with Covid precautions taken with screening, masked, distance and good handwashing. Patient was referred by: [facility] onc[facility] for depression and anxiety symptoms. Patient had been diagnosed with breast cancer in November 2019 .     1. 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  ER weakly 5% 1+ positive, WI 50% 1-2+ positive, HER-2/ashley 100% 3+ positive.   2. Significant diabetes with predating neuropathy due to spinal stenosis status post laminectomy 5/24/2019 with persistent neuropathy dorsum left foot  3. MRSA bacteremia due to port infection March 2020 after course 1 day 1 of Herceptin Taxol   4. Letrozole started after intolerance to Arimidex: 9/15/2021 changed to Tamoxifen due to intolerance.     5. Cross tapered off Lexapro to venlafaxine XR 75mg daily 9.15.2021    Chief Complaint:  Stressed anxious, some depression       History of Present Illness The patient reports the following symptoms of  anxiety: constant anxiety/worry, restlessness/on edge, difficulty concentrating, mind goes blank, irritability, muscle tension and sleep disturbance. The symptoms have been present for at least 5 week(s) and have caused impairment in important areas of functioning. 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 She has to have 27 years for full group home but is thinking she will retire in December. A week ago she went to her PCP to get restricted hours for work and now only working mornings which is set up until December. She reports with being diagnosed with diabetes and has to have back surgery several times and then breast cancer she has reconsidered her  life and trying to find much more elvie. Patient reports some depressive symptoms but are resolving with decision to retire. She has felt less energy, motivation and interest for about 4 weeks. She also is taking the venlafaxine which she hopes will help with hot flashes she has been having, sleep disturbance and mood changes. She was intolerant of Arimidex and Letrozole with joint and body pain along with menopausal symptoms. She denies panic, denies SI/HI or AVH. She denies PTSD< OCD or manic like symptoms. Patient reports alcohol dependence in past but has been sober for nine years. She has repaired relationships with her adult children. She quit cigarettes but continues vaping, she denies illicit drug use.    The following portions of the patient's history were reviewed and updated as appropriate: allergies, current medications, past family history, past medical history, past social history, past surgical history and problem list.      Past Psych History:history of anxiety/depression Lexapro began about 6 years ago for depression and anxiety. Wellbutrin began about 5 weeks ago by also her PCP to help with nicotine cessation but this was stopped because of Tamoxifen plans. Lexapro was weaned down to goal of stopping for venlafaxine XR 37.5 then 75 mg daily since pt on Tamoxifen for breast cancer weakly er pr +    Substance Abuse:   Quit alcohol 9 years sober  Cont vape nicotine  Denies illicit drug use     ABUSE HX: denies   LEGAL HX: denies     YOLIE REVIEWED: no red flags       Family Psychiatric History:  family history includes Breast cancer (age of onset: 70) in her mother; Diabetes in her mother and sister; Heart disease in her father; Hypertension in her father.      Social History: Born in [place] and raised by her parents. Graduated from [employer] and graduated college.  and had one son - 36yo and two step daughters she helped raise. All three live in area of [place] where  "patient lives  and have children she enjoys. Patient  and lives alone in [place]. 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 She is a certified  but wasn't able to \"take advantage of teaching because of health issues\". She enjoys her grandchildren, walking, and staying active.       Medical/Surgical History:  Past Medical History:   Diagnosis Date   • Breast cancer (HCC)    • Diabetes mellitus (HCC)    • Diverticulitis    • GERD (gastroesophageal reflux disease)    • Hypokalemia    • Kidney stone    • Lichen sclerosus    • Long term (current) use of insulin ([facility])    • Malignant neoplasm of upper-outer quadrant of right female breast ([facility]) 1/21/2020   • Microalbuminuria    • Nephrolithiasis    • Sciatica    • Type 2 diabetes mellitus, uncontrolled (HCC)    • Wears contact lenses      Past Surgical History:   Procedure Laterality Date   • APPENDECTOMY     • BREAST BIOPSY Right    • BREAST EXCISIONAL BIOPSY Right    • CHOLECYSTECTOMY     • COLON SURGERY     • COLONOSCOPY     • HYSTERECTOMY     • LEG SURGERY      leg fracture surgery-Abstracted from [employer]   • LUMBAR LAMINECTOMY DISCECTOMY DECOMPRESSION N/A 5/24/2019    Procedure: LUMBAR LAMINECTOMY L4-5 AND L5-S1;  Surgeon: Hipolito Kahn MD;  Location:  [facility];  Service: Orthopedic Spine   • MASTECTOMY Bilateral    • MASTECTOMY W/ SENTINEL NODE BIOPSY Bilateral 1/21/2020    Procedure: SKIN SPARING MASTECTOMIES BILATERAL, SENTINEL NODE BIOPSY RIGHT;  Surgeon: Silvio Howard MD;  Location:  [facility];  Service: General   • TUBAL ABDOMINAL LIGATION     • VENOUS ACCESS DEVICE (PORT) REMOVAL N/A 3/3/2020    Procedure: REMOVAL PORT;  Surgeon: Silvio Howard MD;  Location:  [facility];  Service: General;  Laterality: N/A;       Allergies   Allergen Reactions   • Bactrim [Sulfamethoxazole-Trimethoprim] Rash     RASH, SKIN PEELING  "       Current Medications:   Current Outpatient Medications   Medication Sig Dispense Refill   • Ascorbic Acid (Vitamin C) 500 MG capsule Take 1 capsule by mouth Daily.     • BD Pen Needle Radha U/F 32G X 4 MM misc Use one daily. 100 each 1   • Calcium Carb-Cholecalciferol (CALCIUM 1000 + D PO) Take 1 tablet by mouth Daily.     • clobetasol (TEMOVATE) 0.05 % ointment Apply  topically to the appropriate area as directed 2 (Two) Times a Day. 60 g 2   • Continuous Blood Gluc  (Dexcom G6 ) device Dexcom G6  misc     • Continuous Blood Gluc Sensor (Dexcom G6 Sensor) Inject  under the skin into the appropriate area as directed Every 10 (Ten) Days. 9 each 3   • Continuous Blood Gluc Transmit (Dexcom G6 Transmitter) misc 1 each by Other route Every 3 (Three) Months. 1 each 3   • glucose blood (Accu-Chek Assha Plus) test strip Use 1 strip daily 100 each 1   • Insulin Glargine (BASAGLAR KWIKPEN) 100 UNIT/ML injection pen      • loratadine (Claritin) 10 MG tablet Take 10 mg by mouth Daily.     • Magnesium 400 MG tablet Take 1 tablet by mouth Daily.     • metFORMIN ER (GLUCOPHAGE-XR) 500 MG 24 hr tablet TAKE TWO TABLETS BY MOUTH TWICE A  tablet 5   • omeprazole (priLOSEC) 40 MG capsule Take 40 mg by mouth Daily.     • Ozempic, 1 MG/DOSE, 2 MG/1.5ML solution pen-injector Inject 1 mg under the skin into the appropriate area as directed 1 (One) Time Per Week. 9 mL 3   • Saccharomyces boulardii (Probiotic) 250 MG capsule Take  by mouth.     • tamoxifen (NOLVADEX) 20 MG chemo tablet Take 1 tablet by mouth Daily. 30 tablet 11   • Turmeric 500 MG tablet Take  by mouth.     • venlafaxine XR (EFFEXOR-XR) 75 MG 24 hr capsule Take one capsule daily 30 capsule 0   • vitamin B-12 (CYANOCOBALAMIN) 1000 MCG tablet Take 1,000 mcg by mouth Daily.       No current facility-administered medications for this visit.       Lab Results: reviewed most recent in EPIC        Review of Systems Constitutional: Negative for  appetite change, chills, diaphoresis, fatigue, fever and unexpected weight change.   HENT: Negative for hearing loss, sore throat, trouble swallowing and voice change.    Eyes: Negative for photophobia and visual disturbance.   Respiratory: Negative for cough, chest tightness and shortness of breath.    Cardiovascular: Negative for chest pain and palpitations.   Gastrointestinal: Negative for abdominal pain, constipation, nausea and vomiting.   Endocrine: Negative for cold intolerance and heat intolerance.   Genitourinary: Negative for dysuria and frequency.   Musculoskeletal: Negative for arthralgia, back pain, joint swelling and neck stiffness.   Skin: Negative for color change and wound.   Allergic/Immunologic: Negative for environmental allergies and immunocompromised state.   Neurological: Negative for dizziness, tremors, seizures, syncope, weakness, light-headedness and headaches.   Hematological: Negative for adenopathy. Does not bruise/bleed easily.    Objective   Physical Exam  not currently breastfeeding.      Mental Status Exam:   Appearance: appropriate  Hygiene:   good  Cooperation:  Cooperative  Eye Contact:  Good  Psychomotor Behavior:  Appropriate  Mood:  anxious  Affect:  Full range  Hopelessness: Denies  Speech:  Normal  Thought Process:  Linear  Thought Content:  Normal  Suicidal:  None  Homicidal:  None  Hallucinations:  None  Delusion:  None  Memory:  Intact  Orientation:  Person, Place, Time and Situation  Reliability:  good  Insight:  Good  Judgement:  Good  Impulse Control:  Good        Short-term goals: Patient will be compliant with clinic appointments.  Patient will be engaged in therapy, medication compliant with minimal side effects. Patient  will report decrease of symptoms and frequency.    Long-term goals: Patient will have minimal symptoms of mental health disorder with continued treatment. Patient will be compliant with treatment and appointments.       Problem list: adjustment  disorder, nicotine dependence vaping   Strengths: patient appears motivated for treatment          Assessment/Plan   Diagnoses and all orders for this visit:    1. Adjustment disorder with mixed anxiety and depressed mood (Primary)  -     venlafaxine XR (EFFEXOR-XR) 75 MG 24 hr capsule; Take one capsule daily  Dispense: 30 capsule; Refill: 0    2. Sleep disturbance    3. Alcohol dependence in sustained full remission (HCC)    4. Vaping nicotine dependence, tobacco product        A psychological evaluation was conducted in order to assess past and current level of functioning. Areas assessed included, but were not limited to: perception of social support, perception of ability to face and deal with challenges in life (positive functioning), anxiety symptoms, depressive symptoms, perspective on beliefs/belief system, coping skills for stress, intelligence level,  Therapeutic rapport was established. Interventions conducted today were geared towards incorporating medication management along with support for continued therapy. Education was also provided as to the med management with this provider and what to expect in subsequent sessions.    Assisted patient in processing above session content; acknowledged and normalized patient’s thoughts, feelings, and concerns.  Applied  positive coping skills and behavior management in session.  Allowed patient to freely discuss issues without interruption or judgment. Provided safe, confidential environment to facilitate the development of positive therapeutic relationship and encourage open, honest communication. Assisted patient in identifying risk factors which would indicate the need for higher level of care including thoughts to harm self or others and/or self-harming behavior and encouraged patient to contact this office, call 911, or present to the nearest emergency room should any of these events occur. Discussed crisis intervention services and means to access.  Patient  adamantly and convincingly denies current suicidal or homicidal ideation or perceptual disturbance.    Discussed diagnosis and recommendations for treatment:    PROVIDE: Cognitive Behavioral Therapy and Solution Focused Therapy to improve functioning, maintain stability, and avoid decompensation and the need for higher level of care.    Recommended book [place] for this phase of patient's life    MEDICATION MANAGEMENT RECOMMENDATIONS: refilled venlafaxine XR 75 mg po QD for depressive symptoms, anxiety and menopausal type symptoms from hormone inhibitor       We discussed risks, benefits,goals and side effects of the above medication and the patient was agreeable with the plan.Patient was educated on the importance of compliance with treatment and follow-up appointments.To call for questions or concerns and return early if necessary. Crisis plan reviewed including going to the Emergency department.       Treatment Plan: stabilize mood,  patient will stay out of the hospital and be at optimal level of functioning, take all medication as prescribed. Patient verbalized  understanding and agreement to plan.      Return in about 19 days (around 10/25/2021).

## 2021-10-19 ENCOUNTER — OFFICE VISIT (OUTPATIENT)
Dept: GYNECOLOGIC ONCOLOGY | Facility: CLINIC | Age: 61
End: 2021-10-19

## 2021-10-19 VITALS
WEIGHT: 148 LBS | DIASTOLIC BLOOD PRESSURE: 79 MMHG | BODY MASS INDEX: 23.78 KG/M2 | OXYGEN SATURATION: 98 % | HEART RATE: 88 BPM | SYSTOLIC BLOOD PRESSURE: 134 MMHG | TEMPERATURE: 97.1 F | HEIGHT: 66 IN | RESPIRATION RATE: 18 BRPM

## 2021-10-19 DIAGNOSIS — N90.4 LICHEN SCLEROSUS OF FEMALE GENITALIA: ICD-10-CM

## 2021-10-19 DIAGNOSIS — Z01.419 WELL WOMAN EXAM WITH ROUTINE GYNECOLOGICAL EXAM: Primary | ICD-10-CM

## 2021-10-19 DIAGNOSIS — Z85.3 HISTORY OF BREAST CANCER: ICD-10-CM

## 2021-10-19 PROCEDURE — 99396 PREV VISIT EST AGE 40-64: CPT | Performed by: NURSE PRACTITIONER

## 2021-10-19 RX ORDER — LETROZOLE 2.5 MG/1
2.5 TABLET, FILM COATED ORAL DAILY
COMMUNITY
End: 2022-06-20

## 2021-10-19 NOTE — PROGRESS NOTES
GYN ONCOLOGY ANNUAL WELL WOMAN VISIT      Luana Chawla  [id number]  1960      Subjective   Chief Complaint: Annual Exam (Pt c/o Hot Flashes)        History of present illness:      Luana Chawla is a 60 y.o. year old female who is here today for an annual exam. She has a personal history of breast cancer (outlined below) and lichens sclerosus. She is s/p bilateral mastectomies without reconstruction thus far. She is followed by Dr. Gomez and CECI Grajeda of our medical oncology team. She did not tolerate Arimidex or tamoxifen, has been changed back to maintenance letrozole, planning to resume next week. Next oncology follow-up scheduled next month. She is in frequent contact with them for medication adjustments and hot flashes related to her hormone blockade. She is also seeing our mental health nurse practitioner for therapy and medication management, recently started on Effexor.   Patient is single and not sexually active. She is s/p vaginal hysterectomy (maintains ovaries) completed 20+ years ago. She uses clobetasol for management of lichens sclerosus. She reports she is feeling very well today and has no complaints. She denies vaginal bleeding, pelvic pain, changes in bowel or bladder function, new or concerning lesions, and chest wall problems. All well woman screenings are currently UTD.       Cancer History:   Oncology/Hematology History Overview Note   1. 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  ER weakly 5% 1+ positive, MS 50% 1-2+ positive, HER-2/ashley 100% 3+ positive.  Negative cancer next panel  2. Significant diabetes with predating neuropathy due to spinal stenosis status post laminectomy 5/24/2019 with persistent neuropathy dorsum left foot  3. MRSA bacteremia due to port infection March 2020 after course 1 day 1 of  Herceptin Taxol    Oncology history timeline:  -5/24/2019 Dr. Garcia saw for spinal stenosis with radiculopathy and left foot drop due to herniated disc and performed decompressive laminectomy, L4-5 and L5-S1 discectomy and medial facetectomy  -10/15/2019 mammogram BI-RADS 0  -11/22/2019 right mammogram/ultrasound BI-RADS 4 with ultrasound-guided core biopsy showing invasive ductal carcinoma Cory score 6 out of 9 grade 2, ER 5% 1+ positive ME 50% 1-2+ positive, HER-2/ashley 100% 3+ positive.  -12/13/2019 MRI breasts revealed 2.2 cm right breast mass consistent with biopsy proven cancer with unsuspected adjacent area's of non-mass enhancement worrisome for DCIS.  Non-mass enhancement in the subareolar left breast worrisome for DCIS.  Dominant focus left central portion left breast indeterminate BI-RADS 4.  Cancer next panel negative  -1/15/2020 CMP unremarkable save for glucose 200 with hemoglobin 10.6 normochromic normocytic indices  -1/21/2020 right skin sparing mastectomy with right sentinel lymph node injection and right deep subfascial sentinel lymph node biopsy with contralateral prophylactic left skin sparing mastectomy.  Right breast 1.8 cm Bloom Dias 8 out of 9, total of 4 lymph nodes examined 2 sentinel nodes all negative.  Pathological T1 cN0 M0 stage Ia.  Left breast benign with sclerosing adenosis.  -2/26/2020 started Herceptin Taxol weekly  -3/2/2020 port removed due to MRSA bacteremia with port infection/purulence.  -3/2/2020 through 3/6/2020 hospitalized for MRSA bacteremia from port.  -3/31/2020 [facility] oncology tele-visit: Patient still on IV antibiotics for MRSA port infection.  Decision made to go with Kanjinti alone every 3 weeks along with Arimidex.  -4/13/2020 per ID, patient has completed IV antibiotics and is now on oral antibiotics.  -3/23/2021 completed 1 year Kanjinti.  Recommendation to complete 10 years of Arimidex if can tolerate.  5-7 years if not.    -6/16/2021 [facility]  Oncology clinic follow-up:  Intolerant of Arimidex with hot flashes and mood swings.  Therapy switched to Letrozole.      -9/15/2021 [facility] Oncology clinic follow-up:  She tolerated therapy with Letrozole better than she did with Arimidex but she still is having significant hot flashes and mood disturbance with easy agitation.  She is finding it difficult to work as stress makes these issues worse.  She is going to apply for disability [employer].  She is on Wellbutrin and Lexapro.  We discussed at length her options for hormonal blockade and she wants to continue trying.  At this time I will switch her to tamoxifen.  I spoke with our pharmacist Irma Arechiga, Pharm.D. about changing her antidepressants as her current antidepressants can interfere with tamoxifen.  We will have her stop Wellbutrin, she will start Effexor 37.5 mg daily for 7 days and if tolerated will increase to 75 mg daily, if this is tolerated then we will consider increasing up to 150 mg daily.  I am hoping that Effexor will help mitigate some of her hot flashes along with helping her depression.  She will taper off of her Lexapro over 2 weeks and then stop.  We discussed counseling as I think this will be greatly beneficial to Luana with her dysthymia and helping to deal with stress at work and living with breast cancer.  She did not want me to make the referral but I did give her a brochure and she assures me that she will call and make an appointment.  I plan to call and check on her in about 3 weeks to see how she is tolerating the change in her antidepressants, we will also see if she has made an appointment with CECI Vega for counseling and to see how she is tolerating tamoxifen.  She is going to stop her Lexapro and wait about a week before starting tamoxifen so that she can tell if she is going to have any side effects with it.  I will see her back in 2 months for follow-up and sooner if needed.  We had planned on 5-7 years of therapy  with hormonal blockade, I discussed with her today that with her ongoing side effects we would likely only do 5 years and hopefully we can get through that.  She was only weakly ER positive.     Malignant neoplasm of upper-outer quadrant of right breast in female, estrogen receptor positive (HCC)   1/16/2019 Cancer Staged    Staging form: Breast, AJCC 8th Edition  - Clinical stage from 1/16/2019: Stage IB (cT2, cN0, cM0, G2, ER+, TN+, HER2+) - Signed by Lisa Herman MD on 1/27/2020 1/21/2020 Initial Diagnosis    Malignant neoplasm of upper-outer quadrant of right female breast (CMS/HCC)     2/11/2020 Cancer Staged    Staging form: Breast, AJCC 8th Edition  - Pathologic: Stage IA (pT1c, pN0, cM0, G3, ER+, TN+, HER2+) - Signed by Hero Gomez MD on 2/11/2020 2/18/2020 -  Other Event    Echocardiogram  · Left ventricular systolic function is normal. Estimated EF = 55%.  · The global longitudinal strain was -19.5%.     2/25/2020 - 2/26/2020 Chemotherapy    OP CENTRAL VENOUS ACCESS DEVICE ACCESS, CARE, AND MAINTENANCE (CVAD)     2/26/2020 - 3/10/2020 Chemotherapy    OP BREAST PACLitaxel / Trastuzumab-anns (Weekly X 12)     3/2/2020 Adverse Reaction    MRSA bacteremia due to port infection with port removal after day 1 course 1 Herceptin Taxol     3/31/2020 - 6/16/2021 Hormonal Therapy    Arimidex for planned 5 years.    6/16/2021 therapy changed to Letrozole due to intolerance to Arimidex.  9/15/2021 changed to Tamoxifen kruse to intolerance of letrozole.  10/14/2021 changed back to Letrozole due to intolerance of tamoxifen.       4/2/2020 -  Chemotherapy    Completed 3/23/2021  OP BREAST Trastuzumab-anns Q21D (maintenance)     5/27/2020 -  Other Event    5/27/2020 echocardiogram EF 55%      7/23/2020 Procedure    Colonoscopy with Dr. Marte, normal     8/26/2020 -  Other Event    Echocardiogram   · This was a limited echocardiogram to assess left ventricular ejection fraction in the setting of  "chemotherapy.  · Left ventricular systolic function is normal. Calculated EF = 55.1%. Estimated EF = 55%. Global Longitudinal LV strain = -18.2%.  · Estimated EF = 55%.     2020 Imaging    DEXA bone density IMPRESSION:  Osteopenia of the femoral necks bilaterally, and total hips  bilaterally.     11/10/2020 -  Other Event    Echocardogram   · This was a limited echocardiogram to assess left ventricular function only.  · Left ventricular systolic function is normal. Left ventricular ejection fraction appears to be 56 - 60%.  · Global longitudinal LV strain (GLS) = 19.7%.     2021 Imaging    -2021 left hip 2 view x-ray negative         Obstetric History:  OB History        1    Para   1    Term   1            AB        Living           SAB        IAB        Ectopic        Molar        Multiple        Live Births                   Menstrual History:     No LMP recorded. Patient has had a hysterectomy.          The current medication list and allergy list were reviewed and reconciled.     Past Medical History, Past Surgical History, Social History, Family History have been reviewed and are without significant changes except as mentioned.    Health Maintenance:  Last colonoscopy was 2020, with recommended follow-up in 5-10 year(s). Last DEXA was 2020. Last pap smear was 10/19/2020, results were  normal PAP.. She does not have a history of abnormal pap smears.         Review of Systems   Constitutional: Negative.    Gastrointestinal: Negative.    Endocrine: Positive for heat intolerance (hot flashes).   Genitourinary: Negative.        Objective   Physical Exam  Vital Signs: /79 Comment: LUE  Pulse 88   Temp 97.1 °F (36.2 °C) (Infrared)   Resp 18   Ht 166.4 cm (65.5\")   Wt 67.1 kg (148 lb)   SpO2 98% Comment: RA  BMI 24.25 kg/m²   Vitals:    10/19/21 1458   PainSc: 0-No pain           General Appearance:  alert, cooperative, no apparent distress, appears stated age and " normal weight   Neurologic/Psychiatric: A&O x 3, gait steady, appropriate affect   Lungs:   Clear to auscultation bilaterally; respirations regular, even, and unlabored bilaterally   Heart:  Regular rate and rhythm, no murmurs appreciated   Breasts:  surgically absent bilaterally. No chest wall masses or abnormalities bilaterally   Abdomen:   Soft, non-tender, non-distended, no organomegaly and blood sugar monitor in place to RLQ   Lymph nodes: No cervical, supraclavicular, inguinal or axillary adenopathy noted   Extremities: Normal, atraumatic; no clubbing, cyanosis, or edema    Skin: No rashes, ulcers, or suspicious lesions noted   Pelvic: External Genitalia  atrophic, without lesions, changes consistent with lichens sclerosus, well controlled  Vagina  is pale, atrophic.   Vaginal Cuff  Female Vaginal Cuff: smooth, intact and without visible lesions  Uterus  surgically absent  Ovaries  without palpable masses or fullness  Parametria  smooth  Rectovaginal  Female rectovaginal: deferred     ECOG score: 0             Procedure Note:  No notes on file       Assessment and Plan:      Diagnoses and all orders for this visit:    1. Well woman exam with routine gynecological exam (Primary)    2. Lichen sclerosus of female genitalia    3. History of breast cancer         No chest wall masses or abnormalities appreciated. Keep routine follow-ups and continue plan with oncology team.      No pap smear needed today.      Mammograms no longer needed, s/p bilateral mastectomies     Repeat colonoscopy in 5-10 years per GI recommendations.      Repeat DEXA in 2022, recommend q2 years while on hormone blockade.     Lichen sclerosus appears to be well controlled presently. Continue clobetasol weekly. May use topical steroid to once daily or BID for acute flare-ups PRN.    Keep regular follow-ups with oncology team and mental health APRN as scheduled.   Continue Effexor for management of hot flashes.       Pain assessment was  performed today as a part of patient’s care. For patients with pain related to surgery, gynecologic malignancy or cancer treatment, the plan is as noted in the assessment/plan.  For patients with pain not related to these issues, they are to seek any further needed care from a more appropriate provider, such as PCP.      Follow-up:     Return to clinic in 1 year for Annual exam.      Electronically signed by CECI Mccullough on 10/19/21 at 15:18 EDT

## 2021-10-25 ENCOUNTER — OFFICE VISIT (OUTPATIENT)
Dept: PSYCHIATRY | Facility: CLINIC | Age: 61
End: 2021-10-25

## 2021-10-25 DIAGNOSIS — F17.290 VAPING NICOTINE DEPENDENCE, TOBACCO PRODUCT: ICD-10-CM

## 2021-10-25 DIAGNOSIS — G47.9 SLEEP DISTURBANCE: ICD-10-CM

## 2021-10-25 DIAGNOSIS — F43.23 ADJUSTMENT DISORDER WITH MIXED ANXIETY AND DEPRESSED MOOD: Primary | ICD-10-CM

## 2021-10-25 DIAGNOSIS — F10.21 ALCOHOL DEPENDENCE IN SUSTAINED FULL REMISSION (HCC): ICD-10-CM

## 2021-10-25 PROCEDURE — 99442 PR PHYS/QHP TELEPHONE EVALUATION 11-20 MIN: CPT | Performed by: NURSE PRACTITIONER

## 2021-10-25 NOTE — PROGRESS NOTES
Subjective   Luana Chawla is a 60 y.o. female who is here today for medication management follow up. You have chosen to receive care through a telephone visit. Do you consent to use a telephone visit for your medical care today? Yes    TIME IN: 258  TIME OUT:320    PATIENT AT: THEIR PLACE OF RESIDENCE    PROVIDER AT:   [facility]   CANCER [facility]/BE[facility]AVIOR[facility]  [address] [facility], [address]  [place] [address]        Chief Complaint:     1. Adjustment disorder with mixed anxiety and depressed mood (Primary)    2. Sleep disturbance    3. Alcohol dependence in sustained full remission (HCC)    4. Vaping nicotine dependence, tobacco product      History of Present Illness Patient presents via telephone and reports struggling with tamoxifen which caused leg cramping and general  pain in joints and contacted medical oncology which stated to stop it and she would go back on letrozole.  Patient complains of hot flashes and is currently on venlafaxine extended release 75 mg daily.  Patient states she wants to give it more time to see if it will help with that.  Discussed with her behavior modifications such as decreasing caffeine to none no alcohol clothing that is cotton and can breathe, drinking cool fluids staying hydrated and decreasing stress.  Patient will be retiring in December and states that will help a lot with stress.  Patient reports sleeping better now that she is not having leg cramps.  Sobriety from alcohol intake 9 years today she reports.  Patient continues to vape nicotine.  Denies adverse effects from medications.   (Scales based on 0 - 10 with 10 being the worst)    Therapy:  Start Time:    Stop Time:     ( ) minutes was spent for psychotherapy. Assisted patient in processing patient's (Diagnosis). Acknowledged and normalized patient's thoughts, feelings, and concerns. Utilized cognitive behavioral therapy to assist the patient in recognizing more appropriate  coping mechanisms when (she/he) becomes agitated/anxious which are proven effective in reducing the severity of frequency of symptoms.     CLINICAL MANUEVERING/INTERVENTION:   Patient talked about current stressors, primarily having a difficult time dealing with health,. Venting of frustrations was conducted. Feelings were processed and validated, both negative and positive. Flushing out worries and concerns was conducted in order to diminish emotional tension. Processing treatment was conducted. Ways in which patient may take stress off herself in a purposeful manner was discussed. Patient was assisted in 'talking out' what she may do if health continues to be a challenge, keeping in mind the notion that there is typically a solution to any given problem. Venting of concerns continued regarding work. The patient expressed gratitude for today's session and said that counseling helps her feel better.      The following portions of the patient's history were reviewed and updated as appropriate: allergies, current medications, past family history, past medical history, past social history, past surgical history and problem list.    Review of Systems  A 14 point review of systems was performed and is negative except as noted above.    Objective   Physical Exam  not currently breastfeeding.    Allergies   Allergen Reactions   • Bactrim [Sulfamethoxazole-Trimethoprim] Rash     RASH, SKIN PEELING        Current Medications:   Current Outpatient Medications   Medication Sig Dispense Refill   • Ascorbic Acid (Vitamin C) 500 MG capsule Take 1 capsule by mouth Daily.     • BD Pen Needle Radha U/F 32G X 4 MM misc Use one daily. 100 each 1   • Calcium Carb-Cholecalciferol (CALCIUM 1000 + D PO) Take 1 tablet by mouth Daily.     • clobetasol (TEMOVATE) 0.05 % ointment Apply  topically to the appropriate area as directed 2 (Two) Times a Day. 60 g 2   • Continuous Blood Gluc  (Dexcom G6 ) device Dexcom G6   misc     • Continuous Blood Gluc Sensor (Dexcom G6 Sensor) Inject  under the skin into the appropriate area as directed Every 10 (Ten) Days. 9 each 3   • Continuous Blood Gluc Transmit (Dexcom G6 Transmitter) misc 1 each by Other route Every 3 (Three) Months. 1 each 3   • glucose blood (Accu-Chek Sasha Plus) test strip Use 1 strip daily 100 each 1   • Insulin Glargine (BASAGLAR KWIKPEN) 100 UNIT/ML injection pen      • letrozole (FEMARA) 2.5 MG tablet Take 2.5 mg by mouth Daily.     • loratadine (Claritin) 10 MG tablet Take 10 mg by mouth Daily.     • Magnesium 400 MG tablet Take 1 tablet by mouth Daily.     • metFORMIN ER (GLUCOPHAGE-XR) 500 MG 24 hr tablet TAKE TWO TABLETS BY MOUTH TWICE A  tablet 5   • omeprazole (priLOSEC) 40 MG capsule Take 40 mg by mouth Daily.     • Ozempic, 1 MG/DOSE, 2 MG/1.5ML solution pen-injector Inject 1 mg under the skin into the appropriate area as directed 1 (One) Time Per Week. 9 mL 3   • Saccharomyces boulardii (Probiotic) 250 MG capsule Take  by mouth.     • Turmeric 500 MG tablet Take  by mouth.     • venlafaxine XR (EFFEXOR-XR) 75 MG 24 hr capsule Take one capsule daily 30 capsule 0   • vitamin B-12 (CYANOCOBALAMIN) 1000 MCG tablet Take 1,000 mcg by mouth Daily.       No current facility-administered medications for this visit.       Lab Results:  Office Visit on 08/09/2021   Component Date Value Ref Range Status   • Hemoglobin A1C 08/09/2021 6.7  % Final   • Lot Number 08/09/2021 [id number]   Final   • Expiration Date 08/09/2021 04-21-23   Final   • Glucose 08/09/2021 181* 70 - 130 mg/dL Final   • Lot Number 08/09/2021 [id number]   Final   • Expiration Date 08/09/2021 03/10/22   Final   Lab on 05/05/2021   Component Date Value Ref Range Status   • Glucose 05/05/2021 81  65 - 99 mg/dL Final   • BUN 05/05/2021 12  8 - 23 mg/dL Final   • Creatinine 05/05/2021 0.53* 0.57 - 1.00 mg/dL Final   • Sodium 05/05/2021 142  136 - 145 mmol/L Final   • Potassium 05/05/2021 4.3  3.5 -  5.2 mmol/L Final   • Chloride 05/05/2021 101  98 - 107 mmol/L Final   • CO2 05/05/2021 27.7  22.0 - 29.0 mmol/L Final   • Calcium 05/05/2021 9.5  8.6 - 10.5 mg/dL Final   • Total Protein 05/05/2021 6.6  6.0 - 8.5 g/dL Final   • Albumin 05/05/2021 4.40  3.50 - 5.20 g/dL Final   • ALT (SGPT) 05/05/2021 20  1 - 33 U/L Final   • AST (SGOT) 05/05/2021 15  1 - 32 U/L Final   • Alkaline Phosphatase 05/05/2021 79  39 - 117 U/L Final   • Total Bilirubin 05/05/2021 0.4  0.0 - 1.2 mg/dL Final   • eGFR Non African Amer 05/05/2021 117  >60 mL/min/1.73 Final   • Globulin 05/05/2021 2.2  gm/dL Final   • A/G Ratio 05/05/2021 2.0  g/dL Final   • BUN/Creatinine Ratio 05/05/2021 22.6  7.0 - 25.0 Final   • Anion Gap 05/05/2021 13.3  5.0 - 15.0 mmol/L Final   • TSH 05/05/2021 1.950  0.270 - 4.200 uIU/mL Final   • Microalbumin/Creatinine Ratio 05/05/2021    Final    Unable to calculate   • Creatinine, Urine 05/05/2021 106.4  mg/dL Final   • Microalbumin, Urine 05/05/2021 <1.2  mg/dL Final   • Total Cholesterol 05/05/2021 165  0 - 200 mg/dL Final   • Triglycerides 05/05/2021 66  0 - 150 mg/dL Final   • HDL Cholesterol 05/05/2021 61* 40 - 60 mg/dL Final   • LDL Cholesterol  05/05/2021 91  0 - 100 mg/dL Final   • VLDL Cholesterol 05/05/2021 13  5 - 40 mg/dL Final   • LDL/HDL Ratio 05/05/2021 1.49   Final   Office Visit on 05/05/2021   Component Date Value Ref Range Status   • Hemoglobin A1C 05/05/2021 6.5  % Final   • Lot Number 05/05/2021 [id number]   Final   • Expiration Date 05/05/2021 01/14/2023   Final       JAZMINE-7:       0-4: Minimal anxiety  5-9: Mild anxiety  10-14: Moderate anxiety  15-21: Severe anxiety  PHQ-9:  PHQ-2/PHQ-9 Depression Screening 6/16/2021   Little interest or pleasure in doing things 0   Feeling down, depressed, or hopeless 0   Total Score 0      5-9: Minimal symptoms  10-14: Major depression mild  15-19: Major depression moderate  Greater then 20: Major depression severe    Appearance:   Hygiene:  REPORTS  good  Cooperation:  Cooperative  Eye Contact:    Psychomotor Behavior:  denies psychomotor agitation/retardation, No EPS, No motor tics  Mood:  within normal limits  Affect:    Hopelessness: Denies  Speech:  Normal  Thought Process:  Linear  Thought Content:  Normal  Concentration: Normal   Suicidal: denies  Homicidal:  None  Hallucinations:  None  Delusion:  None  Memory:  Intact  Orientation:  Person, Place, Time and Situation  Reliability:  good  Insight:  Fair  Judgement: good  Impulse Control: good  Estimated Intelligence: average range    YOLIE REVIEWED NO RED FLAGS    Assessment/Plan   Diagnoses and all orders for this visit:    1. Adjustment disorder with mixed anxiety and depressed mood (Primary)    2. Sleep disturbance    3. Alcohol dependence in sustained full remission (HCC)    4. Vaping nicotine dependence, tobacco product        IMPRESSION: cont to struggle with hot flashes, off tamoxifen for past two weeks and will return to letrozole via Med Onc      PLAN: cont venlafaxine XR 75 mg p.o. daily  We discussed risks, benefits, and side effects of the above medications and the patient was agreeable with the plan. Patient was educated on the importance of compliance with treatment and follow-up appointments.     Hot flashes: Discussed  Encouraged to wear natural fibers cotton and linens  Avoid alcohol  Avoid caffeinated drinks  Encouraged exercise  Encouraged hydration with cool fluids  Utilize ceiling fans or table fans as necessary    Provide Cognitive Behavioral Therapy and Solution Focused Therapy to improve functioning, maintain stability, and avoid decompensation and the need for higher level of care.    Counseled patient regarding multimodal approach with encouragement of healthy nutrition, healthy sleep, regular physical mobility, social involvement, counseling, and medication compliance.     Assisted patient in identifying risk factors which would indicate the need for higher level of care  including thoughts to harm self or others and/or self-harming behavior and encouraged patient to contact this office, call 911, or present to the nearest emergency room should any of these events occur. Discussed crisis intervention services and means to access.  Patient adamantly and convincingly denies current suicidal or homicidal ideation or perceptual disturbance.    Treatment Plan: stabilize mood, patient will stay out of psychiatric hospital and be at optimal level of functioning with therapy and take all medication as prescribed. Patient verbalized  understanding and agreement to plan.    Instructed to call for questions or concerns and return early if necessary.     Greater than 50% time was spent in coordination of care, and counseling the patient regarding current assessment, symptoms, plan of care going forward, supportive therapy.  Answered any questions patient had regarding medications and plan of care.    Return in about 4 weeks (around 11/22/2021).

## 2021-10-26 ENCOUNTER — TELEPHONE (OUTPATIENT)
Dept: ONCOLOGY | Facility: CLINIC | Age: 61
End: 2021-10-26

## 2021-10-26 RX ORDER — VENLAFAXINE HYDROCHLORIDE 75 MG/1
CAPSULE, EXTENDED RELEASE ORAL
Qty: 90 CAPSULE | Refills: 1 | Status: SHIPPED | OUTPATIENT
Start: 2021-10-26 | End: 2021-11-08 | Stop reason: SDUPTHER

## 2021-10-26 NOTE — TELEPHONE ENCOUNTER
Caller: Luana Chawla    Relationship: Self    Best call back number: [phone]    What form or medical record are you requesting: MEDICAL RECORDS     Who is requesting this form or medical record from you: PATIENT IS APPLYING FOR MEDICAL care home-MANAGED MEDICAL REVIEW ORGANIZATION FOR [facility].      If mail, what is the address: [address] [place] [address]. [phone]    Timeframe paperwork needed: AS SOON AS POSSIBLE     Additional notes: PLEASE CONTACT PATIENT WITH ANY QUESTIONS OR TO INFORM THAT PAPERWORK HAS BEEN MAILED.

## 2021-10-26 NOTE — TELEPHONE ENCOUNTER
LMOM w/number to call for records [phone] and ask for the Medical Records Department. If she has some forms that need to be completed, we can do them. Left Miles number to call me back today to discuss what is needed.  [id number]  ~Shell

## 2021-11-03 ENCOUNTER — SPECIALTY PHARMACY (OUTPATIENT)
Dept: ENDOCRINOLOGY | Facility: CLINIC | Age: 61
End: 2021-11-03

## 2021-11-03 NOTE — PROGRESS NOTES
Patient declined filling specialty medication at [facility] and/or enrollment in the Patient Management Program.

## 2021-11-08 ENCOUNTER — TELEPHONE (OUTPATIENT)
Dept: ONCOLOGY | Facility: CLINIC | Age: 61
End: 2021-11-08

## 2021-11-08 DIAGNOSIS — F43.23 ADJUSTMENT DISORDER WITH MIXED ANXIETY AND DEPRESSED MOOD: ICD-10-CM

## 2021-11-08 RX ORDER — BLOOD SUGAR DIAGNOSTIC
STRIP MISCELLANEOUS
Qty: 50 EACH | Refills: 5 | Status: SHIPPED | OUTPATIENT
Start: 2021-11-08 | End: 2023-02-02

## 2021-11-08 RX ORDER — VENLAFAXINE HYDROCHLORIDE 37.5 MG/1
CAPSULE, EXTENDED RELEASE ORAL
Qty: 30 CAPSULE | Refills: 0 | Status: SHIPPED | OUTPATIENT
Start: 2021-11-08 | End: 2021-12-02 | Stop reason: SDUPTHER

## 2021-11-08 NOTE — TELEPHONE ENCOUNTER
CALLED PATIENT TO GET MORE INFORMATION REGARDING VM LEFT ABOUT MEDICATION QUESTION. PATIENT MENTIONED THAT SHE WOULD LIKE A CALL BACK ABOUT THE EFFEXOR RX AS IT'S CAUSING HER SEVERE CONSTIPATION AND SHE'S BEEN HAVING TERRIBLE VIVID DREAMS THAT'S EFFECTING HER SLEEP. PLEASE ADVISE.

## 2021-11-08 NOTE — TELEPHONE ENCOUNTER
Caller: Luana Chawla    Relationship: Self    Best call back number: [phone]    What is the best time to reach you: ANYTIME    Who are you requesting to speak with (clinical staff, provider,  specific staff member): CLINICAL       What was the call regarding: PATIENT CALLED HAD QUESTIONS ON RX  EFFERXOR    Do you require a callback: YES

## 2021-11-08 NOTE — TELEPHONE ENCOUNTER
Spoke with pt, will decrease venlafaxine xr to 37.5mg daily for anxiety/depression. She is having vivid disturbing dreams and increase in constipation. Discussed using the mirlax she has and increase water intake, and f/u in 3 weeks, Dec 2nd at 2pm telephone med check.

## 2021-11-17 ENCOUNTER — OFFICE VISIT (OUTPATIENT)
Dept: ONCOLOGY | Facility: CLINIC | Age: 61
End: 2021-11-17

## 2021-11-17 VITALS
HEIGHT: 66 IN | DIASTOLIC BLOOD PRESSURE: 68 MMHG | OXYGEN SATURATION: 94 % | WEIGHT: 148 LBS | BODY MASS INDEX: 23.78 KG/M2 | TEMPERATURE: 97.8 F | HEART RATE: 104 BPM | RESPIRATION RATE: 18 BRPM | SYSTOLIC BLOOD PRESSURE: 158 MMHG

## 2021-11-17 DIAGNOSIS — G62.9 NEUROPATHY: ICD-10-CM

## 2021-11-17 DIAGNOSIS — C50.411 MALIGNANT NEOPLASM OF UPPER-OUTER QUADRANT OF RIGHT BREAST IN FEMALE, ESTROGEN RECEPTOR POSITIVE (HCC): Primary | ICD-10-CM

## 2021-11-17 DIAGNOSIS — Z17.0 MALIGNANT NEOPLASM OF UPPER-OUTER QUADRANT OF RIGHT BREAST IN FEMALE, ESTROGEN RECEPTOR POSITIVE (HCC): Primary | ICD-10-CM

## 2021-11-17 DIAGNOSIS — M54.42 ACUTE BILATERAL LOW BACK PAIN WITH LEFT-SIDED SCIATICA: ICD-10-CM

## 2021-11-17 PROCEDURE — 99214 OFFICE O/P EST MOD 30 MIN: CPT | Performed by: NURSE PRACTITIONER

## 2021-11-17 NOTE — PROGRESS NOTES
CHIEF COMPLAINT: 1.  Hot flashes   2.  Mood swings and agitation   3.  Breast cancer    Problem List:  Oncology/Hematology History Overview Note   1. 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  ER weakly 5% 1+ positive, MO 50% 1-2+ positive, HER-2/ashley 100% 3+ positive.  Negative cancer next panel  2. Significant diabetes with predating neuropathy due to spinal stenosis status post laminectomy 5/24/2019 with persistent neuropathy dorsum left foot  3. MRSA bacteremia due to port infection March 2020 after course 1 day 1 of Herceptin Taxol    Oncology history timeline:  -5/24/2019 Dr. Garcia saw for spinal stenosis with radiculopathy and left foot drop due to herniated disc and performed decompressive laminectomy, L4-5 and L5-S1 discectomy and medial facetectomy  -10/15/2019 mammogram BI-RADS 0  -11/22/2019 right mammogram/ultrasound BI-RADS 4 with ultrasound-guided core biopsy showing invasive ductal carcinoma [place] score 6 out of 9 grade 2, ER 5% 1+ positive MO 50% 1-2+ positive, HER-2/ashley 100% 3+ positive.  -12/13/2019 MRI breasts revealed 2.2 cm right breast mass consistent with biopsy proven cancer with unsuspected adjacent area's of non-mass enhancement worrisome for DCIS.  Non-mass enhancement in the subareolar left breast worrisome for DCIS.  Dominant focus left central portion left breast indeterminate BI-RADS 4.  Cancer next panel negative  -1/15/2020 CMP unremarkable save for glucose 200 with hemoglobin 10.6 normochromic normocytic indices  -1/21/2020 right skin sparing mastectomy with right sentinel lymph node injection and right deep subfascial sentinel lymph node biopsy with contralateral prophylactic left skin sparing mastectomy.  Right breast 1.8 cm Bloom Dias 8 out of 9, total of 4 lymph nodes examined 2 sentinel nodes all negative.   Pathological T1 cN0 M0 stage Ia.  Left breast benign with sclerosing adenosis.  -2/26/2020 started Herceptin Taxol weekly  -3/2/2020 port removed due to MRSA bacteremia with port infection/purulence.  -3/2/2020 through 3/6/2020 hospitalized for MRSA bacteremia from port.  -3/31/2020 [facility] tele-visit: Patient still on IV antibiotics for MRSA port infection.  Decision made to go with Kanjinti alone every 3 weeks along with Arimidex.  -4/13/2020 per ID, patient has completed IV antibiotics and is now on oral antibiotics.  -3/23/2021 completed 1 year Kanjinti.  Recommendation to complete 10 years of Arimidex if can tolerate.  5-7 years if not.    -6/16/2021 [facility] follow-up:  Intolerant of Arimidex with hot flashes and mood swings.  Therapy switched to Letrozole.      -9/15/2021 [facility] clinic follow-up:  She tolerated therapy with Letrozole better than she did with Arimidex but she still is having significant hot flashes and mood disturbance with easy agitation.  She is finding it difficult to work as stress makes these issues worse.  She is going to apply for disability shelter.  She is on Wellbutrin and Lexapro.  We discussed at length her options for hormonal blockade and she wants to continue trying.  At this time I will switch her to tamoxifen.  I spoke with our pharmacist Irma Arechiga, Pharm.D. about changing her antidepressants as her current antidepressants can interfere with tamoxifen.  We will have her stop Wellbutrin, she will start Effexor 37.5 mg daily for 7 days and if tolerated will increase to 75 mg daily, if this is tolerated then we will consider increasing up to 150 mg daily.  I am hoping that Effexor will help mitigate some of her hot flashes along with helping her depression.  She will taper off of her Lexapro over 2 weeks and then stop.  We discussed counseling as I think this will be greatly beneficial to Luana with her dysthymia and helping to deal with  stress at work and living with breast cancer.  She did not want me to make the referral but I did give her a brochure and she assures me that she will call and make an appointment.  I plan to call and check on her in about 3 weeks to see how she is tolerating the change in her antidepressants, we will also see if she has made an appointment with CECI Vega for counseling and to see how she is tolerating tamoxifen.  She is going to stop her Lexapro and wait about a week before starting tamoxifen so that she can tell if she is going to have any side effects with it.  I will see her back in 2 months for follow-up and sooner if needed.  We had planned on 5-7 years of therapy with hormonal blockade, I discussed with her today that with her ongoing side effects we would likely only do 5 years and hopefully we can get through that.  She was only weakly ER positive.    -10/14/2021 Did not tolerate Tamoxifen, went back to Letrozole     Malignant neoplasm of upper-outer quadrant of right breast in female, estrogen receptor positive (HCC)   1/16/2019 Cancer Staged    Staging form: Breast, AJCC 8th Edition  - Clinical stage from 1/16/2019: Stage IB (cT2, cN0, cM0, G2, ER+, OR+, HER2+) - Signed by Lisa Herman MD on 1/27/2020 1/21/2020 Initial Diagnosis    Malignant neoplasm of upper-outer quadrant of right female breast (CMS/HCC)     2/11/2020 Cancer Staged    Staging form: Breast, AJCC 8th Edition  - Pathologic: Stage IA (pT1c, pN0, cM0, G3, ER+, OR+, HER2+) - Signed by Hero Gomez MD on 2/11/2020 2/18/2020 -  Other Event    Echocardiogram  · Left ventricular systolic function is normal. Estimated EF = 55%.  · The global longitudinal strain was -19.5%.     2/25/2020 - 2/26/2020 Chemotherapy    OP CENTRAL VENOUS ACCESS DEVICE ACCESS, CARE, AND MAINTENANCE (CVAD)     2/26/2020 - 3/10/2020 Chemotherapy    OP BREAST PACLitaxel / Trastuzumab-anns (Weekly X 12)     3/2/2020 Adverse Reaction    MRSA  bacteremia due to port infection with port removal after day 1 course 1 Herceptin Taxol     3/31/2020 - 6/16/2021 Hormonal Therapy    Arimidex for planned 5 years.    6/16/2021 therapy changed to Letrozole due to intolerance to Arimidex.  9/15/2021 changed to Tamoxifen kruse to intolerance of letrozole.  10/14/2021 changed back to Letrozole due to intolerance of tamoxifen.       4/2/2020 -  Chemotherapy    Completed 3/23/2021  OP BREAST Trastuzumab-anns Q21D (maintenance)     5/27/2020 -  Other Event    5/27/2020 echocardiogram EF 55%      7/23/2020 Procedure    Colonoscopy with Dr. Marte, normal     8/26/2020 -  Other Event    Echocardiogram   · This was a limited echocardiogram to assess left ventricular ejection fraction in the setting of chemotherapy.  · Left ventricular systolic function is normal. Calculated EF = 55.1%. Estimated EF = 55%. Global Longitudinal LV strain = -18.2%.  · Estimated EF = 55%.     11/9/2020 Imaging    DEXA bone density IMPRESSION:  Osteopenia of the femoral necks bilaterally, and total hips  bilaterally.     11/10/2020 -  Other Event    Echocardogram   · This was a limited echocardiogram to assess left ventricular function only.  · Left ventricular systolic function is normal. Left ventricular ejection fraction appears to be 56 - 60%.  · Global longitudinal LV strain (GLS) = 19.7%.     2/23/2021 Imaging    -2/23/2021 left hip 2 view x-ray negative         HISTORY OF PRESENT ILLNESS:  The patient is a 60 y.o. female, here for follow up on management of right breast cancer.  Luana did not tolerate the switch to tamoxifen and we put her back on letrozole in October and she states that has been much better.  She continues to have significant hot flashes, she is on Effexor and is not sure if it is helping, sometimes she thinks it may be helping and other times she is not sure.  She did not tolerate the 75 mg dose as it caused her horrible nightmares and constipation, she is on the 37.5 mg  dose.  She reports that her low back pain has been bothering her more and also neuropathy in her left calf and top of her left foot.  The pain is worse when she has been on her feet or walked for a extended length of time, it improves with rest.  No lower extremity weakness.  No change in her bowel or bladder habits.  She has a history of previous laminectomy.      Past Medical History:   Diagnosis Date   • Breast cancer ([facility])    • Diabetes mellitus (HCC)    • Diverticulitis    • GERD (gastroesophageal reflux disease)    • Hypokalemia    • Kidney stone    • Lichen sclerosus    • Long term (current) use of insulin (HCC)    • Malignant neoplasm of upper-outer quadrant of right female breast (HCC) 1/21/2020   • Microalbuminuria    • Nephrolithiasis    • Sciatica    • Type 2 diabetes mellitus, uncontrolled (HCC)    • Wears contact lenses      Past Surgical History:   Procedure Laterality Date   • APPENDECTOMY     • BREAST BIOPSY Right    • BREAST EXCISIONAL BIOPSY Right    • CHOLECYSTECTOMY     • COLON SURGERY     • COLONOSCOPY     • HYSTERECTOMY     • LEG SURGERY      leg fracture surgery-Abstracted from [employer]   • LUMBAR LAMINECTOMY DISCECTOMY DECOMPRESSION N/A 5/24/2019    Procedure: LUMBAR LAMINECTOMY L4-5 AND L5-S1;  Surgeon: Hipolito Kahn MD;  Location:  [facility];  Service: Orthopedic Spine   • MASTECTOMY Bilateral    • MASTECTOMY W/ SENTINEL NODE BIOPSY Bilateral 1/21/2020    Procedure: SKIN SPARING MASTECTOMIES BILATERAL, SENTINEL NODE BIOPSY RIGHT;  Surgeon: Silvio Howard MD;  Location:  [facility];  Service: General   • TUBAL ABDOMINAL LIGATION     • VENOUS ACCESS DEVICE (PORT) REMOVAL N/A 3/3/2020    Procedure: REMOVAL PORT;  Surgeon: Silvio Howard MD;  Location:  [facility];  Service: General;  Laterality: N/A;       Allergies   Allergen Reactions   • Bactrim [Sulfamethoxazole-Trimethoprim] Rash     RASH, SKIN PEELING        Family History and Social History reviewed and changed as  "necessary    REVIEW OF SYSTEM:   Positive for hot flashes  Positive for low back pain and neuropathic pain in the left calf and top of the left foot    PHYSICAL EXAM:  General: Well-developed, well-nourished female in no distress  Psychiatric: Conversation is appropriate, affect is normal.  Chest: Chest wall exam is benign, skin tissue is soft, status post bilateral mastectomy, no abnormal masses or nodules, no rashes or lesions.  Nodes: No cervical, supraclavicular or axillary nodes palpable on exam    Vitals:    11/17/21 1323   BP: 158/68   Pulse: 104   Resp: 18   Temp: 97.8 °F (36.6 °C)   SpO2: 94%   Weight: 67.1 kg (148 lb)   Height: 166.4 cm (65.5\")     Vitals:    11/17/21 1323   PainSc: 0-No pain       ECOG score: 0       Vitals reviewed.      ASSESSMENT & PLAN:    1.  T1 cN0 M0 [place] Dias 8 out of 9 ER 5% weakly +1+, GA 50% 1-2+ positive, HER-2/ashley 100% 3+ positive status post Herceptin Taxol weekly x12 followed by 1 year of Herceptin   2.  Hot flashes  3.  Intolerant of Arimidex and tamoxifen  4.  Low back pain with neuropathy of the left lower extremity calf and foot    Discussion:  Luana is tolerating letrozole much better than tamoxifen.  She continues to have significant hot flashes, she is not sure the Effexor is helping.  She did have to go back to the 37.5 mg dose as she did not tolerate the 75 mg dose.  I discussed with her that she could switch to a different antidepressant as far as we were concerned, there is no interaction with the letrozole, we had only switched her to Effexor when we were trying tamoxifen and also we had hoped it would help with her hot flashes.  She is following with Emeli Suero and will discuss with her at her next appointment.  I also recommended acupuncture for her hot flashes as there is data showing that this may be helpful.  We also discussed adding gabapentin but when we reviewed the potential side effects of constipation and lethargy she did not want to try that.  "   She continues to complain of low back pain with some neuropathy in her left lower extremity.  I had previously gotten an x-ray of her Left hip in February for similar complaints, this was negative.  She does have a history of spinal stenosis, I will get an MRI of her lumbar spine for further evaluation and call her with the results.  Assuming there is no involvement of metastatic disease but just symptoms from her spinal stenosis she will need to follow-up with her neurosurgeon.  She has a history of laminectomy.  We help to get her through 5 years of adjuvant therapy with an AI.  She has had mastectomies therefore does not need mammograms.    Return to clinic in 6 months for follow-up and sooner if needed.    This was a level 4, moderate MDM visit with 1 stable chronic illness, management of side effects of therapy, also 1 chronic illness with potential exacerbation with low back pain and neuropathy, ordering of MRI and prescription drug management.    Almita Pruitt, APRN    11/17/2021

## 2021-12-02 ENCOUNTER — OFFICE VISIT (OUTPATIENT)
Dept: PSYCHIATRY | Facility: CLINIC | Age: 61
End: 2021-12-02

## 2021-12-02 DIAGNOSIS — F43.23 ADJUSTMENT DISORDER WITH MIXED ANXIETY AND DEPRESSED MOOD: Primary | ICD-10-CM

## 2021-12-02 DIAGNOSIS — F10.21 ALCOHOL DEPENDENCE IN SUSTAINED FULL REMISSION (HCC): ICD-10-CM

## 2021-12-02 DIAGNOSIS — G47.9 SLEEP DISTURBANCE: ICD-10-CM

## 2021-12-02 DIAGNOSIS — F17.290 VAPING NICOTINE DEPENDENCE, TOBACCO PRODUCT: ICD-10-CM

## 2021-12-02 PROCEDURE — 99442 PR PHYS/QHP TELEPHONE EVALUATION 11-20 MIN: CPT | Performed by: NURSE PRACTITIONER

## 2021-12-02 RX ORDER — VENLAFAXINE HYDROCHLORIDE 37.5 MG/1
CAPSULE, EXTENDED RELEASE ORAL
Qty: 30 CAPSULE | Refills: 5 | Status: SHIPPED | OUTPATIENT
Start: 2021-12-02 | End: 2021-12-22 | Stop reason: ALTCHOICE

## 2021-12-02 NOTE — PROGRESS NOTES
Subjective   Luana Chawla is a 60 y.o. female who is here today for medication management follow up. You have chosen to receive care through a telephone visit. Do you consent to use a telephone visit for your medical care today? Yes    TIME IN:2p  TIME OUT:220    PATIENT AT: THEIR PLACE OF RESIDENCE    PROVIDER AT:   [facility]  [address], [address]  [place] [address]    Chief Complaint: adjustment disorder anxiety, menopausal symptoms with hot flashes     History of Present Illness Patient presents via telephone reporting that she is doing better emotionally on venlafaxine XR 37.5 mg daily.  She reports still having hot flashes but they are reduced in intensity.  Patient reports sleeping and appetite is good.  She will only have 18 days left before she is fully retired.  She states stress and burden of work will be a great relief.  Patient talked about COVID precautions and how isolating that can be however she sees people who have been vaccinated.  She had a good Thanksgiving with her sister and brother-in-law and mother.  No new concerns.  Denies adverse effects from medications.   (Scales based on 0 - 10 with 10 being the worst)        The following portions of the patient's history were reviewed and updated as appropriate: allergies, current medications, past family history, past medical history, past social history, past surgical history and problem list.    Review of Systems  A 14 point review of systems was performed and is negative except as noted above.    Objective   Physical Exam  not currently breastfeeding.    Allergies   Allergen Reactions   • Bactrim [Sulfamethoxazole-Trimethoprim] Rash     RASH, SKIN PEELING        Current Medications:   Current Outpatient Medications   Medication Sig Dispense Refill   • Ascorbic Acid (Vitamin C) 500 MG capsule Take 1 capsule by mouth Daily.     • BD Pen Needle Radha U/F 32G X 4 MM misc  Use one daily. 100 each 1   • Calcium Carb-Cholecalciferol (CALCIUM 1000 + D PO) Take 1 tablet by mouth Daily.     • clobetasol (TEMOVATE) 0.05 % ointment Apply  topically to the appropriate area as directed 2 (Two) Times a Day. 60 g 2   • Continuous Blood Gluc  (Dexcom G6 ) device Dexcom G6  misc     • Continuous Blood Gluc Sensor (Dexcom G6 Sensor) Inject  under the skin into the appropriate area as directed Every 10 (Ten) Days. 9 each 3   • Continuous Blood Gluc Transmit (Dexcom G6 Transmitter) misc 1 each by Other route Every 3 (Three) Months. 1 each 3   • glucose blood (Accu-Chek Sasha Plus) test strip USE TO TEST ONCE A DAY 50 each 5   • Insulin Glargine (BASAGLAR KWIKPEN) 100 UNIT/ML injection pen      • letrozole (FEMARA) 2.5 MG tablet Take 2.5 mg by mouth Daily.     • loratadine (Claritin) 10 MG tablet Take 10 mg by mouth Daily.     • Magnesium 400 MG tablet Take 1 tablet by mouth Daily.     • metFORMIN ER (GLUCOPHAGE-XR) 500 MG 24 hr tablet TAKE TWO TABLETS BY MOUTH TWICE A  tablet 5   • omeprazole (priLOSEC) 40 MG capsule Take 40 mg by mouth Daily.     • Ozempic, 1 MG/DOSE, 2 MG/1.5ML solution pen-injector Inject 1 mg under the skin into the appropriate area as directed 1 (One) Time Per Week. 9 mL 3   • Saccharomyces boulardii (Probiotic) 250 MG capsule Take  by mouth.     • Turmeric 500 MG tablet Take  by mouth.     • venlafaxine XR (EFFEXOR-XR) 37.5 MG 24 hr capsule Take one capsule daily 30 capsule 5   • vitamin B-12 (CYANOCOBALAMIN) 1000 MCG tablet Take 1,000 mcg by mouth Daily.       No current facility-administered medications for this visit.       Appearance: na  Hygiene:  REPORTS good  Cooperation:  Cooperative  Eye Contact:  na  Psychomotor Behavior:  denies psychomotor agitation/retardation, No EPS, No motor tics  Mood:  within normal limits  Affect:  na  Hopelessness: Denies  Speech:  Normal  Thought Process:  Linear  Thought Content:  Normal  Concentration:  Normal   Suicidal: denies  Homicidal:  None  Hallucinations:  None  Delusion:  None  Memory:  Intact  Orientation:  Person, Place, Time and Situation  Reliability:  good  Insight:  Fair  Judgement: good  Impulse Control: good  Estimated Intelligence: average range    YOLIE REVIEWED NO RED FLAGS    Assessment/Plan   Diagnoses and all orders for this visit:    1. Adjustment disorder with mixed anxiety and depressed mood (Primary)  -     venlafaxine XR (EFFEXOR-XR) 37.5 MG 24 hr capsule; Take one capsule daily  Dispense: 30 capsule; Refill: 5    2. Sleep disturbance    3. Alcohol dependence in sustained full remission (HCC)    4. Vaping nicotine dependence, tobacco product      IMPRESSION: doing well, mood stable, retiring in 18 days     PLAN: cont venlafaxine XR 37.5mg daily     We discussed risks, benefits, and side effects of the above medications and the patient was agreeable with the plan. Patient was educated on the importance of compliance with treatment and follow-up appointments.     Counseled patient regarding multimodal approach with encouragement of healthy nutrition, healthy sleep, regular physical mobility, social involvement, counseling, and medication compliance.     Assisted patient in identifying risk factors which would indicate the need for higher level of care including thoughts to harm self or others and/or self-harming behavior and encouraged patient to contact this office, call 911, or present to the nearest emergency room should any of these events occur. Discussed crisis intervention services and means to access.  Patient adamantly and convincingly denies current suicidal or homicidal ideation or perceptual disturbance.    Treatment Plan: stabilize mood, patient will stay out of psychiatric hospital and be at optimal level of functioning with therapy and take all medication as prescribed. Patient verbalized  understanding and agreement to plan.    Instructed to call for questions or concerns  and return early if necessary.     Greater than 50% time was spent in coordination of care, and counseling the patient regarding current assessment, symptoms, plan of care going forward, supportive therapy.  Answered any questions patient had regarding medications and plan of care.    Return if symptoms worsen or fail to improve.

## 2021-12-21 ENCOUNTER — TELEPHONE (OUTPATIENT)
Dept: PALLIATIVE CARE | Facility: CLINIC | Age: 61
End: 2021-12-21

## 2021-12-21 NOTE — TELEPHONE ENCOUNTER
PATIENT GIVEN INFORMATION REGARDING WEANING OFF OF EFFEXOR AS PROVIDED BY PROVIDER BUT PATIENT SHOWED CONCERN ABOUT COMING OFF OF THE EFFEXOR AND NEEDED TO KNOW IF SHE COULD START THE LEXAPRO WHILE COMING OFF OF THE EFFEXOR. PLEASE ADVISE.

## 2021-12-21 NOTE — TELEPHONE ENCOUNTER
PATIENT CALLED AND REQUESTED TO STOP TAKING THE EFFEXOR AS IT'S BEEN CAUSING HER SEVERE CONSTIPATION. PATIENT WISHES TO DISCONTINUE THE EFFEXOR AND CONTINUE BACK ON THE LEXAPRO 20 MG. PLEASE ADVISE.

## 2021-12-22 RX ORDER — ESCITALOPRAM OXALATE 10 MG/1
10 TABLET ORAL DAILY
Qty: 30 TABLET | Refills: 1 | Status: SHIPPED | OUTPATIENT
Start: 2021-12-22 | End: 2022-08-30

## 2021-12-22 NOTE — TELEPHONE ENCOUNTER
Spoke with patient and discussed coming off venlafaxine xr and onto escitalopram 10 mg daily. F/u dali in 3 weeks Jan 13th at 2pm telephone. No further action needed, she knows to call for questions or concerns.

## 2021-12-27 ENCOUNTER — OFFICE VISIT (OUTPATIENT)
Dept: ENDOCRINOLOGY | Facility: CLINIC | Age: 61
End: 2021-12-27

## 2021-12-27 VITALS
OXYGEN SATURATION: 97 % | HEART RATE: 92 BPM | BODY MASS INDEX: 23.95 KG/M2 | WEIGHT: 149 LBS | SYSTOLIC BLOOD PRESSURE: 129 MMHG | DIASTOLIC BLOOD PRESSURE: 76 MMHG | HEIGHT: 66 IN

## 2021-12-27 DIAGNOSIS — I10 BENIGN HYPERTENSION: ICD-10-CM

## 2021-12-27 DIAGNOSIS — Z79.4 INSULIN LONG-TERM USE (HCC): ICD-10-CM

## 2021-12-27 DIAGNOSIS — E11.65 UNCONTROLLED TYPE 2 DIABETES MELLITUS WITH HYPERGLYCEMIA (HCC): Primary | ICD-10-CM

## 2021-12-27 LAB
EXPIRATION DATE: ABNORMAL
EXPIRATION DATE: NORMAL
GLUCOSE BLDC GLUCOMTR-MCNC: 277 MG/DL (ref 70–130)
HBA1C MFR BLD: 7.9 %
Lab: ABNORMAL
Lab: NORMAL

## 2021-12-27 PROCEDURE — 99213 OFFICE O/P EST LOW 20 MIN: CPT | Performed by: INTERNAL MEDICINE

## 2021-12-27 PROCEDURE — 83036 HEMOGLOBIN GLYCOSYLATED A1C: CPT | Performed by: INTERNAL MEDICINE

## 2021-12-27 PROCEDURE — 82947 ASSAY GLUCOSE BLOOD QUANT: CPT | Performed by: INTERNAL MEDICINE

## 2021-12-27 RX ORDER — DEXTROMETHORPHAN HYDROBROMIDE AND PROMETHAZINE HYDROCHLORIDE 15; 6.25 MG/5ML; MG/5ML
SYRUP ORAL AS NEEDED
COMMUNITY
Start: 2021-11-19 | End: 2022-05-31

## 2021-12-27 RX ORDER — AZITHROMYCIN 250 MG/1
TABLET, FILM COATED ORAL
COMMUNITY
Start: 2021-11-19 | End: 2022-03-29

## 2021-12-27 RX ORDER — ALBUTEROL SULFATE 90 UG/1
AEROSOL, METERED RESPIRATORY (INHALATION)
COMMUNITY
Start: 2021-11-19 | End: 2022-07-23 | Stop reason: SDUPTHER

## 2021-12-27 RX ORDER — CHLORAL HYDRATE 500 MG
CAPSULE ORAL
COMMUNITY
End: 2022-03-29

## 2021-12-27 RX ORDER — GABAPENTIN 300 MG/1
300 CAPSULE ORAL DAILY
COMMUNITY
Start: 2021-12-08 | End: 2022-12-15

## 2021-12-27 NOTE — ASSESSMENT & PLAN NOTE
Diabetes is worsening. A1c has crept up to 7.9%.  Continue current treatment regimen.  Work on diet and stress relief.  Diabetes will be reassessed in 3 months.    She has titrated up her insulin.  CGM shows fasting glucose levels are okay.  Glucose rises during the day.  She would like to wait 3 months and recheck before changing medication.

## 2021-12-27 NOTE — PROGRESS NOTES
"     Office Note      Date: 2021  Patient Name: Luana Chawla  MRN: [id number]  : 1960    Chief Complaint   Patient presents with   • Diabetes       History of Present Illness:   Luana Chawla is a 60 y.o. female who presents for Diabetes type 2. Diagnosed in: . Treated in past with insulin. Current treatments: metformin, ozempic and basal insulin. Number of insulin shots per day: 1. Checks blood sugar 288 times a day - on DexCom CGM.  Has low blood sugar: occasional. Aspirin use: No - told not to.. Statin use: No - no indication.. ACE-I/ARB use: No - no indication.. Changes in health since last visit: none. Last eye exam 2021.    She has been under stress at work.  She hasn't been as compliant with diet.  She plans to retire in 2 days!    Subjective      Diabetic Complications:  Eyes: No  Kidneys: No  Feet: No  Heart: No    Diet and Exercise:  Meals per day: 3  Minutes of exercise per week: 150 mins.    Review of Systems:   Review of Systems   Constitutional: Negative.    Cardiovascular: Negative.    Gastrointestinal: Negative.    Endocrine: Positive for heat intolerance.       The following portions of the patient's history were reviewed and updated as appropriate: allergies, current medications, past family history, past medical history, past social history, past surgical history and problem list.    Objective       Visit Vitals  /76   Pulse 92   Ht 167.6 cm (66\")   Wt 67.6 kg (149 lb)   SpO2 97%   BMI 24.05 kg/m²       Physical Exam:  Physical Exam  Constitutional:       Appearance: Normal appearance.   Neurological:      Mental Status: She is alert.         Labs:    HbA1c  Lab Results   Component Value Date    HGBA1C 7.9 2021       CMP  Lab Results   Component Value Date    GLUCOSE 81 2021    BUN 12 2021    CREATININE 0.53 (L) 2021    EGFRIFNONA 117 2021    EGFRIFAFRI 110 2020    BCR 22.6 2021    K 4.3 2021    CO2 " 27.7 05/05/2021    CALCIUM 9.5 05/05/2021    PROTENTOTREF 6.5 02/25/2020    LABIL2 1.7 02/25/2020    AST 15 05/05/2021    ALT 20 05/05/2021        Lipid Panel  Lab Results   Component Value Date    HDL 61 (H) 05/05/2021    LDL 91 05/05/2021    TRIG 66 05/05/2021        TSH  Lab Results   Component Value Date    TSH 1.950 05/05/2021        Hemoglobin A1C  Lab Results   Component Value Date    HGBA1C 7.9 12/27/2021        Microalbumin/Creatinine  Lab Results   Component Value Date    MALBCRERATIO  05/05/2021      Comment:      Unable to calculate    MICROALBUR <1.2 05/05/2021           Assessment / Plan      Assessment & Plan:  Diagnoses and all orders for this visit:    1. Uncontrolled type 2 diabetes mellitus with hyperglycemia (HCC) (Primary)  Assessment & Plan:  Diabetes is worsening. A1c has crept up to 7.9%.  Continue current treatment regimen.  Work on diet and stress relief.  Diabetes will be reassessed in 3 months.    She has titrated up her insulin.  CGM shows fasting glucose levels are okay.  Glucose rises during the day.  She would like to wait 3 months and recheck before changing medication.    Orders:  -     POC Glucose, Blood  -     POC Glycosylated Hemoglobin (Hb A1C)    2. Benign hypertension  Assessment & Plan:  Hypertension is improving with treatment.  Continue current treatment regimen.  Blood pressure will be reassessed at the next regular appointment.      3. Insulin long-term use (HCC)      Return in about 3 months (around 3/27/2022) for Recheck with A1c, CMP, lipids, TSH, microalbumin, foot exam..    Jose Cummings MD   12/27/2021

## 2022-03-02 RX ORDER — INSULIN GLARGINE 100 [IU]/ML
INJECTION, SOLUTION SUBCUTANEOUS
Qty: 15 ML | Refills: 2 | Status: SHIPPED | OUTPATIENT
Start: 2022-03-02 | End: 2022-07-26 | Stop reason: SDUPTHER

## 2022-03-22 RX ORDER — METFORMIN HYDROCHLORIDE 500 MG/1
TABLET, EXTENDED RELEASE ORAL
Qty: 360 TABLET | Refills: 1 | Status: SHIPPED | OUTPATIENT
Start: 2022-03-22 | End: 2022-09-30 | Stop reason: SDUPTHER

## 2022-03-29 ENCOUNTER — OFFICE VISIT (OUTPATIENT)
Dept: ENDOCRINOLOGY | Facility: CLINIC | Age: 62
End: 2022-03-29

## 2022-03-29 ENCOUNTER — LAB (OUTPATIENT)
Dept: LAB | Facility: HOSPITAL | Age: 62
End: 2022-03-29

## 2022-03-29 VITALS
DIASTOLIC BLOOD PRESSURE: 70 MMHG | OXYGEN SATURATION: 96 % | HEIGHT: 66 IN | SYSTOLIC BLOOD PRESSURE: 102 MMHG | HEART RATE: 71 BPM | WEIGHT: 145 LBS | BODY MASS INDEX: 23.3 KG/M2

## 2022-03-29 DIAGNOSIS — E11.65 TYPE 2 DIABETES MELLITUS WITH HYPERGLYCEMIA, WITH LONG-TERM CURRENT USE OF INSULIN: Primary | Chronic | ICD-10-CM

## 2022-03-29 DIAGNOSIS — Z79.4 TYPE 2 DIABETES MELLITUS WITH HYPERGLYCEMIA, WITH LONG-TERM CURRENT USE OF INSULIN: Primary | Chronic | ICD-10-CM

## 2022-03-29 DIAGNOSIS — E11.65 TYPE 2 DIABETES MELLITUS WITH HYPERGLYCEMIA, WITH LONG-TERM CURRENT USE OF INSULIN: Chronic | ICD-10-CM

## 2022-03-29 DIAGNOSIS — Z79.4 TYPE 2 DIABETES MELLITUS WITH HYPERGLYCEMIA, WITH LONG-TERM CURRENT USE OF INSULIN: Chronic | ICD-10-CM

## 2022-03-29 LAB
ALBUMIN SERPL-MCNC: 4.4 G/DL (ref 3.5–5.2)
ALBUMIN UR-MCNC: <1.2 MG/DL
ALBUMIN/GLOB SERPL: 1.8 G/DL
ALP SERPL-CCNC: 68 U/L (ref 39–117)
ALT SERPL W P-5'-P-CCNC: 15 U/L (ref 1–33)
ANION GAP SERPL CALCULATED.3IONS-SCNC: 11.4 MMOL/L (ref 5–15)
AST SERPL-CCNC: 16 U/L (ref 1–32)
BILIRUB SERPL-MCNC: 0.4 MG/DL (ref 0–1.2)
BUN SERPL-MCNC: 10 MG/DL (ref 8–23)
BUN/CREAT SERPL: 14.7 (ref 7–25)
CALCIUM SPEC-SCNC: 9.9 MG/DL (ref 8.6–10.5)
CHLORIDE SERPL-SCNC: 103 MMOL/L (ref 98–107)
CHOLEST SERPL-MCNC: 156 MG/DL (ref 0–200)
CO2 SERPL-SCNC: 27.6 MMOL/L (ref 22–29)
CREAT SERPL-MCNC: 0.68 MG/DL (ref 0.57–1)
CREAT UR-MCNC: 123.8 MG/DL
EGFRCR SERPLBLD CKD-EPI 2021: 98.6 ML/MIN/1.73
EXPIRATION DATE: NORMAL
EXPIRATION DATE: NORMAL
GLOBULIN UR ELPH-MCNC: 2.5 GM/DL
GLUCOSE BLDC GLUCOMTR-MCNC: 85 MG/DL (ref 70–130)
GLUCOSE SERPL-MCNC: 69 MG/DL (ref 65–99)
HBA1C MFR BLD: 6.8 %
HDLC SERPL-MCNC: 70 MG/DL (ref 40–60)
LDLC SERPL CALC-MCNC: 76 MG/DL (ref 0–100)
LDLC/HDLC SERPL: 1.1 {RATIO}
Lab: NORMAL
Lab: NORMAL
MICROALBUMIN/CREAT UR: NORMAL MG/G{CREAT}
POTASSIUM SERPL-SCNC: 4.2 MMOL/L (ref 3.5–5.2)
PROT SERPL-MCNC: 6.9 G/DL (ref 6–8.5)
SODIUM SERPL-SCNC: 142 MMOL/L (ref 136–145)
TRIGL SERPL-MCNC: 46 MG/DL (ref 0–150)
TSH SERPL DL<=0.05 MIU/L-ACNC: 1.57 UIU/ML (ref 0.27–4.2)
VLDLC SERPL-MCNC: 10 MG/DL (ref 5–40)

## 2022-03-29 PROCEDURE — 80061 LIPID PANEL: CPT

## 2022-03-29 PROCEDURE — 99213 OFFICE O/P EST LOW 20 MIN: CPT | Performed by: PHYSICIAN ASSISTANT

## 2022-03-29 PROCEDURE — 95251 CONT GLUC MNTR ANALYSIS I&R: CPT | Performed by: PHYSICIAN ASSISTANT

## 2022-03-29 PROCEDURE — 82570 ASSAY OF URINE CREATININE: CPT

## 2022-03-29 PROCEDURE — 84443 ASSAY THYROID STIM HORMONE: CPT

## 2022-03-29 PROCEDURE — 80053 COMPREHEN METABOLIC PANEL: CPT

## 2022-03-29 PROCEDURE — 83036 HEMOGLOBIN GLYCOSYLATED A1C: CPT | Performed by: PHYSICIAN ASSISTANT

## 2022-03-29 PROCEDURE — 82043 UR ALBUMIN QUANTITATIVE: CPT

## 2022-03-29 NOTE — PROGRESS NOTES
"     Office Note      Date: 2022  Patient Name: Luana Chawla  MRN: [id number]  : 1960    Chief Complaint   Patient presents with   • Diabetes       History of Present Illness:   Luana Chawla is a 61 y.o. female who presents today for follow up on type 2 diabetes, diagnosed in .  She remains on basal insulin, Ozempic, and metformin.  She is using the Dexcom G6 CGM.  She reports blood sugars have improved since retiring severe months ago.  She notes occasional hypoglycemia.  She denies any severe hypoglycemia.  She is currently taking Basaglar 30-33 units once daily.  Current diet: In general, \"healthy diet\".  Snacking less since she retired.  Current exercise: Walk/run on treadmilll 5 days per week.  Free weights 3 days per week.  Active around the house.  Feet: No sores.  Some tingling/numbness in left foot since back surgery in 2019.  Eye exam current (within one year): yes, 2022. No retinopathy.  ACE inhibitor/ARB: No.  Statin: No.  She reports easy bruising and occasional nose bleeds since restarting aspirin daily.  She reports testing positive for Covid-19 on 3/14/2022.  She did have congestion and rhinorrhea.      Subjective      Review of Systems:   Review of Systems   Constitutional: Negative.    Cardiovascular: Negative.    Gastrointestinal: Negative.    Endocrine: Negative.        The following portions of the patient's history were reviewed and updated as appropriate: allergies, current medications, past family history, past medical history, past social history, past surgical history and problem list.    Objective     Vitals:    22 0804   BP: 102/70   Pulse: 71   SpO2: 96%   Weight: 65.8 kg (145 lb)   Height: 167.6 cm (66\")   PainSc: 0-No pain     Body mass index is 23.4 kg/m².    Physical Exam  Vitals reviewed.   Constitutional:       General: She is not in acute distress.  Cardiovascular:      Pulses:           Dorsalis pedis pulses are 2+ on the right " side and 2+ on the left side.        Posterior tibial pulses are 2+ on the right side and 2+ on the left side.   Musculoskeletal:      Right foot: No deformity.      Left foot: No deformity.   Feet:      Right foot:      Protective Sensation: 8 sites tested. 8 sites sensed.      Skin integrity: Skin integrity normal.      Toenail Condition: Right toenails are normal.      Left foot:      Protective Sensation: 8 sites tested. 8 sites sensed.      Skin integrity: Skin integrity normal.      Toenail Condition: Left toenails are normal.      Comments: Vibratory sensation absent to distal left great toe, intact to left 1st MTP joint.  Diabetic Foot Exam Performed and Monofilament Test Performed.  Neurological:      Mental Status: She is alert and oriented to person, place, and time.   Psychiatric:         Mood and Affect: Affect normal.         HEMOGLOBIN A1C  Lab Results   Component Value Date    HGBA1C 6.8 03/29/2022    HGBA1C 7.9 12/27/2021    HGBA1C 6.7 08/09/2021     GLUCOSE  Lab Results   Component Value Date    POCGLU 85 03/29/2022     CMP  Lab Results   Component Value Date    GLUCOSE 81 05/05/2021    BUN 12 05/05/2021    CREATININE 0.53 (L) 05/05/2021    EGFRIFNONA 117 05/05/2021    EGFRIFAFRI 110 02/25/2020    BCR 22.6 05/05/2021     05/05/2021    K 4.3 05/05/2021    CO2 27.7 05/05/2021    CALCIUM 9.5 05/05/2021    PROTENTOTREF 6.5 02/25/2020    ALBUMIN 4.40 05/05/2021    LABGLOBREF 2.4 02/25/2020    BILITOT 0.4 05/05/2021    ALKPHOS 79 05/05/2021    AST 15 05/05/2021    ALT 20 05/05/2021     LIPID PANEL  Lab Results   Component Value Date    CHOL 165 05/05/2021    TRIG 66 05/05/2021    HDL 61 (H) 05/05/2021    LDL 91 05/05/2021     URINE MICROALBUMIN/CREATININE RATIO  Lab Results   Component Value Date    MALBCRERATIO  05/05/2021      Comment:      Unable to calculate     THYROID  Lab Results   Component Value Date    TSH 1.950 05/05/2021       Current Outpatient Medications   Medication Instructions   •  albuterol sulfate  (90 Base) MCG/ACT inhaler No dose, route, or frequency recorded.   • Ascorbic Acid (Vitamin C) 500 MG capsule 1 capsule, Oral, Daily   • BD Pen Needle Radha U/F 32G X 4 MM misc Use one daily.   • Calcium Carb-Cholecalciferol (CALCIUM 1000 + D PO) 1 tablet, Oral, Daily   • clobetasol (TEMOVATE) 0.05 % ointment Topical, 2 Times Daily   • Continuous Blood Gluc  (Dexcom G6 ) device Dexcom G6  misc   • Continuous Blood Gluc Sensor (Dexcom G6 Sensor) Subcutaneous, Every 10 Days   • Continuous Blood Gluc Transmit (Dexcom G6 Transmitter) misc 1 each, Other, Every 3 Months   • escitalopram (LEXAPRO) 10 mg, Oral, Daily   • gabapentin (NEURONTIN) 300 mg, Oral, Daily   • glucose blood (Accu-Chek Sasha Plus) test strip USE TO TEST ONCE A DAY   • Insulin Glargine (BASAGLAR KWIKPEN) 100 UNIT/ML injection pen INJECT UNDER THE SKIN MAX DOSE OF 50 UNITS DAILY   • letrozole (FEMARA) 2.5 mg, Oral, Daily   • loratadine (CLARITIN) 10 mg, Oral, Daily   • Magnesium 400 MG tablet 1 tablet, Oral, Daily   • metFORMIN ER (GLUCOPHAGE-XR) 500 MG 24 hr tablet TAKE TWO TABLETS BY MOUTH TWICE A DAY   • omeprazole (PRILOSEC) 40 mg, Oral, Daily   • Ozempic (1 MG/DOSE) 1 mg, Subcutaneous, Weekly   • promethazine-dextromethorphan (PROMETHAZINE-DM) 6.25-15 MG/5ML syrup As Needed   • Saccharomyces boulardii (Probiotic) 250 MG capsule Oral   • Turmeric 500 MG tablet Oral   • vitamin B-12 (CYANOCOBALAMIN) 1,000 mcg, Oral, Daily       Assessment / Plan      Assessment & Plan:  1. Type 2 diabetes mellitus with hyperglycemia, with long-term current use of insulin ([facility])  A1c improved, at goal.  Reviewed CGM data and discussed with patient.  Sensor glucose average 146 for the past 2 weeks, 76% time in range , <1% <70, 0% very low <54, 2% >250.  Basaglar 30 units appears to be a good basal dose.  BP okay.  Due to easy bruising/occasional nosebleeds, recommend to try taking aspirin 81 mg every other  day.  Will notify her of pending lab results from today.  - POC Glycosylated Hemoglobin (Hb A1C)  - POC Glucose, Blood  - Comprehensive Metabolic Panel; Future  - Lipid Panel; Future  - Microalbumin / Creatinine Urine Ratio - Urine, Clean Catch; Future  - TSH; Future    Return in about 3 months (around 6/29/2022) for - keep scheduled follow up with Dr. Jose Cummings. She was advised to contact the office with any interval questions or concerns.    LAIX Finney  Endocrinology  03/29/2022

## 2022-04-23 RX ORDER — OMEPRAZOLE 40 MG/1
CAPSULE, DELAYED RELEASE ORAL
Qty: 90 CAPSULE | Refills: 1 | Status: SHIPPED | OUTPATIENT
Start: 2022-04-23 | End: 2022-08-30

## 2022-05-18 ENCOUNTER — OFFICE VISIT (OUTPATIENT)
Dept: ONCOLOGY | Facility: CLINIC | Age: 62
End: 2022-05-18

## 2022-05-18 VITALS
BODY MASS INDEX: 22.02 KG/M2 | DIASTOLIC BLOOD PRESSURE: 65 MMHG | RESPIRATION RATE: 20 BRPM | WEIGHT: 137 LBS | SYSTOLIC BLOOD PRESSURE: 112 MMHG | HEIGHT: 66 IN | OXYGEN SATURATION: 98 % | TEMPERATURE: 98.1 F | HEART RATE: 75 BPM

## 2022-05-18 DIAGNOSIS — C50.411 MALIGNANT NEOPLASM OF UPPER-OUTER QUADRANT OF RIGHT BREAST IN FEMALE, ESTROGEN RECEPTOR POSITIVE: Primary | ICD-10-CM

## 2022-05-18 DIAGNOSIS — Z17.0 MALIGNANT NEOPLASM OF UPPER-OUTER QUADRANT OF RIGHT BREAST IN FEMALE, ESTROGEN RECEPTOR POSITIVE: Primary | ICD-10-CM

## 2022-05-18 PROCEDURE — 99214 OFFICE O/P EST MOD 30 MIN: CPT | Performed by: NURSE PRACTITIONER

## 2022-05-18 NOTE — PROGRESS NOTES
CHIEF COMPLAINT: 1.  Hot flashes   2.  Breast cancer    Problem List:  Oncology/Hematology History Overview Note   1. 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  ER weakly 5% 1+ positive, IA 50% 1-2+ positive, HER-2/ashley 100% 3+ positive.  Negative cancer next panel  2. Significant diabetes with predating neuropathy due to spinal stenosis status post laminectomy 5/24/2019 with persistent neuropathy dorsum left foot  3. MRSA bacteremia due to port infection March 2020 after course 1 day 1 of Herceptin Taxol    Oncology history timeline:  -5/24/2019 Dr. Garcia saw for spinal stenosis with radiculopathy and left foot drop due to herniated disc and performed decompressive laminectomy, L4-5 and L5-S1 discectomy and medial facetectomy  -10/15/2019 mammogram BI-RADS 0  -11/22/2019 right mammogram/ultrasound BI-RADS 4 with ultrasound-guided core biopsy showing invasive ductal carcinoma Cory score 6 out of 9 grade 2, ER 5% 1+ positive IA 50% 1-2+ positive, HER-2/ashley 100% 3+ positive.  -12/13/2019 MRI breasts revealed 2.2 cm right breast mass consistent with biopsy proven cancer with unsuspected adjacent area's of non-mass enhancement worrisome for DCIS.  Non-mass enhancement in the subareolar left breast worrisome for DCIS.  Dominant focus left central portion left breast indeterminate BI-RADS 4.  Cancer next panel negative  -1/15/2020 CMP unremarkable save for glucose 200 with hemoglobin 10.6 normochromic normocytic indices  -1/21/2020 right skin sparing mastectomy with right sentinel lymph node injection and right deep subfascial sentinel lymph node biopsy with contralateral prophylactic left skin sparing mastectomy.  Right breast 1.8 cm Bloom Dias 8 out of 9, total of 4 lymph nodes examined 2 sentinel nodes all negative.  Pathological T1 cN0 M0 stage Ia.  Left  breast benign with sclerosing adenosis.  -2/26/2020 started Herceptin Taxol weekly  -3/2/2020 port removed due to MRSA bacteremia with port infection/purulence.  -3/2/2020 through 3/6/2020 hospitalized for MRSA bacteremia from port.  -3/31/2020 [facility]: Patient still on IV antibiotics for MRSA port infection.  Decision made to go with Kanjinti alone every 3 weeks along with Arimidex.  -4/13/2020 per ID, patient has completed IV antibiotics and is now on oral antibiotics.  -3/23/2021 completed 1 year Kanjinti.  Recommendation to complete 10 years of Arimidex if can tolerate.  5-7 years if not.    -6/16/2021 [facility] follow-up:  Intolerant of Arimidex with hot flashes and mood swings.  Therapy switched to Letrozole.      -9/15/2021 [facility] follow-up:  She tolerated therapy with Letrozole better than she did with Arimidex but she still is having significant hot flashes and mood disturbance with easy agitation.  She is finding it difficult to work as stress makes these issues worse.  She is going to apply for disability half-way.  She is on Wellbutrin and Lexapro.  We discussed at length her options for hormonal blockade and she wants to continue trying.  At this time I will switch her to tamoxifen.  I spoke with our pharmacist Irma Arechiga, Pharm.D. about changing her antidepressants as her current antidepressants can interfere with tamoxifen.  We will have her stop Wellbutrin, she will start Effexor 37.5 mg daily for 7 days and if tolerated will increase to 75 mg daily, if this is tolerated then we will consider increasing up to 150 mg daily.  I am hoping that Effexor will help mitigate some of her hot flashes along with helping her depression.  She will taper off of her Lexapro over 2 weeks and then stop.  We discussed counseling as I think this will be greatly beneficial to Luana with her dysthymia and helping to deal with stress at work and living with breast  cancer.  She did not want me to make the referral but I did give her a brochure and she assures me that she will call and make an appointment.  I plan to call and check on her in about 3 weeks to see how she is tolerating the change in her antidepressants, we will also see if she has made an appointment with [facility] for counseling and to see how she is tolerating tamoxifen.  She is going to stop her Lexapro and wait about a week before starting tamoxifen so that she can tell if she is going to have any side effects with it.  I will see her back in 2 months for follow-up and sooner if needed.  We had planned on 5-7 years of therapy with hormonal blockade, I discussed with her today that with her ongoing side effects we would likely only do 5 years and hopefully we can get through that.  She was only weakly ER positive.    -10/14/2021 Did not tolerate Tamoxifen, went back to Letrozole    -11/17/2021 [facility] clinic follow-up: Luana is tolerating letrozole much better than tamoxifen.  She continues to have significant hot flashes, she is not sure the Effexor is helping.  She did have to go back to the 37.5 mg dose as she did not tolerate the 75 mg dose.  I discussed with her that she could switch to a different antidepressant as far as we were concerned, there is no interaction with the letrozole, we had only switched her to Effexor when we were trying tamoxifen and also we had hoped it would help with her hot flashes.  She is following with Emeli Suero and will discuss with her at her next appointment.  I also recommended acupuncture for her hot flashes as there is data showing that this may be helpful.  We also discussed adding gabapentin but when we reviewed the potential side effects of constipation and lethargy she did not want to try that.  She continues to complain of low back pain with some neuropathy in her left lower extremity.  She does have a history of spinal stenosis, I will get an MRI of  her lumbar spine for further evaluation and call her with the results.  Assuming there is no involvement of metastatic disease but just symptoms from her spinal stenosis she will need to follow-up with her neurosurgeon.  She has a history of laminectomy.     Malignant neoplasm of upper-outer quadrant of right breast in female, estrogen receptor positive (HCC)   1/16/2019 Cancer Staged    Staging form: Breast, AJCC 8th Edition  - Clinical stage from 1/16/2019: Stage IB (cT2, cN0, cM0, G2, ER+, HI+, HER2+) - Signed by Lisa Herman MD on 1/27/2020 1/21/2020 Initial Diagnosis    Malignant neoplasm of upper-outer quadrant of right female breast (CMS/HCC)     2/11/2020 Cancer Staged    Staging form: Breast, AJCC 8th Edition  - Pathologic: Stage IA (pT1c, pN0, cM0, G3, ER+, HI+, HER2+) - Signed by Hero Gomez MD on 2/11/2020 2/18/2020 -  Other Event    Echocardiogram  · Left ventricular systolic function is normal. Estimated EF = 55%.  · The global longitudinal strain was -19.5%.     2/25/2020 - 2/26/2020 Chemotherapy    OP CENTRAL VENOUS ACCESS DEVICE ACCESS, CARE, AND MAINTENANCE (CVAD)     2/26/2020 - 3/10/2020 Chemotherapy    OP BREAST PACLitaxel / Trastuzumab-anns (Weekly X 12)     3/2/2020 Adverse Reaction    MRSA bacteremia due to port infection with port removal after day 1 course 1 Herceptin Taxol     3/31/2020 - 6/16/2021 Hormonal Therapy    Arimidex for planned 5 years.    6/16/2021 therapy changed to Letrozole due to intolerance to Arimidex.  9/15/2021 changed to Tamoxifen kruse to intolerance of letrozole.  10/14/2021 changed back to Letrozole due to intolerance of tamoxifen.       4/2/2020 -  Chemotherapy    Completed 3/23/2021  OP BREAST Trastuzumab-anns Q21D (maintenance)     5/27/2020 -  Other Event    5/27/2020 echocardiogram EF 55%      7/23/2020 Procedure    Colonoscopy with Dr. Marte, normal     8/26/2020 -  Other Event    Echocardiogram   · This was a limited echocardiogram to assess  left ventricular ejection fraction in the setting of chemotherapy.  · Left ventricular systolic function is normal. Calculated EF = 55.1%. Estimated EF = 55%. Global Longitudinal LV strain = -18.2%.  · Estimated EF = 55%.     11/9/2020 Imaging    DEXA bone density IMPRESSION:  Osteopenia of the femoral necks bilaterally, and total hips  bilaterally.     11/10/2020 -  Other Event    Echocardogram   · This was a limited echocardiogram to assess left ventricular function only.  · Left ventricular systolic function is normal. Left ventricular ejection fraction appears to be 56 - 60%.  · Global longitudinal LV strain (GLS) = 19.7%.     2/23/2021 Imaging    -2/23/2021 left hip 2 view x-ray negative     12/1/2021 Imaging    MRI of the lumbar spine with and without contrast: Overall no significant interval change in the appearance of the lumbar spine since previous 5/7/2020 comparison study.  There are postoperative changes at L5-S1 with enhancing epidural scar in the left lateral recess and there are multilevel degenerative changes with canal in foraminal encroachment.         HISTORY OF PRESENT ILLNESS:  The patient is a 61 y.o. female, here for follow up on management of right breast cancer.  Luana overall is doing well on adjuvant therapy with letrozole, she previously did not tolerate Arimidex or tamoxifen.  She continues to have hot flashes but they seem to be more tolerable.  She has no new or concerning bony aches or pains, has occasional pain in her left wrist.  The previous reported sciatica pain that went to her left calf has improved, she did see her surgeon who did her previous laminectomy and he has placed her on gabapentin.  She also has periodic massages.  No new or concerning findings on chest wall exam.  She has now retired.    Past Medical History:   Diagnosis Date   • Breast cancer (HCC)    • Diabetes mellitus (HCC)    • Diverticulitis    • GERD (gastroesophageal reflux disease)    • Hypokalemia    •  "Kidney stone    • Lichen sclerosus    • Long term (current) use of insulin (HCC)    • Malignant neoplasm of upper-outer quadrant of right female breast (HCC) 01/21/2020   • Microalbuminuria    • Nephrolithiasis    • Sciatica    • Type 2 diabetes mellitus, uncontrolled    • Wears contact lenses      Past Surgical History:   Procedure Laterality Date   • APPENDECTOMY     • BREAST BIOPSY Right    • BREAST EXCISIONAL BIOPSY Right    • CHOLECYSTECTOMY     • COLON SURGERY     • COLONOSCOPY     • HYSTERECTOMY     • LEG SURGERY      leg fracture surgery-Abstracted from [employer]   • LUMBAR LAMINECTOMY DISCECTOMY DECOMPRESSION N/A 05/24/2019    Procedure: LUMBAR LAMINECTOMY L4-5 AND L5-S1;  Surgeon: Hipolito Kahn MD;  Location:  [facility];  Service: Orthopedic Spine   • MASTECTOMY Bilateral    • MASTECTOMY W/ SENTINEL NODE BIOPSY Bilateral 01/21/2020    Procedure: SKIN SPARING MASTECTOMIES BILATERAL, SENTINEL NODE BIOPSY RIGHT;  Surgeon: Silvio Howard MD;  Location:  [facility];  Service: General   • TUBAL ABDOMINAL LIGATION     • VENOUS ACCESS DEVICE (PORT) REMOVAL N/A 03/03/2020    Procedure: REMOVAL PORT;  Surgeon: Silvio Howard MD;  Location:  [facility];  Service: General;  Laterality: N/A;       Allergies   Allergen Reactions   • Bactrim [Sulfamethoxazole-Trimethoprim] Rash     RASH, SKIN PEELING        Family History and Social History reviewed and changed as necessary    REVIEW OF SYSTEM:   Positive for hot flashes      PHYSICAL EXAM:  Well-developed, well-nourished female in no distress  Chest wall exam is benign, skin tissue is soft, status post bilateral mastectomy, no abnormal masses or nodules, no rashes or lesions.  Nodes: No cervical, supraclavicular or axillary nodes palpable on exam    Vitals:    05/18/22 1318   BP: 112/65   Pulse: 75   Resp: 20   Temp: 98.1 °F (36.7 °C)   SpO2: 98%   Weight: 62.1 kg (137 lb)   Height: 167.6 cm (66\")     Vitals:    05/18/22 1318   PainSc: 0-No pain       ECOG " score: 0       Vitals reviewed.      ASSESSMENT & PLAN:    1.  T1 cN0 M0 Soria Dias 8 out of 9 ER 5% weakly +1+, AL 50% 1-2+ positive, HER-2/ashley 100% 3+ positive status post Herceptin Taxol weekly x12 followed by 1 year of Herceptin   2.  Hot flashes  3.  Intolerant of Arimidex and tamoxifen  4.  Chronic back pain with history of laminectomy  5.  Diabetes mellitus type 2 insulin-dependent    Discussion:  Luana continues to tolerate adjuvant therapy with letrozole, she was previously intolerant of anastrozole and tamoxifen.  She continues to have hot flashes but these seem to be more tolerable.  She has no new worrisome symptoms.  We plan on 5 years of adjuvant therapy with an AI.  She has had mastectomies therefore does not need mammograms.  She will need bone density repeated this fall for 2-year follow-up, previous bone density showed osteopenia.  She may benefit from bisphosphonate but we will see what her next bone density scan shows.  She does take calcium and vitamin D and remains active and walks regularly.  Her diabetes is under good control, she follows with endocrinology.  Her back pain is better, she is now on gabapentin under the direction of her surgeon.    Return to clinic in 6 months for follow-up and sooner if needed.    This was a level 4, moderate MDM visit with 2 or more stable chronic illnesses, management of prescription drug therapy.    Almita Pruitt, APRN    05/18/2022

## 2022-05-31 RX ORDER — DEXTROMETHORPHAN HYDROBROMIDE AND PROMETHAZINE HYDROCHLORIDE 15; 6.25 MG/5ML; MG/5ML
SYRUP ORAL
Qty: 120 ML | Refills: 1 | Status: SHIPPED | OUTPATIENT
Start: 2022-05-31 | End: 2022-09-14

## 2022-05-31 RX ORDER — PROCHLORPERAZINE 25 MG/1
SUPPOSITORY RECTAL
Qty: 9 EACH | Refills: 3 | Status: SHIPPED | OUTPATIENT
Start: 2022-05-31 | End: 2022-12-15

## 2022-05-31 RX ORDER — PEN NEEDLE, DIABETIC 32GX 5/32"
NEEDLE, DISPOSABLE MISCELLANEOUS
Qty: 100 EACH | Refills: 3 | Status: SHIPPED | OUTPATIENT
Start: 2022-05-31

## 2022-06-06 RX ORDER — AZITHROMYCIN 250 MG/1
TABLET, FILM COATED ORAL
Qty: 6 TABLET | Refills: 0 | Status: SHIPPED | OUTPATIENT
Start: 2022-06-06 | End: 2022-07-23

## 2022-06-13 ENCOUNTER — OFFICE VISIT (OUTPATIENT)
Dept: ENDOCRINOLOGY | Facility: CLINIC | Age: 62
End: 2022-06-13

## 2022-06-13 VITALS
HEART RATE: 75 BPM | HEIGHT: 66 IN | OXYGEN SATURATION: 97 % | SYSTOLIC BLOOD PRESSURE: 114 MMHG | DIASTOLIC BLOOD PRESSURE: 72 MMHG | WEIGHT: 135 LBS | BODY MASS INDEX: 21.69 KG/M2

## 2022-06-13 DIAGNOSIS — Z79.4 TYPE 2 DIABETES MELLITUS WITH HYPERGLYCEMIA, WITH LONG-TERM CURRENT USE OF INSULIN: Primary | ICD-10-CM

## 2022-06-13 DIAGNOSIS — I10 BENIGN HYPERTENSION: ICD-10-CM

## 2022-06-13 DIAGNOSIS — E11.65 TYPE 2 DIABETES MELLITUS WITH HYPERGLYCEMIA, WITH LONG-TERM CURRENT USE OF INSULIN: Primary | ICD-10-CM

## 2022-06-13 DIAGNOSIS — Z79.4 INSULIN LONG-TERM USE: ICD-10-CM

## 2022-06-13 LAB
EXPIRATION DATE: ABNORMAL
GLUCOSE BLDC GLUCOMTR-MCNC: 143 MG/DL (ref 70–130)
Lab: ABNORMAL

## 2022-06-13 PROCEDURE — 95251 CONT GLUC MNTR ANALYSIS I&R: CPT | Performed by: INTERNAL MEDICINE

## 2022-06-13 PROCEDURE — 99213 OFFICE O/P EST LOW 20 MIN: CPT | Performed by: INTERNAL MEDICINE

## 2022-06-13 NOTE — PROGRESS NOTES
"     Office Note      Date: 2022  Patient Name: Luana Chawla  MRN: [id number]  : 1960    Chief Complaint   Patient presents with   • Diabetes     Type II   • Hypertension       History of Present Illness:   Luana Chawla is a 61 y.o. female who presents for Diabetes type 2. Diagnosed in: . Treated in past with insulin. Current treatments: metformin, ozempic and basal insulin. Number of insulin shots per day: 1. Checks blood sugar 288 times a day - on DexCom CGM.  Has low blood sugar: occasional. Aspirin use: No - due to easy bruising. Statin use: No - lipids have been okay. ACE-I/ARB use: No - no indication.. Changes in health since last visit: none. Last eye exam 2021.    Subjective      Diabetic Complications:  Eyes: No  Kidneys: No  Feet: No  Heart: No    Diet and Exercise:  Meals per day: 3  Minutes of exercise per week: 150 mins.    Review of Systems:   Review of Systems   Constitutional: Negative.    Cardiovascular: Negative.    Gastrointestinal: Negative.    Endocrine: Positive for heat intolerance.       The following portions of the patient's history were reviewed and updated as appropriate: allergies, current medications, past family history, past medical history, past social history, past surgical history and problem list.    Objective       Visit Vitals  /72 (BP Location: Left arm, Patient Position: Sitting, Cuff Size: Adult)   Pulse 75   Ht 167.6 cm (66\")   Wt 61.2 kg (135 lb)   SpO2 97%   BMI 21.79 kg/m²       Physical Exam:  Physical Exam  Constitutional:       Appearance: Normal appearance.   Neurological:      Mental Status: She is alert.         Labs:    HbA1c  Lab Results   Component Value Date    HGBA1C 6.8 2022       CMP  Lab Results   Component Value Date    GLUCOSE 69 2022    BUN 10 2022    CREATININE 0.68 2022    EGFRIFNONA 117 2021    EGFRIFAFRI 110 2020    BCR 14.7 2022    K 4.2 2022    CO2 27.6 " 03/29/2022    CALCIUM 9.9 03/29/2022    PROTENTOTREF 6.5 02/25/2020    LABIL2 1.7 02/25/2020    AST 16 03/29/2022    ALT 15 03/29/2022        Lipid Panel  Lab Results   Component Value Date    HDL 70 (H) 03/29/2022    LDL 76 03/29/2022    TRIG 46 03/29/2022        TSH  Lab Results   Component Value Date    TSH 1.570 03/29/2022        Hemoglobin A1C  Lab Results   Component Value Date    HGBA1C 6.8 03/29/2022        Microalbumin/Creatinine  Lab Results   Component Value Date    MALBCRERATIO  03/29/2022      Comment:      Unable to calculate    MICROALBUR <1.2 03/29/2022           Assessment / Plan      Assessment & Plan:  Diagnoses and all orders for this visit:    1. Type 2 diabetes mellitus with hyperglycemia, with long-term current use of insulin (HCC) (Primary)  Assessment & Plan:  Diabetes is unchanged.  Too soon to do A1c today.  CGM shows some postprandial spikes but acceptable.  Continue current treatment regimen.  Diabetes will be reassessed in 3 months.    Orders:  -     POC Glucose, Blood  -     Cancel: POC Glycosylated Hemoglobin (Hb A1C)    2. Benign hypertension  Assessment & Plan:  Hypertension is unchanged.  Continue current treatment regimen.  Blood pressure will be reassessed at the next regular appointment.      3. Insulin long-term use (HCC)      Return in about 3 months (around 9/13/2022) for Recheck with A1c.    Jose Cummings MD   06/13/2022

## 2022-06-13 NOTE — ASSESSMENT & PLAN NOTE
Diabetes is unchanged.  Too soon to do A1c today.  CGM shows some postprandial spikes but acceptable.  Continue current treatment regimen.  Diabetes will be reassessed in 3 months.

## 2022-06-18 DIAGNOSIS — C50.411 MALIGNANT NEOPLASM OF UPPER-OUTER QUADRANT OF RIGHT BREAST IN FEMALE, ESTROGEN RECEPTOR POSITIVE: Primary | ICD-10-CM

## 2022-06-18 DIAGNOSIS — Z17.0 MALIGNANT NEOPLASM OF UPPER-OUTER QUADRANT OF RIGHT BREAST IN FEMALE, ESTROGEN RECEPTOR POSITIVE: Primary | ICD-10-CM

## 2022-06-20 RX ORDER — LETROZOLE 2.5 MG/1
TABLET, FILM COATED ORAL
Qty: 30 TABLET | Refills: 11 | Status: SHIPPED | OUTPATIENT
Start: 2022-06-20 | End: 2022-07-23

## 2022-07-20 ENCOUNTER — OFFICE VISIT (OUTPATIENT)
Dept: FAMILY MEDICINE CLINIC | Facility: CLINIC | Age: 62
End: 2022-07-20

## 2022-07-20 VITALS
WEIGHT: 136.9 LBS | DIASTOLIC BLOOD PRESSURE: 66 MMHG | SYSTOLIC BLOOD PRESSURE: 104 MMHG | HEART RATE: 87 BPM | HEIGHT: 66 IN | TEMPERATURE: 98 F | BODY MASS INDEX: 22 KG/M2 | OXYGEN SATURATION: 97 %

## 2022-07-20 DIAGNOSIS — J02.9 SORE THROAT: ICD-10-CM

## 2022-07-20 DIAGNOSIS — R05.9 COUGH: Primary | ICD-10-CM

## 2022-07-20 LAB
EXPIRATION DATE: ABNORMAL
FLUAV AG UPPER RESP QL IA.RAPID: NOT DETECTED
FLUBV AG UPPER RESP QL IA.RAPID: NOT DETECTED
INTERNAL CONTROL: ABNORMAL
Lab: ABNORMAL
SARS-COV-2 AG UPPER RESP QL IA.RAPID: NOT DETECTED

## 2022-07-20 PROCEDURE — 99213 OFFICE O/P EST LOW 20 MIN: CPT | Performed by: NURSE PRACTITIONER

## 2022-07-20 PROCEDURE — 87428 SARSCOV & INF VIR A&B AG IA: CPT | Performed by: NURSE PRACTITIONER

## 2022-07-20 NOTE — PROGRESS NOTES
"Chief Complaint  Cough (X 4 day. Also has sore throat, headache, drainage. Pt reports she just got back from [place]. Pt also reprts having a COVID test at [facility] Monday that was negative.)    Subjective        Luana Chawla presents to [facility]  History of Present IllnessSt, fatigue and cough and cold symptoms x 4 days.  Several people in her group have also become sick.    Objective   Vital Signs:  /66 (BP Location: Left arm, Patient Position: Sitting, Cuff Size: Adult)   Pulse 87   Temp 98 °F (36.7 °C) (Temporal)   Ht 167.6 cm (66\")   Wt 62.1 kg (136 lb 14.4 oz)   SpO2 97%   BMI 22.10 kg/m²   Estimated body mass index is 22.1 kg/m² as calculated from the following:    Height as of this encounter: 167.6 cm (66\").    Weight as of this encounter: 62.1 kg (136 lb 14.4 oz).    BMI is within normal parameters. No other follow-up for BMI required.      Physical Exam  Constitutional:       Appearance: She is normal weight.   HENT:      Right Ear: Tympanic membrane, ear canal and external ear normal.      Left Ear: Tympanic membrane, ear canal and external ear normal.      Mouth/Throat:      Mouth: Mucous membranes are moist.   Cardiovascular:      Rate and Rhythm: Normal rate and regular rhythm.   Pulmonary:      Effort: Pulmonary effort is normal.      Breath sounds: Normal breath sounds.   Skin:     General: Skin is warm and dry.   Neurological:      Mental Status: She is alert and oriented to person, place, and time.   Psychiatric:         Mood and Affect: Mood normal.        Result Review :                Assessment and Plan   Diagnoses and all orders for this visit:    1. Cough (Primary)  Comments:  She can get some cough syrup over-the-counter.  Orders:  -     POCT SARS-CoV-2 Antigen ARIS + Flu    2. Sore throat  Assessment & Plan:  I want to go on to give her an antibiotic given the fact that she is a brittle diabetic and she is COVID-negative.  She is to " follow-up for worsening symptoms.             Follow Up   Return if symptoms worsen or fail to improve.  Patient was given instructions and counseling regarding her condition or for health maintenance advice. Please see specific information pulled into the AVS if appropriate.       Answers for HPI/ROS submitted by the patient on 7/19/2022  What is the primary reason for your visit?: Sore Throat

## 2022-07-23 ENCOUNTER — OFFICE VISIT (OUTPATIENT)
Dept: FAMILY MEDICINE CLINIC | Facility: CLINIC | Age: 62
End: 2022-07-23

## 2022-07-23 VITALS
HEART RATE: 80 BPM | OXYGEN SATURATION: 96 % | HEIGHT: 66 IN | DIASTOLIC BLOOD PRESSURE: 80 MMHG | BODY MASS INDEX: 21.87 KG/M2 | TEMPERATURE: 97.8 F | WEIGHT: 136.1 LBS | SYSTOLIC BLOOD PRESSURE: 128 MMHG

## 2022-07-23 DIAGNOSIS — J40 BRONCHITIS: ICD-10-CM

## 2022-07-23 DIAGNOSIS — J01.00 ACUTE MAXILLARY SINUSITIS, RECURRENCE NOT SPECIFIED: Primary | ICD-10-CM

## 2022-07-23 DIAGNOSIS — I49.9 IRREGULAR HEARTBEAT: ICD-10-CM

## 2022-07-23 PROBLEM — I82.210: Status: ACTIVE | Noted: 2022-07-23

## 2022-07-23 PROBLEM — Z90.13 HISTORY OF BILATERAL MASTECTOMY: Status: ACTIVE | Noted: 2022-07-23

## 2022-07-23 PROCEDURE — 99214 OFFICE O/P EST MOD 30 MIN: CPT | Performed by: FAMILY MEDICINE

## 2022-07-23 PROCEDURE — 93000 ELECTROCARDIOGRAM COMPLETE: CPT | Performed by: FAMILY MEDICINE

## 2022-07-23 RX ORDER — GUAIFENESIN AND CODEINE PHOSPHATE 100; 10 MG/5ML; MG/5ML
SOLUTION ORAL
Qty: 240 ML | Refills: 0 | Status: SHIPPED | OUTPATIENT
Start: 2022-07-23

## 2022-07-23 RX ORDER — AZITHROMYCIN 250 MG/1
TABLET, FILM COATED ORAL
Qty: 6 TABLET | Refills: 0 | Status: SHIPPED | OUTPATIENT
Start: 2022-07-23 | End: 2022-09-14

## 2022-07-23 RX ORDER — ALBUTEROL SULFATE 90 UG/1
2 AEROSOL, METERED RESPIRATORY (INHALATION) EVERY 6 HOURS PRN
Qty: 18 G | Refills: 0 | Status: SHIPPED | OUTPATIENT
Start: 2022-07-23 | End: 2022-09-29

## 2022-07-23 NOTE — PROGRESS NOTES
Follow Up Office Visit      Patient Name: Luana Chawla  : 1960   MRN: [id number]     Chief Complaint:    Chief Complaint   Patient presents with   • Cough     Patient states that she has some green mucus now.         History of Present Illness: Luana Chawla is a 61 y.o. female who is here today to   follow-up on her bronchitis says the skin a little worse is coughing up yellow and green stuff now and feels she needs an antibiotic.  She no longer smokes but does vape.  He would like a refill on her inhaler also    Review of Systems   Constitutional: Negative for fatigue and fever.   Respiratory: Negative for   shortness of breath.    Cardiovascular: Negative for chest pain and palpitations.   Skin: Negative for rash or itching      Subjective      Review of Systems:   Review of Systems    Past Medical History:   Past Medical History:   Diagnosis Date   • Breast cancer (HCC)    • Diabetes mellitus (HCC)    • Diverticulitis    • GERD (gastroesophageal reflux disease)    • Hypokalemia    • Kidney stone    • Lichen sclerosus    • Long term (current) use of insulin (HCC)    • Malignant neoplasm of upper-outer quadrant of right female breast (HCC) 2020   • Microalbuminuria    • Nephrolithiasis    • Sciatica    • Type 2 diabetes mellitus, uncontrolled    • Wears contact lenses        Past Surgical History:   Past Surgical History:   Procedure Laterality Date   • APPENDECTOMY     • BREAST BIOPSY Right    • BREAST EXCISIONAL BIOPSY Right    • CHOLECYSTECTOMY     • COLON SURGERY     • COLONOSCOPY     • HYSTERECTOMY     • LEG SURGERY      leg fracture surgery-Abstracted from [employer]   • LUMBAR LAMINECTOMY DISCECTOMY DECOMPRESSION N/A 2019    Procedure: LUMBAR LAMINECTOMY L4-5 AND L5-S1;  Surgeon: Hipolito Kahn MD;  Location: [facility];  Service: Orthopedic Spine   • MASTECTOMY Bilateral    • MASTECTOMY W/ SENTINEL NODE BIOPSY Bilateral 2020    Procedure: SKIN SPARING  MASTECTOMIES BILATERAL, SENTINEL NODE BIOPSY RIGHT;  Surgeon: Silvio Howard MD;  Location:  [facility];  Service: General   • TUBAL ABDOMINAL LIGATION     • VENOUS ACCESS DEVICE (PORT) REMOVAL N/A 2020    Procedure: REMOVAL PORT;  Surgeon: Silvio Howard MD;  Location:  [facility];  Service: General;  Laterality: N/A;       Family History:   Family History   Problem Relation Age of Onset   • Breast cancer Mother 70   • Diabetes Mother    • Cancer Mother    • Heart disease Father    • Hypertension Father    • Diabetes Sister    • Ovarian cancer Neg Hx        Social History:   Social History     Socioeconomic History   • Marital status: Single   Tobacco Use   • Smoking status: Former Smoker     Packs/day: 1.00     Types: Cigarettes, Electronic Cigarette     Start date: 3/13/1990     Quit date: 2017     Years since quittin.2   • Smokeless tobacco: Never Used   • Tobacco comment: currently use nicotine e-cig.   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   • Drug use: No   • Sexual activity: Defer       Medications:     Current Outpatient Medications:   •  albuterol sulfate  (90 Base) MCG/ACT inhaler, Inhale 2 puffs Every 6 (Six) Hours As Needed for Wheezing., Disp: 18 g, Rfl: 0  •  Ascorbic Acid (Vitamin C) 500 MG capsule, Take 1 capsule by mouth Daily., Disp: , Rfl:   •  BD Pen Needle Radha 2nd Gen 32G X 4 MM misc, USE ONE DAILY, Disp: 100 each, Rfl: 3  •  Calcium Carb-Cholecalciferol (CALCIUM 1000 + D PO), Take 1 tablet by mouth Daily., Disp: , Rfl:   •  clobetasol (TEMOVATE) 0.05 % ointment, Apply  topically to the appropriate area as directed 2 (Two) Times a Day., Disp: 60 g, Rfl: 2  •  Continuous Blood Gluc  (Dexcom G6 ) device, Dexcom G6  misc, Disp: , Rfl:   •  Continuous Blood Gluc Sensor (Dexcom G6 Sensor), INJECT UNDER THE SKIN  INTO THE APPROPRIATE AREA AS DIRECTED EVERY 10 DAYS, Disp: 9 each, Rfl: 3  •  Continuous Blood Gluc Transmit (Dexcom G6 Transmitter) misc, 1 each by Other route Every 3 (Three) Months., Disp: 1 each, Rfl: 3  •  escitalopram (LEXAPRO) 10 MG tablet, Take 1 tablet by mouth Daily., Disp: 30 tablet, Rfl: 1  •  gabapentin (NEURONTIN) 300 MG capsule, Take 300 mg by mouth Daily., Disp: , Rfl:   •  glucose blood (Accu-Chek Sasha Plus) test strip, USE TO TEST ONCE A DAY, Disp: 50 each, Rfl: 5  •  Insulin Glargine (BASAGLAR KWIKPEN) 100 UNIT/ML injection pen, INJECT UNDER THE SKIN MAX DOSE OF 50 UNITS DAILY, Disp: 15 mL, Rfl: 2  •  loratadine (CLARITIN) 10 MG tablet, Take 10 mg by mouth Daily., Disp: , Rfl:   •  Magnesium 400 MG tablet, Take 1 tablet by mouth Daily., Disp: , Rfl:   •  metFORMIN ER (GLUCOPHAGE-XR) 500 MG 24 hr tablet, TAKE TWO TABLETS BY MOUTH TWICE A DAY, Disp: 360 tablet, Rfl: 1  •  omeprazole (priLOSEC) 40 MG capsule, TAKE ONE CAPSULE BY MOUTH DAILY BEFORE A MEAL, Disp: 90 capsule, Rfl: 1  •  promethazine-dextromethorphan (PROMETHAZINE-DM) 6.25-15 MG/5ML syrup, TAKE 5MLS BY MOUTH EVERY 6 HOURS AS NEEDED, Disp: 120 mL, Rfl: 1  •  Saccharomyces boulardii (Probiotic) 250 MG capsule, Take  by mouth., Disp: , Rfl:   •  Semaglutide, 1 MG/DOSE, 4 MG/3ML solution pen-injector, Inject 1 mg under the skin into the appropriate area as directed 1 (One) Time Per Week., Disp: 9 mL, Rfl: 3  •  Turmeric 500 MG tablet, Take  by mouth., Disp: , Rfl:   •  vitamin B-12 (CYANOCOBALAMIN) 1000 MCG tablet, Take 1,000 mcg by mouth Daily., Disp: , Rfl:   •  azithromycin (Zithromax Z-Vaughn) 250 MG tablet, Take 2 tablets by mouth on day 1, then 1 tablet daily on days 2-5, Disp: 6 tablet, Rfl: 0  •  guaiFENesin-codeine (GUAIFENESIN AC) 100-10 MG/5ML liquid, 2 teaspoons p.o. nightly as needed cough, Disp: 240 mL, Rfl: 0  •  letrozole (FEMARA) 2.5 MG tablet, TAKE ONE TABLET BY MOUTH DAILY, Disp: 30 tablet, Rfl: 11    Allergies:   Allergies  "  Allergen Reactions   • Bactrim [Sulfamethoxazole-Trimethoprim] Rash     RASH, SKIN PEELING        Objective     Physical Exam:  Vital Signs:   Vitals:    07/23/22 1041   BP: 128/80   BP Location: Left arm   Patient Position: Sitting   Cuff Size: Adult   Pulse: 80   Temp: 97.8 °F (36.6 °C)   TempSrc: Temporal   SpO2: 96%   Weight: 61.7 kg (136 lb 1.6 oz)   Height: 167.6 cm (66\")   PainSc: 0-No pain     Body mass index is 21.97 kg/m².     Physical Exam  Vitals and nursing note reviewed.   Constitutional:       Appearance: Normal appearance.   HENT:      Head: Normocephalic and atraumatic.      Right Ear: Tympanic membrane and ear canal normal.      Left Ear: Tympanic membrane and ear canal normal.      Mouth/Throat:      Mouth: Mucous membranes are moist.      Pharynx: Oropharynx is clear. No posterior oropharyngeal erythema.   Cardiovascular:      Rate and Rhythm: Normal rate. Rhythm irregular.      Comments: Sounds like she has PVCs/PACs?  Pulmonary:      Effort: Pulmonary effort is normal.      Breath sounds: Normal breath sounds.   Musculoskeletal:      Cervical back: Normal range of motion and neck supple. No rigidity or tenderness.      Right lower leg: No edema.      Left lower leg: No edema.   Lymphadenopathy:      Cervical: No cervical adenopathy.   Skin:     General: Skin is warm and dry.   Neurological:      Mental Status: She is alert.           ECG 12 Lead    Date/Time: 7/23/2022 1:09 PM  Performed by: Timbo Palumbo MD  Authorized by: Timbo Palumbo MD   Comparison: not compared with previous ECG   Rhythm: sinus rhythm  Rate: normal  BPM: 72    Clinical impression: normal ECG            Assessment / Plan      Assessment/Plan:   Diagnoses and all orders for this visit:    1. Acute maxillary sinusitis, recurrence not specified (Primary)    2. Bronchitis  -     guaiFENesin-codeine (GUAIFENESIN AC) 100-10 MG/5ML liquid; 2 teaspoons p.o. nightly as needed cough  Dispense: 240 mL; Refill: 0    3. Irregular " heartbeat    Other orders  -     azithromycin (Zithromax Z-Vaughn) 250 MG tablet; Take 2 tablets by mouth on day 1, then 1 tablet daily on days 2-5  Dispense: 6 tablet; Refill: 0  -     albuterol sulfate  (90 Base) MCG/ACT inhaler; Inhale 2 puffs Every 6 (Six) Hours As Needed for Wheezing.  Dispense: 18 g; Refill: 0  -     ECG 12 Lead         Z-Vaughn and Robitussin-AC and albuterol inhaler as directed instructed on proper use risk benefits side effects of medicine no driving on Robitussin with codeine.  Rest fluids and if not better return her EKG today is normal I suspect she may have just had a couple PACs or PVCs when I was listening to her heart.  Old EKG did not show any PACs or PVCs in the past either.  I offered to do a Holter monitor to evaluate further she declined as she has no symptoms.  Follow-up for regular checkups as directed sooner if worse.  BMI is within normal parameters. No other follow-up for BMI required.      Follow Up:   No follow-ups on file.        Timbo Palumbo MD  [facility]   Portions of note created with Dragon voice recognition technology

## 2022-07-25 PROBLEM — R05.9 COUGH: Status: ACTIVE | Noted: 2022-07-25

## 2022-07-25 NOTE — ASSESSMENT & PLAN NOTE
I want to go on to give her an antibiotic given the fact that she is a brittle diabetic and she is COVID-negative.  She is to follow-up for worsening symptoms.

## 2022-07-26 RX ORDER — INSULIN GLARGINE 100 [IU]/ML
INJECTION, SOLUTION SUBCUTANEOUS
Qty: 15 ML | Refills: 5 | Status: SHIPPED | OUTPATIENT
Start: 2022-07-26 | End: 2023-01-12

## 2022-07-26 NOTE — TELEPHONE ENCOUNTER
----- Message from Luana Chawla sent at 7/24/2022  7:39 PM EDT -----  Regarding: Basaglar   I need my basaglar refilled, thanks Luana

## 2022-08-03 RX ORDER — ALBUTEROL SULFATE 90 UG/1
AEROSOL, METERED RESPIRATORY (INHALATION)
Qty: 8.5 G | Refills: 0 | Status: SHIPPED | OUTPATIENT
Start: 2022-08-03

## 2022-08-08 RX ORDER — PROCHLORPERAZINE 25 MG/1
SUPPOSITORY RECTAL
Qty: 1 EACH | Refills: 3 | Status: SHIPPED | OUTPATIENT
Start: 2022-08-08 | End: 2022-12-15

## 2022-08-30 RX ORDER — ESCITALOPRAM OXALATE 10 MG/1
TABLET ORAL
Qty: 30 TABLET | Refills: 1 | Status: SHIPPED | OUTPATIENT
Start: 2022-08-30 | End: 2022-11-21

## 2022-08-30 RX ORDER — OMEPRAZOLE 40 MG/1
CAPSULE, DELAYED RELEASE ORAL
Qty: 90 CAPSULE | Refills: 1 | Status: SHIPPED | OUTPATIENT
Start: 2022-08-30

## 2022-09-14 ENCOUNTER — OFFICE VISIT (OUTPATIENT)
Dept: ENDOCRINOLOGY | Facility: CLINIC | Age: 62
End: 2022-09-14

## 2022-09-14 VITALS
DIASTOLIC BLOOD PRESSURE: 74 MMHG | BODY MASS INDEX: 28.61 KG/M2 | HEART RATE: 106 BPM | HEIGHT: 66 IN | OXYGEN SATURATION: 97 % | SYSTOLIC BLOOD PRESSURE: 108 MMHG | WEIGHT: 178 LBS

## 2022-09-14 DIAGNOSIS — E11.65 UNCONTROLLED TYPE 2 DIABETES MELLITUS WITH HYPERGLYCEMIA: Primary | ICD-10-CM

## 2022-09-14 DIAGNOSIS — Z79.4 INSULIN LONG-TERM USE: ICD-10-CM

## 2022-09-14 DIAGNOSIS — I10 BENIGN HYPERTENSION: ICD-10-CM

## 2022-09-14 LAB
EXPIRATION DATE: NORMAL
EXPIRATION DATE: NORMAL
GLUCOSE BLDC GLUCOMTR-MCNC: 102 MG/DL (ref 70–130)
HBA1C MFR BLD: 6.5 %
Lab: NORMAL
Lab: NORMAL

## 2022-09-14 PROCEDURE — 83036 HEMOGLOBIN GLYCOSYLATED A1C: CPT | Performed by: INTERNAL MEDICINE

## 2022-09-14 PROCEDURE — 99213 OFFICE O/P EST LOW 20 MIN: CPT | Performed by: INTERNAL MEDICINE

## 2022-09-14 PROCEDURE — 82947 ASSAY GLUCOSE BLOOD QUANT: CPT | Performed by: INTERNAL MEDICINE

## 2022-09-14 NOTE — PROGRESS NOTES
"     Office Note      Date: 2022  Patient Name: Luana Chawla  MRN: [id number]  : 1960    Chief Complaint   Patient presents with   • Diabetes                                               History of Present Illness:   Luana Chawla is a 61 y.o. female who presents for Diabetes type 2. Diagnosed in: . Treated in past with insulin. Current treatments: metformin, ozempic and basal insulin. Number of insulin shots per day: 1. Checks blood sugar 288 times a day - on DexCom CGM.  Has low blood sugar: occasional. Aspirin use: No - due to easy bruising/nosebleeds. Statin use: No - lipids have been okay. ACE-I/ARB use: No - no indication.. Changes in health since last visit: none. Last eye exam 2021.    Subjective      Diabetic Complications:  Eyes: No  Kidneys: No  Feet: No  Heart: No    Diet and Exercise:  Meals per day: 3  Minutes of exercise per week: 150 mins.    Review of Systems:   Review of Systems   Constitutional: Negative.    Cardiovascular: Negative.    Gastrointestinal: Negative.    Endocrine: Positive for heat intolerance.       The following portions of the patient's history were reviewed and updated as appropriate: allergies, current medications, past family history, past medical history, past social history, past surgical history and problem list.    Objective       Visit Vitals  /74   Pulse 106   Ht 167.6 cm (66\")   Wt 80.7 kg (178 lb)   LMP  (LMP Unknown)   SpO2 97%   BMI 28.73 kg/m²       Physical Exam:  Physical Exam  Constitutional:       Appearance: Normal appearance.   Neurological:      Mental Status: She is alert.         Labs:    HbA1c  Lab Results   Component Value Date    HGBA1C 6.5 2022       CMP  Lab Results   Component Value Date    GLUCOSE 69 2022    BUN 10 2022    CREATININE 0.68 2022    EGFRIFNONA 117 2021    EGFRIFAFRI 110 2020    BCR 14.7 2022    K 4.2 2022    CO2 27.6 2022    CALCIUM 9.9 " 03/29/2022    PROTENTOTREF 6.5 02/25/2020    LABIL2 1.7 02/25/2020    AST 16 03/29/2022    ALT 15 03/29/2022        Lipid Panel  Lab Results   Component Value Date    HDL 70 (H) 03/29/2022    LDL 76 03/29/2022    TRIG 46 03/29/2022        TSH  Lab Results   Component Value Date    TSH 1.570 03/29/2022        Hemoglobin A1C  Lab Results   Component Value Date    HGBA1C 6.5 09/14/2022        Microalbumin/Creatinine  Lab Results   Component Value Date    MALBCRERATIO  03/29/2022      Comment:      Unable to calculate    MICROALBUR <1.2 03/29/2022           Assessment / Plan      Assessment & Plan:  Diagnoses and all orders for this visit:    1. Uncontrolled type 2 diabetes mellitus with hyperglycemia (HCC) (Primary)  Assessment & Plan:  Diabetes is improving with treatment.  A1c okay at 6.5%.  Continue current treatment regimen.  Diabetes will be reassessed in 3 months.    Orders:  -     POC Glucose, Blood  -     POC Glycosylated Hemoglobin (Hb A1C)    2. Benign hypertension  Assessment & Plan:  Hypertension is unchanged.  Continue current treatment regimen.  Blood pressure will be reassessed at the next regular appointment.      3. Insulin long-term use (HCC)      Return in about 3 months (around 12/14/2022) for Recheck with A1c.    Jose Cummings MD   09/14/2022

## 2022-09-14 NOTE — ASSESSMENT & PLAN NOTE
Diabetes is improving with treatment.  A1c okay at 6.5%.  Continue current treatment regimen.  Diabetes will be reassessed in 3 months.

## 2022-09-23 ENCOUNTER — TELEPHONE (OUTPATIENT)
Dept: ONCOLOGY | Facility: CLINIC | Age: 62
End: 2022-09-23

## 2022-09-23 NOTE — TELEPHONE ENCOUNTER
Caller: Luana Chawla    Relationship to patient: Self    Best call back number: [phone]    Chief complaint: PATIENT WANTED TO SCHEDULE AN APPOINTMENT FOR NEXT WEEK IN [place], SHE IS HAVING BACK PAIN AROUND HER SHOULDERS. SHE HAS HAD THIS PAIN FOR A COUPLE WEEKS NOW     Type of visit: FOLLOW UP    Requested date: NEXT WEEK AT THE [place] OFFICE      Additional notes:PLEASE CALL PATIENT TO SCHEDULE, HUB UNABLE TO SCHEDULE FOR ACTIVE TREATMENT

## 2022-09-29 ENCOUNTER — OFFICE VISIT (OUTPATIENT)
Dept: ONCOLOGY | Facility: CLINIC | Age: 62
End: 2022-09-29

## 2022-09-29 VITALS
TEMPERATURE: 98 F | RESPIRATION RATE: 18 BRPM | HEIGHT: 66 IN | BODY MASS INDEX: 21.53 KG/M2 | DIASTOLIC BLOOD PRESSURE: 70 MMHG | WEIGHT: 134 LBS | HEART RATE: 83 BPM | SYSTOLIC BLOOD PRESSURE: 99 MMHG | OXYGEN SATURATION: 98 %

## 2022-09-29 DIAGNOSIS — M54.9 ACUTE UPPER BACK PAIN: ICD-10-CM

## 2022-09-29 DIAGNOSIS — R53.83 OTHER FATIGUE: ICD-10-CM

## 2022-09-29 DIAGNOSIS — R07.1 CHEST PAIN ON BREATHING: ICD-10-CM

## 2022-09-29 DIAGNOSIS — Z17.0 MALIGNANT NEOPLASM OF UPPER-OUTER QUADRANT OF RIGHT BREAST IN FEMALE, ESTROGEN RECEPTOR POSITIVE: Primary | ICD-10-CM

## 2022-09-29 DIAGNOSIS — C50.411 MALIGNANT NEOPLASM OF UPPER-OUTER QUADRANT OF RIGHT BREAST IN FEMALE, ESTROGEN RECEPTOR POSITIVE: Primary | ICD-10-CM

## 2022-09-29 PROCEDURE — 99214 OFFICE O/P EST MOD 30 MIN: CPT | Performed by: NURSE PRACTITIONER

## 2022-09-29 RX ORDER — LETROZOLE 2.5 MG/1
TABLET, FILM COATED ORAL
COMMUNITY
Start: 2022-09-17

## 2022-09-29 NOTE — PROGRESS NOTES
CHIEF COMPLAINT: Upper back and chest pain    Problem List:  Oncology/Hematology History Overview Note   1. 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  ER weakly 5% 1+ positive, W[place] 50% 1-2+ positive, HER-2/ashley 100% 3+ positive.  Negative cancer next panel  2. Significant diabetes with predating neuropathy due to spinal stenosis status post laminectomy 5/24/2019 with persistent neuropathy dorsum left foot  3. MRSA bacteremia due to port infection March 2020 after course 1 day 1 of Herceptin Taxol    Oncology history timeline:  -5/24/2019 Dr. Garcia saw for spinal stenosis with radiculopathy and left foot drop due to herniated disc and performed decompressive laminectomy, L4-5 and L5-S1 discectomy and medial facetectomy  -10/15/2019 mammogram BI-RADS 0  -11/22/2019 right mammogram/ultrasound BI-RADS 4 with ultrasound-guided core biopsy showing invasive ductal carcinoma Cory score 6 out of 9 grade 2, ER 5% 1+ positive W[place] 50% 1-2+ positive, HER-2/ashley 100% 3+ positive.  -12/13/2019 MRI breasts revealed 2.2 cm right breast mass consistent with biopsy proven cancer with unsuspected adjacent area's of non-mass enhancement worrisome for DCIS.  Non-mass enhancement in the subareolar left breast worrisome for DCIS.  Dominant focus left central portion left breast indeterminate BI-RADS 4.  Cancer next panel negative  -1/15/2020 CMP unremarkable save for glucose 200 with hemoglobin 10.6 normochromic normocytic indices  -1/21/2020 right skin sparing mastectomy with right sentinel lymph node injection and right deep subfascial sentinel lymph node biopsy with contralateral prophylactic left skin sparing mastectomy.  Right breast 1.8 cm Bloom Dias 8 out of 9, total of 4 lymph nodes examined 2 sentinel nodes all negative.  Pathological T1 cN0 M0 stage Ia.  Left breast  benign with sclerosing adenosis.  -2/26/2020 started Herceptin Taxol weekly  -3/2/2020 port removed due to MRSA bacteremia with port infection/purulence.  -3/2/2020 through 3/6/2020 hospitalized for MRSA bacteremia from port.  -3/31/2020 [facility] tele-visit: Patient still on IV antibiotics for MRSA port infection.  Decision made to go with Kanjinti alone every 3 weeks along with Arimidex.  -4/13/2020 per ID, patient has completed IV antibiotics and is now on oral antibiotics.  -3/23/2021 completed 1 year Kanjinti.  Recommendation to complete 10 years of Arimidex if can tolerate.  5-7 years if not.    -6/16/2021 [facility] clinic follow-up:  Intolerant of Arimidex with hot flashes and mood swings.  Therapy switched to Letrozole.      -9/15/2021 [facility] clinic follow-up:  She tolerated therapy with Letrozole better than she did with Arimidex but she still is having significant hot flashes and mood disturbance with easy agitation.  She is finding it difficult to work as stress makes these issues worse.  She is going to apply for disability custodial.  She is on Wellbutrin and Lexapro.  We discussed at length her options for hormonal blockade and she wants to continue trying.  At this time I will switch her to tamoxifen.  I spoke with our pharmacist Irma Arechiga, Pharm.D. about changing her antidepressants as her current antidepressants can interfere with tamoxifen.  We will have her stop Wellbutrin, she will start Effexor 37.5 mg daily for 7 days and if tolerated will increase to 75 mg daily, if this is tolerated then we will consider increasing up to 150 mg daily.  I am hoping that Effexor will help mitigate some of her hot flashes along with helping her depression.  She will taper off of her Lexapro over 2 weeks and then stop.  We discussed counseling as I think this will be greatly beneficial to Luana with her dysthymia and helping to deal with stress at work and living with breast cancer.  She  did not want me to make the referral but I did give her a brochure and she assures me that she will call and make an appointment.  I plan to call and check on her in about 3 weeks to see how she is tolerating the change in her antidepressants, we will also see if she has made an appointment with [facility] for counseling and to see how she is tolerating tamoxifen.  She is going to stop her Lexapro and wait about a week before starting tamoxifen so that she can tell if she is going to have any side effects with it.  I will see her back in 2 months for follow-up and sooner if needed.  We had planned on 5-7 years of therapy with hormonal blockade, I discussed with her today that with her ongoing side effects we would likely only do 5 years and hopefully we can get through that.  She was only weakly ER positive.    -10/14/2021 Did not tolerate Tamoxifen, went back to Letrozole    -11/17/2021 [facility] clinic follow-up: Luana is tolerating letrozole much better than tamoxifen.  She continues to have significant hot flashes, she is not sure the Effexor is helping.  She did have to go back to the 37.5 mg dose as she did not tolerate the 75 mg dose.  I discussed with her that she could switch to a different antidepressant as far as we were concerned, there is no interaction with the letrozole, we had only switched her to Effexor when we were trying tamoxifen and also we had hoped it would help with her hot flashes.  She is following with Emeli Suero and will discuss with her at her next appointment.  I also recommended acupuncture for her hot flashes as there is data showing that this may be helpful.  We also discussed adding gabapentin but when we reviewed the potential side effects of constipation and lethargy she did not want to try that.  She continues to complain of low back pain with some neuropathy in her left lower extremity.  She does have a history of spinal stenosis, I will get an MRI of her lumbar  spine for further evaluation and call her with the results.  Assuming there is no involvement of metastatic disease but just symptoms from her spinal stenosis she will need to follow-up with her neurosurgeon.  She has a history of laminectomy.    -5/18/2022 [facility], [place], operated by [facility] oncology clinic follow-up: Luana continues to tolerate adjuvant therapy with letrozole, she was previously intolerant of anastrozole and tamoxifen.  She continues to have hot flashes but these seem to be more tolerable.  She has no new worrisome symptoms.  We plan on 5 years of adjuvant therapy with an AI.  She has had mastectomies therefore does not need mammograms.  She will need bone density repeated this fall for 2-year follow-up, previous bone density showed osteopenia.  She may benefit from bisphosphonate but we will see what her next bone density scan shows.  She does take calcium and vitamin D and remains active and walks regularly.  Her diabetes is under good control, she follows with endocrinology.  Her back pain is better, she is now on gabapentin under the direction of her surgeon.     Malignant neoplasm of upper-outer quadrant of right breast in female, estrogen receptor positive (HCC)   1/16/2019 Cancer Staged    Staging form: Breast, AJCC 8th Edition  - Clinical stage from 1/16/2019: Stage IB (cT2, cN0, cM0, G2, ER+, FL+, HER2+) - Signed by Lisa Herman MD on 1/27/2020 1/21/2020 Initial Diagnosis    Malignant neoplasm of upper-outer quadrant of right female breast (CMS/HCC)     2/11/2020 Cancer Staged    Staging form: Breast, AJCC 8th Edition  - Pathologic: Stage IA (pT1c, pN0, cM0, G3, ER+, FL+, HER2+) - Signed by Hero Gomez MD on 2/11/2020 2/18/2020 -  Other Event    Echocardiogram  · Left ventricular systolic function is normal. Estimated EF = 55%.  · The global longitudinal strain was -19.5%.     2/25/2020 - 2/26/2020 Chemotherapy    OP CENTRAL VENOUS ACCESS DEVICE ACCESS, CARE, AND MAINTENANCE (CVAD)     2/26/2020 -  3/10/2020 Chemotherapy    OP BREAST PACLitaxel / Trastuzumab-anns (Weekly X 12)     3/2/2020 Adverse Reaction    MRSA bacteremia due to port infection with port removal after day 1 course 1 Herceptin Taxol     3/31/2020 - 6/16/2021 Hormonal Therapy    Arimidex for planned 5 years.    6/16/2021 therapy changed to Letrozole due to intolerance to Arimidex.  9/15/2021 changed to Tamoxifen kruse to intolerance of letrozole.  10/14/2021 changed back to Letrozole due to intolerance of tamoxifen.       4/2/2020 -  Chemotherapy    Completed 3/23/2021  OP BREAST Trastuzumab-anns Q21D (maintenance)     5/27/2020 -  Other Event    5/27/2020 echocardiogram EF 55%      7/23/2020 Procedure    Colonoscopy with Dr. Marte, normal     8/26/2020 -  Other Event    Echocardiogram   · This was a limited echocardiogram to assess left ventricular ejection fraction in the setting of chemotherapy.  · Left ventricular systolic function is normal. Calculated EF = 55.1%. Estimated EF = 55%. Global Longitudinal LV strain = -18.2%.  · Estimated EF = 55%.     11/9/2020 Imaging    DEXA bone density IMPRESSION:  Osteopenia of the femoral necks bilaterally, and total hips  bilaterally.     11/10/2020 -  Other Event    Echocardogram   · This was a limited echocardiogram to assess left ventricular function only.  · Left ventricular systolic function is normal. Left ventricular ejection fraction appears to be 56 - 60%.  · Global longitudinal LV strain (GLS) = 19.7%.     2/23/2021 Imaging    -2/23/2021 left hip 2 view x-ray negative     12/1/2021 Imaging    MRI of the lumbar spine with and without contrast: Overall no significant interval change in the appearance of the lumbar spine since previous 5/7/2020 comparison study.  There are postoperative changes at L5-S1 with enhancing epidural scar in the left lateral recess and there are multilevel degenerative changes with canal in foraminal encroachment.         HISTORY OF PRESENT ILLNESS:  The patient is a 61  y.o. female, here for follow up on management of ER positive breast cancer currently on adjuvant therapy with letrozole.  Luana returns today earlier than her previously scheduled appointment due to concerns over feeling poorly the past several weeks.  She states that up until about 3 weeks ago she was feeling great, all of a sudden she began having upper back pain between her shoulder blades that hurt worse when she took a deep breath, she also has some pain in her chest at times.  Her back pain does not radiate.  Does not seem to be affected by activity.  She has fatigue.  She reports the pain in her upper back seems to be improving slightly over time but is still there.  She does have occasional cough, no fevers or chills.  No unusual shortness of breath.  She does have a history of smoking.  Appetite is normal, no change in her bowel or bladder habits.  She had been exercising fairly regularly however stopped a while back due to pain in her left heel and that is improving.    Past Medical History:   Diagnosis Date   • Breast cancer (HCC)    • Diabetes mellitus (HCC)    • Diverticulitis    • GERD (gastroesophageal reflux disease)    • Hypokalemia    • Kidney stone    • Lichen sclerosus    • Long term (current) use of insulin (HCC)    • Malignant neoplasm of upper-outer quadrant of right female breast (HCC) 01/21/2020   • Microalbuminuria    • Nephrolithiasis    • Sciatica    • Type 2 diabetes mellitus, uncontrolled    • Wears contact lenses      Past Surgical History:   Procedure Laterality Date   • APPENDECTOMY     • BREAST BIOPSY Right    • BREAST EXCISIONAL BIOPSY Right    • CHOLECYSTECTOMY     • COLON SURGERY     • COLONOSCOPY     • HYSTERECTOMY     • LEG SURGERY      leg fracture surgery-Abstracted from [employer]   • LUMBAR LAMINECTOMY DISCECTOMY DECOMPRESSION N/A 05/24/2019    Procedure: LUMBAR LAMINECTOMY L4-5 AND L5-S1;  Surgeon: Hipolito Kahn MD;  Location: [facility];  Service: Orthopedic Spine   •  "MASTECTOMY Bilateral    • MASTECTOMY W/ SENTINEL NODE BIOPSY Bilateral 01/21/2020    Procedure: SKIN SPARING MASTECTOMIES BILATERAL, SENTINEL NODE BIOPSY RIGHT;  Surgeon: Silvio Howard MD;  Location: [facility] OR;  Service: General   • TUBAL ABDOMINAL LIGATION     • VENOUS ACCESS DEVICE (PORT) REMOVAL N/A 03/03/2020    Procedure: REMOVAL PORT;  Surgeon: Silvio Howard MD;  Location:  [facility];  Service: General;  Laterality: N/A;       Allergies   Allergen Reactions   • Bactrim [Sulfamethoxazole-Trimethoprim] Rash     RASH, SKIN PEELING        Family History and Social History reviewed and changed as necessary    REVIEW OF SYSTEM:   Chest pain, occasional cough, upper back pain    PHYSICAL EXAM:  Well-developed, well-nourished healthy-appearing female in no distress  No cervical, supraclavicular or axillary nodes palpable on exam  Lungs clear to auscultation bilaterally, respirations even and unlabored.  Oxygen saturation 98% on room air  Skin without rash.  She ambulates without assistance, gait is normal.  No musculoskeletal weakness    Vitals:    09/29/22 1323   BP: 99/70   Pulse: 83   Resp: 18   Temp: 98 °F (36.7 °C)   SpO2: 98%   Weight: 60.8 kg (134 lb)   Height: 167.6 cm (66\")     Vitals:    09/29/22 1323   PainSc:   8   PainLoc: Back  Comment: upper back          ECOG score: 0           Vitals reviewed.  Labs available in epic reviewed    ECOG: (0) Fully Active - Able to Carry On All Pre-disease Performance Without Restriction    Lab Results   Component Value Date    HGB 13.1 03/23/2021    HCT 38.0 03/23/2021    MCV 95.7 03/23/2021     03/23/2021    WBC 7.91 03/23/2021    NEUTROABS 5.17 03/23/2021    LYMPHSABS 1.78 03/23/2021    MONOSABS 0.74 03/23/2021    EOSABS 0.16 03/23/2021    BASOSABS 0.04 03/23/2021       Lab Results   Component Value Date    GLUCOSE 69 03/29/2022    BUN 10 03/29/2022    CREATININE 0.68 03/29/2022     03/29/2022    K 4.2 03/29/2022     03/29/2022    [facility] " 27.6 03/29/2022    CALCIUM 9.9 03/29/2022    PROTEINTOT 6.9 03/29/2022    ALBUMIN 4.40 03/29/2022    BILITOT 0.4 03/29/2022    ALKPHOS 68 03/29/2022    AST 16 03/29/2022    ALT 15 03/29/2022             ASSESSMENT & PLAN:    1.  Acute upper back pain  2.  Chest pain  3.  History of smoking >30 pack years  4.  Right breast cancer, T1 cN0 M0 ER 5% weakly positive, CT 50% positive, HER2/ashley 100% positive, completed chemotherapy and HER2 directed therapy March 2021, currently on adjuvant therapy with letrozole  5.  Fatigue    Discussion: Luana has not been feeling well over the last 3 weeks with upper back pain, fatigue and chest pain.  She states that it feels musculoskeletal in nature.  She has no associated shortness of breath, she has occasional cough.  She does have a history of smoking for greater than 30 years.  I will get CT of her chest for further evaluation.  She states that she had CT of her chest sometime ago for screening purposes.  I will also get a bone scan in light of her upper back pain.  I will see her back in a week for follow-up to go over the results.  I will also check her CBC and CMP to look for any anemia or abnormalities that could contribute to her fatigue. I reviewed labs that she had recently in epic and she had a normal TSH in March.  She follows with endocrinology for her diabetes, she is done a good job on managing that, her most recent hemoglobin A1c this month was 6.5.    Return to clinic in 1 week for follow-up    I spent 30 minutes caring for Luana on this date of service. This time includes time spent by me in the following activities: preparing for the visit, reviewing tests, performing a medically appropriate examination and/or evaluation, ordering medications, tests, or procedures and documenting information in the medical record.     Almita Pruitt, APRN    09/29/2022

## 2022-09-30 RX ORDER — METFORMIN HYDROCHLORIDE 500 MG/1
1000 TABLET, EXTENDED RELEASE ORAL 2 TIMES DAILY
Qty: 360 TABLET | Refills: 3 | Status: SHIPPED | OUTPATIENT
Start: 2022-09-30

## 2022-10-06 ENCOUNTER — OFFICE VISIT (OUTPATIENT)
Dept: ONCOLOGY | Facility: CLINIC | Age: 62
End: 2022-10-06

## 2022-10-06 VITALS
SYSTOLIC BLOOD PRESSURE: 132 MMHG | TEMPERATURE: 98.2 F | HEIGHT: 66 IN | DIASTOLIC BLOOD PRESSURE: 84 MMHG | WEIGHT: 134 LBS | HEART RATE: 66 BPM | RESPIRATION RATE: 18 BRPM | OXYGEN SATURATION: 98 % | BODY MASS INDEX: 21.53 KG/M2

## 2022-10-06 DIAGNOSIS — R91.8 MULTIPLE PULMONARY NODULES DETERMINED BY COMPUTED TOMOGRAPHY OF LUNG: ICD-10-CM

## 2022-10-06 DIAGNOSIS — Z17.0 MALIGNANT NEOPLASM OF UPPER-OUTER QUADRANT OF RIGHT BREAST IN FEMALE, ESTROGEN RECEPTOR POSITIVE: Primary | ICD-10-CM

## 2022-10-06 DIAGNOSIS — D69.6 ISOLATED THROMBOCYTOPENIA: ICD-10-CM

## 2022-10-06 DIAGNOSIS — C50.411 MALIGNANT NEOPLASM OF UPPER-OUTER QUADRANT OF RIGHT BREAST IN FEMALE, ESTROGEN RECEPTOR POSITIVE: Primary | ICD-10-CM

## 2022-10-06 DIAGNOSIS — Z87.891 HISTORY OF TOBACCO ABUSE: ICD-10-CM

## 2022-10-06 PROCEDURE — 99214 OFFICE O/P EST MOD 30 MIN: CPT | Performed by: NURSE PRACTITIONER

## 2022-10-06 RX ORDER — AMOXICILLIN AND CLAVULANATE POTASSIUM 875; 125 MG/1; MG/1
1 TABLET, FILM COATED ORAL 2 TIMES DAILY
Qty: 14 TABLET | Refills: 0 | Status: SHIPPED | OUTPATIENT
Start: 2022-10-06 | End: 2022-10-12

## 2022-10-06 NOTE — PROGRESS NOTES
CHIEF COMPLAINT: Right upper back and chest pain    Problem List:  Oncology/Hematology History Overview Note   1. 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  ER weakly 5% 1+ positive, NE 50% 1-2+ positive, HER-2/ashley 100% 3+ positive.  Negative cancer next panel  2. Significant diabetes with predating neuropathy due to spinal stenosis status post laminectomy 5/24/2019 with persistent neuropathy dorsum left foot  3. MRSA bacteremia due to port infection March 2020 after course 1 day 1 of Herceptin Taxol    Oncology history timeline:  -5/24/2019 Dr. Garcia saw for spinal stenosis with radiculopathy and left foot drop due to herniated disc and performed decompressive laminectomy, L4-5 and L5-S1 discectomy and medial facetectomy  -10/15/2019 mammogram BI-RADS 0  -11/22/2019 right mammogram/ultrasound BI-RADS 4 with ultrasound-guided core biopsy showing invasive ductal carcinoma [place] score 6 out of 9 grade 2, ER 5% 1+ positive NE 50% 1-2+ positive, HER-2/ashley 100% 3+ positive.  -12/13/2019 MRI breasts revealed 2.2 cm right breast mass consistent with biopsy proven cancer with unsuspected adjacent area's of non-mass enhancement worrisome for DCIS.  Non-mass enhancement in the subareolar left breast worrisome for DCIS.  Dominant focus left central portion left breast indeterminate BI-RADS 4.  Cancer next panel negative  -1/15/2020 CMP unremarkable save for glucose 200 with hemoglobin 10.6 normochromic normocytic indices  -1/21/2020 right skin sparing mastectomy with right sentinel lymph node injection and right deep subfascial sentinel lymph node biopsy with contralateral prophylactic left skin sparing mastectomy.  Right breast 1.8 cm Bloom Dias 8 out of 9, total of 4 lymph nodes examined 2 sentinel nodes all negative.  Pathological T1 cN0 M0 stage Ia.  Left  breast benign with sclerosing adenosis.  -2/26/2020 started Herceptin Taxol weekly  -3/2/2020 port removed due to MRSA bacteremia with port infection/purulence.  -3/2/2020 through 3/6/2020 hospitalized for MRSA bacteremia from port.  -3/31/2020 [facility] tele-visit: Patient still on IV antibiotics for MRSA port infection.  Decision made to go with Kanjinti alone every 3 weeks along with Arimidex.  -4/13/2020 per ID, patient has completed IV antibiotics and is now on oral antibiotics.  -3/23/2021 completed 1 year Kanjinti.  Recommendation to complete 10 years of Arimidex if can tolerate.  5-7 years if not.    -6/16/2021 [facility] follow-up:  Intolerant of Arimidex with hot flashes and mood swings.  Therapy switched to Letrozole.      -9/15/2021 [facility] follow-up:  She tolerated therapy with Letrozole better than she did with Arimidex but she still is having significant hot flashes and mood disturbance with easy agitation.  She is finding it difficult to work as stress makes these issues worse.  She is going to apply for disability senior living.  She is on Wellbutrin and Lexapro.  We discussed at length her options for hormonal blockade and she wants to continue trying.  At this time I will switch her to tamoxifen.  I spoke with our pharmacist Irma Arechiga, Pharm.D. about changing her antidepressants as her current antidepressants can interfere with tamoxifen.  We will have her stop Wellbutrin, she will start Effexor 37.5 mg daily for 7 days and if tolerated will increase to 75 mg daily, if this is tolerated then we will consider increasing up to 150 mg daily.  I am hoping that Effexor will help mitigate some of her hot flashes along with helping her depression.  She will taper off of her Lexapro over 2 weeks and then stop.  We discussed counseling as I think this will be greatly beneficial to Luana with her dysthymia and helping to deal with stress at work and living with breast  cancer.  She did not want me to make the referral but I did give her a brochure and she assures me that she will call and make an appointment.  I plan to call and check on her in about 3 weeks to see how she is tolerating the change in her antidepressants, we will also see if she has made an appointment with CECI Vega for counseling and to see how she is tolerating tamoxifen.  She is going to stop her Lexapro and wait about a week before starting tamoxifen so that she can tell if she is going to have any side effects with it.  I will see her back in 2 months for follow-up and sooner if needed.  We had planned on 5-7 years of therapy with hormonal blockade, I discussed with her today that with her ongoing side effects we would likely only do 5 years and hopefully we can get through that.  She was only weakly ER positive.    -10/14/2021 Did not tolerate Tamoxifen, went back to Letrozole    -11/17/2021 [facility] clinic follow-up: Luana is tolerating letrozole much better than tamoxifen.  She continues to have significant hot flashes, she is not sure the Effexor is helping.  She did have to go back to the 37.5 mg dose as she did not tolerate the 75 mg dose.  I discussed with her that she could switch to a different antidepressant as far as we were concerned, there is no interaction with the letrozole, we had only switched her to Effexor when we were trying tamoxifen and also we had hoped it would help with her hot flashes.  She is following with Emeli Suero and will discuss with her at her next appointment.  I also recommended acupuncture for her hot flashes as there is data showing that this may be helpful.  We also discussed adding gabapentin but when we reviewed the potential side effects of constipation and lethargy she did not want to try that.  She continues to complain of low back pain with some neuropathy in her left lower extremity.  She does have a history of spinal stenosis, I will get an MRI of  her lumbar spine for further evaluation and call her with the results.  Assuming there is no involvement of metastatic disease but just symptoms from her spinal stenosis she will need to follow-up with her neurosurgeon.  She has a history of laminectomy.    -5/18/2022 [facility] clinic follow-up: Luana continues to tolerate adjuvant therapy with letrozole, she was previously intolerant of anastrozole and tamoxifen.  She continues to have hot flashes but these seem to be more tolerable.  She has no new worrisome symptoms.  We plan on 5 years of adjuvant therapy with an AI.  She has had mastectomies therefore does not need mammograms.  She will need bone density repeated this fall for 2-year follow-up, previous bone density showed osteopenia.  She may benefit from bisphosphonate but we will see what her next bone density scan shows.  She does take calcium and vitamin D and remains active and walks regularly.  Her diabetes is under good control, she follows with endocrinology.  Her back pain is better, she is now on gabapentin under the direction of her surgeon.    -9/29/2022 [facility] follow-up: Luana called for an appointment due to not feeling well with upper back pain, fatigue and chest pain.  CT chest, bone scan, CBC, CMP ordered for evaluation.    -10/4/2022 total body bone scan negative for osseous metastatic disease, benign/arthritic/degenerative/posttraumatic changes in the cervical spine, shoulders, acromioclavicular joints (moderate uptake), sternoclavicular joints, right greater than left, elbows, wrists and hands, thoracic spine, lumbosacral spine, sacroiliac joints, hips, knees, ankles and feet.  CT chest with small 3-4 mm bilateral pulmonary nodules.  Right apical groundglass and consolidative opacities could be posttreatment change versus infectious.  -10/6/2022 [facility] clinic follow-up: Reviewed the above with the patient.  No signs of recurrent or metastatic breast cancer.   Will treat with Augmentin empirically.  Suspect most of her pain is due to her arthritis/degenerative changes, recommended she follow-up with PCP if no improvement, may benefit from rheumatology referral.  We will repeat CT chest in 3 months.     Malignant neoplasm of upper-outer quadrant of right breast in female, estrogen receptor positive (HCC)   1/16/2019 Cancer Staged    Staging form: Breast, AJCC 8th Edition  - Clinical stage from 1/16/2019: Stage IB (cT2, cN0, cM0, G2, ER+, AL+, HER2+) - Signed by Lisa Herman MD on 1/27/2020 1/21/2020 Initial Diagnosis    Malignant neoplasm of upper-outer quadrant of right female breast (CMS/HCC)     2/11/2020 Cancer Staged    Staging form: Breast, AJCC 8th Edition  - Pathologic: Stage IA (pT1c, pN0, cM0, G3, ER+, AL+, HER2+) - Signed by Hero Gomez MD on 2/11/2020 2/18/2020 -  Other Event    Echocardiogram  · Left ventricular systolic function is normal. Estimated EF = 55%.  · The global longitudinal strain was -19.5%.     2/25/2020 - 2/26/2020 Chemotherapy    OP CENTRAL VENOUS ACCESS DEVICE ACCESS, CARE, AND MAINTENANCE (CVAD)     2/26/2020 - 3/10/2020 Chemotherapy    OP BREAST PACLitaxel / Trastuzumab-anns (Weekly X 12)     3/2/2020 Adverse Reaction    MRSA bacteremia due to port infection with port removal after day 1 course 1 Herceptin Taxol     3/31/2020 - 6/16/2021 Hormonal Therapy    Arimidex for planned 5 years.    6/16/2021 therapy changed to Letrozole due to intolerance to Arimidex.  9/15/2021 changed to Tamoxifen kruse to intolerance of letrozole.  10/14/2021 changed back to Letrozole due to intolerance of tamoxifen.       4/2/2020 -  Chemotherapy    Completed 3/23/2021  OP BREAST Trastuzumab-anns Q21D (maintenance)     5/27/2020 -  Other Event    5/27/2020 echocardiogram EF 55%      7/23/2020 Procedure    Colonoscopy with Dr. Marte, normal     8/26/2020 -  Other Event    Echocardiogram   · This was a limited echocardiogram to assess left  ventricular ejection fraction in the setting of chemotherapy.  · Left ventricular systolic function is normal. Calculated EF = 55.1%. Estimated EF = 55%. Global Longitudinal LV strain = -18.2%.  · Estimated EF = 55%.     11/9/2020 Imaging    DEXA bone density IMPRESSION:  Osteopenia of the femoral necks bilaterally, and total hips  bilaterally.     11/10/2020 -  Other Event    Echocardogram   · This was a limited echocardiogram to assess left ventricular function only.  · Left ventricular systolic function is normal. Left ventricular ejection fraction appears to be 56 - 60%.  · Global longitudinal LV strain (GLS) = 19.7%.     2/23/2021 Imaging    -2/23/2021 left hip 2 view x-ray negative     12/1/2021 Imaging    MRI of the lumbar spine with and without contrast: Overall no significant interval change in the appearance of the lumbar spine since previous 5/7/2020 comparison study.  There are postoperative changes at L5-S1 with enhancing epidural scar in the left lateral recess and there are multilevel degenerative changes with canal in foraminal encroachment.         HISTORY OF PRESENT ILLNESS:  The patient is a 61 y.o. female, here for follow up on management of ER positive breast cancer currently on adjuvant therapy with letrozole.  Luana returns today to go over her bone scan and CT of the chest that was done in light of her right upper back and chest pain.  She continues to report pain in her right upper chest and subscapular area with movement it is worse.  She denies any shortness of breath, no unusual cough.  She has had no fevers or chills.  Also has some left heel pain for which she is scheduled to see a podiatrist.      Past Medical History:   Diagnosis Date   • Breast cancer (HCC)    • Diabetes mellitus (HCC)    • Diverticulitis    • GERD (gastroesophageal reflux disease)    • Hypokalemia    • Kidney stone    • Lichen sclerosus    • Long term (current) use of insulin (HCC)    • Malignant neoplasm of  "upper-outer quadrant of right female breast (HCC) 01/21/2020   • Microalbuminuria    • Nephrolithiasis    • Sciatica    • Type 2 diabetes mellitus, uncontrolled    • Wears contact lenses      Past Surgical History:   Procedure Laterality Date   • APPENDECTOMY     • BREAST BIOPSY Right    • BREAST EXCISIONAL BIOPSY Right    • CHOLECYSTECTOMY     • COLON SURGERY     • COLONOSCOPY     • HYSTERECTOMY     • LEG SURGERY      leg fracture surgery-Abstracted from [employer]   • LUMBAR LAMINECTOMY DISCECTOMY DECOMPRESSION N/A 05/24/2019    Procedure: LUMBAR LAMINECTOMY L4-5 AND L5-S1;  Surgeon: Hipolito Kahn MD;  Location:  [facility];  Service: Orthopedic Spine   • MASTECTOMY Bilateral    • MASTECTOMY W/ SENTINEL NODE BIOPSY Bilateral 01/21/2020    Procedure: SKIN SPARING MASTECTOMIES BILATERAL, SENTINEL NODE BIOPSY RIGHT;  Surgeon: Silvio Howard MD;  Location:  [facility];  Service: General   • TUBAL ABDOMINAL LIGATION     • VENOUS ACCESS DEVICE (PORT) REMOVAL N/A 03/03/2020    Procedure: REMOVAL PORT;  Surgeon: Silvio Howard MD;  Location:  [facility];  Service: General;  Laterality: N/A;       Allergies   Allergen Reactions   • Bactrim [Sulfamethoxazole-Trimethoprim] Rash     RASH, SKIN PEELING        Family History and Social History reviewed and changed as necessary    REVIEW OF SYSTEM:   Right-sided chest and upper back pain    PHYSICAL EXAM:  Well-developed, well-nourished healthy-appearing female in no distress  No cervical, supraclavicular or axillary nodes palpable on exam  Lungs clear to auscultation bilaterally, respirations even and unlabored.  Oxygen saturation 98% on room air      Vitals:    10/06/22 0921   BP: 132/84   Pulse: 66   Resp: 18   Temp: 98.2 °F (36.8 °C)   SpO2: 98%   Weight: 60.8 kg (134 lb)   Height: 167.6 cm (66\")     Vitals:    10/06/22 0921   PainSc:   8   PainLoc: Chest  Comment: right chest and shoulder     Luana Chawla reports a pain score of 8.  Given her pain " assessment as noted, treatment options were discussed and the following options were decided upon as a follow-up plan to address the patient's pain: encouraged her to follow up with her PCP if no improvement.         ECOG score: 0           Vitals reviewed.  Labs reviewed along with results of bone scan and CT chest.  10/4/2022 CBC with WBC 10,000, hemoglobin 13, hematocrit 39.1%, platelets 437,000, ANC 7.2.  CMP was unremarkable, sodium 139, potassium 4.4, BUN 10, creatinine 0.65, glucose 166, calcium 9.7, total bilirubin 0.3, AST 9 ALT 22 alkaline phosphatase 94.    ECOG: (0) Fully Active - Able to Carry On All Pre-disease Performance Without Restriction          ASSESSMENT & PLAN:    1.  Right chest and subscapular pain with movement  2.  History of smoking >30 pack years  3.  Significant arthritic/degenerative changes on bone scan 10/4/2022, no evidence of osseous metastasis  4.  Small 3-4 mm bilateral pulmonary nodules and right apical groundglass and consolidative opacities on CT chest 10/4/2022  5.  Right breast cancer, T1 cN0 M0 ER 5% weakly positive, [place] 50% positive, HER2/ashley 100% positive, completed chemotherapy and HER2 directed therapy March 2021, currently on adjuvant therapy with letrozole    Discussion: Luana returns today to go over the results of her CT chest and bone scan in light of her right upper chest and subscapular pain.  Her pain is worse with movement.  Bone scan shows significant arthritic and degenerative changes but no osseous metastasis.  I suspected that her pain is coming either from her cervical spine arthritis or perhaps something going on in the right shoulder.  I recommend that she follow-up with her PCP if this does not improve, she may benefit from physical therapy or further imaging but I defer to her PCP.  CT chest with small 3-4 mm bilateral pulmonary nodules and also some right apical groundglass and consolidative opacities.  I will treat her empirically with Augmentin but  she has no outward signs of infectious process, she has had no fevers, no significant cough or shortness of breath.  I doubt that her pain is due to this.  I will repeat CT chest in 3 months for follow-up.  She continues on letrozole unchanged.    Return to clinic in 3 months for follow-up    I spent 30 minutes caring for Luana on this date of service. This time includes time spent by me in the following activities: preparing for the visit, reviewing tests, performing a medically appropriate examination and/or evaluation, counseling and educating the patient/family/caregiver, ordering medications, tests, or procedures and documenting information in the medical record.     Almita Pruitt, APRN    10/06/2022

## 2022-10-12 ENCOUNTER — OFFICE VISIT (OUTPATIENT)
Dept: FAMILY MEDICINE CLINIC | Facility: CLINIC | Age: 62
End: 2022-10-12

## 2022-10-12 VITALS
WEIGHT: 136 LBS | TEMPERATURE: 97.8 F | HEIGHT: 66 IN | OXYGEN SATURATION: 96 % | SYSTOLIC BLOOD PRESSURE: 112 MMHG | BODY MASS INDEX: 21.86 KG/M2 | HEART RATE: 76 BPM | DIASTOLIC BLOOD PRESSURE: 72 MMHG

## 2022-10-12 DIAGNOSIS — M54.2 CERVICALGIA: ICD-10-CM

## 2022-10-12 DIAGNOSIS — M19.90 ARTHRITIS: Primary | ICD-10-CM

## 2022-10-12 PROCEDURE — 99213 OFFICE O/P EST LOW 20 MIN: CPT | Performed by: NURSE PRACTITIONER

## 2022-10-12 RX ORDER — MELOXICAM 15 MG/1
15 TABLET ORAL DAILY
Qty: 90 TABLET | Refills: 1 | Status: SHIPPED | OUTPATIENT
Start: 2022-10-12 | End: 2022-12-15

## 2022-10-12 RX ORDER — SEMAGLUTIDE 1.34 MG/ML
INJECTION, SOLUTION SUBCUTANEOUS SEE ADMIN INSTRUCTIONS
COMMUNITY
End: 2022-12-15

## 2022-10-12 RX ORDER — CALCIUM ACETATE 667 MG/1
1 TABLET ORAL EVERY 8 HOURS SCHEDULED
COMMUNITY

## 2022-10-12 RX ORDER — INSULIN GLARGINE 100 [IU]/ML
INJECTION, SOLUTION SUBCUTANEOUS SEE ADMIN INSTRUCTIONS
COMMUNITY

## 2022-10-12 RX ORDER — CYCLOBENZAPRINE HCL 10 MG
10 TABLET ORAL 3 TIMES DAILY PRN
Qty: 60 TABLET | Refills: 0 | Status: SHIPPED | OUTPATIENT
Start: 2022-10-12

## 2022-10-12 NOTE — PROGRESS NOTES
"Chief Complaint  Arthritis    Subjective        Luana Chawla presents to [facility]  History of Present Illness she has been hurting on her right side in her neck and shoulder and upper back. She has been to her oncologist and had a CT scan of her upper chest and everything was negative. She has no known injury.  Her bone scan showed arthritis in all of her major joints.     Objective   Vital Signs:  /72 (BP Location: Left arm, Patient Position: Sitting, Cuff Size: Adult)   Pulse 76   Temp 97.8 °F (36.6 °C) (Temporal)   Ht 167.6 cm (66\")   Wt 61.7 kg (136 lb)   SpO2 96%   BMI 21.95 kg/m²   Estimated body mass index is 21.95 kg/m² as calculated from the following:    Height as of this encounter: 167.6 cm (66\").    Weight as of this encounter: 61.7 kg (136 lb).    BMI is within normal parameters. No other follow-up for BMI required.      Physical Exam  Vitals and nursing note reviewed.   Constitutional:       Appearance: Normal appearance.   HENT:      Head: Normocephalic and atraumatic.      Right Ear: Tympanic membrane and ear canal normal.      Left Ear: Tympanic membrane and ear canal normal.      Nose: Nose normal.      Mouth/Throat:      Pharynx: Oropharynx is clear.   Eyes:      Extraocular Movements: Extraocular movements intact.      Conjunctiva/sclera: Conjunctivae normal.      Pupils: Pupils are equal, round, and reactive to light.   Cardiovascular:      Rate and Rhythm: Normal rate and regular rhythm.      Heart sounds: Normal heart sounds.   Pulmonary:      Effort: Pulmonary effort is normal.      Breath sounds: Normal breath sounds.   Abdominal:      General: Abdomen is flat. Bowel sounds are normal. There is no distension.      Palpations: Abdomen is soft.      Tenderness: There is no abdominal tenderness. There is no guarding or rebound.   Musculoskeletal:         General: No swelling or tenderness. Normal range of motion.      Cervical back: Normal " range of motion and neck supple.      Right lower leg: No edema.      Left lower leg: No edema.      Comments: No swelling of her joints, no redness or deformity.   Skin:     General: Skin is warm and dry.   Neurological:      General: No focal deficit present.      Mental Status: She is alert and oriented to person, place, and time. Mental status is at baseline.   Psychiatric:         Mood and Affect: Mood normal.         Behavior: Behavior normal.         Thought Content: Thought content normal.         Judgment: Judgment normal.        Result Review :                Assessment and Plan   Diagnoses and all orders for this visit:    1. Arthritis (Primary)  Assessment & Plan:  Will try Mobic daily and see if this helps. We discussed side effects and benfits. She is to f/u in one month.    Orders:  -     meloxicam (Mobic) 15 MG tablet; Take 1 tablet by mouth Daily.  Dispense: 90 tablet; Refill: 1  -     cyclobenzaprine (FLEXERIL) 10 MG tablet; Take 1 tablet by mouth 3 (Three) Times a Day As Needed for Muscle Spasms.  Dispense: 60 tablet; Refill: 0    2. Cervicalgia  Assessment & Plan:  Will try Mobic and see if this relieves some of her symptoms.    Orders:  -     Ambulatory Referral to Physical Therapy Evaluate and treat  -     cyclobenzaprine (FLEXERIL) 10 MG tablet; Take 1 tablet by mouth 3 (Three) Times a Day As Needed for Muscle Spasms.  Dispense: 60 tablet; Refill: 0           Follow Up   Return in about 4 weeks (around 11/9/2022) for Recheck.  Patient was given instructions and counseling regarding her condition or for health maintenance advice. Please see specific information pulled into the AVS if appropriate.       Answers for HPI/ROS submitted by the patient on 10/7/2022  Please describe your symptoms.: Arthritis diagnosis  Have you had these symptoms before?: Yes  How long have you been having these symptoms?: 1-2 weeks  Please list any medications you are currently taking for this condition.: Advil  What  is the primary reason for your visit?: Other

## 2022-10-13 PROBLEM — M54.2 CERVICALGIA: Status: ACTIVE | Noted: 2022-10-13

## 2022-10-13 PROBLEM — M19.90 ARTHRITIS: Status: ACTIVE | Noted: 2022-10-13

## 2022-10-20 ENCOUNTER — OFFICE VISIT (OUTPATIENT)
Dept: GYNECOLOGIC ONCOLOGY | Facility: CLINIC | Age: 62
End: 2022-10-20

## 2022-10-20 VITALS
HEART RATE: 85 BPM | OXYGEN SATURATION: 98 % | DIASTOLIC BLOOD PRESSURE: 64 MMHG | BODY MASS INDEX: 22.37 KG/M2 | WEIGHT: 138.6 LBS | SYSTOLIC BLOOD PRESSURE: 120 MMHG | TEMPERATURE: 98.4 F | RESPIRATION RATE: 15 BRPM

## 2022-10-20 DIAGNOSIS — Z85.3 HISTORY OF BREAST CANCER: ICD-10-CM

## 2022-10-20 DIAGNOSIS — Z01.419 WELL WOMAN EXAM WITH ROUTINE GYNECOLOGICAL EXAM: Primary | ICD-10-CM

## 2022-10-20 PROCEDURE — 99396 PREV VISIT EST AGE 40-64: CPT | Performed by: NURSE PRACTITIONER

## 2022-10-21 DIAGNOSIS — Z78.0 POST-MENOPAUSE: Primary | ICD-10-CM

## 2022-10-31 DIAGNOSIS — J40 BRONCHITIS: ICD-10-CM

## 2022-10-31 RX ORDER — GUAIFENESIN AND CODEINE PHOSPHATE 100; 10 MG/5ML; MG/5ML
SOLUTION ORAL
Qty: 240 ML | Refills: 0 | OUTPATIENT
Start: 2022-10-31

## 2022-11-09 ENCOUNTER — OFFICE VISIT (OUTPATIENT)
Dept: FAMILY MEDICINE CLINIC | Facility: CLINIC | Age: 62
End: 2022-11-09

## 2022-11-09 VITALS
SYSTOLIC BLOOD PRESSURE: 120 MMHG | HEART RATE: 88 BPM | OXYGEN SATURATION: 97 % | TEMPERATURE: 98.2 F | BODY MASS INDEX: 22.21 KG/M2 | WEIGHT: 138.2 LBS | DIASTOLIC BLOOD PRESSURE: 76 MMHG | HEIGHT: 66 IN

## 2022-11-09 DIAGNOSIS — M19.90 ARTHRITIS: Primary | ICD-10-CM

## 2022-11-09 DIAGNOSIS — R05.1 ACUTE COUGH: ICD-10-CM

## 2022-11-09 PROBLEM — E11.9 DIABETES MELLITUS WITHOUT COMPLICATION: Status: ACTIVE | Noted: 2022-02-18

## 2022-11-09 PROCEDURE — 99214 OFFICE O/P EST MOD 30 MIN: CPT | Performed by: NURSE PRACTITIONER

## 2022-11-09 NOTE — PROGRESS NOTES
"Chief Complaint  Arthritis and Cough (X3W)    Subjective        Luana Chawla presents to [facility]  History of Present Illness she had a recent bone scan from her oncologist. She was told she had arthritis all over.she does not have any rearranging of her joints and no swelling or redness. She has not been tested for RA.  She has been taking meloxicam with some relief.#2 cough, cold and congestion. She caught this from Covid + grandchildren. She did not test herself.  Most of her symptoms are gone except her cough that keeps her awake at night.     Objective   Vital Signs:  /76 (BP Location: Left arm, Patient Position: Sitting, Cuff Size: Adult)   Pulse 88   Temp 98.2 °F (36.8 °C) (Temporal)   Ht 167.6 cm (66\")   Wt 62.7 kg (138 lb 3.2 oz)   SpO2 97%   BMI 22.31 kg/m²   Estimated body mass index is 22.31 kg/m² as calculated from the following:    Height as of this encounter: 167.6 cm (66\").    Weight as of this encounter: 62.7 kg (138 lb 3.2 oz).    BMI is within normal parameters. No other follow-up for BMI required.      Physical Exam  Vitals and nursing note reviewed.   Constitutional:       Appearance: Normal appearance.   HENT:      Head: Normocephalic and atraumatic.      Right Ear: Tympanic membrane and ear canal normal.      Left Ear: Tympanic membrane and ear canal normal.      Nose: Nose normal.      Mouth/Throat:      Pharynx: Oropharynx is clear.   Eyes:      Extraocular Movements: Extraocular movements intact.      Conjunctiva/sclera: Conjunctivae normal.      Pupils: Pupils are equal, round, and reactive to light.   Cardiovascular:      Rate and Rhythm: Normal rate and regular rhythm.      Heart sounds: Normal heart sounds.   Pulmonary:      Effort: Pulmonary effort is normal.      Breath sounds: Normal breath sounds.   Abdominal:      General: Abdomen is flat. Bowel sounds are normal. There is no distension.      Palpations: Abdomen is soft.      " Tenderness: There is no abdominal tenderness. There is no guarding or rebound.   Musculoskeletal:         General: Normal range of motion.      Cervical back: Normal range of motion and neck supple.   Skin:     General: Skin is warm and dry.   Neurological:      General: No focal deficit present.      Mental Status: She is alert and oriented to person, place, and time. Mental status is at baseline.   Psychiatric:         Mood and Affect: Mood normal.         Behavior: Behavior normal.         Thought Content: Thought content normal.         Judgment: Judgment normal.        Result Review :                Assessment and Plan   Diagnoses and all orders for this visit:    1. Arthritis (Primary)  Assessment & Plan:  Continue meloxicam.  Inflammatory markers drawn.     Orders:  -     C-reactive protein; Future  -     TAMIE; Future  -     Rheumatoid factor; Future  -     CBC (No Diff); Future    2. Acute cough  Assessment & Plan:  I consulted Dr. Cast for a tussionex prescription for night time cough. She is not to drive while taking this medication.     Orders:  -     HYDROcod Polst-CPM Polst ER (Tussionex Pennkinetic ER) 10-8 MG/5ML ER suspension; Take 5 mL by mouth Every 12 (Twelve) Hours As Needed for Cough.  Dispense: 120 mL; Refill: 0           Follow Up   Return in about 6 months (around 5/9/2023) for Recheck.  Patient was given instructions and counseling regarding her condition or for health maintenance advice. Please see specific information pulled into the AVS if appropriate.       Answers for HPI/ROS submitted by the patient on 11/2/2022  Please describe your symptoms.: Arthritis pain, cough  Have you had these symptoms before?: Yes  How long have you been having these symptoms?: Greater than 2 weeks  Please list any medications you are currently taking for this condition.: Meloxicam  What is the primary reason for your visit?: Other

## 2022-11-09 NOTE — ASSESSMENT & PLAN NOTE
I consulted Dr. Cast for a tussionex prescription for night time cough. She is not to drive while taking this medication.

## 2022-11-10 LAB
ANA SER QL: NEGATIVE
CRP SERPL-MCNC: 2 MG/L (ref 0–10)
ERYTHROCYTE [DISTWIDTH] IN BLOOD BY AUTOMATED COUNT: 12 % (ref 11.7–15.4)
HCT VFR BLD AUTO: 35.9 % (ref 34–46.6)
HGB BLD-MCNC: 12.3 G/DL (ref 11.1–15.9)
MCH RBC QN AUTO: 31.2 PG (ref 26.6–33)
MCHC RBC AUTO-ENTMCNC: 34.3 G/DL (ref 31.5–35.7)
MCV RBC AUTO: 91 FL (ref 79–97)
PLATELET # BLD AUTO: 365 X10E3/UL (ref 150–450)
RBC # BLD AUTO: 3.94 X10E6/UL (ref 3.77–5.28)
WBC # BLD AUTO: 9.4 X10E3/UL (ref 3.4–10.8)

## 2022-11-18 ENCOUNTER — TELEPHONE (OUTPATIENT)
Dept: ENDOCRINOLOGY | Facility: CLINIC | Age: 62
End: 2022-11-18

## 2022-11-18 NOTE — TELEPHONE ENCOUNTER
Dexcom denied by insurance.  Patient does not meet insuliln requirement of 3 or more insulin injections daily.

## 2022-11-21 RX ORDER — ESCITALOPRAM OXALATE 10 MG/1
TABLET ORAL
Qty: 30 TABLET | Refills: 1 | Status: SHIPPED | OUTPATIENT
Start: 2022-11-21 | End: 2023-01-16

## 2022-11-21 NOTE — TELEPHONE ENCOUNTER
Rx Refill Note    Requested Prescriptions     Pending Prescriptions Disp Refills   • escitalopram (LEXAPRO) 10 MG tablet [Pharmacy Med Name: ESCITALOPRAM 10 MG TABLET] 30 tablet 1     Sig: TAKE ONE TABLET BY MOUTH DAILY        Last office visit with prescribing clinician: 11/9/2022      Next office visit with prescribing clinician: Visit date not found   Last labs:   Last refill: 08/30/2022   Pharmacy (be sure to add in Epic). correct

## 2022-11-29 NOTE — ASSESSMENT & PLAN NOTE
Will try Mobic and see if this relieves some of her symptoms.  
Will try Mobic daily and see if this helps. We discussed side effects and benfits. She is to f/u in one month.  
No previous uterine incision

## 2022-12-13 ENCOUNTER — TELEPHONE (OUTPATIENT)
Dept: ENDOCRINOLOGY | Facility: CLINIC | Age: 62
End: 2022-12-13

## 2022-12-13 NOTE — TELEPHONE ENCOUNTER
Insurance denied the appeal for Dexcom.  Per physician review, only approved if patient is using an intensive insulin regimen of three or more insulin injections daily or insulin pump therapy.

## 2022-12-15 ENCOUNTER — OFFICE VISIT (OUTPATIENT)
Dept: ENDOCRINOLOGY | Facility: CLINIC | Age: 62
End: 2022-12-15

## 2022-12-15 VITALS
DIASTOLIC BLOOD PRESSURE: 62 MMHG | SYSTOLIC BLOOD PRESSURE: 118 MMHG | HEIGHT: 66 IN | HEART RATE: 79 BPM | OXYGEN SATURATION: 96 % | WEIGHT: 136.8 LBS | BODY MASS INDEX: 21.98 KG/M2

## 2022-12-15 DIAGNOSIS — E11.65 UNCONTROLLED TYPE 2 DIABETES MELLITUS WITH HYPERGLYCEMIA: Primary | ICD-10-CM

## 2022-12-15 DIAGNOSIS — I10 BENIGN HYPERTENSION: ICD-10-CM

## 2022-12-15 LAB
EXPIRATION DATE: ABNORMAL
EXPIRATION DATE: NORMAL
GLUCOSE BLDC GLUCOMTR-MCNC: 245 MG/DL (ref 70–130)
HBA1C MFR BLD: 7.4 %
Lab: ABNORMAL
Lab: NORMAL

## 2022-12-15 PROCEDURE — 83036 HEMOGLOBIN GLYCOSYLATED A1C: CPT | Performed by: INTERNAL MEDICINE

## 2022-12-15 PROCEDURE — 99213 OFFICE O/P EST LOW 20 MIN: CPT | Performed by: INTERNAL MEDICINE

## 2022-12-15 PROCEDURE — 82947 ASSAY GLUCOSE BLOOD QUANT: CPT | Performed by: INTERNAL MEDICINE

## 2022-12-15 RX ORDER — SEMAGLUTIDE 2.68 MG/ML
2 INJECTION, SOLUTION SUBCUTANEOUS
Qty: 9 ML | Refills: 3 | Status: SHIPPED | OUTPATIENT
Start: 2022-12-15 | End: 2023-02-02 | Stop reason: DRUGHIGH

## 2022-12-15 NOTE — ASSESSMENT & PLAN NOTE
Diabetes is worsening. A1c above goal at 7.4%.  She has had trouble getting ozempic.  She says the 2mg dose is available at her pharmacy.  Continue current treatment regimen.  Can't get DexCom now either.  Diabetes will be reassessed in 3 months.

## 2022-12-15 NOTE — PROGRESS NOTES
"     Office Note      Date: 12/15/2022  Patient Name: Luana Chawla  MRN: [id number]  : 1960    Chief Complaint   Patient presents with   • Diabetes       History of Present Illness:   Luana Chawla is a 62 y.o. female who presents for Diabetes type 2. Diagnosed in: . Treated in past with insulin. Current treatments: metformin, ozempic and basal insulin. Number of insulin shots per day: 1. Checks blood sugar 1 time a day.  Has low blood sugar: occasional. Aspirin use: No - due to easy bruising/nosebleeds. Statin use: No - lipids have been okay. ACE-I/ARB use: No - no indication.. Changes in health since last visit: none. Last eye exam 2021.    Subjective      Diabetic Complications:  Eyes: No  Kidneys: No  Feet: No  Heart: No    Diet and Exercise:  Meals per day: 3  Minutes of exercise per week: 150 mins.    Review of Systems:   Review of Systems   Constitutional: Negative.    Cardiovascular: Negative.    Gastrointestinal: Negative.    Endocrine: Positive for heat intolerance.       The following portions of the patient's history were reviewed and updated as appropriate: allergies, current medications, past family history, past medical history, past social history, past surgical history and problem list.    Objective       Visit Vitals  /62   Pulse 79   Ht 167.6 cm (66\")   Wt 62.1 kg (136 lb 12.8 oz)   LMP  (LMP Unknown)   SpO2 96%   BMI 22.08 kg/m²       Physical Exam:  Physical Exam  Constitutional:       Appearance: Normal appearance.   Neurological:      Mental Status: She is alert.         Labs:    HbA1c  Lab Results   Component Value Date    HGBA1C 7.4 12/15/2022       CMP  Lab Results   Component Value Date    GLUCOSE 69 2022    BUN 10 2022    CREATININE 0.68 2022    EGFRIFNONA 117 2021    EGFRIFAFRI 110 2020    BCR 14.7 2022    K 4.2 2022    CO2 27.6 2022    CALCIUM 9.9 2022    PROTENTOTREF 6.5 2020    LABIL2 " 1.7 02/25/2020    AST 16 03/29/2022    ALT 15 03/29/2022        Lipid Panel  Lab Results   Component Value Date    HDL 70 (H) 03/29/2022    LDL 76 03/29/2022    TRIG 46 03/29/2022        TSH  Lab Results   Component Value Date    TSH 1.570 03/29/2022        Hemoglobin A1C  Lab Results   Component Value Date    HGBA1C 7.4 12/15/2022        Microalbumin/Creatinine  Lab Results   Component Value Date    MALBCRERATIO  03/29/2022      Comment:      Unable to calculate    MICROALBUR <1.2 03/29/2022           Assessment / Plan      Assessment & Plan:  Diagnoses and all orders for this visit:    1. Uncontrolled type 2 diabetes mellitus with hyperglycemia (HCC) (Primary)  Assessment & Plan:  Diabetes is worsening. A1c above goal at 7.4%.  She has had trouble getting ozempic.  She says the 2mg dose is available at her pharmacy.  Continue current treatment regimen.  Can't get DexCom now either.  Diabetes will be reassessed in 3 months.    Orders:  -     POC Glucose, Blood  -     POC Glycosylated Hemoglobin (Hb A1C)    2. Benign hypertension  Assessment & Plan:  Hypertension is unchanged.  Continue current treatment regimen.  Blood pressure will be reassessed at the next regular appointment.      Other orders  -     Semaglutide, 2 MG/DOSE, (Ozempic, 2 MG/DOSE,) 8 MG/3ML solution pen-injector; Inject 2 mg under the skin into the appropriate area as directed Every 7 (Seven) Days.  Dispense: 9 mL; Refill: 3      Return in about 3 months (around 3/15/2023) for Recheck with A1c, CMP, lipids, TSH, microalbumin, foot exam.    Jose Cummings MD   12/15/2022

## 2022-12-20 ENCOUNTER — HOSPITAL ENCOUNTER (OUTPATIENT)
Dept: BONE DENSITY | Facility: HOSPITAL | Age: 62
Discharge: HOME | End: 2022-12-20
Admitting: NURSE PRACTITIONER

## 2022-12-20 DIAGNOSIS — Z78.0 POST-MENOPAUSE: ICD-10-CM

## 2022-12-20 PROCEDURE — 77080 DXA BONE DENSITY AXIAL: CPT

## 2022-12-22 ENCOUNTER — TELEPHONE (OUTPATIENT)
Dept: GYNECOLOGIC ONCOLOGY | Facility: CLINIC | Age: 62
End: 2022-12-22

## 2022-12-22 NOTE — TELEPHONE ENCOUNTER
----- Message from CECI Mccullough sent at 12/21/2022  8:23 AM EST -----  Please call and notify of DEXA results, osteopenia.  on calcium and vitamin D supplementation. Plan to repeat scan in 2-3 years.   Thanks!        Called and gave patient the above information. She verbally understood.

## 2023-01-12 ENCOUNTER — OFFICE VISIT (OUTPATIENT)
Dept: ONCOLOGY | Facility: CLINIC | Age: 63
End: 2023-01-12
Payer: COMMERCIAL

## 2023-01-12 VITALS
WEIGHT: 138 LBS | DIASTOLIC BLOOD PRESSURE: 76 MMHG | TEMPERATURE: 98.3 F | HEIGHT: 66 IN | HEART RATE: 105 BPM | OXYGEN SATURATION: 98 % | BODY MASS INDEX: 22.18 KG/M2 | SYSTOLIC BLOOD PRESSURE: 127 MMHG | RESPIRATION RATE: 18 BRPM

## 2023-01-12 DIAGNOSIS — Z17.0 MALIGNANT NEOPLASM OF UPPER-OUTER QUADRANT OF RIGHT BREAST IN FEMALE, ESTROGEN RECEPTOR POSITIVE: Primary | ICD-10-CM

## 2023-01-12 DIAGNOSIS — C50.411 MALIGNANT NEOPLASM OF UPPER-OUTER QUADRANT OF RIGHT BREAST IN FEMALE, ESTROGEN RECEPTOR POSITIVE: Primary | ICD-10-CM

## 2023-01-12 PROCEDURE — 99214 OFFICE O/P EST MOD 30 MIN: CPT | Performed by: NURSE PRACTITIONER

## 2023-01-12 NOTE — PROGRESS NOTES
CHIEF COMPLAINT: Sinus headache    Problem List:  Oncology/Hematology History Overview Note   1. 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  ER weakly 5% 1+ positive, VT 50% 1-2+ positive, HER-2/ashley 100% 3+ positive.  Negative cancer next panel  2. Significant diabetes with predating neuropathy due to spinal stenosis status post laminectomy 5/24/2019 with persistent neuropathy dorsum left foot  3. MRSA bacteremia due to port infection March 2020 after course 1 day 1 of Herceptin Taxol    Oncology history timeline:  -5/24/2019 Dr. Garcia saw for spinal stenosis with radiculopathy and left foot drop due to herniated disc and performed decompressive laminectomy, L4-5 and L5-S1 discectomy and medial facetectomy  -10/15/2019 mammogram BI-RADS 0  -11/22/2019 right mammogram/ultrasound BI-RADS 4 with ultrasound-guided core biopsy showing invasive ductal carcinoma Cory score 6 out of 9 grade 2, ER 5% 1+ positive VT 50% 1-2+ positive, HER-2/ashley 100% 3+ positive.  -12/13/2019 MRI breasts revealed 2.2 cm right breast mass consistent with biopsy proven cancer with unsuspected adjacent area's of non-mass enhancement worrisome for DCIS.  Non-mass enhancement in the subareolar left breast worrisome for DCIS.  Dominant focus left central portion left breast indeterminate BI-RADS 4.  Cancer next panel negative  -1/15/2020 CMP unremarkable save for glucose 200 with hemoglobin 10.6 normochromic normocytic indices  -1/21/2020 right skin sparing mastectomy with right sentinel lymph node injection and right deep subfascial sentinel lymph node biopsy with contralateral prophylactic left skin sparing mastectomy.  Right breast 1.8 cm Bloom Dias 8 out of 9, total of 4 lymph nodes examined 2 sentinel nodes all negative.  Pathological T1 cN0 M0 stage Ia.  Left breast benign with  sclerosing adenosis.  -2/26/2020 started Herceptin Taxol weekly  -3/2/2020 port removed due to MRSA bacteremia with port infection/purulence.  -3/2/2020 through 3/6/2020 hospitalized for MRSA bacteremia from port.  -3/31/2020 [facility]: Patient still on IV antibiotics for MRSA port infection.  Decision made to go with Kanjinti alone every 3 weeks along with Arimidex.  -4/13/2020 per ID, patient has completed IV antibiotics and is now on oral antibiotics.  -3/23/2021 completed 1 year Kanjinti.  Recommendation to complete 10 years of Arimidex if can tolerate.  5-7 years if not.    -6/16/2021 [facility] follow-up:  Intolerant of Arimidex with hot flashes and mood swings.  Therapy switched to Letrozole.      -9/15/2021 [facility] follow-up:  She tolerated therapy with Letrozole better than she did with Arimidex but she still is having significant hot flashes and mood disturbance with easy agitation.  She is finding it difficult to work as stress makes these issues worse.  She is going to apply for disability detention.  She is on Wellbutrin and Lexapro.  We discussed at length her options for hormonal blockade and she wants to continue trying.  At this time I will switch her to tamoxifen.  I spoke with our pharmacist Irma Arechiga, Pharm.D. about changing her antidepressants as her current antidepressants can interfere with tamoxifen.  We will have her stop Wellbutrin, she will start Effexor 37.5 mg daily for 7 days and if tolerated will increase to 75 mg daily, if this is tolerated then we will consider increasing up to 150 mg daily.  I am hoping that Effexor will help mitigate some of her hot flashes along with helping her depression.  She will taper off of her Lexapro over 2 weeks and then stop.  We discussed counseling as I think this will be greatly beneficial to Luana with her dysthymia and helping to deal with stress at work and living with breast cancer.  She did not want  me to make the referral but I did give her a brochure and she assures me that she will call and make an appointment.  I plan to call and check on her in about 3 weeks to see how she is tolerating the change in her antidepressants, we will also see if she has made an appointment with CEC[facility] Vega for counseling and to see how she is tolerating tamoxifen.  She is going to stop her Lexapro and wait about a week before starting tamoxifen so that she can tell if she is going to have any side effects with it.  I will see her back in 2 months for follow-up and sooner if needed.  We had planned on 5-7 years of therapy with hormonal blockade, I discussed with her today that with her ongoing side effects we would likely only do 5 years and hopefully we can get through that.  She was only weakly ER positive.    -10/14/2021 Did not tolerate Tamoxifen, went back to Letrozole    -11/17/2021 [facility] clinic follow-up: Luana is tolerating letrozole much better than tamoxifen.  She continues to have significant hot flashes, she is not sure the Effexor is helping.  She did have to go back to the 37.5 mg dose as she did not tolerate the 75 mg dose.  I discussed with her that she could switch to a different antidepressant as far as we were concerned, there is no interaction with the letrozole, we had only switched her to Effexor when we were trying tamoxifen and also we had hoped it would help with her hot flashes.  She is following with Emeli Suero and will discuss with her at her next appointment.  I also recommended acupuncture for her hot flashes as there is data showing that this may be helpful.  We also discussed adding gabapentin but when we reviewed the potential side effects of constipation and lethargy she did not want to try that.  She continues to complain of low back pain with some neuropathy in her left lower extremity.  She does have a history of spinal stenosis, I will get an MRI of her lumbar spine for  further evaluation and call her with the results.  Assuming there is no involvement of metastatic disease but just symptoms from her spinal stenosis she will need to follow-up with her neurosurgeon.  She has a history of laminectomy.    -5/18/2022 [facility] follow-up: Luana continues to tolerate adjuvant therapy with letrozole, she was previously intolerant of anastrozole and tamoxifen.  She continues to have hot flashes but these seem to be more tolerable.  She has no new worrisome symptoms.  We plan on 5 years of adjuvant therapy with an AI.  She has had mastectomies therefore does not need mammograms.  She will need bone density repeated this fall for 2-year follow-up, previous bone density showed osteopenia.  She may benefit from bisphosphonate but we will see what her next bone density scan shows.  She does take calcium and vitamin D and remains active and walks regularly.  Her diabetes is under good control, she follows with endocrinology.  Her back pain is better, she is now on gabapentin under the direction of her surgeon.    -9/29/2022 [facility] follow-up: Luana called for an appointment due to not feeling well with upper back pain, fatigue and chest pain.  CT chest, bone scan, CBC, CMP ordered for evaluation.    -10/4/2022 total body bone scan negative for osseous metastatic disease, benign/arthritic/degenerative/posttraumatic changes in the cervical spine, shoulders, acromioclavicular joints (moderate uptake), sternoclavicular joints, right greater than left, elbows, wrists and hands, thoracic spine, lumbosacral spine, sacroiliac joints, hips, knees, ankles and feet.  CT chest with small 3-4 mm bilateral pulmonary nodules.  Right apical groundglass and consolidative opacities could be posttreatment change versus infectious.  -10/6/2022 [facility] follow-up: Reviewed the above with the patient.  No signs of recurrent or metastatic breast cancer.  Will treat with  Augmentin empirically.  Suspect most of her pain is due to her arthritis/degenerative changes, recommended she follow-up with PCP if no improvement, may benefit from rheumatology referral.  We will repeat CT chest in 3 months.     Malignant neoplasm of upper-outer quadrant of right breast in female, estrogen receptor positive (HCC)   1/16/2019 Cancer Staged    Staging form: Breast, AJCC 8th Edition  - Clinical stage from 1/16/2019: Stage IB (cT2, cN0, cM0, G2, ER+, MI+, HER2+) - Signed by Lisa Herman MD on 1/27/2020 1/21/2020 Initial Diagnosis    Malignant neoplasm of upper-outer quadrant of right female breast (CMS/HCC)     2/11/2020 Cancer Staged    Staging form: Breast, AJCC 8th Edition  - Pathologic: Stage IA (pT1c, pN0, cM0, G3, ER+, MI+, HER2+) - Signed by Hero Gomez MD on 2/11/2020 2/18/2020 -  Other Event    Echocardiogram  · Left ventricular systolic function is normal. Estimated EF = 55%.  · The global longitudinal strain was -19.5%.     2/25/2020 - 2/26/2020 Chemotherapy    OP CENTRAL VENOUS ACCESS DEVICE ACCESS, CARE, AND MAINTENANCE (CVAD)     2/26/2020 - 3/10/2020 Chemotherapy    OP BREAST PACLitaxel / Trastuzumab-anns (Weekly X 12)     3/2/2020 Adverse Reaction    MRSA bacteremia due to port infection with port removal after day 1 course 1 Herceptin Taxol     3/31/2020 - 6/16/2021 Hormonal Therapy    Arimidex for planned 5 years.    6/16/2021 therapy changed to Letrozole due to intolerance to Arimidex.  9/15/2021 changed to Tamoxifen kruse to intolerance of letrozole.  10/14/2021 changed back to Letrozole due to intolerance of tamoxifen.       4/2/2020 -  Chemotherapy    Completed 3/23/2021  OP BREAST Trastuzumab-anns Q21D (maintenance)     5/27/2020 -  Other Event    5/27/2020 echocardiogram EF 55%      7/23/2020 Procedure    Colonoscopy with Dr. Marte, normal     8/26/2020 -  Other Event    Echocardiogram   · This was a limited echocardiogram to assess left ventricular ejection  fraction in the setting of chemotherapy.  · Left ventricular systolic function is normal. Calculated EF = 55.1%. Estimated EF = 55%. Global Longitudinal LV strain = -18.2%.  · Estimated EF = 55%.     11/9/2020 Imaging    DEXA bone density IMPRESSION:  Osteopenia of the femoral necks bilaterally, and total hips  bilaterally.     11/10/2020 -  Other Event    Echocardogram   · This was a limited echocardiogram to assess left ventricular function only.  · Left ventricular systolic function is normal. Left ventricular ejection fraction appears to be 56 - 60%.  · Global longitudinal LV strain (GLS) = 19.7%.     2/23/2021 Imaging    -2/23/2021 left hip 2 view x-ray negative     12/1/2021 Imaging    MRI of the lumbar spine with and without contrast: Overall no significant interval change in the appearance of the lumbar spine since previous 5/7/2020 comparison study.  There are postoperative changes at L5-S1 with enhancing epidural scar in the left lateral recess and there are multilevel degenerative changes with canal in foraminal encroachment.         HISTORY OF PRESENT ILLNESS:  The patient is a 62 y.o. female, here for follow up on management of breast cancer currently on adjuvant therapy with letrozole.  Luana has been doing well since we saw her last with no new concerns.  She does report a headache today that she thinks is due to sinus congestion, she took Sudafed prior to her visit along with Tylenol.  No ongoing persistent headaches.  No neurological concerns.  She is tolerating letrozole with no significant side effects other than for hot flashes that are tolerable.    Past Medical History:   Diagnosis Date   • Arthritis    • Breast cancer (HCC)    • Diabetes mellitus (HCC)    • Diverticulitis    • GERD (gastroesophageal reflux disease)    • Hypokalemia    • Kidney stone    • Lichen sclerosus    • Long term (current) use of insulin (HCC)    • Malignant neoplasm of upper-outer quadrant of right female breast (HCC)  "01/21/2020   • Microalbuminuria    • Nephrolithiasis    • Sciatica    • Type 2 diabetes mellitus, uncontrolled    • Wears contact lenses      Past Surgical History:   Procedure Laterality Date   • APPENDECTOMY     • BREAST BIOPSY Right    • BREAST EXCISIONAL BIOPSY Right    • CHOLECYSTECTOMY     • COLON SURGERY     • COLONOSCOPY     • HYSTERECTOMY     • LEG SURGERY      leg fracture surgery-Abstracted from [employer]   • LUMBAR LAMINECTOMY DISCECTOMY DECOMPRESSION N/A 05/24/2019    Procedure: LUMBAR LAMINECTOMY L4-5 AND L5-S1;  Surgeon: Hipolito Kahn MD;  Location:  [facility];  Service: Orthopedic Spine   • MASTECTOMY Bilateral    • MASTECTOMY W/ SENTINEL NODE BIOPSY Bilateral 01/21/2020    Procedure: SKIN SPARING MASTECTOMIES BILATERAL, SENTINEL NODE BIOPSY RIGHT;  Surgeon: Silvio Howard MD;  Location:  [facility];  Service: General   • TUBAL ABDOMINAL LIGATION     • VENOUS ACCESS DEVICE (PORT) REMOVAL N/A 03/03/2020    Procedure: REMOVAL PORT;  Surgeon: Silvio Howard MD;  Location:  [facility];  Service: General;  Laterality: N/A;       Allergies   Allergen Reactions   • Bactrim [Sulfamethoxazole-Trimethoprim] Rash     RASH, SKIN PEELING        Family History and Social History reviewed and changed as necessary    REVIEW OF SYSTEM:   Sinus headache today otherwise no new somatic concerns    PHYSICAL EXAM:  Well-developed, well-nourished female in no distress  No cervical, supraclavicular or axillary nodes palpable on exam  Chest wall exam is benign status post bilateral mastectomies, no abnormal masses, nodules, rashes or lesions.  Respirations regular nonlabored, lungs clear  Heart regular rate and rhythm, slightly tachycardic with heart rate of 105    Vitals:    01/12/23 1051   BP: 127/76   Pulse: 105   Resp: 18   Temp: 98.3 °F (36.8 °C)   SpO2: 98%   Weight: 62.6 kg (138 lb)   Height: 167.6 cm (66\")     Vitals:    01/12/23 1051   PainSc: 10-Worst pain ever   PainLoc: Head  Comment: headache on " and off     Luana Chawla reports a pain score of 10.  Given her pain assessment as noted, treatment options were discussed and the following options were decided upon as a follow-up plan to address the patient's pain: continuation of current treatment plan for pain and Tylenol and or ibuprofen for headache.  Cautioned on the use of Sudafed.      ECOG score: 0           Vitals reviewed.  Labs available in epic reviewed along with results from bone density testing 12/20/2022 that showed osteopenia    ECOG: (0) Fully Active - Able to Carry On All Pre-disease Performance Without Restriction    Lab Results   Component Value Date    HGB 12.3 11/09/2022    HCT 35.9 11/09/2022    MCV 91 11/09/2022     11/09/2022    WBC 9.4 11/09/2022    NEUTROABS 5.17 03/23/2021    LYMPHSABS 1.78 03/23/2021    MONOSABS 0.74 03/23/2021    EOSABS 0.16 03/23/2021    BASOSABS 0.04 03/23/2021       Lab Results   Component Value Date    GLUCOSE 69 03/29/2022    BUN 10 03/29/2022    CREATININE 0.68 03/29/2022     03/29/2022    K 4.2 03/29/2022     03/29/2022    CO2 27.6 03/29/2022    CALCIUM 9.9 03/29/2022    PROTEINTOT 6.9 03/29/2022    ALBUMIN 4.40 03/29/2022    BILITOT 0.4 03/29/2022    ALKPHOS 68 03/29/2022    AST 16 03/29/2022    ALT 15 03/29/2022             ASSESSMENT & PLAN:    1. Right breast cancer, T1 cN0 M0 ER 5% weakly positive, ND 50% positive, HER2/ashley 100% positive, completed chemotherapy and HER2 directed therapy March 2021, currently on adjuvant therapy with letrozole  2.  Osteopenia  3.  CT scan October 2022 with small 3-4 mm bilateral pulmonary nodules  4.  Sinus headache    Discussion: Luana overall is doing well.  She has no evidence of recurrent breast cancer on clinical exam and no new worrisome symptoms.  She is tolerating letrozole for the most part, she has learned to deal with the hot flashes.  She does have a headache today but thinks that this is due to the weather changes and some sinus  congestion.  She does not have any persistent, daily worrisome headache.  She will let me know if anything changes.  She did take Sudafed this morning and her heart rate was increased today, I discussed with her that she should consider alternative decongestant such as phenylephrine.  She was to have had a CT of the chest prior to today's appointment however she forgot about that scan that was ordered.  This was ordered as a follow-up to the CT chest she had in October that showed small 3-4 mm bilateral pulmonary nodules and right apical groundglass and consolidative opacities for which she was treated with an antibiotic.  We will get that rescheduled and I will call her with the results.  She had bone density testing on 12/20/2022 that showed osteopenia with some improvement in the lumbar spine and a slight decrease in the right hip.  She continues on calcium and vitamin D and remains physically active.  She will continue letrozole unchanged for a planned 5 years adjuvant therapy through March 2025 and would consider longer if tolerated but it has been difficult for her.  She has tried Arimidex then went to letrozole then to tamoxifen and then back to letrozole.  She was ER weakly positive at 5% and AL positive at 50%.  Could also consider doing BCI testing to see if she would benefit from extended adjuvant therapy when it is closer to the time of 5 years.    Return to clinic in 6 months for follow-up    I spent 41 minutes caring for Luana on this date of service. This time includes time spent by me in the following activities: preparing for the visit, obtaining and/or reviewing a separately obtained history, performing a medically appropriate examination and/or evaluation, counseling and educating the patient/family/caregiver, ordering medications, tests, or procedures and documenting information in the medical record.     Almita Pruitt, APRN    01/12/2023

## 2023-01-16 RX ORDER — ESCITALOPRAM OXALATE 10 MG/1
TABLET ORAL
Qty: 30 TABLET | Refills: 1 | Status: SHIPPED | OUTPATIENT
Start: 2023-01-16 | End: 2023-02-17

## 2023-01-16 NOTE — TELEPHONE ENCOUNTER
Rx Refill Note    Requested Prescriptions     Pending Prescriptions Disp Refills   • escitalopram (LEXAPRO) 10 MG tablet [Pharmacy Med Name: ESCITALOPRAM 10 MG TABLET] 30 tablet 1     Sig: TAKE ONE TABLET BY MOUTH DAILY        Last office visit with prescribing clinician: 11/9/2022      Next office visit with prescribing clinician: 1/18/2023   Last labs:   Last refill: 11/21/2022   Pharmacy (be sure to add in Epic). correct

## 2023-01-17 ENCOUNTER — TELEPHONE (OUTPATIENT)
Dept: ONCOLOGY | Facility: CLINIC | Age: 63
End: 2023-01-17
Payer: COMMERCIAL

## 2023-01-17 DIAGNOSIS — G44.52 NEW DAILY PERSISTENT HEADACHE: ICD-10-CM

## 2023-01-17 DIAGNOSIS — J01.10 ACUTE NON-RECURRENT FRONTAL SINUSITIS: Primary | ICD-10-CM

## 2023-01-17 DIAGNOSIS — R05.1 ACUTE COUGH: ICD-10-CM

## 2023-01-17 DIAGNOSIS — Z17.0 MALIGNANT NEOPLASM OF UPPER-OUTER QUADRANT OF RIGHT BREAST IN FEMALE, ESTROGEN RECEPTOR POSITIVE: ICD-10-CM

## 2023-01-17 DIAGNOSIS — C50.411 MALIGNANT NEOPLASM OF UPPER-OUTER QUADRANT OF RIGHT BREAST IN FEMALE, ESTROGEN RECEPTOR POSITIVE: ICD-10-CM

## 2023-01-17 DIAGNOSIS — R93.89 ABNORMAL CT OF THE CHEST: ICD-10-CM

## 2023-01-17 RX ORDER — AZITHROMYCIN 250 MG/1
TABLET, FILM COATED ORAL
Qty: 6 TABLET | Refills: 0 | Status: SHIPPED | OUTPATIENT
Start: 2023-01-17 | End: 2023-02-02

## 2023-01-17 NOTE — TELEPHONE ENCOUNTER
I reviewed the results of Luana's CT chest from 1/16/2023 with Dr. Gomez.  There is stable size of previously noted bilateral pulmonary nodules.  She does have what appears to be new finding of multiple focal nodular opacities in the bilateral upper lobes, favoring combination of postradiation change and/or infection however given his masslike appearance malignancy cannot be excluded.  I called Luana and discussed the results with her.  We will get a PET scan for further evaluation.  She also reports she is still having a daily headache and was actually going to see her primary care provider tomorrow as she feels she may have a sinus infection.  We will also get MRI of the brain for further evaluation.  I will send in a prescription for a Z-Vaughn.  She has a cough but no fever, no shortness of breath.  We will follow-up after her PET scan and MRI to go over the results and further plan of care.  If the PET is unrevealing Dr. Gomez recommends would want to refer to pulmonology for evaluation and possible bronchoscopy.    Addendum:  Note corrected to reflect that patient does not have a history of radiation.

## 2023-01-30 ENCOUNTER — HOSPITAL ENCOUNTER (OUTPATIENT)
Dept: PET IMAGING | Facility: HOSPITAL | Age: 63
Discharge: HOME | End: 2023-01-30
Payer: COMMERCIAL

## 2023-01-30 ENCOUNTER — LAB (OUTPATIENT)
Dept: LAB | Facility: HOSPITAL | Age: 63
End: 2023-01-30
Payer: COMMERCIAL

## 2023-01-30 DIAGNOSIS — Z87.891 HISTORY OF TOBACCO ABUSE: ICD-10-CM

## 2023-01-30 DIAGNOSIS — R05.1 ACUTE COUGH: ICD-10-CM

## 2023-01-30 DIAGNOSIS — Z17.0 MALIGNANT NEOPLASM OF UPPER-OUTER QUADRANT OF RIGHT BREAST IN FEMALE, ESTROGEN RECEPTOR POSITIVE: ICD-10-CM

## 2023-01-30 DIAGNOSIS — R93.89 ABNORMAL CT OF THE CHEST: ICD-10-CM

## 2023-01-30 DIAGNOSIS — C50.411 MALIGNANT NEOPLASM OF UPPER-OUTER QUADRANT OF RIGHT BREAST IN FEMALE, ESTROGEN RECEPTOR POSITIVE: ICD-10-CM

## 2023-01-30 DIAGNOSIS — R91.8 MULTIPLE PULMONARY NODULES DETERMINED BY COMPUTED TOMOGRAPHY OF LUNG: ICD-10-CM

## 2023-01-30 LAB
ALBUMIN SERPL-MCNC: 3.8 G/DL (ref 3.5–5.2)
ALBUMIN/GLOB SERPL: 1.1 G/DL
ALP SERPL-CCNC: 93 U/L (ref 39–117)
ALT SERPL W P-5'-P-CCNC: 10 U/L (ref 1–33)
ANION GAP SERPL CALCULATED.3IONS-SCNC: 14 MMOL/L (ref 5–15)
AST SERPL-CCNC: 13 U/L (ref 1–32)
BASOPHILS # BLD AUTO: 0.05 10*3/MM3 (ref 0–0.2)
BASOPHILS NFR BLD AUTO: 0.4 % (ref 0–1.5)
BILIRUB SERPL-MCNC: 0.3 MG/DL (ref 0–1.2)
BUN SERPL-MCNC: 14 MG/DL (ref 8–23)
BUN/CREAT SERPL: 23.3 (ref 7–25)
CALCIUM SPEC-SCNC: 9.5 MG/DL (ref 8.6–10.5)
CHLORIDE SERPL-SCNC: 96 MMOL/L (ref 98–107)
CO2 SERPL-SCNC: 27 MMOL/L (ref 22–29)
CREAT SERPL-MCNC: 0.6 MG/DL (ref 0.57–1)
DEPRECATED RDW RBC AUTO: 41.7 FL (ref 37–54)
EGFRCR SERPLBLD CKD-EPI 2021: 101 ML/MIN/1.73
EOSINOPHIL # BLD AUTO: 0.06 10*3/MM3 (ref 0–0.4)
EOSINOPHIL NFR BLD AUTO: 0.5 % (ref 0.3–6.2)
ERYTHROCYTE [DISTWIDTH] IN BLOOD BY AUTOMATED COUNT: 12.4 % (ref 12.3–15.4)
GLOBULIN UR ELPH-MCNC: 3.4 GM/DL
GLUCOSE BLDC GLUCOMTR-MCNC: 78 MG/DL (ref 70–130)
GLUCOSE SERPL-MCNC: 82 MG/DL (ref 65–99)
HCT VFR BLD AUTO: 35.2 % (ref 34–46.6)
HGB BLD-MCNC: 11.6 G/DL (ref 12–15.9)
IMM GRANULOCYTES # BLD AUTO: 0.07 10*3/MM3 (ref 0–0.05)
IMM GRANULOCYTES NFR BLD AUTO: 0.5 % (ref 0–0.5)
LYMPHOCYTES # BLD AUTO: 1.61 10*3/MM3 (ref 0.7–3.1)
LYMPHOCYTES NFR BLD AUTO: 12.6 % (ref 19.6–45.3)
MCH RBC QN AUTO: 30.4 PG (ref 26.6–33)
MCHC RBC AUTO-ENTMCNC: 33 G/DL (ref 31.5–35.7)
MCV RBC AUTO: 92.4 FL (ref 79–97)
MONOCYTES # BLD AUTO: 1.18 10*3/MM3 (ref 0.1–0.9)
MONOCYTES NFR BLD AUTO: 9.3 % (ref 5–12)
NEUTROPHILS NFR BLD AUTO: 76.7 % (ref 42.7–76)
NEUTROPHILS NFR BLD AUTO: 9.76 10*3/MM3 (ref 1.7–7)
NRBC BLD AUTO-RTO: 0 /100 WBC (ref 0–0.2)
PLATELET # BLD AUTO: 622 10*3/MM3 (ref 140–450)
PMV BLD AUTO: 8.6 FL (ref 6–12)
POTASSIUM SERPL-SCNC: 4.1 MMOL/L (ref 3.5–5.2)
PROT SERPL-MCNC: 7.2 G/DL (ref 6–8.5)
RBC # BLD AUTO: 3.81 10*6/MM3 (ref 3.77–5.28)
SODIUM SERPL-SCNC: 137 MMOL/L (ref 136–145)
WBC NRBC COR # BLD: 12.73 10*3/MM3 (ref 3.4–10.8)

## 2023-01-30 PROCEDURE — A9552 F18 FDG: HCPCS | Performed by: NURSE PRACTITIONER

## 2023-01-30 PROCEDURE — 78815 PET IMAGE W/CT SKULL-THIGH: CPT

## 2023-01-30 PROCEDURE — 80053 COMPREHEN METABOLIC PANEL: CPT

## 2023-01-30 PROCEDURE — 85025 COMPLETE CBC W/AUTO DIFF WBC: CPT

## 2023-01-30 PROCEDURE — 82962 GLUCOSE BLOOD TEST: CPT

## 2023-01-30 PROCEDURE — 0 FLUDEOXYGLUCOSE F18 SOLUTION: Performed by: NURSE PRACTITIONER

## 2023-01-30 PROCEDURE — 36415 COLL VENOUS BLD VENIPUNCTURE: CPT

## 2023-01-30 RX ADMIN — FLUDEOXYGLUCOSE F18 1 DOSE: 300 INJECTION INTRAVENOUS at 12:29

## 2023-01-31 ENCOUNTER — TELEPHONE (OUTPATIENT)
Dept: FAMILY MEDICINE CLINIC | Facility: CLINIC | Age: 63
End: 2023-01-31
Payer: COMMERCIAL

## 2023-01-31 ENCOUNTER — TELEPHONE (OUTPATIENT)
Dept: ONCOLOGY | Facility: CLINIC | Age: 63
End: 2023-01-31
Payer: COMMERCIAL

## 2023-01-31 DIAGNOSIS — Z17.0 MALIGNANT NEOPLASM OF UPPER-OUTER QUADRANT OF RIGHT BREAST IN FEMALE, ESTROGEN RECEPTOR POSITIVE: Primary | ICD-10-CM

## 2023-01-31 DIAGNOSIS — C50.411 MALIGNANT NEOPLASM OF UPPER-OUTER QUADRANT OF RIGHT BREAST IN FEMALE, ESTROGEN RECEPTOR POSITIVE: Primary | ICD-10-CM

## 2023-01-31 DIAGNOSIS — R94.8 ABNORMAL POSITRON EMISSION TOMOGRAPHY (PET) SCAN: ICD-10-CM

## 2023-01-31 DIAGNOSIS — R94.2 ABNORMAL PET SCAN OF LUNG: ICD-10-CM

## 2023-01-31 DIAGNOSIS — M89.8X1 PAIN OF LEFT CLAVICLE: ICD-10-CM

## 2023-01-31 NOTE — TELEPHONE ENCOUNTER
I phoned Luana to review her PET scan results from yesterday, it showed multiple irregular consolidative and soft solid mostly subpleural based densities in both lungs with corresponding hypermetabolism most suggestive of infectious or inflammatory process.  She states that in general she does not feel well but denies any specific respiratory concerns.  She has had no fevers or chills, no unusual cough or shortness of breath other than for occasionally she will cough up phlegm.  She does report worsening night sweats that she was attributing to hot flashes and her letrozole but they have been worse over the last few weeks.  She states that over the last 5-10 days she has had left clavicular pain and left wrist pain along with swelling of the left wrist and hand intermittently.  She states that it is worse in the morning when she gets up and improves after she takes her meloxicam.  She has an appointment with her PCP on Thursday.  I have asked her to try to get that moved up as it sounds like she could have gout.  She will continue her meloxicam.  Her MRI of the brain is scheduled for next week.  I will get her to pulmonology for evaluation of her PET scan abnormalities and bronchoscopy.  I will also get MRI of the left clavicle in light of PET abnormality and her pain in that area.

## 2023-02-01 NOTE — TELEPHONE ENCOUNTER
Pt sent my chart message as well. Pt has had the swelling since last Saturday. Responding via [facility] message.

## 2023-02-02 ENCOUNTER — APPOINTMENT (OUTPATIENT)
Dept: GENERAL RADIOLOGY | Facility: HOSPITAL | Age: 63
End: 2023-02-02
Payer: COMMERCIAL

## 2023-02-02 ENCOUNTER — APPOINTMENT (OUTPATIENT)
Dept: CT IMAGING | Facility: HOSPITAL | Age: 63
End: 2023-02-02
Payer: COMMERCIAL

## 2023-02-02 ENCOUNTER — HOSPITAL ENCOUNTER (EMERGENCY)
Facility: HOSPITAL | Age: 63
Discharge: HOME | End: 2023-02-02
Attending: EMERGENCY MEDICINE | Admitting: EMERGENCY MEDICINE
Payer: COMMERCIAL

## 2023-02-02 ENCOUNTER — OFFICE VISIT (OUTPATIENT)
Dept: FAMILY MEDICINE CLINIC | Facility: CLINIC | Age: 63
End: 2023-02-02
Payer: COMMERCIAL

## 2023-02-02 ENCOUNTER — APPOINTMENT (OUTPATIENT)
Dept: CARDIOLOGY | Facility: HOSPITAL | Age: 63
End: 2023-02-02
Payer: COMMERCIAL

## 2023-02-02 VITALS
BODY MASS INDEX: 21.47 KG/M2 | TEMPERATURE: 98.2 F | HEIGHT: 66 IN | WEIGHT: 133.6 LBS | HEART RATE: 57 BPM | DIASTOLIC BLOOD PRESSURE: 80 MMHG | SYSTOLIC BLOOD PRESSURE: 106 MMHG | OXYGEN SATURATION: 99 %

## 2023-02-02 VITALS
HEIGHT: 67 IN | SYSTOLIC BLOOD PRESSURE: 118 MMHG | WEIGHT: 133 LBS | HEART RATE: 88 BPM | TEMPERATURE: 98.7 F | OXYGEN SATURATION: 98 % | BODY MASS INDEX: 20.88 KG/M2 | DIASTOLIC BLOOD PRESSURE: 68 MMHG | RESPIRATION RATE: 20 BRPM

## 2023-02-02 DIAGNOSIS — M79.642 LEFT HAND PAIN: ICD-10-CM

## 2023-02-02 DIAGNOSIS — J18.9 PNEUMONIA OF LEFT LUNG DUE TO INFECTIOUS ORGANISM, UNSPECIFIED PART OF LUNG: ICD-10-CM

## 2023-02-02 DIAGNOSIS — M79.642 LEFT HAND PAIN: Primary | ICD-10-CM

## 2023-02-02 DIAGNOSIS — M79.642 HAND PAIN, LEFT: ICD-10-CM

## 2023-02-02 DIAGNOSIS — R07.1 CHEST PAIN ON RESPIRATION: Primary | ICD-10-CM

## 2023-02-02 DIAGNOSIS — M25.512 ARTHRALGIA OF LEFT ACROMIOCLAVICULAR JOINT: ICD-10-CM

## 2023-02-02 DIAGNOSIS — Z85.3 HISTORY OF MALIGNANT NEOPLASM OF RIGHT BREAST: ICD-10-CM

## 2023-02-02 DIAGNOSIS — R07.9 LEFT-SIDED CHEST PAIN: Primary | ICD-10-CM

## 2023-02-02 DIAGNOSIS — Z86.39 HISTORY OF TYPE 2 DIABETES MELLITUS: ICD-10-CM

## 2023-02-02 LAB
ALBUMIN SERPL-MCNC: 3.8 G/DL (ref 3.5–5.2)
ALBUMIN/GLOB SERPL: 1.2 G/DL
ALP SERPL-CCNC: 92 U/L (ref 39–117)
ALT SERPL W P-5'-P-CCNC: 12 U/L (ref 1–33)
ANION GAP SERPL CALCULATED.3IONS-SCNC: 13 MMOL/L (ref 5–15)
AST SERPL-CCNC: 14 U/L (ref 1–32)
BASOPHILS # BLD AUTO: 0.04 10*3/MM3 (ref 0–0.2)
BASOPHILS NFR BLD AUTO: 0.4 % (ref 0–1.5)
BH CV UPPER VENOUS LEFT AXILLARY AUGMENT: NORMAL
BH CV UPPER VENOUS LEFT AXILLARY COMPRESS: NORMAL
BH CV UPPER VENOUS LEFT AXILLARY PHASIC: NORMAL
BH CV UPPER VENOUS LEFT AXILLARY SPONT: NORMAL
BH CV UPPER VENOUS LEFT BASILIC FOREARM COMPRESS: NORMAL
BH CV UPPER VENOUS LEFT BASILIC UPPER COMPRESS: NORMAL
BH CV UPPER VENOUS LEFT BRACHIAL COMPRESS: NORMAL
BH CV UPPER VENOUS LEFT CEPHALIC FOREARM COMPRESS: NORMAL
BH CV UPPER VENOUS LEFT CEPHALIC UPPER COMPRESS: NORMAL
BH CV UPPER VENOUS LEFT INTERNAL JUGULAR AUGMENT: NORMAL
BH CV UPPER VENOUS LEFT INTERNAL JUGULAR COMPRESS: NORMAL
BH CV UPPER VENOUS LEFT INTERNAL JUGULAR PHASIC: NORMAL
BH CV UPPER VENOUS LEFT INTERNAL JUGULAR SPONT: NORMAL
BH CV UPPER VENOUS LEFT RADIAL COMPRESS: NORMAL
BH CV UPPER VENOUS LEFT SUBCLAVIAN AUGMENT: NORMAL
BH CV UPPER VENOUS LEFT SUBCLAVIAN COMPRESS: NORMAL
BH CV UPPER VENOUS LEFT SUBCLAVIAN PHASIC: NORMAL
BH CV UPPER VENOUS LEFT SUBCLAVIAN SPONT: NORMAL
BH CV UPPER VENOUS LEFT ULNAR COMPRESS: NORMAL
BH CV UPPER VENOUS RIGHT INTERNAL JUGULAR AUGMENT: NORMAL
BH CV UPPER VENOUS RIGHT INTERNAL JUGULAR COMPRESS: NORMAL
BH CV UPPER VENOUS RIGHT INTERNAL JUGULAR PHASIC: NORMAL
BH CV UPPER VENOUS RIGHT INTERNAL JUGULAR SPONT: NORMAL
BH CV UPPER VENOUS RIGHT SUBCLAVIAN AUGMENT: NORMAL
BH CV UPPER VENOUS RIGHT SUBCLAVIAN COMPRESS: NORMAL
BH CV UPPER VENOUS RIGHT SUBCLAVIAN PHASIC: NORMAL
BH CV UPPER VENOUS RIGHT SUBCLAVIAN SPONT: NORMAL
BH CV VAS PRELIMINARY FINDINGS SCRIPTING: 1
BILIRUB SERPL-MCNC: 0.3 MG/DL (ref 0–1.2)
BUN SERPL-MCNC: 12 MG/DL (ref 8–23)
BUN/CREAT SERPL: 30.8 (ref 7–25)
CALCIUM SPEC-SCNC: 9.3 MG/DL (ref 8.6–10.5)
CHLORIDE SERPL-SCNC: 99 MMOL/L (ref 98–107)
CO2 SERPL-SCNC: 25 MMOL/L (ref 22–29)
CREAT SERPL-MCNC: 0.39 MG/DL (ref 0.57–1)
DEPRECATED RDW RBC AUTO: 39.9 FL (ref 37–54)
EGFRCR SERPLBLD CKD-EPI 2021: 112.1 ML/MIN/1.73
EOSINOPHIL # BLD AUTO: 0.15 10*3/MM3 (ref 0–0.4)
EOSINOPHIL NFR BLD AUTO: 1.4 % (ref 0.3–6.2)
ERYTHROCYTE [DISTWIDTH] IN BLOOD BY AUTOMATED COUNT: 12.2 % (ref 12.3–15.4)
GLOBULIN UR ELPH-MCNC: 3.3 GM/DL
GLUCOSE SERPL-MCNC: 106 MG/DL (ref 65–99)
HCT VFR BLD AUTO: 33.1 % (ref 34–46.6)
HGB BLD-MCNC: 11.4 G/DL (ref 12–15.9)
HOLD SPECIMEN: NORMAL
IMM GRANULOCYTES # BLD AUTO: 0.04 10*3/MM3 (ref 0–0.05)
IMM GRANULOCYTES NFR BLD AUTO: 0.4 % (ref 0–0.5)
LIPASE SERPL-CCNC: 23 U/L (ref 13–60)
LYMPHOCYTES # BLD AUTO: 1.06 10*3/MM3 (ref 0.7–3.1)
LYMPHOCYTES NFR BLD AUTO: 10.2 % (ref 19.6–45.3)
MAXIMAL PREDICTED HEART RATE: 157 BPM
MCH RBC QN AUTO: 31 PG (ref 26.6–33)
MCHC RBC AUTO-ENTMCNC: 34.4 G/DL (ref 31.5–35.7)
MCV RBC AUTO: 89.9 FL (ref 79–97)
MONOCYTES # BLD AUTO: 0.86 10*3/MM3 (ref 0.1–0.9)
MONOCYTES NFR BLD AUTO: 8.3 % (ref 5–12)
NEUTROPHILS NFR BLD AUTO: 79.3 % (ref 42.7–76)
NEUTROPHILS NFR BLD AUTO: 8.22 10*3/MM3 (ref 1.7–7)
NRBC BLD AUTO-RTO: 0 /100 WBC (ref 0–0.2)
NT-PROBNP SERPL-MCNC: 56.6 PG/ML (ref 0–900)
PLATELET # BLD AUTO: 569 10*3/MM3 (ref 140–450)
PMV BLD AUTO: 8.2 FL (ref 6–12)
POTASSIUM SERPL-SCNC: 4.1 MMOL/L (ref 3.5–5.2)
PROT SERPL-MCNC: 7.1 G/DL (ref 6–8.5)
QT INTERVAL: 354 MS
QT INTERVAL: 398 MS
QTC INTERVAL: 442 MS
QTC INTERVAL: 447 MS
RBC # BLD AUTO: 3.68 10*6/MM3 (ref 3.77–5.28)
SODIUM SERPL-SCNC: 137 MMOL/L (ref 136–145)
STRESS TARGET HR: 133 BPM
TROPONIN T SERPL-MCNC: <0.01 NG/ML (ref 0–0.03)
TROPONIN T SERPL-MCNC: <0.01 NG/ML (ref 0–0.03)
URATE SERPL-MCNC: 2.3 MG/DL (ref 2.4–5.7)
WBC NRBC COR # BLD: 10.37 10*3/MM3 (ref 3.4–10.8)
WHOLE BLOOD HOLD COAG: NORMAL
WHOLE BLOOD HOLD SPECIMEN: NORMAL

## 2023-02-02 PROCEDURE — 25010000002 HYDROMORPHONE 1 MG/ML SOLUTION: Performed by: EMERGENCY MEDICINE

## 2023-02-02 PROCEDURE — 93971 EXTREMITY STUDY: CPT

## 2023-02-02 PROCEDURE — 83690 ASSAY OF LIPASE: CPT | Performed by: EMERGENCY MEDICINE

## 2023-02-02 PROCEDURE — 80053 COMPREHEN METABOLIC PANEL: CPT | Performed by: EMERGENCY MEDICINE

## 2023-02-02 PROCEDURE — 85025 COMPLETE CBC W/AUTO DIFF WBC: CPT | Performed by: EMERGENCY MEDICINE

## 2023-02-02 PROCEDURE — 0 IOPAMIDOL PER 1 ML: Performed by: EMERGENCY MEDICINE

## 2023-02-02 PROCEDURE — 99214 OFFICE O/P EST MOD 30 MIN: CPT | Performed by: NURSE PRACTITIONER

## 2023-02-02 PROCEDURE — 93971 EXTREMITY STUDY: CPT | Performed by: INTERNAL MEDICINE

## 2023-02-02 PROCEDURE — 96374 THER/PROPH/DIAG INJ IV PUSH: CPT

## 2023-02-02 PROCEDURE — 36415 COLL VENOUS BLD VENIPUNCTURE: CPT

## 2023-02-02 PROCEDURE — 99284 EMERGENCY DEPT VISIT MOD MDM: CPT

## 2023-02-02 PROCEDURE — 96375 TX/PRO/DX INJ NEW DRUG ADDON: CPT

## 2023-02-02 PROCEDURE — 93005 ELECTROCARDIOGRAM TRACING: CPT

## 2023-02-02 PROCEDURE — 84484 ASSAY OF TROPONIN QUANT: CPT | Performed by: EMERGENCY MEDICINE

## 2023-02-02 PROCEDURE — 83880 ASSAY OF NATRIURETIC PEPTIDE: CPT | Performed by: EMERGENCY MEDICINE

## 2023-02-02 PROCEDURE — 84550 ASSAY OF BLOOD/URIC ACID: CPT | Performed by: NURSE PRACTITIONER

## 2023-02-02 PROCEDURE — 71045 X-RAY EXAM CHEST 1 VIEW: CPT

## 2023-02-02 PROCEDURE — 93005 ELECTROCARDIOGRAM TRACING: CPT | Performed by: EMERGENCY MEDICINE

## 2023-02-02 PROCEDURE — 25010000002 ONDANSETRON PER 1 MG: Performed by: EMERGENCY MEDICINE

## 2023-02-02 PROCEDURE — 71275 CT ANGIOGRAPHY CHEST: CPT

## 2023-02-02 RX ORDER — SODIUM CHLORIDE 0.9 % (FLUSH) 0.9 %
10 SYRINGE (ML) INJECTION AS NEEDED
Status: DISCONTINUED | OUTPATIENT
Start: 2023-02-02 | End: 2023-02-02 | Stop reason: HOSPADM

## 2023-02-02 RX ORDER — ONDANSETRON 2 MG/ML
4 INJECTION INTRAMUSCULAR; INTRAVENOUS ONCE
Status: COMPLETED | OUTPATIENT
Start: 2023-02-02 | End: 2023-02-02

## 2023-02-02 RX ORDER — HYDROCODONE BITARTRATE AND ACETAMINOPHEN 5; 325 MG/1; MG/1
1 TABLET ORAL ONCE
Status: COMPLETED | OUTPATIENT
Start: 2023-02-02 | End: 2023-02-02

## 2023-02-02 RX ORDER — DOXYCYCLINE 100 MG/1
100 CAPSULE ORAL 2 TIMES DAILY
Qty: 20 CAPSULE | Refills: 0 | Status: SHIPPED | OUTPATIENT
Start: 2023-02-02 | End: 2023-02-17

## 2023-02-02 RX ORDER — ASPIRIN 81 MG/1
324 TABLET, CHEWABLE ORAL ONCE
Status: COMPLETED | OUTPATIENT
Start: 2023-02-02 | End: 2023-02-02

## 2023-02-02 RX ORDER — HYDROCODONE BITARTRATE AND ACETAMINOPHEN 5; 325 MG/1; MG/1
1 TABLET ORAL EVERY 6 HOURS PRN
Qty: 12 TABLET | Refills: 0 | Status: SHIPPED | OUTPATIENT
Start: 2023-02-02 | End: 2023-02-17 | Stop reason: SDUPTHER

## 2023-02-02 RX ORDER — SEMAGLUTIDE 1.34 MG/ML
INJECTION, SOLUTION SUBCUTANEOUS WEEKLY
COMMUNITY
Start: 2022-12-27

## 2023-02-02 RX ADMIN — HYDROCODONE BITARTRATE AND ACETAMINOPHEN 1 TABLET: 5; 325 TABLET ORAL at 14:55

## 2023-02-02 RX ADMIN — IOPAMIDOL 85 ML: 755 INJECTION, SOLUTION INTRAVENOUS at 13:04

## 2023-02-02 RX ADMIN — ONDANSETRON 4 MG: 2 INJECTION INTRAMUSCULAR; INTRAVENOUS at 11:17

## 2023-02-02 RX ADMIN — HYDROMORPHONE HYDROCHLORIDE 1 MG: 1 INJECTION, SOLUTION INTRAMUSCULAR; INTRAVENOUS; SUBCUTANEOUS at 11:19

## 2023-02-02 RX ADMIN — ASPIRIN 324 MG: 81 TABLET, CHEWABLE ORAL at 11:09

## 2023-02-02 NOTE — PROGRESS NOTES
"Chief Complaint  Hand Pain (L. Since last Saturday. Swelling. Pt states she is having left sided chest pain that is going to her to her back. Painful breathing.)    Subjective        Luana Chalwa presents to [facility]  History of Present Illness Luana comes in today with complaints of left hand and arm swelling and severe pain x1 week.  No known injury to this area.  The past 2 days she has experienced increasing chest pain that is midsternal and radiates into her back.  She is not short of breath, but she has a sharp stabbing pain that is so excruciating it makes her holler out.  She has been doing this night and day and nothing has relieved this pain.  She comes in today and somewhat of a distress with her pain levels being out of control both in her chest and in her left arm.  She has recently had a CT of the chest which showed a mass in her lung she is also recently had a PET scan that I do not have the results back on yet.    Objective   Vital Signs:  /80 (BP Location: Left arm, Patient Position: Sitting, Cuff Size: Adult)   Pulse 57   Temp 98.2 °F (36.8 °C) (Temporal)   Ht 167.6 cm (66\")   Wt 60.6 kg (133 lb 9.6 oz)   SpO2 99%   BMI 21.56 kg/m²   Estimated body mass index is 21.56 kg/m² as calculated from the following:    Height as of this encounter: 167.6 cm (66\").    Weight as of this encounter: 60.6 kg (133 lb 9.6 oz).       BMI is within normal parameters. No other follow-up for BMI required.      Physical Exam  Pulmonary:      Effort: Pulmonary effort is normal.      Breath sounds: Normal breath sounds.      Comments: Her breath sounds appear to be normal however she is not able to take in a deep breath without hollering out in pain.  Musculoskeletal:         General: Swelling and tenderness present.      Comments: The left hand is quite swollen.  She has good palpable pulses, good color but limited range of motion.  She has pain with palpation on " the anterior/dorsal hand and very decreased range of motion.  Her lower arm is swollen slightly as well.  She does not tend to be tender around the elbow area.  I cannot reproduce any chest wall tenderness.        Result Review :      Data reviewed: Radiologic studies I reviewed her recent CT of her chest which showed a mass in her right lung.             Assessment and Plan   Diagnoses and all orders for this visit:    1. Chest pain on respiration (Primary)  Assessment & Plan:  Due to the amount of distress that she is in taking a deep breath and the level of pain that she is in we notified her son to come get her and take her directly to the hospital.  I called the ER doctor at [facility] in [place] and informed her of her of her condition and that to await her arrival.      2. Left hand pain  Assessment & Plan:  The hospital will address this while she is there as well.             Follow Up   Return if symptoms worsen or fail to improve.  Patient was given instructions and counseling regarding her condition or for health maintenance advice. Please see specific information pulled into the AVS if appropriate.

## 2023-02-02 NOTE — ASSESSMENT & PLAN NOTE
Due to the amount of distress that she is in taking a deep breath and the level of pain that she is in we notified her son to come get her and take her directly to the hospital.  I called the ER doctor at [facility] in [place] and informed her of her of her condition and that to await her arrival.

## 2023-02-07 ENCOUNTER — OFFICE VISIT (OUTPATIENT)
Dept: PULMONOLOGY | Facility: CLINIC | Age: 63
End: 2023-02-07
Payer: COMMERCIAL

## 2023-02-07 ENCOUNTER — NURSE NAVIGATOR (OUTPATIENT)
Dept: ONCOLOGY | Facility: CLINIC | Age: 63
End: 2023-02-07
Payer: COMMERCIAL

## 2023-02-07 VITALS
SYSTOLIC BLOOD PRESSURE: 122 MMHG | OXYGEN SATURATION: 93 % | BODY MASS INDEX: 21.31 KG/M2 | WEIGHT: 135.8 LBS | HEIGHT: 67 IN | DIASTOLIC BLOOD PRESSURE: 74 MMHG | HEART RATE: 95 BPM | TEMPERATURE: 98.2 F

## 2023-02-07 DIAGNOSIS — R91.1 LUNG NODULE: Primary | ICD-10-CM

## 2023-02-07 DIAGNOSIS — Z17.0 MALIGNANT NEOPLASM OF UPPER-OUTER QUADRANT OF RIGHT BREAST IN FEMALE, ESTROGEN RECEPTOR POSITIVE: Primary | ICD-10-CM

## 2023-02-07 DIAGNOSIS — B37.31 VULVOVAGINAL CANDIDIASIS: ICD-10-CM

## 2023-02-07 DIAGNOSIS — C50.411 MALIGNANT NEOPLASM OF UPPER-OUTER QUADRANT OF RIGHT BREAST IN FEMALE, ESTROGEN RECEPTOR POSITIVE: Primary | ICD-10-CM

## 2023-02-07 DIAGNOSIS — R93.89 ABNORMAL CT OF THE CHEST: ICD-10-CM

## 2023-02-07 PROCEDURE — 99204 OFFICE O/P NEW MOD 45 MIN: CPT | Performed by: INTERNAL MEDICINE

## 2023-02-07 RX ORDER — AMOXICILLIN AND CLAVULANATE POTASSIUM 875; 125 MG/1; MG/1
1 TABLET, FILM COATED ORAL 2 TIMES DAILY
Qty: 28 TABLET | Refills: 0 | Status: SHIPPED | OUTPATIENT
Start: 2023-02-07 | End: 2023-03-14

## 2023-02-07 NOTE — PROGRESS NOTES
Met patient in lung nodule clinic with Dr. Lai. She has history of breast cancer and recently told she has arthritis. She is currently waiting to see Dr. Natividad Rodriguez. She developed significant pleuritic pain on the left and sent to ER. She had CT chest revealing bilateral inflammatory areas most notably on the left lateral abutting the pleura. She was given doxycycline at that time but did not take it. She denies f/c, she occasionally has productive cough in the mornings but no real respiratory complaints. States only symptom she has had was headache x7 days. Scans reviewed. Per Dr. Lai Augmentin now and repeat CXR with OV x4 weeks. She v/u and agreeable to plan. Introduced lung navigator role and provided contact information. She knows to call with questions or concerns.

## 2023-02-07 NOTE — PROGRESS NOTES
Initial Pulmonary Consult Note    Subjective   Reason for consultation/Chief Complaint: Abnormal CT Chest    Luana Chawla is a 62 y.o. female is being seen for consultation today at the request of CECI Venegas    History of Present Illness    Ms. Chawla is a 63yo F with a history of breast cancer who was referred for an abnormal CT scan of the chest. She previously had a CT scan of the chest in October 2022 which showed small 3-4mm bilateral pulmonary nodules and right apical groundglass and consolidations. She was treated with a course of Augmentin.     She had a follow up CT chest in January 2023 which showed stable pulmonary nodules but a new 11mm pleural based masslike opacity in the anterior right upper lobe, a 16mm masslike density along the right mediastinum extending to the right hilum, a 9mm pleural based opacity with peripheral groundglass in the left upper lobe and left basal atelectasis.     She was treated with a Z-pack and then had a PET scan which showed multiple irregular consolidative and subsolid mostly subpleural based densities in both lungs with corresponding hypermetabolism.     She was seen by her PCP and noted to have severe left sided chest pain and sent to the [facility] ED. She then had a CTA chest which was negative for PE and showed no change in the bilateral subpleural densities which were not significantly changed since the comparison PET scan. She was prescribed Doxycycline by the ED, but she has not taken this as it upsets her stomach.     She continues to have left sided pleuritic chest pain. She had an MRI of her left clavicle and brain earlier today.       Active Ambulatory Problems     Diagnosis Date Noted   • Malignant neoplasm of upper-outer quadrant of right breast in female, estrogen receptor positive (HCC) 01/21/2020   • Encounter for antineoplastic chemotherapy 02/11/2020   • Encounter for care related to vascular access port 02/25/2020   • MRSA infection of  venous access port  03/02/2020   • Type 2 diabetes mellitus ([facility]) 03/02/2020   • GERD (gastroesophageal reflux disease) 03/02/2020   • Back pain 03/02/2020   • Bacteremia due to methicillin resistant Staphylococcus aureus 03/02/2020   • Septic discitis of lumbar region 03/05/2020   • Thrombus 03/06/2020   • Iron deficiency anemia due to chronic blood loss 06/03/2020   • Insulin long-term use ([facility]) 10/15/2020   • Benign hypertension 10/22/2020   • Uncontrolled type 2 diabetes mellitus with hyperglycemia ([facility]) 01/13/2021   • Hot flashes related to aromatase inhibitor therapy 09/15/2021   • Dysthymia 09/15/2021   • Lichen sclerosus of female genitalia 10/19/2021   • Sore throat 07/20/2022   • History of bilateral mastectomy 07/23/2022   • 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[facility]) 07/23/2022   • Cough 07/25/2022   • Arthritis 10/13/2022   • Cervicalgia 10/13/2022   • Diabetes mellitus without complication ([facility]) 02/18/2022   • Left hand pain 02/02/2023   • Chest pain on respiration 02/02/2023   • Abnormal CT of the chest 02/07/2023     Resolved Ambulatory Problems     Diagnosis Date Noted   • Sepsis due to infected port-a-cath access (CMS/[facility]) 03/04/2020     Past Medical History:   Diagnosis Date   • Breast cancer ([facility])    • Diabetes mellitus ([facility])    • Diverticulitis    • Hypokalemia    • Kidney stone    • Lichen sclerosus    • Long term (current) use of insulin ([facility])    • Malignant neoplasm of upper-outer quadrant of right female breast ([facility]) 01/21/2020   • Microalbuminuria    • Nephrolithiasis    • Sciatica    • Type 2 diabetes mellitus, uncontrolled    • Wears contact lenses        Past Surgical History:   Procedure Laterality Date   • APPENDECTOMY     • BREAST BIOPSY Right    • BREAST EXCISIONAL BIOPSY Right    • CHOLECYSTECTOMY     • COLON SURGERY     • COLONOSCOPY     • HYSTERECTOMY     • LEG SURGERY      leg fracture surgery-Abstracted from [employer]   • LUMBAR LAMINECTOMY DISCECTOMY DECOMPRESSION N/A  2019    Procedure: LUMBAR LAMINECTOMY L4-5 AND L5-S1;  Surgeon: Hipolito Kahn MD;  Location:  [facility];  Service: Orthopedic Spine   • MASTECTOMY Bilateral    • MASTECTOMY W/ SENTINEL NODE BIOPSY Bilateral 2020    Procedure: SKIN SPARING MASTECTOMIES BILATERAL, SENTINEL NODE BIOPSY RIGHT;  Surgeon: Silvio Howard MD;  Location:  [facility];  Service: General   • TUBAL ABDOMINAL LIGATION     • VENOUS ACCESS DEVICE (PORT) REMOVAL N/A 2020    Procedure: REMOVAL PORT;  Surgeon: Silvio Howard MD;  Location:  [facility];  Service: General;  Laterality: N/A;       Family History   Problem Relation Age of Onset   • Breast cancer Mother 70   • Diabetes Mother    • Cancer Mother    • Heart disease Father    • Hypertension Father    • Diabetes Sister    • Ovarian cancer Neg Hx        Social History     Socioeconomic History   • Marital status: Single   Tobacco Use   • Smoking status: Former     Packs/day: 1.00     Types: Cigarettes, Electronic Cigarette     Start date: 3/13/1990     Quit date: 2017     Years since quittin.7     Passive exposure: Past   • Smokeless tobacco: Never   • Tobacco comments:     currently use nicotine e-cig.   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     • BD Pen Needle Radha 2nd Gen 32G X 4 MM misc USE ONE DAILY 100 each 3   • Calcium Acetate 667 MG tablet Take 1 tablet  by mouth Every 8 (Eight) Hours.     • Calcium Carb-Cholecalciferol (CALCIUM 1000 + D PO) Take 1 tablet by mouth Daily.     • clobetasol (TEMOVATE) 0.05 % ointment Apply  topically to the appropriate area as directed 2 (Two) Times a Day. 60 g 2   • cyclobenzaprine (FLEXERIL) 10 MG tablet Take 1 tablet by mouth 3 (Three) Times a Day As Needed for Muscle Spasms. 60 tablet 0   • doxycycline (MONODOX) 100 MG capsule Take 1 capsule by mouth 2 (Two) Times a Day. 20 capsule 0   • escitalopram (LEXAPRO) 10 MG tablet TAKE ONE TABLET BY MOUTH DAILY 30 tablet 1   • glucose blood (Accu-Chek Sasha Plus) test strip USE ONE STRIP TO TEST DAILY 50 each 3   • guaiFENesin-codeine (GUAIFENESIN AC) 100-10 MG/5ML liquid 2 teaspoons p.o. nightly as needed cough 240 mL 0   • HYDROcod Polst-CPM Polst ER (Tussionex Pennkinetic ER) 10-8 MG/5ML ER suspension Take 5 mL by mouth Every 12 (Twelve) Hours As Needed for Cough. 120 mL 0   • HYDROcodone-acetaminophen (NORCO) 5-325 MG per tablet Take 1 tablet by mouth Every 6 (Six) Hours As Needed for Moderate Pain. 12 tablet 0   • Insulin Glargine (BASAGLAR KWIKPEN) 100 UNIT/ML injection pen See Admin Instructions.     • letrozole (FEMARA) 2.5 MG tablet      • loratadine (CLARITIN) 10 MG tablet Take 10 mg by mouth Daily.     • Magnesium 400 MG tablet Take 1 tablet by mouth Daily.     • metFORMIN ER (GLUCOPHAGE-XR) 500 MG 24 hr tablet Take 2 tablets by mouth 2 (Two) Times a Day. 360 tablet 3   • Multiple Vitamins-Minerals (CULTURELLE PROBIOTICS + MULTIV PO) Take 1 tablet by mouth See Admin Instructions.     • omeprazole (priLOSEC) 40 MG capsule TAKE ONE CAPSULE BY MOUTH DAILY BEFORE A MEAL 90 capsule 1   • Ozempic, 1 MG/DOSE, 4 MG/3ML solution pen-injector      • Saccharomyces boulardii (Probiotic) 250 MG capsule Take  by mouth.     • Turmeric 500 MG tablet Take  by mouth.     • vitamin B-12 (CYANOCOBALAMIN) 1000 MCG tablet Take 1,000 mcg by mouth Daily.     • amoxicillin-clavulanate (Augmentin)  "875-125 MG per tablet Take 1 tablet by mouth 2 (Two) Times a Day. 28 tablet 0     No current facility-administered medications for this visit.       Review of Systems   Constitutional: Negative.    HENT: Negative.    Eyes: Negative.    Respiratory: Negative.    Cardiovascular: Positive for chest pain.   Gastrointestinal: Negative.    Endocrine: Negative.    Genitourinary: Negative.    Musculoskeletal: Positive for arthralgias, joint swelling and neck stiffness.   Skin: Negative.    Allergic/Immunologic: Negative.    Neurological: Negative.    Hematological: Negative.    Psychiatric/Behavioral: Negative.          Objective   Blood pressure 122/74, pulse 95, temperature 98.2 °F (36.8 °C), temperature source Infrared, height 170 cm (66.93\"), weight 61.6 kg (135 lb 12.8 oz), SpO2 93 %, not currently breastfeeding.  Physical Exam  Constitutional:       General: She is not in acute distress.     Appearance: She is well-developed.   HENT:      Head: Normocephalic and atraumatic.      Right Ear: External ear normal.      Left Ear: External ear normal.      Nose: Nose normal.   Eyes:      Pupils: Pupils are equal, round, and reactive to light.   Neck:      Trachea: No tracheal deviation.   Pulmonary:      Effort: Pulmonary effort is normal. No respiratory distress.   Musculoskeletal:         General: Normal range of motion.      Cervical back: Normal range of motion and neck supple.   Skin:     General: Skin is warm.      Findings: No erythema or rash.      Nails: There is no clubbing.   Neurological:      Mental Status: She is alert and oriented to person, place, and time.   Psychiatric:         Behavior: Behavior normal.         Thought Content: Thought content normal.         Judgment: Judgment normal.         PFTs:  No PFTs available.     Imaging:  CT chest in January 2023 which showed stable pulmonary nodules but a new 11mm pleural based masslike opacity in the anterior right upper lobe, a 16mm masslike density along " the right mediastinum extending to the right hilum, a 9mm pleural based opacity with peripheral groundglass in the left upper lobe and left basal atelectasis.     PET scan January 2023 which showed multiple irregular consolidative and subsolid mostly subpleural based densities in both lungs with corresponding hypermetabolism.     CTA chest which was negative for PE and showed no change in the bilateral subpleural densities which were not significantly changed since the comparison PET scan.        Assessment & Plan   Diagnoses and all orders for this visit:    1. Malignant neoplasm of upper-outer quadrant of right breast in female, estrogen receptor positive (HCC) (Primary)    2. Abnormal CT of the chest    Other orders  -     amoxicillin-clavulanate (Augmentin) 875-125 MG per tablet; Take 1 tablet by mouth 2 (Two) Times a Day.  Dispense: 28 tablet; Refill: 0        Discussion:  Ms. Chawla is a 63yo F with a history of breast cancer who is referred for an abnormal CT scan of the chest.    1. Abnormal CT Chest  - The findings on the CT chest are most consistent with an inflammatory/infectious process. These do not have the typical appearance of a malignancy.  - I have sent an Rx for a 2 week course of Augmentin.   - We will see her back in clinic in 4 weeks with a chest xray. She may need a repeat CT chest at that time as well.   - If the abnormalities on the left are still present at that time, we will refer her for a CT-guided biopsy for further evaluation.     Follow up in 4 weeks with a chest xray.     I have spent 48 minutes reviewing the patient record, face to face with the patient in discussion of test results and plans for further management and in documentation and coordination of care. Excluding time spent on other separate services such as performing procedures or test interpretation, if applicable.        Carole V Case, DO  Pulmonary and Critical Care Medicine  Note Electronically Signed

## 2023-02-08 ENCOUNTER — TELEPHONE (OUTPATIENT)
Dept: ONCOLOGY | Facility: CLINIC | Age: 63
End: 2023-02-08
Payer: COMMERCIAL

## 2023-02-08 NOTE — TELEPHONE ENCOUNTER
I called Luana to review the results of her MRI brain from 2/7/2023 that showed no evidence of malignancy or metastatic disease.  She is scheduled for MRI of the left clavicle on Tuesday, we will move her follow-up out a week after that to go over all of the results.  She met with Dr. Lai yesterday and is now on Augmentin for 2 weeks.

## 2023-02-10 NOTE — ED PROVIDER NOTES
Subjective   History of Present Illness  Luana Chawla is a 62-year-old female that presents the emergency department with multiple complaints.  Patient complains of pain to the left side of her chest that radiates into her left arm.  She describes it as a constant pain has been going on for the past week.  Patient complains of swelling to her left hand.  Patient seen her doctor multiple times she has been told that it was related to arthritis.  Patient advised that she has a history of breast cancer, known pulmonary nodules.  Patient had a PET scan on Monday and was told that there was an area on her lungs that look like inflammation versus infection.  She was not started on antibiotics at this time.  Patient has an appointment to see pulmonology.  Patient's main concern is her left hand.  She went to her PCP today she saw Bree Lantigua and they sent her to the ER.  Patient is very frustrated she advises there are times that she is unable to make a fist.  People have told her that it was probably related to arthritis or gout.  She explains she has been trying to see a rheumatologist for months however her PCP will not refer her to 1.  Patient is a diabetic.  She is insulin-dependent.    History provided by:  Patient   used: No    Chest Pain  Pain location:  L chest  Pain quality: radiating, sharp and stabbing    Pain radiates to:  L arm  Pain severity:  Moderate  Timing:  Constant  Progression:  Worsening  Associated symptoms: nausea and shortness of breath    Associated symptoms: no abdominal pain, no cough, no dizziness, no fever, no lower extremity edema, no numbness and no vomiting    Risk factors: diabetes mellitus and hypertension    Risk factors: not male        Review of Systems   Constitutional: Negative for chills and fever.   Respiratory: Positive for shortness of breath. Negative for cough.    Cardiovascular: Positive for chest pain. Negative for leg swelling.    Gastrointestinal: Positive for nausea. Negative for abdominal pain and vomiting.   Genitourinary: Negative for dysuria, frequency and urgency.   Musculoskeletal: Positive for myalgias.        Lt hand pain     Neurological: Negative for dizziness and numbness.   All other systems reviewed and are negative.      Past Medical History:   Diagnosis Date   • Arthritis    • Breast cancer (HCC)    • Diabetes mellitus (HCC)    • Diverticulitis    • GERD (gastroesophageal reflux disease)    • Hypokalemia    • Kidney stone    • Lichen sclerosus    • Long term (current) use of insulin (HCC)    • Malignant neoplasm of upper-outer quadrant of right female breast (HCC) 01/21/2020   • Microalbuminuria    • Nephrolithiasis    • Sciatica    • Type 2 diabetes mellitus, uncontrolled    • Wears contact lenses        Allergies   Allergen Reactions   • Bactrim [Sulfamethoxazole-Trimethoprim] Rash     RASH, SKIN PEELING        Past Surgical History:   Procedure Laterality Date   • APPENDECTOMY     • BREAST BIOPSY Right    • BREAST EXCISIONAL BIOPSY Right    • CHOLECYSTECTOMY     • COLON SURGERY     • COLONOSCOPY     • HYSTERECTOMY     • LEG SURGERY      leg fracture surgery-Abstracted from [employer]   • LUMBAR LAMINECTOMY DISCECTOMY DECOMPRESSION N/A 05/24/2019    Procedure: LUMBAR LAMINECTOMY L4-5 AND L5-S1;  Surgeon: Hipolito Kahn MD;  Location:  [facility];  Service: Orthopedic Spine   • MASTECTOMY Bilateral    • MASTECTOMY W/ SENTINEL NODE BIOPSY Bilateral 01/21/2020    Procedure: SKIN SPARING MASTECTOMIES BILATERAL, SENTINEL NODE BIOPSY RIGHT;  Surgeon: Silvio Howard MD;  Location:  [facility];  Service: General   • TUBAL ABDOMINAL LIGATION     • VENOUS ACCESS DEVICE (PORT) REMOVAL N/A 03/03/2020    Procedure: REMOVAL PORT;  Surgeon: Silvio Howard MD;  Location:  [facility];  Service: General;  Laterality: N/A;       Family History   Problem Relation Age of Onset   • Breast cancer Mother 70   • Diabetes Mother    •  Cancer Mother    • Heart disease Father    • Hypertension Father    • Diabetes Sister    • Ovarian cancer Neg Hx        Social History     Socioeconomic History   • Marital status: Single   Tobacco Use   • Smoking status: Former     Packs/day: 1.00     Types: Cigarettes, Electronic Cigarette     Start date: 3/13/1990     Quit date: 2017     Years since quittin.7     Passive exposure: Past   • Smokeless tobacco: Never   • Tobacco comments:     currently use nicotine e-cig.   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   Constitutional:       General: She is in acute distress.      Appearance: Normal appearance. She is well-developed. She is not toxic-appearing.   HENT:      Head: Normocephalic and atraumatic.   Eyes:      General: Lids are normal.      Extraocular Movements: Extraocular movements intact.      Conjunctiva/sclera: Conjunctivae normal.      Pupils: Pupils are equal, round, and reactive to light.   Neck:      Trachea: Trachea normal.   Cardiovascular:      Rate and Rhythm: Regular rhythm.      Pulses: Normal pulses.      Heart sounds: Normal heart sounds.   Pulmonary:      Effort: Pulmonary effort is normal. No respiratory distress.      Breath sounds: Normal breath sounds. No decreased breath sounds, wheezing, rhonchi or rales.   Abdominal:      General: Bowel sounds are normal.      Palpations: Abdomen is soft.      Tenderness: There is no abdominal tenderness.   Musculoskeletal:         General: Normal range of motion.      Cervical back: Full passive range of motion without pain and normal range of motion.      Comments: Lt hand dorsal aspect of left hand has mild erythema, edema.  Tenderness to  palpation.   Skin:     General: Skin is warm and dry.      Findings: No rash.   Neurological:      Mental Status: She is alert and oriented to person, place, and time.      Cranial Nerves: No cranial nerve deficit.   Psychiatric:         Speech: Speech normal.         Behavior: Behavior normal. Behavior is cooperative.         Procedures           ED Course  ED Course as of 02/10/23 0002   u Feb 02, 2023   1110 EKG reviewed.  Heart rate 94.  Shows sinus rhythm with multiple PACs. [KG]   1154 No evidence of DVT or SVT in the left upper extremity. [KG]   1330 Patient had 2 sets of negative cardiac enzymes.   [KG]   1330 CTA of the chest revealed the continued inflammatory infectious process in the lung.  No sign of PE. [KG]   1403 Uric Acid(!): 2.3 [KG]   1450 After multiple discussions with the patient we will treat her for pneumonia.  She will be placed on doxycycline.  She will also be given a short course of hydrocodone for her left hand discomfort.  Patient is encouraged to follow-up with her PCP, oncology.  I recommend rheumatology follow-up.  Patient to return for any worsening symptoms. [KG]      ED Course User Index  [KG] Sharita Weaver, APRN           No results found for this or any previous visit (from the past 24 hour(s)).  Note: In addition to lab results from this visit, the labs listed above may include labs taken at another facility or during a different encounter within the last 24 hours. Please correlate lab times with ED admission and discharge times for further clarification of the services performed during this visit.    CT Angiogram Chest   Final Result      1. Multifocal airspace opacities mostly upper lobes but also in the lower lobes which are not significantly changed since the comparison exam dated 1/30/2023, most compatible with an infectious/inflammatory process   2. Stable multiple small pulmonary nodules. Recommend attention on follow-up   3. No new pulmonary abnormality        "  Electronically Signed: Ronald Cast     2/2/2023 1:24 PM EST     Workstation ID: [id number]      XR Chest 1 View   Final Result   Impression:   Multifocal ill-defined opacities in the upper lungs most suspicious for an infectious/inflammatory process. Correlate for any clinical findings of pneumonia. Chest CT may be helpful to better characterize      Electronically Signed: Ronald Cast     2/2/2023 10:56 AM EST     Workstation ID: [id number]        Vitals:    02/02/23 1018 02/02/23 1152 02/02/23 1330 02/02/23 1447   BP: (!) 146/101  118/68    BP Location: Left arm      Patient Position: Sitting      Pulse: 98  87 88   Resp: 20      Temp: 98.7 °F (37.1 °C)      TempSrc: Oral      SpO2: 100%  (!) 89% 98%   Weight: 60.3 kg (133 lb) 60.3 kg (133 lb)     Height: 167.6 cm (66\") 170 cm (66.93\")       Medications   aspirin chewable tablet 324 mg (324 mg Oral Given 2/2/23 1109)   HYDROmorphone (DILAUDID) injection 1 mg (1 mg Intravenous Given 2/2/23 1119)   ondansetron (ZOFRAN) injection 4 mg (4 mg Intravenous Given 2/2/23 1117)   iopamidol (ISOVUE-370) 76 % injection 100 mL (85 mL Intravenous Given 2/2/23 1304)   HYDROcodone-acetaminophen (NORCO) 5-325 MG per tablet 1 tablet (1 tablet Oral Given 2/2/23 1455)     ECG/EMG Results (last 24 hours)     ** No results found for the last 24 hours. **        ECG 12 Lead ED Triage Standing Order; Chest Pain   Final Result   Test Reason : ED Triage Standing Order~   Blood Pressure :   */*   mmHG   Vent. Rate :  76 BPM     Atrial Rate :  76 BPM      P-R Int : 136 ms          QRS Dur :  86 ms       QT Int : 398 ms       P-R-T Axes :  68   4  33 degrees      QTc Int : 447 ms      Normal sinus rhythm   Normal ECG   When compared with ECG of 02-FEB-2023 10:23, (Unconfirmed)   premature atrial complexes are no longer present   Confirmed by MD Han Michael ([id number]) on 2/2/2023 3:58:56 PM      Referred By: EDMD           Confirmed By: Brady Han MD      ECG 12 Lead ED Triage Standing " Order; Chest Pain   Final Result   Test Reason : ED Triage Standing Order~   Blood Pressure :   */*   mmHG   Vent. Rate :  94 BPM     Atrial Rate :  94 BPM      P-R Int : 124 ms          QRS Dur :  88 ms       QT Int : 354 ms       P-R-T Axes :  51  54  61 degrees      QTc Int : 442 ms      Sinus rhythm with premature atrial complexes in a pattern of bigeminy   Otherwise normal ECG   When compared with ECG of 15-BYRON-2020 16:37,   premature atrial complexes are now present   Confirmed by MD Han Michael ([id number]) on 2/2/2023 3:57:57 PM      Referred By: EDMD           Confirmed By: Brady Han MD                                          [facility]    Final diagnoses:   Left-sided chest pain   Pneumonia of left lung due to infectious organism, unspecified part of lung   Hand pain, left   History of malignant neoplasm of right breast   History of type 2 diabetes mellitus       ED Disposition  ED Disposition     ED Disposition   Discharge    Condition   Stable    Comment   --             Bree Lantigua, CECI  [address] [address]  [phone]          Almita Hermosillo PA  [address] [address]  [phone]               Medication List      New Prescriptions    doxycycline 100 MG capsule  Commonly known as: MONODOX  Take 1 capsule by mouth 2 (Two) Times a Day.     HYDROcodone-acetaminophen 5-325 MG per tablet  Commonly known as: NORCO  Take 1 tablet by mouth Every 6 (Six) Hours As Needed for Moderate Pain.           Where to Get Your Medications      These medications were sent to [facility] [id number] - [place], [place] - [address] AT [facility] [address] - [phone]  - [phone] FX  [address] [id number]    Phone: [phone]   · doxycycline 100 MG capsule  · HYDROcodone-acetaminophen 5-325 MG per tablet          Sharita Weaver, APRN  02/10/23 0002

## 2023-02-13 ENCOUNTER — PATIENT MESSAGE (OUTPATIENT)
Dept: FAMILY MEDICINE CLINIC | Facility: CLINIC | Age: 63
End: 2023-02-13
Payer: COMMERCIAL

## 2023-02-17 ENCOUNTER — OFFICE VISIT (OUTPATIENT)
Dept: FAMILY MEDICINE CLINIC | Facility: CLINIC | Age: 63
End: 2023-02-17
Payer: COMMERCIAL

## 2023-02-17 VITALS
OXYGEN SATURATION: 98 % | HEIGHT: 66 IN | DIASTOLIC BLOOD PRESSURE: 90 MMHG | BODY MASS INDEX: 21.07 KG/M2 | SYSTOLIC BLOOD PRESSURE: 134 MMHG | HEART RATE: 74 BPM | WEIGHT: 131.1 LBS | TEMPERATURE: 98.2 F

## 2023-02-17 DIAGNOSIS — R07.9 LEFT-SIDED CHEST PAIN: ICD-10-CM

## 2023-02-17 DIAGNOSIS — J18.9 PNEUMONITIS: ICD-10-CM

## 2023-02-17 DIAGNOSIS — J18.9 PNEUMONIA OF LEFT LUNG DUE TO INFECTIOUS ORGANISM, UNSPECIFIED PART OF LUNG: ICD-10-CM

## 2023-02-17 DIAGNOSIS — M79.642 HAND PAIN, LEFT: ICD-10-CM

## 2023-02-17 DIAGNOSIS — F33.1 MODERATE EPISODE OF RECURRENT MAJOR DEPRESSIVE DISORDER: ICD-10-CM

## 2023-02-17 DIAGNOSIS — R52 PAIN: Primary | ICD-10-CM

## 2023-02-17 PROBLEM — J98.4 PNEUMONITIS: Status: ACTIVE | Noted: 2023-02-17

## 2023-02-17 PROCEDURE — 80305 DRUG TEST PRSMV DIR OPT OBS: CPT | Performed by: NURSE PRACTITIONER

## 2023-02-17 PROCEDURE — 99214 OFFICE O/P EST MOD 30 MIN: CPT | Performed by: NURSE PRACTITIONER

## 2023-02-17 RX ORDER — CITALOPRAM 10 MG/1
20 TABLET ORAL DAILY
Qty: 60 TABLET | Refills: 1 | Status: SHIPPED | OUTPATIENT
Start: 2023-02-17 | End: 2023-02-21

## 2023-02-17 RX ORDER — FLUCONAZOLE 150 MG/1
150 TABLET ORAL ONCE
Qty: 1 TABLET | Refills: 0 | Status: SHIPPED | OUTPATIENT
Start: 2023-02-17 | End: 2023-02-17

## 2023-02-17 RX ORDER — HYDROCODONE BITARTRATE AND ACETAMINOPHEN 5; 325 MG/1; MG/1
1 TABLET ORAL EVERY 8 HOURS PRN
Qty: 60 TABLET | Refills: 0 | Status: SHIPPED | OUTPATIENT
Start: 2023-02-17

## 2023-02-17 NOTE — PROGRESS NOTES
"Chief Complaint  Pain (F/u)    Subjective        Luana Chawla presents to [facility]  History of Present Illness  She was sent to the ER for severe chest pain and shortness of breath. She was dx with pneumonia and questionable lung nodules. She has been to pulmonary and has an appointment with rheumatology scheduled for March. She has pain in both shoulders and scapular pain due to pulmonary inflammation, pneumonitis. She is not a candidate for steroids due to her being an IDDM. She needs some Lortabs to get her through this pain until she can see the other MD's to be worked up for possible malignant nodes and subsequent inflammation of this area. She is taking NSAIDS daily.  She continues to have pain that she ranks anywhere from a 5 to a 10 at times.  She continues to have a sharp catching pain with taking a deep breath.    Objective   Vital Signs:  /90 (BP Location: Left arm, Patient Position: Sitting, Cuff Size: Adult)   Pulse 74   Temp 98.2 °F (36.8 °C) (Temporal)   Ht 167.6 cm (66\")   Wt 59.5 kg (131 lb 1.6 oz)   SpO2 98%   BMI 21.16 kg/m²   Estimated body mass index is 21.16 kg/m² as calculated from the following:    Height as of this encounter: 167.6 cm (66\").    Weight as of this encounter: 59.5 kg (131 lb 1.6 oz).       BMI is within normal parameters. No other follow-up for BMI required.      Physical Exam  Constitutional:       Appearance: She is normal weight.   HENT:      Mouth/Throat:      Mouth: Mucous membranes are moist.   Cardiovascular:      Rate and Rhythm: Normal rate and regular rhythm.   Pulmonary:      Effort: Pulmonary effort is normal.      Breath sounds: Normal breath sounds.   Abdominal:      General: Abdomen is flat. Bowel sounds are normal.      Palpations: Abdomen is soft.   Neurological:      Mental Status: She is alert and oriented to person, place, and time.        Result Review :      Data reviewed: Radiologic studies Ct and " MRI of clavicle reviewed.              Assessment and Plan   Diagnoses and all orders for this visit:    1. Pain (Primary)  -     POC Urine Drug Screen Premier Bio-Cup    2. Moderate episode of recurrent major depressive disorder (HCC)  -     citalopram (CeleXA) 10 MG tablet; Take 2 tablets by mouth Daily.  Dispense: 60 tablet; Refill: 1    3. Pneumonitis  Assessment & Plan:  I consulted Dr. Palumbo for Lortab prescription. Her Samuel and UDS are appropriate.              Follow Up   Return in about 3 months (around 5/17/2023) for Recheck.  Patient was given instructions and counseling regarding her condition or for health maintenance advice. Please see specific information pulled into the AVS if appropriate.

## 2023-02-20 ENCOUNTER — TELEPHONE (OUTPATIENT)
Dept: ONCOLOGY | Facility: CLINIC | Age: 63
End: 2023-02-20
Payer: COMMERCIAL

## 2023-02-20 RX ORDER — ESCITALOPRAM OXALATE 10 MG/1
10 TABLET ORAL DAILY
COMMUNITY
End: 2023-02-20 | Stop reason: SDUPTHER

## 2023-02-20 NOTE — TELEPHONE ENCOUNTER
Caller: Luana Chawla    Relationship: Self    Best call back number: [phone]    What is the best time to reach you: ANYTIME     Who are you requesting to speak with (clinical staff, provider,  specific staff member): SCHEDULING     What was the call regarding: PT HAD COVID AND WANTS TO KNOW IF APPT WITH DR HARLEY ON 2/21 CAN BE A TELEPHONE CALL INSTEAD OF OFFICE VISIT     Do you require a callback: YES PLEASE

## 2023-02-20 NOTE — TELEPHONE ENCOUNTER
From: Luana Chawla  To: Bree Lantigua  Sent: 2/13/2023 10:00 AM EST  Subject: Pain medicine     A referral has been sent to dr Martinez rheumatologist, I don’t have an appointment yet, so in the mean time I need some pain medicine. I was prescribed loratab at the ER a few weeks ago I have been taking one a day with Aleve to cope with the pain. I don’t have anymore.  I say all this because I can’t get an appointment with Bree until next week. Please show this message to Bree thank you   Luana

## 2023-02-21 ENCOUNTER — TELEMEDICINE (OUTPATIENT)
Dept: ONCOLOGY | Facility: CLINIC | Age: 63
End: 2023-02-21
Payer: COMMERCIAL

## 2023-02-21 DIAGNOSIS — C50.411 MALIGNANT NEOPLASM OF UPPER-OUTER QUADRANT OF RIGHT BREAST IN FEMALE, ESTROGEN RECEPTOR POSITIVE: Primary | ICD-10-CM

## 2023-02-21 DIAGNOSIS — Z17.0 MALIGNANT NEOPLASM OF UPPER-OUTER QUADRANT OF RIGHT BREAST IN FEMALE, ESTROGEN RECEPTOR POSITIVE: Primary | ICD-10-CM

## 2023-02-21 PROCEDURE — 99215 OFFICE O/P EST HI 40 MIN: CPT | Performed by: INTERNAL MEDICINE

## 2023-02-21 RX ORDER — ESCITALOPRAM OXALATE 10 MG/1
10 TABLET ORAL DAILY
Qty: 90 TABLET | Refills: 3 | Status: SHIPPED | OUTPATIENT
Start: 2023-02-21 | End: 2023-02-27 | Stop reason: SDUPTHER

## 2023-02-21 NOTE — PROGRESS NOTES
This was an audio and video enabled telemedicine encounter.  Done for COVID-19 risk reduction.  Verbal consent given.    CHIEF COMPLAINT: General aches and fatigue and cough testing positive for COVID yesterday    Problem List:  Oncology/Hematology History Overview Note   Right invasive ductal carcinoma pathological stage I aT1 cN0 M0, 1.8 cm, Bloom Dias 8 out of 9, N0 (total of 4 lymph nodes 2 of which were sentinel), M0 status post bilateral mastectomies.  ER weakly 5% 1+ positive, ID 50% 1-2+ positive, HER-2/ashley 100% 3+ positive.  Negative cancer next panel  1. Significant diabetes with predating neuropathy due to spinal stenosis status post laminectomy 5/24/2019 with persistent neuropathy dorsum left foot  2. MRSA bacteremia due to port infection March 2020 after course 1 day 1 of Herceptin Taxol    Oncology history timeline:  -5/24/2019 Dr. Garcia saw for spinal stenosis with radiculopathy and left foot drop due to herniated disc and performed decompressive laminectomy, L4-5 and L5-S1 discectomy and medial facetectomy  -10/15/2019 mammogram BI-RADS 0  -11/22/2019 right mammogram/ultrasound BI-RADS 4 with ultrasound-guided core biopsy showing invasive ductal carcinoma [place] score 6 out of 9 grade 2, ER 5% 1+ positive ID 50% 1-2+ positive, HER-2/ashley 100% 3+ positive.  -12/13/2019 MRI breasts revealed 2.2 cm right breast mass consistent with biopsy proven cancer with unsuspected adjacent area's of non-mass enhancement worrisome for DCIS.  Non-mass enhancement in the subareolar left breast worrisome for DCIS.  Dominant focus left central portion left breast indeterminate BI-RADS 4.  Cancer next panel negative  -1/15/2020 CMP unremarkable save for glucose 200 with hemoglobin 10.6 normochromic normocytic indices  -1/21/2020 right skin sparing mastectomy with right sentinel lymph node injection and right deep subfascial sentinel lymph node biopsy with contralateral prophylactic left skin sparing mastectomy.   Right breast 1.8 cm Bloom Dias 8 out of 9, total of 4 lymph nodes examined 2 sentinel nodes all negative.  Pathological T1 cN0 M0 stage Ia.  Left breast benign with sclerosing adenosis.  -2/26/2020 started Herceptin Taxol weekly  -3/2/2020 port removed due to MRSA bacteremia with port infection/purulence.  -3/2/2020 through 3/6/2020 hospitalized for MRSA bacteremia from port.  -3/31/2020 [facility] oncology tele-visit: Patient still on IV antibiotics for MRSA port infection.  Decision made to go with Kanjinti alone every 3 weeks along with Arimidex.  -4/13/2020 per ID, patient has completed IV antibiotics and is now on oral antibiotics.  -3/23/2021 completed 1 year Kanjinti.  Recommendation to complete 10 years of Arimidex if can tolerate.  5-7 years if not.    -6/16/2021 [facility] clinic follow-up:  Intolerant of Arimidex with hot flashes and mood swings.  Therapy switched to Letrozole.      -9/15/2021 [facility] clinic follow-up:  She tolerated therapy with Letrozole better than she did with Arimidex but she still is having significant hot flashes and mood disturbance with easy agitation.  She is finding it difficult to work as stress makes these issues worse.  She is going to apply for disability residential.  She is on Wellbutrin and Lexapro.  We discussed at length her options for hormonal blockade and she wants to continue trying.  At this time I will switch her to tamoxifen.  I spoke with our pharmacist Irma Arechiga, Pharm.D. about changing her antidepressants as her current antidepressants can interfere with tamoxifen.  We will have her stop Wellbutrin, she will start Effexor 37.5 mg daily for 7 days and if tolerated will increase to 75 mg daily, if this is tolerated then we will consider increasing up to 150 mg daily.  I am hoping that Effexor will help mitigate some of her hot flashes along with helping her depression.  She will taper off of her Lexapro over 2 weeks and then stop.  We  discussed counseling as I think this will be greatly beneficial to Luana with her dysthymia and helping to deal with stress at work and living with breast cancer.  She did not want me to make the referral but I did give her a brochure and she assures me that she will call and make an appointment.  I plan to call and check on her in about 3 weeks to see how she is tolerating the change in her antidepressants, we will also see if she has made an appointment with [facility] for counseling and to see how she is tolerating tamoxifen.  She is going to stop her Lexapro and wait about a week before starting tamoxifen so that she can tell if she is going to have any side effects with it.  I will see her back in 2 months for follow-up and sooner if needed.  We had planned on 5-7 years of therapy with hormonal blockade, I discussed with her today that with her ongoing side effects we would likely only do 5 years and hopefully we can get through that.  She was only weakly ER positive.    -10/14/2021 Did not tolerate Tamoxifen, went back to Letrozole    -11/17/2021 [facility] Oncology clinic follow-up: Luana is tolerating letrozole much better than tamoxifen.  She continues to have significant hot flashes, she is not sure the Effexor is helping.  She did have to go back to the 37.5 mg dose as she did not tolerate the 75 mg dose.  I discussed with her that she could switch to a different antidepressant as far as we were concerned, there is no interaction with the letrozole, we had only switched her to Effexor when we were trying tamoxifen and also we had hoped it would help with her hot flashes.  She is following with Emeli Suero and will discuss with her at her next appointment.  I also recommended acupuncture for her hot flashes as there is data showing that this may be helpful.  We also discussed adding gabapentin but when we reviewed the potential side effects of constipation and lethargy she did not want to try that.  She  continues to complain of low back pain with some neuropathy in her left lower extremity.  She does have a history of spinal stenosis, I will get an MRI of her lumbar spine for further evaluation and call her with the results.  Assuming there is no involvement of metastatic disease but just symptoms from her spinal stenosis she will need to follow-up with her neurosurgeon.  She has a history of laminectomy.    -5/18/2022 [facility] oncology clinic follow-up: Luana continues to tolerate adjuvant therapy with letrozole, she was previously intolerant of anastrozole and tamoxifen.  She continues to have hot flashes but these seem to be more tolerable.  She has no new worrisome symptoms.  We plan on 5 years of adjuvant therapy with an AI.  She has had mastectomies therefore does not need mammograms.  She will need bone density repeated this fall for 2-year follow-up, previous bone density showed osteopenia.  She may benefit from bisphosphonate but we will see what her next bone density scan shows.  She does take calcium and vitamin D and remains active and walks regularly.  Her diabetes is under good control, she follows with endocrinology.  Her back pain is better, she is now on gabapentin under the direction of her surgeon.    -9/29/2022 [facility] Oncology clinic follow-up: Luana called for an appointment due to not feeling well with upper back pain, fatigue and chest pain.  CT chest, bone scan, CBC, CMP ordered for evaluation.    -10/4/2022 total body bone scan negative for osseous metastatic disease, benign/arthritic/degenerative/posttraumatic changes in the cervical spine, shoulders, acromioclavicular joints (moderate uptake), sternoclavicular joints, right greater than left, elbows, wrists and hands, thoracic spine, lumbosacral spine, sacroiliac joints, hips, knees, ankles and feet.  CT chest with small 3-4 mm bilateral pulmonary nodules.  Right apical groundglass and consolidative opacities could be posttreatment change  versus infectious.  -10/6/2022 [facility] clinic follow-up: Reviewed the above with the patient.  No signs of recurrent or metastatic breast cancer.  Will treat with Augmentin empirically.  Suspect most of her pain is due to her arthritis/degenerative changes, recommended she follow-up with PCP if no improvement, may benefit from rheumatology referral.  We will repeat CT chest in 3 months.    -1/17/2023: CT chest shows stable size of previously noted bilateral pulmonary nodules.  She does have what appears to be new finding of multiple focal nodular opacities in the bilateral upper lobes, favoring combination of postradiation change and/or infection however given his masslike appearance malignancy cannot be excluded.  I called Luana and discussed the results with her.  We will get a PET scan for further evaluation.  She also reports she is still having a daily headache and was actually going to see her primary care provider tomorrow as she feels she may have a sinus infection.  We will also get MRI of the brain for further evaluation.  I will send in a prescription for a Z-Vaughn.  She has a cough but no fever, no shortness of breath.  We will follow-up after her PET scan and MRI to go over the results and further plan of care.  If the PET is unrevealing Dr. Gomez recommends  referral to pulmonology for evaluation and possible bronchoscopy. Of note, she did not have radiation for her breast cancer which was 1.8 cm and node negative with clear margins.    -1/30/2023 PET scan shows multiple irregular consolidative and subsolid mostly subpleural based densities in both lungs with corresponding hypermetabolism.  The appearance is most suggestive of infectious or inflammatory process, including entities such as organizing pneumonia.  The appearance would be atypical for lung metastasis.  There is also conspicuous focal hypermetabolism in the left medial clavicle at the sternoclavicular joint.  This is likely some  underlying subchondral cystic change suggesting likely degenerative uptake, although the degree of hypermetabolism is slightly greater than would be expected for normal degenerative change and a solitary bony metastasis would be difficult to exclude.      -2/2/2023 Chest x-ray showed the multifocal ill-defined opacities in the lung suspicious for infection.  CT angiogram chest showed multifocal airspace opacities not changed from 1/30/2023 consistent with infection/inflammation with stable multiple small pulmonary nodules.  Duplex venous imaging left upper extremity normal.  Hemoglobin 11.4 with MCV 89.9 and platelets 569,000 with normal white count 10,370.  CMP unremarkable.  Normal lipase.  Normal proBNP.  Low uric acid 2.3.  Troponin negative.  EKG normal.    - 2/7/2023 MRI brain with and without contrast nonspecific small vessel changes and altered bone mineral density.  Left calvarial hemangioma.  MRI left clavicle shows osteoarthrosis of the sternoclavicular, acromioclavicular, and glenohumeral joints and a suspected rotator cuff tear.  Lipoma supraspinatus intramuscular.     Malignant neoplasm of upper-outer quadrant of right breast in female, estrogen receptor positive (HCC)   1/16/2019 Cancer Staged    Staging form: Breast, AJCC 8th Edition  - Clinical stage from 1/16/2019: Stage IB (cT2, cN0, cM0, G2, ER+, NY+, HER2+) - Signed by Lisa Herman MD on 1/27/2020 1/21/2020 Initial Diagnosis    Malignant neoplasm of upper-outer quadrant of right female breast (CMS/HCC)     2/11/2020 Cancer Staged    Staging form: Breast, AJCC 8th Edition  - Pathologic: Stage IA (pT1c, pN0, cM0, G3, ER+, NY+, HER2+) - Signed by Hero Gomez MD on 2/11/2020 2/18/2020 -  Other Event    Echocardiogram  · Left ventricular systolic function is normal. Estimated EF = 55%.  · The global longitudinal strain was -19.5%.     2/25/2020 - 2/26/2020 Chemotherapy    OP CENTRAL VENOUS ACCESS DEVICE ACCESS, CARE, AND  MAINTENANCE (CVAD)     2/26/2020 - 3/10/2020 Chemotherapy    OP BREAST PACLitaxel / Trastuzumab-anns (Weekly X 12)     3/2/2020 Adverse Reaction    MRSA bacteremia due to port infection with port removal after day 1 course 1 Herceptin Taxol     3/31/2020 -  Hormonal Therapy    Arimidex for planned 5 years.    6/16/2021 therapy changed to Letrozole due to intolerance to Arimidex.  9/15/2021 changed to Tamoxifen kruse to intolerance of letrozole.  10/14/2021 changed back to Letrozole due to intolerance of tamoxifen.       4/2/2020 -  Chemotherapy    Completed 3/23/2021  OP BREAST Trastuzumab-anns Q21D (maintenance)     5/27/2020 -  Other Event    5/27/2020 echocardiogram EF 55%      7/23/2020 Procedure    Colonoscopy with Dr. Marte, normal     8/26/2020 -  Other Event    Echocardiogram   · This was a limited echocardiogram to assess left ventricular ejection fraction in the setting of chemotherapy.  · Left ventricular systolic function is normal. Calculated EF = 55.1%. Estimated EF = 55%. Global Longitudinal LV strain = -18.2%.  · Estimated EF = 55%.     11/9/2020 Imaging    DEXA bone density IMPRESSION:  Osteopenia of the femoral necks bilaterally, and total hips  bilaterally.  Lowest T score -2.0 of the right femoral neck.     11/10/2020 -  Other Event    Echocardogram   · This was a limited echocardiogram to assess left ventricular function only.  · Left ventricular systolic function is normal. Left ventricular ejection fraction appears to be 56 - 60%.  · Global longitudinal LV strain (GLS) = 19.7%.     2/23/2021 Imaging    -2/23/2021 left hip 2 view x-ray negative     12/1/2021 Imaging    MRI of the lumbar spine with and without contrast: Overall no significant interval change in the appearance of the lumbar spine since previous 5/7/2020 comparison study.  There are postoperative changes at L5-S1 with enhancing epidural scar in the left lateral recess and there are multilevel degenerative changes with canal in  foraminal encroachment.     12/20/2022 Imaging    DEXA bone density scan with osteopenia of the right femoral neck and total right hip.  Lowest T score -2.4 of the right femoral neck.  Compared to previous there was an increase in bone mineral density of the L1-L4 vertebrae by 1.8% and a decrease in the bone mineral density of the total right hip by 4.4%.         HISTORY OF PRESENT ILLNESS:  The patient is a 62 y.o. female, here for follow up on management of adjuvant therapy breast cancer with multiple primarily bony somatic complaints but no evidence of metastasis on aggressive imaging work-up as outlined above.  Tested positive for COVID yesterday and has been feeling generally frail and fatigued with recent worsening cough but not dyspneic and has been started on Paxlovid.  Prior to that had been placed on Augmentin by Dr. Lai and follows up with her in March relative to the pulmonary opacities    Past Medical History:   Diagnosis Date   • Arthritis    • Breast cancer (HCC)    • Diabetes mellitus (HCC)    • Diverticulitis    • GERD (gastroesophageal reflux disease)    • Hypokalemia    • Kidney stone    • Lichen sclerosus    • Long term (current) use of insulin (HCC)    • Malignant neoplasm of upper-outer quadrant of right female breast (HCC) 01/21/2020   • Microalbuminuria    • Nephrolithiasis    • Sciatica    • Type 2 diabetes mellitus, uncontrolled    • Wears contact lenses      Past Surgical History:   Procedure Laterality Date   • APPENDECTOMY     • BREAST BIOPSY Right    • BREAST EXCISIONAL BIOPSY Right    • CHOLECYSTECTOMY     • COLON SURGERY     • COLONOSCOPY     • HYSTERECTOMY     • LEG SURGERY      leg fracture surgery-Abstracted from [employer]   • LUMBAR LAMINECTOMY DISCECTOMY DECOMPRESSION N/A 05/24/2019    Procedure: LUMBAR LAMINECTOMY L4-5 AND L5-S1;  Surgeon: Hipolito Kahn MD;  [facility];  Service: Orthopedic Spine   • MASTECTOMY Bilateral    • MASTECTOMY W/ SENTINEL NODE BIOPSY  Bilateral 01/21/2020    Procedure: SKIN SPARING MASTECTOMIES BILATERAL, SENTINEL NODE BIOPSY RIGHT;  Surgeon: Silvio Howard MD;  Location: [facility] OR;  Service: General   • TUBAL ABDOMINAL LIGATION     • VENOUS ACCESS DEVICE (PORT) REMOVAL N/A 03/03/2020    Procedure: REMOVAL PORT;  Surgeon: Silvio Howard MD;  Location:  [facility] OR;  Service: General;  Laterality: N/A;       Allergies   Allergen Reactions   • Bactrim [Sulfamethoxazole-Trimethoprim] Rash     RASH, SKIN PEELING        Family History and Social History reviewed and changed as necessary    REVIEW OF SYSTEM:   Cough and general fatigue and aches    PHYSICAL EXAM:  No obvious respiratory distress or wheezes.    There were no vitals filed for this visit.  There were no vitals filed for this visit.                       ECOG: (1) Restricted in Physically Strenuous Activity, Ambulatory & Able to Do Work of Light Nature    Lab Results   Component Value Date    HGB 11.4 (L) 02/02/2023    HCT 33.1 (L) 02/02/2023    MCV 89.9 02/02/2023     (H) 02/02/2023    WBC 10.37 02/02/2023    NEUTROABS 8.22 (H) 02/02/2023    LYMPHSABS 1.06 02/02/2023    MONOSABS 0.86 02/02/2023    EOSABS 0.15 02/02/2023    BASOSABS 0.04 02/02/2023       Lab Results   Component Value Date    GLUCOSE 106 (H) 02/02/2023    BUN 12 02/02/2023    CREATININE 0.39 (L) 02/02/2023     02/02/2023    K 4.1 02/02/2023    CL 99 02/02/2023    CO2 25.0 02/02/2023    CALCIUM 9.3 02/02/2023    PROTEINTOT 7.1 02/02/2023    ALBUMIN 3.8 02/02/2023    BILITOT 0.3 02/02/2023    ALKPHOS 92 02/02/2023    AST 14 02/02/2023    ALT 12 02/02/2023             ASSESSMENT & PLAN:  1.  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  ER weakly 5% 1+ positive, SC 50% 1-2+ positive, HER-2/ashley 100% 3+ positive.  Negative cancer next panel  2.  Significant  diabetes with predating neuropathy due to spinal stenosis status post laminectomy 5/24/2019 with persistent neuropathy dorsum left foot  3.  MRSA bacteremia due to port infection March 2020 after course 1 day 1 of Herceptin Taxol  4.  Covid 2/2023    Oncology history timeline:  -5/24/2019 Dr. Garcia saw for spinal stenosis with radiculopathy and left foot drop due to herniated disc and performed decompressive laminectomy, L4-5 and L5-S1 discectomy and medial facetectomy  -10/15/2019 mammogram BI-RADS 0  -11/22/2019 right mammogram/ultrasound BI-RADS 4 with ultrasound-guided core biopsy showing invasive ductal carcinoma [place] score 6 out of 9 grade 2, ER 5% 1+ positive MS 50% 1-2+ positive, HER-2/ashley 100% 3+ positive.  -12/13/2019 MRI breasts revealed 2.2 cm right breast mass consistent with biopsy proven cancer with unsuspected adjacent area's of non-mass enhancement worrisome for DCIS.  Non-mass enhancement in the subareolar left breast worrisome for DCIS.  Dominant focus left central portion left breast indeterminate BI-RADS 4.  Cancer next panel negative  -1/15/2020 CMP unremarkable save for glucose 200 with hemoglobin 10.6 normochromic normocytic indices  -1/21/2020 right skin sparing mastectomy with right sentinel lymph node injection and right deep subfascial sentinel lymph node biopsy with contralateral prophylactic left skin sparing mastectomy.  Right breast 1.8 cm Bloom Dias 8 out of 9, total of 4 lymph nodes examined 2 sentinel nodes all negative.  Pathological T1 cN0 M0 stage Ia.  Left breast benign with sclerosing adenosis.  -2/26/2020 started Herceptin Taxol weekly  -3/2/2020 port removed due to MRSA bacteremia with port infection/purulence.  -3/2/2020 through 3/6/2020 hospitalized for MRSA bacteremia from port.  -3/31/2020 [facility] oncology tele-visit: Patient still on IV antibiotics for MRSA port infection.  Decision made to go with Kanjinti alone every 3 weeks along with  Arimidex.  -4/13/2020 per ID, patient has completed IV antibiotics and is now on oral antibiotics.  -3/23/2021 completed 1 year Kanjinti.  Recommendation to complete 10 years of Arimidex if can tolerate.  5-7 years if not.    -6/16/2021 [facility] Oncology clinic follow-up:  Intolerant of Arimidex with hot flashes and mood swings.  Therapy switched to Letrozole.      -9/15/2021 [facility] Oncology clinic follow-up:  She tolerated therapy with Letrozole better than she did with Arimidex but she still is having significant hot flashes and mood disturbance with easy agitation.  She is finding it difficult to work as stress makes these issues worse.  She is going to apply for disability assisted.  She is on Wellbutrin and Lexapro.  We discussed at length her options for hormonal blockade and she wants to continue trying.  At this time I will switch her to tamoxifen.  I spoke with our pharmacist Irma Arechiga, Pharm.D. about changing her antidepressants as her current antidepressants can interfere with tamoxifen.  We will have her stop Wellbutrin, she will start Effexor 37.5 mg daily for 7 days and if tolerated will increase to 75 mg daily, if this is tolerated then we will consider increasing up to 150 mg daily.  I am hoping that Effexor will help mitigate some of her hot flashes along with helping her depression.  She will taper off of her Lexapro over 2 weeks and then stop.  We discussed counseling as I think this will be greatly beneficial to Luana with her dysthymia and helping to deal with stress at work and living with breast cancer.  She did not want me to make the referral but I did give her a brochure and she assures me that she will call and make an appointment.  I plan to call and check on her in about 3 weeks to see how she is tolerating the change in her antidepressants, we will also see if she has made an appointment with [facility] for counseling and to see how she is tolerating tamoxifen.  She is going to  stop her Lexapro and wait about a week before starting tamoxifen so that she can tell if she is going to have any side effects with it.  I will see her back in 2 months for follow-up and sooner if needed.  We had planned on 5-7 years of therapy with hormonal blockade, I discussed with her today that with her ongoing side effects we would likely only do 5 years and hopefully we can get through that.  She was only weakly ER positive.    -10/14/2021 Did not tolerate Tamoxifen, went back to Letrozole    -11/17/2021 [facility] [place] by [facility] Oncology clinic follow-up: Luana is tolerating letrozole much better than tamoxifen.  She continues to have significant hot flashes, she is not sure the Effexor is helping.  She did have to go back to the 37.5 mg dose as she did not tolerate the 75 mg dose.  I discussed with her that she could switch to a different antidepressant as far as we were concerned, there is no interaction with the letrozole, we had only switched her to Effexor when we were trying tamoxifen and also we had hoped it would help with her hot flashes.  She is following with Emeli Suero and will discuss with her at her next appointment.  I also recommended acupuncture for her hot flashes as there is data showing that this may be helpful.  We also discussed adding gabapentin but when we reviewed the potential side effects of constipation and lethargy she did not want to try that.  She continues to complain of low back pain with some neuropathy in her left lower extremity.  She does have a history of spinal stenosis, I will get an MRI of her lumbar spine for further evaluation and call her with the results.  Assuming there is no involvement of metastatic disease but just symptoms from her spinal stenosis she will need to follow-up with her neurosurgeon.  She has a history of laminectomy.    -5/18/2022 [facility], [place], operated by [facility] oncology clinic follow-up: Luana continues to tolerate adjuvant therapy with letrozole, she was previously intolerant of  anastrozole and tamoxifen.  She continues to have hot flashes but these seem to be more tolerable.  She has no new worrisome symptoms.  We plan on 5 years of adjuvant therapy with an AI.  She has had mastectomies therefore does not need mammograms.  She will need bone density repeated this fall for 2-year follow-up, previous bone density showed osteopenia.  She may benefit from bisphosphonate but we will see what her next bone density scan shows.  She does take calcium and vitamin D and remains active and walks regularly.  Her diabetes is under good control, she follows with endocrinology.  Her back pain is better, she is now on gabapentin under the direction of her surgeon.    -9/29/2022 [facility] follow-up: Luana called for an appointment due to not feeling well with upper back pain, fatigue and chest pain.  CT chest, bone scan, CBC, CMP ordered for evaluation.    -10/4/2022 total body bone scan negative for osseous metastatic disease, benign/arthritic/degenerative/posttraumatic changes in the cervical spine, shoulders, acromioclavicular joints (moderate uptake), sternoclavicular joints, right greater than left, elbows, wrists and hands, thoracic spine, lumbosacral spine, sacroiliac joints, hips, knees, ankles and feet.  CT chest with small 3-4 mm bilateral pulmonary nodules.  Right apical groundglass and consolidative opacities could be posttreatment change versus infectious.  -10/6/2022 [facility] follow-up: Reviewed the above with the patient.  No signs of recurrent or metastatic breast cancer.  Will treat with Augmentin empirically.  Suspect most of her pain is due to her arthritis/degenerative changes, recommended she follow-up with PCP if no improvement, may benefit from rheumatology referral.  We will repeat CT chest in 3 months.    -1/17/2023: CT chest shows stable size of previously noted bilateral pulmonary nodules.  She does have what appears to be new finding of multiple  focal nodular opacities in the bilateral upper lobes, favoring combination of postradiation change and/or infection however given his masslike appearance malignancy cannot be excluded.  I called Luana and discussed the results with her.  We will get a PET scan for further evaluation.  She also reports she is still having a daily headache and was actually going to see her primary care provider tomorrow as she feels she may have a sinus infection.  We will also get MRI of the brain for further evaluation.  I will send in a prescription for a Z-Vaughn.  She has a cough but no fever, no shortness of breath.  We will follow-up after her PET scan and MRI to go over the results and further plan of care.  If the PET is unrevealing Dr. Gomez recommends  referral to pulmonology for evaluation and possible bronchoscopy. Of note, she did not have radiation for her breast cancer which was 1.8 cm and node negative with clear margins.    -1/30/2023 PET scan shows multiple irregular consolidative and subsolid mostly subpleural based densities in both lungs with corresponding hypermetabolism.  The appearance is most suggestive of infectious or inflammatory process, including entities such as organizing pneumonia.  The appearance would be atypical for lung metastasis.  There is also conspicuous focal hypermetabolism in the left medial clavicle at the sternoclavicular joint.  This is likely some underlying subchondral cystic change suggesting likely degenerative uptake, although the degree of hypermetabolism is slightly greater than would be expected for normal degenerative change and a solitary bony metastasis would be difficult to exclude.      -2/2/2023 Chest x-ray showed the multifocal ill-defined opacities in the lung suspicious for infection.  CT angiogram chest showed multifocal airspace opacities not changed from 1/30/2023 consistent with infection/inflammation with stable multiple small pulmonary nodules.  Duplex venous imaging  left upper extremity normal.  Hemoglobin 11.4 with MCV 89.9 and platelets 569,000 with normal white count 10,370.  CMP unremarkable.  Normal lipase.  Normal proBNP.  Low uric acid 2.3.  Troponin negative.  EKG normal.    - 2/7/2023 MRI brain with and without contrast nonspecific small vessel changes and altered bone mineral density.  Left calvarial hemangioma.  MRI left clavicle shows osteoarthrosis of the sternoclavicular, acromioclavicular, and glenohumeral joints and a suspected rotator cuff tear.  Lipoma supraspinatus intramuscular.    -2/21/2023 [facility] oncology telemedicine follow-up: Generalized fatigue with mild cough and COVID-positive yesterday on Paxlovid previously given Augmentin by Dr. Lai for opacities in the lungs and due to follow-up with Dr. Lai in March.  Due to see rheumatology in March.  Thorough imaging as outlined above showed no hint of malignancy though lung abnormalities cannot be 100% ruled out for malignancy but the vast likelihood is that this is infectious.  I will have her follow-up in April with my nurse practitioner and she continues on with [facility].    Total time of care today inclusive of time spent today prior to her arrival reviewing multiplicity of notes from other providers, my nurse practitioner, emergency room notes, multiple image reports and images themselves and during visit translating all that to the patient and putting forth a plan as outlined above and after visit instituting this plan took 47 minutes of patient care time throughout the day today.  Hero Gomez MD    02/21/2023

## 2023-02-27 DIAGNOSIS — F33.1 MODERATE EPISODE OF RECURRENT MAJOR DEPRESSIVE DISORDER: Primary | ICD-10-CM

## 2023-02-27 RX ORDER — ESCITALOPRAM OXALATE 10 MG/1
10 TABLET ORAL DAILY
Qty: 90 TABLET | Refills: 3 | Status: SHIPPED | OUTPATIENT
Start: 2023-02-27 | End: 2023-02-28

## 2023-02-27 RX ORDER — ESCITALOPRAM OXALATE 10 MG/1
10 TABLET ORAL DAILY
Qty: 90 TABLET | Refills: 3 | Status: SHIPPED | OUTPATIENT
Start: 2023-02-27 | End: 2023-02-27 | Stop reason: SDUPTHER

## 2023-02-28 DIAGNOSIS — F33.1 MODERATE EPISODE OF RECURRENT MAJOR DEPRESSIVE DISORDER: Primary | ICD-10-CM

## 2023-02-28 RX ORDER — ESCITALOPRAM OXALATE 20 MG/1
20 TABLET ORAL DAILY
Qty: 90 TABLET | Refills: 1 | Status: SHIPPED | OUTPATIENT
Start: 2023-02-28

## 2023-03-13 ENCOUNTER — OFFICE VISIT (OUTPATIENT)
Dept: ENDOCRINOLOGY | Facility: CLINIC | Age: 63
End: 2023-03-13
Payer: COMMERCIAL

## 2023-03-13 VITALS
WEIGHT: 134 LBS | DIASTOLIC BLOOD PRESSURE: 64 MMHG | BODY MASS INDEX: 21.53 KG/M2 | SYSTOLIC BLOOD PRESSURE: 128 MMHG | HEART RATE: 66 BPM | HEIGHT: 66 IN | OXYGEN SATURATION: 98 %

## 2023-03-13 DIAGNOSIS — Z79.4 INSULIN LONG-TERM USE: ICD-10-CM

## 2023-03-13 DIAGNOSIS — I10 BENIGN HYPERTENSION: ICD-10-CM

## 2023-03-13 DIAGNOSIS — E11.65 UNCONTROLLED TYPE 2 DIABETES MELLITUS WITH HYPERGLYCEMIA: Primary | ICD-10-CM

## 2023-03-13 LAB
ALBUMIN SERPL-MCNC: 4.3 G/DL (ref 3.5–5.2)
ALBUMIN UR-MCNC: <1.2 MG/DL
ALBUMIN/GLOB SERPL: 1.5 G/DL
ALP SERPL-CCNC: 78 U/L (ref 39–117)
ALT SERPL W P-5'-P-CCNC: 16 U/L (ref 1–33)
ANION GAP SERPL CALCULATED.3IONS-SCNC: 11 MMOL/L (ref 5–15)
AST SERPL-CCNC: 16 U/L (ref 1–32)
BILIRUB SERPL-MCNC: 0.3 MG/DL (ref 0–1.2)
BUN SERPL-MCNC: 11 MG/DL (ref 8–23)
BUN/CREAT SERPL: 18 (ref 7–25)
CALCIUM SPEC-SCNC: 9.3 MG/DL (ref 8.6–10.5)
CHLORIDE SERPL-SCNC: 101 MMOL/L (ref 98–107)
CHOLEST SERPL-MCNC: 172 MG/DL (ref 0–200)
CO2 SERPL-SCNC: 26 MMOL/L (ref 22–29)
CREAT SERPL-MCNC: 0.61 MG/DL (ref 0.57–1)
CREAT UR-MCNC: 79.4 MG/DL
EGFRCR SERPLBLD CKD-EPI 2021: 100.6 ML/MIN/1.73
EXPIRATION DATE: NORMAL
EXPIRATION DATE: NORMAL
GLOBULIN UR ELPH-MCNC: 2.8 GM/DL
GLUCOSE BLDC GLUCOMTR-MCNC: 123 MG/DL (ref 70–130)
GLUCOSE SERPL-MCNC: 174 MG/DL (ref 65–99)
HBA1C MFR BLD: 8.6 %
HDLC SERPL-MCNC: 75 MG/DL (ref 40–60)
LDLC SERPL CALC-MCNC: 87 MG/DL (ref 0–100)
LDLC/HDLC SERPL: 1.17 {RATIO}
Lab: NORMAL
Lab: NORMAL
MICROALBUMIN/CREAT UR: NORMAL MG/G{CREAT}
POTASSIUM SERPL-SCNC: 4.5 MMOL/L (ref 3.5–5.2)
PROT SERPL-MCNC: 7.1 G/DL (ref 6–8.5)
SODIUM SERPL-SCNC: 138 MMOL/L (ref 136–145)
TRIGL SERPL-MCNC: 47 MG/DL (ref 0–150)
TSH SERPL DL<=0.05 MIU/L-ACNC: 1.71 UIU/ML (ref 0.27–4.2)
VLDLC SERPL-MCNC: 10 MG/DL (ref 5–40)

## 2023-03-13 PROCEDURE — 82947 ASSAY GLUCOSE BLOOD QUANT: CPT | Performed by: INTERNAL MEDICINE

## 2023-03-13 PROCEDURE — 83036 HEMOGLOBIN GLYCOSYLATED A1C: CPT | Performed by: INTERNAL MEDICINE

## 2023-03-13 PROCEDURE — 84443 ASSAY THYROID STIM HORMONE: CPT | Performed by: INTERNAL MEDICINE

## 2023-03-13 PROCEDURE — 80061 LIPID PANEL: CPT | Performed by: INTERNAL MEDICINE

## 2023-03-13 PROCEDURE — 82043 UR ALBUMIN QUANTITATIVE: CPT | Performed by: INTERNAL MEDICINE

## 2023-03-13 PROCEDURE — 99214 OFFICE O/P EST MOD 30 MIN: CPT | Performed by: INTERNAL MEDICINE

## 2023-03-13 PROCEDURE — 80053 COMPREHEN METABOLIC PANEL: CPT | Performed by: INTERNAL MEDICINE

## 2023-03-13 PROCEDURE — 82570 ASSAY OF URINE CREATININE: CPT | Performed by: INTERNAL MEDICINE

## 2023-03-13 NOTE — PROGRESS NOTES
"     Office Note      Date: 2023  Patient Name: Luana Chawla  MRN: [id number]  : 1960    Chief Complaint   Patient presents with   • Diabetes       History of Present Illness:   Luana Chawla is a 62 y.o. female who presents for Diabetes type 2. Diagnosed in: . Treated in past with insulin. Current treatments: metformin, ozempic and basal insulin. Number of insulin shots per day: 1. Checks blood sugar 1 time a day.  Has low blood sugar: occasional. Aspirin use: No - due to easy bruising/nosebleeds. Statin use: No - lipids have been okay. ACE-I/ARB use: No - no indication.. Changes in health since last visit: pneumonia, arthritis pain - hands, shoulders, chest - sees rheumatologist later today. Last eye exam 2021.    Subjective      Diabetic Complications:  Eyes: No  Kidneys: No  Feet: No  Heart: No    Diet and Exercise:  Meals per day: 3  Minutes of exercise per week: 150 mins.    Review of Systems:   Review of Systems   Constitutional: Negative.    Cardiovascular: Negative.    Gastrointestinal: Negative.    Endocrine: Positive for heat intolerance.       The following portions of the patient's history were reviewed and updated as appropriate: allergies, current medications, past family history, past medical history, past social history, past surgical history and problem list.    Objective       Visit Vitals  /64 (BP Location: Left arm, Patient Position: Sitting, Cuff Size: Adult)   Pulse 66   Ht 167 cm (65.75\")   Wt 60.8 kg (134 lb)   LMP  (LMP Unknown)   SpO2 98%   BMI 21.79 kg/m²       Physical Exam:  Physical Exam  Constitutional:       Appearance: Normal appearance.   Cardiovascular:      Pulses:           Dorsalis pedis pulses are 2+ on the right side and 2+ on the left side.        Posterior tibial pulses are 2+ on the right side and 2+ on the left side.   Feet:      Right foot:      Protective Sensation: 5 sites tested. 5 sites sensed.      Skin integrity: Skin " integrity normal.      Toenail Condition: Right toenails are normal.      Left foot:      Protective Sensation: 5 sites tested. 5 sites sensed.      Skin integrity: Skin integrity normal.      Toenail Condition: Left toenails are normal.   Neurological:      Mental Status: She is alert.         Labs:    HbA1c  Lab Results   Component Value Date    HGBA1C 8.6 03/13/2023       CMP  Lab Results   Component Value Date    GLUCOSE 106 (H) 02/02/2023    BUN 12 02/02/2023    CREATININE 0.39 (L) 02/02/2023    EGFRIFNONA 117 05/05/2021    EGFRIFAFRI 110 02/25/2020    BCR 30.8 (H) 02/02/2023    K 4.1 02/02/2023    CO2 25.0 02/02/2023    CALCIUM 9.3 02/02/2023    PROTENTOTREF 6.5 02/25/2020    LABIL2 1.7 02/25/2020    AST 14 02/02/2023    ALT 12 02/02/2023        Lipid Panel  Lab Results   Component Value Date    HDL 70 (H) 03/29/2022    LDL 76 03/29/2022    TRIG 46 03/29/2022        TSH  Lab Results   Component Value Date    TSH 1.570 03/29/2022        Hemoglobin A1C  Lab Results   Component Value Date    HGBA1C 8.6 03/13/2023        Microalbumin/Creatinine  Lab Results   Component Value Date    MALBCRERATIO  03/29/2022      Comment:      Unable to calculate    MICROALBUR <1.2 03/29/2022           Assessment / Plan      Assessment & Plan:  Diagnoses and all orders for this visit:    1. Uncontrolled type 2 diabetes mellitus with hyperglycemia (HCC) (Primary)  Assessment & Plan:  Diabetes is worsening.  A1c increased but had pneumonia recently with hyperglycemia.  Continue current treatment regimen.  Continue to titrate insulin as needed.  Diabetes will be reassessed in 3 months.    Orders:  -     POC Glycosylated Hemoglobin (Hb A1C)  -     POC Glucose, Blood  -     Comprehensive Metabolic Panel; Future  -     Lipid Panel; Future  -     Microalbumin / Creatinine Urine Ratio - Urine, Clean Catch; Future  -     TSH; Future    2. Benign hypertension  Assessment & Plan:  Hypertension is unchanged.  Continue current treatment  regimen.  Blood pressure will be reassessed at the next regular appointment.      3. Insulin long-term use (HCC)    Current Outpatient Medications   Medication Instructions   • albuterol sulfate  (90 Base) MCG/ACT inhaler INHALE TWO PUFFS BY MOUTH EVERY 4 HOURS AS NEEDED   • amoxicillin-clavulanate (Augmentin) 875-125 MG per tablet 1 tablet, Oral, 2 Times Daily   • Ascorbic Acid (Vitamin C) 500 MG capsule 1 capsule, Oral, Daily   • BD Pen Needle Radha 2nd Gen 32G X 4 MM misc USE ONE DAILY   • Calcium Acetate 667 MG tablet 1 tablet, Oral, Every 8 Hours Scheduled   • Calcium Carb-Cholecalciferol (CALCIUM 1000 + D PO) 1 tablet, Oral, Daily   • clobetasol (TEMOVATE) 0.05 % ointment Topical, 2 Times Daily   • cyclobenzaprine (FLEXERIL) 10 mg, Oral, 3 Times Daily PRN   • escitalopram (LEXAPRO) 20 mg, Oral, Daily   • glucose blood (Accu-Chek Sasha Plus) test strip Test up to 3 times daily   • guaiFENesin-codeine (GUAIFENESIN AC) 100-10 MG/5ML liquid 2 teaspoons p.o. nightly as needed cough   • HYDROcod Polst-CPM Polst ER (Tussionex Pennkinetic ER) 10-8 MG/5ML ER suspension 5 mL, Oral, Every 12 Hours PRN   • HYDROcodone-acetaminophen (NORCO) 5-325 MG per tablet 1 tablet, Oral, Every 8 Hours PRN   • Insulin Glargine (BASAGLAR KWIKPEN) 100 UNIT/ML injection pen See Admin Instructions   • letrozole (FEMARA) 2.5 MG tablet No dose, route, or frequency recorded.   • loratadine (CLARITIN) 10 mg, Oral, Daily   • Magnesium 400 MG tablet 1 tablet, Oral, Daily   • metFORMIN ER (GLUCOPHAGE-XR) 1,000 mg, Oral, 2 Times Daily   • Multiple Vitamins-Minerals (CULTURELLE PROBIOTICS + MULTIV PO) 1 tablet, Oral, See Admin Instructions   • omeprazole (priLOSEC) 40 MG capsule TAKE ONE CAPSULE BY MOUTH DAILY BEFORE A MEAL   • Ozempic, 1 MG/DOSE, 4 MG/3ML solution pen-injector No dose, route, or frequency recorded.   • Saccharomyces boulardii (Probiotic) 250 MG capsule Oral   • Turmeric 500 MG tablet Oral   • vitamin B-12 (CYANOCOBALAMIN)  1,000 mcg, Oral, Daily      Return in about 3 months (around 6/13/2023) for Recheck with A1c.    Jose Cummings MD   03/13/2023

## 2023-03-14 ENCOUNTER — HOSPITAL ENCOUNTER (OUTPATIENT)
Dept: GENERAL RADIOLOGY | Facility: HOSPITAL | Age: 63
Discharge: HOME | End: 2023-03-14
Admitting: INTERNAL MEDICINE
Payer: COMMERCIAL

## 2023-03-14 ENCOUNTER — NURSE NAVIGATOR (OUTPATIENT)
Dept: ONCOLOGY | Facility: CLINIC | Age: 63
End: 2023-03-14
Payer: COMMERCIAL

## 2023-03-14 ENCOUNTER — OFFICE VISIT (OUTPATIENT)
Dept: PULMONOLOGY | Facility: CLINIC | Age: 63
End: 2023-03-14
Payer: COMMERCIAL

## 2023-03-14 VITALS
SYSTOLIC BLOOD PRESSURE: 136 MMHG | HEART RATE: 77 BPM | TEMPERATURE: 97.8 F | OXYGEN SATURATION: 98 % | BODY MASS INDEX: 21.86 KG/M2 | DIASTOLIC BLOOD PRESSURE: 80 MMHG | WEIGHT: 136 LBS | RESPIRATION RATE: 18 BRPM | HEIGHT: 66 IN

## 2023-03-14 DIAGNOSIS — C50.411 MALIGNANT NEOPLASM OF UPPER-OUTER QUADRANT OF RIGHT BREAST IN FEMALE, ESTROGEN RECEPTOR POSITIVE: Primary | ICD-10-CM

## 2023-03-14 DIAGNOSIS — C50.411 MALIGNANT NEOPLASM OF UPPER-OUTER QUADRANT OF RIGHT BREAST IN FEMALE, ESTROGEN RECEPTOR POSITIVE: ICD-10-CM

## 2023-03-14 DIAGNOSIS — Z17.0 MALIGNANT NEOPLASM OF UPPER-OUTER QUADRANT OF RIGHT BREAST IN FEMALE, ESTROGEN RECEPTOR POSITIVE: ICD-10-CM

## 2023-03-14 DIAGNOSIS — R93.89 ABNORMAL CT OF THE CHEST: ICD-10-CM

## 2023-03-14 DIAGNOSIS — Z17.0 MALIGNANT NEOPLASM OF UPPER-OUTER QUADRANT OF RIGHT BREAST IN FEMALE, ESTROGEN RECEPTOR POSITIVE: Primary | ICD-10-CM

## 2023-03-14 PROCEDURE — 71046 X-RAY EXAM CHEST 2 VIEWS: CPT

## 2023-03-14 PROCEDURE — 99214 OFFICE O/P EST MOD 30 MIN: CPT | Performed by: INTERNAL MEDICINE

## 2023-03-14 NOTE — PROGRESS NOTES
Saw patient in clinic with Dr. Lai. Patient had COVID since last visit and has made full recovery. She finished antibiotics and chest pains have resolved. Repeat CXR today looks improved. She has also seen rheumatology and has started work-up with them. Per Dr. Lai repeat CT chest. She v/u and agreeable to plan. She knows to call with questions or concerns.

## 2023-03-14 NOTE — PROGRESS NOTES
Pulmonary Office Follow Up      Subjective   Chief Complaint: Joint Pain    Luana Chawla is a 62 y.o. female is being seen in follow up for Abnormal CT chest    History of Present Illness    Ms. Chawla is a 63yo F with a history of breast cancer who was followed for an abnormal CT scan of the chest. She previously had a CT scan of the chest in October 2022 which showed small 3-4mm bilateral pulmonary nodules and right apical groundglass and consolidations. She was treated with a course of Augmentin.      She had a follow up CT chest in January 2023 which showed stable pulmonary nodules but a new 11mm pleural based masslike opacity in the anterior right upper lobe, a 16mm masslike density along the right mediastinum extending to the right hilum, a 9mm pleural based opacity with peripheral groundglass in the left upper lobe and left basal atelectasis.      She was treated with a Z-pack and then had a PET scan which showed multiple irregular consolidative and subsolid mostly subpleural based densities in both lungs with corresponding hypermetabolism.      She was seen by her PCP and noted to have severe left sided chest pain and sent to the [facility] ED. She then had a CTA chest which was negative for PE and showed no change in the bilateral subpleural densities which were not significantly changed since the comparison PET scan. She was prescribed Doxycycline by the ED, but she has not taken this as it upsets her stomach.     She was last seen in clinic on 2/7/23 and started on a 2 week course of Augmentin.     She reports that she is feeling better. She is no longer having chest pain. She has seen Rheumatology and is being worked up for inflammatory arthritis.     The following portions of the patient's history were reviewed and updated as appropriate: allergies, current medications, past family history, past medical history, past social history, past surgical history and problem list.    Review of Systems  "  Constitutional: Negative.    HENT: Negative.    Eyes: Negative.    Respiratory: Negative.    Cardiovascular: Negative.    Gastrointestinal: Negative.    Endocrine: Negative.    Genitourinary: Negative.    Musculoskeletal: Positive for arthralgias.   Skin: Negative.    Allergic/Immunologic: Negative.    Neurological: Negative.    Hematological: Negative.    Psychiatric/Behavioral: Negative.          Objective   Blood pressure 136/80, pulse 77, temperature 97.8 °F (36.6 °C), resp. rate 18, height 167 cm (65.75\"), weight 61.7 kg (136 lb), SpO2 98 %, not currently breastfeeding.  Physical Exam  Constitutional:       General: She is not in acute distress.     Appearance: She is well-developed.   HENT:      Head: Normocephalic and atraumatic.      Right Ear: External ear normal.      Left Ear: External ear normal.      Nose: Nose normal.   Eyes:      Pupils: Pupils are equal, round, and reactive to light.   Neck:      Trachea: No tracheal deviation.   Pulmonary:      Effort: Pulmonary effort is normal. No respiratory distress.   Musculoskeletal:         General: Normal range of motion.      Cervical back: Normal range of motion and neck supple.   Skin:     General: Skin is warm.      Findings: No erythema or rash.      Nails: There is no clubbing.   Neurological:      Mental Status: She is alert and oriented to person, place, and time.   Psychiatric:         Behavior: Behavior normal.         Thought Content: Thought content normal.         Judgment: Judgment normal.         PFTs:  No PFTs available.    Imaging:  Chest xray performed today. This is a PA/Lateral film. The previously seen left upper lobe airspace disease is improved. The previously seen left upper lobe pleural based density has resolved. The previously seen right upper lobe nodules also appear to be resolved.     Assessment & Plan   Diagnoses and all orders for this visit:    1. Malignant neoplasm of upper-outer quadrant of right breast in female, " estrogen receptor positive (HCC) (Primary)  -     XR Chest 2 View; Future    2. Abnormal CT of the chest  -     CT Chest Without Contrast Diagnostic; Future        Discussion:  Ms. Chawla is a 61yo F with a history of breast cancer who is followed for an abnormal CT scan of the chest.     1. Abnormal CT Chest  - The findings on the CT chest are most consistent with an inflammatory/infectious process. These do not have the typical appearance of a malignancy.  - Treated with a 2 week course of Augmentin in early February.   - Chest xray today shows resolution of the previously seen left pleural density and the right upper lobe density as compared to previous imaging from 2/2/23.    - We will get a CT chest to be complete as there were a couple of upper lobe areas that I cannot visualize well on chest xray. She also had a couple of previously seen nodules that were being followed and we can monitor these as well.      I will plan to call her with the results of her CT scan of the chest.     I have spent 35 minutes reviewing the patient record, face to face with the patient in discussion of test results and plans for further management and in documentation and coordination of care. Excluding time spent on other separate services such as performing procedures or test interpretation, if applicable.    Carole GARRIDO Case, DO  Pulmonary and Critical Care Medicine  Note Electronically Signed

## 2023-03-17 ENCOUNTER — TELEPHONE (OUTPATIENT)
Dept: ONCOLOGY | Facility: CLINIC | Age: 63
End: 2023-03-17
Payer: COMMERCIAL

## 2023-03-17 NOTE — TELEPHONE ENCOUNTER
Caller: FABIEN    Relationship: [facility]     Best call back number: [phone]    What is the best time to reach you: ANYTIME 8-5    Who are you requesting to speak with (clinical staff, provider,  specific staff member): PRIOR AUTH STAFF        What was the call regarding:     PRIOR AUTH MRI OF CLAVICAL DONE IN FEBRUARY       Do you require a callback: YES

## 2023-03-17 NOTE — TELEPHONE ENCOUNTER
CALL [facility] BACK AND THEY DONT HAVE ANY BY THE NAME OF FABIEN. THEY WILL SEND EMAIL AND SEE WHO MAY HAVE BEEN CALLING AND TRY US BACK

## 2023-03-28 ENCOUNTER — TRANSCRIBE ORDERS (OUTPATIENT)
Dept: PULMONOLOGY | Facility: CLINIC | Age: 63
End: 2023-03-28
Payer: COMMERCIAL

## 2023-04-05 DIAGNOSIS — F33.1 MODERATE EPISODE OF RECURRENT MAJOR DEPRESSIVE DISORDER: ICD-10-CM

## 2023-04-05 RX ORDER — ESCITALOPRAM OXALATE 20 MG/1
20 TABLET ORAL DAILY
Qty: 90 TABLET | Refills: 1 | OUTPATIENT
Start: 2023-04-05

## 2023-04-11 RX ORDER — INSULIN GLARGINE 100 [IU]/ML
INJECTION, SOLUTION SUBCUTANEOUS
Qty: 45 ML | Refills: 3 | Status: SHIPPED | OUTPATIENT
Start: 2023-04-11

## 2023-04-11 NOTE — TELEPHONE ENCOUNTER
Lov 3/13/23  Nov6/23/23    The patient is taking 33 units to 39 units a night , I called and verified with the patient

## 2023-04-12 RX ORDER — OMEPRAZOLE 40 MG/1
CAPSULE, DELAYED RELEASE ORAL
Qty: 90 CAPSULE | Refills: 0 | Status: SHIPPED | OUTPATIENT
Start: 2023-04-12

## 2023-04-12 NOTE — TELEPHONE ENCOUNTER
Rx Refill Note    Requested Prescriptions     Pending Prescriptions Disp Refills   • omeprazole (priLOSEC) 40 MG capsule [Pharmacy Med Name: OMEPRAZOLE DR 40 MG CAPSULE] 90 capsule 0     Sig: TAKE ONE CAPSULE BY MOUTH DAILY BEFORE A MEAL        Last office visit with prescribing clinician: 2/17/2023      Next office visit with prescribing clinician: Visit date not found   Last labs:   Last refill: 08/30/2022   Pharmacy (be sure to add in Epic). correct

## 2023-04-19 ENCOUNTER — OFFICE VISIT (OUTPATIENT)
Dept: ONCOLOGY | Facility: CLINIC | Age: 63
End: 2023-04-19
Payer: COMMERCIAL

## 2023-04-19 VITALS
TEMPERATURE: 98.2 F | BODY MASS INDEX: 21.86 KG/M2 | OXYGEN SATURATION: 98 % | WEIGHT: 136 LBS | DIASTOLIC BLOOD PRESSURE: 70 MMHG | HEIGHT: 66 IN | HEART RATE: 80 BPM | SYSTOLIC BLOOD PRESSURE: 121 MMHG | RESPIRATION RATE: 18 BRPM

## 2023-04-19 DIAGNOSIS — C50.411 MALIGNANT NEOPLASM OF UPPER-OUTER QUADRANT OF RIGHT BREAST IN FEMALE, ESTROGEN RECEPTOR POSITIVE: Primary | ICD-10-CM

## 2023-04-19 DIAGNOSIS — Z17.0 MALIGNANT NEOPLASM OF UPPER-OUTER QUADRANT OF RIGHT BREAST IN FEMALE, ESTROGEN RECEPTOR POSITIVE: Primary | ICD-10-CM

## 2023-04-19 PROCEDURE — 99214 OFFICE O/P EST MOD 30 MIN: CPT | Performed by: NURSE PRACTITIONER

## 2023-04-19 RX ORDER — MULTIVITAMIN WITH IRON
8000 TABLET ORAL DAILY
Qty: 30 CAPSULE | Refills: 11 | COMMUNITY
Start: 2023-04-19 | End: 2024-04-18

## 2023-04-19 RX ORDER — PROMETHAZINE HYDROCHLORIDE 25 MG/1
25 TABLET ORAL
COMMUNITY
Start: 2023-04-19 | End: 2023-04-26

## 2023-04-19 RX ORDER — FOLIC ACID 1 MG/1
TABLET ORAL
COMMUNITY
Start: 2023-03-28

## 2023-04-19 NOTE — PROGRESS NOTES
CHIEF COMPLAINT: 1.  Arthritic pain   2.  Acid reflux    Problem List:  Oncology/Hematology History Overview Note   1.  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  ER weakly 5% 1+ positive, MO 50% 1-2+ positive, HER-2/ashley 100% 3+ positive.  Negative cancer next panel  2.  Significant diabetes with predating neuropathy due to spinal stenosis status post laminectomy 5/24/2019 with persistent neuropathy dorsum left foot  3.  MRSA bacteremia due to port infection March 2020 after course 1 day 1 of Herceptin Taxol  4.  Covid 2/2023  5.  Atypical inflammatory/infectious process of the chest February 2023 treated with Augmentin x2 weeks with near complete resolution on CT chest 3/31/2023.  Following with pulmonology.  6.  Rheumatoid arthritis diagnosed March 2023, evaluation by Dr. Natividad Martinez.  Began methotrexate.    Oncology history timeline:  -5/24/2019 Dr. Garcia saw for spinal stenosis with radiculopathy and left foot drop due to herniated disc and performed decompressive laminectomy, L4-5 and L5-S1 discectomy and medial facetectomy  -10/15/2019 mammogram BI-RADS 0  -11/22/2019 right mammogram/ultrasound BI-RADS 4 with ultrasound-guided core biopsy showing invasive ductal carcinoma [place] score 6 out of 9 grade 2, ER 5% 1+ positive MO 50% 1-2+ positive, HER-2/ashley 100% 3+ positive.  -12/13/2019 MRI breasts revealed 2.2 cm right breast mass consistent with biopsy proven cancer with unsuspected adjacent area's of non-mass enhancement worrisome for DCIS.  Non-mass enhancement in the subareolar left breast worrisome for DCIS.  Dominant focus left central portion left breast indeterminate BI-RADS 4.  Cancer next panel negative  -1/15/2020 CMP unremarkable save for glucose 200 with hemoglobin 10.6 normochromic normocytic indices  -1/21/2020 right skin sparing mastectomy  with right sentinel lymph node injection and right deep subfascial sentinel lymph node biopsy with contralateral prophylactic left skin sparing mastectomy.  Right breast 1.8 cm Bloom Dias 8 out of 9, total of 4 lymph nodes examined 2 sentinel nodes all negative.  Pathological T1 cN0 M0 stage Ia.  Left breast benign with sclerosing adenosis.  -2/26/2020 started Herceptin Taxol weekly  -3/2/2020 port removed due to MRSA bacteremia with port infection/purulence.  -3/2/2020 through 3/6/2020 hospitalized for MRSA bacteremia from port.  -3/31/2020 [facility] tele-visit: Patient still on IV antibiotics for MRSA port infection.  Decision made to go with Kanjinti alone every 3 weeks along with Arimidex.  -4/13/2020 per ID, patient has completed IV antibiotics and is now on oral antibiotics.  -3/23/2021 completed 1 year Kanjinti.  Recommendation to complete 10 years of Arimidex if can tolerate.  5-7 years if not.    -6/16/2021 [facility] follow-up:  Intolerant of Arimidex with hot flashes and mood swings.  Therapy switched to Letrozole.      -9/15/2021 [facility] follow-up:  She tolerated therapy with Letrozole better than she did with Arimidex but she still is having significant hot flashes and mood disturbance with easy agitation.  She is finding it difficult to work as stress makes these issues worse.  She is going to apply for disability detention.  She is on Wellbutrin and Lexapro.  We discussed at length her options for hormonal blockade and she wants to continue trying.  At this time I will switch her to tamoxifen.  I spoke with our pharmacist Irma Arechiga, Pharm.D. about changing her antidepressants as her current antidepressants can interfere with tamoxifen.  We will have her stop Wellbutrin, she will start Effexor 37.5 mg daily for 7 days and if tolerated will increase to 75 mg daily, if this is tolerated then we will consider increasing up to 150 mg daily.  I am hoping that  Effexor will help mitigate some of her hot flashes along with helping her depression.  She will taper off of her Lexapro over 2 weeks and then stop.  We discussed counseling as I think this will be greatly beneficial to Luana with her dysthymia and helping to deal with stress at work and living with breast cancer.  She did not want me to make the referral but I did give her a brochure and she assures me that she will call and make an appointment.  I plan to call and check on her in about 3 weeks to see how she is tolerating the change in her antidepressants, we will also see if she has made an appointment with [facility] for counseling and to see how she is tolerating tamoxifen.  She is going to stop her Lexapro and wait about a week before starting tamoxifen so that she can tell if she is going to have any side effects with it.  I will see her back in 2 months for follow-up and sooner if needed.  We had planned on 5-7 years of therapy with hormonal blockade, I discussed with her today that with her ongoing side effects we would likely only do 5 years and hopefully we can get through that.  She was only weakly ER positive.    -10/14/2021 Did not tolerate Tamoxifen, went back to Letrozole    -11/17/2021 [facility] clinic follow-up: Luana is tolerating letrozole much better than tamoxifen.  She continues to have significant hot flashes, she is not sure the Effexor is helping.  She did have to go back to the 37.5 mg dose as she did not tolerate the 75 mg dose.  I discussed with her that she could switch to a different antidepressant as far as we were concerned, there is no interaction with the letrozole, we had only switched her to Effexor when we were trying tamoxifen and also we had hoped it would help with her hot flashes.  She is following with Emeli Suero and will discuss with her at her next appointment.  I also recommended acupuncture for her hot flashes as there is data showing that this may be  helpful.  We also discussed adding gabapentin but when we reviewed the potential side effects of constipation and lethargy she did not want to try that.  She continues to complain of low back pain with some neuropathy in her left lower extremity.  She does have a history of spinal stenosis, I will get an MRI of her lumbar spine for further evaluation and call her with the results.  Assuming there is no involvement of metastatic disease but just symptoms from her spinal stenosis she will need to follow-up with her neurosurgeon.  She has a history of laminectomy.    -5/18/2022 [facility] oncology clinic follow-up: Luana continues to tolerate adjuvant therapy with letrozole, she was previously intolerant of anastrozole and tamoxifen.  She continues to have hot flashes but these seem to be more tolerable.  She has no new worrisome symptoms.  We plan on 5 years of adjuvant therapy with an AI.  She has had mastectomies therefore does not need mammograms.  She will need bone density repeated this fall for 2-year follow-up, previous bone density showed osteopenia.  She may benefit from bisphosphonate but we will see what her next bone density scan shows.  She does take calcium and vitamin D and remains active and walks regularly.  Her diabetes is under good control, she follows with endocrinology.  Her back pain is better, she is now on gabapentin under the direction of her surgeon.    -9/29/2022 [facility] Oncology clinic follow-up: Luana called for an appointment due to not feeling well with upper back pain, fatigue and chest pain.  CT chest, bone scan, CBC, CMP ordered for evaluation.    -10/4/2022 total body bone scan negative for osseous metastatic disease, benign/arthritic/degenerative/posttraumatic changes in the cervical spine, shoulders, acromioclavicular joints (moderate uptake), sternoclavicular joints, right greater than left, elbows, wrists and hands, thoracic spine, lumbosacral spine, sacroiliac joints, hips, knees,  ankles and feet.  CT chest with small 3-4 mm bilateral pulmonary nodules.  Right apical groundglass and consolidative opacities could be posttreatment change versus infectious.  -10/6/2022 [facility] follow-up: Reviewed the above with the patient.  No signs of recurrent or metastatic breast cancer.  Will treat with Augmentin empirically.  Suspect most of her pain is due to her arthritis/degenerative changes, recommended she follow-up with PCP if no improvement, may benefit from rheumatology referral.  We will repeat CT chest in 3 months.    -1/17/2023: CT chest shows stable size of previously noted bilateral pulmonary nodules.  She does have what appears to be new finding of multiple focal nodular opacities in the bilateral upper lobes, favoring combination of postradiation change and/or infection however given his masslike appearance malignancy cannot be excluded.  I called Luana and discussed the results with her.  We will get a PET scan for further evaluation.  She also reports she is still having a daily headache and was actually going to see her primary care provider tomorrow as she feels she may have a sinus infection.  We will also get MRI of the brain for further evaluation.  I will send in a prescription for a Z-Vaughn.  She has a cough but no fever, no shortness of breath.  We will follow-up after her PET scan and MRI to go over the results and further plan of care.  If the PET is unrevealing Dr. Gomez recommends  referral to pulmonology for evaluation and possible bronchoscopy. Of note, she did not have radiation for her breast cancer which was 1.8 cm and node negative with clear margins.    -1/30/2023 PET scan shows multiple irregular consolidative and subsolid mostly subpleural based densities in both lungs with corresponding hypermetabolism.  The appearance is most suggestive of infectious or inflammatory process, including entities such as organizing pneumonia.  The appearance would be  atypical for lung metastasis.  There is also conspicuous focal hypermetabolism in the left medial clavicle at the sternoclavicular joint.  This is likely some underlying subchondral cystic change suggesting likely degenerative uptake, although the degree of hypermetabolism is slightly greater than would be expected for normal degenerative change and a solitary bony metastasis would be difficult to exclude.      -2/2/2023 Chest x-ray showed the multifocal ill-defined opacities in the lung suspicious for infection.  CT angiogram chest showed multifocal airspace opacities not changed from 1/30/2023 consistent with infection/inflammation with stable multiple small pulmonary nodules.  Duplex venous imaging left upper extremity normal.  Hemoglobin 11.4 with MCV 89.9 and platelets 569,000 with normal white count 10,370.  CMP unremarkable.  Normal lipase.  Normal proBNP.  Low uric acid 2.3.  Troponin negative.  EKG normal.    - 2/7/2023 MRI brain with and without contrast nonspecific small vessel changes and altered bone mineral density.  Left calvarial hemangioma.  MRI left clavicle shows osteoarthrosis of the sternoclavicular, acromioclavicular, and glenohumeral joints and a suspected rotator cuff tear.  Lipoma supraspinatus intramuscular.    -3/30/2023 CT chest without contrast shows near complete resolution of previous identified rounded areas of consolidation in both lungs.  Residual minimal groundglass opacity and small semisolid nodules unchanged in the lower lungs.  Recommend 6-month follow-up chest CT.     Malignant neoplasm of upper-outer quadrant of right breast in female, estrogen receptor positive   1/16/2019 Cancer Staged    Staging form: Breast, AJCC 8th Edition  - Clinical stage from 1/16/2019: Stage IB (cT2, cN0, cM0, G2, ER+, MT+, HER2+) - Signed by Lisa Herman MD on 1/27/2020 1/21/2020 Initial Diagnosis    Malignant neoplasm of upper-outer quadrant of right female breast (CMS/HCC)      2/11/2020 Cancer Staged    Staging form: Breast, AJCC 8th Edition  - Pathologic: Stage IA (pT1c, pN0, cM0, G3, ER+, ID+, HER2+) - Signed by Hero Gomez MD on 2/11/2020 2/18/2020 -  Other Event    Echocardiogram  · Left ventricular systolic function is normal. Estimated EF = 55%.  · The global longitudinal strain was -19.5%.     2/25/2020 - 2/26/2020 Chemotherapy    OP CENTRAL VENOUS ACCESS DEVICE ACCESS, CARE, AND MAINTENANCE (CVAD)     2/26/2020 - 3/10/2020 Chemotherapy    OP BREAST PACLitaxel / Trastuzumab-anns (Weekly X 12)     3/2/2020 Adverse Reaction    MRSA bacteremia due to port infection with port removal after day 1 course 1 Herceptin Taxol     3/31/2020 -  Hormonal Therapy    Arimidex for planned 5 years.    6/16/2021 therapy changed to Letrozole due to intolerance to Arimidex.  9/15/2021 changed to Tamoxifen kruse to intolerance of letrozole.  10/14/2021 changed back to Letrozole due to intolerance of tamoxifen.       4/2/2020 -  Chemotherapy    Completed 3/23/2021  OP BREAST Trastuzumab-anns Q21D (maintenance)     5/27/2020 -  Other Event    5/27/2020 echocardiogram EF 55%      7/23/2020 Procedure    Colonoscopy with Dr. Marte, normal     8/26/2020 -  Other Event    Echocardiogram   · This was a limited echocardiogram to assess left ventricular ejection fraction in the setting of chemotherapy.  · Left ventricular systolic function is normal. Calculated EF = 55.1%. Estimated EF = 55%. Global Longitudinal LV strain = -18.2%.  · Estimated EF = 55%.     11/9/2020 Imaging    DEXA bone density IMPRESSION:  Osteopenia of the femoral necks bilaterally, and total hips  bilaterally.  Lowest T score -2.0 of the right femoral neck.     11/10/2020 -  Other Event    Echocardogram   · This was a limited echocardiogram to assess left ventricular function only.  · Left ventricular systolic function is normal. Left ventricular ejection fraction appears to be 56 - 60%.  · Global longitudinal LV strain (GLS) = 19.7%.      2/23/2021 Imaging    -2/23/2021 left hip 2 view x-ray negative     12/1/2021 Imaging    MRI of the lumbar spine with and without contrast: Overall no significant interval change in the appearance of the lumbar spine since previous 5/7/2020 comparison study.  There are postoperative changes at L5-S1 with enhancing epidural scar in the left lateral recess and there are multilevel degenerative changes with canal in foraminal encroachment.     12/20/2022 Imaging    DEXA bone density scan with osteopenia of the right femoral neck and total right hip.  Lowest T score -2.4 of the right femoral neck.  Compared to previous there was an increase in bone mineral density of the L1-L4 vertebrae by 1.8% and a decrease in the bone mineral density of the total right hip by 4.4%.         HISTORY OF PRESENT ILLNESS:  The patient is a 62 y.o. female, here for follow up on management of right breast cancer currently on adjuvant therapy with letrozole.  Since we saw Luana last she had a repeat CT of her chest ordered by Dr. Lai that showed resolution of previous identified rounded areas of consolidation in both lungs.  She is feeling much better.  Continues to have arthritic pain and has been diagnosed with rheumatoid arthritis and is following with rheumatology and now on methotrexate.  She states that she also takes diclofenac.  She has noted increased reflux and dyspepsia since starting these medications.  Tolerating letrozole for the most part, still has hot flashes but manageable.      Past Medical History:   Diagnosis Date   • Arthritis    • Breast cancer    • Diabetes mellitus    • Diverticulitis    • GERD (gastroesophageal reflux disease)    • Hypokalemia    • Kidney stone    • Lichen sclerosus    • Long term (current) use of insulin    • Malignant neoplasm of upper-outer quadrant of right female breast 01/21/2020   • Microalbuminuria    • Nephrolithiasis    • Sciatica    • Type 2 diabetes mellitus, uncontrolled    • Wears  "contact lenses      Past Surgical History:   Procedure Laterality Date   • APPENDECTOMY     • BREAST BIOPSY Right    • BREAST EXCISIONAL BIOPSY Right    • CHOLECYSTECTOMY     • COLON SURGERY     • COLONOSCOPY     • HYSTERECTOMY     • LEG SURGERY      leg fracture surgery-Abstracted from [employer]   • LUMBAR LAMINECTOMY DISCECTOMY DECOMPRESSION N/A 05/24/2019    Procedure: LUMBAR LAMINECTOMY L4-5 AND L5-S1;  Surgeon: Hipolito Kahn MD;  Location:  [facility];  Service: Orthopedic Spine   • MASTECTOMY Bilateral    • MASTECTOMY W/ SENTINEL NODE BIOPSY Bilateral 01/21/2020    Procedure: SKIN SPARING MASTECTOMIES BILATERAL, SENTINEL NODE BIOPSY RIGHT;  Surgeon: Silvio Howard MD;  Location:  [facility];  Service: General   • TUBAL ABDOMINAL LIGATION     • VENOUS ACCESS DEVICE (PORT) REMOVAL N/A 03/03/2020    Procedure: REMOVAL PORT;  Surgeon: Silvio Howard MD;  Location:  [facility];  Service: General;  Laterality: N/A;       Allergies   Allergen Reactions   • Bactrim [Sulfamethoxazole-Trimethoprim] Rash     RASH, SKIN PEELING        Family History and Social History reviewed and changed as necessary    REVIEW OF SYSTEM:   Positive for arthralgias  Positive for hot flashes    PHYSICAL EXAM:  Petite, well-developed, well-nourished appearing female in no distress  Lungs clear to auscultation bilaterally, respirations regular nonlabored  No cervical, supraclavicular or axillary nodes palpable on exam  Chest wall exam is benign status post bilateral mastectomies, no abnormal masses, nodules, rashes or lesions.    Vitals:    04/19/23 1124   BP: 121/70   Pulse: 80   Resp: 18   Temp: 98.2 °F (36.8 °C)   SpO2: 98%   Weight: 61.7 kg (136 lb)   Height: 167 cm (65.75\")     Vitals:    04/19/23 1124   PainSc:   8   PainLoc: Arm  Comment: left arm and shoulder          ECOG score: 0         Vitals reviewed  Labs available in epic reviewed at time of visit along with note from [facility]ology " office visit 3/28/2023.      ECO    Lab Results   Component Value Date    HGB 11.4 (L) 2023    HCT 33.1 (L) 2023    MCV 89.9 2023     (H) 2023    WBC 10.37 2023    NEUTROABS 8.22 (H) 2023    LYMPHSABS 1.06 2023    MONOSABS 0.86 2023    EOSABS 0.15 2023    BASOSABS 0.04 2023       Lab Results   Component Value Date    GLUCOSE 174 (H) 2023    BUN 11 2023    CREATININE 0.61 2023     2023    K 4.5 2023     2023    CO2 26.0 2023    CALCIUM 9.3 2023    PROTEINTOT 7.1 2023    ALBUMIN 4.3 2023    BILITOT 0.3 2023    ALKPHOS 78 2023    AST 16 2023    ALT 16 2023             ASSESSMENT & PLAN:  1.  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  ER weakly 5% 1+ positive, SD 50% 1-2+ positive, HER-2/ashley 100% 3+ positive.  Negative cancer next panel  2.  Significant diabetes with predating neuropathy due to spinal stenosis status post laminectomy 2019 with persistent neuropathy dorsum left foot  3.  MRSA bacteremia due to port infection 2020 after course 1 day 1 of Herceptin Taxol  4.  Covid 2023  5.  Atypical inflammatory/infectious process of the chest 2023 treated with Augmentin x2 weeks with near complete resolution on CT chest 3/31/2023.  Following with pulmonology.  6.  Rheumatoid arthritis diagnosed 2023, evaluation by Dr. Natividad Martinez.  Began methotrexate.  7.  GERD and dyspepsia    Oncology history timeline:  -2019 Dr. Garcia saw for spinal stenosis with radiculopathy and left foot drop due to herniated disc and performed decompressive laminectomy, L4-5 and L5-S1 discectomy and medial facetectomy  -10/15/2019 mammogram BI-RADS 0  -2019 right mammogram/ultrasound BI-RADS 4  with ultrasound-guided core biopsy showing invasive ductal carcinoma [place] score 6 out of 9 grade 2, ER 5% 1+ positive CT 50% 1-2+ positive, HER-2/ashley 100% 3+ positive.  -12/13/2019 MRI breasts revealed 2.2 cm right breast mass consistent with biopsy proven cancer with unsuspected adjacent area's of non-mass enhancement worrisome for DCIS.  Non-mass enhancement in the subareolar left breast worrisome for DCIS.  Dominant focus left central portion left breast indeterminate BI-RADS 4.  Cancer next panel negative  -1/15/2020 CMP unremarkable save for glucose 200 with hemoglobin 10.6 normochromic normocytic indices  -1/21/2020 right skin sparing mastectomy with right sentinel lymph node injection and right deep subfascial sentinel lymph node biopsy with contralateral prophylactic left skin sparing mastectomy.  Right breast 1.8 cm Bloom Dias 8 out of 9, total of 4 lymph nodes examined 2 sentinel nodes all negative.  Pathological T1 cN0 M0 stage Ia.  Left breast benign with sclerosing adenosis.  -2/26/2020 started Herceptin Taxol weekly  -3/2/2020 port removed due to MRSA bacteremia with port infection/purulence.  -3/2/2020 through 3/6/2020 hospitalized for MRSA bacteremia from port.  -3/31/2020 [facility] tele-visit: Patient still on IV antibiotics for MRSA port infection.  Decision made to go with Kanjinti alone every 3 weeks along with Arimidex.  -4/13/2020 per ID, patient has completed IV antibiotics and is now on oral antibiotics.  -3/23/2021 completed 1 year Kanjinti.  Recommendation to complete 10 years of Arimidex if can tolerate.  5-7 years if not.    -6/16/2021 [facility] follow-up:  Intolerant of Arimidex with hot flashes and mood swings.  Therapy switched to Letrozole.      -9/15/2021 [facility] follow-up:  She tolerated therapy with Letrozole better than she did with Arimidex but she still is having significant hot flashes and mood disturbance with easy agitation.   She is finding it difficult to work as stress makes these issues worse.  She is going to apply for disability custodial.  She is on Wellbutrin and Lexapro.  We discussed at length her options for hormonal blockade and she wants to continue trying.  At this time I will switch her to tamoxifen.  I spoke with our pharmacist Irma Arechiga, Pharm.D. about changing her antidepressants as her current antidepressants can interfere with tamoxifen.  We will have her stop Wellbutrin, she will start Effexor 37.5 mg daily for 7 days and if tolerated will increase to 75 mg daily, if this is tolerated then we will consider increasing up to 150 mg daily.  I am hoping that Effexor will help mitigate some of her hot flashes along with helping her depression.  She will taper off of her Lexapro over 2 weeks and then stop.  We discussed counseling as I think this will be greatly beneficial to Luana with her dysthymia and helping to deal with stress at work and living with breast cancer.  She did not want me to make the referral but I did give her a brochure and she assures me that she will call and make an appointment.  I plan to call and check on her in about 3 weeks to see how she is tolerating the change in her antidepressants, we will also see if she has made an appointment with [facility] for counseling and to see how she is tolerating tamoxifen.  She is going to stop her Lexapro and wait about a week before starting tamoxifen so that she can tell if she is going to have any side effects with it.  I will see her back in 2 months for follow-up and sooner if needed.  We had planned on 5-7 years of therapy with hormonal blockade, I discussed with her today that with her ongoing side effects we would likely only do 5 years and hopefully we can get through that.  She was only weakly ER positive.    -10/14/2021 Did not tolerate Tamoxifen, went back to Letrozole    -11/17/2021 [facility] follow-up: Luana is tolerating  letrozole much better than tamoxifen.  She continues to have significant hot flashes, she is not sure the Effexor is helping.  She did have to go back to the 37.5 mg dose as she did not tolerate the 75 mg dose.  I discussed with her that she could switch to a different antidepressant as far as we were concerned, there is no interaction with the letrozole, we had only switched her to Effexor when we were trying tamoxifen and also we had hoped it would help with her hot flashes.  She is following with Emeli Suero and will discuss with her at her next appointment.  I also recommended acupuncture for her hot flashes as there is data showing that this may be helpful.  We also discussed adding gabapentin but when we reviewed the potential side effects of constipation and lethargy she did not want to try that.  She continues to complain of low back pain with some neuropathy in her left lower extremity.  She does have a history of spinal stenosis, I will get an MRI of her lumbar spine for further evaluation and call her with the results.  Assuming there is no involvement of metastatic disease but just symptoms from her spinal stenosis she will need to follow-up with her neurosurgeon.  She has a history of laminectomy.    -5/18/2022 [facility] oncology clinic follow-up: Luana continues to tolerate adjuvant therapy with letrozole, she was previously intolerant of anastrozole and tamoxifen.  She continues to have hot flashes but these seem to be more tolerable.  She has no new worrisome symptoms.  We plan on 5 years of adjuvant therapy with an AI.  She has had mastectomies therefore does not need mammograms.  She will need bone density repeated this fall for 2-year follow-up, previous bone density showed osteopenia.  She may benefit from bisphosphonate but we will see what her next bone density scan shows.  She does take calcium and vitamin D and remains active and walks regularly.  Her diabetes is under good control, she follows  with endocrinology.  Her back pain is better, she is now on gabapentin under the direction of her surgeon.    -9/29/2022 [facility] clinic follow-up: Luana called for an appointment due to not feeling well with upper back pain, fatigue and chest pain.  CT chest, bone scan, CBC, CMP ordered for evaluation.    -10/4/2022 total body bone scan negative for osseous metastatic disease, benign/arthritic/degenerative/posttraumatic changes in the cervical spine, shoulders, acromioclavicular joints (moderate uptake), sternoclavicular joints, right greater than left, elbows, wrists and hands, thoracic spine, lumbosacral spine, sacroiliac joints, hips, knees, ankles and feet.  CT chest with small 3-4 mm bilateral pulmonary nodules.  Right apical groundglass and consolidative opacities could be posttreatment change versus infectious.  -10/6/2022 [facility] clinic follow-up: Reviewed the above with the patient.  No signs of recurrent or metastatic breast cancer.  Will treat with Augmentin empirically.  Suspect most of her pain is due to her arthritis/degenerative changes, recommended she follow-up with PCP if no improvement, may benefit from rheumatology referral.  We will repeat CT chest in 3 months.    -1/17/2023: CT chest shows stable size of previously noted bilateral pulmonary nodules.  She does have what appears to be new finding of multiple focal nodular opacities in the bilateral upper lobes, favoring combination of postradiation change and/or infection however given his masslike appearance malignancy cannot be excluded.  I called Luana and discussed the results with her.  We will get a PET scan for further evaluation.  She also reports she is still having a daily headache and was actually going to see her primary care provider tomorrow as she feels she may have a sinus infection.  We will also get MRI of the brain for further evaluation.  I will send in a prescription for a Z-Vaughn.  She has a cough but no  fever, no shortness of breath.  We will follow-up after her PET scan and MRI to go over the results and further plan of care.  If the PET is unrevealing Dr. Gomez recommends  referral to pulmonology for evaluation and possible bronchoscopy. Of note, she did not have radiation for her breast cancer which was 1.8 cm and node negative with clear margins.    -1/30/2023 PET scan shows multiple irregular consolidative and subsolid mostly subpleural based densities in both lungs with corresponding hypermetabolism.  The appearance is most suggestive of infectious or inflammatory process, including entities such as organizing pneumonia.  The appearance would be atypical for lung metastasis.  There is also conspicuous focal hypermetabolism in the left medial clavicle at the sternoclavicular joint.  This is likely some underlying subchondral cystic change suggesting likely degenerative uptake, although the degree of hypermetabolism is slightly greater than would be expected for normal degenerative change and a solitary bony metastasis would be difficult to exclude.      -2/2/2023 Chest x-ray showed the multifocal ill-defined opacities in the lung suspicious for infection.  CT angiogram chest showed multifocal airspace opacities not changed from 1/30/2023 consistent with infection/inflammation with stable multiple small pulmonary nodules.  Duplex venous imaging left upper extremity normal.  Hemoglobin 11.4 with MCV 89.9 and platelets 569,000 with normal white count 10,370.  CMP unremarkable.  Normal lipase.  Normal proBNP.  Low uric acid 2.3.  Troponin negative.  EKG normal.    - 2/7/2023 MRI brain with and without contrast nonspecific small vessel changes and altered bone mineral density.  Left calvarial hemangioma.  MRI left clavicle shows osteoarthrosis of the sternoclavicular, acromioclavicular, and glenohumeral joints and a suspected rotator cuff tear.  Lipoma supraspinatus intramuscular.    -2/21/2023 [facility]  oncology telemedicine follow-up: Generalized fatigue with mild cough and COVID-positive yesterday on Paxlovid previously given Augmentin by Dr. Lai for opacities in the lungs and due to follow-up with Dr. Lai in March.  Due to see rheumatology in March.  Thorough imaging as outlined above showed no hint of malignancy though lung abnormalities cannot be 100% ruled out for malignancy but the vast likelihood is that this is infectious.  I will have her follow-up in April with my nurse practitioner and she continues on with Femara.    -3/30/2023 CT chest without contrast shows near complete resolution of previous identified rounded areas of consolidation in both lungs.  Residual minimal groundglass opacity and small semisolid nodules unchanged in the lower lungs.  Recommend 6-month follow-up chest CT.    -4/19/2023 [facility] clinic follow-up: Luana overall is doing well on letrozole.  She has no evidence of recurrent breast cancer on clinical exam.  She began AI therapy March 2020 and we plan on at least 5 years adjuvant therapy.  I would consider BCI testing around that time to see whether or not she would benefit from extended adjuvant therapy as it has been challenging for her to take.  Atypical inflammation/infection in her lungs has cleared after Augmentin, CT of the chest on 3/30/2023 showed near complete resolution.  Recommendation to follow-up in 6 months.  Since we saw her last she also was diagnosed with rheumatoid arthritis and is following with Dr. Natividad Martinez.  She has been placed on methotrexate, she also was taking diclofenac as needed.  She has had an increase in acid reflux and dyspepsia, she does take omeprazole and also an occasional Tums.  We discussed that the diclofenac could be upsetting her stomach, she is going to hold that and will try topical diclofenac and she has this at home.  If she continues to have issues I have asked her to notify her primary care provider as she may need a  different PPI or possibly referral to GI for consideration of EGD.    Return to clinic in 6 months for follow-up    This was a level 4, moderate MDM visit with 2 or more stable chronic illnesses, review of labs, review of outside physician's note from rheumatology, review of CT chest results and follow-up with pulmonology, prescription drug management and management of side effects of therapy.    Almita Pruitt, APRN    04/19/2023

## 2023-05-24 ENCOUNTER — OFFICE VISIT (OUTPATIENT)
Dept: FAMILY MEDICINE CLINIC | Facility: CLINIC | Age: 63
End: 2023-05-24
Payer: COMMERCIAL

## 2023-05-24 VITALS
BODY MASS INDEX: 22.19 KG/M2 | SYSTOLIC BLOOD PRESSURE: 110 MMHG | DIASTOLIC BLOOD PRESSURE: 80 MMHG | OXYGEN SATURATION: 95 % | HEIGHT: 66 IN | WEIGHT: 138.1 LBS | HEART RATE: 93 BPM

## 2023-05-24 DIAGNOSIS — M25.511 ACUTE PAIN OF RIGHT SHOULDER: Primary | ICD-10-CM

## 2023-05-24 PROCEDURE — 99213 OFFICE O/P EST LOW 20 MIN: CPT | Performed by: NURSE PRACTITIONER

## 2023-05-24 NOTE — PROGRESS NOTES
"Chief Complaint  Shoulder Pain (Right)    Subjective        Luana Chawla presents to [facility]  History of Present Illness She has had pain in the right shoulder since March. She has no known injury. She had x-rays done in march that shows degenerative processes. She has RA and is taking methotrexate. Her pain radiates down her right arm.     Objective   Vital Signs:  /80 (BP Location: Left arm, Patient Position: Sitting, Cuff Size: Adult)   Pulse 93   Ht 167.6 cm (66\")   Wt 62.6 kg (138 lb 1.6 oz)   SpO2 95%   BMI 22.29 kg/m²   Estimated body mass index is 22.29 kg/m² as calculated from the following:    Height as of this encounter: 167.6 cm (66\").    Weight as of this encounter: 62.6 kg (138 lb 1.6 oz).       BMI is within normal parameters. No other follow-up for BMI required.      Physical Exam  Musculoskeletal:         General: Swelling and tenderness present.      Comments: She has good ROM of the right shoulder.    Skin:     General: Skin is warm and dry.        Result Review :                   Assessment and Plan   Diagnoses and all orders for this visit:    1. Acute pain of right shoulder (Primary)  Assessment & Plan:  Recommend PT. She is not a candidate for steroids or NSAIDS due to methotrexate.              Follow Up   Return in about 3 months (around 8/24/2023) for Recheck.  Patient was given instructions and counseling regarding her condition or for health maintenance advice. Please see specific information pulled into the AVS if appropriate.       Answers for HPI/ROS submitted by the patient on 5/21/2023  Please describe your symptoms.: Shoulder pain  Have you had these symptoms before?: Yes  How long have you been having these symptoms?: Greater than 2 weeks  Please list any medications you are currently taking for this condition.: Muscle relaxer and pain medication periodically  Please describe any probable cause for these symptoms. : RA or " injury  What is the primary reason for your visit?: Other

## 2023-06-13 RX ORDER — LETROZOLE 2.5 MG/1
TABLET, FILM COATED ORAL
Qty: 30 TABLET | Refills: 11 | Status: SHIPPED | OUTPATIENT
Start: 2023-06-13

## 2023-08-21 ENCOUNTER — OFFICE VISIT (OUTPATIENT)
Dept: FAMILY MEDICINE CLINIC | Facility: CLINIC | Age: 63
End: 2023-08-21
Payer: COMMERCIAL

## 2023-08-21 VITALS
OXYGEN SATURATION: 97 % | HEIGHT: 66 IN | TEMPERATURE: 98.4 F | BODY MASS INDEX: 22.73 KG/M2 | DIASTOLIC BLOOD PRESSURE: 80 MMHG | SYSTOLIC BLOOD PRESSURE: 132 MMHG | HEART RATE: 79 BPM | WEIGHT: 141.4 LBS

## 2023-08-21 DIAGNOSIS — G47.62 NOCTURNAL LEG CRAMPS: Primary | ICD-10-CM

## 2023-08-21 PROCEDURE — 99213 OFFICE O/P EST LOW 20 MIN: CPT | Performed by: NURSE PRACTITIONER

## 2023-08-21 RX ORDER — CELECOXIB 100 MG/1
100 CAPSULE ORAL 2 TIMES DAILY
COMMUNITY
Start: 2023-07-25

## 2023-08-21 RX ORDER — INSULIN GLARGINE 100 [IU]/ML
INJECTION, SOLUTION SUBCUTANEOUS
COMMUNITY
Start: 2023-07-11 | End: 2023-08-21 | Stop reason: SDUPTHER

## 2023-08-21 NOTE — PROGRESS NOTES
"Chief Complaint  Leg Pain (L. Intermittent. Hx spinal surgery from 2019.)    Subjective        Luana Chawla presents to [facility]  Leg Pain    She has been walking and going to water aerobics and she now has leg cramps at night. She has been drinking plenty of water. The cramping involves the left lower calf.     Objective   Vital Signs:  /80 (BP Location: Left arm, Patient Position: Sitting, Cuff Size: Adult)   Pulse 79   Temp 98.4 øF (36.9 øC) (Temporal)   Ht 167.6 cm (66\")   Wt 64.1 kg (141 lb 6.4 oz)   SpO2 97%   BMI 22.82 kg/mý   Estimated body mass index is 22.82 kg/mý as calculated from the following:    Height as of this encounter: 167.6 cm (66\").    Weight as of this encounter: 64.1 kg (141 lb 6.4 oz).       BMI is within normal parameters. No other follow-up for BMI required.      Physical Exam  Constitutional:       Appearance: She is normal weight.   Musculoskeletal:         General: Tenderness present. No swelling, deformity or signs of injury.      Left lower leg: No edema.   Neurological:      Mental Status: She is oriented to person, place, and time.   Psychiatric:         Mood and Affect: Mood normal.         Behavior: Behavior normal.      Result Review :                   Assessment and Plan   Diagnoses and all orders for this visit:    1. Nocturnal leg cramps (Primary)  -     Ambulatory Referral to Orthopedic Surgery             Follow Up   Return in about 3 months (around 11/21/2023) for Recheck.  Patient was given instructions and counseling regarding her condition or for health maintenance advice. Please see specific information pulled into the AVS if appropriate.       Answers submitted by the patient for this visit:  Other (Submitted on 8/14/2023)  Please describe your symptoms.: Lower left leg cramp  Have you had these symptoms before?: Yes  How long have you been having these symptoms?: 5-7 days  Please list any medications you are " currently taking for this condition.: Muscle relaxer at night  Please describe any probable cause for these symptoms. : Unsure  Primary Reason for Visit (Submitted on 8/14/2023)  What is the primary reason for your visit?: Other

## 2023-09-14 ENCOUNTER — OFFICE VISIT (OUTPATIENT)
Dept: ENDOCRINOLOGY | Facility: CLINIC | Age: 63
End: 2023-09-14
Payer: COMMERCIAL

## 2023-09-14 VITALS
HEART RATE: 55 BPM | OXYGEN SATURATION: 98 % | SYSTOLIC BLOOD PRESSURE: 126 MMHG | BODY MASS INDEX: 22.18 KG/M2 | HEIGHT: 66 IN | WEIGHT: 138 LBS | DIASTOLIC BLOOD PRESSURE: 74 MMHG

## 2023-09-14 DIAGNOSIS — E11.65 TYPE 2 DIABETES MELLITUS WITH HYPERGLYCEMIA, WITH LONG-TERM CURRENT USE OF INSULIN: Primary | ICD-10-CM

## 2023-09-14 DIAGNOSIS — Z79.4 TYPE 2 DIABETES MELLITUS WITH HYPERGLYCEMIA, WITH LONG-TERM CURRENT USE OF INSULIN: Primary | ICD-10-CM

## 2023-09-14 DIAGNOSIS — I10 BENIGN HYPERTENSION: ICD-10-CM

## 2023-09-14 LAB
EXPIRATION DATE: ABNORMAL
EXPIRATION DATE: NORMAL
GLUCOSE BLDC GLUCOMTR-MCNC: 185 MG/DL (ref 70–130)
HBA1C MFR BLD: 8 %
Lab: ABNORMAL
Lab: NORMAL

## 2023-09-14 PROCEDURE — 82947 ASSAY GLUCOSE BLOOD QUANT: CPT | Performed by: INTERNAL MEDICINE

## 2023-09-14 PROCEDURE — 99214 OFFICE O/P EST MOD 30 MIN: CPT | Performed by: INTERNAL MEDICINE

## 2023-09-14 PROCEDURE — 83036 HEMOGLOBIN GLYCOSYLATED A1C: CPT | Performed by: INTERNAL MEDICINE

## 2023-09-14 RX ORDER — SEMAGLUTIDE 2.68 MG/ML
2 INJECTION, SOLUTION SUBCUTANEOUS
Qty: 9 ML | Refills: 3 | Status: SHIPPED | OUTPATIENT
Start: 2023-09-14

## 2023-09-14 NOTE — ASSESSMENT & PLAN NOTE
Diabetes is unchanged.  A1c stable.  Having rare nocturnal hypoglycemia.  Increase ozempic.   Watch for hypoglycemia.  Diabetes will be reassessed in 3 months.

## 2023-09-14 NOTE — PROGRESS NOTES
"     Office Note      Date: 2023  Patient Name: Luana Chawla  MRN: [id number]  : 1960    Chief Complaint   Patient presents with    Diabetes       History of Present Illness:   Luana Chawla is a 62 y.o. female who presents for Diabetes type 2. Diagnosed in: . Treated in past with insulin. Current treatments: metformin, ozempic and basal insulin. Number of insulin shots per day: 1. Checks blood sugar 1 time a day.  Has low blood sugar: occasional. Aspirin use: No - due to easy bruising/nosebleeds. Statin use: No - lipids have been okay. ACE-I/ARB use: No - no indication.. Changes in health since last visit: none. Last eye exam 3/2023.      Subjective      Diabetic Complications:  Eyes: No  Kidneys: No  Feet: No  Heart: No    Diet and Exercise:  Meals per day: 3  Minutes of exercise per week: 150 mins.    Review of Systems:   Review of Systems   Constitutional: Negative.    Cardiovascular: Negative.    Gastrointestinal: Negative.    Endocrine: Negative.      The following portions of the patient's history were reviewed and updated as appropriate: allergies, current medications, past family history, past medical history, past social history, past surgical history, and problem list.    Objective     Visit Vitals  /74   Pulse 55   Ht 167.6 cm (66\")   Wt 62.6 kg (138 lb)   LMP  (LMP Unknown)   SpO2 98%   BMI 22.27 kg/m²       Physical Exam:  Physical Exam  Constitutional:       Appearance: Normal appearance.   Neurological:      Mental Status: She is alert.       Labs:    HbA1c  Lab Results   Component Value Date    HGBA1C 8.0 2023       CMP  Lab Results   Component Value Date    GLUCOSE 174 (H) 2023    BUN 11 2023    CREATININE 0.61 2023    EGFRIFNONA 117 2021    EGFRIFAFRI 110 2020    BCR 18.0 2023    K 4.5 2023    CO2 26.0 2023    CALCIUM 9.3 2023    PROTENTOTREF 6.5 2020    LABIL2 1.7 2020    AST 16 " 03/13/2023    ALT 16 03/13/2023        Lipid Panel  Lab Results   Component Value Date    HDL 75 (H) 03/13/2023    LDL 87 03/13/2023    TRIG 47 03/13/2023        TSH  Lab Results   Component Value Date    TSH 1.710 03/13/2023        Hemoglobin A1C  Lab Results   Component Value Date    HGBA1C 8.0 09/14/2023        Microalbumin/Creatinine  Lab Results   Component Value Date    MALBCRERATIO  03/13/2023      Comment:      Unable to calculate    MICROALBUR <1.2 03/13/2023           Assessment / Plan      Assessment & Plan:  Diagnoses and all orders for this visit:    1. Type 2 diabetes mellitus with hyperglycemia, with long-term current use of insulin (Primary)  Assessment & Plan:  Diabetes is unchanged.  A1c stable.  Having rare nocturnal hypoglycemia.  Increase ozempic.   Watch for hypoglycemia.  Diabetes will be reassessed in 3 months.    Orders:  -     POC Glucose, Blood  -     POC Glycosylated Hemoglobin (Hb A1C)    2. Benign hypertension  Assessment & Plan:  Hypertension is unchanged.  Continue current treatment regimen.  Blood pressure will be reassessed at the next regular appointment.      Other orders  -     Semaglutide, 2 MG/DOSE, (Ozempic, 2 MG/DOSE,) 8 MG/3ML solution pen-injector; Inject 2 mg under the skin into the appropriate area as directed Every 7 (Seven) Days.  Dispense: 9 mL; Refill: 3      Current Outpatient Medications   Medication Instructions    albuterol sulfate  (90 Base) MCG/ACT inhaler INHALE TWO PUFFS BY MOUTH EVERY 4 HOURS AS NEEDED    ascorbic acid (VITAMIN C) 100 mg, Oral, Daily    BD Pen Needle Radha 2nd Gen 32G X 4 MM misc USE ONE - DAILY    Calcium Acetate 667 MG tablet 1 tablet, Oral, Every 8 Hours Scheduled    Calcium Carb-Cholecalciferol (CALCIUM 1000 + D PO) 1 tablet, Oral, Daily    celecoxib (CELEBREX) 100 mg, Oral, 2 Times Daily    clobetasol (TEMOVATE) 0.05 % ointment Topical, 2 Times Daily    cyclobenzaprine (FLEXERIL) 10 mg, Oral, 3 Times Daily PRN    escitalopram  (LEXAPRO) 20 mg, Oral, Daily    folic acid (FOLVITE) 1 MG tablet No dose, route, or frequency recorded.    glucose blood (Accu-Chek Sasha Plus) test strip Test up to 3 times daily    Insulin Glargine (BASAGLAR KWIKPEN) 100 UNIT/ML injection pen Use daily; titrate to fasting glucose <120; max daily dose 50u    letrozole (FEMARA) 2.5 MG tablet TAKE ONE TABLET BY MOUTH DAILY    loratadine (CLARITIN) 10 mg, Oral, Daily    Magnesium 400 MG tablet 1 tablet, Oral, Daily    metFORMIN ER (GLUCOPHAGE-XR) 1,000 mg, Oral, 2 Times Daily    methotrexate 2.5 MG tablet No dose, route, or frequency recorded.    omeprazole (priLOSEC) 40 MG capsule TAKE ONE CAPSULE BY MOUTH DAILY BEFORE A MEAL    Ozempic (2 MG/DOSE) 2 mg, Subcutaneous, Every 7 Days    Saccharomyces boulardii (Probiotic) 250 MG capsule Oral    Turmeric 500 MG tablet Oral    Vitamin A 8,000 Units, Oral, Daily    vitamin B-12 (CYANOCOBALAMIN) 1,000 mcg, Oral, Daily      Return in about 3 months (around 12/14/2023) for Recheck with A1c, CMP, lipid, TSH, microalbumin, foot exam.    Jose Cummings MD   09/14/2023

## 2023-09-25 RX ORDER — METFORMIN HYDROCHLORIDE 500 MG/1
TABLET, EXTENDED RELEASE ORAL
Qty: 360 TABLET | Refills: 3 | Status: SHIPPED | OUTPATIENT
Start: 2023-09-25

## 2023-09-25 NOTE — TELEPHONE ENCOUNTER
Rx Refill Note  Requested Prescriptions     Pending Prescriptions Disp Refills    metFORMIN ER (GLUCOPHAGE-XR) 500 MG 24 hr tablet [Pharmacy Med Name: METFORMIN HCL  MG TABLET] 360 tablet 3     Sig: TAKE TWO TABLETS BY MOUTH TWICE A DAY      Last office visit with prescribing clinician: 9/14/2023     Next office visit with prescribing clinician: 12/15/2023       Rosa Kern MA  09/25/23, 10:19 EDT

## 2023-09-30 DIAGNOSIS — F33.1 MODERATE EPISODE OF RECURRENT MAJOR DEPRESSIVE DISORDER: ICD-10-CM

## 2023-10-02 RX ORDER — ESCITALOPRAM OXALATE 20 MG/1
TABLET ORAL
Qty: 90 TABLET | Refills: 1 | Status: SHIPPED | OUTPATIENT
Start: 2023-10-02

## 2023-10-05 ENCOUNTER — OFFICE VISIT (OUTPATIENT)
Dept: FAMILY MEDICINE CLINIC | Facility: CLINIC | Age: 63
End: 2023-10-05
Payer: COMMERCIAL

## 2023-10-05 VITALS
HEIGHT: 66 IN | DIASTOLIC BLOOD PRESSURE: 76 MMHG | HEART RATE: 81 BPM | TEMPERATURE: 98.4 F | BODY MASS INDEX: 22.45 KG/M2 | WEIGHT: 139.7 LBS | SYSTOLIC BLOOD PRESSURE: 122 MMHG | OXYGEN SATURATION: 95 %

## 2023-10-05 DIAGNOSIS — M19.90 ARTHRITIS: ICD-10-CM

## 2023-10-05 DIAGNOSIS — M54.2 CERVICALGIA: ICD-10-CM

## 2023-10-05 RX ORDER — CYCLOBENZAPRINE HCL 10 MG
10 TABLET ORAL 3 TIMES DAILY PRN
Qty: 90 TABLET | Refills: 3 | Status: SHIPPED | OUTPATIENT
Start: 2023-10-05

## 2023-10-05 NOTE — PROGRESS NOTES
"Chief Complaint  Neck Pain (Radiating to the L shoulder.)    Subjective        Luana Chawla presents to [facility]  History of Present Illness Back and neck issues are part of her RA. She has been to PT and exhausted her options here. She is using TENS and neck stretcher. She has been using muscle relaxers. She has had imaging done.    Objective   Vital Signs:  /76 (BP Location: Left arm, Patient Position: Sitting, Cuff Size: Adult)   Pulse 81   Temp 98.4 °F (36.9 °C) (Temporal)   Ht 167.6 cm (66\")   Wt 63.4 kg (139 lb 11.2 oz)   SpO2 95%   BMI 22.55 kg/m²   Estimated body mass index is 22.55 kg/m² as calculated from the following:    Height as of this encounter: 167.6 cm (66\").    Weight as of this encounter: 63.4 kg (139 lb 11.2 oz).       BMI is within normal parameters. No other follow-up for BMI required.      Physical Exam  Constitutional:       Appearance: Normal appearance.   Cardiovascular:      Rate and Rhythm: Normal rate and regular rhythm.   Pulmonary:      Effort: Pulmonary effort is normal.      Breath sounds: Normal breath sounds.   Musculoskeletal:         General: Tenderness present.      Cervical back: Normal range of motion. Tenderness present.      Comments: She has limited ROM of the right arm/shoulder. She has some pain with certain movements.      Result Review :                   Assessment and Plan   Diagnoses and all orders for this visit:    1. Cervicalgia  Assessment & Plan:  We discussed getting a new pillow specially for this. Continue PT stretches.    Orders:  -     cyclobenzaprine (FLEXERIL) 10 MG tablet; Take 1 tablet by mouth 3 (Three) Times a Day As Needed for Muscle Spasms.  Dispense: 90 tablet; Refill: 3    2. Arthritis  Assessment & Plan:  No change.  Continue medications.    Orders:  -     cyclobenzaprine (FLEXERIL) 10 MG tablet; Take 1 tablet by mouth 3 (Three) Times a Day As Needed for Muscle Spasms.  Dispense: 90 tablet; " Refill: 3             Follow Up   Return if symptoms worsen or fail to improve.  Patient was given instructions and counseling regarding her condition or for health maintenance advice. Please see specific information pulled into the AVS if appropriate.       Answers submitted by the patient for this visit:  Other (Submitted on 10/2/2023)  Please describe your symptoms.: Muscle pain in neck and shoulder  Have you had these symptoms before?: Yes  How long have you been having these symptoms?: 1-2 weeks  Please describe any probable cause for these symptoms. : Arthritis  Primary Reason for Visit (Submitted on 10/2/2023)  What is the primary reason for your visit?: Other

## 2023-10-09 RX ORDER — OMEPRAZOLE 40 MG/1
CAPSULE, DELAYED RELEASE ORAL
Qty: 90 CAPSULE | Refills: 0 | Status: SHIPPED | OUTPATIENT
Start: 2023-10-09

## 2023-10-18 ENCOUNTER — OFFICE VISIT (OUTPATIENT)
Dept: ONCOLOGY | Facility: CLINIC | Age: 63
End: 2023-10-18
Payer: COMMERCIAL

## 2023-10-18 VITALS
HEIGHT: 66 IN | BODY MASS INDEX: 22.34 KG/M2 | TEMPERATURE: 97.7 F | HEART RATE: 95 BPM | OXYGEN SATURATION: 96 % | RESPIRATION RATE: 18 BRPM | SYSTOLIC BLOOD PRESSURE: 112 MMHG | WEIGHT: 139 LBS | DIASTOLIC BLOOD PRESSURE: 71 MMHG

## 2023-10-18 DIAGNOSIS — Z17.0 MALIGNANT NEOPLASM OF UPPER-OUTER QUADRANT OF RIGHT BREAST IN FEMALE, ESTROGEN RECEPTOR POSITIVE: Primary | ICD-10-CM

## 2023-10-18 DIAGNOSIS — C50.411 MALIGNANT NEOPLASM OF UPPER-OUTER QUADRANT OF RIGHT BREAST IN FEMALE, ESTROGEN RECEPTOR POSITIVE: Primary | ICD-10-CM

## 2023-10-18 DIAGNOSIS — R93.89 ABNORMAL CT OF THE CHEST: ICD-10-CM

## 2023-10-18 PROCEDURE — 99214 OFFICE O/P EST MOD 30 MIN: CPT | Performed by: NURSE PRACTITIONER

## 2023-10-18 NOTE — PROGRESS NOTES
CHIEF COMPLAINT: 1.  Arthritic pain       Problem List:  Oncology/Hematology History Overview Note   1.  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  ER weakly 5% 1+ positive, SD 50% 1-2+ positive, HER-2/ashley 100% 3+ positive.  Negative cancer next panel  2.  Significant diabetes with predating neuropathy due to spinal stenosis status post laminectomy 5/24/2019 with persistent neuropathy dorsum left foot  3.  MRSA bacteremia due to port infection March 2020 after course 1 day 1 of Herceptin Taxol  4.  Covid 2/2023  5.  Atypical inflammatory/infectious process of the chest February 2023 treated with Augmentin x2 weeks with near complete resolution on CT chest 3/31/2023.  Following with pulmonology.  6.  Rheumatoid arthritis diagnosed March 2023, evaluation by Dr. Natividad Martinez.  Began methotrexate.    Oncology history timeline:  -5/24/2019 Dr. Garcia saw for spinal stenosis with radiculopathy and left foot drop due to herniated disc and performed decompressive laminectomy, L4-5 and L5-S1 discectomy and medial facetectomy  -10/15/2019 mammogram BI-RADS 0  -11/22/2019 right mammogram/ultrasound BI-RADS 4 with ultrasound-guided core biopsy showing invasive ductal carcinoma Cory score 6 out of 9 grade 2, ER 5% 1+ positive SD 50% 1-2+ positive, HER-2/ashley 100% 3+ positive.  -12/13/2019 MRI breasts revealed 2.2 cm right breast mass consistent with biopsy proven cancer with unsuspected adjacent area's of non-mass enhancement worrisome for DCIS.  Non-mass enhancement in the subareolar left breast worrisome for DCIS.  Dominant focus left central portion left breast indeterminate BI-RADS 4.  Cancer next panel negative  -1/15/2020 CMP unremarkable save for glucose 200 with hemoglobin 10.6 normochromic normocytic indices  -1/21/2020 right skin sparing mastectomy with right  sentinel lymph node injection and right deep subfascial sentinel lymph node biopsy with contralateral prophylactic left skin sparing mastectomy.  Right breast 1.8 cm Bloom Dias 8 out of 9, total of 4 lymph nodes examined 2 sentinel nodes all negative.  Pathological T1 cN0 M0 stage Ia.  Left breast benign with sclerosing adenosis.  -2/26/2020 started Herceptin Taxol weekly  -3/2/2020 port removed due to MRSA bacteremia with port infection/purulence.  -3/2/2020 through 3/6/2020 hospitalized for MRSA bacteremia from port.  -3/31/2020 [facility] tele-visit: Patient still on IV antibiotics for MRSA port infection.  Decision made to go with Kanjinti alone every 3 weeks along with Arimidex.  -4/13/2020 per ID, patient has completed IV antibiotics and is now on oral antibiotics.  -3/23/2021 completed 1 year Kanjinti.  Recommendation to complete 10 years of Arimidex if can tolerate.  5-7 years if not.    -6/16/2021 [facility] clinic follow-up:  Intolerant of Arimidex with hot flashes and mood swings.  Therapy switched to Letrozole.      -9/15/2021 [facility] clinic follow-up:  She tolerated therapy with Letrozole better than she did with Arimidex but she still is having significant hot flashes and mood disturbance with easy agitation.  She is finding it difficult to work as stress makes these issues worse.  She is going to apply for disability senior care.  She is on Wellbutrin and Lexapro.  We discussed at length her options for hormonal blockade and she wants to continue trying.  At this time I will switch her to tamoxifen.  I spoke with our pharmacist Irma Arechiga, Pharm.D. about changing her antidepressants as her current antidepressants can interfere with tamoxifen.  We will have her stop Wellbutrin, she will start Effexor 37.5 mg daily for 7 days and if tolerated will increase to 75 mg daily, if this is tolerated then we will consider increasing up to 150 mg daily.  I am hoping that Effexor will  help mitigate some of her hot flashes along with helping her depression.  She will taper off of her Lexapro over 2 weeks and then stop.  We discussed counseling as I think this will be greatly beneficial to Luana with her dysthymia and helping to deal with stress at work and living with breast cancer.  She did not want me to make the referral but I did give her a brochure and she assures me that she will call and make an appointment.  I plan to call and check on her in about 3 weeks to see how she is tolerating the change in her antidepressants, we will also see if she has made an appointment with [facility] Vega for counseling and to see how she is tolerating tamoxifen.  She is going to stop her Lexapro and wait about a week before starting tamoxifen so that she can tell if she is going to have any side effects with it.  I will see her back in 2 months for follow-up and sooner if needed.  We had planned on 5-7 years of therapy with hormonal blockade, I discussed with her today that with her ongoing side effects we would likely only do 5 years and hopefully we can get through that.  She was only weakly ER positive.    -10/14/2021 Did not tolerate Tamoxifen, went back to Letrozole    -11/17/2021 [facility] [place] [facility] clinic follow-up: Luana is tolerating letrozole much better than tamoxifen.  She continues to have significant hot flashes, she is not sure the Effexor is helping.  She did have to go back to the 37.5 mg dose as she did not tolerate the 75 mg dose.  I discussed with her that she could switch to a different antidepressant as far as we were concerned, there is no interaction with the letrozole, we had only switched her to Effexor when we were trying tamoxifen and also we had hoped it would help with her hot flashes.  She is following with Emeli Suero and will discuss with her at her next appointment.  I also recommended acupuncture for her hot flashes as there is data showing that this may be helpful.  We also  discussed adding gabapentin but when we reviewed the potential side effects of constipation and lethargy she did not want to try that.  She continues to complain of low back pain with some neuropathy in her left lower extremity.  She does have a history of spinal stenosis, I will get an MRI of her lumbar spine for further evaluation and call her with the results.  Assuming there is no involvement of metastatic disease but just symptoms from her spinal stenosis she will need to follow-up with her neurosurgeon.  She has a history of laminectomy.    -5/18/2022 [facility] oncology clinic follow-up: Luana continues to tolerate adjuvant therapy with letrozole, she was previously intolerant of anastrozole and tamoxifen.  She continues to have hot flashes but these seem to be more tolerable.  She has no new worrisome symptoms.  We plan on 5 years of adjuvant therapy with an AI.  She has had mastectomies therefore does not need mammograms.  She will need bone density repeated this fall for 2-year follow-up, previous bone density showed osteopenia.  She may benefit from bisphosphonate but we will see what her next bone density scan shows.  She does take calcium and vitamin D and remains active and walks regularly.  Her diabetes is under good control, she follows with endocrinology.  Her back pain is better, she is now on gabapentin under the direction of her surgeon.    -9/29/2022 [facility] clinic follow-up: Luana called for an appointment due to not feeling well with upper back pain, fatigue and chest pain.  CT chest, bone scan, CBC, CMP ordered for evaluation.    -10/4/2022 total body bone scan negative for osseous metastatic disease, benign/arthritic/degenerative/posttraumatic changes in the cervical spine, shoulders, acromioclavicular joints (moderate uptake), sternoclavicular joints, right greater than left, elbows, wrists and hands, thoracic spine, lumbosacral spine, sacroiliac joints, hips, knees, ankles and feet.   CT chest with small 3-4 mm bilateral pulmonary nodules.  Right apical groundglass and consolidative opacities could be posttreatment change versus infectious.  -10/6/2022 [facility] follow-up: Reviewed the above with the patient.  No signs of recurrent or metastatic breast cancer.  Will treat with Augmentin empirically.  Suspect most of her pain is due to her arthritis/degenerative changes, recommended she follow-up with PCP if no improvement, may benefit from rheumatology referral.  We will repeat CT chest in 3 months.    -1/17/2023: CT chest shows stable size of previously noted bilateral pulmonary nodules.  She does have what appears to be new finding of multiple focal nodular opacities in the bilateral upper lobes, favoring combination of postradiation change and/or infection however given his masslike appearance malignancy cannot be excluded.  I called Luana and discussed the results with her.  We will get a PET scan for further evaluation.  She also reports she is still having a daily headache and was actually going to see her primary care provider tomorrow as she feels she may have a sinus infection.  We will also get MRI of the brain for further evaluation.  I will send in a prescription for a Z-Vaughn.  She has a cough but no fever, no shortness of breath.  We will follow-up after her PET scan and MRI to go over the results and further plan of care.  If the PET is unrevealing Dr. Gomez recommends  referral to pulmonology for evaluation and possible bronchoscopy. Of note, she did not have radiation for her breast cancer which was 1.8 cm and node negative with clear margins.    -1/30/2023 PET scan shows multiple irregular consolidative and subsolid mostly subpleural based densities in both lungs with corresponding hypermetabolism.  The appearance is most suggestive of infectious or inflammatory process, including entities such as organizing pneumonia.  The appearance would be atypical for lung  metastasis.  There is also conspicuous focal hypermetabolism in the left medial clavicle at the sternoclavicular joint.  This is likely some underlying subchondral cystic change suggesting likely degenerative uptake, although the degree of hypermetabolism is slightly greater than would be expected for normal degenerative change and a solitary bony metastasis would be difficult to exclude.      -2/2/2023 Chest x-ray showed the multifocal ill-defined opacities in the lung suspicious for infection.  CT angiogram chest showed multifocal airspace opacities not changed from 1/30/2023 consistent with infection/inflammation with stable multiple small pulmonary nodules.  Duplex venous imaging left upper extremity normal.  Hemoglobin 11.4 with MCV 89.9 and platelets 569,000 with normal white count 10,370.  CMP unremarkable.  Normal lipase.  Normal proBNP.  Low uric acid 2.3.  Troponin negative.  EKG normal.    - 2/7/2023 MRI brain with and without contrast nonspecific small vessel changes and altered bone mineral density.  Left calvarial hemangioma.  MRI left clavicle shows osteoarthrosis of the sternoclavicular, acromioclavicular, and glenohumeral joints and a suspected rotator cuff tear.  Lipoma supraspinatus intramuscular.    -3/30/2023 CT chest without contrast shows near complete resolution of previous identified rounded areas of consolidation in both lungs.  Residual minimal groundglass opacity and small semisolid nodules unchanged in the lower lungs.  Recommend 6-month follow-up chest CT.    -4/19/2023 [facility] follow-up: Luana overall is doing well on letrozole.  She has no evidence of recurrent breast cancer on clinical exam.  She began AI therapy March 2020 and we plan on at least 5 years adjuvant therapy.  I would consider BCI testing around that time to see whether or not she would benefit from extended adjuvant therapy as it has been challenging for her to take.  Atypical inflammation/infection in  her lungs has cleared after Augmentin, CT of the chest on 3/30/2023 showed near complete resolution.  Recommendation to follow-up in 6 months.  Since we saw her last she also was diagnosed with rheumatoid arthritis and is following with Dr. Natividad Martinez.  She has been placed on methotrexate, she also was taking diclofenac as needed.  She has had an increase in acid reflux and dyspepsia, she does take omeprazole and also an occasional Tums.  We discussed that the diclofenac could be upsetting her stomach, she is going to hold that and will try topical diclofenac and she has this at home.  If she continues to have issues I have asked her to notify her primary care provider as she may need a different PPI or possibly referral to GI for consideration of EGD.     Malignant neoplasm of upper-outer quadrant of right breast in female, estrogen receptor positive   1/16/2019 Cancer Staged    Staging form: Breast, AJCC 8th Edition  - Clinical stage from 1/16/2019: Stage IB (cT2, cN0, cM0, G2, ER+, OH+, HER2+) - Signed by Lisa Herman MD on 1/27/2020 1/21/2020 Initial Diagnosis    Malignant neoplasm of upper-outer quadrant of right female breast (CMS/HCC)     2/11/2020 Cancer Staged    Staging form: Breast, AJCC 8th Edition  - Pathologic: Stage IA (pT1c, pN0, cM0, G3, ER+, OH+, HER2+) - Signed by Hero Gomez MD on 2/11/2020 2/18/2020 -  Other Event    Echocardiogram  Left ventricular systolic function is normal. Estimated EF = 55%.  The global longitudinal strain was -19.5%.     2/25/2020 - 2/26/2020 Chemotherapy    OP CENTRAL VENOUS ACCESS DEVICE ACCESS, CARE, AND MAINTENANCE (CVAD)     2/26/2020 - 3/10/2020 Chemotherapy    OP BREAST PACLitaxel / Trastuzumab-anns (Weekly X 12)     3/2/2020 Adverse Reaction    MRSA bacteremia due to port infection with port removal after day 1 course 1 Herceptin Taxol     3/31/2020 -  Hormonal Therapy    Arimidex for planned 5 years.    6/16/2021 therapy changed to Letrozole  due to intolerance to Arimidex.  9/15/2021 changed to Tamoxifen kruse to intolerance of letrozole.  10/14/2021 changed back to Letrozole due to intolerance of tamoxifen.       4/2/2020 -  Chemotherapy    Completed 3/23/2021  OP BREAST Trastuzumab-anns Q21D (maintenance)         5/27/2020 -  Other Event    5/27/2020 echocardiogram EF 55%      7/23/2020 Procedure    Colonoscopy with Dr. Marte, normal     8/26/2020 -  Other Event    Echocardiogram   This was a limited echocardiogram to assess left ventricular ejection fraction in the setting of chemotherapy.  Left ventricular systolic function is normal. Calculated EF = 55.1%. Estimated EF = 55%. Global Longitudinal LV strain = -18.2%.  Estimated EF = 55%.     11/9/2020 Imaging    DEXA bone density IMPRESSION:  Osteopenia of the femoral necks bilaterally, and total hips  bilaterally.  Lowest T score -2.0 of the right femoral neck.     11/10/2020 -  Other Event    Echocardogram   This was a limited echocardiogram to assess left ventricular function only.  Left ventricular systolic function is normal. Left ventricular ejection fraction appears to be 56 - 60%.  Global longitudinal LV strain (GLS) = 19.7%.     2/23/2021 Imaging    -2/23/2021 left hip 2 view x-ray negative     12/1/2021 Imaging    MRI of the lumbar spine with and without contrast: Overall no significant interval change in the appearance of the lumbar spine since previous 5/7/2020 comparison study.  There are postoperative changes at L5-S1 with enhancing epidural scar in the left lateral recess and there are multilevel degenerative changes with canal in foraminal encroachment.     12/20/2022 Imaging    DEXA bone density scan with osteopenia of the right femoral neck and total right hip.  Lowest T score -2.4 of the right femoral neck.  Compared to previous there was an increase in bone mineral density of the L1-L4 vertebrae by 1.8% and a decrease in the bone mineral density of the total right hip by 4.4%.          HISTORY OF PRESENT ILLNESS:  The patient is a 62 y.o. female, here for follow up on management of right breast cancer currently on adjuvant therapy with letrozole.  Tolerating it fairly well.  She continues to have arthritic pain and is now on methotrexate for rheumatoid arthritis.  She has no further issues with acid reflux since stopping diclofenac.  She is having left shoulder pain from cervical arthritis.  She has used cervical collar and done physical therapy without much relief.  She has been referred to pain management.  She is going on vacation next week to the [place].  She is looking forward to this trip.      Past Medical History:   Diagnosis Date    Arthritis     Breast cancer     Diabetes mellitus     Diverticulitis     GERD (gastroesophageal reflux disease)     Hypokalemia     Kidney stone     Lichen sclerosus     Long term (current) use of insulin     Malignant neoplasm of upper-outer quadrant of right female breast 01/21/2020    Microalbuminuria     Nephrolithiasis     Sciatica     Type 2 diabetes mellitus, uncontrolled     Wears contact lenses      Past Surgical History:   Procedure Laterality Date    APPENDECTOMY      BREAST BIOPSY Right     BREAST EXCISIONAL BIOPSY Right     CHOLECYSTECTOMY      COLON SURGERY      COLONOSCOPY      HYSTERECTOMY      LEG SURGERY      leg fracture surgery-Abstracted from [employer]    LUMBAR LAMINECTOMY DISCECTOMY DECOMPRESSION N/A 05/24/2019    Procedure: LUMBAR LAMINECTOMY L4-5 AND L5-S1;  Surgeon: Hipolito Kahn MD;  Location: [facility] OR;  Service: Orthopedic Spine    MASTECTOMY Bilateral     MASTECTOMY W/ SENTINEL NODE BIOPSY Bilateral 01/21/2020    Procedure: SKIN SPARING MASTECTOMIES BILATERAL, SENTINEL NODE BIOPSY RIGHT;  Surgeon: Silvio Howard MD;  Location: [facility] OR;  Service: General    TUBAL ABDOMINAL LIGATION      VENOUS ACCESS DEVICE (PORT) REMOVAL N/A 03/03/2020    Procedure: REMOVAL PORT;  Surgeon: Silvio Howard MD;   "Location: [facility] OR;  Service: General;  Laterality: N/A;       Allergies   Allergen Reactions    Bactrim [Sulfamethoxazole-Trimethoprim] Rash     RASH, SKIN PEELING        Family History and Social History reviewed and changed as necessary    REVIEW OF SYSTEM:   Arthralgias, specifically left shoulder pain    PHYSICAL EXAM:  Petite, well-developed, well-nourished appearing female in no distress  Lungs clear to auscultation bilaterally, respirations regular nonlabored  No cervical, supraclavicular or axillary nodes palpable on exam  Chest wall exam is benign status post bilateral mastectomies, no abnormal masses, nodules, rashes or lesions.    Vitals:    10/18/23 1045   BP: 112/71   Pulse: 95   Resp: 18   Temp: 97.7 °F (36.5 °C)   SpO2: 96%   Weight: 63 kg (139 lb)   Height: 167.6 cm (66\")     Vitals:    10/18/23 1045   PainSc: 10-Worst pain ever   PainLoc: Neck  Comment: left side            ECOG score: 0         Vitals reviewed  Labs available in epic reviewed at time of visit along with note from [facility]ology office visit 3/28/2023.      ECO    Lab Results   Component Value Date    HGB 11.4 (L) 2023    HCT 33.1 (L) 2023    MCV 89.9 2023     (H) 2023    WBC 10.37 2023    NEUTROABS 8.22 (H) 2023    LYMPHSABS 1.06 2023    MONOSABS 0.86 2023    EOSABS 0.15 2023    BASOSABS 0.04 2023       Lab Results   Component Value Date    GLUCOSE 174 (H) 2023    BUN 11 2023    CREATININE 0.61 2023     2023    K 4.5 2023     2023    CO2 26.0 2023    CALCIUM 9.3 2023    PROTEINTOT 7.1 2023    ALBUMIN 4.3 2023    BILITOT 0.3 2023    ALKPHOS 78 2023    AST 16 2023    ALT 16 2023             ASSESSMENT & PLAN:  1.  Right invasive ductal carcinoma pathological stage I aT1 cN0 M0, 1.8 cm, Bloom Dias 8 out of 9, N0 (total of 4 lymph nodes " 2 of which were sentinel), M0 status post bilateral mastectomies.  ER weakly 5% 1+ positive, WA 50% 1-2+ positive, HER-2/ashley 100% 3+ positive.  Negative cancer next panel  2.  Significant diabetes with predating neuropathy due to spinal stenosis status post laminectomy 5/24/2019 with persistent neuropathy dorsum left foot  3.  MRSA bacteremia due to port infection March 2020 after course 1 day 1 of Herceptin Taxol  4.  Covid 2/2023  5.  Atypical inflammatory/infectious process of the chest February 2023 treated with Augmentin x2 weeks with near complete resolution on CT chest 3/31/2023.  Following with pulmonology.  6.  Rheumatoid arthritis diagnosed March 2023, evaluation by Dr. Natividad Martinez.  Began methotrexate.  7.  GERD and dyspepsia    Oncology history timeline:  -5/24/2019 Dr. Garcia saw for spinal stenosis with radiculopathy and left foot drop due to herniated disc and performed decompressive laminectomy, L4-5 and L5-S1 discectomy and medial facetectomy  -10/15/2019 mammogram BI-RADS 0  -11/22/2019 right mammogram/ultrasound BI-RADS 4 with ultrasound-guided core biopsy showing invasive ductal carcinoma Cory score 6 out of 9 grade 2, ER 5% 1+ positive WA 50% 1-2+ positive, HER-2/ashley 100% 3+ positive.  -12/13/2019 MRI breasts revealed 2.2 cm right breast mass consistent with biopsy proven cancer with unsuspected adjacent area's of non-mass enhancement worrisome for DCIS.  Non-mass enhancement in the subareolar left breast worrisome for DCIS.  Dominant focus left central portion left breast indeterminate BI-RADS 4.  Cancer next panel negative  -1/15/2020 CMP unremarkable save for glucose 200 with hemoglobin 10.6 normochromic normocytic indices  -1/21/2020 right skin sparing mastectomy with right sentinel lymph node injection and right deep subfascial sentinel lymph node biopsy with contralateral prophylactic left skin sparing mastectomy.  Right breast 1.8 cm Bloom Dias 8 out of 9, total of 4 lymph  nodes examined 2 sentinel nodes all negative.  Pathological T1 cN0 M0 stage Ia.  Left breast benign with sclerosing adenosis.  -2/26/2020 started Herceptin Taxol weekly  -3/2/2020 port removed due to MRSA bacteremia with port infection/purulence.  -3/2/2020 through 3/6/2020 hospitalized for MRSA bacteremia from port.  -3/31/2020 [facility] tele-visit: Patient still on IV antibiotics for MRSA port infection.  Decision made to go with Kanjinti alone every 3 weeks along with Arimidex.  -4/13/2020 per ID, patient has completed IV antibiotics and is now on oral antibiotics.  -3/23/2021 completed 1 year Kanjinti.  Recommendation to complete 10 years of Arimidex if can tolerate.  5-7 years if not.    -6/16/2021 [facility] clinic follow-up:  Intolerant of Arimidex with hot flashes and mood swings.  Therapy switched to Letrozole.      -9/15/2021 [facility] clinic follow-up:  She tolerated therapy with Letrozole better than she did with Arimidex but she still is having significant hot flashes and mood disturbance with easy agitation.  She is finding it difficult to work as stress makes these issues worse.  She is going to apply for disability senior living.  She is on Wellbutrin and Lexapro.  We discussed at length her options for hormonal blockade and she wants to continue trying.  At this time I will switch her to tamoxifen.  I spoke with our pharmacist Irma Arechiga, Pharm.D. about changing her antidepressants as her current antidepressants can interfere with tamoxifen.  We will have her stop Wellbutrin, she will start Effexor 37.5 mg daily for 7 days and if tolerated will increase to 75 mg daily, if this is tolerated then we will consider increasing up to 150 mg daily.  I am hoping that Effexor will help mitigate some of her hot flashes along with helping her depression.  She will taper off of her Lexapro over 2 weeks and then stop.  We discussed counseling as I think this will be greatly beneficial to Luana  with her dysthymia and helping to deal with stress at work and living with breast cancer.  She did not want me to make the referral but I did give her a brochure and she assures me that she will call and make an appointment.  I plan to call and check on her in about 3 weeks to see how she is tolerating the change in her antidepressants, we will also see if she has made an appointment with [facility] for counseling and to see how she is tolerating tamoxifen.  She is going to stop her Lexapro and wait about a week before starting tamoxifen so that she can tell if she is going to have any side effects with it.  I will see her back in 2 months for follow-up and sooner if needed.  We had planned on 5-7 years of therapy with hormonal blockade, I discussed with her today that with her ongoing side effects we would likely only do 5 years and hopefully we can get through that.  She was only weakly ER positive.    -10/14/2021 Did not tolerate Tamoxifen, went back to Letrozole    -11/17/2021 [facility] clinic follow-up: Luana is tolerating letrozole much better than tamoxifen.  She continues to have significant hot flashes, she is not sure the Effexor is helping.  She did have to go back to the 37.5 mg dose as she did not tolerate the 75 mg dose.  I discussed with her that she could switch to a different antidepressant as far as we were concerned, there is no interaction with the letrozole, we had only switched her to Effexor when we were trying tamoxifen and also we had hoped it would help with her hot flashes.  She is following with Emeli Suero and will discuss with her at her next appointment.  I also recommended acupuncture for her hot flashes as there is data showing that this may be helpful.  We also discussed adding gabapentin but when we reviewed the potential side effects of constipation and lethargy she did not want to try that.  She continues to complain of low back pain with some neuropathy in her left  lower extremity.  She does have a history of spinal stenosis, I will get an MRI of her lumbar spine for further evaluation and call her with the results.  Assuming there is no involvement of metastatic disease but just symptoms from her spinal stenosis she will need to follow-up with her neurosurgeon.  She has a history of laminectomy.    -5/18/2022 [facility] clinic follow-up: Luana continues to tolerate adjuvant therapy with letrozole, she was previously intolerant of anastrozole and tamoxifen.  She continues to have hot flashes but these seem to be more tolerable.  She has no new worrisome symptoms.  We plan on 5 years of adjuvant therapy with an AI.  She has had mastectomies therefore does not need mammograms.  She will need bone density repeated this fall for 2-year follow-up, previous bone density showed osteopenia.  She may benefit from bisphosphonate but we will see what her next bone density scan shows.  She does take calcium and vitamin D and remains active and walks regularly.  Her diabetes is under good control, she follows with endocrinology.  Her back pain is better, she is now on gabapentin under the direction of her surgeon.    -9/29/2022 [facility] follow-up: Luana called for an appointment due to not feeling well with upper back pain, fatigue and chest pain.  CT chest, bone scan, CBC, CMP ordered for evaluation.    -10/4/2022 total body bone scan negative for osseous metastatic disease, benign/arthritic/degenerative/posttraumatic changes in the cervical spine, shoulders, acromioclavicular joints (moderate uptake), sternoclavicular joints, right greater than left, elbows, wrists and hands, thoracic spine, lumbosacral spine, sacroiliac joints, hips, knees, ankles and feet.  CT chest with small 3-4 mm bilateral pulmonary nodules.  Right apical groundglass and consolidative opacities could be posttreatment change versus infectious.  -10/6/2022 [facility] follow-up:  Reviewed the above with the patient.  No signs of recurrent or metastatic breast cancer.  Will treat with Augmentin empirically.  Suspect most of her pain is due to her arthritis/degenerative changes, recommended she follow-up with PCP if no improvement, may benefit from rheumatology referral.  We will repeat CT chest in 3 months.    -1/17/2023: CT chest shows stable size of previously noted bilateral pulmonary nodules.  She does have what appears to be new finding of multiple focal nodular opacities in the bilateral upper lobes, favoring combination of postradiation change and/or infection however given his masslike appearance malignancy cannot be excluded.  I called Luana and discussed the results with her.  We will get a PET scan for further evaluation.  She also reports she is still having a daily headache and was actually going to see her primary care provider tomorrow as she feels she may have a sinus infection.  We will also get MRI of the brain for further evaluation.  I will send in a prescription for a Z-Vaughn.  She has a cough but no fever, no shortness of breath.  We will follow-up after her PET scan and MRI to go over the results and further plan of care.  If the PET is unrevealing Dr. Gomez recommends  referral to pulmonology for evaluation and possible bronchoscopy. Of note, she did not have radiation for her breast cancer which was 1.8 cm and node negative with clear margins.    -1/30/2023 PET scan shows multiple irregular consolidative and subsolid mostly subpleural based densities in both lungs with corresponding hypermetabolism.  The appearance is most suggestive of infectious or inflammatory process, including entities such as organizing pneumonia.  The appearance would be atypical for lung metastasis.  There is also conspicuous focal hypermetabolism in the left medial clavicle at the sternoclavicular joint.  This is likely some underlying subchondral cystic change suggesting likely degenerative  uptake, although the degree of hypermetabolism is slightly greater than would be expected for normal degenerative change and a solitary bony metastasis would be difficult to exclude.      -2/2/2023 Chest x-ray showed the multifocal ill-defined opacities in the lung suspicious for infection.  CT angiogram chest showed multifocal airspace opacities not changed from 1/30/2023 consistent with infection/inflammation with stable multiple small pulmonary nodules.  Duplex venous imaging left upper extremity normal.  Hemoglobin 11.4 with MCV 89.9 and platelets 569,000 with normal white count 10,370.  CMP unremarkable.  Normal lipase.  Normal proBNP.  Low uric acid 2.3.  Troponin negative.  EKG normal.    - 2/7/2023 MRI brain with and without contrast nonspecific small vessel changes and altered bone mineral density.  Left calvarial hemangioma.  MRI left clavicle shows osteoarthrosis of the sternoclavicular, acromioclavicular, and glenohumeral joints and a suspected rotator cuff tear.  Lipoma supraspinatus intramuscular.    -2/21/2023 [facility] telemedicine follow-up: Generalized fatigue with mild cough and COVID-positive yesterday on Paxlovid previously given Augmentin by Dr. Lai for opacities in the lungs and due to follow-up with Dr. Lai in March.  Due to see rheumatology in March.  Thorough imaging as outlined above showed no hint of malignancy though lung abnormalities cannot be 100% ruled out for malignancy but the vast likelihood is that this is infectious.  I will have her follow-up in April with my nurse practitioner and she continues on with Femara.    -3/30/2023 CT chest without contrast shows near complete resolution of previous identified rounded areas of consolidation in both lungs.  Residual minimal groundglass opacity and small semisolid nodules unchanged in the lower lungs.  Recommend 6-month follow-up chest CT.    -4/19/2023 [facility] follow-up: Luana overall is doing well on  letrozole.  She has no evidence of recurrent breast cancer on clinical exam.  She began AI therapy March 2020 and we plan on at least 5 years adjuvant therapy.  I would consider BCI testing around that time to see whether or not she would benefit from extended adjuvant therapy as it has been challenging for her to take.  Atypical inflammation/infection in her lungs has cleared after Augmentin, CT of the chest on 3/30/2023 showed near complete resolution.  Recommendation to follow-up in 6 months.  Since we saw her last she also was diagnosed with rheumatoid arthritis and is following with Dr. Natividad Martinez.  She has been placed on methotrexate, she also was taking diclofenac as needed.  She has had an increase in acid reflux and dyspepsia, she does take omeprazole and also an occasional Tums.  We discussed that the diclofenac could be upsetting her stomach, she is going to hold that and will try topical diclofenac and she has this at home.  If she continues to have issues I have asked her to notify her primary care provider as she may need a different PPI or possibly referral to GI for consideration of EGD.    -10/18/2023 [facility] medical oncology follow-up: She continues on letrozole and tolerating it fairly well.  She has no evidence of recurrent cancer on clinical exam or worrisome symptoms.  She began AI therapy March 2020 and we plan on at least 5 years adjuvant therapy.  We will consider BCI testing around that time to see whether or not she would benefit from extended adjuvant therapy.  She had atypical inflammation/infection in her lungs with near complete resolution on CT of chest on 3/30/2023.  Recommendation to follow-up in 6 months.  I have ordered repeat CT chest today.  She continues to follow closely with Dr. Martinez for rheumatoid arthritis in which she is taking methotrexate.  She is having significant left shoulder pain due to cervical arthritis.  She has tried cervical collar and physical therapy.  She  also does meditation and yoga.  She has been referred to pain management.  She had DEXA scan December 2022 that showed osteopenia.  She is taking calcium and vitamin D daily.  We will repeat DEXA scan December 2024.  We will see her back in 6 months.  She will notify us sooner if any issues or concerns.    Return to clinic in 6 months for follow-up    This was a level 4, moderate MDM visit with 2 or more stable chronic illnesses, review of labs, review of outside physician's note from rheumatology, ordering of CT chest, prescription drug management and management of side effects of therapy.    Jenifer Agee, APRN  10/18/2023

## 2023-10-30 ENCOUNTER — OFFICE VISIT (OUTPATIENT)
Dept: GYNECOLOGIC ONCOLOGY | Facility: CLINIC | Age: 63
End: 2023-10-30
Payer: COMMERCIAL

## 2023-10-30 VITALS
HEIGHT: 66 IN | BODY MASS INDEX: 22.26 KG/M2 | WEIGHT: 138.5 LBS | TEMPERATURE: 98 F | DIASTOLIC BLOOD PRESSURE: 64 MMHG | SYSTOLIC BLOOD PRESSURE: 131 MMHG | RESPIRATION RATE: 18 BRPM | HEART RATE: 74 BPM | OXYGEN SATURATION: 98 %

## 2023-10-30 DIAGNOSIS — N90.4 LICHEN SCLEROSUS OF FEMALE GENITALIA: ICD-10-CM

## 2023-10-30 DIAGNOSIS — Z01.419 WELL WOMAN EXAM WITH ROUTINE GYNECOLOGICAL EXAM: Primary | ICD-10-CM

## 2023-10-30 DIAGNOSIS — Z85.3 HISTORY OF BREAST CANCER: ICD-10-CM

## 2023-10-30 PROCEDURE — 99396 PREV VISIT EST AGE 40-64: CPT | Performed by: NURSE PRACTITIONER

## 2023-10-30 RX ORDER — TRAMADOL HYDROCHLORIDE 50 MG/1
50 TABLET ORAL
COMMUNITY
Start: 2023-10-10 | End: 2023-11-09

## 2023-10-30 RX ORDER — CLOBETASOL PROPIONATE 0.5 MG/G
OINTMENT TOPICAL 2 TIMES DAILY
Qty: 60 G | Refills: 2 | Status: SHIPPED | OUTPATIENT
Start: 2023-10-30

## 2023-10-30 RX ORDER — ABATACEPT 125 MG/ML
125 INJECTION, SOLUTION SUBCUTANEOUS
COMMUNITY
Start: 2023-10-12

## 2023-10-30 NOTE — PROGRESS NOTES
GYN ONCOLOGY ANNUAL WELL WOMAN VISIT      Luana Chawla  [id number]  1960      Subjective   Chief Complaint: Annual Exam (No gyn complaints)        History of present illness:      Luana Chawla is a 62 y.o. year old female who is here today for an annual exam. She has a personal history of breast cancer (outlined below) and lichens sclerosus. She is s/p bilateral mastectomies without reconstruction thus far. She is followed by our medical oncology team, seen last by CECI Cm earlier this month. She began AI therapy in 3/2020, plan for at least 5 years. She currently continues letrozole.   She is s/p vaginal hysterectomy (maintains ovaries) completed 20+ years ago. She uses clobetasol for management of lichens sclerosus. She reports she is feeling very well today and has no complaints. She denies vaginal bleeding, pelvic pain, changes in bowel or bladder function, new or concerning lesions, and chest wall problems. All well woman screenings are currently UTD. Only change in health history since last visit is diagnosis with RA in 3/2023.       Cancer History:   Oncology/Hematology History Overview Note   1.  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  ER weakly 5% 1+ positive, TX 50% 1-2+ positive, HER-2/ashley 100% 3+ positive.  Negative cancer next panel  2.  Significant diabetes with predating neuropathy due to spinal stenosis status post laminectomy 5/24/2019 with persistent neuropathy dorsum left foot  3.  MRSA bacteremia due to port infection March 2020 after course 1 day 1 of Herceptin Taxol  4.  Covid 2/2023  5.  Atypical inflammatory/infectious process of the chest February 2023 treated with Augmentin x2 weeks with near complete resolution on CT chest 3/31/2023.  Following with pulmonology.  6.  Rheumatoid arthritis diagnosed  March 2023, evaluation by Dr. Natividad Martinez.  Began methotrexate.    Oncology history timeline:  -5/24/2019 Dr. Garcia saw for spinal stenosis with radiculopathy and left foot drop due to herniated disc and performed decompressive laminectomy, L4-5 and L5-S1 discectomy and medial facetectomy  -10/15/2019 mammogram BI-RADS 0  -11/22/2019 right mammogram/ultrasound BI-RADS 4 with ultrasound-guided core biopsy showing invasive ductal carcinoma Dallas score 6 out of 9 grade 2, ER 5% 1+ positive DE 50% 1-2+ positive, HER-2/ashley 100% 3+ positive.  -12/13/2019 MRI breasts revealed 2.2 cm right breast mass consistent with biopsy proven cancer with unsuspected adjacent area's of non-mass enhancement worrisome for DCIS.  Non-mass enhancement in the subareolar left breast worrisome for DCIS.  Dominant focus left central portion left breast indeterminate BI-RADS 4.  Cancer next panel negative  -1/15/2020 CMP unremarkable save for glucose 200 with hemoglobin 10.6 normochromic normocytic indices  -1/21/2020 right skin sparing mastectomy with right sentinel lymph node injection and right deep subfascial sentinel lymph node biopsy with contralateral prophylactic left skin sparing mastectomy.  Right breast 1.8 cm Bloom Dias 8 out of 9, total of 4 lymph nodes examined 2 sentinel nodes all negative.  Pathological T1 cN0 M0 stage Ia.  Left breast benign with sclerosing adenosis.  -2/26/2020 started Herceptin Taxol weekly  -3/2/2020 port removed due to MRSA bacteremia with port infection/purulence.  -3/2/2020 through 3/6/2020 hospitalized for MRSA bacteremia from port.  -3/31/2020 [facility] oncology tele-visit: Patient still on IV antibiotics for MRSA port infection.  Decision made to go with Kanjinti alone every 3 weeks along with Arimidex.  -4/13/2020 per ID, patient has completed IV antibiotics and is now on oral antibiotics.  -3/23/2021 completed 1 year Kanjinti.  Recommendation to complete 10 years of Arimidex if can tolerate.   5-7 years if not.    -6/16/2021 [facility] Oncology clinic follow-up:  Intolerant of Arimidex with hot flashes and mood swings.  Therapy switched to Letrozole.      -9/15/2021 [facility] Oncology clinic follow-up:  She tolerated therapy with Letrozole better than she did with Arimidex but she still is having significant hot flashes and mood disturbance with easy agitation.  She is finding it difficult to work as stress makes these issues worse.  She is going to apply for disability detention.  She is on Wellbutrin and Lexapro.  We discussed at length her options for hormonal blockade and she wants to continue trying.  At this time I will switch her to tamoxifen.  I spoke with our pharmacist Irma Arechiga, Pharm.D. about changing her antidepressants as her current antidepressants can interfere with tamoxifen.  We will have her stop Wellbutrin, she will start Effexor 37.5 mg daily for 7 days and if tolerated will increase to 75 mg daily, if this is tolerated then we will consider increasing up to 150 mg daily.  I am hoping that Effexor will help mitigate some of her hot flashes along with helping her depression.  She will taper off of her Lexapro over 2 weeks and then stop.  We discussed counseling as I think this will be greatly beneficial to Luana with her dysthymia and helping to deal with stress at work and living with breast cancer.  She did not want me to make the referral but I did give her a brochure and she assures me that she will call and make an appointment.  I plan to call and check on her in about 3 weeks to see how she is tolerating the change in her antidepressants, we will also see if she has made an appointment with CECI Vega for counseling and to see how she is tolerating tamoxifen.  She is going to stop her Lexapro and wait about a week before starting tamoxifen so that she can tell if she is going to have any side effects with it.  I will see her back in 2 months for follow-up and sooner if needed.   We had planned on 5-7 years of therapy with hormonal blockade, I discussed with her today that with her ongoing side effects we would likely only do 5 years and hopefully we can get through that.  She was only weakly ER positive.    -10/14/2021 Did not tolerate Tamoxifen, went back to Letrozole    -11/17/2021 [facility] Oncology clinic follow-up: Luana is tolerating letrozole much better than tamoxifen.  She continues to have significant hot flashes, she is not sure the Effexor is helping.  She did have to go back to the 37.5 mg dose as she did not tolerate the 75 mg dose.  I discussed with her that she could switch to a different antidepressant as far as we were concerned, there is no interaction with the letrozole, we had only switched her to Effexor when we were trying tamoxifen and also we had hoped it would help with her hot flashes.  She is following with Emeli Suero and will discuss with her at her next appointment.  I also recommended acupuncture for her hot flashes as there is data showing that this may be helpful.  We also discussed adding gabapentin but when we reviewed the potential side effects of constipation and lethargy she did not want to try that.  She continues to complain of low back pain with some neuropathy in her left lower extremity.  She does have a history of spinal stenosis, I will get an MRI of her lumbar spine for further evaluation and call her with the results.  Assuming there is no involvement of metastatic disease but just symptoms from her spinal stenosis she will need to follow-up with her neurosurgeon.  She has a history of laminectomy.    -5/18/2022 [facility] oncology clinic follow-up: Luana continues to tolerate adjuvant therapy with letrozole, she was previously intolerant of anastrozole and tamoxifen.  She continues to have hot flashes but these seem to be more tolerable.  She has no new worrisome symptoms.  We plan on 5 years of adjuvant therapy with an AI.  She has had  mastectomies therefore does not need mammograms.  She will need bone density repeated this fall for 2-year follow-up, previous bone density showed osteopenia.  She may benefit from bisphosphonate but we will see what her next bone density scan shows.  She does take calcium and vitamin D and remains active and walks regularly.  Her diabetes is under good control, she follows with endocrinology.  Her back pain is better, she is now on gabapentin under the direction of her surgeon.    -9/29/2022 [facility] clinic follow-up: Luana called for an appointment due to not feeling well with upper back pain, fatigue and chest pain.  CT chest, bone scan, CBC, CMP ordered for evaluation.    -10/4/2022 total body bone scan negative for osseous metastatic disease, benign/arthritic/degenerative/posttraumatic changes in the cervical spine, shoulders, acromioclavicular joints (moderate uptake), sternoclavicular joints, right greater than left, elbows, wrists and hands, thoracic spine, lumbosacral spine, sacroiliac joints, hips, knees, ankles and feet.  CT chest with small 3-4 mm bilateral pulmonary nodules.  Right apical groundglass and consolidative opacities could be posttreatment change versus infectious.  -10/6/2022 [facility] clinic follow-up: Reviewed the above with the patient.  No signs of recurrent or metastatic breast cancer.  Will treat with Augmentin empirically.  Suspect most of her pain is due to her arthritis/degenerative changes, recommended she follow-up with PCP if no improvement, may benefit from rheumatology referral.  We will repeat CT chest in 3 months.    -1/17/2023: CT chest shows stable size of previously noted bilateral pulmonary nodules.  She does have what appears to be new finding of multiple focal nodular opacities in the bilateral upper lobes, favoring combination of postradiation change and/or infection however given his masslike appearance malignancy cannot be excluded.  I called Luana and  discussed the results with her.  We will get a PET scan for further evaluation.  She also reports she is still having a daily headache and was actually going to see her primary care provider tomorrow as she feels she may have a sinus infection.  We will also get MRI of the brain for further evaluation.  I will send in a prescription for a Z-Vaughn.  She has a cough but no fever, no shortness of breath.  We will follow-up after her PET scan and MRI to go over the results and further plan of care.  If the PET is unrevealing Dr. Gomez recommends  referral to pulmonology for evaluation and possible bronchoscopy. Of note, she did not have radiation for her breast cancer which was 1.8 cm and node negative with clear margins.    -1/30/2023 PET scan shows multiple irregular consolidative and subsolid mostly subpleural based densities in both lungs with corresponding hypermetabolism.  The appearance is most suggestive of infectious or inflammatory process, including entities such as organizing pneumonia.  The appearance would be atypical for lung metastasis.  There is also conspicuous focal hypermetabolism in the left medial clavicle at the sternoclavicular joint.  This is likely some underlying subchondral cystic change suggesting likely degenerative uptake, although the degree of hypermetabolism is slightly greater than would be expected for normal degenerative change and a solitary bony metastasis would be difficult to exclude.      -2/2/2023 Chest x-ray showed the multifocal ill-defined opacities in the lung suspicious for infection.  CT angiogram chest showed multifocal airspace opacities not changed from 1/30/2023 consistent with infection/inflammation with stable multiple small pulmonary nodules.  Duplex venous imaging left upper extremity normal.  Hemoglobin 11.4 with MCV 89.9 and platelets 569,000 with normal white count 10,370.  CMP unremarkable.  Normal lipase.  Normal proBNP.  Low uric acid 2.3.  Troponin negative.   EKG normal.    - 2/7/2023 MRI brain with and without contrast nonspecific small vessel changes and altered bone mineral density.  Left calvarial hemangioma.  MRI left clavicle shows osteoarthrosis of the sternoclavicular, acromioclavicular, and glenohumeral joints and a suspected rotator cuff tear.  Lipoma supraspinatus intramuscular.    -3/30/2023 CT chest without contrast shows near complete resolution of previous identified rounded areas of consolidation in both lungs.  Residual minimal groundglass opacity and small semisolid nodules unchanged in the lower lungs.  Recommend 6-month follow-up chest CT.    -4/19/2023 [facility] Oncology clinic follow-up: Luana overall is doing well on letrozole.  She has no evidence of recurrent breast cancer on clinical exam.  She began AI therapy March 2020 and we plan on at least 5 years adjuvant therapy.  I would consider BCI testing around that time to see whether or not she would benefit from extended adjuvant therapy as it has been challenging for her to take.  Atypical inflammation/infection in her lungs has cleared after Augmentin, CT of the chest on 3/30/2023 showed near complete resolution.  Recommendation to follow-up in 6 months.  Since we saw her last she also was diagnosed with rheumatoid arthritis and is following with Dr. Natividad Martinez.  She has been placed on methotrexate, she also was taking diclofenac as needed.  She has had an increase in acid reflux and dyspepsia, she does take omeprazole and also an occasional Tums.  We discussed that the diclofenac could be upsetting her stomach, she is going to hold that and will try topical diclofenac and she has this at home.  If she continues to have issues I have asked her to notify her primary care provider as she may need a different PPI or possibly referral to GI for consideration of EGD.    -10/18/2023 [facility] medical oncology follow-up: She continues on letrozole and tolerating it fairly well.  She has no evidence of  recurrent cancer on clinical exam or worrisome symptoms.  She began AI therapy March 2020 and we plan on at least 5 years adjuvant therapy.  We will consider BCI testing around that time to see whether or not she would benefit from extended adjuvant therapy.  She had atypical inflammation/infection in her lungs with near complete resolution on CT of chest on 3/30/2023.  Recommendation to follow-up in 6 months.  I have ordered repeat CT chest today.  She continues to follow closely with Dr. Martinez for rheumatoid arthritis in which she is taking methotrexate.  She is having significant left shoulder pain due to cervical arthritis.  She has tried cervical collar and physical therapy.  She also does meditation and yoga.  She has been referred to pain management.  She had DEXA scan December 2022 that showed osteopenia.  She is taking calcium and vitamin D daily.  We will repeat DEXA scan December 2024.  We will see her back in 6 months.  She will notify us sooner if any issues or concerns.     Malignant neoplasm of upper-outer quadrant of right breast in female, estrogen receptor positive   1/16/2019 Cancer Staged    Staging form: Breast, AJCC 8th Edition  - Clinical stage from 1/16/2019: Stage IB (cT2, cN0, cM0, G2, ER+, FL+, HER2+) - Signed by Lisa Herman MD on 1/27/2020 1/21/2020 Initial Diagnosis    Malignant neoplasm of upper-outer quadrant of right female breast (CMS/HCC)     2/11/2020 Cancer Staged    Staging form: Breast, AJCC 8th Edition  - Pathologic: Stage IA (pT1c, pN0, cM0, G3, ER+, FL+, HER2+) - Signed by Hero Gomez MD on 2/11/2020 2/18/2020 -  Other Event    Echocardiogram  Left ventricular systolic function is normal. Estimated EF = 55%.  The global longitudinal strain was -19.5%.     2/25/2020 - 2/26/2020 Chemotherapy    OP CENTRAL VENOUS ACCESS DEVICE ACCESS, CARE, AND MAINTENANCE (CVAD)     2/26/2020 - 3/10/2020 Chemotherapy    OP BREAST PACLitaxel / Trastuzumab-anns (Weekly X 12)      3/2/2020 Adverse Reaction    MRSA bacteremia due to port infection with port removal after day 1 course 1 Herceptin Taxol     3/31/2020 -  Hormonal Therapy    Arimidex for planned 5 years.    6/16/2021 therapy changed to Letrozole due to intolerance to Arimidex.  9/15/2021 changed to Tamoxifen kruse to intolerance of letrozole.  10/14/2021 changed back to Letrozole due to intolerance of tamoxifen.       4/2/2020 -  Chemotherapy    Completed 3/23/2021  OP BREAST Trastuzumab-anns Q21D (maintenance)         5/27/2020 -  Other Event    5/27/2020 echocardiogram EF 55%      7/23/2020 Procedure    Colonoscopy with Dr. Marte, normal     8/26/2020 -  Other Event    Echocardiogram   This was a limited echocardiogram to assess left ventricular ejection fraction in the setting of chemotherapy.  Left ventricular systolic function is normal. Calculated EF = 55.1%. Estimated EF = 55%. Global Longitudinal LV strain = -18.2%.  Estimated EF = 55%.     11/9/2020 Imaging    DEXA bone density IMPRESSION:  Osteopenia of the femoral necks bilaterally, and total hips  bilaterally.  Lowest T score -2.0 of the right femoral neck.     11/10/2020 -  Other Event    Echocardogram   This was a limited echocardiogram to assess left ventricular function only.  Left ventricular systolic function is normal. Left ventricular ejection fraction appears to be 56 - 60%.  Global longitudinal LV strain (GLS) = 19.7%.     2/23/2021 Imaging    -2/23/2021 left hip 2 view x-ray negative     12/1/2021 Imaging    MRI of the lumbar spine with and without contrast: Overall no significant interval change in the appearance of the lumbar spine since previous 5/7/2020 comparison study.  There are postoperative changes at L5-S1 with enhancing epidural scar in the left lateral recess and there are multilevel degenerative changes with canal in foraminal encroachment.     12/20/2022 Imaging    DEXA bone density scan with osteopenia of the right femoral neck and total right  hip.  Lowest T score -2.4 of the right femoral neck.  Compared to previous there was an increase in bone mineral density of the L1-L4 vertebrae by 1.8% and a decrease in the bone mineral density of the total right hip by 4.4%.         Obstetric History:  OB History          1    Para   1    Term   1            AB        Living             SAB        IAB        Ectopic        Molar        Multiple        Live Births                   Menstrual History:     No LMP recorded (lmp unknown). Patient has had a hysterectomy.          The current medication list and allergy list were reviewed and reconciled.     Past Medical History, Past Surgical History, Social History, Family History have been reviewed and are without significant changes except as mentioned.    Past Surgical History:   Procedure Laterality Date    APPENDECTOMY      BREAST BIOPSY Right     BREAST EXCISIONAL BIOPSY Right     CHOLECYSTECTOMY      COLON SURGERY      COLONOSCOPY      HYSTERECTOMY      LEG SURGERY      leg fracture surgery-Abstracted from [employer]    LUMBAR LAMINECTOMY DISCECTOMY DECOMPRESSION N/A 2019    Procedure: LUMBAR LAMINECTOMY L4-5 AND L5-S1;  Surgeon: Hipolito Kahn MD;  Location: [facility] OR;  Service: Orthopedic Spine    MASTECTOMY Bilateral     MASTECTOMY W/ SENTINEL NODE BIOPSY Bilateral 2020    Procedure: SKIN SPARING MASTECTOMIES BILATERAL, SENTINEL NODE BIOPSY RIGHT;  Surgeon: Silvio Howard MD;  Location:  [facility] OR;  Service: General    TUBAL ABDOMINAL LIGATION      VENOUS ACCESS DEVICE (PORT) REMOVAL N/A 2020    Procedure: REMOVAL PORT;  Surgeon: Silvio Howard MD;  Location: [facility] OR;  Service: General;  Laterality: N/A;         Health Maintenance:  Last colonoscopy was 2020, with recommended follow-up in 5-10 year(s). Last DEXA was 2022. Last pap smear was 10/19/2020, results were normal PAP.  She does not have a history of abnormal pap smears.         Review of  "Systems   Constitutional: Negative.    Gastrointestinal: Negative.    Endocrine: Positive for heat intolerance (hot flashes).   Genitourinary: Negative.    Musculoskeletal:  Positive for arthralgias.       Objective   Physical Exam  Vital Signs: /64   Pulse 74   Temp 98 °F (36.7 °C) (Temporal)   Resp 18   Ht 167.6 cm (66\")   Wt 62.8 kg (138 lb 8 oz)   LMP  (LMP Unknown)   SpO2 98%   BMI 22.35 kg/m²   Vitals:    10/30/23 1315   PainSc:   8   PainLoc: Generalized           General Appearance:  alert, cooperative, no apparent distress, appears stated age and normal weight   Neurologic/Psych: A&O x 3, gait steady, appropriate affect   Lungs:   Clear to auscultation bilaterally; respirations regular, even, and unlabored bilaterally   Heart:  Regular rate and rhythm, no murmurs appreciated   Breasts:  surgically absent bilaterally. No chest wall masses or abnormalities bilaterally   Abdomen:   Soft, non-tender, non-distended, no organomegaly and blood sugar monitor in place to RLQ   Lymph nodes: No cervical, supraclavicular, inguinal or axillary adenopathy noted   Extremities: Normal, atraumatic; no clubbing, cyanosis, or edema    Skin: No rashes, ulcers, or suspicious lesions noted   Pelvic: External Genitalia  atrophic, without lesions, changes consistent with lichens sclerosus, well controlled  Vagina  is pale, atrophic.   Vaginal Cuff  Female Vaginal Cuff: smooth, intact and without visible lesions  Uterus  surgically absent  Ovaries  without palpable masses or fullness  Parametria  smooth  Rectovaginal  Female rectovaginal: deferred     ECOG score: 1             Procedure Note:          Assessment and Plan:      Diagnoses and all orders for this visit:    1. Well woman exam with routine gynecological exam (Primary)    2. History of breast cancer    3. Lichen sclerosus of female genitalia           No chest wall masses or abnormalities appreciated. Keep routine follow-ups and continue plan with oncology " team.      No pap smear needed today.      Mammograms no longer needed, s/p bilateral mastectomies     Repeat colonoscopy in 5-10 years per GI recommendations.      Repeat DEXA in 2024, recommend q2 years while on hormone blockade.     Lichen sclerosus appears to be well controlled presently. Continue clobetasol weekly to three times weekly for suppression. May use topical steroid to once daily or BID for acute flare-ups PRN.    Keep regular follow-ups with oncology team and mental health APRN as scheduled.     Pain assessment was performed today as a part of patient’s care. For patients with pain related to surgery, gynecologic malignancy or cancer treatment, the plan is as noted in the assessment/plan.  For patients with pain not related to these issues, they are to seek any further needed care from a more appropriate provider, such as PCP.      Follow-up:     Return to clinic in 1 year for Annual exam.      Electronically signed by CECI Mccullough on 10/30/23 at 13:48 EDT

## 2023-11-14 ENCOUNTER — HOSPITAL ENCOUNTER (OUTPATIENT)
Dept: CT IMAGING | Facility: HOSPITAL | Age: 63
Discharge: HOME | End: 2023-11-14
Admitting: NURSE PRACTITIONER
Payer: COMMERCIAL

## 2023-11-14 DIAGNOSIS — R93.89 ABNORMAL CT OF THE CHEST: ICD-10-CM

## 2023-11-14 PROCEDURE — 71250 CT THORAX DX C-: CPT

## 2023-12-15 ENCOUNTER — OFFICE VISIT (OUTPATIENT)
Dept: ENDOCRINOLOGY | Facility: CLINIC | Age: 63
End: 2023-12-15
Payer: COMMERCIAL

## 2023-12-15 VITALS
WEIGHT: 140 LBS | OXYGEN SATURATION: 96 % | BODY MASS INDEX: 22.5 KG/M2 | DIASTOLIC BLOOD PRESSURE: 70 MMHG | HEART RATE: 106 BPM | SYSTOLIC BLOOD PRESSURE: 122 MMHG | HEIGHT: 66 IN

## 2023-12-15 DIAGNOSIS — I10 BENIGN HYPERTENSION: ICD-10-CM

## 2023-12-15 DIAGNOSIS — Z79.4 TYPE 2 DIABETES MELLITUS WITH HYPERGLYCEMIA, WITH LONG-TERM CURRENT USE OF INSULIN: Primary | ICD-10-CM

## 2023-12-15 DIAGNOSIS — E11.65 TYPE 2 DIABETES MELLITUS WITH HYPERGLYCEMIA, WITH LONG-TERM CURRENT USE OF INSULIN: Primary | ICD-10-CM

## 2023-12-15 LAB
EXPIRATION DATE: ABNORMAL
EXPIRATION DATE: ABNORMAL
GLUCOSE BLDC GLUCOMTR-MCNC: 232 MG/DL (ref 70–130)
HBA1C MFR BLD: 8.1 % (ref 4.5–5.7)
Lab: ABNORMAL
Lab: ABNORMAL

## 2023-12-15 RX ORDER — ABATACEPT 125 MG/ML
125 INJECTION, SOLUTION SUBCUTANEOUS
Start: 2023-12-15

## 2023-12-15 NOTE — PROGRESS NOTES
"     Office Note      Date: 12/15/2023  Patient Name: Luana Chawla  MRN: [id number]  : 1960    Chief Complaint   Patient presents with    Diabetes       History of Present Illness:   Luana Chawla is a 63 y.o. female who presents for Diabetes type 2. Diagnosed in: . Treated in past with insulin. Current treatments: metformin, ozempic and basal insulin. Number of insulin shots per day: 1. Checks blood sugar 1 time a day.  Has low blood sugar: occasional. Aspirin use: No - due to easy bruising/nosebleeds. Statin use: No - lipids have been okay. ACE-I/ARB use: No. Changes in health since last visit: more RA pain in her hands. Last eye exam 3/2023.      Subjective      Diabetic Complications:  Eyes: No  Kidneys: No  Feet: No  Heart: No    Diet and Exercise:  Meals per day: 3  Minutes of exercise per week: 150 mins.    Review of Systems:   Review of Systems   Constitutional: Negative.    Cardiovascular: Negative.    Gastrointestinal: Negative.    Endocrine: Negative.        The following portions of the patient's history were reviewed and updated as appropriate: allergies, current medications, past family history, past medical history, past social history, past surgical history, and problem list.    Objective     Visit Vitals  /70   Pulse 106   Ht 167.6 cm (66\")   Wt 63.5 kg (140 lb)   LMP  (LMP Unknown)   SpO2 96%   BMI 22.60 kg/m²       Physical Exam:  Physical Exam  Constitutional:       Appearance: Normal appearance.   Cardiovascular:      Pulses:           Dorsalis pedis pulses are 2+ on the right side and 2+ on the left side.        Posterior tibial pulses are 2+ on the right side and 2+ on the left side.   Feet:      Right foot:      Protective Sensation: 5 sites tested.  5 sites sensed.      Skin integrity: Skin integrity normal.      Toenail Condition: Right toenails are normal.      Left foot:      Protective Sensation: 5 sites tested.  5 sites sensed.      Skin " integrity: Skin integrity normal.      Toenail Condition: Left toenails are normal.   Neurological:      Mental Status: She is alert.         Labs:    HbA1c  Lab Results   Component Value Date    HGBA1C 8.1 (A) 12/15/2023       CMP  Lab Results   Component Value Date    GLUCOSE 174 (H) 03/13/2023    BUN 11 03/13/2023    CREATININE 0.61 03/13/2023    EGFRIFNONA 117 05/05/2021    EGFRIFAFRI 110 02/25/2020    BCR 18.0 03/13/2023    K 4.5 03/13/2023    CO2 26.0 03/13/2023    CALCIUM 9.3 03/13/2023    PROTENTOTREF 6.5 02/25/2020    LABIL2 1.7 02/25/2020    AST 16 03/13/2023    ALT 16 03/13/2023        Lipid Panel  Lab Results   Component Value Date    HDL 75 (H) 03/13/2023    LDL 87 03/13/2023    TRIG 47 03/13/2023        TSH  Lab Results   Component Value Date    TSH 1.710 03/13/2023        Hemoglobin A1C  Lab Results   Component Value Date    HGBA1C 8.1 (A) 12/15/2023        Microalbumin/Creatinine  Lab Results   Component Value Date    MALBCRERATIO  03/13/2023      Comment:      Unable to calculate    MICROALBUR <1.2 03/13/2023           Assessment / Plan      Assessment & Plan:  Diagnoses and all orders for this visit:    1. Type 2 diabetes mellitus with hyperglycemia, with long-term current use of insulin (Primary)  Assessment & Plan:  Diabetes is unchanged.   She hasn't been as good with her diet recently.    Continue current treatment regimen.  She would prefer to work on diet before adjusting meds.    Diabetes will be reassessed in 3 months.    Orders:  -     POC Glucose, Blood  -     POC Glycosylated Hemoglobin (Hb A1C)  -     Cancel: Comprehensive Metabolic Panel; Future  -     Cancel: Lipid Panel; Future  -     Cancel: Microalbumin / Creatinine Urine Ratio - Urine, Clean Catch; Future  -     Cancel: TSH; Future    2. Benign hypertension  Assessment & Plan:  Hypertension is improving with treatment.  Continue current treatment regimen.  Blood pressure will be reassessed at the next regular  appointment.      Other orders  -     Orencia ClickJect 125 MG/ML solution auto-injector; Inject 1 mL under the skin into the appropriate area as directed Every 7 (Seven) Days.      Current Outpatient Medications   Medication Instructions    albuterol sulfate  (90 Base) MCG/ACT inhaler INHALE TWO PUFFS BY MOUTH EVERY 4 HOURS AS NEEDED    ascorbic acid (VITAMIN C) 100 mg, Oral, Daily    BD Pen Needle Radha 2nd Gen 32G X 4 MM misc USE ONE - DAILY    Calcium Carb-Cholecalciferol (CALCIUM 1000 + D PO) 1 tablet, Oral, Daily    clobetasol (TEMOVATE) 0.05 % ointment Topical, 2 Times Daily    cyclobenzaprine (FLEXERIL) 10 mg, Oral, 3 Times Daily PRN    escitalopram (LEXAPRO) 20 MG tablet TAKE ONE TABLET BY MOUTH DAILY    folic acid (FOLVITE) 1 MG tablet No dose, route, or frequency recorded.    glucose blood (Accu-Chek Sasha Plus) test strip Test up to 3 times daily    Insulin Glargine (BASAGLAR KWIKPEN) 100 UNIT/ML injection pen Use daily; titrate to fasting glucose <120; max daily dose 50u    letrozole (FEMARA) 2.5 MG tablet TAKE ONE TABLET BY MOUTH DAILY    loratadine (CLARITIN) 10 mg, Oral, Daily    Magnesium 400 MG tablet 1 tablet, Oral, Daily    metFORMIN ER (GLUCOPHAGE-XR) 500 MG 24 hr tablet TAKE TWO TABLETS BY MOUTH TWICE A DAY    methotrexate 2.5 MG tablet No dose, route, or frequency recorded.    omeprazole (priLOSEC) 40 MG capsule TAKE ONE CAPSULE BY MOUTH DAILY BEFORE A MEAL    Orencia ClickJect 125 mg, Subcutaneous, Every 7 Days    Ozempic (2 MG/DOSE) 2 mg, Subcutaneous, Every 7 Days    Saccharomyces boulardii (Probiotic) 250 MG capsule Oral    Turmeric 500 MG tablet Oral    vitamin B-12 (CYANOCOBALAMIN) 1,000 mcg, Oral, Daily      Return in about 3 months (around 3/15/2024) for Recheck with A1c, CMP, lipid, TSH, microalbumin.    Jose Cummings MD   12/15/2023

## 2023-12-15 NOTE — ASSESSMENT & PLAN NOTE
Diabetes is unchanged.   She hasn't been as good with her diet recently.    Continue current treatment regimen.  She would prefer to work on diet before adjusting meds.    Diabetes will be reassessed in 3 months.

## 2024-01-05 RX ORDER — OMEPRAZOLE 40 MG/1
CAPSULE, DELAYED RELEASE ORAL
Qty: 90 CAPSULE | Refills: 0 | Status: SHIPPED | OUTPATIENT
Start: 2024-01-05

## 2024-02-01 ENCOUNTER — DOCUMENTATION (OUTPATIENT)
Dept: PHYSICAL THERAPY | Facility: HOSPITAL | Age: 64
End: 2024-02-01
Payer: COMMERCIAL

## 2024-02-01 NOTE — THERAPY DISCHARGE NOTE
Outpatient Physical Therapy Lymphedema Screen & Discharge       Patient Name: Luana Chawla  : 1960  MRN: [id number]  Today's Date: 2024      Visit Date: 2024    Visit Dx:  No diagnosis found.    Patient Active Problem List   Diagnosis    Malignant neoplasm of upper-outer quadrant of right breast in female, estrogen receptor positive    Encounter for antineoplastic chemotherapy    Encounter for care related to vascular access port    MRSA infection of venous access port     Type 2 diabetes mellitus    GERD (gastroesophageal reflux disease)    Back pain    Bacteremia due to methicillin resistant Staphylococcus aureus    Septic discitis of lumbar region    Thrombus    Iron deficiency anemia due to chronic blood loss    Insulin long-term use    Benign hypertension    Uncontrolled type 2 diabetes mellitus with hyperglycemia    Hot flashes related to aromatase inhibitor therapy    Dysthymia    Lichen sclerosus of female genitalia    Sore throat    History of bilateral mastectomy    Acute thrombosis of superior vena cava    Cough    Arthritis    Cervicalgia    Diabetes mellitus without complication    Left hand pain    Chest pain on respiration    Abnormal CT of the chest    Pneumonitis    Acute pain of right shoulder    Nocturnal leg cramps        Past Medical History:   Diagnosis Date    Arthritis     Benign hypertension 10/22/2020    Breast cancer     Diabetes mellitus     Diverticulitis     GERD (gastroesophageal reflux disease)     Hypokalemia     Kidney stone     Lichen sclerosus     Long term (current) use of insulin     Malignant neoplasm of upper-outer quadrant of right female breast 2020    Microalbuminuria     Nephrolithiasis     Sciatica     Type 2 diabetes mellitus, uncontrolled     Wears contact lenses         Past Surgical History:   Procedure Laterality Date    APPENDECTOMY      BREAST BIOPSY Right     BREAST EXCISIONAL BIOPSY Right     CHOLECYSTECTOMY      COLON  SURGERY      COLONOSCOPY      HYSTERECTOMY      LEG SURGERY      leg fracture surgery-Abstracted from [employer]    LUMBAR LAMINECTOMY DISCECTOMY DECOMPRESSION N/A 05/24/2019    Procedure: LUMBAR LAMINECTOMY L4-5 AND L5-S1;  Surgeon: Hipolito Kahn MD;  Location:  [facility];  Service: Orthopedic Spine    MASTECTOMY Bilateral     MASTECTOMY W/ SENTINEL NODE BIOPSY Bilateral 01/21/2020    Procedure: SKIN SPARING MASTECTOMIES BILATERAL, SENTINEL NODE BIOPSY RIGHT;  Surgeon: Silvio Howard MD;  Location:  [facility];  Service: General    TUBAL ABDOMINAL LIGATION      VENOUS ACCESS DEVICE (PORT) REMOVAL N/A 03/03/2020    Procedure: REMOVAL PORT;  Surgeon: Silvio Howard MD;  Location:  [facility];  Service: General;  Laterality: N/A;         Pt has been recovering well from [facility] care. She notes issues with R > L handed RA pain. Her primary MD advised her to consider arthritis gloves, however she is struggling to find a quality fit. Pt noted this to MD who then requested PT to speak with pt. PT was able to offer Norco R handed small glove with full fingers to assess the impact of compression gloves on her pain/discomfort. Advised that PT will send information for glove options to pt for pt try as able. Pt had questions about paraffin use that PT answered. Educated on self care and provided contact information if pt continues to struggle.     No charges provided as this was a screening procedure.       Time Calculation:  6094-2902 (PT had to go to a different office to acquire the glove provided today).             Michelle Holland, PT 1-

## 2024-02-03 ENCOUNTER — OFFICE VISIT (OUTPATIENT)
Dept: FAMILY MEDICINE CLINIC | Facility: CLINIC | Age: 64
End: 2024-02-03
Payer: COMMERCIAL

## 2024-02-03 VITALS
BODY MASS INDEX: 23.01 KG/M2 | SYSTOLIC BLOOD PRESSURE: 112 MMHG | RESPIRATION RATE: 16 BRPM | OXYGEN SATURATION: 98 % | WEIGHT: 143.2 LBS | HEIGHT: 66 IN | DIASTOLIC BLOOD PRESSURE: 68 MMHG | HEART RATE: 87 BPM

## 2024-02-03 DIAGNOSIS — J06.9 ACUTE URI: Primary | ICD-10-CM

## 2024-02-03 DIAGNOSIS — M05.9 RHEUMATOID ARTHRITIS WITH POSITIVE RHEUMATOID FACTOR, INVOLVING UNSPECIFIED SITE: ICD-10-CM

## 2024-02-03 PROBLEM — E11.9 DIABETES MELLITUS WITHOUT COMPLICATION: Status: RESOLVED | Noted: 2022-02-18 | Resolved: 2024-02-03

## 2024-02-03 PROBLEM — T80.219A INFECTED VENOUS ACCESS PORT: Status: RESOLVED | Noted: 2020-03-02 | Resolved: 2024-02-03

## 2024-02-03 PROBLEM — M25.511 ACUTE PAIN OF RIGHT SHOULDER: Status: RESOLVED | Noted: 2023-05-24 | Resolved: 2024-02-03

## 2024-02-03 PROBLEM — R07.1 CHEST PAIN ON RESPIRATION: Status: RESOLVED | Noted: 2023-02-02 | Resolved: 2024-02-03

## 2024-02-03 PROBLEM — M46.46 SEPTIC DISCITIS OF LUMBAR REGION: Status: RESOLVED | Noted: 2020-03-05 | Resolved: 2024-02-03

## 2024-02-03 PROBLEM — R05.9 COUGH: Status: RESOLVED | Noted: 2022-07-25 | Resolved: 2024-02-03

## 2024-02-03 PROBLEM — Z51.11 ENCOUNTER FOR ANTINEOPLASTIC CHEMOTHERAPY: Status: RESOLVED | Noted: 2020-02-11 | Resolved: 2024-02-03

## 2024-02-03 PROBLEM — J02.9 SORE THROAT: Status: RESOLVED | Noted: 2022-07-20 | Resolved: 2024-02-03

## 2024-02-03 PROBLEM — J98.4 PNEUMONITIS: Status: RESOLVED | Noted: 2023-02-17 | Resolved: 2024-02-03

## 2024-02-03 PROBLEM — M79.642 LEFT HAND PAIN: Status: RESOLVED | Noted: 2023-02-02 | Resolved: 2024-02-03

## 2024-02-03 PROBLEM — B95.62 BACTEREMIA DUE TO METHICILLIN RESISTANT STAPHYLOCOCCUS AUREUS: Status: RESOLVED | Noted: 2020-03-02 | Resolved: 2024-02-03

## 2024-02-03 PROBLEM — R78.81 BACTEREMIA DUE TO METHICILLIN RESISTANT STAPHYLOCOCCUS AUREUS: Status: RESOLVED | Noted: 2020-03-02 | Resolved: 2024-02-03

## 2024-02-03 PROBLEM — I82.210: Status: RESOLVED | Noted: 2022-07-23 | Resolved: 2024-02-03

## 2024-02-03 PROBLEM — Z45.2 ENCOUNTER FOR CARE RELATED TO VASCULAR ACCESS PORT: Status: RESOLVED | Noted: 2020-02-25 | Resolved: 2024-02-03

## 2024-02-03 PROBLEM — R93.89 ABNORMAL CT OF THE CHEST: Status: RESOLVED | Noted: 2023-02-07 | Resolved: 2024-02-03

## 2024-02-03 PROBLEM — M19.90 ARTHRITIS: Status: RESOLVED | Noted: 2022-10-13 | Resolved: 2024-02-03

## 2024-02-03 PROBLEM — D50.0 IRON DEFICIENCY ANEMIA DUE TO CHRONIC BLOOD LOSS: Status: RESOLVED | Noted: 2020-06-03 | Resolved: 2024-02-03

## 2024-02-03 PROCEDURE — 99214 OFFICE O/P EST MOD 30 MIN: CPT | Performed by: PHYSICIAN ASSISTANT

## 2024-02-03 NOTE — PROGRESS NOTES
Patient Office Visit      Patient Name: Luana Chawla  : 1960   MRN: [id number]     Chief Complaint:    Chief Complaint   Patient presents with    URI    Rheumatoid Arthritis       History of Present Illness: Luana Chawla is a 63 y.o. female who is here today complaining of ears itching and feeling stopped up.  She is here on a Saturday, I started with sore throat Monday and has had drainage.  She figures it is a cold or allergies but her ears are bothering her the most.  She also tells me that after she was treated for breast cancer she developed rheumatoid arthritis.  She is taking methotrexate but continues to have some exacerbations.  She is not interested in the biologics such as Enbrel because of the increased cancer risk and she says Plaquenil causes problems with vision.  She is asking if we can do some pain management here for when she has exacerbations.  She has been referred to pain management in [place] but they are wanting her to come every week for interventional strategies as she does not want to drive to [place] every week.  Her rheumatologist will not prescribe anything stronger than tramadol.    Subjective      Review of Systems:   Review of Systems   Constitutional:  Negative for chills and fever.   HENT:  Positive for postnasal drip and sore throat.    Respiratory:  Negative for cough.         Past Medical History:   Past Medical History:   Diagnosis Date    Arthritis     Benign hypertension 10/22/2020    Breast cancer     Diabetes mellitus     Diverticulitis     GERD (gastroesophageal reflux disease)     Hypokalemia     Kidney stone     Lichen sclerosus     Long term (current) use of insulin     Malignant neoplasm of upper-outer quadrant of right female breast 2020    Microalbuminuria     Nephrolithiasis     Rheumatoid arthritis with positive rheumatoid factor 2024    Sciatica     Septic discitis of lumbar region     Type 2 diabetes mellitus,  uncontrolled     Wears contact lenses        Past Surgical History:   Past Surgical History:   Procedure Laterality Date    APPENDECTOMY      BREAST BIOPSY Right     BREAST EXCISIONAL BIOPSY Right     CHOLECYSTECTOMY      COLON SURGERY      COLONOSCOPY      HYSTERECTOMY      LEG SURGERY      leg fracture surgery-Abstracted from [employer]    LUMBAR LAMINECTOMY DISCECTOMY DECOMPRESSION N/A 2019    Procedure: LUMBAR LAMINECTOMY L4-5 AND L5-S1;  Surgeon: Hipolito Kahn MD;  Location:  [facility];  Service: Orthopedic Spine    MASTECTOMY Bilateral     MASTECTOMY W/ SENTINEL NODE BIOPSY Bilateral 2020    Procedure: SKIN SPARING MASTECTOMIES BILATERAL, SENTINEL NODE BIOPSY RIGHT;  Surgeon: Silvio Howard MD;  Location:  [facility];  Service: General    TUBAL ABDOMINAL LIGATION      VENOUS ACCESS DEVICE (PORT) REMOVAL N/A 2020    Procedure: REMOVAL PORT;  Surgeon: Silvio Howard MD;  Location:  [facility];  Service: General;  Laterality: N/A;       Family History:   Family History   Problem Relation Age of Onset    Breast cancer Mother 70    Diabetes Mother     Cancer Mother     Heart disease Father     Hypertension Father     Diabetes Sister     Ovarian cancer Neg Hx        Social History:   Social History     Socioeconomic History    Marital status: Single   Tobacco Use    Smoking status: Former     Packs/day: 1     Types: Cigarettes, Electronic Cigarette     Start date: 3/13/1990     Quit date: 2017     Years since quittin.7     Passive exposure: Past    Smokeless tobacco: Never    Tobacco comments:     currently use nicotine e-cig.   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   BMI is within normal parameters. No other follow-up for BMI required.       Physical Exam  Constitutional:       Appearance: Normal appearance.   HENT:      Right Ear: Tympanic membrane, ear canal and external ear normal.      Left Ear: Tympanic membrane, ear canal and external ear normal.      Nose: Congestion present.      Mouth/Throat:      Mouth: Mucous membranes are moist.      Pharynx: Posterior oropharyngeal erythema (Minimal erythema) present.   Cardiovascular:      Rate and Rhythm: Normal rate and regular rhythm.   Musculoskeletal:      Cervical back: Normal range of motion and neck supple.   Neurological:      Mental Status: She is alert.         Procedures    Assessment / Plan      Assessment/Plan:   Diagnoses and all orders for this visit:    1. Acute URI (Primary)  Assessment & Plan:  Symptoms consistent with viral upper respiratory infection.  We discussed symptom management.  I offered prescription for Stahist but she declines.  Continue to use over-the-counter steroid nasal spray but increase frequency to twice a day.  Eustachian tube dysfunction associated with upper respiratory infections can last 6 to 8 weeks.  If persist longer than this then recommend see ENT.  They can evaluate for obstructions to the eustachian tubes such as nasal polyps and can place tympanostomy tubes as last resort.      2. Rheumatoid arthritis with positive rheumatoid factor, involving unspecified site  Assessment & Plan:  Continue with rheumatology.  Tramadol is strongest pain medication that I can manage here.  She had been seen by Bree ORNELAS as her primary care " provider but Bree is now occupational medicine only.  She said Bree had gotten one of the physicians to sign a prescription for 30 hydrocodone and this list lasted her months she is down to 1 pill and she says she only uses for pain due to exacerbations.  This is not something that I will get into managing.  She is welcome to schedule an appointment with one of the MDs to discuss but there is no guarantee that they are going to manage.           Medications:     Current Outpatient Medications:     albuterol sulfate  (90 Base) MCG/ACT inhaler, INHALE TWO PUFFS BY MOUTH EVERY 4 HOURS AS NEEDED, Disp: 8.5 g, Rfl: 0    ascorbic acid (VITAMIN C) 100 MG tablet, Take 1 tablet by mouth Daily., Disp: , Rfl:     BD Pen Needle Radha 2nd Gen 32G X 4 MM misc, USE ONE - DAILY, Disp: 100 each, Rfl: 3    Calcium Carb-Cholecalciferol (CALCIUM 1000 + D PO), Take 1 tablet by mouth Daily., Disp: , Rfl:     clobetasol (TEMOVATE) 0.05 % ointment, Apply  topically to the appropriate area as directed 2 (Two) Times a Day., Disp: 60 g, Rfl: 2    cyclobenzaprine (FLEXERIL) 10 MG tablet, Take 1 tablet by mouth 3 (Three) Times a Day As Needed for Muscle Spasms., Disp: 90 tablet, Rfl: 3    escitalopram (LEXAPRO) 20 MG tablet, TAKE ONE TABLET BY MOUTH DAILY, Disp: 90 tablet, Rfl: 1    folic acid (FOLVITE) 1 MG tablet, , Disp: , Rfl:     glucose blood (Accu-Chek Sasha Plus) test strip, Test up to 3 times daily, Disp: 100 each, Rfl: 5    Insulin Glargine (BASAGLAR KWIKPEN) 100 UNIT/ML injection pen, Use daily; titrate to fasting glucose <120; max daily dose 50u, Disp: 45 mL, Rfl: 3    letrozole (FEMARA) 2.5 MG tablet, TAKE ONE TABLET BY MOUTH DAILY, Disp: 30 tablet, Rfl: 11    loratadine (CLARITIN) 10 MG tablet, Take 1 tablet by mouth Daily., Disp: , Rfl:     Magnesium 400 MG tablet, Take 1 tablet by mouth Daily., Disp: , Rfl:     metFORMIN ER (GLUCOPHAGE-XR) 500 MG 24 hr tablet, TAKE TWO TABLETS BY MOUTH TWICE A DAY, Disp: 360 tablet, Rfl:  3    methotrexate 2.5 MG tablet, , Disp: , Rfl:     omeprazole (priLOSEC) 40 MG capsule, TAKE ONE CAPSULE BY MOUTH DAILY BEFORE A MEAL, Disp: 90 capsule, Rfl: 0    Orencia ClickJect 125 MG/ML solution auto-injector, Inject 1 mL under the skin into the appropriate area as directed Every 7 (Seven) Days., Disp: , Rfl:     Saccharomyces boulardii (Probiotic) 250 MG capsule, Take  by mouth., Disp: , Rfl:     Semaglutide, 2 MG/DOSE, (Ozempic, 2 MG/DOSE,) 8 MG/3ML solution pen-injector, Inject 2 mg under the skin into the appropriate area as directed Every 7 (Seven) Days., Disp: 9 mL, Rfl: 3    Turmeric 500 MG tablet, Take  by mouth., Disp: , Rfl:     vitamin B-12 (CYANOCOBALAMIN) 1000 MCG tablet, Take 1 tablet by mouth Daily., Disp: , Rfl:     I spent 30 minutes caring for Luana on this date of service. This time includes time spent by me in the following activities:preparing for the visit, reviewing tests, obtaining and/or reviewing a separately obtained history, performing a medically appropriate examination and/or evaluation , counseling and educating the patient/family/caregiver, and documenting information in the medical record    Follow Up:   No follow-ups on file.    Karin Mackey PA-C   [facility]     NOTE TO PATIENT: The 21st Century Cures Act makes medical notes like these available to patients in the interest of transparency. However, be advised this is a medical document. It is intended as peer to peer communication. It is written in medical language and may contain abbreviations or verbiage that are unfamiliar. It may appear blunt or direct. Medical documents are intended to carry relevant information, facts as evident, and the clinical opinion of the practitioner.

## 2024-02-03 NOTE — ASSESSMENT & PLAN NOTE
Symptoms consistent with viral upper respiratory infection.  We discussed symptom management.  I offered prescription for Stahist but she declines.  Continue to use over-the-counter steroid nasal spray but increase frequency to twice a day.  Eustachian tube dysfunction associated with upper respiratory infections can last 6 to 8 weeks.  If persist longer than this then recommend see ENT.  They can evaluate for obstructions to the eustachian tubes such as nasal polyps and can place tympanostomy tubes as last resort.

## 2024-02-03 NOTE — ASSESSMENT & PLAN NOTE
Continue with rheumatology.  Tramadol is strongest pain medication that I can manage here.  She had been seen by Bree ORNELAS as her primary care provider but Bree is now occupational medicine only.  She said Bree had gotten one of the physicians to sign a prescription for 30 hydrocodone and this list lasted her months she is down to 1 pill and she says she only uses for pain due to exacerbations.  This is not something that I will get into managing.  She is welcome to schedule an appointment with one of the MDs to discuss but there is no guarantee that they are going to manage.

## 2024-03-21 ENCOUNTER — OFFICE VISIT (OUTPATIENT)
Dept: ENDOCRINOLOGY | Facility: CLINIC | Age: 64
End: 2024-03-21
Payer: COMMERCIAL

## 2024-03-21 VITALS
HEART RATE: 88 BPM | OXYGEN SATURATION: 98 % | HEIGHT: 66 IN | DIASTOLIC BLOOD PRESSURE: 66 MMHG | SYSTOLIC BLOOD PRESSURE: 98 MMHG | BODY MASS INDEX: 22.34 KG/M2 | WEIGHT: 139 LBS

## 2024-03-21 DIAGNOSIS — E11.65 UNCONTROLLED TYPE 2 DIABETES MELLITUS WITH HYPERGLYCEMIA: Primary | ICD-10-CM

## 2024-03-21 DIAGNOSIS — I10 BENIGN HYPERTENSION: ICD-10-CM

## 2024-03-21 LAB
ALBUMIN SERPL-MCNC: 4.7 G/DL (ref 3.5–5.2)
ALBUMIN UR-MCNC: <1.2 MG/DL
ALBUMIN/GLOB SERPL: 1.7 G/DL
ALP SERPL-CCNC: 81 U/L (ref 39–117)
ALT SERPL W P-5'-P-CCNC: 8 U/L (ref 1–33)
ANION GAP SERPL CALCULATED.3IONS-SCNC: 15.6 MMOL/L (ref 5–15)
AST SERPL-CCNC: 13 U/L (ref 1–32)
BILIRUB SERPL-MCNC: 0.4 MG/DL (ref 0–1.2)
BUN SERPL-MCNC: 15 MG/DL (ref 8–23)
BUN/CREAT SERPL: 20 (ref 7–25)
CALCIUM SPEC-SCNC: 10.3 MG/DL (ref 8.6–10.5)
CHLORIDE SERPL-SCNC: 103 MMOL/L (ref 98–107)
CHOLEST SERPL-MCNC: 178 MG/DL (ref 0–200)
CO2 SERPL-SCNC: 25.4 MMOL/L (ref 22–29)
CREAT SERPL-MCNC: 0.75 MG/DL (ref 0.57–1)
CREAT UR-MCNC: 144.8 MG/DL
EGFRCR SERPLBLD CKD-EPI 2021: 89 ML/MIN/1.73
EXPIRATION DATE: ABNORMAL
EXPIRATION DATE: NORMAL
GLOBULIN UR ELPH-MCNC: 2.7 GM/DL
GLUCOSE BLDC GLUCOMTR-MCNC: 107 MG/DL (ref 70–130)
GLUCOSE SERPL-MCNC: 99 MG/DL (ref 65–99)
HBA1C MFR BLD: 7.4 % (ref 4.5–5.7)
HDLC SERPL-MCNC: 79 MG/DL (ref 40–60)
LDLC SERPL CALC-MCNC: 87 MG/DL (ref 0–100)
LDLC/HDLC SERPL: 1.1 {RATIO}
Lab: ABNORMAL
Lab: NORMAL
MICROALBUMIN/CREAT UR: NORMAL MG/G{CREAT}
POTASSIUM SERPL-SCNC: 4.3 MMOL/L (ref 3.5–5.2)
PROT SERPL-MCNC: 7.4 G/DL (ref 6–8.5)
SODIUM SERPL-SCNC: 144 MMOL/L (ref 136–145)
TRIGL SERPL-MCNC: 61 MG/DL (ref 0–150)
TSH SERPL DL<=0.05 MIU/L-ACNC: 1.52 UIU/ML (ref 0.27–4.2)
VLDLC SERPL-MCNC: 12 MG/DL (ref 5–40)

## 2024-03-21 PROCEDURE — 83036 HEMOGLOBIN GLYCOSYLATED A1C: CPT | Performed by: INTERNAL MEDICINE

## 2024-03-21 PROCEDURE — 82043 UR ALBUMIN QUANTITATIVE: CPT | Performed by: INTERNAL MEDICINE

## 2024-03-21 PROCEDURE — 82947 ASSAY GLUCOSE BLOOD QUANT: CPT | Performed by: INTERNAL MEDICINE

## 2024-03-21 PROCEDURE — 84443 ASSAY THYROID STIM HORMONE: CPT | Performed by: INTERNAL MEDICINE

## 2024-03-21 PROCEDURE — 80061 LIPID PANEL: CPT | Performed by: INTERNAL MEDICINE

## 2024-03-21 PROCEDURE — 99213 OFFICE O/P EST LOW 20 MIN: CPT | Performed by: INTERNAL MEDICINE

## 2024-03-21 PROCEDURE — 80053 COMPREHEN METABOLIC PANEL: CPT | Performed by: INTERNAL MEDICINE

## 2024-03-21 PROCEDURE — 82570 ASSAY OF URINE CREATININE: CPT | Performed by: INTERNAL MEDICINE

## 2024-03-21 RX ORDER — CYCLOSPORINE 0 G/ML
1 SOLUTION/ DROPS OPHTHALMIC; TOPICAL EVERY 12 HOURS
COMMUNITY
Start: 2024-03-18

## 2024-03-21 RX ORDER — ABATACEPT 125 MG/ML
125 INJECTION, SOLUTION SUBCUTANEOUS
COMMUNITY
Start: 2024-03-13

## 2024-03-21 NOTE — PROGRESS NOTES
"     Office Note      Date: 2024  Patient Name: Luana Chawla  MRN: [id number]  : 1960    Chief Complaint   Patient presents with    Diabetes       History of Present Illness:   Luana Chawla is a 63 y.o. female who presents for Diabetes type 2. Diagnosed in: . Treated in past with insulin. Current treatments: metformin, ozempic and basal insulin. Number of insulin shots per day: 1. Checks blood sugar 1 time a day.  Has low blood sugar: occasional. Aspirin use: No - due to easy bruising/nosebleeds. Statin use: No - lipids have been okay. ACE-I/ARB use: No. Changes in health since last visit: started on orencia for RA; frozen shoulder. Last eye exam 3/2024.       Subjective      Diabetic Complications:  Eyes: No  Kidneys: No  Feet: No  Heart: No    Diet and Exercise:  Meals per day: 3  Minutes of exercise per week: 150 mins.    Review of Systems:   Review of Systems   Constitutional:  Positive for fatigue.   Cardiovascular: Negative.    Gastrointestinal: Negative.         Heartburn/Reflux   Endocrine: Negative.        The following portions of the patient's history were reviewed and updated as appropriate: allergies, current medications, past family history, past medical history, past social history, past surgical history, and problem list.    Objective     Visit Vitals  BP 98/66   Pulse 88   Ht 167.6 cm (66\")   Wt 63 kg (139 lb)   LMP  (LMP Unknown)   SpO2 98%   BMI 22.44 kg/m²       Physical Exam:  Physical Exam  Constitutional:       Appearance: Normal appearance.   Neurological:      Mental Status: She is alert.         Labs:    HbA1c  Lab Results   Component Value Date    HGBA1C 7.4 (A) 2024       CMP  Lab Results   Component Value Date    GLUCOSE 174 (H) 2023    BUN 11 2023    CREATININE 0.61 2023    EGFRIFNONA 117 2021    EGFRIFAFRI 110 2020    BCR 18.0 2023    K 4.5 2023    CO2 26.0 2023    CALCIUM 9.3 2023    " PROTENTOTREF 6.5 02/25/2020    LABIL2 1.7 02/25/2020    AST 16 03/13/2023    ALT 16 03/13/2023        Lipid Panel  Lab Results   Component Value Date    HDL 75 (H) 03/13/2023    LDL 87 03/13/2023    TRIG 47 03/13/2023        TSH  Lab Results   Component Value Date    TSH 1.710 03/13/2023        Hemoglobin A1C  Lab Results   Component Value Date    HGBA1C 7.4 (A) 03/21/2024        Microalbumin/Creatinine  Lab Results   Component Value Date    MALBCRERATIO  03/13/2023      Comment:      Unable to calculate    MICROALBUR <1.2 03/13/2023           Assessment / Plan      Assessment & Plan:  Diagnoses and all orders for this visit:    1. Uncontrolled type 2 diabetes mellitus with hyperglycemia (Primary)  Assessment & Plan:  Diabetes is improving with treatment.   Continue current treatment regimen.  Diabetes will be reassessed in 3 months.    Orders:  -     Comprehensive Metabolic Panel; Future  -     Lipid Panel; Future  -     Microalbumin / Creatinine Urine Ratio - Urine, Clean Catch; Future  -     TSH; Future  -     POC Glucose, Blood  -     POC Glycosylated Hemoglobin (Hb A1C)  -     Comprehensive Metabolic Panel  -     Lipid Panel  -     Microalbumin / Creatinine Urine Ratio - Urine, Clean Catch  -     TSH    2. Benign hypertension  Assessment & Plan:  Hypertension is stable and controlled  Continue current treatment regimen.  Blood pressure will be reassessed in 3 months.        Current Outpatient Medications   Medication Instructions    albuterol sulfate  (90 Base) MCG/ACT inhaler INHALE TWO PUFFS BY MOUTH EVERY 4 HOURS AS NEEDED    ascorbic acid (VITAMIN C) 100 mg, Oral, Daily    BD Pen Needle Radha 2nd Gen 32G X 4 MM misc USE ONE - DAILY    Calcium Carb-Cholecalciferol (CALCIUM 1000 + D PO) 1 tablet, Oral, Daily    clobetasol (TEMOVATE) 0.05 % ointment Topical, 2 Times Daily    cyclobenzaprine (FLEXERIL) 10 mg, Oral, 3 Times Daily PRN    cycloSPORINE, PF, (Cequa) 0.09 % solution 1 drop, Ophthalmic, Every 12  Hours    escitalopram (LEXAPRO) 20 MG tablet TAKE ONE TABLET BY MOUTH DAILY    folic acid (FOLVITE) 1 MG tablet No dose, route, or frequency recorded.    glucose blood (Accu-Chek Sasha Plus) test strip Test up to 3 times daily    Insulin Glargine (BASAGLAR KWIKPEN) 100 UNIT/ML injection pen Use daily; titrate to fasting glucose <120; max daily dose 50u    letrozole (FEMARA) 2.5 MG tablet TAKE ONE TABLET BY MOUTH DAILY    loratadine (CLARITIN) 10 mg, Oral, Daily    Magnesium 400 MG tablet 1 tablet, Oral, Daily    metFORMIN ER (GLUCOPHAGE-XR) 500 MG 24 hr tablet TAKE TWO TABLETS BY MOUTH TWICE A DAY    methotrexate 2.5 MG tablet No dose, route, or frequency recorded.    omeprazole (priLOSEC) 40 MG capsule TAKE ONE CAPSULE BY MOUTH DAILY BEFORE A MEAL    Orencia ClickJect 125 mg, Once a week    Ozempic (2 MG/DOSE) 2 mg, Subcutaneous, Every 7 Days    Saccharomyces boulardii (Probiotic) 250 MG capsule Oral    Turmeric 500 MG tablet Oral    vitamin B-12 (CYANOCOBALAMIN) 1,000 mcg, Oral, Daily      Return in about 3 months (around 6/21/2024) for Recheck with A1c.    Electronically signed by: Jose Cummings MD  03/21/2024

## 2024-03-25 RX ORDER — BLOOD SUGAR DIAGNOSTIC
STRIP MISCELLANEOUS
Qty: 50 EACH | Refills: 3 | Status: SHIPPED | OUTPATIENT
Start: 2024-03-25

## 2024-03-25 NOTE — TELEPHONE ENCOUNTER
Rx Refill Note  Requested Prescriptions     Pending Prescriptions Disp Refills    glucose blood (Accu-Chek Sasha Plus) test strip [Pharmacy Med Name: ACCU-CHEK SASHA PLUS TEST STRP]       Sig: USE ONE STRIP TO TEST BLOOD SUGAR DAILY          Last office visit with prescribing clinician: 3/21/2024     Next office visit with prescribing clinician: 6/18/2024         Cami Grimm MA  03/25/24, 11:50 EDT

## 2024-03-26 DIAGNOSIS — F33.1 MODERATE EPISODE OF RECURRENT MAJOR DEPRESSIVE DISORDER: ICD-10-CM

## 2024-03-26 RX ORDER — ESCITALOPRAM OXALATE 20 MG/1
20 TABLET ORAL DAILY
Qty: 90 TABLET | Refills: 1 | Status: SHIPPED | OUTPATIENT
Start: 2024-03-26

## 2024-04-01 RX ORDER — OMEPRAZOLE 40 MG/1
CAPSULE, DELAYED RELEASE ORAL
Qty: 90 CAPSULE | Refills: 0 | OUTPATIENT
Start: 2024-04-01

## 2024-04-02 RX ORDER — INSULIN GLARGINE 100 [IU]/ML
INJECTION, SOLUTION SUBCUTANEOUS
Qty: 45 ML | Refills: 3 | Status: SHIPPED | OUTPATIENT
Start: 2024-04-02

## 2024-04-02 NOTE — TELEPHONE ENCOUNTER
Rx Refill Note  Requested Prescriptions     Pending Prescriptions Disp Refills    Insulin Glargine (BASAGLAR KWIKPEN) 100 UNIT/ML injection pen [Pharmacy Med Name: BASAGLAR 100 UNIT/ML KWIKPEN] 45 mL 3     Sig: USE DAILY TO TITRATE TA FASTING GLUCOSE LESS THAN 120  UP TO MAX DAILY DOSE OF 50 UNITS          Last office visit with prescribing clinician: 3/21/2024     Next office visit with prescribing clinician: 6/18/2024         Cami Grimm MA  04/02/24, 10:54 EDT

## 2024-04-17 ENCOUNTER — OFFICE VISIT (OUTPATIENT)
Dept: ONCOLOGY | Facility: CLINIC | Age: 64
End: 2024-04-17
Payer: COMMERCIAL

## 2024-04-17 VITALS
OXYGEN SATURATION: 95 % | RESPIRATION RATE: 18 BRPM | HEIGHT: 66 IN | BODY MASS INDEX: 22.18 KG/M2 | SYSTOLIC BLOOD PRESSURE: 117 MMHG | TEMPERATURE: 98.5 F | DIASTOLIC BLOOD PRESSURE: 64 MMHG | WEIGHT: 138 LBS | HEART RATE: 84 BPM

## 2024-04-17 DIAGNOSIS — C50.411 MALIGNANT NEOPLASM OF UPPER-OUTER QUADRANT OF RIGHT BREAST IN FEMALE, ESTROGEN RECEPTOR POSITIVE: Primary | ICD-10-CM

## 2024-04-17 DIAGNOSIS — Z17.0 MALIGNANT NEOPLASM OF UPPER-OUTER QUADRANT OF RIGHT BREAST IN FEMALE, ESTROGEN RECEPTOR POSITIVE: Primary | ICD-10-CM

## 2024-04-17 PROCEDURE — 99214 OFFICE O/P EST MOD 30 MIN: CPT | Performed by: NURSE PRACTITIONER

## 2024-04-17 NOTE — PROGRESS NOTES
CHIEF COMPLAINT: Arthritic pain       Problem List:  Oncology/Hematology History Overview Note   1.  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  ER weakly 5% 1+ positive, NJ 50% 1-2+ positive, HER-2/ashley 100% 3+ positive.  Negative cancer next panel  2.  Significant diabetes with predating neuropathy due to spinal stenosis status post laminectomy 5/24/2019 with persistent neuropathy dorsum left foot  3.  MRSA bacteremia due to port infection March 2020 after course 1 day 1 of Herceptin Taxol  4.  Covid 2/2023  5.  Atypical inflammatory/infectious process of the chest February 2023 treated with Augmentin x2 weeks with near complete resolution on CT chest 3/31/2023.  Following with pulmonology.  6.  Rheumatoid arthritis diagnosed March 2023, evaluation by Dr. Natividad Martinez.  Began methotrexate.    Oncology history timeline:  -5/24/2019 Dr. Garcia saw for spinal stenosis with radiculopathy and left foot drop due to herniated disc and performed decompressive laminectomy, L4-5 and L5-S1 discectomy and medial facetectomy  -10/15/2019 mammogram BI-RADS 0  -11/22/2019 right mammogram/ultrasound BI-RADS 4 with ultrasound-guided core biopsy showing invasive ductal carcinoma Cory score 6 out of 9 grade 2, ER 5% 1+ positive NJ 50% 1-2+ positive, HER-2/ashley 100% 3+ positive.  -12/13/2019 MRI breasts revealed 2.2 cm right breast mass consistent with biopsy proven cancer with unsuspected adjacent area's of non-mass enhancement worrisome for DCIS.  Non-mass enhancement in the subareolar left breast worrisome for DCIS.  Dominant focus left central portion left breast indeterminate BI-RADS 4.  Cancer next panel negative  -1/15/2020 CMP unremarkable save for glucose 200 with hemoglobin 10.6 normochromic normocytic indices  -1/21/2020 right skin sparing mastectomy with right sentinel  lymph node injection and right deep subfascial sentinel lymph node biopsy with contralateral prophylactic left skin sparing mastectomy.  Right breast 1.8 cm Bloom Dias 8 out of 9, total of 4 lymph nodes examined 2 sentinel nodes all negative.  Pathological T1 cN0 M0 stage Ia.  Left breast benign with sclerosing adenosis.  -2/26/2020 started Herceptin Taxol weekly  -3/2/2020 port removed due to MRSA bacteremia with port infection/purulence.  -3/2/2020 through 3/6/2020 hospitalized for MRSA bacteremia from port.  -3/31/2020 [facility] tele-visit: Patient still on IV antibiotics for MRSA port infection.  Decision made to go with Kanjinti alone every 3 weeks along with Arimidex.  -4/13/2020 per ID, patient has completed IV antibiotics and is now on oral antibiotics.  -3/23/2021 completed 1 year Kanjinti.  Recommendation to complete 10 years of Arimidex if can tolerate.  5-7 years if not.    -6/16/2021 [facility] follow-up:  Intolerant of Arimidex with hot flashes and mood swings.  Therapy switched to Letrozole.      -9/15/2021 [facility] follow-up:  She tolerated therapy with Letrozole better than she did with Arimidex but she still is having significant hot flashes and mood disturbance with easy agitation.  She is finding it difficult to work as stress makes these issues worse.  She is going to apply for disability long-term.  She is on Wellbutrin and Lexapro.  We discussed at length her options for hormonal blockade and she wants to continue trying.  At this time I will switch her to tamoxifen.  I spoke with our pharmacist Irma Arechiga, Pharm.D. about changing her antidepressants as her current antidepressants can interfere with tamoxifen.  We will have her stop Wellbutrin, she will start Effexor 37.5 mg daily for 7 days and if tolerated will increase to 75 mg daily, if this is tolerated then we will consider increasing up to 150 mg daily.  I am hoping that Effexor will help  mitigate some of her hot flashes along with helping her depression.  She will taper off of her Lexapro over 2 weeks and then stop.  We discussed counseling as I think this will be greatly beneficial to Luana with her dysthymia and helping to deal with stress at work and living with breast cancer.  She did not want me to make the referral but I did give her a brochure and she assures me that she will call and make an appointment.  I plan to call and check on her in about 3 weeks to see how she is tolerating the change in her antidepressants, we will also see if she has made an appointment with [facility] Vega for counseling and to see how she is tolerating tamoxifen.  She is going to stop her Lexapro and wait about a week before starting tamoxifen so that she can tell if she is going to have any side effects with it.  I will see her back in 2 months for follow-up and sooner if needed.  We had planned on 5-7 years of therapy with hormonal blockade, I discussed with her today that with her ongoing side effects we would likely only do 5 years and hopefully we can get through that.  She was only weakly ER positive.    -10/14/2021 Did not tolerate Tamoxifen, went back to Letrozole    -11/17/2021 [facility] follow-up: Luana is tolerating letrozole much better than tamoxifen.  She continues to have significant hot flashes, she is not sure the Effexor is helping.  She did have to go back to the 37.5 mg dose as she did not tolerate the 75 mg dose.  I discussed with her that she could switch to a different antidepressant as far as we were concerned, there is no interaction with the letrozole, we had only switched her to Effexor when we were trying tamoxifen and also we had hoped it would help with her hot flashes.  She is following with Emeli Suero and will discuss with her at her next appointment.  I also recommended acupuncture for her hot flashes as there is data showing that this may be helpful.  We also  discussed adding gabapentin but when we reviewed the potential side effects of constipation and lethargy she did not want to try that.  She continues to complain of low back pain with some neuropathy in her left lower extremity.  She does have a history of spinal stenosis, I will get an MRI of her lumbar spine for further evaluation and call her with the results.  Assuming there is no involvement of metastatic disease but just symptoms from her spinal stenosis she will need to follow-up with her neurosurgeon.  She has a history of laminectomy.    -5/18/2022 [facility] oncology clinic follow-up: Luana continues to tolerate adjuvant therapy with letrozole, she was previously intolerant of anastrozole and tamoxifen.  She continues to have hot flashes but these seem to be more tolerable.  She has no new worrisome symptoms.  We plan on 5 years of adjuvant therapy with an AI.  She has had mastectomies therefore does not need mammograms.  She will need bone density repeated this fall for 2-year follow-up, previous bone density showed osteopenia.  She may benefit from bisphosphonate but we will see what her next bone density scan shows.  She does take calcium and vitamin D and remains active and walks regularly.  Her diabetes is under good control, she follows with endocrinology.  Her back pain is better, she is now on gabapentin under the direction of her surgeon.    -9/29/2022 [facility] clinic follow-up: Luana called for an appointment due to not feeling well with upper back pain, fatigue and chest pain.  CT chest, bone scan, CBC, CMP ordered for evaluation.    -10/4/2022 total body bone scan negative for osseous metastatic disease, benign/arthritic/degenerative/posttraumatic changes in the cervical spine, shoulders, acromioclavicular joints (moderate uptake), sternoclavicular joints, right greater than left, elbows, wrists and hands, thoracic spine, lumbosacral spine, sacroiliac joints, hips, knees, ankles and feet.   CT chest with small 3-4 mm bilateral pulmonary nodules.  Right apical groundglass and consolidative opacities could be posttreatment change versus infectious.  -10/6/2022 [facility] follow-up: Reviewed the above with the patient.  No signs of recurrent or metastatic breast cancer.  Will treat with Augmentin empirically.  Suspect most of her pain is due to her arthritis/degenerative changes, recommended she follow-up with PCP if no improvement, may benefit from rheumatology referral.  We will repeat CT chest in 3 months.    -1/17/2023: CT chest shows stable size of previously noted bilateral pulmonary nodules.  She does have what appears to be new finding of multiple focal nodular opacities in the bilateral upper lobes, favoring combination of postradiation change and/or infection however given his masslike appearance malignancy cannot be excluded.  I called Luana and discussed the results with her.  We will get a PET scan for further evaluation.  She also reports she is still having a daily headache and was actually going to see her primary care provider tomorrow as she feels she may have a sinus infection.  We will also get MRI of the brain for further evaluation.  I will send in a prescription for a Z-Vaughn.  She has a cough but no fever, no shortness of breath.  We will follow-up after her PET scan and MRI to go over the results and further plan of care.  If the PET is unrevealing Dr. Gomez recommends  referral to pulmonology for evaluation and possible bronchoscopy. Of note, she did not have radiation for her breast cancer which was 1.8 cm and node negative with clear margins.    -1/30/2023 PET scan shows multiple irregular consolidative and subsolid mostly subpleural based densities in both lungs with corresponding hypermetabolism.  The appearance is most suggestive of infectious or inflammatory process, including entities such as organizing pneumonia.  The appearance would be atypical for lung  metastasis.  There is also conspicuous focal hypermetabolism in the left medial clavicle at the sternoclavicular joint.  This is likely some underlying subchondral cystic change suggesting likely degenerative uptake, although the degree of hypermetabolism is slightly greater than would be expected for normal degenerative change and a solitary bony metastasis would be difficult to exclude.      -2/2/2023 Chest x-ray showed the multifocal ill-defined opacities in the lung suspicious for infection.  CT angiogram chest showed multifocal airspace opacities not changed from 1/30/2023 consistent with infection/inflammation with stable multiple small pulmonary nodules.  Duplex venous imaging left upper extremity normal.  Hemoglobin 11.4 with MCV 89.9 and platelets 569,000 with normal white count 10,370.  CMP unremarkable.  Normal lipase.  Normal proBNP.  Low uric acid 2.3.  Troponin negative.  EKG normal.    - 2/7/2023 MRI brain with and without contrast nonspecific small vessel changes and altered bone mineral density.  Left calvarial hemangioma.  MRI left clavicle shows osteoarthrosis of the sternoclavicular, acromioclavicular, and glenohumeral joints and a suspected rotator cuff tear.  Lipoma supraspinatus intramuscular.    -3/30/2023 CT chest without contrast shows near complete resolution of previous identified rounded areas of consolidation in both lungs.  Residual minimal groundglass opacity and small semisolid nodules unchanged in the lower lungs.  Recommend 6-month follow-up chest CT.    -4/19/2023 [facility] follow-up: Luana overall is doing well on letrozole.  She has no evidence of recurrent breast cancer on clinical exam.  She began AI therapy March 2020 and we plan on at least 5 years adjuvant therapy.  I would consider BCI testing around that time to see whether or not she would benefit from extended adjuvant therapy as it has been challenging for her to take.  Atypical inflammation/infection in  her lungs has cleared after Augmentin, CT of the chest on 3/30/2023 showed near complete resolution.  Recommendation to follow-up in 6 months.  Since we saw her last she also was diagnosed with rheumatoid arthritis and is following with Dr. Natividad Martinez.  She has been placed on methotrexate, she also was taking diclofenac as needed.  She has had an increase in acid reflux and dyspepsia, she does take omeprazole and also an occasional Tums.  We discussed that the diclofenac could be upsetting her stomach, she is going to hold that and will try topical diclofenac and she has this at home.  If she continues to have issues I have asked her to notify her primary care provider as she may need a different PPI or possibly referral to GI for consideration of EGD.    -10/18/2023 [facility] medical oncology follow-up: She continues on letrozole and tolerating it fairly well.  She has no evidence of recurrent cancer on clinical exam or worrisome symptoms.  She began AI therapy March 2020 and we plan on at least 5 years adjuvant therapy.  We will consider BCI testing around that time to see whether or not she would benefit from extended adjuvant therapy.  She had atypical inflammation/infection in her lungs with near complete resolution on CT of chest on 3/30/2023.  Recommendation to follow-up in 6 months.  I have ordered repeat CT chest today.  She continues to follow closely with Dr. Martinez for rheumatoid arthritis in which she is taking methotrexate.  She is having significant left shoulder pain due to cervical arthritis.  She has tried cervical collar and physical therapy.  She also does meditation and yoga.  She has been referred to pain management.  She had DEXA scan December 2022 that showed osteopenia.  She is taking calcium and vitamin D daily.  We will repeat DEXA scan December 2024.  We will see her back in 6 months.  She will notify us sooner if any issues or concerns.     Malignant neoplasm of upper-outer quadrant of  right breast in female, estrogen receptor positive   1/16/2019 Cancer Staged    Staging form: Breast, AJCC 8th Edition  - Clinical stage from 1/16/2019: Stage IB (cT2, cN0, cM0, G2, ER+, MS+, HER2+) - Signed by Lisa Herman MD on 1/27/2020 1/21/2020 Initial Diagnosis    Malignant neoplasm of upper-outer quadrant of right female breast (CMS/HCC)     2/11/2020 Cancer Staged    Staging form: Breast, AJCC 8th Edition  - Pathologic: Stage IA (pT1c, pN0, cM0, G3, ER+, MS+, HER2+) - Signed by Hero Gomez MD on 2/11/2020 2/18/2020 -  Other Event    Echocardiogram  Left ventricular systolic function is normal. Estimated EF = 55%.  The global longitudinal strain was -19.5%.     2/25/2020 - 2/26/2020 Chemotherapy    OP CENTRAL VENOUS ACCESS DEVICE ACCESS, CARE, AND MAINTENANCE (CVAD)     2/26/2020 - 3/10/2020 Chemotherapy    OP BREAST PACLitaxel / Trastuzumab-anns (Weekly X 12)     3/2/2020 Adverse Reaction    MRSA bacteremia due to port infection with port removal after day 1 course 1 Herceptin Taxol     3/31/2020 -  Hormonal Therapy    Arimidex for planned 5 years.    6/16/2021 therapy changed to Letrozole due to intolerance to Arimidex.  9/15/2021 changed to Tamoxifen kruse to intolerance of letrozole.  10/14/2021 changed back to Letrozole due to intolerance of tamoxifen.       4/2/2020 -  Chemotherapy    Completed 3/23/2021  OP BREAST Trastuzumab-anns Q21D (maintenance)     5/27/2020 -  Other Event    5/27/2020 echocardiogram EF 55%      7/23/2020 Procedure    Colonoscopy with Dr. Marte, normal     8/26/2020 -  Other Event    Echocardiogram   This was a limited echocardiogram to assess left ventricular ejection fraction in the setting of chemotherapy.  Left ventricular systolic function is normal. Calculated EF = 55.1%. Estimated EF = 55%. Global Longitudinal LV strain = -18.2%.  Estimated EF = 55%.     11/9/2020 Imaging    DEXA bone density IMPRESSION:  Osteopenia of the femoral necks bilaterally, and total  hips  bilaterally.  Lowest T score -2.0 of the right femoral neck.     11/10/2020 -  Other Event    Echocardogram   This was a limited echocardiogram to assess left ventricular function only.  Left ventricular systolic function is normal. Left ventricular ejection fraction appears to be 56 - 60%.  Global longitudinal LV strain (GLS) = 19.7%.     2/23/2021 Imaging    -2/23/2021 left hip 2 view x-ray negative     12/1/2021 Imaging    MRI of the lumbar spine with and without contrast: Overall no significant interval change in the appearance of the lumbar spine since previous 5/7/2020 comparison study.  There are postoperative changes at L5-S1 with enhancing epidural scar in the left lateral recess and there are multilevel degenerative changes with canal in foraminal encroachment.     12/20/2022 Imaging    DEXA bone density scan with osteopenia of the right femoral neck and total right hip.  Lowest T score -2.4 of the right femoral neck.  Compared to previous there was an increase in bone mineral density of the L1-L4 vertebrae by 1.8% and a decrease in the bone mineral density of the total right hip by 4.4%.         HISTORY OF PRESENT ILLNESS:  The patient is a 63 y.o. female, here for follow up on management of right breast cancer currently on adjuvant therapy with letrozole.    Luana overall has been doing well since we saw her last.  She continues to report discomfort mainly in her hands from her rheumatoid arthritis.  She also was diagnosed with frozen right shoulder and had an injection of Toradol and that area which helped.  She has no new or concerning bony aches or pains.  No new or concerning findings on chest wall exam.  She is tolerating letrozole fairly well but admits that she does look forward to the time when she is able to stop taking it.  She reports her diabetes has been under good control on that her endocrinologist feels that as long as her RA is under good control this has been key in keeping her  diabetes under control also.  She does not like her RA medications but is tolerating them.      Past Medical History:   Diagnosis Date    Arthritis     Benign hypertension 10/22/2020    Breast cancer     Diabetes mellitus     Diverticulitis     GERD (gastroesophageal reflux disease)     Hypokalemia     Kidney stone     Lichen sclerosus     Long term (current) use of insulin     Malignant neoplasm of upper-outer quadrant of right female breast 01/21/2020    Microalbuminuria     Nephrolithiasis     Rheumatoid arthritis with positive rheumatoid factor 02/03/2024    Sciatica     Septic discitis of lumbar region     Type 2 diabetes mellitus, uncontrolled     Wears contact lenses      Past Surgical History:   Procedure Laterality Date    APPENDECTOMY      BREAST BIOPSY Right     BREAST EXCISIONAL BIOPSY Right     CHOLECYSTECTOMY      COLON SURGERY      COLONOSCOPY      HYSTERECTOMY      LEG SURGERY      leg fracture surgery-Abstracted from [employer]    LUMBAR LAMINECTOMY DISCECTOMY DECOMPRESSION N/A 05/24/2019    Procedure: LUMBAR LAMINECTOMY L4-5 AND L5-S1;  Surgeon: Hipolito Kahn MD;  Location:  [facility];  Service: Orthopedic Spine    MASTECTOMY Bilateral     MASTECTOMY W/ SENTINEL NODE BIOPSY Bilateral 01/21/2020    Procedure: SKIN SPARING MASTECTOMIES BILATERAL, SENTINEL NODE BIOPSY RIGHT;  Surgeon: Silvio Howard MD;  Location:  [facility];  Service: General    TUBAL ABDOMINAL LIGATION      VENOUS ACCESS DEVICE (PORT) REMOVAL N/A 03/03/2020    Procedure: REMOVAL PORT;  Surgeon: Silvio Howard MD;  Location:  [facility];  Service: General;  Laterality: N/A;       Allergies   Allergen Reactions    Bactrim [Sulfamethoxazole-Trimethoprim] Rash     RASH, SKIN PEELING        Family History and Social History reviewed and changed as necessary    REVIEW OF SYSTEM:   Arthralgias mainly in her hands    PHYSICAL EXAM:  Petite, well-developed, well-nourished appearing female in no distress  No cervical,  "supraclavicular or axillary nodes palpable on exam  Chest wall exam is benign status post bilateral mastectomies, no abnormal masses, nodules, rashes or lesions  Respirations regular and unlabored, lungs clear to auscultation bilaterally  Heart rate appears irregular at times, normal rate    Vitals:    24 1128   BP: 117/64   Pulse: 84   Resp: 18   Temp: 98.5 °F (36.9 °C)   SpO2: 95%   Weight: 62.6 kg (138 lb)   Height: 167.6 cm (66\")     Vitals:    24 1128   PainSc: 0-No pain            ECOG score: 0         Vitals reviewed  Labs available in epic reviewed at time of visit along with note from [facility] rheumatology office visit 3/28/2023.      ECO    Lab Results   Component Value Date    HGB 11.4 (L) 2023    HCT 33.1 (L) 2023    MCV 89.9 2023     (H) 2023    WBC 10.37 2023    NEUTROABS 8.22 (H) 2023    LYMPHSABS 1.06 2023    MONOSABS 0.86 2023    EOSABS 0.15 2023    BASOSABS 0.04 2023       Lab Results   Component Value Date    GLUCOSE 99 2024    BUN 15 2024    CREATININE 0.75 2024     2024    K 4.3 2024     2024    CO2 25.4 2024    CALCIUM 10.3 2024    PROTEINTOT 7.4 2024    ALBUMIN 4.7 2024    BILITOT 0.4 2024    ALKPHOS 81 2024    AST 13 2024    ALT 8 2024             ASSESSMENT & PLAN:  1.  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  ER weakly 5% 1+ positive, RI 50% 1-2+ positive, HER-2/ashley 100% 3+ positive.  Negative cancer next panel  2.  Significant diabetes with predating neuropathy due to spinal stenosis status post laminectomy 2019 with persistent neuropathy dorsum left foot  3.  MRSA bacteremia due to port infection 2020 after course 1 day 1 of " Herceptin Taxol  4.  Covid 2/2023  5.  Atypical inflammatory/infectious process of the chest February 2023 treated with Augmentin x2 weeks with near complete resolution on CT chest 3/31/2023.  Following with pulmonology.  6.  Rheumatoid arthritis diagnosed March 2023, evaluation by Dr. Natividad Martinez.  Began methotrexate.  7.  GERD and dyspepsia    Oncology history timeline:  -5/24/2019 Dr. Garcia saw for spinal stenosis with radiculopathy and left foot drop due to herniated disc and performed decompressive laminectomy, L4-5 and L5-S1 discectomy and medial facetectomy  -10/15/2019 mammogram BI-RADS 0  -11/22/2019 right mammogram/ultrasound BI-RADS 4 with ultrasound-guided core biopsy showing invasive ductal carcinoma Cory score 6 out of 9 grade 2, ER 5% 1+ positive DC 50% 1-2+ positive, HER-2/ashley 100% 3+ positive.  -12/13/2019 MRI breasts revealed 2.2 cm right breast mass consistent with biopsy proven cancer with unsuspected adjacent area's of non-mass enhancement worrisome for DCIS.  Non-mass enhancement in the subareolar left breast worrisome for DCIS.  Dominant focus left central portion left breast indeterminate BI-RADS 4.  Cancer next panel negative  -1/15/2020 CMP unremarkable save for glucose 200 with hemoglobin 10.6 normochromic normocytic indices  -1/21/2020 right skin sparing mastectomy with right sentinel lymph node injection and right deep subfascial sentinel lymph node biopsy with contralateral prophylactic left skin sparing mastectomy.  Right breast 1.8 cm Bloom Dias 8 out of 9, total of 4 lymph nodes examined 2 sentinel nodes all negative.  Pathological T1 cN0 M0 stage Ia.  Left breast benign with sclerosing adenosis.  -2/26/2020 started Herceptin Taxol weekly  -3/2/2020 port removed due to MRSA bacteremia with port infection/purulence.  -3/2/2020 through 3/6/2020 hospitalized for MRSA bacteremia from port.  -3/31/2020 [facility] tele-visit: Patient still on IV antibiotics for MRSA port  infection.  Decision made to go with Kanjinti alone every 3 weeks along with Arimidex.  -4/13/2020 per ID, patient has completed IV antibiotics and is now on oral antibiotics.  -3/23/2021 completed 1 year Kanjinti.  Recommendation to complete 10 years of Arimidex if can tolerate.  5-7 years if not.    -6/16/2021 [facility] clinic follow-up:  Intolerant of Arimidex with hot flashes and mood swings.  Therapy switched to Letrozole.      -9/15/2021 [facility] Oncology clinic follow-up:  She tolerated therapy with Letrozole better than she did with Arimidex but she still is having significant hot flashes and mood disturbance with easy agitation.  She is finding it difficult to work as stress makes these issues worse.  She is going to apply for disability half-way.  She is on Wellbutrin and Lexapro.  We discussed at length her options for hormonal blockade and she wants to continue trying.  At this time I will switch her to tamoxifen.  I spoke with our pharmacist Irma Arechiga, Pharm.D. about changing her antidepressants as her current antidepressants can interfere with tamoxifen.  We will have her stop Wellbutrin, she will start Effexor 37.5 mg daily for 7 days and if tolerated will increase to 75 mg daily, if this is tolerated then we will consider increasing up to 150 mg daily.  I am hoping that Effexor will help mitigate some of her hot flashes along with helping her depression.  She will taper off of her Lexapro over 2 weeks and then stop.  We discussed counseling as I think this will be greatly beneficial to Luana with her dysthymia and helping to deal with stress at work and living with breast cancer.  She did not want me to make the referral but I did give her a brochure and she assures me that she will call and make an appointment.  I plan to call and check on her in about 3 weeks to see how she is tolerating the change in her antidepressants, we will also see if she has made an appointment with CEC[facility] Vega  for counseling and to see how she is tolerating tamoxifen.  She is going to stop her Lexapro and wait about a week before starting tamoxifen so that she can tell if she is going to have any side effects with it.  I will see her back in 2 months for follow-up and sooner if needed.  We had planned on 5-7 years of therapy with hormonal blockade, I discussed with her today that with her ongoing side effects we would likely only do 5 years and hopefully we can get through that.  She was only weakly ER positive.    -10/14/2021 Did not tolerate Tamoxifen, went back to Letrozole    -11/17/2021 [facility] On[facility] clinic follow-up: Luana is tolerating letrozole much better than tamoxifen.  She continues to have significant hot flashes, she is not sure the Effexor is helping.  She did have to go back to the 37.5 mg dose as she did not tolerate the 75 mg dose.  I discussed with her that she could switch to a different antidepressant as far as we were concerned, there is no interaction with the letrozole, we had only switched her to Effexor when we were trying tamoxifen and also we had hoped it would help with her hot flashes.  She is following with Emeli Suero and will discuss with her at her next appointment.  I also recommended acupuncture for her hot flashes as there is data showing that this may be helpful.  We also discussed adding gabapentin but when we reviewed the potential side effects of constipation and lethargy she did not want to try that.  She continues to complain of low back pain with some neuropathy in her left lower extremity.  She does have a history of spinal stenosis, I will get an MRI of her lumbar spine for further evaluation and call her with the results.  Assuming there is no involvement of metastatic disease but just symptoms from her spinal stenosis she will need to follow-up with her neurosurgeon.  She has a history of laminectomy.    -5/18/2022 [facility] oncology clinic follow-up: Luana continues to  tolerate adjuvant therapy with letrozole, she was previously intolerant of anastrozole and tamoxifen.  She continues to have hot flashes but these seem to be more tolerable.  She has no new worrisome symptoms.  We plan on 5 years of adjuvant therapy with an AI.  She has had mastectomies therefore does not need mammograms.  She will need bone density repeated this fall for 2-year follow-up, previous bone density showed osteopenia.  She may benefit from bisphosphonate but we will see what her next bone density scan shows.  She does take calcium and vitamin D and remains active and walks regularly.  Her diabetes is under good control, she follows with endocrinology.  Her back pain is better, she is now on gabapentin under the direction of her surgeon.    -9/29/2022 [facility] follow-up: Luana called for an appointment due to not feeling well with upper back pain, fatigue and chest pain.  CT chest, bone scan, CBC, CMP ordered for evaluation.    -10/4/2022 total body bone scan negative for osseous metastatic disease, benign/arthritic/degenerative/posttraumatic changes in the cervical spine, shoulders, acromioclavicular joints (moderate uptake), sternoclavicular joints, right greater than left, elbows, wrists and hands, thoracic spine, lumbosacral spine, sacroiliac joints, hips, knees, ankles and feet.  CT chest with small 3-4 mm bilateral pulmonary nodules.  Right apical groundglass and consolidative opacities could be posttreatment change versus infectious.  -10/6/2022 [facility] clinic follow-up: Reviewed the above with the patient.  No signs of recurrent or metastatic breast cancer.  Will treat with Augmentin empirically.  Suspect most of her pain is due to her arthritis/degenerative changes, recommended she follow-up with PCP if no improvement, may benefit from rheumatology referral.  We will repeat CT chest in 3 months.    -1/17/2023: CT chest shows stable size of previously noted bilateral  pulmonary nodules.  She does have what appears to be new finding of multiple focal nodular opacities in the bilateral upper lobes, favoring combination of postradiation change and/or infection however given his masslike appearance malignancy cannot be excluded.  I called Luana and discussed the results with her.  We will get a PET scan for further evaluation.  She also reports she is still having a daily headache and was actually going to see her primary care provider tomorrow as she feels she may have a sinus infection.  We will also get MRI of the brain for further evaluation.  I will send in a prescription for a Z-Vaughn.  She has a cough but no fever, no shortness of breath.  We will follow-up after her PET scan and MRI to go over the results and further plan of care.  If the PET is unrevealing Dr. Gomez recommends  referral to pulmonology for evaluation and possible bronchoscopy. Of note, she did not have radiation for her breast cancer which was 1.8 cm and node negative with clear margins.    -1/30/2023 PET scan shows multiple irregular consolidative and subsolid mostly subpleural based densities in both lungs with corresponding hypermetabolism.  The appearance is most suggestive of infectious or inflammatory process, including entities such as organizing pneumonia.  The appearance would be atypical for lung metastasis.  There is also conspicuous focal hypermetabolism in the left medial clavicle at the sternoclavicular joint.  This is likely some underlying subchondral cystic change suggesting likely degenerative uptake, although the degree of hypermetabolism is slightly greater than would be expected for normal degenerative change and a solitary bony metastasis would be difficult to exclude.      -2/2/2023 Chest x-ray showed the multifocal ill-defined opacities in the lung suspicious for infection.  CT angiogram chest showed multifocal airspace opacities not changed from 1/30/2023 consistent with  infection/inflammation with stable multiple small pulmonary nodules.  Duplex venous imaging left upper extremity normal.  Hemoglobin 11.4 with MCV 89.9 and platelets 569,000 with normal white count 10,370.  CMP unremarkable.  Normal lipase.  Normal proBNP.  Low uric acid 2.3.  Troponin negative.  EKG normal.    - 2/7/2023 MRI brain with and without contrast nonspecific small vessel changes and altered bone mineral density.  Left calvarial hemangioma.  MRI left clavicle shows osteoarthrosis of the sternoclavicular, acromioclavicular, and glenohumeral joints and a suspected rotator cuff tear.  Lipoma supraspinatus intramuscular.    -2/21/2023 [facility] medical oncology telemedicine follow-up: Generalized fatigue with mild cough and COVID-positive yesterday on Paxlovid previously given Augmentin by Dr. Lai for opacities in the lungs and due to follow-up with Dr. Lai in March.  Due to see rheumatology in March.  Thorough imaging as outlined above showed no hint of malignancy though lung abnormalities cannot be 100% ruled out for malignancy but the vast likelihood is that this is infectious.  I will have her follow-up in April with my nurse practitioner and she continues on with Femara.    -3/30/2023 CT chest without contrast shows near complete resolution of previous identified rounded areas of consolidation in both lungs.  Residual minimal groundglass opacity and small semisolid nodules unchanged in the lower lungs.  Recommend 6-month follow-up chest CT.    -4/19/2023 [facility] clinic follow-up: Luana overall is doing well on letrozole.  She has no evidence of recurrent breast cancer on clinical exam.  She began AI therapy March 2020 and we plan on at least 5 years adjuvant therapy.  I would consider BCI testing around that time to see whether or not she would benefit from extended adjuvant therapy as it has been challenging for her to take.  Atypical inflammation/infection in her lungs has cleared after  Augmentin, CT of the chest on 3/30/2023 showed near complete resolution.  Recommendation to follow-up in 6 months.  Since we saw her last she also was diagnosed with rheumatoid arthritis and is following with Dr. Natividad Martinez.  She has been placed on methotrexate, she also was taking diclofenac as needed.  She has had an increase in acid reflux and dyspepsia, she does take omeprazole and also an occasional Tums.  We discussed that the diclofenac could be upsetting her stomach, she is going to hold that and will try topical diclofenac and she has this at home.  If she continues to have issues I have asked her to notify her primary care provider as she may need a different PPI or possibly referral to GI for consideration of EGD.    -10/18/2023 [facility] oncology follow-up: She continues on letrozole and tolerating it fairly well.  She has no evidence of recurrent cancer on clinical exam or worrisome symptoms.  She began AI therapy March 2020 and we plan on at least 5 years adjuvant therapy.  We will consider BCI testing around that time to see whether or not she would benefit from extended adjuvant therapy.  She had atypical inflammation/infection in her lungs with near complete resolution on CT of chest on 3/30/2023.  Recommendation to follow-up in 6 months.  I have ordered repeat CT chest today.  She continues to follow closely with Dr. Martinez for rheumatoid arthritis in which she is taking methotrexate.  She is having significant left shoulder pain due to cervical arthritis.  She has tried cervical collar and physical therapy.  She also does meditation and yoga.  She has been referred to pain management.  She had DEXA scan December 2022 that showed osteopenia.  She is taking calcium and vitamin D daily.  We will repeat DEXA scan December 2024.  We will see her back in 6 months.  She will notify us sooner if any issues or concerns.    -4/17/2024 [facility] clinic follow-up: Luana overall is doing well, she  "has no evidence of recurrent breast cancer on clinical exam and no new worrisome symptoms.  She is tolerating letrozole fairly well, she began in March 2020 on AI therapy.  She admits to looking forward to when she can stop AI therapy as she thinks that her quality of life will improve as far as her arthralgias.  She does have RA and follows with rheumatology and is currently on therapy with Orencia.  We discussed today whether or not we would want to consider doing BCI testing to see whether or not she would benefit from extended adjuvant therapy or if she has a major \"hate\" factor with AI therapy then I would be comfortable with stopping at 5 years.  I think she will likely want to do BCI testing to help put her mind at ease.  In the meantime for now she will continue letrozole unchanged.  She has had bilateral mastectomies therefore no need for mammograms.  She will be due for DEXA scan again sometime after December.  Her heart rate seems irregular but she is completely asymptomatic.  I did review previous ECGs available in epic and she has a history of PACs on prior ECG in February 2023 that was resolved on follow-up ECG that same day.  I discussed with her that she should make sure she mentions to her primary care provider on follow-up. She had CT of the chest in November 2023 for follow-up on previous scarring and inflammation, according to evaluation with pulmonary they felt this was residual scarring from areas of inflammation but there was nothing new or worrisome.  No further follow-up was recommended.    Return to clinic in 6 months for follow-up    I spent 30 minutes caring for Luana on this date of service. This time includes time spent by me in the following activities: preparing for the visit, reviewing tests, obtaining and/or reviewing a separately obtained history, performing a medically appropriate examination and/or evaluation, ordering medications, tests, or procedures, and documenting " information in the medical record.     Almita Pruitt, APRN    04/17/2024

## 2024-05-22 ENCOUNTER — OFFICE VISIT (OUTPATIENT)
Dept: FAMILY MEDICINE CLINIC | Facility: CLINIC | Age: 64
End: 2024-05-22
Payer: COMMERCIAL

## 2024-05-22 VITALS
WEIGHT: 139 LBS | SYSTOLIC BLOOD PRESSURE: 112 MMHG | BODY MASS INDEX: 22.34 KG/M2 | HEIGHT: 66 IN | HEART RATE: 107 BPM | OXYGEN SATURATION: 98 % | DIASTOLIC BLOOD PRESSURE: 76 MMHG

## 2024-05-22 DIAGNOSIS — M47.22 OSTEOARTHRITIS OF SPINE WITH RADICULOPATHY, CERVICAL REGION: Primary | ICD-10-CM

## 2024-05-22 DIAGNOSIS — K21.9 GASTROESOPHAGEAL REFLUX DISEASE, UNSPECIFIED WHETHER ESOPHAGITIS PRESENT: ICD-10-CM

## 2024-05-22 DIAGNOSIS — R01.1 HEART MURMUR: ICD-10-CM

## 2024-05-22 DIAGNOSIS — F33.1 MODERATE EPISODE OF RECURRENT MAJOR DEPRESSIVE DISORDER: ICD-10-CM

## 2024-05-22 DIAGNOSIS — G89.29 CHRONIC LEFT SHOULDER PAIN: ICD-10-CM

## 2024-05-22 DIAGNOSIS — M25.512 CHRONIC LEFT SHOULDER PAIN: ICD-10-CM

## 2024-05-22 PROBLEM — H04.129 TEAR FILM INSUFFICIENCY: Status: ACTIVE | Noted: 2024-03-18

## 2024-05-22 PROBLEM — H16.143 SPK (SUPERFICIAL PUNCTATE KERATITIS), BILATERAL: Status: ACTIVE | Noted: 2024-03-18

## 2024-05-22 PROBLEM — J06.9 ACUTE URI: Status: RESOLVED | Noted: 2024-02-03 | Resolved: 2024-05-22

## 2024-05-22 PROCEDURE — 99214 OFFICE O/P EST MOD 30 MIN: CPT | Performed by: STUDENT IN AN ORGANIZED HEALTH CARE EDUCATION/TRAINING PROGRAM

## 2024-05-22 RX ORDER — ESCITALOPRAM OXALATE 20 MG/1
20 TABLET ORAL DAILY
Qty: 90 TABLET | Refills: 1 | Status: SHIPPED | OUTPATIENT
Start: 2024-05-22

## 2024-05-22 RX ORDER — OMEPRAZOLE 40 MG/1
40 CAPSULE, DELAYED RELEASE ORAL
Qty: 90 CAPSULE | Refills: 1 | Status: SHIPPED | OUTPATIENT
Start: 2024-05-22

## 2024-05-22 NOTE — PROGRESS NOTES
Office Note     Name: Luana Chawla    : 1960     MRN: [id number]     Chief Complaint  Establish Care (Go over EKG, questions about her arthritis )    Subjective     History of Present Illness:  Luana Chawla is a 63 y.o. female who presents today for:    Rheumatoid arthritis  -Patient takes methotrexate.  On chart review it looks like she was not interested in Biologics or Plaquenil at that time.  She was referred to pain management for exacerbations but does not want to drive to [place] frequently.  She does see rheumatology who will not prescribe anything stronger than tramadol. She requested hydrocodone from another provider in our office who does not manage these medications.  -Follows with Dr. Rodriguez with [facility]  -reports that hands hurt really bad  -takes methotrexate and orencia. Orencia is a new addition to her regimen  -reports osteoarthritis in her neck and has hx of back surgery  -has a TENS unit, decompression for neck, physical therapy    Neck and shoulder pain  -the last couple of days when she turns her neck a certain way and gets tingling in left shoulder and points to left levator scapulae   -had recent MRI in right shoulder and has frozen shoulder, saw ortho  -Dr. Mckeon has been recommended to her for pain management by a nurse friend and she would like to see him  -is interested in PT    Hx of heart murmur  -EKG by Stephenie Burr because she thought she heard a murmur  -oncologist heard it first  -mom and sister have a heart murmur     GERD  -has been on omeprazole for a long time  -this medication helps    MDD  -does well on lexapro    Past Medical History:   Past Medical History:   Diagnosis Date    Arthritis     Benign hypertension 10/22/2020    Breast cancer     Cholelithiasis     Diabetes mellitus     Diverticulitis     GERD (gastroesophageal reflux disease)     Hypokalemia     Kidney stone     Lichen sclerosus     Long term (current) use  of insulin     Malignant neoplasm of upper-outer quadrant of right female breast 01/21/2020    Microalbuminuria     Nephrolithiasis     Osteopenia     Rheumatoid arthritis with positive rheumatoid factor 02/03/2024    Sciatica     Septic discitis of lumbar region     Type 2 diabetes mellitus, uncontrolled     Wears contact lenses        Past Surgical History:   Past Surgical History:   Procedure Laterality Date    APPENDECTOMY      BREAST BIOPSY Right     BREAST EXCISIONAL BIOPSY Right     CHOLECYSTECTOMY      COLON SURGERY      COLONOSCOPY      HYSTERECTOMY      LEG SURGERY      leg fracture surgery-Abstracted from [employer]    LUMBAR LAMINECTOMY DISCECTOMY DECOMPRESSION N/A 05/24/2019    Procedure: LUMBAR LAMINECTOMY L4-5 AND L5-S1;  Surgeon: Hipolito Kahn MD;  Location:  [facility];  Service: Orthopedic Spine    LYMPH NODE BIOPSY      Jan 2020    MASTECTOMY Bilateral     MASTECTOMY W/ SENTINEL NODE BIOPSY Bilateral 01/21/2020    Procedure: SKIN SPARING MASTECTOMIES BILATERAL, SENTINEL NODE BIOPSY RIGHT;  Surgeon: Silvio Howard MD;  Location:  [facility];  Service: General    SPINE SURGERY      May 2019    TONSILLECTOMY      TUBAL ABDOMINAL LIGATION      VENOUS ACCESS DEVICE (PORT) REMOVAL N/A 03/03/2020    Procedure: REMOVAL PORT;  Surgeon: Silvio Howard MD;  Location:  [facility];  Service: General;  Laterality: N/A;       Immunizations:   Immunization History   Administered Date(s) Administered    ABRYSVO (RSV, 60+ or pregnant women 32-36 wks) 10/12/2023    COVID-19 (PFIZER) BIVALENT 12+YRS 09/19/2022    COVID-19 (PFIZER) Purple Cap Monovalent 03/10/2021, 04/07/2021, 08/24/2021    COVID-19 F23 (PFIZER) 12YRS+ (COMIRNATY) 10/17/2023    Covid-19 (Pfizer) Gray Cap Monovalent 04/18/2022    Flublock Quad =>18yrs 10/03/2020    Flublok 18+yrs 10/13/2019, 10/28/2021, 10/13/2022, 10/12/2023    Fluzone (or Fluarix & Flulaval for VFC) >6mos 11/02/2018    Fluzone Quad >6mos (Multi-dose) 11/08/2017     Hepatitis A 05/11/2018, 11/19/2018    Hepatitis B 11/29/2019, 10/26/2022    Hepatitis B Adult/Adolescent IM 08/22/2022    Influenza Quad Vaccine (Inpatient) 10/31/2016    Influenza, Unspecified 10/11/2023    Shingrix 11/27/2020    Td (TDVAX) 09/10/2003    Tdap 07/28/2014        Medications:     Current Outpatient Medications:     albuterol sulfate  (90 Base) MCG/ACT inhaler, INHALE TWO PUFFS BY MOUTH EVERY 4 HOURS AS NEEDED, Disp: 8.5 g, Rfl: 0    ascorbic acid (VITAMIN C) 100 MG tablet, Take 1 tablet by mouth Daily., Disp: , Rfl:     BD Pen Needle Radha 2nd Gen 32G X 4 MM misc, USE ONE - DAILY, Disp: 100 each, Rfl: 3    Calcium Carb-Cholecalciferol (CALCIUM 1000 + D PO), Take 1 tablet by mouth Daily., Disp: , Rfl:     clobetasol (TEMOVATE) 0.05 % ointment, Apply  topically to the appropriate area as directed 2 (Two) Times a Day., Disp: 60 g, Rfl: 2    cyclobenzaprine (FLEXERIL) 10 MG tablet, Take 1 tablet by mouth 3 (Three) Times a Day As Needed for Muscle Spasms., Disp: 90 tablet, Rfl: 3    cycloSPORINE, PF, (Cequa) 0.09 % solution, Apply 1 drop to eye(s) as directed by provider Every 12 (Twelve) Hours., Disp: , Rfl:     escitalopram (LEXAPRO) 20 MG tablet, Take 1 tablet by mouth Daily., Disp: 90 tablet, Rfl: 1    folic acid (FOLVITE) 1 MG tablet, , Disp: , Rfl:     glucose blood (Accu-Chek Sasha Plus) test strip, USE ONE STRIP TO TEST BLOOD SUGAR DAILY. Dx code e11.65, Disp: 50 each, Rfl: 3    Insulin Glargine (BASAGLAR KWIKPEN) 100 UNIT/ML injection pen, USE DAILY TO TITRATE TA FASTING GLUCOSE LESS THAN 120  UP TO MAX DAILY DOSE OF 50 UNITS, Disp: 45 mL, Rfl: 3    letrozole (FEMARA) 2.5 MG tablet, TAKE ONE TABLET BY MOUTH DAILY, Disp: 30 tablet, Rfl: 11    Magnesium 400 MG tablet, Take 1 tablet by mouth Daily., Disp: , Rfl:     metFORMIN ER (GLUCOPHAGE-XR) 500 MG 24 hr tablet, TAKE TWO TABLETS BY MOUTH TWICE A DAY, Disp: 360 tablet, Rfl: 3    methotrexate 2.5 MG tablet, , Disp: , Rfl:     omeprazole  (priLOSEC) 40 MG capsule, Take 1 capsule by mouth Every Morning Before Breakfast., Disp: 90 capsule, Rfl: 1    Orencia ClickJect 125 MG/ML solution auto-injector, 1 mL. Once a week, Disp: , Rfl:     Saccharomyces boulardii (Probiotic) 250 MG capsule, Take  by mouth., Disp: , Rfl:     Semaglutide, 2 MG/DOSE, (Ozempic, 2 MG/DOSE,) 8 MG/3ML solution pen-injector, Inject 2 mg under the skin into the appropriate area as directed Every 7 (Seven) Days., Disp: 9 mL, Rfl: 3    Turmeric 500 MG tablet, Take  by mouth., Disp: , Rfl:     vitamin B-12 (CYANOCOBALAMIN) 1000 MCG tablet, Take 1 tablet by mouth Daily., Disp: , Rfl:     loratadine (CLARITIN) 10 MG tablet, Take 1 tablet by mouth Daily. (Patient not taking: Reported on 2024), Disp: , Rfl:     Allergies:   Allergies   Allergen Reactions    Bactrim [Sulfamethoxazole-Trimethoprim] Rash     RASH, SKIN PEELING        Family History:   Family History   Problem Relation Age of Onset    Breast cancer Mother 70    Diabetes Mother     Cancer Mother     Heart disease Father     Hypertension Father     Diabetes Sister     Ovarian cancer Neg Hx        Social History:   Social History     Socioeconomic History    Marital status: Single   Tobacco Use    Smoking status: Former     Current packs/day: 0.00     Average packs/day: 1 pack/day for 27.2 years (27.2 ttl pk-yrs)     Types: Cigarettes, Electronic Cigarette     Start date: 3/13/1990     Quit date: 2017     Years since quittin.0     Passive exposure: Past    Smokeless tobacco: Never    Tobacco comments:     currently use nicotine e-cig.   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    Weight as of this encounter: 63 kg (139 lb).    BMI is within normal parameters. No other follow-up for BMI required.      Physical Exam  Constitutional:       General: She is not in acute distress.     Appearance: Normal appearance.   HENT:      Head: Normocephalic and atraumatic.   Eyes:      Conjunctiva/sclera: Conjunctivae normal.   Cardiovascular:      Rate and Rhythm: Normal rate and regular rhythm.      Comments: Very faint possible murmur  Pulmonary:      Effort: Pulmonary effort is normal.      Breath sounds: Normal breath sounds.   Neurological:      Mental Status: She is alert.   Psychiatric:         Mood and Affect: Mood normal.         Behavior: Behavior normal.         Thought Content: Thought content normal.          Assessment and Plan     Diagnoses and all orders for this visit:    1. Osteoarthritis of spine with radiculopathy, cervical region (Primary)  -     Ambulatory Referral to Pain Management  -     Ambulatory Referral to Physical Therapy    2. Chronic left shoulder pain  -     Ambulatory Referral to Physical Therapy    3. Gastroesophageal reflux disease, unspecified whether esophagitis present  -     omeprazole (priLOSEC) 40 MG capsule; Take 1 capsule by mouth Every Morning Before Breakfast.  Dispense: 90 capsule; Refill: 1    4. Moderate episode of recurrent major depressive disorder  -     escitalopram (LEXAPRO) 20 MG tablet; Take 1 tablet by mouth Daily.  Dispense: 90 tablet; Refill: 1    5. Heart murmur  -     Adult Transthoracic Echo Complete W/ Cont if Necessary Per Protocol; Future    Patient here today to follow-up on multiple issues.  She has known osteoarthritis of the spine and the neck with " radiculopathy.  We will place referral to pain management as well as physical therapy.  She also has chronic left shoulder pain and would benefit from physical therapy for that as well.  Follow-up with me for this as needed.    GERD is stable, continue current regimen    Depression is well-controlled with Lexapro, continue current regimen.    Patient has a faint possible murmur on exam today but has been told by another provider that she has a murmur.  Will get an echo to further evaluate.         Follow Up  Return in about 6 months (around 11/22/2024) for Next scheduled follow up.    DO [facility]  [address] [id number] [phone]    NOTE TO PATIENT: The 21st Century Cures Act makes medical notes like these available to patients in the interest of transparency. However, be advised this is a medical document. It is intended as peer to peer communication. It is written in medical language and may contain abbreviations or verbiage that are unfamiliar. It may appear blunt or direct. Medical documents are intended to carry relevant information, facts as evident, and the clinical opinion of the practitioner.

## 2024-05-28 ENCOUNTER — PRIOR AUTHORIZATION (OUTPATIENT)
Dept: ENDOCRINOLOGY | Facility: CLINIC | Age: 64
End: 2024-05-28
Payer: COMMERCIAL

## 2024-05-28 NOTE — TELEPHONE ENCOUNTER
YVON SHLOMOLUIS MANUEL (Key: [id number])  Rx #: [id number]  Ozempic (2 MG/DOSE) 8MG/3ML pen-injectors  Form  [facility] Electronic PA Form (2017 [employer])  Created  3 days ago  Sent to Plan  2 minutes ago  Plan Response  2 minutes ago  Submit Clinical Questions  1 minute ago  Determination  Favorable  less than a minute ago  Message from Plan  Your PA request has been approved. Additional information will be provided in the approval communication. (Message 1142). Authorization Expiration Date: May 28, 2027.

## 2024-06-03 DIAGNOSIS — F41.9 ANXIETY: Primary | ICD-10-CM

## 2024-06-03 RX ORDER — HYDROXYZINE HYDROCHLORIDE 25 MG/1
25 TABLET, FILM COATED ORAL 3 TIMES DAILY PRN
Qty: 30 TABLET | Refills: 1 | Status: SHIPPED | OUTPATIENT
Start: 2024-06-03

## 2024-06-18 ENCOUNTER — OFFICE VISIT (OUTPATIENT)
Dept: ENDOCRINOLOGY | Facility: CLINIC | Age: 64
End: 2024-06-18
Payer: COMMERCIAL

## 2024-06-18 VITALS
BODY MASS INDEX: 22.18 KG/M2 | SYSTOLIC BLOOD PRESSURE: 116 MMHG | WEIGHT: 138 LBS | HEIGHT: 66 IN | OXYGEN SATURATION: 98 % | HEART RATE: 70 BPM | DIASTOLIC BLOOD PRESSURE: 64 MMHG

## 2024-06-18 DIAGNOSIS — E11.65 UNCONTROLLED TYPE 2 DIABETES MELLITUS WITH HYPERGLYCEMIA: Primary | ICD-10-CM

## 2024-06-18 DIAGNOSIS — I10 BENIGN HYPERTENSION: ICD-10-CM

## 2024-06-18 LAB
EXPIRATION DATE: ABNORMAL
EXPIRATION DATE: ABNORMAL
GLUCOSE BLDC GLUCOMTR-MCNC: 147 MG/DL (ref 70–130)
HBA1C MFR BLD: 7.1 % (ref 4.5–5.7)
Lab: ABNORMAL
Lab: ABNORMAL

## 2024-06-18 PROCEDURE — 83036 HEMOGLOBIN GLYCOSYLATED A1C: CPT | Performed by: INTERNAL MEDICINE

## 2024-06-18 PROCEDURE — 99213 OFFICE O/P EST LOW 20 MIN: CPT | Performed by: INTERNAL MEDICINE

## 2024-06-18 PROCEDURE — 82947 ASSAY GLUCOSE BLOOD QUANT: CPT | Performed by: INTERNAL MEDICINE

## 2024-06-18 RX ORDER — METFORMIN HYDROCHLORIDE 500 MG/1
1000 TABLET, EXTENDED RELEASE ORAL 2 TIMES DAILY
Qty: 360 TABLET | Refills: 3 | Status: SHIPPED | OUTPATIENT
Start: 2024-06-18

## 2024-06-18 RX ORDER — SEMAGLUTIDE 2.68 MG/ML
2 INJECTION, SOLUTION SUBCUTANEOUS
Qty: 9 ML | Refills: 3 | Status: SHIPPED | OUTPATIENT
Start: 2024-06-18

## 2024-06-18 NOTE — PROGRESS NOTES
"     Office Note      Date: 2024  Patient Name: Luana Chawla  MRN: [id number]  : 1960    Chief Complaint   Patient presents with    Diabetes     Type II       History of Present Illness:   Luana Chawla is a 63 y.o. female who presents for Diabetes type 2. Diagnosed in: . Treated in past with insulin. Current treatments: metformin, ozempic and basal insulin. Number of insulin shots per day: 1. Checks blood sugar 1 time a day.  Has low blood sugar: occasional. Aspirin use: No - due to easy bruising/nosebleeds. Statin use: No - lipids have been okay. ACE-I/ARB use: No. Changes in health since last visit: pinched nerve in neck - MRI is scheduled. Last eye exam 3/2024.       Subjective      Diabetic Complications:  Eyes: No  Kidneys: No  Feet: No  Heart: No    Diet and Exercise:  Meals per day: 3  Minutes of exercise per week: 150 mins.    Review of Systems:   Review of Systems   Constitutional:  Positive for fatigue.   Cardiovascular: Negative.    Gastrointestinal: Negative.    Endocrine: Negative.        The following portions of the patient's history were reviewed and updated as appropriate: allergies, current medications, past family history, past medical history, past social history, past surgical history, and problem list.    Objective     Visit Vitals  /64 (BP Location: Left arm, Patient Position: Sitting, Cuff Size: Adult)   Pulse 70   Ht 167.4 cm (65.9\")   Wt 62.6 kg (138 lb)   LMP  (LMP Unknown)   SpO2 98%   BMI 22.34 kg/m²       Physical Exam:  Physical Exam  Constitutional:       Appearance: Normal appearance.   Neurological:      Mental Status: She is alert.         Labs:    HbA1c  Lab Results   Component Value Date    HGBA1C 7.1 (A) 2024       CMP  Lab Results   Component Value Date    GLUCOSE 99 2024    BUN 15 2024    CREATININE 0.75 2024    EGFRIFNONA 117 2021    EGFRIFAFRI 110 2020    BCR 20.0 2024    K 4.3 " 03/21/2024    CO2 25.4 03/21/2024    CALCIUM 10.3 03/21/2024    PROTENTOTREF 6.5 02/25/2020    LABIL2 1.7 02/25/2020    AST 13 03/21/2024    ALT 8 03/21/2024        Lipid Panel  Lab Results   Component Value Date    HDL 79 (H) 03/21/2024    LDL 87 03/21/2024    TRIG 61 03/21/2024        TSH  Lab Results   Component Value Date    TSH 1.520 03/21/2024        Hemoglobin A1C  Lab Results   Component Value Date    HGBA1C 7.1 (A) 06/18/2024        Microalbumin/Creatinine  Lab Results   Component Value Date    MALBCRERATIO  03/21/2024      Comment:      Unable to calculate    MICROALBUR <1.2 03/21/2024           Assessment / Plan      Assessment & Plan:  Diagnoses and all orders for this visit:    1. Uncontrolled type 2 diabetes mellitus with hyperglycemia (Primary)  Assessment & Plan:  Diabetes is improving with treatment.   Continue current treatment regimen.  Diabetes will be reassessed in 3 months.    Orders:  -     POC Glucose, Blood  -     POC Glycosylated Hemoglobin (Hb A1C)    2. Benign hypertension  Assessment & Plan:  Hypertension is stable and controlled  Continue current treatment regimen.  Blood pressure will be reassessed in 6 months.      Other orders  -     metFORMIN ER (GLUCOPHAGE-XR) 500 MG 24 hr tablet; Take 2 tablets by mouth 2 (Two) Times a Day.  Dispense: 360 tablet; Refill: 3  -     Semaglutide, 2 MG/DOSE, (Ozempic, 2 MG/DOSE,) 8 MG/3ML solution pen-injector; Inject 2 mg under the skin into the appropriate area as directed Every 7 (Seven) Days.  Dispense: 9 mL; Refill: 3      Current Outpatient Medications   Medication Instructions    albuterol sulfate  (90 Base) MCG/ACT inhaler INHALE TWO PUFFS BY MOUTH EVERY 4 HOURS AS NEEDED    ascorbic acid (VITAMIN C) 100 mg, Oral, Daily    BD Pen Needle Radha 2nd Gen 32G X 4 MM misc USE ONE - DAILY    Calcium Carb-Cholecalciferol (CALCIUM 1000 + D PO) 1 tablet, Oral, Daily    clobetasol (TEMOVATE) 0.05 % ointment Topical, 2 Times Daily    cyclobenzaprine  (FLEXERIL) 10 mg, Oral, 3 Times Daily PRN    cycloSPORINE, PF, (Cequa) 0.09 % solution 1 drop, Ophthalmic, Every 12 Hours    escitalopram (LEXAPRO) 20 mg, Oral, Daily    folic acid (FOLVITE) 1 MG tablet No dose, route, or frequency recorded.    glucose blood (Accu-Chek Sasha Plus) test strip USE ONE STRIP TO TEST BLOOD SUGAR DAILY. Dx code e11.65    hydrOXYzine (ATARAX) 25 mg, Oral, 3 Times Daily PRN    Insulin Glargine (BASAGLAR KWIKPEN) 100 UNIT/ML injection pen USE DAILY TO TITRATE TA FASTING GLUCOSE LESS THAN 120  UP TO MAX DAILY DOSE OF 50 UNITS    letrozole (FEMARA) 2.5 MG tablet TAKE ONE TABLET BY MOUTH DAILY    loratadine (CLARITIN) 10 mg, Oral, Daily    Magnesium 400 MG tablet 1 tablet, Oral, Daily    metFORMIN ER (GLUCOPHAGE-XR) 1,000 mg, Oral, 2 Times Daily    methotrexate 2.5 MG tablet No dose, route, or frequency recorded.    omeprazole (PRILOSEC) 40 mg, Oral, Every Morning Before Breakfast    Orencia ClickJect 125 mg, Once a week    Ozempic (2 MG/DOSE) 2 mg, Subcutaneous, Every 7 Days    Saccharomyces boulardii (Probiotic) 250 MG capsule Oral    Turmeric 500 MG tablet Oral    vitamin B-12 (CYANOCOBALAMIN) 1,000 mcg, Oral, Daily      Return in about 3 months (around 9/18/2024) for Recheck with A1c.    Electronically signed by: Jose Cummings MD  06/18/2024

## 2024-06-21 NOTE — PROGRESS NOTES
"Chief Complaint: \"Pain in my neck that radiates to my left shoulder\"      History of Present Illness:   Patient: Ms. Luana Chawla, 63 y.o. female   Referring Physician: Dr. Adilene Bonner   Reason for Referral: Consultation for chronic intractable posterior neck pain.   Pain History: Patient reports a 4-month history of chronic intractable posterior neck pain, which began without incident. Luana Chawla describes that 1 day, she started experiencing neck pain radiating into the left shoulder and since then, her pain has increased significantly.  She also reports a history of right frozen shoulder that responded exceedingly well to a right shoulder injection by Dr. Matias. Luana Chawla participated in a physical therapy program for six weeks without any significant relief.  She has also participated in yoga for her neck without improvement.  She has been on tramadol for her rheumatoid arthritis.  Her rheumatologist is Dr. Natividad Rodriguez.  She has been experiencing difficulties controlling her symptoms of RA and currently takes methotrexate and Orencia.  She is also a diabetic and has some difficulties controlling her hemoglobin A1c/glycemia.  Luana Chawla underwent orthopedic surgical consultation with Dr. Kahn, who ordered her cervical MRI. She has a follow up appointment with Dr. Kahn today at 3 PM.  MRI of the cervical spine on June 27, 2024 revealed reversal of the normal cervical lordosis with mid cervical kyphotic deformity.  Disc desiccation and facet hypertrophy at all cervical levels. Anterior listhesis of C3 on C4. Spinal cord caliber and signal appear preserved. At C3-C4: Anterior listhesis. Broad-based disc bulge with effacement of the anterior thecal sac.  Facet hypertrophy. Mild canal stenosis.  Lateral recess stenosis and foraminal stenosis with abutment of the exiting C4 nerve roots with possible compression on the left side. At C4-C5, C5-C6, C6-C7: " Severe loss of disc height, retrolisthesis, facet hypertrophy.  Effacement of the anterior thecal sac with compression of the bilateral exiting C5, C6, C7 nerve roots. Cervical x-rays June 12, 2024 revealed loss of disc height at C4-C5 C5-6 C6-C7.  Reversal of the normal cervical lordosis Luana Chawla has failed to obtain pain relief with conservative measures for more than 4 months including oral analgesics, topical analgesics, ice, physical therapy (last visit within the past weeks), physical therapist directed home exercise program HEP (ongoing), therapeutic massage, to name a few. Pain has progressed in intensity over the past months.  Pain Description: Constant left-sided posterior neck pain with intermittent exacerbation, described as aching, dull, sharp, burning, numbing, and tingling sensation.   Radiation of Pain: The pain radiates into the anterior aspect of her left shoulder and the left occipital region  Pain intensity today: 10/10   Average pain intensity last week: 8/10  Pain intensity ranges from: 6/10 to 10/10  Aggravating factors: Pain increases with extension, rotation of the cervical spine   Alleviating factors: Pain decreases with ice  Associated Symptoms:   Patient denies pain, numbness, or weakness in the upper extremities.   Patient denies any new bladder or bowel problems.   Patient reports difficulties with her balance but denies recent falls.   Patient reports left-sided cervicogenic and occipital headaches almost daily lasting several hours   Pain interferes with general activities and affects patient's quality of life  Pain interferes with sleep: Falling asleep   Muscle spasms: No  Stiffness: Neck    Review of previous therapies and additional medical records:  Luana Chawla has already failed the following measures, including:   Conservative Measures: Oral analgesics, topical analgesics, ice, physical therapy (last visit within the past weeks), physical therapist  directed home exercise program HEP (ongoing), therapeutic massage  Interventional Measures: None  Surgical Measures: No history of previous cervical spine or shoulder surgery. No history of hip surgery. 05/24/2019: Lumbar laminectomy discectomy decompression L4-L5 and L5-S1, by Dr. Kahn  Luana Chawla underwent orthopedic surgical consultation with Dr. Kahn, who ordered her cervical MRI. She has a follow up appointment today.  Luana Chawla presents with significant comorbidities including hypertension, Breast cancer, GERD, Malignant neoplasm of upper-outer quadrant of right female breast (01/21/2020), osteopenia, Rheumatoid arthritis with positive rheumatoid factor, Type 2 diabetes mellitus on insulin, dysthymia  In terms of current analgesics, Luana Chawla takes: cyclobenzaprine. Patient also takes escitalopram, hydroxyzine, methotrexate, Orencia   I have reviewed Samuel Report consistent with medication reconciliation.  SOAPP/ORT: Low Risk       PHQ-9 Depression Screening  Little interest or pleasure in doing things? 2-->more than half the days   Feeling down, depressed, or hopeless? 2-->more than half the days   Trouble falling or staying asleep, or sleeping too much? 1-->several days   Feeling tired or having little energy? 2-->more than half the days   Poor appetite or overeating? 1-->several days   Feeling bad about yourself - or that you are a failure or have let yourself or your family down? 0-->not at all   Trouble concentrating on things, such as reading the newspaper or watching television? 1-->several days   Moving or speaking so slowly that other people could have noticed? Or the opposite - being so fidgety or restless that you have been moving around a lot more than usual? 0-->not at all   Thoughts that you would be better off dead, or of hurting yourself in some way? 0-->not at all   PHQ-9 Total Score 9   If you checked off any problems, how difficult have these  problems made it for you to do your work, take care of things at home, or get along with other people? somewhat difficult        Pain Self-Efficacy Questionnaire (PSEQ)  ITEM 06-27 2024        I can enjoy things despite the pain. 2        I can do most of the household chores (tidying up, washing dishes, etc), despite the pain. 2        I can socialize with my friends or family members as often as I used to do, despite the pain. 2        I can cope with my pain in most situations. 4        I can do some form of work, despite the pain (includes housework, paid, and unpaid work). 3        I can still do many of the things I enjoy doing, such as hobbies or leisure activity despite pain. 3        I can cope with my pain without medications. 2        I can accomplish most of my goals in life despite the pain. 2        I can live in a normal lifestyle, despite the pain. 2        I can gradually become more active, despite the pain. 2        TOTAL SCORE 24/60            Global Pain Scale 06-27 2024          Pain 22          Feelings 15          Clinical outcomes 9          Activities 12          GPS Total: 58            NECK PAIN DISABILITY INDEX QUESTIONNAIRE  DATE 06-27 2024           Pain intensity  0: No pain  1: Mild  2: Moderate  3: Fairly severe  4: very severe  5: Worst imaginable 5           Personal Care   0: I can look after myself normally without causing extra pain.  1: I can look after myself normally, but it causes extra pain.  2: It is painful to look after myself and I am slow and careful.  3: I need some help, but manage most of my personal care.  4: I need help every day in most aspects of self-care.  5: I do not get dressed; I wash with difficulty and stay in bed. 2           Lifting  0: I can lift heavy weights without extra pain.  1: I can lift heavy weights, but it gives extra pain.  2: Pain prevents me from lifting heavy weights off the floor but I can manage if they are conveniently positioned,  for example, on a table.  3: Pain prevents me from lifting heavy weights, but I can manage light to medium weights if they are conveniently positioned.  4: I can lift very light weights.  5: I cannot lift or carry anything at all. 2           Reading  0: I can read as much as I want to with no pain in my neck.  1: I can read as much as I want to with slight pain in my neck.  2: I can read as much as I want to with moderate pain in my neck.  3: I cannot read as much as I want because of moderate pain in my neck.  4: I cannot read as much as I want because of severe pain in my neck.  5: I cannot read at all.  3           Headaches  0: I have no headaches at all.  1: I have slight headaches which come infrequently.  2: I have moderate headaches which come infrequently.  3: I have moderate headaches which come frequently.  4: I have severe headaches which come frequently.  5: I have headaches almost all the time.  1           Concentration  0: I can concentrate fully when I want to with no difficulty.  1: I can concentrate fully when I want to with slight difficulty.  2: I have a fair degree of difficulty in concentrating when I want to.  3: I have a lot of difficulty in concentrating when I want to.  4: I have a great deal of difficulty in concentrating when I want to.  5: I cannot concentrate at all.  3           Work  0: I can do as much work as I want to.  1: I can only do my usual work, but no more.  2: I can do most of my usual work, but no more.  3: I cannot do my usual work.  4: I can hardly do any work at all.  5: I cannot do any work at all.  3           Driving  0: I can drive my car without any neck pain.  1: I can drive my car as long as I want with slight pain in my neck.  2: I can drive my car as long as I want with moderate pain in my   neck.  3: I cannot drive my car as long as I want because of moderate pain   in my neck.  4: I can hardly drive at all because of severe pain in my neck.  5: I cannot  drive my car at all.  4           Sleeping  0: I have no trouble sleeping.  1: My sleep is slightly disturbed (less than 1 hour sleepless).  2: My sleep is mildly disturbed (1-2 hours sleepless).  3: My sleep is moderately disturbed (2-3 hours sleepless).  4: My sleep is greatly disturbed (3-5 hours sleepless).  5: My sleep is completely disturbed (5-7 hours)  1           Recreation  0: I am able to engage in all of my recreational activities with no neck pain at all.  1: I am able to engage in all of my recreational activities with some pain in my neck.  2: I am able to engage in most, but not all of my recreational activities because of pain in my neck.  3: I am able to engage in a few of my recreational activities because of pain in my neck.  4: I can hardly do any recreational activities because of pain in my neck.  5: I cannot do any recreational activities at all.  4           TOTAL SCORE 28/50             Review of Diagnostic Studies: I have independently reviewed and interpreted the images with the patient and used the images and a tridimensional spine model to explain findings. I have also reviewed the reports.  MRI of the cervical spine on June 27, 2024 revealed reversal of the normal cervical lordosis with mid cervical kyphotic deformity.  The posterior fossa and craniocervical junction appear unremarkable.  Disc desiccation at all cervical levels.  Anterior listhesis of C3 on C4.  Cord caliber and signal appear preserved.  Axial imaging:  C2-C3: Broad-based disc bulge, facet hypertrophy.  No significant canal or foraminal stenosis  C3-C4: Anterior listhesis.  Broad-based disc bulge with effacement of the anterior thecal sac.  Facet hypertrophy. Mild canal stenosis.  There is some lateral recess stenosis and foraminal stenosis with abutment of the exiting C4 nerve roots with possible compression on the left side.  C4-C5, C5-C6, C6-C7: Severe loss of disc height, retrolisthesis, facet hypertrophy.  There  is effacement of the anterior thecal sac with compression of the bilateral exiting C5, C6, C7 nerve roots  C7-T1: Anterior listhesis, broad-based disc bulge, facet hypertrophy with abutment of the bilateral C8 nerves.   Cervical x-rays June 12, 2024 revealed loss of disc height at C4-C5 C5-6 C6-C7.  Reversal of the normal cervical lordosis    Review of Systems   Musculoskeletal:  Positive for arthralgias, joint swelling, neck pain and neck stiffness.   Psychiatric/Behavioral:  The patient is nervous/anxious.    All other systems reviewed and are negative.        Patient Active Problem List   Diagnosis    Malignant neoplasm of upper-outer quadrant of right breast in female, estrogen receptor positive    Type 2 diabetes mellitus    GERD (gastroesophageal reflux disease)    Back pain    Thrombus    Insulin long-term use    Benign hypertension    Uncontrolled type 2 diabetes mellitus with hyperglycemia    Hot flashes related to aromatase inhibitor therapy    Dysthymia    Lichen sclerosus of female genitalia    History of bilateral mastectomy    Cervicalgia    Nocturnal leg cramps    Rheumatoid arthritis with positive rheumatoid factor    Tear film insufficiency    SPK (superficial punctate keratitis), bilateral    Cervical radiculopathy    Connective tissue stenosis of neural canal of cervical region    Osseous and subluxation stenosis of intervertebral foramina of cervical region    Diabetes mellitus type 2, insulin dependent       Past Medical History:   Diagnosis Date    Arthritis     Benign hypertension 10/22/2020    Breast cancer     Cholelithiasis     Chronic pain disorder     Diabetes mellitus     Diverticulitis     GERD (gastroesophageal reflux disease)     Hypokalemia     Joint pain     Kidney stone     Lichen sclerosus     Long term (current) use of insulin     Lumbosacral disc disease     Malignant neoplasm of upper-outer quadrant of right female breast 01/21/2020    Microalbuminuria     Neck pain      Nephrolithiasis     Osteoarthritis     Osteopenia     Rheumatoid arthritis with positive rheumatoid factor 2024    Sciatica     Septic discitis of lumbar region     Spinal stenosis     Type 2 diabetes mellitus, uncontrolled     Wears contact lenses          Past Surgical History:   Procedure Laterality Date    APPENDECTOMY      BREAST BIOPSY Right     BREAST EXCISIONAL BIOPSY Right     CHOLECYSTECTOMY      COLON SURGERY      COLONOSCOPY      HYSTERECTOMY      LEG SURGERY      leg fracture surgery-Abstracted from [employer]    LUMBAR LAMINECTOMY DISCECTOMY DECOMPRESSION N/A 2019    Procedure: LUMBAR LAMINECTOMY L4-5 AND L5-S1;  Surgeon: Hipolito Kahn MD;  Location:  [facility];  Service: Orthopedic Spine    LYMPH NODE BIOPSY      2020    MASTECTOMY Bilateral     MASTECTOMY W/ SENTINEL NODE BIOPSY Bilateral 2020    Procedure: SKIN SPARING MASTECTOMIES BILATERAL, SENTINEL NODE BIOPSY RIGHT;  Surgeon: Silvio Howard MD;  Location:  [facility];  Service: General    SPINE SURGERY      May 2019    TONSILLECTOMY      TUBAL ABDOMINAL LIGATION      VENOUS ACCESS DEVICE (PORT) REMOVAL N/A 2020    Procedure: REMOVAL PORT;  Surgeon: Silvio Howard MD;  Location:  [facility];  Service: General;  Laterality: N/A;         Family History   Problem Relation Age of Onset    Breast cancer Mother 70    Diabetes Mother     Cancer Mother     Heart disease Father     Hypertension Father     Diabetes Sister     Ovarian cancer Neg Hx          Social History     Socioeconomic History    Marital status: Single   Tobacco Use    Smoking status: Former     Current packs/day: 0.00     Average packs/day: 1 pack/day for 27.2 years (27.2 ttl pk-yrs)     Types: Cigarettes, Electronic Cigarette     Start date: 3/13/1990     Quit date: 2017     Years since quittin.1     Passive exposure: Past    Smokeless tobacco: Never    Tobacco comments:     currently use nicotine e-cig.   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    Drug use: Never    Sexual activity: Not Currently     Partners: Male     Birth control/protection: Tubal ligation           Current Outpatient Medications:     albuterol sulfate  (90 Base) MCG/ACT inhaler, INHALE TWO PUFFS BY MOUTH EVERY 4 HOURS AS NEEDED, Disp: 8.5 g, Rfl: 0    ascorbic acid (VITAMIN C) 100 MG tablet, Take 1 tablet by mouth Daily., Disp: , Rfl:     BD Pen Needle Radha 2nd Gen 32G X 4 MM misc, USE ONE - DAILY, Disp: 100 each, Rfl: 3    Calcium Carb-Cholecalciferol (CALCIUM 1000 + D PO), Take 1 tablet by mouth Daily., Disp: , Rfl:     clobetasol (TEMOVATE) 0.05 % ointment, Apply  topically to the appropriate area as directed 2 (Two) Times a Day., Disp: 60 g, Rfl: 2    cyclobenzaprine (FLEXERIL) 10 MG tablet, Take 1 tablet by mouth 3 (Three) Times a Day As Needed for Muscle Spasms., Disp: 90 tablet, Rfl: 3    cycloSPORINE, PF, (Cequa) 0.09 % solution, Apply 1 drop to eye(s) as directed by provider Every 12 (Twelve) Hours., Disp: , Rfl:     escitalopram (Lexapro) 10 MG tablet, 1 tablet., Disp: , Rfl:     folic acid (FOLVITE) 1 MG tablet, , Disp: , Rfl:     glucose blood (Accu-Chek Sasha Plus) test strip, USE ONE STRIP TO TEST BLOOD SUGAR DAILY. Dx code e11.65, Disp: 50 each, Rfl: 3    hydrOXYzine (ATARAX) 25 MG tablet, Take 1 tablet by mouth 3 (Three) Times a Day As Needed for Anxiety., Disp: 30 tablet, Rfl: 1    Insulin Glargine (BASAGLAR KWIKPEN) 100 UNIT/ML injection pen, USE DAILY TO TITRATE TA FASTING GLUCOSE LESS THAN 120  UP TO MAX DAILY DOSE OF 50 UNITS, Disp: 45 mL, Rfl: 3    letrozole (FEMARA) 2.5 MG tablet, TAKE ONE TABLET BY MOUTH DAILY, Disp: 30 tablet, Rfl: 11    loratadine (CLARITIN) 10 MG tablet, Take 1 tablet by mouth Daily., Disp: , Rfl:     Magnesium 400 MG tablet, Take 1 tablet by  "mouth Daily., Disp: , Rfl:     metFORMIN ER (GLUCOPHAGE-XR) 500 MG 24 hr tablet, Take 2 tablets by mouth 2 (Two) Times a Day., Disp: 360 tablet, Rfl: 3    methotrexate 2.5 MG tablet, , Disp: , Rfl:     omeprazole (priLOSEC) 40 MG capsule, Take 1 capsule by mouth Every Morning Before Breakfast., Disp: 90 capsule, Rfl: 1    Orencia ClickJect 125 MG/ML solution auto-injector, 1 mL. Once a week, Disp: , Rfl:     Saccharomyces boulardii (Probiotic) 250 MG capsule, Take  by mouth., Disp: , Rfl:     Semaglutide, 2 MG/DOSE, (Ozempic, 2 MG/DOSE,) 8 MG/3ML solution pen-injector, Inject 2 mg under the skin into the appropriate area as directed Every 7 (Seven) Days., Disp: 9 mL, Rfl: 3    Turmeric 500 MG tablet, Take  by mouth., Disp: , Rfl:     vitamin B-12 (CYANOCOBALAMIN) 1000 MCG tablet, Take 1 tablet by mouth Daily., Disp: , Rfl:       Allergies   Allergen Reactions    Bactrim [Sulfamethoxazole-Trimethoprim] Rash     RASH, SKIN PEELING          Pulse 91   Ht 167.6 cm (66\")   Wt 61.7 kg (136 lb)   LMP  (LMP Unknown)   SpO2 99%   BMI 21.95 kg/m²       Physical Exam:  Constitutional: Patient appears well-developed, well-nourished, well-hydrated, appears younger than stated age  HEENT: Head: Normocephalic and atraumatic  Eyes: Conjunctivae and lids are normal  Pupils: Equal, round, reactive to light  Neck: Trachea normal. Neck supple. No JVD present.   Lymphatic: No cervical adenopathy  Musculoskeletal   Gait and station: Gait evaluation demonstrated a normal gait. Able to walk on heels, toes, tandem walking   Cervical Spine: Passive and active range of motion are limited secondary to pain. Extension, flexion, lateral flexion, rotation of the cervical spine increased and reproduced pain. Cervical facet joint loading maneuvers are positive.  Muscles: Presence of active trigger points at the levator scapulae   Right Shoulder: The range of motion of the right glenohumeral joint is full and without pain. Rotator cuff strength " is 5/5.   Left Shoulder: The range of motion of the left glenohumeral joint is full and without pain. Rotator cuff strength is 5/5.   Neurological:   Patient is alert and oriented to person, place, and time.   Speech: Normal.   Cortical function: Normal mental status.   Cranial nerves 2-12: intact.   Reflex Scores:  Right brachioradialis: 2+  Left brachioradialis: 2+  Right biceps: 2+  Left biceps: 2+  Right triceps: 1+  Left triceps: 1+  Right patellar: 2+  Left patellar: 2+  Right Achilles: 0+  Left Achilles: 0+  Motor strength: 5/5, except left foot dorsiflexion 4+/5  Motor Tone: Normal  Involuntary movements: None.   Superficial/Primitive Reflexes: Primitive reflexes were absent.   Right Lopez: Absent  Left Lopez: Absent  Right ankle clonus: Absent  Left ankle clonus: Absent   Babinsky: Absent  Spurling sign: Positive. Neck tornado test: Positive. Lhermitte sign: Negative. Long tract signs: Negative.   Sensory exam: Intact to light touch, intact pain and temperature sensation, intact vibration sensation and normal proprioception  Coordination: Finger to nose: Normal. Balance: Normal Romberg's sign: Negative   Skin and subcutaneous tissue: Skin is warm and intact. No rash noted. No cyanosis.   Psychiatric: Judgment and insight: Normal. Recent and remote memory: Intact. Mood and affect: Normal.       ASSESSMENT:   1. Cervical radiculopathy    2. Connective tissue stenosis of neural canal of cervical region    3. Osseous and subluxation stenosis of intervertebral foramina of cervical region    4. Diabetes mellitus type 2, insulin dependent    5. History of bilateral mastectomy    6. Rheumatoid arthritis involving right shoulder with positive rheumatoid factor    7. Dysthymia        PLAN/MEDICAL DECISION MAKING:  Ms. Luana Chawla, 63 y.o. female presents with a 4-month history of chronic intractable posterior neck pain, which began without incident. Luana Chawla describes that one day, she  started experiencing neck pain radiating into the anterior aspect of the left shoulder. Since then, her pain has increased significantly. She also reports a history of right frozen shoulder that responded exceedingly well to a right shoulder injection by Dr. Matias. Luana Chawla participated in a physical therapy program for six weeks without any significant relief.  She has also participated in yoga for her neck without improvement.  She has used a TENs unit without relief. She tried gabapentin. She has been on tramadol for her rheumatoid arthritis. Her rheumatologist is Dr. Natividad Rodriguez. She has been experiencing difficulties controlling her symptoms of RA and currently takes methotrexate and Orencia.  She is also a diabetic and has some difficulties controlling her HbA1C/glycemia (reports 7.1%). Luana Chawla underwent orthopedic surgical consultation with Dr. Kahn, who ordered her cervical MRI. She has a follow up appointment with Dr. Kahn today at 3 PM. MRI of the cervical spine on June 27, 2024 revealed reversal of the normal cervical lordosis with mid cervical kyphotic deformity.  Disc desiccation and facet hypertrophy at all cervical levels. Anterior listhesis of C3 on C4. Spinal cord caliber and signal appear preserved. At C3-C4: Anterior listhesis. Broad-based disc bulge with effacement of the anterior thecal sac.  Facet hypertrophy. Mild canal stenosis.  Lateral recess stenosis and foraminal stenosis with abutment of the exiting C4 nerve roots with possible compression on the left side. At C4-C5, C5-C6, C6-C7: Severe loss of disc height, retrolisthesis, facet hypertrophy.  Effacement of the anterior thecal sac with compression of the bilateral exiting C5, C6, C7 nerve roots. Cervical x-rays June 12, 2024 revealed loss of disc height at C4-C5 C5-6 C6-C7.  Reversal of the normal cervical lordosis Luana Chawla has failed to obtain pain relief with conservative measures  for more than 4 months including oral analgesics, topical analgesics, ice, physical therapy (last visit within the past weeks), physical therapist directed home exercise program HEP (ongoing), therapeutic massage, to name a few. Pain has progressed in intensity over the past months.  Upon physical examination, she presents with findings consistent with cervical radiculopathy that could entail left C4 and/or any other nerve root as she presents with extensive degenerative changes.  She does not have any signs or symptoms of cervical myelopathy at this time. A comprehensive evaluation including history and physical exam along with pertinent physiologic and functional assessment was performed. Patient presents with intractable pain due to the diagnoses listed above. Patient has failed to respond to conservative modalities, as referenced under HPI. I have documented the impact of patient's moderate-to-severe pain contributing to significant impairment in daily activities, ADLs, and a negative impact on the patient's quality of life, as reflected on Global Pain Scale 58/100; Neck pain disability index questionnaire 28/50; Tinetti Gait & Balance Assessment Tool  (low risk for falls). I have reviewed pertinent supporting diagnostic studies of patient's chronic pain condition as well as all available pertinent medical records to patient's chronic pain condition including previous therapies, as referenced above. PHQ-9 Depression Screening 9; Pain Self-Efficacy Questionnaire (PSEQ) 24/60. I had a lengthy conversation with Ms. Luana Harris Marysherman regarding her chronic pain condition and potential therapeutic options including risks, benefits, alternative therapies, to name a few. We have discussed using a stepwise approach starting with the least intense level of care as determined by the extent required to diagnose and or treat a patient's condition. The proposed treatments are consistent with the patient's medical  condition and known to be safe and effective by current guidelines and the standard of care. The duration and frequency proposed are considered appropriate for the service in accordance with accepted standards of medical practice for the diagnosis and treatment of the patient's condition and intended to improve the patient's level of function. These services will be furnished in a setting appropriate to the patient's medical needs and condition. Therefore, I have proposed the following plan:    1. Interventional pain management measures: Patient does not take blood thinners.  Luana has an appointment with Dr. Kahn this afternoon to discuss potential surgical intervention.  As patient was interested in interventional pain management measures, we had discussed prospects of a cervical epidural steroid injection either by interlaminar approach versus transforaminal approach.  Will go ahead and schedule her.  If plans for surgery, then, she will call and cancel her appointment.  Patient will be scheduled for cervical epidural steroid injection by left paramedian interlaminar approach using the lowest effective dose of steroids, under C-arm fluoroscopic guidance, with the use of contrast dye to confirm appropriate needle placement and spread of contrast dye. We may repeat cervical epidural steroid injection by left paramedian interlaminar vs diagnostic and therapeutic left C3-C4 (or other levels depending on her symptoms) transforaminal epidural steroid injection depending on patient's outcome and following current guidelines. Epidurals will be limited to a maximum of 4 sessions per spinal region in a rolling twelve (12) month period. Continuation of epidural steroid injections over 12 months would only be considered under the following provisions;  Patient is a high-risk surgical candidate, or the patient does not desire surgery, or recurrence of pain in the same location relieved with ESIs for at least three months  and epidural provides at least 50% sustained improvement of pain and/or 50% objective improvement in function (using same scale as baseline)  Pain is severe enough to cause a significant degree of functional disability or vocational disability  The primary care provider will be notified regarding continuation of procedures and repeat steroid use   Patient will follow-up with Dr. Kahn thereafter.     2. Diagnostic studies: None indicated at this time. May need flexion and extension X-rays of the cervical spine/cervical myelogram with CT post-myelogram     3. Pharmacological measures: Reviewed and discussed;   A.  Patient takes cyclobenzaprine. Patient also takes escitalopram, hydroxyzine, methotrexate, Orencia.   B. Trial with prilocaine 2%, lidocaine 10%, imipramine 3%, capsaicin 0.001%, and mannitol 20%  cream, apply 1 to 2 grams of cream to the affected areas every 4 to 6 hours as needed  C. Trial with Rheumate one tablet once daily  D. Start pyridoxine 100 mg one tablet by mouth daily take for 30 days, #30, no refills  E. Start alpha lipoid acid 0132-5151 mg per day divided into 3 doses  F.  Patient has a prescription for gabapentin.  She took only a few doses and did not like the way it made her feel.  She will see Dr Kahn this afternoon.  My suggestion was to try a low-dose of gabapentin 100 mg during the daytime and if tolerated, 300 mg at bedtime.      4. Long-term rehabilitation efforts:  A.  The patient does not have a history of falls. Also, I performed a risk assessment for falls using the Tinetti gait & balance assessment tool (scored low risk for falls).   B. Patient will start a comprehensive physical therapy program for neurodynamics, upper body strengthening, posture correction, ultrasound, ASTYM, E-STIM, myofascial release, cupping, dry needling, home exercise program, 2-3 x per week for 8 weeks   C. Contrast therapy: Apply ice-packs for 15-20 minutes, followed by heating pads for 15-20  minutes to affected area   D. Luana Chawla  reports that she quit smoking about 7 years ago. Her smoking use included cigarettes and electronic cigarette. She started smoking about 34 years ago. She has a 27.2 pack-year smoking history. She has been exposed to tobacco smoke. She has never used smokeless tobacco.     5. The patient has been instructed to contact my office with any questions or difficulties. The patient understands the plan and agrees to proceed accordingly.    The patient has a documented plan of care to address chronic pain. Luana Chawla reports a pain score of 10/10.  Given her pain assessment as noted, treatment options were discussed and the following options were decided upon as a follow-up plan to address the patient's pain: continuation of current treatment plan for pain, educational materials on pain management, home exercises and therapy, prescription for non-opiod analgesics, referral to Physical Therapy, referral to specialist for assistance in pain treatment guidance, steroid injections, use of non-medical modalities (ice, heat, stretching and/or behavior modifications), and interventional pain management measures .               Pain Management Panel  More data exists         Latest Ref Rng & Units 3/21/2024 3/13/2023   Pain Management Panel   Creatinine, Urine mg/dL 144.8  79.4         YOLIE query complete. YOLIE reviewed by Samy Mckeon MD.     Pain Medications               cyclobenzaprine (FLEXERIL) 10 MG tablet Take 1 tablet by mouth 3 (Three) Times a Day As Needed for Muscle Spasms.    escitalopram (Lexapro) 10 MG tablet 1 tablet.    Orencia ClickJect 125 MG/ML solution auto-injector 1 mL. Once a week             No orders of the defined types were placed in this encounter.       Time spent face-to-face with the patient: 56 minutes  Time spent reviewing diagnostic studies, consultation reports, previous treatments, ordering diagnostic studies,  referrals, and writing this note: 6 minutes  Total Time Spent: 62  minutes    Please note that portions of this note were completed with a voice recognition program.   Any copied data in any portion of my note has been reviewed by myself and accurate.     The 21st Century Cures Act makes medical notes like this available to patients in the interest of transparency. This is a medical document intended as peer to peer communication. It is written in medical language and may contain abbreviations or verbiage that are unfamiliar. It may appear blunt or direct. Medical documents are intended to carry relevant information, facts as evident, and the clinical opinion of the practitioner.     Samy Mckeon MD    Patient Care Team:  Adilene Bonner DO as PCP - General (Family Medicine)  Silvio Howard MD as Consulting Physician (General Surgery)  Jose Cummings MD as Consulting Physician (Endocrinology)  Dutch Chávez MD as Consulting Physician (Infectious Diseases)  Kandis Suero APRN as Nurse Practitioner (Psychiatry)  Carole Lai DO as Consulting Physician (Pulmonary Disease)  Natividad Martinez MD as Consulting Physician (Rheumatology)     No orders of the defined types were placed in this encounter.        Future Appointments   Date Time Provider Department Center   6/28/2024  9:00 AM FRK ECHO/VASC [facility]   9/18/2024 10:30 AM Jose Cummings MD [facility] END [facility]   10/16/2024  1:30 PM Almita Pruitt APRN [facility] ON[facility]   11/4/2024 10:30 AM Kari Orozco APRN [facility]   11/21/2024 10:30 AM Kari Lord APRN [facility]   12/18/2024  9:00 AM Jose Cummings MD [facility]   3/21/2025  7:45 AM Jose Cummings MD [facility]

## 2024-06-27 ENCOUNTER — OFFICE VISIT (OUTPATIENT)
Dept: PAIN MEDICINE | Facility: CLINIC | Age: 64
End: 2024-06-27
Payer: COMMERCIAL

## 2024-06-27 VITALS — HEIGHT: 66 IN | HEART RATE: 91 BPM | WEIGHT: 136 LBS | OXYGEN SATURATION: 99 % | BODY MASS INDEX: 21.86 KG/M2

## 2024-06-27 DIAGNOSIS — M54.12 CERVICAL RADICULOPATHY: Primary | ICD-10-CM

## 2024-06-27 DIAGNOSIS — Z79.4 DIABETES MELLITUS TYPE 2, INSULIN DEPENDENT: ICD-10-CM

## 2024-06-27 DIAGNOSIS — M05.711 RHEUMATOID ARTHRITIS INVOLVING RIGHT SHOULDER WITH POSITIVE RHEUMATOID FACTOR: ICD-10-CM

## 2024-06-27 DIAGNOSIS — E11.9 DIABETES MELLITUS TYPE 2, INSULIN DEPENDENT: ICD-10-CM

## 2024-06-27 DIAGNOSIS — M54.12 CERVICAL RADICULOPATHY: ICD-10-CM

## 2024-06-27 DIAGNOSIS — F34.1 DYSTHYMIA: ICD-10-CM

## 2024-06-27 DIAGNOSIS — M99.41 CONNECTIVE TISSUE STENOSIS OF NEURAL CANAL OF CERVICAL REGION: ICD-10-CM

## 2024-06-27 DIAGNOSIS — Z90.13 HISTORY OF BILATERAL MASTECTOMY: ICD-10-CM

## 2024-06-27 DIAGNOSIS — M99.61 OSSEOUS AND SUBLUXATION STENOSIS OF INTERVERTEBRAL FORAMINA OF CERVICAL REGION: ICD-10-CM

## 2024-06-27 RX ORDER — ESCITALOPRAM OXALATE 10 MG/1
10 TABLET ORAL
COMMUNITY

## 2024-06-28 RX ORDER — SAW/PYGEUM/BETA/HERB/D3/B6/ZN 30 MG-25MG
400 CAPSULE ORAL 3 TIMES DAILY
Qty: 180 CAPSULE | Refills: 1 | Status: SHIPPED | OUTPATIENT
Start: 2024-06-28

## 2024-06-28 RX ORDER — ME-TETRAHYDROFOLATE/B12/HRB236 1-1-500 MG
1 CAPSULE ORAL DAILY
Qty: 90 CAPSULE | Refills: 0 | Status: SHIPPED | OUTPATIENT
Start: 2024-06-28

## 2024-06-28 RX ORDER — MULTIVITAMIN WITH IRON
100 TABLET ORAL DAILY
Qty: 30 TABLET | Refills: 0 | Status: SHIPPED | OUTPATIENT
Start: 2024-06-28

## 2024-07-01 DIAGNOSIS — M54.12 CERVICAL RADICULOPATHY: ICD-10-CM

## 2024-07-08 RX ORDER — LETROZOLE 2.5 MG/1
2.5 TABLET, FILM COATED ORAL DAILY
Qty: 90 TABLET | Refills: 3 | Status: SHIPPED | OUTPATIENT
Start: 2024-07-08

## 2024-07-17 ENCOUNTER — OUTSIDE FACILITY SERVICE (OUTPATIENT)
Dept: PAIN MEDICINE | Facility: CLINIC | Age: 64
End: 2024-07-17
Payer: COMMERCIAL

## 2024-07-17 ENCOUNTER — DOCUMENTATION (OUTPATIENT)
Dept: PAIN MEDICINE | Facility: CLINIC | Age: 64
End: 2024-07-17

## 2024-07-17 PROCEDURE — 62321 NJX INTERLAMINAR CRV/THRC: CPT | Performed by: ANESTHESIOLOGY

## 2024-07-17 NOTE — PROGRESS NOTES
[facility]     PROCEDURE DATE: 07/17/2024    PREOPERATIVE DIAGNOSES:  1. Cervical radiculopathy   2. 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 Diabetes mellitus type 2, insulin dependent   5. History of bilateral mastectomy   6. Rheumatoid arthritis   7. Dysthymia     POSTOPERATIVE DIAGNOSES:  1. Cervical radiculopathy   2. 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 Diabetes mellitus type 2, insulin dependent   5. History of bilateral mastectomy   6. Rheumatoid arthritis   7. Dysthymia     PROCEDURE: Cervical epidural steroid injection by left paramedian interlaminar approach     ANESTHESIA: Local anesthesia plus IV sedation    COMPLICATIONS: None    EBL: 0    MEDICAL NECESSITY: A comprehensive evaluation, including history and physical exam and pertinent physiologic and functional assessment, was performed. The patient presents with intractable pain due to the diagnoses listed above. The patient has failed to respond to conservative modalities, as referenced under HPI including the impact of patient's moderate-to-severe pain contributing to significant impairment in daily activities, ADLs, and a negative impact on quality of life. Supporting diagnostic studies of patient's chronic pain condition have been reviewed. We have discussed using a stepwise approach starting with the shortest or least intense level of treatment, care, or service as determined by the extent required to diagnose and or treat a patient's condition. The treatments proposed are consistent with the patient's medical condition and are known to be as safe and effective by current guidelines and standard of care. See OV note for additional details.    PROCEDURE SUMMARY: After explaining all the risks and benefits of the procedure, an informed consent was  obtained.  The Patient's surgical site was confirmed with the patient and marked in the holding room accordingly. The patient was transferred to the procedure room and placed on the operating room table in the prone position. Time out was completed. Noninvasive monitors were applied. Administration of IV sedation was performed by a designated procedure room nurse, who monitored the patient's level of consciousness and physiological status. Pertinent information was reported on a sedation flow sheet, which is part of the patient's permanent medical records. Start sedation time: 11:29 AM.  The patient's vital signs remained within normal limits. The cervical spine area was prepped and draped in the sterile fashion. Using fluoroscopic guidance, the C6-C7 interlaminar space (target) was visualized. The skin and tissues overlying the target were anesthetized with five mL of 1% lidocaine. A 20-gauge Tuohy needle was advanced by the loss of resistance technique, under direct C-arm fluoroscopic guidance, by interlaminar approach into the posterior epidural space without difficulties. AP and contralateral oblique X-ray views confirmed appropriate needle placement. Aspiration was negative for blood and/or CSF. Two ml of Omnipaque-180 were injected and a cervical epidurogram was obtained. Contrast dye was seen bathing the bilateral C5, C6, C7, C8 and T1 nerve roots. X-rays were obtained and scanned in the patient's chart. Two ml of a mixture of 0.25% bupivacaine with 4 mg of dexamethasone were injected. Fluoroscopy was utilized using low-dose of radiation applying collimation, pulsed mode, and shielding following ALARA recommendations. Fluoroscopy time: 7 seconds. End sedation time: 11:33 AM . The patient tolerated the procedure well and without incident.  Upon completion of the procedure, the patient was transferred to the recovery room in stable condition. The patient was discharged from the clinic neurologically intact and  with appropriate discharge instructions. Pain level before procedure: 8/10. Pain level after procedure: 0/10.                PLAN:   Follow-up with CECI Carrington in about 10 weeks. We may repeat a cervical epidural steroid injection by left paramedian interlaminar approach vs diagnostic and therapeutic left C3-C4 (or other levels depending on her symptoms) transforaminal epidural steroid injection depending on patient's outcome and following current guidelines. Patient will follow-up with Dr. Kahn thereafter.   Diagnostic studies: See OV note.  Pharmacological measures: See OV note.  Long-term rehabilitation efforts: See OV note.  The patient has been instructed to contact my office with any questions or difficulties. The patient understands the plan and agrees to proceed accordingly.        Signatures  Electronically signed by: Samy Mckeon M.D.; JULY 17, 2024, 11:49 AM EST (Author)

## 2024-08-19 RX ORDER — PEN NEEDLE, DIABETIC 32GX 5/32"
NEEDLE, DISPOSABLE MISCELLANEOUS
Qty: 100 EACH | Refills: 0 | Status: SHIPPED | OUTPATIENT
Start: 2024-08-19

## 2024-09-18 ENCOUNTER — OFFICE VISIT (OUTPATIENT)
Dept: ENDOCRINOLOGY | Facility: CLINIC | Age: 64
End: 2024-09-18
Payer: COMMERCIAL

## 2024-09-18 VITALS
WEIGHT: 137 LBS | TEMPERATURE: 97.5 F | HEART RATE: 92 BPM | BODY MASS INDEX: 22.02 KG/M2 | SYSTOLIC BLOOD PRESSURE: 126 MMHG | OXYGEN SATURATION: 93 % | DIASTOLIC BLOOD PRESSURE: 78 MMHG | HEIGHT: 66 IN

## 2024-09-18 DIAGNOSIS — I10 BENIGN HYPERTENSION: ICD-10-CM

## 2024-09-18 DIAGNOSIS — E11.65 UNCONTROLLED TYPE 2 DIABETES MELLITUS WITH HYPERGLYCEMIA: Primary | ICD-10-CM

## 2024-09-18 LAB
EXPIRATION DATE: ABNORMAL
EXPIRATION DATE: ABNORMAL
GLUCOSE BLDC GLUCOMTR-MCNC: 215 MG/DL (ref 70–130)
HBA1C MFR BLD: 7 % (ref 4.5–5.7)
Lab: ABNORMAL
Lab: ABNORMAL

## 2024-09-18 PROCEDURE — 82947 ASSAY GLUCOSE BLOOD QUANT: CPT | Performed by: INTERNAL MEDICINE

## 2024-09-18 PROCEDURE — 99213 OFFICE O/P EST LOW 20 MIN: CPT | Performed by: INTERNAL MEDICINE

## 2024-09-18 PROCEDURE — 83036 HEMOGLOBIN GLYCOSYLATED A1C: CPT | Performed by: INTERNAL MEDICINE

## 2024-09-18 RX ORDER — GABAPENTIN 100 MG/1
100 CAPSULE ORAL 3 TIMES DAILY
COMMUNITY
Start: 2024-08-01

## 2024-09-18 RX ORDER — MELOXICAM 15 MG/1
15 TABLET ORAL DAILY
COMMUNITY
Start: 2024-07-09

## 2024-09-23 ENCOUNTER — OFFICE VISIT (OUTPATIENT)
Dept: FAMILY MEDICINE CLINIC | Facility: CLINIC | Age: 64
End: 2024-09-23
Payer: COMMERCIAL

## 2024-09-23 VITALS
WEIGHT: 139.1 LBS | HEART RATE: 83 BPM | SYSTOLIC BLOOD PRESSURE: 118 MMHG | DIASTOLIC BLOOD PRESSURE: 72 MMHG | HEIGHT: 66 IN | BODY MASS INDEX: 22.35 KG/M2 | OXYGEN SATURATION: 98 %

## 2024-09-23 DIAGNOSIS — R22.32 ARM MASS, LEFT: Primary | ICD-10-CM

## 2024-09-23 DIAGNOSIS — M05.9 RHEUMATOID ARTHRITIS WITH POSITIVE RHEUMATOID FACTOR, INVOLVING UNSPECIFIED SITE: ICD-10-CM

## 2024-09-23 PROBLEM — N39.0 URINARY TRACT INFECTIOUS DISEASE: Status: ACTIVE | Noted: 2020-03-01

## 2024-09-23 PROCEDURE — 99214 OFFICE O/P EST MOD 30 MIN: CPT | Performed by: STUDENT IN AN ORGANIZED HEALTH CARE EDUCATION/TRAINING PROGRAM

## 2024-09-23 NOTE — PROGRESS NOTES
Office Note     Name: Luana Chawla    : 1960     MRN: [id number]     Chief Complaint  Mass (Patient has a lump on her upper left forearm. No irritation .) and pain med (Patient has rheumatoid arthritis, states that she has tried multiple different ways to help with it and nothing helps.)    Subjective     History of Present Illness:  Luana Chawla is a 63 y.o. female who presents today for:    Soft tissue mass  -bump in left forearm  -concerned because hx of breast cancer with lymph node removal  -noticed 1 month ago and has not gotten larger  -non painful     RA  -would like to talk about pain meds, seeing pain management Dr. Mckeon  -had a steroid shot in the neck which helped a little bit  -has tried meloxicam (stomach irritation), tramadol  -tried gabapentin but didn't like how it made her feel, weaned herself off and is not taking   -had a lortab at home and took that and it did help  -doing chair yoga and chair volleyball at the gym  -stress is biggest trigger, is meditating and going to [employer] to help  -is sleeping okay     Past Medical History:   Past Medical History:   Diagnosis Date    Arthritis     Benign hypertension 10/22/2020    Breast cancer     Cholelithiasis     Chronic pain disorder     Diabetes mellitus     Diverticulitis     GERD (gastroesophageal reflux disease)     Hypokalemia     Joint pain     Kidney stone     Lichen sclerosus     Long term (current) use of insulin     Lumbosacral disc disease     Malignant neoplasm of upper-outer quadrant of right female breast 2020    Microalbuminuria     Neck pain     Nephrolithiasis     Osteoarthritis     Osteopenia     Rheumatoid arthritis with positive rheumatoid factor 2024    Sciatica     Septic discitis of lumbar region     Spinal stenosis     Type 2 diabetes mellitus, uncontrolled     Wears contact lenses        Past Surgical History:   Past Surgical History:   Procedure Laterality Date    APPENDECTOMY       BREAST BIOPSY Right     BREAST EXCISIONAL BIOPSY Right     CHOLECYSTECTOMY      COLON SURGERY      COLONOSCOPY      HYSTERECTOMY      LEG SURGERY      leg fracture surgery-Abstracted from [employer]    LUMBAR LAMINECTOMY DISCECTOMY DECOMPRESSION N/A 05/24/2019    Procedure: LUMBAR LAMINECTOMY L4-5 AND L5-S1;  Surgeon: Hipolito Kahn MD;  Location:  [facility];  Service: Orthopedic Spine    LYMPH NODE BIOPSY      Jan 2020    MASTECTOMY Bilateral     MASTECTOMY W/ SENTINEL NODE BIOPSY Bilateral 01/21/2020    Procedure: SKIN SPARING MASTECTOMIES BILATERAL, SENTINEL NODE BIOPSY RIGHT;  Surgeon: Silvio Howard MD;  Location:  [facility];  Service: General    SPINE SURGERY      May 2019    TONSILLECTOMY      TRIGGER POINT INJECTION  8/24    TUBAL ABDOMINAL LIGATION      VENOUS ACCESS DEVICE (PORT) REMOVAL N/A 03/03/2020    Procedure: REMOVAL PORT;  Surgeon: Silvio Howard MD;  Location:  [facility];  Service: General;  Laterality: N/A;       Immunizations:   Immunization History   Administered Date(s) Administered    ABRYSVO (RSV, 60+ or pregnant women 32-36 wks) 10/12/2023    COVID-19 (PFIZER) 12YRS+ (COMIRNATY) 10/17/2023    COVID-19 (PFIZER) BIVALENT 12+YRS 09/19/2022    COVID-19 (PFIZER) Purple Cap Monovalent 03/10/2021, 04/07/2021, 08/24/2021    Covid-19 (Pfizer) Gray Cap Monovalent 04/18/2022    Flublock Quad =>18yrs 10/03/2020    Flublok 18+yrs 10/13/2019, 10/28/2021, 10/13/2022, 10/12/2023    Fluzone  >6mos 11/12/2014, 10/19/2015, 10/31/2016    Fluzone (or Fluarix & Flulaval for VFC) >6mos 11/02/2018    Fluzone Quad >6mos (Multi-dose) 11/08/2017    Hepatitis A 05/11/2018, 11/19/2018    Hepatitis B 11/29/2019, 10/26/2022    Hepatitis B Adult/Adolescent IM 08/22/2022    Influenza, Unspecified 10/11/2023    Shingrix 11/27/2020    Td (TDVAX) 09/10/2003    Tdap 07/28/2014        Medications:     Current Outpatient Medications:     albuterol sulfate  (90 Base) MCG/ACT inhaler, INHALE TWO PUFFS BY  MOUTH EVERY 4 HOURS AS NEEDED, Disp: 8.5 g, Rfl: 0    Alpha Lipoic Acid 200 MG capsule, Take 400 mg by mouth 3 (Three) Times a Day., Disp: 180 capsule, Rfl: 1    ascorbic acid (VITAMIN C) 100 MG tablet, Take 1 tablet by mouth Daily., Disp: , Rfl:     Calcium Carb-Cholecalciferol (CALCIUM 1000 + D PO), Take 1 tablet by mouth Daily., Disp: , Rfl:     clobetasol (TEMOVATE) 0.05 % ointment, Apply  topically to the appropriate area as directed 2 (Two) Times a Day., Disp: 60 g, Rfl: 2    cyclobenzaprine (FLEXERIL) 10 MG tablet, Take 1 tablet by mouth 3 (Three) Times a Day As Needed for Muscle Spasms., Disp: 90 tablet, Rfl: 3    Dietary Management Product (Rheumate) capsule, Take 1 capsule by mouth Daily., Disp: 90 capsule, Rfl: 0    escitalopram (Lexapro) 10 MG tablet, 1 tablet., Disp: , Rfl:     folic acid (FOLVITE) 1 MG tablet, , Disp: , Rfl:     gabapentin (NEURONTIN) 100 MG capsule, Take 1 capsule by mouth 3 (Three) Times a Day., Disp: , Rfl:     Gel Base gel, Plus Meloxicam 0.1% Apply 1 to 2 grams of cream to the affected areas every 4 to 6 hours if needed, Disp: 30 g, Rfl: 5    glucose blood (Accu-Chek Sasha Plus) test strip, USE ONE STRIP TO TEST BLOOD SUGAR DAILY. Dx code e11.65, Disp: 50 each, Rfl: 3    hydrOXYzine (ATARAX) 25 MG tablet, Take 1 tablet by mouth 3 (Three) Times a Day As Needed for Anxiety., Disp: 30 tablet, Rfl: 1    Insulin Glargine (BASAGLAR KWIKPEN) 100 UNIT/ML injection pen, USE DAILY TO TITRATE TA FASTING GLUCOSE LESS THAN 120  UP TO MAX DAILY DOSE OF 50 UNITS, Disp: 45 mL, Rfl: 3    Insulin Pen Needle (BD Pen Needle Radha 2nd Gen) 32G X 4 MM misc, USE ONE PEN NEEDLE DAILY, Disp: 100 each, Rfl: 0    letrozole (FEMARA) 2.5 MG tablet, Take 1 tablet by mouth Daily., Disp: 90 tablet, Rfl: 3    loratadine (CLARITIN) 10 MG tablet, Take 1 tablet by mouth Daily., Disp: , Rfl:     Magnesium 400 MG tablet, Take 1 tablet by mouth Daily., Disp: , Rfl:     meloxicam (MOBIC) 15 MG tablet, Take 1 tablet by  mouth Daily., Disp: , Rfl:     metFORMIN ER (GLUCOPHAGE-XR) 500 MG 24 hr tablet, Take 2 tablets by mouth 2 (Two) Times a Day., Disp: 360 tablet, Rfl: 3    methotrexate 2.5 MG tablet, , Disp: , Rfl:     omeprazole (priLOSEC) 40 MG capsule, Take 1 capsule by mouth Every Morning Before Breakfast., Disp: 90 capsule, Rfl: 1    Orencia ClickJect 125 MG/ML solution auto-injector, 1 mL. Once a week, Disp: , Rfl:     Saccharomyces boulardii (Probiotic) 250 MG capsule, Take  by mouth., Disp: , Rfl:     Semaglutide, 2 MG/DOSE, (Ozempic, 2 MG/DOSE,) 8 MG/3ML solution pen-injector, Inject 2 mg under the skin into the appropriate area as directed Every 7 (Seven) Days., Disp: 9 mL, Rfl: 3    Turmeric 500 MG tablet, Take  by mouth., Disp: , Rfl:     vitamin B-12 (CYANOCOBALAMIN) 1000 MCG tablet, Take 1 tablet by mouth Daily., Disp: , Rfl:     vitamin B-6 (PYRIDOXINE) 100 MG tablet, Take 1 tablet by mouth Daily., Disp: 30 tablet, Rfl: 0    lidocaine (LIDODERM) 5 %, Place 1 patch on the skin as directed by provider Daily. Remove & Discard patch within 12 hours or as directed by MD, Disp: 60 patch, Rfl: 0    lidocaine-prilocaine (EMLA) 2.5-2.5 % cream, Apply 1 Application topically to the appropriate area as directed Every 2 (Two) Hours As Needed for Mild Pain., Disp: 1 g, Rfl: 1    Vevye 0.1 % solution, INSTILL 1 DROP IN EACH EYE TWO TIMES A DAY, Disp: , Rfl:     Allergies:   Allergies   Allergen Reactions    Bactrim [Sulfamethoxazole-Trimethoprim] Rash     RASH, SKIN PEELING        Family History:   Family History   Problem Relation Age of Onset    Breast cancer Mother 70    Diabetes Mother     Cancer Mother     Heart disease Father     Hypertension Father     Diabetes Sister     Ovarian cancer Neg Hx        Social History:   Social History     Socioeconomic History    Marital status: Single   Tobacco Use    Smoking status: Former     Current packs/day: 0.00     Average packs/day: 1 pack/day for 27.2 years (27.2 ttl pk-yrs)      "Types: Cigarettes, Electronic Cigarette     Start date: 3/13/1990     Quit date: 2017     Years since quittin.4     Passive exposure: Past    Smokeless tobacco: Never    Tobacco comments:     currently use nicotine e-cig.   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    Weight as of this encounter: 63.1 kg (139 lb 1.6 oz).    BMI is within normal parameters. No other follow-up for BMI required.      Physical Exam  Constitutional:       General: She is not in acute distress.     Appearance: Normal appearance.   HENT:      Head: Normocephalic and atraumatic.   Eyes:      Conjunctiva/sclera: Conjunctivae normal.   Cardiovascular:      Rate and Rhythm: Normal rate and regular rhythm.   Pulmonary:      Effort: Pulmonary effort is normal.   Musculoskeletal:      Comments: There is a small firm nodule in the left lower arm that is mobile.  Not tender on palpation   Neurological:      Mental Status: She is alert.   Psychiatric:         Mood and Affect: Mood normal.         Behavior: Behavior normal.         Thought Content: Thought content normal.          Assessment and Plan     Diagnoses and all orders for this visit:    1. Arm mass, left (Primary)  -     US Soft Tissue; Future  -     US Soft Tissue    2. Rheumatoid arthritis with positive rheumatoid factor, involving unspecified site    Will " start with an ultrasound soft tissue of this left arm mass.  Patient does have history of breast cancer that required lymph node removal and she is concerned that this may be related.  Follow-up based on imaging    Did also discuss her pain secondary to rheumatoid arthritis.  She is already established with pain management and I encouraged her to continue to follow with them and let them guide her treatment and recommendations as far as pain.  She did take the Lortab that she had on hand but discussed that we would not be prescribing this chronically             Follow Up  No follow-ups on file.    DO GIANNI Piedra [facility]  [address] [id number] [phone]    NOTE TO PATIENT: The 21st Century Cures Act makes medical notes like these available to patients in the interest of transparency. However, be advised this is a medical document. It is intended as peer to peer communication. It is written in medical language and may contain abbreviations or verbiage that are unfamiliar. It may appear blunt or direct. Medical documents are intended to carry relevant information, facts as evident, and the clinical opinion of the practitioner.

## 2024-09-25 ENCOUNTER — OFFICE VISIT (OUTPATIENT)
Dept: PAIN MEDICINE | Facility: CLINIC | Age: 64
End: 2024-09-25
Payer: COMMERCIAL

## 2024-09-25 VITALS — BODY MASS INDEX: 22.18 KG/M2 | OXYGEN SATURATION: 98 % | HEIGHT: 66 IN | WEIGHT: 138 LBS | HEART RATE: 102 BPM

## 2024-09-25 DIAGNOSIS — M54.12 CERVICAL RADICULOPATHY: ICD-10-CM

## 2024-09-25 DIAGNOSIS — M05.711 RHEUMATOID ARTHRITIS INVOLVING RIGHT SHOULDER WITH POSITIVE RHEUMATOID FACTOR: ICD-10-CM

## 2024-09-25 DIAGNOSIS — E11.9 DIABETES MELLITUS TYPE 2, INSULIN DEPENDENT: ICD-10-CM

## 2024-09-25 DIAGNOSIS — M99.41 CONNECTIVE TISSUE STENOSIS OF NEURAL CANAL OF CERVICAL REGION: ICD-10-CM

## 2024-09-25 DIAGNOSIS — Z79.4 DIABETES MELLITUS TYPE 2, INSULIN DEPENDENT: ICD-10-CM

## 2024-09-25 PROCEDURE — 99214 OFFICE O/P EST MOD 30 MIN: CPT | Performed by: NURSE PRACTITIONER

## 2024-09-25 RX ORDER — LIDOCAINE 50 MG/G
1 PATCH TOPICAL EVERY 24 HOURS
Qty: 60 PATCH | Refills: 0 | Status: SHIPPED | OUTPATIENT
Start: 2024-09-25

## 2024-09-25 RX ORDER — CYCLOSPORINE OPHTHALMIC SOLUTION 1 MG/ML
SOLUTION/ DROPS OPHTHALMIC
COMMUNITY
Start: 2024-09-24

## 2024-10-02 RX ORDER — LIDOCAINE/PRILOCAINE 2.5 %-2.5%
1 CREAM (GRAM) TOPICAL
Qty: 1 G | Refills: 1 | Status: SHIPPED | OUTPATIENT
Start: 2024-10-02

## 2024-10-16 ENCOUNTER — OFFICE VISIT (OUTPATIENT)
Dept: ONCOLOGY | Facility: CLINIC | Age: 64
End: 2024-10-16
Payer: COMMERCIAL

## 2024-10-16 VITALS
DIASTOLIC BLOOD PRESSURE: 69 MMHG | SYSTOLIC BLOOD PRESSURE: 117 MMHG | HEART RATE: 83 BPM | HEIGHT: 66 IN | BODY MASS INDEX: 22.34 KG/M2 | TEMPERATURE: 98.2 F | RESPIRATION RATE: 18 BRPM | WEIGHT: 139 LBS

## 2024-10-16 DIAGNOSIS — Z17.0 MALIGNANT NEOPLASM OF UPPER-OUTER QUADRANT OF RIGHT BREAST IN FEMALE, ESTROGEN RECEPTOR POSITIVE: ICD-10-CM

## 2024-10-16 DIAGNOSIS — Z79.811 LONG TERM CURRENT USE OF AROMATASE INHIBITOR: Primary | ICD-10-CM

## 2024-10-16 DIAGNOSIS — C50.411 MALIGNANT NEOPLASM OF UPPER-OUTER QUADRANT OF RIGHT BREAST IN FEMALE, ESTROGEN RECEPTOR POSITIVE: ICD-10-CM

## 2024-10-16 NOTE — PROGRESS NOTES
CHIEF COMPLAINT: Arthritic pain       Problem List:  Oncology/Hematology History Overview Note   1.  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  ER weakly 5% 1+ positive, NC 50% 1-2+ positive, HER-2/ashley 100% 3+ positive.  Negative cancer next panel  2.  Significant diabetes with predating neuropathy due to spinal stenosis status post laminectomy 5/24/2019 with persistent neuropathy dorsum left foot  3.  MRSA bacteremia due to port infection March 2020 after course 1 day 1 of Herceptin Taxol  4.  Covid 2/2023  5.  Atypical inflammatory/infectious process of the chest February 2023 treated with Augmentin x2 weeks with near complete resolution on CT chest 3/31/2023.  Following with pulmonology.  6.  Rheumatoid arthritis diagnosed March 2023, evaluation by Dr. Nativiadd Martinez.  Began methotrexate.    Oncology history timeline:  -5/24/2019 Dr. Garcia saw for spinal stenosis with radiculopathy and left foot drop due to herniated disc and performed decompressive laminectomy, L4-5 and L5-S1 discectomy and medial facetectomy  -10/15/2019 mammogram BI-RADS 0  -11/22/2019 right mammogram/ultrasound BI-RADS 4 with ultrasound-guided core biopsy showing invasive ductal carcinoma Cory score 6 out of 9 grade 2, ER 5% 1+ positive NC 50% 1-2+ positive, HER-2/ashley 100% 3+ positive.  -12/13/2019 MRI breasts revealed 2.2 cm right breast mass consistent with biopsy proven cancer with unsuspected adjacent area's of non-mass enhancement worrisome for DCIS.  Non-mass enhancement in the subareolar left breast worrisome for DCIS.  Dominant focus left central portion left breast indeterminate BI-RADS 4.  Cancer next panel negative  -1/15/2020 CMP unremarkable save for glucose 200 with hemoglobin 10.6 normochromic normocytic indices  -1/21/2020 right skin sparing mastectomy with right sentinel  lymph node injection and right deep subfascial sentinel lymph node biopsy with contralateral prophylactic left skin sparing mastectomy.  Right breast 1.8 cm Bloom Dias 8 out of 9, total of 4 lymph nodes examined 2 sentinel nodes all negative.  Pathological T1 cN0 M0 stage Ia.  Left breast benign with sclerosing adenosis.  -2/26/2020 started Herceptin Taxol weekly  -3/2/2020 port removed due to MRSA bacteremia with port infection/purulence.  -3/2/2020 through 3/6/2020 hospitalized for MRSA bacteremia from port.  -3/31/2020 [facility] tele-visit: Patient still on IV antibiotics for MRSA port infection.  Decision made to go with Kanjinti alone every 3 weeks along with Arimidex.  -4/13/2020 per ID, patient has completed IV antibiotics and is now on oral antibiotics.  -3/23/2021 completed 1 year Kanjinti.  Recommendation to complete 10 years of Arimidex if can tolerate.  5-7 years if not.    -6/16/2021 [facility] clinic follow-up:  Intolerant of Arimidex with hot flashes and mood swings.  Therapy switched to Letrozole.      -9/15/2021 [facility] clinic follow-up:  She tolerated therapy with Letrozole better than she did with Arimidex but she still is having significant hot flashes and mood disturbance with easy agitation.  She is finding it difficult to work as stress makes these issues worse.  She is going to apply for disability skilled nursing.  She is on Wellbutrin and Lexapro.  We discussed at length her options for hormonal blockade and she wants to continue trying.  At this time I will switch her to tamoxifen.  I spoke with our pharmacist Irma Arechiga, Pharm.D. about changing her antidepressants as her current antidepressants can interfere with tamoxifen.  We will have her stop Wellbutrin, she will start Effexor 37.5 mg daily for 7 days and if tolerated will increase to 75 mg daily, if this is tolerated then we will consider increasing up to 150 mg daily.  I am hoping that Effexor will help  mitigate some of her hot flashes along with helping her depression.  She will taper off of her Lexapro over 2 weeks and then stop.  We discussed counseling as I think this will be greatly beneficial to Luana with her dysthymia and helping to deal with stress at work and living with breast cancer.  She did not want me to make the referral but I did give her a brochure and she assures me that she will call and make an appointment.  I plan to call and check on her in about 3 weeks to see how she is tolerating the change in her antidepressants, we will also see if she has made an appointment with [facility] for counseling and to see how she is tolerating tamoxifen.  She is going to stop her Lexapro and wait about a week before starting tamoxifen so that she can tell if she is going to have any side effects with it.  I will see her back in 2 months for follow-up and sooner if needed.  We had planned on 5-7 years of therapy with hormonal blockade, I discussed with her today that with her ongoing side effects we would likely only do 5 years and hopefully we can get through that.  She was only weakly ER positive.    -10/14/2021 Did not tolerate Tamoxifen, went back to Letrozole    -11/17/2021 [facility] follow-up: Luana is tolerating letrozole much better than tamoxifen.  She continues to have significant hot flashes, she is not sure the Effexor is helping.  She did have to go back to the 37.5 mg dose as she did not tolerate the 75 mg dose.  I discussed with her that she could switch to a different antidepressant as far as we were concerned, there is no interaction with the letrozole, we had only switched her to Effexor when we were trying tamoxifen and also we had hoped it would help with her hot flashes.  She is following with Emeli Suero and will discuss with her at her next appointment.  I also recommended acupuncture for her hot flashes as there is data showing that this may be helpful.  We also  discussed adding gabapentin but when we reviewed the potential side effects of constipation and lethargy she did not want to try that.  She continues to complain of low back pain with some neuropathy in her left lower extremity.  She does have a history of spinal stenosis, I will get an MRI of her lumbar spine for further evaluation and call her with the results.  Assuming there is no involvement of metastatic disease but just symptoms from her spinal stenosis she will need to follow-up with her neurosurgeon.  She has a history of laminectomy.    -5/18/2022 [facility] oncology clinic follow-up: Luana continues to tolerate adjuvant therapy with letrozole, she was previously intolerant of anastrozole and tamoxifen.  She continues to have hot flashes but these seem to be more tolerable.  She has no new worrisome symptoms.  We plan on 5 years of adjuvant therapy with an AI.  She has had mastectomies therefore does not need mammograms.  She will need bone density repeated this fall for 2-year follow-up, previous bone density showed osteopenia.  She may benefit from bisphosphonate but we will see what her next bone density scan shows.  She does take calcium and vitamin D and remains active and walks regularly.  Her diabetes is under good control, she follows with endocrinology.  Her back pain is better, she is now on gabapentin under the direction of her surgeon.    -9/29/2022 [facility] follow-up: Luana called for an appointment due to not feeling well with upper back pain, fatigue and chest pain.  CT chest, bone scan, CBC, CMP ordered for evaluation.    -10/4/2022 total body bone scan negative for osseous metastatic disease, benign/arthritic/degenerative/posttraumatic changes in the cervical spine, shoulders, acromioclavicular joints (moderate uptake), sternoclavicular joints, right greater than left, elbows, wrists and hands, thoracic spine, lumbosacral spine, sacroiliac joints, hips, knees, ankles and feet.   CT chest with small 3-4 mm bilateral pulmonary nodules.  Right apical groundglass and consolidative opacities could be posttreatment change versus infectious.  -10/6/2022 [facility] follow-up: Reviewed the above with the patient.  No signs of recurrent or metastatic breast cancer.  Will treat with Augmentin empirically.  Suspect most of her pain is due to her arthritis/degenerative changes, recommended she follow-up with PCP if no improvement, may benefit from rheumatology referral.  We will repeat CT chest in 3 months.    -1/17/2023: CT chest shows stable size of previously noted bilateral pulmonary nodules.  She does have what appears to be new finding of multiple focal nodular opacities in the bilateral upper lobes, favoring combination of postradiation change and/or infection however given his masslike appearance malignancy cannot be excluded.  I called Luana and discussed the results with her.  We will get a PET scan for further evaluation.  She also reports she is still having a daily headache and was actually going to see her primary care provider tomorrow as she feels she may have a sinus infection.  We will also get MRI of the brain for further evaluation.  I will send in a prescription for a Z-Vaughn.  She has a cough but no fever, no shortness of breath.  We will follow-up after her PET scan and MRI to go over the results and further plan of care.  If the PET is unrevealing Dr. Gomez recommends  referral to pulmonology for evaluation and possible bronchoscopy. Of note, she did not have radiation for her breast cancer which was 1.8 cm and node negative with clear margins.    -1/30/2023 PET scan shows multiple irregular consolidative and subsolid mostly subpleural based densities in both lungs with corresponding hypermetabolism.  The appearance is most suggestive of infectious or inflammatory process, including entities such as organizing pneumonia.  The appearance would be atypical for lung  metastasis.  There is also conspicuous focal hypermetabolism in the left medial clavicle at the sternoclavicular joint.  This is likely some underlying subchondral cystic change suggesting likely degenerative uptake, although the degree of hypermetabolism is slightly greater than would be expected for normal degenerative change and a solitary bony metastasis would be difficult to exclude.      -2/2/2023 Chest x-ray showed the multifocal ill-defined opacities in the lung suspicious for infection.  CT angiogram chest showed multifocal airspace opacities not changed from 1/30/2023 consistent with infection/inflammation with stable multiple small pulmonary nodules.  Duplex venous imaging left upper extremity normal.  Hemoglobin 11.4 with MCV 89.9 and platelets 569,000 with normal white count 10,370.  CMP unremarkable.  Normal lipase.  Normal proBNP.  Low uric acid 2.3.  Troponin negative.  EKG normal.    - 2/7/2023 MRI brain with and without contrast nonspecific small vessel changes and altered bone mineral density.  Left calvarial hemangioma.  MRI left clavicle shows osteoarthrosis of the sternoclavicular, acromioclavicular, and glenohumeral joints and a suspected rotator cuff tear.  Lipoma supraspinatus intramuscular.    -3/30/2023 CT chest without contrast shows near complete resolution of previous identified rounded areas of consolidation in both lungs.  Residual minimal groundglass opacity and small semisolid nodules unchanged in the lower lungs.  Recommend 6-month follow-up chest CT.    -4/19/2023 [facility] follow-up: Luana overall is doing well on letrozole.  She has no evidence of recurrent breast cancer on clinical exam.  She began AI therapy March 2020 and we plan on at least 5 years adjuvant therapy.  I would consider BCI testing around that time to see whether or not she would benefit from extended adjuvant therapy as it has been challenging for her to take.  Atypical inflammation/infection in  her lungs has cleared after Augmentin, CT of the chest on 3/30/2023 showed near complete resolution.  Recommendation to follow-up in 6 months.  Since we saw her last she also was diagnosed with rheumatoid arthritis and is following with Dr. Natividad Martinez.  She has been placed on methotrexate, she also was taking diclofenac as needed.  She has had an increase in acid reflux and dyspepsia, she does take omeprazole and also an occasional Tums.  We discussed that the diclofenac could be upsetting her stomach, she is going to hold that and will try topical diclofenac and she has this at home.  If she continues to have issues I have asked her to notify her primary care provider as she may need a different PPI or possibly referral to GI for consideration of EGD.    -10/18/2023 [facility] follow-up: She continues on letrozole and tolerating it fairly well.  She has no evidence of recurrent cancer on clinical exam or worrisome symptoms.  She began AI therapy March 2020 and we plan on at least 5 years adjuvant therapy.  We will consider BCI testing around that time to see whether or not she would benefit from extended adjuvant therapy.  She had atypical inflammation/infection in her lungs with near complete resolution on CT of chest on 3/30/2023.  Recommendation to follow-up in 6 months.  I have ordered repeat CT chest today.  She continues to follow closely with Dr. Martinez for rheumatoid arthritis in which she is taking methotrexate.  She is having significant left shoulder pain due to cervical arthritis.  She has tried cervical collar and physical therapy.  She also does meditation and yoga.  She has been referred to pain management.  She had DEXA scan December 2022 that showed osteopenia.  She is taking calcium and vitamin D daily.  We will repeat DEXA scan December 2024.  We will see her back in 6 months.  She will notify us sooner if any issues or concerns.    -4/17/2024 [facility] clinic follow-up:  "Luana overall is doing well, she has no evidence of recurrent breast cancer on clinical exam and no new worrisome symptoms.  She is tolerating letrozole fairly well, she began in March 2020 on AI therapy.  She admits to looking forward to when she can stop AI therapy as she thinks that her quality of life will improve as far as her arthralgias.  She does have RA and follows with rheumatology and is currently on therapy with Orencia.  We discussed today whether or not we would want to consider doing BCI testing to see whether or not she would benefit from extended adjuvant therapy or if she has a major \"hate\" factor with AI therapy then I would be comfortable with stopping at 5 years.  I think she will likely want to do BCI testing to help put her mind at ease.  In the meantime for now she will continue letrozole unchanged.  She has had bilateral mastectomies therefore no need for mammograms.  She will be due for DEXA scan again sometime after December.  Her heart rate seems irregular but she is completely asymptomatic.  I did review previous ECGs available in epic and she has a history of PACs on prior ECG in February 2023 that was resolved on follow-up ECG that same day.  I discussed with her that she should make sure she mentions to her primary care provider on follow-up. She had CT of the chest in November 2023 for follow-up on previous scarring and inflammation, according to evaluation with pulmonary they felt this was residual scarring from areas of inflammation but there was nothing new or worrisome.  No further follow-up was recommended.    -10/16/2024 [facility] clinic follow-up: Luana stopped her letrozole approximately July 3, 2024. She started AI March 2020.  She is feeling much better off of the letrozole.  She agrees to breast cancer index testing to determine if there would be benefit of extended endocrine therapy.  She would prefer not to restart the letrozole but if breast cancer index shows " statistical benefit then she will consider restarting.  She does have generalized arthralgias but the arthralgias are much improved off the letrozole.  She continues to be followed by rheumatology for RA and is currently on Orencia.  She has had bilateral mastectomy therefore no need for mammograms.  Order placed for bone density scan due December 2024.  We will contact her with results of breast cancer index testing when available.  Return to clinic in 6 months for follow-up.     Malignant neoplasm of upper-outer quadrant of right breast in female, estrogen receptor positive   1/16/2019 Cancer Staged    Staging form: Breast, AJCC 8th Edition  - Clinical stage from 1/16/2019: Stage IB (cT2, cN0, cM0, G2, ER+, CO+, HER2+) - Signed by Lisa Herman MD on 1/27/2020 1/21/2020 Initial Diagnosis    Malignant neoplasm of upper-outer quadrant of right female breast (CMS/HCC)     2/11/2020 Cancer Staged    Staging form: Breast, AJCC 8th Edition  - Pathologic: Stage IA (pT1c, pN0, cM0, G3, ER+, CO+, HER2+) - Signed by Hero Gomez MD on 2/11/2020 2/18/2020 -  Other Event    Echocardiogram  Left ventricular systolic function is normal. Estimated EF = 55%.  The global longitudinal strain was -19.5%.     2/25/2020 - 2/26/2020 Chemotherapy    OP CENTRAL VENOUS ACCESS DEVICE ACCESS, CARE, AND MAINTENANCE (CVAD)     2/26/2020 - 3/10/2020 Chemotherapy    OP BREAST PACLitaxel / Trastuzumab-anns (Weekly X 12)     3/2/2020 Adverse Reaction    MRSA bacteremia due to port infection with port removal after day 1 course 1 Herceptin Taxol     3/31/2020 -  Hormonal Therapy    Arimidex for planned 5 years.    6/16/2021 therapy changed to Letrozole due to intolerance to Arimidex.  9/15/2021 changed to Tamoxifen kruse to intolerance of letrozole.  10/14/2021 changed back to Letrozole due to intolerance of tamoxifen.       4/2/2020 -  Chemotherapy    Completed 3/23/2021  OP BREAST Trastuzumab-anns Q21D (maintenance)     5/27/2020  -  Other Event    5/27/2020 echocardiogram EF 55%      7/23/2020 Procedure    Colonoscopy with Dr. Marte, normal     8/26/2020 -  Other Event    Echocardiogram   This was a limited echocardiogram to assess left ventricular ejection fraction in the setting of chemotherapy.  Left ventricular systolic function is normal. Calculated EF = 55.1%. Estimated EF = 55%. Global Longitudinal LV strain = -18.2%.  Estimated EF = 55%.     11/9/2020 Imaging    DEXA bone density IMPRESSION:  Osteopenia of the femoral necks bilaterally, and total hips  bilaterally.  Lowest T score -2.0 of the right femoral neck.     11/10/2020 -  Other Event    Echocardogram   This was a limited echocardiogram to assess left ventricular function only.  Left ventricular systolic function is normal. Left ventricular ejection fraction appears to be 56 - 60%.  Global longitudinal LV strain (GLS) = 19.7%.     2/23/2021 Imaging    -2/23/2021 left hip 2 view x-ray negative     12/1/2021 Imaging    MRI of the lumbar spine with and without contrast: Overall no significant interval change in the appearance of the lumbar spine since previous 5/7/2020 comparison study.  There are postoperative changes at L5-S1 with enhancing epidural scar in the left lateral recess and there are multilevel degenerative changes with canal in foraminal encroachment.     12/20/2022 Imaging    DEXA bone density scan with osteopenia of the right femoral neck and total right hip.  Lowest T score -2.4 of the right femoral neck.  Compared to previous there was an increase in bone mineral density of the L1-L4 vertebrae by 1.8% and a decrease in the bone mineral density of the total right hip by 4.4%.         HISTORY OF PRESENT ILLNESS:  The patient is a 63 y.o. female, here for follow up on management of right breast cancer currently on adjuvant therapy with letrozole.    Luana reports she stopped taking letrozole around July 3, 2024.  She states shortly after stopping the letrozole her  generalized arthralgias had improved.  She still has arthralgias related to her rheumatoid arthritis but overall pain has been much better.  She also states her fatigue is much improved being off the letrozole.  Overall she just feels much better.  She continues on Orencia for rheumatoid arthritis.  She feels like she is tolerating the Orencia some better now that she is not having to take the letrozole.  In September she noticed a nodule in her left upper arm.  She had a left upper extremity ultrasound on 10/4/2024 that showed no venous thrombus in the area of interest of the veins of the medial left arm.  No mass or fluid collection identified.        Past Medical History:   Diagnosis Date    Arthritis     Benign hypertension 10/22/2020    Breast cancer     Cholelithiasis     Chronic pain disorder     Diabetes mellitus     Diverticulitis     GERD (gastroesophageal reflux disease)     Hypokalemia     Joint pain     Kidney stone     Lichen sclerosus     Long term (current) use of insulin     Lumbosacral disc disease     Malignant neoplasm of upper-outer quadrant of right female breast 01/21/2020    Microalbuminuria     Neck pain     Nephrolithiasis     Osteoarthritis     Osteopenia     Rheumatoid arthritis with positive rheumatoid factor 02/03/2024    Sciatica     Septic discitis of lumbar region     Spinal stenosis     Type 2 diabetes mellitus, uncontrolled     Wears contact lenses      Past Surgical History:   Procedure Laterality Date    APPENDECTOMY      BREAST BIOPSY Right     BREAST EXCISIONAL BIOPSY Right     CHOLECYSTECTOMY      COLON SURGERY      COLONOSCOPY      HYSTERECTOMY      LEG SURGERY      leg fracture surgery-Abstracted from [employer]    LUMBAR LAMINECTOMY DISCECTOMY DECOMPRESSION N/A 05/24/2019    Procedure: LUMBAR LAMINECTOMY L4-5 AND L5-S1;  Surgeon: Hipolito Kahn MD;  Location: [facility];  Service: Orthopedic Spine    LYMPH NODE BIOPSY      Jan 2020    MASTECTOMY Bilateral     MASTECTOMY  "W/ SENTINEL NODE BIOPSY Bilateral 2020    Procedure: SKIN SPARING MASTECTOMIES BILATERAL, SENTINEL NODE BIOPSY RIGHT;  Surgeon: Silvio Howard MD;  Location: [facility];  Service: General    SPINE SURGERY      May 2019    TONSILLECTOMY      TRIGGER POINT INJECTION      TUBAL ABDOMINAL LIGATION      VENOUS ACCESS DEVICE (PORT) REMOVAL N/A 2020    Procedure: REMOVAL PORT;  Surgeon: Silvio Howard MD;  Location: [facility];  Service: General;  Laterality: N/A;       Allergies   Allergen Reactions    Bactrim [Sulfamethoxazole-Trimethoprim] Rash     RASH, SKIN PEELING        Family History and Social History reviewed and changed as necessary    REVIEW OF SYSTEM:   Generalized arthralgias    PHYSICAL EXAM:  Petite, well-developed, well-nourished appearing female in no distress  No cervical, supraclavicular or axillary nodes palpable on exam  Chest wall exam is benign status post bilateral mastectomies, no abnormal masses, nodules, rashes or lesions  Respirations regular and unlabored, lungs clear to auscultation bilaterally  Heart rate appears irregular at times, normal rate  Extremities: No palpable lymph nodes or masses noted in the medial left upper extremity.  No bilateral lower extremity edema    Vitals:    10/16/24 1312   BP: 117/69   Pulse: 83   Resp: 18   Temp: 98.2 °F (36.8 °C)   Weight: 63 kg (139 lb)   Height: 167.6 cm (66\")     Vitals:    10/16/24 1312   PainSc: 0-No pain            ECOG score: 0         Vitals reviewed  Labs available in epic reviewed at time of visit along with note from [facility]ology office visit 3/28/2023.      ECO    Lab Results   Component Value Date    HGB 11.4 (L) 2023    HCT 33.1 (L) 2023    MCV 89.9 2023     (H) 2023    WBC 10.37 2023    NEUTROABS 8.22 (H) 2023    LYMPHSABS 1.06 2023    MONOSABS 0.86 2023    EOSABS 0.15 2023    BASOSABS 0.04 2023       Lab " Results   Component Value Date    GLUCOSE 99 03/21/2024    BUN 15 03/21/2024    CREATININE 0.75 03/21/2024     03/21/2024    K 4.3 03/21/2024     03/21/2024    CO2 25.4 03/21/2024    CALCIUM 10.3 03/21/2024    PROTEINTOT 7.4 03/21/2024    ALBUMIN 4.7 03/21/2024    BILITOT 0.4 03/21/2024    ALKPHOS 81 03/21/2024    AST 13 03/21/2024    ALT 8 03/21/2024             ASSESSMENT & PLAN:  1.  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  ER weakly 5% 1+ positive, DE 50% 1-2+ positive, HER-2/ashley 100% 3+ positive.  Negative cancer next panel  2.  Significant diabetes with predating neuropathy due to spinal stenosis status post laminectomy 5/24/2019 with persistent neuropathy dorsum left foot  3.  MRSA bacteremia due to port infection March 2020 after course 1 day 1 of Herceptin Taxol  4.  Covid 2/2023  5.  Atypical inflammatory/infectious process of the chest February 2023 treated with Augmentin x2 weeks with near complete resolution on CT chest 3/31/2023.  Following with pulmonology.  6.  Rheumatoid arthritis diagnosed March 2023, evaluation by Dr. Natividad Martinez.  Began methotrexate.  7.  GERD and dyspepsia    Oncology history timeline:  -5/24/2019 Dr. Garcia saw for spinal stenosis with radiculopathy and left foot drop due to herniated disc and performed decompressive laminectomy, L4-5 and L5-S1 discectomy and medial facetectomy  -10/15/2019 mammogram BI-RADS 0  -11/22/2019 right mammogram/ultrasound BI-RADS 4 with ultrasound-guided core biopsy showing invasive ductal carcinoma Askov score 6 out of 9 grade 2, ER 5% 1+ positive DE 50% 1-2+ positive, HER-2/ashley 100% 3+ positive.  -12/13/2019 MRI breasts revealed 2.2 cm right breast mass consistent with biopsy proven cancer with unsuspected adjacent area's of non-mass enhancement worrisome for DCIS.  Non-mass  enhancement in the subareolar left breast worrisome for DCIS.  Dominant focus left central portion left breast indeterminate BI-RADS 4.  Cancer next panel negative  -1/15/2020 CMP unremarkable save for glucose 200 with hemoglobin 10.6 normochromic normocytic indices  -1/21/2020 right skin sparing mastectomy with right sentinel lymph node injection and right deep subfascial sentinel lymph node biopsy with contralateral prophylactic left skin sparing mastectomy.  Right breast 1.8 cm Bloom Dias 8 out of 9, total of 4 lymph nodes examined 2 sentinel nodes all negative.  Pathological T1 cN0 M0 stage Ia.  Left breast benign with sclerosing adenosis.  -2/26/2020 started Herceptin Taxol weekly  -3/2/2020 port removed due to MRSA bacteremia with port infection/purulence.  -3/2/2020 through 3/6/2020 hospitalized for MRSA bacteremia from port.  -3/31/2020 [facility] tele-visit: Patient still on IV antibiotics for MRSA port infection.  Decision made to go with Kanjinti alone every 3 weeks along with Arimidex.  -4/13/2020 per ID, patient has completed IV antibiotics and is now on oral antibiotics.  -3/23/2021 completed 1 year Kanjinti.  Recommendation to complete 10 years of Arimidex if can tolerate.  5-7 years if not.    -6/16/2021 [facility] clinic follow-up:  Intolerant of Arimidex with hot flashes and mood swings.  Therapy switched to Letrozole.      -9/15/2021 [facility] follow-up:  She tolerated therapy with Letrozole better than she did with Arimidex but she still is having significant hot flashes and mood disturbance with easy agitation.  She is finding it difficult to work as stress makes these issues worse.  She is going to apply for disability custodial.  She is on Wellbutrin and Lexapro.  We discussed at length her options for hormonal blockade and she wants to continue trying.  At this time I will switch her to tamoxifen.  I spoke with our pharmacist Irma Arechiga, Pharm.D. about changing  her antidepressants as her current antidepressants can interfere with tamoxifen.  We will have her stop Wellbutrin, she will start Effexor 37.5 mg daily for 7 days and if tolerated will increase to 75 mg daily, if this is tolerated then we will consider increasing up to 150 mg daily.  I am hoping that Effexor will help mitigate some of her hot flashes along with helping her depression.  She will taper off of her Lexapro over 2 weeks and then stop.  We discussed counseling as I think this will be greatly beneficial to Luana with her dysthymia and helping to deal with stress at work and living with breast cancer.  She did not want me to make the referral but I did give her a brochure and she assures me that she will call and make an appointment.  I plan to call and check on her in about 3 weeks to see how she is tolerating the change in her antidepressants, we will also see if she has made an appointment with [facility] for counseling and to see how she is tolerating tamoxifen.  She is going to stop her Lexapro and wait about a week before starting tamoxifen so that she can tell if she is going to have any side effects with it.  I will see her back in 2 months for follow-up and sooner if needed.  We had planned on 5-7 years of therapy with hormonal blockade, I discussed with her today that with her ongoing side effects we would likely only do 5 years and hopefully we can get through that.  She was only weakly ER positive.    -10/14/2021 Did not tolerate Tamoxifen, went back to Letrozole    -11/17/2021 [facility] clinic follow-up: Luana is tolerating letrozole much better than tamoxifen.  She continues to have significant hot flashes, she is not sure the Effexor is helping.  She did have to go back to the 37.5 mg dose as she did not tolerate the 75 mg dose.  I discussed with her that she could switch to a different antidepressant as far as we were concerned, there is no interaction with the letrozole, we  had only switched her to Effexor when we were trying tamoxifen and also we had hoped it would help with her hot flashes.  She is following with Emeli Suero and will discuss with her at her next appointment.  I also recommended acupuncture for her hot flashes as there is data showing that this may be helpful.  We also discussed adding gabapentin but when we reviewed the potential side effects of constipation and lethargy she did not want to try that.  She continues to complain of low back pain with some neuropathy in her left lower extremity.  She does have a history of spinal stenosis, I will get an MRI of her lumbar spine for further evaluation and call her with the results.  Assuming there is no involvement of metastatic disease but just symptoms from her spinal stenosis she will need to follow-up with her neurosurgeon.  She has a history of laminectomy.    -5/18/2022 [facility] clinic follow-up: Luana continues to tolerate adjuvant therapy with letrozole, she was previously intolerant of anastrozole and tamoxifen.  She continues to have hot flashes but these seem to be more tolerable.  She has no new worrisome symptoms.  We plan on 5 years of adjuvant therapy with an AI.  She has had mastectomies therefore does not need mammograms.  She will need bone density repeated this fall for 2-year follow-up, previous bone density showed osteopenia.  She may benefit from bisphosphonate but we will see what her next bone density scan shows.  She does take calcium and vitamin D and remains active and walks regularly.  Her diabetes is under good control, she follows with endocrinology.  Her back pain is better, she is now on gabapentin under the direction of her surgeon.    -9/29/2022 [facility] follow-up: Luana called for an appointment due to not feeling well with upper back pain, fatigue and chest pain.  CT chest, bone scan, CBC, CMP ordered for evaluation.    -10/4/2022 total body bone scan negative for  osseous metastatic disease, benign/arthritic/degenerative/posttraumatic changes in the cervical spine, shoulders, acromioclavicular joints (moderate uptake), sternoclavicular joints, right greater than left, elbows, wrists and hands, thoracic spine, lumbosacral spine, sacroiliac joints, hips, knees, ankles and feet.  CT chest with small 3-4 mm bilateral pulmonary nodules.  Right apical groundglass and consolidative opacities could be posttreatment change versus infectious.  -10/6/2022 [facility] follow-up: Reviewed the above with the patient.  No signs of recurrent or metastatic breast cancer.  Will treat with Augmentin empirically.  Suspect most of her pain is due to her arthritis/degenerative changes, recommended she follow-up with PCP if no improvement, may benefit from rheumatology referral.  We will repeat CT chest in 3 months.    -1/17/2023: CT chest shows stable size of previously noted bilateral pulmonary nodules.  She does have what appears to be new finding of multiple focal nodular opacities in the bilateral upper lobes, favoring combination of postradiation change and/or infection however given his masslike appearance malignancy cannot be excluded.  I called Luana and discussed the results with her.  We will get a PET scan for further evaluation.  She also reports she is still having a daily headache and was actually going to see her primary care provider tomorrow as she feels she may have a sinus infection.  We will also get MRI of the brain for further evaluation.  I will send in a prescription for a Z-Vaughn.  She has a cough but no fever, no shortness of breath.  We will follow-up after her PET scan and MRI to go over the results and further plan of care.  If the PET is unrevealing Dr. Gomez recommends  referral to pulmonology for evaluation and possible bronchoscopy. Of note, she did not have radiation for her breast cancer which was 1.8 cm and node negative with clear  margins.    -1/30/2023 PET scan shows multiple irregular consolidative and subsolid mostly subpleural based densities in both lungs with corresponding hypermetabolism.  The appearance is most suggestive of infectious or inflammatory process, including entities such as organizing pneumonia.  The appearance would be atypical for lung metastasis.  There is also conspicuous focal hypermetabolism in the left medial clavicle at the sternoclavicular joint.  This is likely some underlying subchondral cystic change suggesting likely degenerative uptake, although the degree of hypermetabolism is slightly greater than would be expected for normal degenerative change and a solitary bony metastasis would be difficult to exclude.      -2/2/2023 Chest x-ray showed the multifocal ill-defined opacities in the lung suspicious for infection.  CT angiogram chest showed multifocal airspace opacities not changed from 1/30/2023 consistent with infection/inflammation with stable multiple small pulmonary nodules.  Duplex venous imaging left upper extremity normal.  Hemoglobin 11.4 with MCV 89.9 and platelets 569,000 with normal white count 10,370.  CMP unremarkable.  Normal lipase.  Normal proBNP.  Low uric acid 2.3.  Troponin negative.  EKG normal.    - 2/7/2023 MRI brain with and without contrast nonspecific small vessel changes and altered bone mineral density.  Left calvarial hemangioma.  MRI left clavicle shows osteoarthrosis of the sternoclavicular, acromioclavicular, and glenohumeral joints and a suspected rotator cuff tear.  Lipoma supraspinatus intramuscular.    -2/21/2023 [facility] medical oncology telemedicine follow-up: Generalized fatigue with mild cough and COVID-positive yesterday on Paxlovid previously given Augmentin by Dr. Lai for opacities in the lungs and due to follow-up with Dr. Lai in March.  Due to see rheumatology in March.  Thorough imaging as outlined above showed no hint of malignancy though lung abnormalities  cannot be 100% ruled out for malignancy but the vast likelihood is that this is infectious.  I will have her follow-up in April with my nurse practitioner and she continues on with Femara.    -3/30/2023 CT chest without contrast shows near complete resolution of previous identified rounded areas of consolidation in both lungs.  Residual minimal groundglass opacity and small semisolid nodules unchanged in the lower lungs.  Recommend 6-month follow-up chest CT.    -4/19/2023 [facility] On[facility] clinic follow-up: Luana overall is doing well on letrozole.  She has no evidence of recurrent breast cancer on clinical exam.  She began AI therapy March 2020 and we plan on at least 5 years adjuvant therapy.  I would consider BCI testing around that time to see whether or not she would benefit from extended adjuvant therapy as it has been challenging for her to take.  Atypical inflammation/infection in her lungs has cleared after Augmentin, CT of the chest on 3/30/2023 showed near complete resolution.  Recommendation to follow-up in 6 months.  Since we saw her last she also was diagnosed with rheumatoid arthritis and is following with Dr. Natividad Martinez.  She has been placed on methotrexate, she also was taking diclofenac as needed.  She has had an increase in acid reflux and dyspepsia, she does take omeprazole and also an occasional Tums.  We discussed that the diclofenac could be upsetting her stomach, she is going to hold that and will try topical diclofenac and she has this at home.  If she continues to have issues I have asked her to notify her primary care provider as she may need a different PPI or possibly referral to GI for consideration of EGD.    -10/18/2023 [facility] medical oncology follow-up: She continues on letrozole and tolerating it fairly well.  She has no evidence of recurrent cancer on clinical exam or worrisome symptoms.  She began AI therapy March 2020 and we plan on at least 5 years adjuvant therapy.  We will  "consider BCI testing around that time to see whether or not she would benefit from extended adjuvant therapy.  She had atypical inflammation/infection in her lungs with near complete resolution on CT of chest on 3/30/2023.  Recommendation to follow-up in 6 months.  I have ordered repeat CT chest today.  She continues to follow closely with Dr. Martinez for rheumatoid arthritis in which she is taking methotrexate.  She is having significant left shoulder pain due to cervical arthritis.  She has tried cervical collar and physical therapy.  She also does meditation and yoga.  She has been referred to pain management.  She had DEXA scan December 2022 that showed osteopenia.  She is taking calcium and vitamin D daily.  We will repeat DEXA scan December 2024.  We will see her back in 6 months.  She will notify us sooner if any issues or concerns.    -4/17/2024 [facility] clinic follow-up: Luana overall is doing well, she has no evidence of recurrent breast cancer on clinical exam and no new worrisome symptoms.  She is tolerating letrozole fairly well, she began in March 2020 on AI therapy.  She admits to looking forward to when she can stop AI therapy as she thinks that her quality of life will improve as far as her arthralgias.  She does have RA and follows with rheumatology and is currently on therapy with Orencia.  We discussed today whether or not we would want to consider doing BCI testing to see whether or not she would benefit from extended adjuvant therapy or if she has a major \"hate\" factor with AI therapy then I would be comfortable with stopping at 5 years.  I think she will likely want to do BCI testing to help put her mind at ease.  In the meantime for now she will continue letrozole unchanged.  She has had bilateral mastectomies therefore no need for mammograms.  She will be due for DEXA scan again sometime after December.  Her heart rate seems irregular but she is completely asymptomatic.  I did review " previous ECGs available in epic and she has a history of PACs on prior ECG in February 2023 that was resolved on follow-up ECG that same day.  I discussed with her that she should make sure she mentions to her primary care provider on follow-up. She had CT of the chest in November 2023 for follow-up on previous scarring and inflammation, according to evaluation with pulmonary they felt this was residual scarring from areas of inflammation but there was nothing new or worrisome.  No further follow-up was recommended.    -10/16/2024 [facility] oncology clinic follow-up: Luana stopped her letrozole approximately July 3, 2024. She started AI March 2020.  She is feeling much better off of the letrozole.  She agrees to breast cancer index testing to determine if there would be benefit of extended endocrine therapy.  She would prefer not to restart the letrozole but if breast cancer index shows statistical benefit then she will consider restarting.  She does have generalized arthralgias but the arthralgias are much improved off the letrozole.  She continues to be followed by rheumatology for RA and is currently on Orencia.  She has had bilateral mastectomy therefore no need for mammograms.  Order placed for bone density scan due December 2024.  We will contact her with results of breast cancer index testing when available.  Return to clinic in 6 months for follow-up.      I spent 38 minutes caring for Luana on this date of service. This time includes time spent by me in the following activities: preparing for the visit, performing a medically appropriate examination and/or evaluation, counseling and educating the patient/family/caregiver, documenting information in the medical record, ordering test(s), and reviewing a separately obtained history        Kala Menendez, CEC[facility]    10/16/2024

## 2024-10-21 ENCOUNTER — TELEPHONE (OUTPATIENT)
Dept: ONCOLOGY | Facility: CLINIC | Age: 64
End: 2024-10-21
Payer: COMMERCIAL

## 2024-11-01 RX ORDER — PEN NEEDLE, DIABETIC 32GX 5/32"
NEEDLE, DISPOSABLE MISCELLANEOUS
Qty: 100 EACH | Refills: 2 | Status: SHIPPED | OUTPATIENT
Start: 2024-11-01

## 2024-11-04 ENCOUNTER — OFFICE VISIT (OUTPATIENT)
Dept: GYNECOLOGIC ONCOLOGY | Facility: CLINIC | Age: 64
End: 2024-11-04
Payer: COMMERCIAL

## 2024-11-04 VITALS
WEIGHT: 137.8 LBS | DIASTOLIC BLOOD PRESSURE: 79 MMHG | TEMPERATURE: 98.2 F | HEIGHT: 66 IN | BODY MASS INDEX: 22.14 KG/M2 | OXYGEN SATURATION: 99 % | SYSTOLIC BLOOD PRESSURE: 122 MMHG | RESPIRATION RATE: 18 BRPM | HEART RATE: 84 BPM

## 2024-11-04 DIAGNOSIS — Z01.419 WELL WOMAN EXAM WITH ROUTINE GYNECOLOGICAL EXAM: Primary | ICD-10-CM

## 2024-11-04 DIAGNOSIS — N90.4 LICHEN SCLEROSUS OF FEMALE GENITALIA: ICD-10-CM

## 2024-11-04 DIAGNOSIS — Z87.891 PERSONAL HISTORY OF TOBACCO USE: ICD-10-CM

## 2024-11-04 DIAGNOSIS — Z85.3 HISTORY OF BREAST CANCER: ICD-10-CM

## 2024-11-04 PROCEDURE — 99396 PREV VISIT EST AGE 40-64: CPT | Performed by: NURSE PRACTITIONER

## 2024-11-04 NOTE — PROGRESS NOTES
GYN ONCOLOGY ANNUAL WELL WOMAN VISIT      Luana Chawla  [id number]  1960      Subjective   Chief Complaint: Annual Exam     Answers submitted by the patient for this visit:  Other (Submitted on 11/2/2024)  Please describe your symptoms.: NA  Have you had these symptoms before?: No  How long have you been having these symptoms?: 1-4 days  Please list any medications you are currently taking for this condition.: Clobetasol  Please describe any probable cause for these symptoms. : None  Primary Reason for Visit (Submitted on 11/2/2024)  What is the primary reason for your visit?: Problem Not Listed        History of present illness:   Luana Chawla is a 62 y.o. year old female who is here today for an annual exam. She lives in [place]. She has a personal history of breast cancer (outlined below) and lichens sclerosus. She is s/p bilateral mastectomies without reconstruction thus far. She is followed by our medical oncology team, seen last by CECI Michele earlier this month. She began AI therapy in 3/2020, and d/c in 7/2024 due to intolerable s/e. Feeling much better (arthralgias) off letrozole. Wonders if AI therapy exacerbated or has any association with development of RA (see below). Med onc provider and pt discussed breast cancer index testing to determine if there would be benefit of extended endocrine therapy. Results still pending. She would prefer not to restart the letrozole but if breast cancer index shows statistical benefit then she will consider restarting.   She is s/p vaginal hysterectomy (maintains ovaries) completed 20+ years ago. This was for AUB, not cancer dx. She uses clobetasol for management of lichens sclerosus. This is efficacious when she uses consistently, but often does not apply routinely. Declines needing RF at this time. She reports she is feeling very well today and has no complaints. She denies vaginal bleeding, pelvic pain, changes in bowel or bladder  function, new or concerning lesions, and chest wall problems. All well woman screenings are currently UTD, see health maintenance below. Due for low dose chest CT for lung cancer screening this month.  She is a former cigarette smoker, 40 pack-year history.  She does currently vape daily.  Last chest CT without contrast was completed 11/2023 to follow-up on inflammation/infection noted on 9 scan 6 months prior.  Radiology report notes a small new approximately 1 x 2.5 cm area of scarring or possibly infection associated with calcified right lower lobe granuloma.  Mild active inflammation is favored.  Stable mild lung scarring and scattered small pulmonary parenchymal nodules elsewhere.  No other new or progressive chest disease identified.  She was diagnosed with RA in 3/2023.       Cancer History:   Oncology/Hematology History Overview Note   1.  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  ER weakly 5% 1+ positive, MA 50% 1-2+ positive, HER-2/ashley 100% 3+ positive.  Negative cancer next panel  2.  Significant diabetes with predating neuropathy due to spinal stenosis status post laminectomy 5/24/2019 with persistent neuropathy dorsum left foot  3.  MRSA bacteremia due to port infection March 2020 after course 1 day 1 of Herceptin Taxol  4.  Covid 2/2023  5.  Atypical inflammatory/infectious process of the chest February 2023 treated with Augmentin x2 weeks with near complete resolution on CT chest 3/31/2023.  Following with pulmonology.  6.  Rheumatoid arthritis diagnosed March 2023, evaluation by Dr. Natividad Martinez.  Began methotrexate.    Oncology history timeline:  -5/24/2019 Dr. Garcia saw for spinal stenosis with radiculopathy and left foot drop due to herniated disc and performed decompressive laminectomy, L4-5 and L5-S1 discectomy and medial  facetectomy  -10/15/2019 mammogram BI-RADS 0  -11/22/2019 right mammogram/ultrasound BI-RADS 4 with ultrasound-guided core biopsy showing invasive ductal carcinoma Cory score 6 out of 9 grade 2, ER 5% 1+ positive NJ 50% 1-2+ positive, HER-2/ashley 100% 3+ positive.  -12/13/2019 MRI breasts revealed 2.2 cm right breast mass consistent with biopsy proven cancer with unsuspected adjacent area's of non-mass enhancement worrisome for DCIS.  Non-mass enhancement in the subareolar left breast worrisome for DCIS.  Dominant focus left central portion left breast indeterminate BI-RADS 4.  Cancer next panel negative  -1/15/2020 CMP unremarkable save for glucose 200 with hemoglobin 10.6 normochromic normocytic indices  -1/21/2020 right skin sparing mastectomy with right sentinel lymph node injection and right deep subfascial sentinel lymph node biopsy with contralateral prophylactic left skin sparing mastectomy.  Right breast 1.8 cm Bloom Dias 8 out of 9, total of 4 lymph nodes examined 2 sentinel nodes all negative.  Pathological T1 cN0 M0 stage Ia.  Left breast benign with sclerosing adenosis.  -2/26/2020 started Herceptin Taxol weekly  -3/2/2020 port removed due to MRSA bacteremia with port infection/purulence.  -3/2/2020 through 3/6/2020 hospitalized for MRSA bacteremia from port.  -3/31/2020 [facility] tele-visit: Patient still on IV antibiotics for MRSA port infection.  Decision made to go with Kanjinti alone every 3 weeks along with Arimidex.  -4/13/2020 per ID, patient has completed IV antibiotics and is now on oral antibiotics.  -3/23/2021 completed 1 year Kanjinti.  Recommendation to complete 10 years of Arimidex if can tolerate.  5-7 years if not.    -6/16/2021 [facility] follow-up:  Intolerant of Arimidex with hot flashes and mood swings.  Therapy switched to Letrozole.      -9/15/2021 [facility] follow-up:  She tolerated therapy with Letrozole better than she did with  Arimidex but she still is having significant hot flashes and mood disturbance with easy agitation.  She is finding it difficult to work as stress makes these issues worse.  She is going to apply for disability [employer].  She is on Wellbutrin and Lexapro.  We discussed at length her options for hormonal blockade and she wants to continue trying.  At this time I will switch her to tamoxifen.  I spoke with our pharmacist Irma Arechiga, Pharm.D. about changing her antidepressants as her current antidepressants can interfere with tamoxifen.  We will have her stop Wellbutrin, she will start Effexor 37.5 mg daily for 7 days and if tolerated will increase to 75 mg daily, if this is tolerated then we will consider increasing up to 150 mg daily.  I am hoping that Effexor will help mitigate some of her hot flashes along with helping her depression.  She will taper off of her Lexapro over 2 weeks and then stop.  We discussed counseling as I think this will be greatly beneficial to Luana with her dysthymia and helping to deal with stress at work and living with breast cancer.  She did not want me to make the referral but I did give her a brochure and she assures me that she will call and make an appointment.  I plan to call and check on her in about 3 weeks to see how she is tolerating the change in her antidepressants, we will also see if she has made an appointment with CECI Vega for counseling and to see how she is tolerating tamoxifen.  She is going to stop her Lexapro and wait about a week before starting tamoxifen so that she can tell if she is going to have any side effects with it.  I will see her back in 2 months for follow-up and sooner if needed.  We had planned on 5-7 years of therapy with hormonal blockade, I discussed with her today that with her ongoing side effects we would likely only do 5 years and hopefully we can get through that.  She was only weakly ER positive.    -10/14/2021 Did not tolerate  Tamoxifen, went back to Letrozole    -11/17/2021 [facility] clinic follow-up: Luana is tolerating letrozole much better than tamoxifen.  She continues to have significant hot flashes, she is not sure the Effexor is helping.  She did have to go back to the 37.5 mg dose as she did not tolerate the 75 mg dose.  I discussed with her that she could switch to a different antidepressant as far as we were concerned, there is no interaction with the letrozole, we had only switched her to Effexor when we were trying tamoxifen and also we had hoped it would help with her hot flashes.  She is following with Emeli Suero and will discuss with her at her next appointment.  I also recommended acupuncture for her hot flashes as there is data showing that this may be helpful.  We also discussed adding gabapentin but when we reviewed the potential side effects of constipation and lethargy she did not want to try that.  She continues to complain of low back pain with some neuropathy in her left lower extremity.  She does have a history of spinal stenosis, I will get an MRI of her lumbar spine for further evaluation and call her with the results.  Assuming there is no involvement of metastatic disease but just symptoms from her spinal stenosis she will need to follow-up with her neurosurgeon.  She has a history of laminectomy.    -5/18/2022 [facility] oncology clinic follow-up: Luana continues to tolerate adjuvant therapy with letrozole, she was previously intolerant of anastrozole and tamoxifen.  She continues to have hot flashes but these seem to be more tolerable.  She has no new worrisome symptoms.  We plan on 5 years of adjuvant therapy with an AI.  She has had mastectomies therefore does not need mammograms.  She will need bone density repeated this fall for 2-year follow-up, previous bone density showed osteopenia.  She may benefit from bisphosphonate but we will see what her next bone density scan shows.  She does take calcium  and vitamin D and remains active and walks regularly.  Her diabetes is under good control, she follows with endocrinology.  Her back pain is better, she is now on gabapentin under the direction of her surgeon.    -9/29/2022 [facility] follow-up: Luana called for an appointment due to not feeling well with upper back pain, fatigue and chest pain.  CT chest, bone scan, CBC, CMP ordered for evaluation.    -10/4/2022 total body bone scan negative for osseous metastatic disease, benign/arthritic/degenerative/posttraumatic changes in the cervical spine, shoulders, acromioclavicular joints (moderate uptake), sternoclavicular joints, right greater than left, elbows, wrists and hands, thoracic spine, lumbosacral spine, sacroiliac joints, hips, knees, ankles and feet.  CT chest with small 3-4 mm bilateral pulmonary nodules.  Right apical groundglass and consolidative opacities could be posttreatment change versus infectious.  -10/6/2022 [facility] follow-up: Reviewed the above with the patient.  No signs of recurrent or metastatic breast cancer.  Will treat with Augmentin empirically.  Suspect most of her pain is due to her arthritis/degenerative changes, recommended she follow-up with PCP if no improvement, may benefit from rheumatology referral.  We will repeat CT chest in 3 months.    -1/17/2023: CT chest shows stable size of previously noted bilateral pulmonary nodules.  She does have what appears to be new finding of multiple focal nodular opacities in the bilateral upper lobes, favoring combination of postradiation change and/or infection however given his masslike appearance malignancy cannot be excluded.  I called Luana and discussed the results with her.  We will get a PET scan for further evaluation.  She also reports she is still having a daily headache and was actually going to see her primary care provider tomorrow as she feels she may have a sinus infection.  We will also get MRI of the  brain for further evaluation.  I will send in a prescription for a Z-Vaughn.  She has a cough but no fever, no shortness of breath.  We will follow-up after her PET scan and MRI to go over the results and further plan of care.  If the PET is unrevealing Dr. Gomez recommends  referral to pulmonology for evaluation and possible bronchoscopy. Of note, she did not have radiation for her breast cancer which was 1.8 cm and node negative with clear margins.    -1/30/2023 PET scan shows multiple irregular consolidative and subsolid mostly subpleural based densities in both lungs with corresponding hypermetabolism.  The appearance is most suggestive of infectious or inflammatory process, including entities such as organizing pneumonia.  The appearance would be atypical for lung metastasis.  There is also conspicuous focal hypermetabolism in the left medial clavicle at the sternoclavicular joint.  This is likely some underlying subchondral cystic change suggesting likely degenerative uptake, although the degree of hypermetabolism is slightly greater than would be expected for normal degenerative change and a solitary bony metastasis would be difficult to exclude.      -2/2/2023 Chest x-ray showed the multifocal ill-defined opacities in the lung suspicious for infection.  CT angiogram chest showed multifocal airspace opacities not changed from 1/30/2023 consistent with infection/inflammation with stable multiple small pulmonary nodules.  Duplex venous imaging left upper extremity normal.  Hemoglobin 11.4 with MCV 89.9 and platelets 569,000 with normal white count 10,370.  CMP unremarkable.  Normal lipase.  Normal proBNP.  Low uric acid 2.3.  Troponin negative.  EKG normal.    - 2/7/2023 MRI brain with and without contrast nonspecific small vessel changes and altered bone mineral density.  Left calvarial hemangioma.  MRI left clavicle shows osteoarthrosis of the sternoclavicular, acromioclavicular, and glenohumeral joints and a  suspected rotator cuff tear.  Lipoma supraspinatus intramuscular.    -3/30/2023 CT chest without contrast shows near complete resolution of previous identified rounded areas of consolidation in both lungs.  Residual minimal groundglass opacity and small semisolid nodules unchanged in the lower lungs.  Recommend 6-month follow-up chest CT.    -4/19/2023 [facility] clinic follow-up: Luana overall is doing well on letrozole.  She has no evidence of recurrent breast cancer on clinical exam.  She began AI therapy March 2020 and we plan on at least 5 years adjuvant therapy.  I would consider BCI testing around that time to see whether or not she would benefit from extended adjuvant therapy as it has been challenging for her to take.  Atypical inflammation/infection in her lungs has cleared after Augmentin, CT of the chest on 3/30/2023 showed near complete resolution.  Recommendation to follow-up in 6 months.  Since we saw her last she also was diagnosed with rheumatoid arthritis and is following with Dr. Natividad Martinez.  She has been placed on methotrexate, she also was taking diclofenac as needed.  She has had an increase in acid reflux and dyspepsia, she does take omeprazole and also an occasional Tums.  We discussed that the diclofenac could be upsetting her stomach, she is going to hold that and will try topical diclofenac and she has this at home.  If she continues to have issues I have asked her to notify her primary care provider as she may need a different PPI or possibly referral to GI for consideration of EGD.    -10/18/2023 [facility] medical oncology follow-up: She continues on letrozole and tolerating it fairly well.  She has no evidence of recurrent cancer on clinical exam or worrisome symptoms.  She began AI therapy March 2020 and we plan on at least 5 years adjuvant therapy.  We will consider BCI testing around that time to see whether or not she would benefit from extended adjuvant therapy.  She had  "atypical inflammation/infection in her lungs with near complete resolution on CT of chest on 3/30/2023.  Recommendation to follow-up in 6 months.  I have ordered repeat CT chest today.  She continues to follow closely with Dr. Martinez for rheumatoid arthritis in which she is taking methotrexate.  She is having significant left shoulder pain due to cervical arthritis.  She has tried cervical collar and physical therapy.  She also does meditation and yoga.  She has been referred to pain management.  She had DEXA scan December 2022 that showed osteopenia.  She is taking calcium and vitamin D daily.  We will repeat DEXA scan December 2024.  We will see her back in 6 months.  She will notify us sooner if any issues or concerns.    -4/17/2024 [facility] clinic follow-up: Luana overall is doing well, she has no evidence of recurrent breast cancer on clinical exam and no new worrisome symptoms.  She is tolerating letrozole fairly well, she began in March 2020 on AI therapy.  She admits to looking forward to when she can stop AI therapy as she thinks that her quality of life will improve as far as her arthralgias.  She does have RA and follows with rheumatology and is currently on therapy with Orencia.  We discussed today whether or not we would want to consider doing BCI testing to see whether or not she would benefit from extended adjuvant therapy or if she has a major \"hate\" factor with AI therapy then I would be comfortable with stopping at 5 years.  I think she will likely want to do BCI testing to help put her mind at ease.  In the meantime for now she will continue letrozole unchanged.  She has had bilateral mastectomies therefore no need for mammograms.  She will be due for DEXA scan again sometime after December.  Her heart rate seems irregular but she is completely asymptomatic.  I did review previous ECGs available in epic and she has a history of PACs on prior ECG in February 2023 that was resolved on " follow-up ECG that same day.  I discussed with her that she should make sure she mentions to her primary care provider on follow-up. She had CT of the chest in November 2023 for follow-up on previous scarring and inflammation, according to evaluation with pulmonary they felt this was residual scarring from areas of inflammation but there was nothing new or worrisome.  No further follow-up was recommended.    -10/16/2024 [facility] oncology clinic follow-up: Luana stopped her letrozole approximately July 3, 2024. She started AI March 2020.  She is feeling much better off of the letrozole.  She agrees to breast cancer index testing to determine if there would be benefit of extended endocrine therapy.  She would prefer not to restart the letrozole but if breast cancer index shows statistical benefit then she will consider restarting.  She does have generalized arthralgias but the arthralgias are much improved off the letrozole.  She continues to be followed by rheumatology for RA and is currently on Orencia.  She has had bilateral mastectomy therefore no need for mammograms.  Order placed for bone density scan due December 2024.  We will contact her with results of breast cancer index testing when available.  Return to clinic in 6 months for follow-up.     Malignant neoplasm of upper-outer quadrant of right breast in female, estrogen receptor positive   1/16/2019 Cancer Staged    Staging form: Breast, AJCC 8th Edition  - Clinical stage from 1/16/2019: Stage IB (cT2, cN0, cM0, G2, ER+, ME+, HER2+) - Signed by Lisa Herman MD on 1/27/2020 1/21/2020 Initial Diagnosis    Malignant neoplasm of upper-outer quadrant of right female breast (CMS/HCC)     2/11/2020 Cancer Staged    Staging form: Breast, AJCC 8th Edition  - Pathologic: Stage IA (pT1c, pN0, cM0, G3, ER+, ME+, HER2+) - Signed by Hero Gomez MD on 2/11/2020 2/18/2020 -  Other Event    Echocardiogram  Left ventricular systolic function is normal.  Estimated EF = 55%.  The global longitudinal strain was -19.5%.     2/25/2020 - 2/26/2020 Chemotherapy    OP CENTRAL VENOUS ACCESS DEVICE ACCESS, CARE, AND MAINTENANCE (CVAD)     2/26/2020 - 3/10/2020 Chemotherapy    OP BREAST PACLitaxel / Trastuzumab-anns (Weekly X 12)     3/2/2020 Adverse Reaction    MRSA bacteremia due to port infection with port removal after day 1 course 1 Herceptin Taxol     3/31/2020 -  Hormonal Therapy    Arimidex for planned 5 years.    6/16/2021 therapy changed to Letrozole due to intolerance to Arimidex.  9/15/2021 changed to Tamoxifen kruse to intolerance of letrozole.  10/14/2021 changed back to Letrozole due to intolerance of tamoxifen.       4/2/2020 -  Chemotherapy    Completed 3/23/2021  OP BREAST Trastuzumab-anns Q21D (maintenance)     5/27/2020 -  Other Event    5/27/2020 echocardiogram EF 55%      7/23/2020 Procedure    Colonoscopy with Dr. Marte, normal     8/26/2020 -  Other Event    Echocardiogram   This was a limited echocardiogram to assess left ventricular ejection fraction in the setting of chemotherapy.  Left ventricular systolic function is normal. Calculated EF = 55.1%. Estimated EF = 55%. Global Longitudinal LV strain = -18.2%.  Estimated EF = 55%.     11/9/2020 Imaging    DEXA bone density IMPRESSION:  Osteopenia of the femoral necks bilaterally, and total hips  bilaterally.  Lowest T score -2.0 of the right femoral neck.     11/10/2020 -  Other Event    Echocardogram   This was a limited echocardiogram to assess left ventricular function only.  Left ventricular systolic function is normal. Left ventricular ejection fraction appears to be 56 - 60%.  Global longitudinal LV strain (GLS) = 19.7%.     2/23/2021 Imaging    -2/23/2021 left hip 2 view x-ray negative     12/1/2021 Imaging    MRI of the lumbar spine with and without contrast: Overall no significant interval change in the appearance of the lumbar spine since previous 5/7/2020 comparison study.  There are  "postoperative changes at L5-S1 with enhancing epidural scar in the left lateral recess and there are multilevel degenerative changes with canal in foraminal encroachment.     2022 Imaging    DEXA bone density scan with osteopenia of the right femoral neck and total right hip.  Lowest T score -2.4 of the right femoral neck.  Compared to previous there was an increase in bone mineral density of the L1-L4 vertebrae by 1.8% and a decrease in the bone mineral density of the total right hip by 4.4%.         Obstetric History:  OB History          1    Para   1    Term   1            AB        Living             SAB        IAB        Ectopic        Molar        Multiple        Live Births                   Menstrual History:     No LMP recorded (lmp unknown). Patient has had a hysterectomy.          The current medication list and allergy list were reviewed and reconciled.     Past Medical History, Past Surgical History, Social History, Family History have been reviewed and are without significant changes except as mentioned.    Health Maintenance:  see EMR. Last mammogram was n/a. Last colonoscopy was 2020 by Dr Marte, with recommended follow-up in 5-10yrs. Pathology from biopsies was benign year(s). Last DEXA was 2022, showing Osteopenia of the right femoral neck, and total right hip.. Last pap smear was 10-, results were  normal PAP.. She  does not have a history of abnormal pap smears.  Last CT chest without contrast was performed 2023.       Review of Systems   Constitutional: Negative.    Gastrointestinal: Negative.    Genitourinary: Negative.    Psychiatric/Behavioral: Negative.           Objective   Physical Exam  Vital Signs: /79   Pulse 84   Temp 98.2 °F (36.8 °C) (Temporal)   Resp 18   Ht 167.6 cm (66\")   Wt 62.5 kg (137 lb 12.8 oz)   LMP  (LMP Unknown)   SpO2 99%   BMI 22.24 kg/m²   Vitals:    24 1011   PainSc: 0-No pain           General Appearance:  " "alert, cooperative, no apparent distress, appears stated age, and normal weight   Neurologic/Psych: A&O x 3, gait steady, appropriate affect   Lungs:   Clear to auscultation bilaterally; respirations regular, even, and unlabored bilaterally   Heart:  Regular rate and rhythm, no murmurs appreciated   Breasts:  surgically absent bilaterally. No chest wall masses or abnormalities bilaterally    Abdomen:   Soft, non-tender, non-distended, and no organomegaly   Lymph nodes: No cervical, supraclavicular, inguinal or axillary adenopathy noted   Extremities: Normal, atraumatic; no clubbing, cyanosis, or edema    Skin: No rashes, ulcers, or suspicious lesions noted   Pelvic: External Genitalia  atrophic, without lesions, changes consistent with lichens sclerosus, well controlled  Vagina  is pale, atrophic.   Vaginal Cuff  Female Vaginal Cuff: smooth, intact and without visible lesions  Uterus  surgically absent  Ovaries  without palpable masses or fullness  Parametria  smooth  Rectovaginal  Female rectovaginal: deferred     ECOG score: 0             Result Review :    Tumor marker:  No results found for: \"\"    PHQ-9 Total Score:      Procedure Note:          Assessment and Plan:      -We will plan to complete a Pap smear next year when she returns for her annual exam. Discussed guideline recommendations for paps after age of 65, no prev abnormalities, and s/p hysterectomy. Patient wishes to continue yearly Pap smears.  -Follow up with PCP, medical oncology team, and any other specialist as scheduled   -DEXA has been scheduled for 1-9-25. Recommend q2 years, recently d/c hormone blockade  -Mammograms no longer needed, status post bilateral mastectomies.  -Lichen sclerosus appears to be well controlled presently. Continue clobetasol weekly to three times weekly for suppression. May use topical steroid to once daily or BID for acute flare-ups PRN.   -Repeat colonoscopy in 5-10 years per GI recommendations   -repeat LDCT " for lung screening will be due next week, order placed     Diagnoses and all orders for this visit:    1. Well woman exam with routine gynecological exam (Primary)    2. History of breast cancer    3. Lichen sclerosus of female genitalia    4. Personal history of tobacco use  -      CT Chest Low Dose Cancer Screening WO; Future      Pain assessment was performed today as a part of patient’s care. For patients with pain related to surgery, gynecologic malignancy or cancer treatment, the plan is as noted in the assessment/plan.  For patients with pain not related to these issues, they are to seek any further needed care from a more appropriate provider, such as PCP.         Follow-up:    Return to clinic in 1 year for Annual exam and repeat pap smear.      Electronically signed by CECI Riley on 11/04/2024

## 2024-11-05 ENCOUNTER — TELEPHONE (OUTPATIENT)
Dept: ONCOLOGY | Facility: CLINIC | Age: 64
End: 2024-11-05
Payer: COMMERCIAL

## 2024-11-05 DIAGNOSIS — Z17.0 MALIGNANT NEOPLASM OF UPPER-OUTER QUADRANT OF RIGHT BREAST IN FEMALE, ESTROGEN RECEPTOR POSITIVE: Primary | ICD-10-CM

## 2024-11-05 DIAGNOSIS — N90.4 LICHEN SCLEROSUS OF FEMALE GENITALIA: Primary | ICD-10-CM

## 2024-11-05 DIAGNOSIS — C50.411 MALIGNANT NEOPLASM OF UPPER-OUTER QUADRANT OF RIGHT BREAST IN FEMALE, ESTROGEN RECEPTOR POSITIVE: Primary | ICD-10-CM

## 2024-11-05 RX ORDER — CLOBETASOL PROPIONATE 0.5 MG/G
OINTMENT TOPICAL 2 TIMES DAILY
Qty: 60 G | Refills: 2 | Status: SHIPPED | OUTPATIENT
Start: 2024-11-05

## 2024-11-05 RX ORDER — LETROZOLE 2.5 MG/1
2.5 TABLET, FILM COATED ORAL DAILY
Qty: 30 TABLET | Refills: 11 | Status: SHIPPED | OUTPATIENT
Start: 2024-11-05

## 2024-11-05 NOTE — TELEPHONE ENCOUNTER
I called and discussed her BCI results, she states that she has felt much better off of AI therapy since July.  She is willing to try once again, she will take for 1 month and see how she feels and let us know.  Prescription sent to her pharmacy for letrozole.

## 2024-11-20 ENCOUNTER — HOSPITAL ENCOUNTER (OUTPATIENT)
Dept: CT IMAGING | Facility: HOSPITAL | Age: 64
Discharge: HOME | End: 2024-11-20
Admitting: NURSE PRACTITIONER
Payer: COMMERCIAL

## 2024-11-20 DIAGNOSIS — Z87.891 PERSONAL HISTORY OF TOBACCO USE: ICD-10-CM

## 2024-11-20 PROCEDURE — 71271 CT THORAX LUNG CANCER SCR C-: CPT

## 2024-12-09 ENCOUNTER — PRIOR AUTHORIZATION (OUTPATIENT)
Dept: ENDOCRINOLOGY | Facility: CLINIC | Age: 64
End: 2024-12-09
Payer: MEDICARE

## 2024-12-09 ENCOUNTER — OFFICE VISIT (OUTPATIENT)
Dept: FAMILY MEDICINE CLINIC | Facility: CLINIC | Age: 64
End: 2024-12-09
Payer: MEDICARE

## 2024-12-09 VITALS
DIASTOLIC BLOOD PRESSURE: 84 MMHG | BODY MASS INDEX: 22.18 KG/M2 | WEIGHT: 138 LBS | HEART RATE: 56 BPM | SYSTOLIC BLOOD PRESSURE: 120 MMHG | HEIGHT: 66 IN | OXYGEN SATURATION: 96 %

## 2024-12-09 DIAGNOSIS — F41.9 ANXIETY: Primary | ICD-10-CM

## 2024-12-09 DIAGNOSIS — L25.9 CONTACT DERMATITIS, UNSPECIFIED CONTACT DERMATITIS TYPE, UNSPECIFIED TRIGGER: ICD-10-CM

## 2024-12-09 PROCEDURE — 3079F DIAST BP 80-89 MM HG: CPT | Performed by: FAMILY MEDICINE

## 2024-12-09 PROCEDURE — 99213 OFFICE O/P EST LOW 20 MIN: CPT | Performed by: FAMILY MEDICINE

## 2024-12-09 PROCEDURE — 3051F HG A1C>EQUAL 7.0%<8.0%: CPT | Performed by: FAMILY MEDICINE

## 2024-12-09 PROCEDURE — 1159F MED LIST DOCD IN RCRD: CPT | Performed by: FAMILY MEDICINE

## 2024-12-09 PROCEDURE — 3074F SYST BP LT 130 MM HG: CPT | Performed by: FAMILY MEDICINE

## 2024-12-09 PROCEDURE — 1160F RVW MEDS BY RX/DR IN RCRD: CPT | Performed by: FAMILY MEDICINE

## 2024-12-09 PROCEDURE — 1126F AMNT PAIN NOTED NONE PRSNT: CPT | Performed by: FAMILY MEDICINE

## 2024-12-09 RX ORDER — ALPRAZOLAM 0.25 MG/1
0.25 TABLET ORAL 2 TIMES DAILY PRN
Qty: 60 TABLET | Refills: 1 | Status: SHIPPED | OUTPATIENT
Start: 2024-12-09

## 2024-12-09 RX ORDER — LOTEPREDNOL ETABONATE 2.5 MG/ML
SUSPENSION/ DROPS OPHTHALMIC
COMMUNITY
Start: 2024-12-04

## 2024-12-09 RX ORDER — HYDROCORTISONE 25 MG/G
1 CREAM TOPICAL 2 TIMES DAILY
Qty: 30 G | Refills: 0 | Status: SHIPPED | OUTPATIENT
Start: 2024-12-09

## 2024-12-09 NOTE — PROGRESS NOTES
Answers submitted by the patient for this visit:  Primary Reason for Visit (Submitted on 2024)  What is the primary reason for your visit?: Anxiety  Anxiety (Submitted on 2024)  Chief Complaint: Anxiety  Visit: initial  Onset: 1 to 6 months ago  Progression since onset: comes and goes  Frequency: most days  Severity: moderate  chest pain: No  confusion: No  depressed mood: Yes  dizziness: No  dry mouth: No  excessive worry: Yes  insomnia: No  irritability: Yes  malaise/fatigue: Yes  nausea: No  obsessions: No  palpitations: No  shortness of breath: No  Sleep quality: good  Aggravated by: family issues      Follow Up Office Visit      Date: 2024   Patient Name: Luana Chawla  : 1960   MRN: [id number]   PCP: Patient Care Team:  Adilene Bonner DO as PCP - General (Family Medicine)  Silvio Howard MD as Consulting Physician (General Surgery)  Jose Cummings MD as Consulting Physician (Endocrinology)  Dutch Chávez MD as Consulting Physician (Infectious Diseases)  Kandis Suero APRN as Nurse Practitioner (Psychiatry)  Carole Lai DO as Consulting Physician (Pulmonary Disease)  Natividad Martinez MD as Consulting Physician (Rheumatology)  Kari Orozco APRN as Nurse Practitioner (Gynecologic Oncology)     Chief Complaint:    Chief Complaint   Patient presents with    Anxiety       History of Present Illness: Luana Chawla is a 63 y.o. female who is here today for anxiety.  Patient is having to care for her elderly mother without other family support.  Patient reports mother is fallen recently just increased anxiety throughout her life.  Patient just wants something to help get through the next few months.  Anxiety is impacting patient's sleep patient reports only sleeping 5 maybe 6 hours a night.    Subjective      Review of Systems:   Review of Systems   Constitutional:  Negative for activity change and diaphoresis.   HENT:  Negative  for congestion and sinus pressure.    Eyes:  Negative for redness.   Respiratory:  Negative for shortness of breath.    Cardiovascular:  Negative for chest pain and palpitations.   Gastrointestinal:  Negative for diarrhea and nausea.   Genitourinary:  Negative for difficulty urinating.   Neurological:  Negative for dizziness and tremors.   Psychiatric/Behavioral:  Negative for agitation and confusion. The patient is nervous/anxious.         I have reviewed the patients family history, social history, past medical history, past surgical history and have updated it as appropriate.     Medications:     Current Outpatient Medications:     albuterol sulfate  (90 Base) MCG/ACT inhaler, INHALE TWO PUFFS BY MOUTH EVERY 4 HOURS AS NEEDED, Disp: 8.5 g, Rfl: 0    Alpha Lipoic Acid 200 MG capsule, Take 400 mg by mouth 3 (Three) Times a Day., Disp: 180 capsule, Rfl: 1    ascorbic acid (VITAMIN C) 100 MG tablet, Take 1 tablet by mouth Daily., Disp: , Rfl:     Calcium Carb-Cholecalciferol (CALCIUM 1000 + D PO), Take 1 tablet by mouth Daily., Disp: , Rfl:     clobetasol (TEMOVATE) 0.05 % ointment, Apply  topically to the appropriate area as directed 2 (Two) Times a Day., Disp: 60 g, Rfl: 2    cyclobenzaprine (FLEXERIL) 10 MG tablet, Take 1 tablet by mouth 3 (Three) Times a Day As Needed for Muscle Spasms., Disp: 90 tablet, Rfl: 3    Dietary Management Product (Rheumate) capsule, Take 1 capsule by mouth Daily., Disp: 90 capsule, Rfl: 0    escitalopram (Lexapro) 10 MG tablet, 1 tablet., Disp: , Rfl:     Eysuvis 0.25 % suspension, , Disp: , Rfl:     folic acid (FOLVITE) 1 MG tablet, , Disp: , Rfl:     gabapentin (NEURONTIN) 100 MG capsule, Take 1 capsule by mouth 3 (Three) Times a Day., Disp: , Rfl:     glucose blood (Accu-Chek Sasha Plus) test strip, USE ONE STRIP TO TEST BLOOD SUGAR DAILY. Dx code e11.65, Disp: 50 each, Rfl: 3    hydrOXYzine (ATARAX) 25 MG tablet, Take 1 tablet by mouth 3 (Three) Times a Day As Needed for  Anxiety., Disp: 30 tablet, Rfl: 1    Insulin Glargine (BASAGLAR KWIKPEN) 100 UNIT/ML injection pen, USE DAILY TO TITRATE TA FASTING GLUCOSE LESS THAN 120  UP TO MAX DAILY DOSE OF 50 UNITS, Disp: 45 mL, Rfl: 3    Insulin Pen Needle (BD Pen Needle Radha 2nd Gen) 32G X 4 MM misc, USE 1 PEN NEEDLE DAILY, Disp: 100 each, Rfl: 2    letrozole (FEMARA) 2.5 MG tablet, Take 1 tablet by mouth Daily., Disp: 30 tablet, Rfl: 11    lidocaine (LIDODERM) 5 %, Place 1 patch on the skin as directed by provider Daily. Remove & Discard patch within 12 hours or as directed by MD, Disp: 60 patch, Rfl: 0    lidocaine-prilocaine (EMLA) 2.5-2.5 % cream, Apply 1 Application topically to the appropriate area as directed Every 2 (Two) Hours As Needed for Mild Pain., Disp: 1 g, Rfl: 1    loratadine (CLARITIN) 10 MG tablet, Take 1 tablet by mouth Daily., Disp: , Rfl:     Magnesium 400 MG tablet, Take 1 tablet by mouth Daily., Disp: , Rfl:     meloxicam (MOBIC) 15 MG tablet, Take 1 tablet by mouth Daily., Disp: , Rfl:     metFORMIN ER (GLUCOPHAGE-XR) 500 MG 24 hr tablet, Take 2 tablets by mouth 2 (Two) Times a Day., Disp: 360 tablet, Rfl: 3    methotrexate 2.5 MG tablet, , Disp: , Rfl:     omeprazole (priLOSEC) 40 MG capsule, Take 1 capsule by mouth Every Morning Before Breakfast., Disp: 90 capsule, Rfl: 1    Orencia ClickJect 125 MG/ML solution auto-injector, 1 mL. Once a week, Disp: , Rfl:     Saccharomyces boulardii (Probiotic) 250 MG capsule, Take  by mouth., Disp: , Rfl:     Semaglutide, 2 MG/DOSE, (Ozempic, 2 MG/DOSE,) 8 MG/3ML solution pen-injector, Inject 2 mg under the skin into the appropriate area as directed Every 7 (Seven) Days., Disp: 9 mL, Rfl: 3    Turmeric 500 MG tablet, Take  by mouth., Disp: , Rfl:     Vevye 0.1 % solution, INSTILL 1 DROP IN EACH EYE TWO TIMES A DAY, Disp: , Rfl:     vitamin B-12 (CYANOCOBALAMIN) 1000 MCG tablet, Take 1 tablet by mouth Daily., Disp: , Rfl:     vitamin B-6 (PYRIDOXINE) 100 MG tablet, Take 1 tablet  "by mouth Daily., Disp: 30 tablet, Rfl: 0    ALPRAZolam (Xanax) 0.25 MG tablet, Take 1 tablet by mouth 2 (Two) Times a Day As Needed for Anxiety., Disp: 60 tablet, Rfl: 1    hydrocortisone 2.5 % cream, Apply 1 Application topically to the appropriate area as directed 2 (Two) Times a Day., Disp: 30 g, Rfl: 0    Allergies:   Allergies   Allergen Reactions    Bactrim [Sulfamethoxazole-Trimethoprim] Rash     RASH, SKIN PEELING        Objective     Physical Exam: Please see above  Vital Signs:   Vitals:    12/09/24 0919   BP: 120/84   BP Location: Right arm   Patient Position: Sitting   Cuff Size: Adult   Pulse: 56   SpO2: 96%   Weight: 62.6 kg (138 lb)   Height: 167.6 cm (66\")   PainSc: 0-No pain     Body mass index is 22.27 kg/m².  BMI is within normal parameters. No other follow-up for BMI required.       Physical Exam  Vitals and nursing note reviewed.   HENT:      Head: Normocephalic.      Nose: Nose normal.   Eyes:      Pupils: Pupils are equal, round, and reactive to light.   Cardiovascular:      Rate and Rhythm: Normal rate and regular rhythm.   Pulmonary:      Effort: Pulmonary effort is normal.      Breath sounds: Normal breath sounds.   Abdominal:      General: Bowel sounds are normal.   Musculoskeletal:         General: Normal range of motion.      Cervical back: Normal range of motion.   Skin:     General: Skin is warm and dry.      Findings: Rash present.   Neurological:      General: No focal deficit present.      Mental Status: She is alert and oriented to person, place, and time.   Psychiatric:         Mood and Affect: Mood normal. Mood is anxious.         Procedures    Results:   Labs:   Hemoglobin A1C   Date Value Ref Range Status   09/18/2024 7.0 (A) 4.5 - 5.7 % Final   03/03/2020 7.70 (H) 4.80 - 5.60 % Final     TSH   Date Value Ref Range Status   03/21/2024 1.520 0.270 - 4.200 uIU/mL Final        POCT Results (if applicable):   Results for orders placed or performed in visit on 09/18/24   POC " Glucose, Blood    Collection Time: 09/18/24 10:48 AM    Specimen: Blood   Result Value Ref Range    Glucose 215 (A) 70 - 130 mg/dL    Lot Number [id number]     Expiration Date 03/09/25    POC Glycosylated Hemoglobin (Hb A1C)    Collection Time: 09/18/24 10:48 AM    Specimen: Blood   Result Value Ref Range    Hemoglobin A1C 7.0 (A) 4.5 - 5.7 %    Lot Number [id number]     Expiration Date 06/10/26        Assessment / Plan      Assessment/Plan:   Diagnoses and all orders for this visit:    1. Anxiety (Primary)  -     ALPRAZolam (Xanax) 0.25 MG tablet; Take 1 tablet by mouth 2 (Two) Times a Day As Needed for Anxiety.  Dispense: 60 tablet; Refill: 1    2. Contact dermatitis, unspecified contact dermatitis type, unspecified trigger  -     hydrocortisone 2.5 % cream; Apply 1 Application topically to the appropriate area as directed 2 (Two) Times a Day.  Dispense: 30 g; Refill: 0        Follow Up:   Return in about 3 months (around 3/9/2025) for Recheck.      CECI Harris  [facility]   Electronically signed by CECI Harris, 12/09/24, 9:27 AM EST.

## 2024-12-09 NOTE — TELEPHONE ENCOUNTER
Luana Audreyamadaangela (Key: [id number])  Rx #: [id number]  Ozempic (2 MG/DOSE) 8MG/3ML pen-injectors  Form  [employer] Electronic [employer] Form  Created  22 hours ago  Sent to Plan  1 minute ago  Plan Response  1 minute ago  Submit Clinical Questions  1 minute ago  Determination  Favorable  less than a minute ago  Message from Plan  PA Case: [id number], Status: Approved, Coverage Starts on: 1/1/2024 12:00:00 AM, Coverage Ends on: 12/31/2025 12:00:00 AM. Questions? Contact [phone].. Authorization Expiration Date: December 31, 2025.

## 2024-12-18 ENCOUNTER — OFFICE VISIT (OUTPATIENT)
Dept: ENDOCRINOLOGY | Facility: CLINIC | Age: 64
End: 2024-12-18
Payer: MEDICARE

## 2024-12-18 VITALS
DIASTOLIC BLOOD PRESSURE: 80 MMHG | BODY MASS INDEX: 22.18 KG/M2 | HEIGHT: 66 IN | OXYGEN SATURATION: 98 % | SYSTOLIC BLOOD PRESSURE: 120 MMHG | HEART RATE: 71 BPM | WEIGHT: 138 LBS

## 2024-12-18 DIAGNOSIS — E11.65 UNCONTROLLED TYPE 2 DIABETES MELLITUS WITH HYPERGLYCEMIA: Primary | ICD-10-CM

## 2024-12-18 DIAGNOSIS — I10 BENIGN HYPERTENSION: ICD-10-CM

## 2024-12-18 LAB
EXPIRATION DATE: ABNORMAL
EXPIRATION DATE: ABNORMAL
GLUCOSE BLDC GLUCOMTR-MCNC: 178 MG/DL (ref 70–130)
HBA1C MFR BLD: 7.7 % (ref 4.5–5.7)
Lab: ABNORMAL
Lab: ABNORMAL

## 2024-12-18 RX ORDER — ACYCLOVIR 400 MG/1
1 TABLET ORAL
Start: 2024-12-18

## 2024-12-18 RX ORDER — ESCITALOPRAM OXALATE 20 MG/1
TABLET ORAL
COMMUNITY
Start: 2024-12-14

## 2024-12-18 NOTE — PROGRESS NOTES
"     Office Note      Date: 2024  Patient Name: Luana Chawla  MRN: [id number]  : 1960    Chief Complaint   Patient presents with    Diabetes       History of Present Illness:   Luana Chawla is a 64 y.o. female who presents for Diabetes type 2. Diagnosed in: . Treated in past with insulin. Current treatments: metformin, ozempic and basal insulin. Number of insulin shots per day: 1. Checks blood sugar 1 time a day.  Has low blood sugar: occasional. Aspirin use: No - due to easy bruising/nosebleeds. Statin use: No - lipids have been okay. ACE-I/ARB use: No. Changes in health since last visit: none. Last eye exam 3/2024.      Subjective      Diabetic Complications:  Eyes: No  Kidneys: No  Feet: No  Heart: No    Diet and Exercise:  Meals per day: 3  Minutes of exercise per week: 150 mins.    Review of Systems:   Review of Systems   Constitutional: Negative.  Positive for fatigue.   Cardiovascular: Negative.    Gastrointestinal: Negative.    Endocrine: Negative.        The following portions of the patient's history were reviewed and updated as appropriate: allergies, current medications, past family history, past medical history, past social history, past surgical history, and problem list.    Objective     Visit Vitals  /80   Pulse 71   Ht 167.6 cm (66\")   Wt 62.6 kg (138 lb)   LMP  (LMP Unknown)   SpO2 98%   BMI 22.27 kg/m²       Physical Exam:  Physical Exam  Constitutional:       Appearance: Normal appearance.   Neurological:      Mental Status: She is alert.         Labs:    HbA1c  Lab Results   Component Value Date    HGBA1C 7.7 (A) 2024       CMP  Lab Results   Component Value Date    GLUCOSE 99 2024    BUN 15 2024    CREATININE 0.75 2024    EGFRIFNONA 117 2021    EGFRIFAFRI 110 2020    BCR 20.0 2024    K 4.3 2024    CO2 25.4 2024    CALCIUM 10.3 2024    PROTENTOTREF 6.5 2020    LABIL2 1.7 2020    " "AST 13 03/21/2024    ALT 8 03/21/2024        Lipid Panel  Lab Results   Component Value Date    HDL Cholesterol 79 (H) 03/21/2024    LDL Cholesterol  87 03/21/2024    LDL/HDL Ratio 1.10 03/21/2024    Triglycerides 61 03/21/2024        TSH  Lab Results   Component Value Date    TSH 1.520 03/21/2024        Hemoglobin A1C  No components found for: \"HGBA1C\"     Microalbumin/Creatinine  Lab Results   Component Value Date    MALBCRERATIO  03/21/2024      Comment:      Unable to calculate    MICROALBUR <1.2 03/21/2024           Assessment / Plan      Assessment & Plan:  Diagnoses and all orders for this visit:    1. Uncontrolled type 2 diabetes mellitus with hyperglycemia (Primary)  Assessment & Plan:  Diabetes is worsening. A1c increased but not too bad.  Continue current treatment regimen.  We discussed CGM today.  Diabetes will be reassessed in 6 months.    Orders:  -     POC Glucose, Blood  -     POC Glycosylated Hemoglobin (Hb A1C)    2. Benign hypertension  Assessment & Plan:  Hypertension is stable and controlled  Continue current treatment regimen.  Blood pressure will be reassessed in 6 months.      Other orders  -     Continuous Glucose Sensor (Dexcom G7 Sensor) misc; Use 1 each Every 10 (Ten) Days.      Current Outpatient Medications   Medication Instructions    albuterol sulfate  (90 Base) MCG/ACT inhaler INHALE TWO PUFFS BY MOUTH EVERY 4 HOURS AS NEEDED    Alpha Lipoic Acid 400 mg, Oral, 3 Times Daily    ALPRAZolam (XANAX) 0.25 mg, Oral, 2 Times Daily PRN    ascorbic acid (VITAMIN C) 100 mg, Daily    Calcium Carb-Cholecalciferol (CALCIUM 1000 + D PO) 1 tablet, Daily    clobetasol (TEMOVATE) 0.05 % ointment Topical, 2 Times Daily    Continuous Glucose Sensor (Dexcom G7 Sensor) misc 1 each, Not Applicable, Every 10 Days    cyclobenzaprine (FLEXERIL) 10 mg, Oral, 3 Times Daily PRN    Dietary Management Product (Rheumate) capsule 1 capsule, Oral, Daily    escitalopram (LEXAPRO) 20 MG tablet     Eysuvis 0.25 " % suspension     folic acid (FOLVITE) 1 MG tablet No dose, route, or frequency recorded.    gabapentin (NEURONTIN) 100 mg, 3 Times Daily    glucose blood (Accu-Chek Sasha Plus) test strip USE ONE STRIP TO TEST BLOOD SUGAR DAILY. Dx code e11.65    hydrocortisone 2.5 % cream 1 Application, Topical, 2 Times Daily    hydrOXYzine (ATARAX) 25 mg, Oral, 3 Times Daily PRN    Insulin Glargine (BASAGLAR KWIKPEN) 100 UNIT/ML injection pen USE DAILY TO TITRATE TA FASTING GLUCOSE LESS THAN 120  UP TO MAX DAILY DOSE OF 50 UNITS    Insulin Pen Needle (BD Pen Needle Radha 2nd Gen) 32G X 4 MM misc USE 1 PEN NEEDLE DAILY    letrozole (FEMARA) 2.5 mg, Oral, Daily    lidocaine (LIDODERM) 5 % 1 patch, Transdermal, Every 24 Hours, Remove & Discard patch within 12 hours or as directed by MD    lidocaine-prilocaine (EMLA) 2.5-2.5 % cream 1 Application, Topical, Every 2 Hours PRN    loratadine (CLARITIN) 10 mg, Daily    Magnesium 400 MG tablet 1 tablet, Daily    meloxicam (MOBIC) 15 mg, Daily    metFORMIN ER (GLUCOPHAGE-XR) 1,000 mg, Oral, 2 Times Daily    methotrexate 2.5 MG tablet No dose, route, or frequency recorded.    omeprazole (PRILOSEC) 40 mg, Oral, Every Morning Before Breakfast    Orencia ClickJect 125 mg    Ozempic (2 MG/DOSE) 2 mg, Subcutaneous, Every 7 Days    Saccharomyces boulardii (Probiotic) 250 MG capsule Take  by mouth.    Turmeric 500 MG tablet Take  by mouth.    Vevye 0.1 % solution INSTILL 1 DROP IN EACH EYE TWO TIMES A DAY    vitamin B-12 (CYANOCOBALAMIN) 1,000 mcg, Daily    vitamin B-6 (PYRIDOXINE) 100 mg, Oral, Daily      Return in about 3 months (around 3/18/2025) for Recheck with A1c, CMP, lipid, TSH, microalbumin, foot exam.    Electronically signed by: Jose Cummings MD  12/18/2024

## 2024-12-18 NOTE — ASSESSMENT & PLAN NOTE
Diabetes is worsening. A1c increased but not too bad.  Continue current treatment regimen.  We discussed CGM today.  Diabetes will be reassessed in 6 months.

## 2024-12-20 ENCOUNTER — TELEPHONE (OUTPATIENT)
Dept: ENDOCRINOLOGY | Facility: CLINIC | Age: 64
End: 2024-12-20
Payer: MEDICARE

## 2025-01-02 RX ORDER — ACYCLOVIR 400 MG/1
1 TABLET ORAL
Qty: 9 EACH | Refills: 2 | Status: SHIPPED | OUTPATIENT
Start: 2025-01-02

## 2025-01-02 NOTE — TELEPHONE ENCOUNTER
Rx Refill Note  Requested Prescriptions      No prescriptions requested or ordered in this encounter          Last office visit with prescribing clinician: 12/18/2024     Next office visit with prescribing clinician: 3/21/2025         Cami Grimm MA  01/02/25, 10:39 EST

## 2025-01-08 ENCOUNTER — PRIOR AUTHORIZATION (OUTPATIENT)
Dept: ENDOCRINOLOGY | Facility: CLINIC | Age: 65
End: 2025-01-08
Payer: MEDICARE

## 2025-01-08 NOTE — TELEPHONE ENCOUNTER
PA for Dexcom G7 Sensors submitted via [facility].    DVT ppx: hep subq  Diet: Carb consistent  f/u w/ SW in AM regarding nursing home placement as per family request since family unable to take care of pt

## 2025-01-10 NOTE — TELEPHONE ENCOUNTER
Luana Abdi (Key: [id number])  PA Case ID #: [id number]  Rx #: [id number]  Need Help? Call us at [phone]  Outcome  [facility]  PA Case: [id number], Status: Approved, Coverage Starts on: 1/1/2025 12:00:00 AM, Coverage Ends on: 12/31/2025 12:00:00 AM. Questions? Contact [phone].  Authorization Expiration Date: 12/30/2025  Drug  Dexcom G7 Sensor  ePA cloud logo  Form  [employer] Form

## 2025-01-27 DIAGNOSIS — K21.9 GASTROESOPHAGEAL REFLUX DISEASE, UNSPECIFIED WHETHER ESOPHAGITIS PRESENT: ICD-10-CM

## 2025-01-27 RX ORDER — OMEPRAZOLE 40 MG/1
40 CAPSULE, DELAYED RELEASE ORAL
Qty: 60 CAPSULE | Refills: 0 | Status: SHIPPED | OUTPATIENT
Start: 2025-01-27

## 2025-02-24 RX ORDER — BLOOD SUGAR DIAGNOSTIC
STRIP MISCELLANEOUS
Qty: 50 EACH | Refills: 4 | Status: SHIPPED | OUTPATIENT
Start: 2025-02-24

## 2025-02-24 NOTE — TELEPHONE ENCOUNTER
Rx Refill Note  Requested Prescriptions     Pending Prescriptions Disp Refills    glucose blood (Accu-Chek Sasha Plus) test strip [Pharmacy Med Name: ACCU-CHEK SASHA PLUS TEST STRP] 50 each 4     Sig: USE ONE STRIP TO TEST BLOOD SUGAR DAILY.      Last office visit with prescribing clinician: 12/18/2024     Next office visit with prescribing clinician: 3/21/2025     Yoly Pearson MA  02/24/25, 10:18 EST

## 2025-03-10 ENCOUNTER — OFFICE VISIT (OUTPATIENT)
Dept: FAMILY MEDICINE CLINIC | Facility: CLINIC | Age: 65
End: 2025-03-10
Payer: MEDICARE

## 2025-03-10 VITALS
SYSTOLIC BLOOD PRESSURE: 138 MMHG | BODY MASS INDEX: 22.76 KG/M2 | HEIGHT: 66 IN | HEART RATE: 81 BPM | WEIGHT: 141.6 LBS | OXYGEN SATURATION: 96 % | DIASTOLIC BLOOD PRESSURE: 74 MMHG

## 2025-03-10 DIAGNOSIS — E11.65 UNCONTROLLED TYPE 2 DIABETES MELLITUS WITH HYPERGLYCEMIA: ICD-10-CM

## 2025-03-10 DIAGNOSIS — F41.9 ANXIETY: ICD-10-CM

## 2025-03-10 DIAGNOSIS — I10 BENIGN HYPERTENSION: Primary | ICD-10-CM

## 2025-03-10 DIAGNOSIS — J30.1 SEASONAL ALLERGIC RHINITIS DUE TO POLLEN: ICD-10-CM

## 2025-03-10 PROCEDURE — 3078F DIAST BP <80 MM HG: CPT | Performed by: STUDENT IN AN ORGANIZED HEALTH CARE EDUCATION/TRAINING PROGRAM

## 2025-03-10 PROCEDURE — 3075F SYST BP GE 130 - 139MM HG: CPT | Performed by: STUDENT IN AN ORGANIZED HEALTH CARE EDUCATION/TRAINING PROGRAM

## 2025-03-10 PROCEDURE — 1125F AMNT PAIN NOTED PAIN PRSNT: CPT | Performed by: STUDENT IN AN ORGANIZED HEALTH CARE EDUCATION/TRAINING PROGRAM

## 2025-03-10 PROCEDURE — 99214 OFFICE O/P EST MOD 30 MIN: CPT | Performed by: STUDENT IN AN ORGANIZED HEALTH CARE EDUCATION/TRAINING PROGRAM

## 2025-03-10 PROCEDURE — 1159F MED LIST DOCD IN RCRD: CPT | Performed by: STUDENT IN AN ORGANIZED HEALTH CARE EDUCATION/TRAINING PROGRAM

## 2025-03-10 PROCEDURE — 1160F RVW MEDS BY RX/DR IN RCRD: CPT | Performed by: STUDENT IN AN ORGANIZED HEALTH CARE EDUCATION/TRAINING PROGRAM

## 2025-03-10 RX ORDER — AZELASTINE HYDROCHLORIDE, FLUTICASONE PROPIONATE 137; 50 UG/1; UG/1
1 SPRAY, METERED NASAL DAILY
Qty: 23 G | Refills: 1 | Status: SHIPPED | OUTPATIENT
Start: 2025-03-10

## 2025-03-10 NOTE — ASSESSMENT & PLAN NOTE
Overall life stressors are starting to calm down.  Her mom has gone to an assisted living location.  She does still have some family stressors that cause anxiety.  She will continue Lexapro.  She is using Xanax extremely sparingly.  Did discuss with her if she has ongoing need of a benzodiazepine would prefer to do a longer acting medication such as Ativan or Klonopin.  She would be agreeable to trying that.

## 2025-03-10 NOTE — ASSESSMENT & PLAN NOTE
Continue Claritin try adding in the Astelin and fluticasone combo nasal spray to see if that improves symptoms

## 2025-03-10 NOTE — ASSESSMENT & PLAN NOTE
Managed by endocrinology but patient did provide urine sample for the microalbumin to creatinine ratio today.

## 2025-03-10 NOTE — PROGRESS NOTES
Office Note     Name: Luana Chawla    : 1960     MRN: [id number]     Chief Complaint  Diabetes (F/u) and Hypertension    Subjective     History of Present Illness:  Luana Chawla is a 64 y.o. female who presents today for:    HTN  -compliant with meds  -Denies chest pain, shortness of air, dizziness, vision changes, presyncope or syncope    Allergic rhinitis  -takes daily claritin  -inhaler as needed     Anxiety  -taking lexapro  -was given xanax in December by float aprn   -taking xanax very rarely     Past Medical History:   Past Medical History:   Diagnosis Date    Arthritis     Benign hypertension 10/22/2020    Breast cancer     Cholelithiasis     Chronic pain disorder     Diabetes mellitus     Diverticulitis     GERD (gastroesophageal reflux disease)     Hypokalemia     Joint pain     Kidney stone     Lichen sclerosus     Long term (current) use of insulin     Lumbosacral disc disease     Malignant neoplasm of upper-outer quadrant of right female breast 2020    Microalbuminuria     Neck pain     Nephrolithiasis     Osteoarthritis     Osteopenia     Rheumatoid arthritis with positive rheumatoid factor 2024    Sciatica     Septic discitis of lumbar region 3/5/2020    Spinal stenosis     Type 2 diabetes mellitus, uncontrolled     Wears contact lenses        Past Surgical History:   Past Surgical History:   Procedure Laterality Date    APPENDECTOMY      BREAST BIOPSY Right     BREAST EXCISIONAL BIOPSY Right     CHOLECYSTECTOMY      COLON SURGERY      COLONOSCOPY      HYSTERECTOMY      LEG SURGERY      leg fracture surgery-Abstracted from [employer]    LUMBAR LAMINECTOMY DISCECTOMY DECOMPRESSION N/A 2019    Procedure: LUMBAR LAMINECTOMY L4-5 AND L5-S1;  Surgeon: Hipolito Kahn MD;  Location: [facility];  Service: Orthopedic Spine    LYMPH NODE BIOPSY      2020    MASTECTOMY Bilateral     MASTECTOMY W/ SENTINEL NODE BIOPSY Bilateral 2020    Procedure:  SKIN SPARING MASTECTOMIES BILATERAL, SENTINEL NODE BIOPSY RIGHT;  Surgeon: Silvio Howard MD;  Location: [facility] OR;  Service: General    SPINE SURGERY      May 2019    TONSILLECTOMY      TRIGGER POINT INJECTION  8/24    TUBAL ABDOMINAL LIGATION      VENOUS ACCESS DEVICE (PORT) REMOVAL N/A 03/03/2020    Procedure: REMOVAL PORT;  Surgeon: Silvio Howard MD;  Location: [facility] OR;  Service: General;  Laterality: N/A;       Immunizations:   Immunization History   Administered Date(s) Administered    ABRYSVO (RSV, 60+ or pregnant women 32-36 wks) 10/12/2023    COVID-19 (PFIZER) 12YRS+ (COMIRNATY) 10/17/2023, 10/26/2024    COVID-19 (PFIZER) BIVALENT 12+YRS 09/19/2022    COVID-19 (PFIZER) Purple Cap Monovalent 03/10/2021, 04/07/2021, 08/24/2021    Covid-19 (Pfizer) Gray Cap Monovalent 04/18/2022    Flublock Quad =>18yrs 10/03/2020    Flublok 18+yrs 10/13/2019, 10/28/2021, 10/13/2022, 10/12/2023    Fluzone  >6mos 11/12/2014, 10/19/2015, 10/31/2016, 10/26/2024    Fluzone (or Fluarix & Flulaval for VFC) >6mos 11/02/2018    Fluzone Quad >6mos (Multi-dose) 11/08/2017    Hepatitis A 05/11/2018, 11/19/2018    Hepatitis B 11/29/2019, 10/26/2022    Hepatitis B Adult/Adolescent IM 08/22/2022    Influenza, Unspecified 10/11/2023    Shingrix 11/27/2020    Td (TDVAX) 09/10/2003    Tdap 07/28/2014        Medications:     Current Outpatient Medications:     albuterol sulfate  (90 Base) MCG/ACT inhaler, INHALE TWO PUFFS BY MOUTH EVERY 4 HOURS AS NEEDED, Disp: 8.5 g, Rfl: 0    ALPRAZolam (Xanax) 0.25 MG tablet, Take 1 tablet by mouth 2 (Two) Times a Day As Needed for Anxiety., Disp: 60 tablet, Rfl: 1    ascorbic acid (VITAMIN C) 100 MG tablet, Take 1 tablet by mouth Daily., Disp: , Rfl:     Calcium Carb-Cholecalciferol (CALCIUM 1000 + D PO), Take 1 tablet by mouth Daily., Disp: , Rfl:     clobetasol (TEMOVATE) 0.05 % ointment, Apply  topically to the appropriate area as directed 2 (Two) Times a Day., Disp: 60 g, Rfl:  2    cyclobenzaprine (FLEXERIL) 10 MG tablet, Take 1 tablet by mouth 3 (Three) Times a Day As Needed for Muscle Spasms., Disp: 90 tablet, Rfl: 3    Dietary Management Product (Rheumate) capsule, Take 1 capsule by mouth Daily., Disp: 90 capsule, Rfl: 0    escitalopram (LEXAPRO) 20 MG tablet, , Disp: , Rfl:     folic acid (FOLVITE) 1 MG tablet, , Disp: , Rfl:     glucose blood (Accu-Chek Sasha Plus) test strip, USE ONE STRIP TO TEST BLOOD SUGAR DAILY., Disp: 50 each, Rfl: 4    hydrocortisone 2.5 % cream, Apply 1 Application topically to the appropriate area as directed 2 (Two) Times a Day., Disp: 30 g, Rfl: 0    hydrOXYzine (ATARAX) 25 MG tablet, Take 1 tablet by mouth 3 (Three) Times a Day As Needed for Anxiety., Disp: 30 tablet, Rfl: 1    Insulin Glargine (BASAGLAR KWIKPEN) 100 UNIT/ML injection pen, USE DAILY TO TITRATE TA FASTING GLUCOSE LESS THAN 120  UP TO MAX DAILY DOSE OF 50 UNITS, Disp: 45 mL, Rfl: 3    Insulin Pen Needle (BD Pen Needle Radha 2nd Gen) 32G X 4 MM misc, USE 1 PEN NEEDLE DAILY, Disp: 100 each, Rfl: 2    letrozole (FEMARA) 2.5 MG tablet, Take 1 tablet by mouth Daily., Disp: 30 tablet, Rfl: 11    lidocaine (LIDODERM) 5 %, Place 1 patch on the skin as directed by provider Daily. Remove & Discard patch within 12 hours or as directed by MD, Disp: 60 patch, Rfl: 0    lidocaine-prilocaine (EMLA) 2.5-2.5 % cream, Apply 1 Application topically to the appropriate area as directed Every 2 (Two) Hours As Needed for Mild Pain., Disp: 1 g, Rfl: 1    loratadine (CLARITIN) 10 MG tablet, Take 1 tablet by mouth Daily., Disp: , Rfl:     Magnesium 400 MG tablet, Take 1 tablet by mouth Daily., Disp: , Rfl:     meloxicam (MOBIC) 15 MG tablet, Take 1 tablet by mouth Daily., Disp: , Rfl:     metFORMIN ER (GLUCOPHAGE-XR) 500 MG 24 hr tablet, Take 2 tablets by mouth 2 (Two) Times a Day., Disp: 360 tablet, Rfl: 3    methotrexate 2.5 MG tablet, , Disp: , Rfl:     omeprazole (priLOSEC) 40 MG capsule, Take 1 capsule by mouth  "Every Morning Before Breakfast., Disp: 60 capsule, Rfl: 0    Saccharomyces boulardii (Probiotic) 250 MG capsule, Take  by mouth., Disp: , Rfl:     Semaglutide, 2 MG/DOSE, (Ozempic, 2 MG/DOSE,) 8 MG/3ML solution pen-injector, Inject 2 mg under the skin into the appropriate area as directed Every 7 (Seven) Days., Disp: 9 mL, Rfl: 3    Turmeric 500 MG tablet, Take  by mouth., Disp: , Rfl:     Azelastine-Fluticasone 137-50 MCG/ACT suspension, Administer 1 spray into the nostril(s) as directed by provider Daily., Disp: 23 g, Rfl: 1    Allergies:   Allergies   Allergen Reactions    Bactrim [Sulfamethoxazole-Trimethoprim] Rash     RASH, SKIN PEELING        Family History:   Family History   Problem Relation Age of Onset    Breast cancer Mother 70    Diabetes Mother     Cancer Mother     Heart disease Father     Hypertension Father     Diabetes Sister     Ovarian cancer Neg Hx        Social History:   Social History     Socioeconomic History    Marital status: Single   Tobacco Use    Smoking status: Former     Current packs/day: 0.00     Average packs/day: 1 pack/day for 27.2 years (27.2 ttl pk-yrs)     Types: Cigarettes, Electronic Cigarette     Start date: 3/13/1990     Quit date: 2017     Years since quittin.8     Passive exposure: Past    Smokeless tobacco: Never    Tobacco comments:     currently use nicotine e-cig.   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    Weight as of this encounter: 64.2 kg (141 lb 9.6 oz).    BMI is within normal parameters. No other follow-up for BMI required.      Physical Exam  Constitutional:       General: She is not in acute distress.     Appearance: Normal appearance.   HENT:      Head: Normocephalic and atraumatic.   Eyes:      Conjunctiva/sclera: Conjunctivae normal.   Cardiovascular:      Rate and Rhythm: Normal rate and regular rhythm.   Pulmonary:      Effort: Pulmonary effort is normal.      Breath sounds: Normal breath sounds.   Skin:     General: Skin is warm and dry.   Neurological:      Mental Status: She is alert.   Psychiatric:         Mood and Affect: Mood normal.         Behavior: Behavior normal.         Thought Content: Thought content normal.          Assessment and Plan     Diagnoses and all orders for this visit:    1. Benign hypertension (Primary)  Assessment & Plan:  Hypertension is stable and controlled  Continue current treatment regimen.  Blood pressure will be reassessed in 6 months.      2. Uncontrolled type 2 diabetes mellitus with hyperglycemia  Assessment & Plan:  Managed by endocrinology but patient did provide urine sample for the microalbumin to creatinine ratio today.      Orders:  -     Microalbumin / Creatinine Urine Ratio - Urine, Clean Catch    3. Seasonal allergic rhinitis due to pollen  Assessment & Plan:  Continue Claritin try adding in the Astelin and fluticasone combo nasal spray to see if that improves symptoms    Orders:  -     Azelastine-Fluticasone 137-50 MCG/ACT suspension; Administer 1 spray into the nostril(s) as directed by provider Daily.  Dispense: 23 g; Refill: 1    4. Anxiety  Assessment & Plan:  Overall life stressors are starting to calm down.  Her mom has gone to an assisted living location.  She does still have some family stressors that cause anxiety.  She will continue Lexapro.  She is using Xanax " extremely sparingly.  Did discuss with her if she has ongoing need of a benzodiazepine would prefer to do a longer acting medication such as Ativan or Klonopin.  She would be agreeable to trying that.                   Follow Up  Return in about 6 months (around 9/10/2025) for Next scheduled follow up.    DO GIANNI Piedra [facility]  [address] [facility] [id number] [phone]    NOTE TO PATIENT: The 21st Century Cures Act makes medical notes like these available to patients in the interest of transparency. However, be advised this is a medical document. It is intended as peer to peer communication. It is written in medical language and may contain abbreviations or verbiage that are unfamiliar. It may appear blunt or direct. Medical documents are intended to carry relevant information, facts as evident, and the clinical opinion of the practitioner.

## 2025-03-11 LAB
ALBUMIN/CREAT UR: <5 MG/G CREAT (ref 0–29)
CREAT UR-MCNC: 58.3 MG/DL
MICROALBUMIN UR-MCNC: <3 UG/ML

## 2025-03-21 ENCOUNTER — OFFICE VISIT (OUTPATIENT)
Dept: ENDOCRINOLOGY | Facility: CLINIC | Age: 65
End: 2025-03-21
Payer: MEDICARE

## 2025-03-21 ENCOUNTER — RESULTS FOLLOW-UP (OUTPATIENT)
Dept: ENDOCRINOLOGY | Facility: CLINIC | Age: 65
End: 2025-03-21

## 2025-03-21 VITALS
DIASTOLIC BLOOD PRESSURE: 70 MMHG | HEART RATE: 80 BPM | SYSTOLIC BLOOD PRESSURE: 126 MMHG | WEIGHT: 140 LBS | OXYGEN SATURATION: 99 % | HEIGHT: 66 IN | BODY MASS INDEX: 22.5 KG/M2

## 2025-03-21 DIAGNOSIS — I10 BENIGN HYPERTENSION: ICD-10-CM

## 2025-03-21 DIAGNOSIS — Z79.4 TYPE 2 DIABETES MELLITUS WITH OTHER SPECIFIED COMPLICATION, WITH LONG-TERM CURRENT USE OF INSULIN: Primary | ICD-10-CM

## 2025-03-21 DIAGNOSIS — E11.69 TYPE 2 DIABETES MELLITUS WITH OTHER SPECIFIED COMPLICATION, WITH LONG-TERM CURRENT USE OF INSULIN: Primary | ICD-10-CM

## 2025-03-21 LAB
ALBUMIN SERPL-MCNC: 4.2 G/DL (ref 3.5–5.2)
ALBUMIN/GLOB SERPL: 1.8 G/DL
ALP SERPL-CCNC: 89 U/L (ref 39–117)
ALT SERPL W P-5'-P-CCNC: 17 U/L (ref 1–33)
ANION GAP SERPL CALCULATED.3IONS-SCNC: 13.3 MMOL/L (ref 5–15)
AST SERPL-CCNC: 14 U/L (ref 1–32)
BILIRUB SERPL-MCNC: 0.3 MG/DL (ref 0–1.2)
BUN SERPL-MCNC: 13 MG/DL (ref 8–23)
BUN/CREAT SERPL: 23.6 (ref 7–25)
CALCIUM SPEC-SCNC: 9.2 MG/DL (ref 8.6–10.5)
CHLORIDE SERPL-SCNC: 101 MMOL/L (ref 98–107)
CHOLEST SERPL-MCNC: 175 MG/DL (ref 0–200)
CO2 SERPL-SCNC: 25.7 MMOL/L (ref 22–29)
CREAT SERPL-MCNC: 0.55 MG/DL (ref 0.57–1)
EGFRCR SERPLBLD CKD-EPI 2021: 101.9 ML/MIN/1.73
EXPIRATION DATE: ABNORMAL
EXPIRATION DATE: NORMAL
GLOBULIN UR ELPH-MCNC: 2.4 GM/DL
GLUCOSE BLDC GLUCOMTR-MCNC: 104 MG/DL (ref 70–130)
GLUCOSE SERPL-MCNC: 86 MG/DL (ref 65–99)
HBA1C MFR BLD: 7.9 % (ref 4.5–5.7)
HDLC SERPL-MCNC: 81 MG/DL (ref 40–60)
LDLC SERPL CALC-MCNC: 85 MG/DL (ref 0–100)
LDLC/HDLC SERPL: 1.05 {RATIO}
Lab: ABNORMAL
Lab: NORMAL
POTASSIUM SERPL-SCNC: 4.3 MMOL/L (ref 3.5–5.2)
PROT SERPL-MCNC: 6.6 G/DL (ref 6–8.5)
SODIUM SERPL-SCNC: 140 MMOL/L (ref 136–145)
TRIGL SERPL-MCNC: 43 MG/DL (ref 0–150)
TSH SERPL DL<=0.05 MIU/L-ACNC: 1.63 UIU/ML (ref 0.27–4.2)
VLDLC SERPL-MCNC: 9 MG/DL (ref 5–40)

## 2025-03-21 PROCEDURE — 80061 LIPID PANEL: CPT | Performed by: INTERNAL MEDICINE

## 2025-03-21 PROCEDURE — 80053 COMPREHEN METABOLIC PANEL: CPT | Performed by: INTERNAL MEDICINE

## 2025-03-21 PROCEDURE — 84443 ASSAY THYROID STIM HORMONE: CPT | Performed by: INTERNAL MEDICINE

## 2025-03-21 RX ORDER — UPADACITINIB 15 MG/1
1 TABLET, EXTENDED RELEASE ORAL DAILY
COMMUNITY
Start: 2025-03-17

## 2025-03-21 NOTE — ASSESSMENT & PLAN NOTE
Diabetes is stable.  A1c a bit above goal.  She has had more pain from RA recently.  She was just started on new medication for the RA.  Continue current treatment regimen.  Continue to adjust insulin.  Diabetes will be reassessed in 3 months.    She reports the CGM was going to cost $500 so she hasn't started this.

## 2025-03-21 NOTE — ASSESSMENT & PLAN NOTE
Hypertension is stable and controlled.  Continue current treatment regimen.  Blood pressure will be reassessed in 3 months.

## 2025-03-21 NOTE — PROGRESS NOTES
"     Office Note      Date: 2025  Patient Name: Luana Chawla  MRN: [id number]  : 1960    Chief Complaint   Patient presents with    Diabetes       History of Present Illness:   Luana Chawla is a 64 y.o. female who presents for Diabetes type 2. Diagnosed in: . Treated in past with insulin. Current treatments: metformin, ozempic and basal insulin. Number of insulin shots per day: 1. Checks blood sugar 1 time a day.  Has low blood sugar: occasional. Aspirin use: No - due to easy bruising/nosebleeds. Statin use: No - lipids have been okay. ACE-I/ARB use: No. Changes in health since last visit: none. Last eye exam 3/2025.      Subjective      Diabetic Complications:  Eyes: No  Kidneys: No  Feet: No  Heart: No    Diet and Exercise:  Meals per day: 3  Minutes of exercise per week: 150 mins.    Review of Systems:   Review of Systems   Constitutional:  Positive for fatigue.   Cardiovascular: Negative.    Gastrointestinal: Negative.    Endocrine: Negative.        The following portions of the patient's history were reviewed and updated as appropriate: allergies, current medications, past family history, past medical history, past social history, past surgical history, and problem list.    Objective     Visit Vitals  /70 (BP Location: Left arm, Patient Position: Sitting, Cuff Size: Adult)   Pulse 80   Ht 167.4 cm (65.9\")   Wt 63.5 kg (140 lb)   LMP  (LMP Unknown)   SpO2 99%   BMI 22.67 kg/m²       Physical Exam:  Physical Exam  Constitutional:       Appearance: Normal appearance.   Cardiovascular:      Pulses:           Dorsalis pedis pulses are 2+ on the right side and 2+ on the left side.        Posterior tibial pulses are 2+ on the right side and 2+ on the left side.   Feet:      Right foot:      Protective Sensation: 5 sites tested.  5 sites sensed.      Skin integrity: Skin integrity normal.      Toenail Condition: Right toenails are normal.      Left foot:      Protective " "Sensation: 5 sites tested.  5 sites sensed.      Skin integrity: Skin integrity normal.      Toenail Condition: Left toenails are normal.   Neurological:      Mental Status: She is alert.         Labs:    HbA1c  Lab Results   Component Value Date    HGBA1C 7.9 (A) 03/21/2025       CMP  Lab Results   Component Value Date    GLUCOSE 99 03/21/2024    BUN 15 03/21/2024    CREATININE 0.75 03/21/2024    EGFRIFNONA 117 05/05/2021    EGFRIFAFRI 110 02/25/2020    BCR 20.0 03/21/2024    K 4.3 03/21/2024    CO2 25.4 03/21/2024    CALCIUM 10.3 03/21/2024    AST 13 03/21/2024    ALT 8 03/21/2024        Lipid Panel  Lab Results   Component Value Date    HDL Cholesterol 79 (H) 03/21/2024    LDL Cholesterol  87 03/21/2024    LDL/HDL Ratio 1.10 03/21/2024    Triglycerides 61 03/21/2024        TSH  Lab Results   Component Value Date    TSH 1.520 03/21/2024        Hemoglobin A1C  No components found for: \"HGBA1C\"     Microalbumin/Creatinine  Lab Results   Component Value Date    MALBCRERATIO <5 03/10/2025    CREATINIURIN 58.3 03/10/2025    MICROALBUR <3.0 03/10/2025           Assessment / Plan      Assessment & Plan:  Diagnoses and all orders for this visit:    1. Type 2 diabetes mellitus with other specified complication, with long-term current use of insulin (Primary)  Assessment & Plan:  Diabetes is stable.  A1c a bit above goal.  She has had more pain from RA recently.  She was just started on new medication for the RA.  Continue current treatment regimen.  Continue to adjust insulin.  Diabetes will be reassessed in 3 months.    She reports the CGM was going to cost $500 so she hasn't started this.    Orders:  -     Comprehensive Metabolic Panel; Future  -     Lipid Panel; Future  -     TSH; Future  -     POC Glucose, Blood  -     POC Glycosylated Hemoglobin (Hb A1C)    2. Benign hypertension  Assessment & Plan:  Hypertension is stable and controlled.  Continue current treatment regimen.  Blood pressure will be reassessed in 3 " months.        Current Outpatient Medications   Medication Instructions    albuterol sulfate  (90 Base) MCG/ACT inhaler INHALE TWO PUFFS BY MOUTH EVERY 4 HOURS AS NEEDED    ALPRAZolam (XANAX) 0.25 mg, Oral, 2 Times Daily PRN    ascorbic acid (VITAMIN C) 100 mg, Daily    Azelastine-Fluticasone 137-50 MCG/ACT suspension 1 spray, Nasal, Daily    Calcium Carb-Cholecalciferol (CALCIUM 1000 + D PO) 1 tablet, Daily    clobetasol (TEMOVATE) 0.05 % ointment Topical, 2 Times Daily    cyclobenzaprine (FLEXERIL) 10 mg, Oral, 3 Times Daily PRN    Dietary Management Product (Rheumate) capsule 1 capsule, Oral, Daily    escitalopram (LEXAPRO) 20 MG tablet     folic acid (FOLVITE) 1 MG tablet No dose, route, or frequency recorded.    glucose blood (Accu-Chek Sasha Plus) test strip USE ONE STRIP TO TEST BLOOD SUGAR DAILY.    hydrocortisone 2.5 % cream 1 Application, Topical, 2 Times Daily    hydrOXYzine (ATARAX) 25 mg, Oral, 3 Times Daily PRN    Insulin Glargine (BASAGLAR KWIKPEN) 100 UNIT/ML injection pen USE DAILY TO TITRATE TA FASTING GLUCOSE LESS THAN 120  UP TO MAX DAILY DOSE OF 50 UNITS    Insulin Pen Needle (BD Pen Needle Radha 2nd Gen) 32G X 4 MM misc USE 1 PEN NEEDLE DAILY    letrozole (FEMARA) 2.5 mg, Oral, Daily    lidocaine (LIDODERM) 5 % 1 patch, Transdermal, Every 24 Hours, Remove & Discard patch within 12 hours or as directed by MD    lidocaine-prilocaine (EMLA) 2.5-2.5 % cream 1 Application, Topical, Every 2 Hours PRN    loratadine (CLARITIN) 10 mg, Daily    Magnesium 400 MG tablet 1 tablet, Daily    metFORMIN ER (GLUCOPHAGE-XR) 1,000 mg, Oral, 2 Times Daily    methotrexate 2.5 MG tablet No dose, route, or frequency recorded.    omeprazole (PRILOSEC) 40 mg, Oral, Every Morning Before Breakfast    Ozempic (2 MG/DOSE) 2 mg, Subcutaneous, Every 7 Days    Rinvoq 15 MG tablet sustained-release 24 hour 1 tablet, Daily    Saccharomyces boulardii (Probiotic) 250 MG capsule Take  by mouth.    Turmeric 500 MG tablet Take   by mouth.      Return in about 3 months (around 6/21/2025) for Recheck with A1c.    Electronically signed by: Jose Cummings MD  03/21/2025

## 2025-03-31 RX ORDER — SEMAGLUTIDE 2.68 MG/ML
2 INJECTION, SOLUTION SUBCUTANEOUS
Qty: 9 ML | Refills: 3 | Status: SHIPPED | OUTPATIENT
Start: 2025-03-31

## 2025-04-02 ENCOUNTER — TELEPHONE (OUTPATIENT)
Dept: FAMILY MEDICINE CLINIC | Facility: CLINIC | Age: 65
End: 2025-04-02
Payer: MEDICARE

## 2025-04-02 DIAGNOSIS — K21.9 GASTROESOPHAGEAL REFLUX DISEASE, UNSPECIFIED WHETHER ESOPHAGITIS PRESENT: ICD-10-CM

## 2025-04-02 RX ORDER — OMEPRAZOLE 40 MG/1
40 CAPSULE, DELAYED RELEASE ORAL
Qty: 90 CAPSULE | Refills: 0 | Status: SHIPPED | OUTPATIENT
Start: 2025-04-02

## 2025-04-02 NOTE — TELEPHONE ENCOUNTER
HUB TO RELAY      PLEASE LET PATIENT KNOW PER HER PROVIDER SHE IS DUE FOR HER MEDICARE WELLNESS VISIT. HER LAST MEDICARE WELLNESS WAS 5/23/2019. I LEFT PATIENT A VOICEMAIL MAKING HER AWARE.     PLEASE SCHEDULE PATIENT FOR A SUBSEQUENT MEDICARE WELLNESS VISIT.

## 2025-04-08 ENCOUNTER — HOSPITAL ENCOUNTER (OUTPATIENT)
Dept: BONE DENSITY | Facility: HOSPITAL | Age: 65
Discharge: HOME | End: 2025-04-08
Admitting: NURSE PRACTITIONER
Payer: MEDICARE

## 2025-04-08 DIAGNOSIS — Z79.811 LONG TERM CURRENT USE OF AROMATASE INHIBITOR: ICD-10-CM

## 2025-04-08 PROCEDURE — 77080 DXA BONE DENSITY AXIAL: CPT

## 2025-04-10 ENCOUNTER — PATIENT MESSAGE (OUTPATIENT)
Dept: FAMILY MEDICINE CLINIC | Facility: CLINIC | Age: 65
End: 2025-04-10
Payer: MEDICARE

## 2025-04-10 RX ORDER — ESCITALOPRAM OXALATE 20 MG/1
20 TABLET ORAL DAILY
Qty: 90 TABLET | Refills: 0 | Status: SHIPPED | OUTPATIENT
Start: 2025-04-10

## 2025-04-10 RX ORDER — ESCITALOPRAM OXALATE 20 MG/1
20 TABLET ORAL DAILY
Qty: 30 TABLET | Refills: 0 | Status: SHIPPED | OUTPATIENT
Start: 2025-04-10 | End: 2025-04-10 | Stop reason: SDUPTHER

## 2025-04-10 NOTE — TELEPHONE ENCOUNTER
Rx Refill Note    Requested Prescriptions     Pending Prescriptions Disp Refills    escitalopram (LEXAPRO) 20 MG tablet 30 tablet 0     Sig: Take 1 tablet by mouth Daily.        Last office visit with prescribing clinician: 3/10/2025      Next office visit with prescribing clinician: 5/9/2025   Last labs:   Last refill: N/A   Pharmacy (be sure to add in Epic). Correct      Dr. Bonner patient.

## 2025-04-30 ENCOUNTER — OFFICE VISIT (OUTPATIENT)
Dept: ONCOLOGY | Facility: CLINIC | Age: 65
End: 2025-04-30
Payer: MEDICARE

## 2025-04-30 VITALS
RESPIRATION RATE: 18 BRPM | DIASTOLIC BLOOD PRESSURE: 82 MMHG | OXYGEN SATURATION: 97 % | HEART RATE: 81 BPM | TEMPERATURE: 97.5 F | WEIGHT: 146 LBS | BODY MASS INDEX: 23.46 KG/M2 | SYSTOLIC BLOOD PRESSURE: 130 MMHG | HEIGHT: 66 IN

## 2025-04-30 DIAGNOSIS — M81.8 OTHER OSTEOPOROSIS WITHOUT CURRENT PATHOLOGICAL FRACTURE: ICD-10-CM

## 2025-04-30 DIAGNOSIS — Z79.811 AROMATASE INHIBITOR USE: ICD-10-CM

## 2025-04-30 DIAGNOSIS — Z78.9 HEALTH MAINTENANCE ALTERATION: ICD-10-CM

## 2025-04-30 DIAGNOSIS — Z79.811 AROMATASE INHIBITOR USE: Primary | ICD-10-CM

## 2025-04-30 PROBLEM — M81.0 OSTEOPOROSIS WITHOUT CURRENT PATHOLOGICAL FRACTURE: Status: ACTIVE | Noted: 2025-04-30

## 2025-04-30 RX ORDER — SODIUM CHLORIDE 9 MG/ML
20 INJECTION, SOLUTION INTRAVENOUS ONCE
OUTPATIENT
Start: 2025-05-14

## 2025-04-30 RX ORDER — ZOLEDRONIC ACID 0.05 MG/ML
5 INJECTION, SOLUTION INTRAVENOUS ONCE
OUTPATIENT
Start: 2025-05-14

## 2025-04-30 NOTE — PROGRESS NOTES
CHIEF COMPLAINT: Arthralgias worse on letrozole    Problem List:  Oncology/Hematology History Overview Note   1.  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  ER weakly 5% 1+ positive, OH 50% 1-2+ positive, HER-2/ashley 100% 3+ positive.  Negative cancer next panel  2.  Significant diabetes with predating neuropathy due to spinal stenosis status post laminectomy 5/24/2019 with persistent neuropathy dorsum left foot  3.  MRSA bacteremia due to port infection March 2020 after course 1 day 1 of Herceptin Taxol  4.  Covid 2/2023  5.  Atypical inflammatory/infectious process of the chest February 2023 treated with Augmentin x2 weeks with near complete resolution on CT chest 3/31/2023.  Following with pulmonology.  6.  Rheumatoid arthritis diagnosed March 2023, evaluation by Dr. Natividad Martinez.  Began methotrexate.    Oncology history timeline:  -5/24/2019 Dr. Garcia saw for spinal stenosis with radiculopathy and left foot drop due to herniated disc and performed decompressive laminectomy, L4-5 and L5-S1 discectomy and medial facetectomy  -10/15/2019 mammogram BI-RADS 0  -11/22/2019 right mammogram/ultrasound BI-RADS 4 with ultrasound-guided core biopsy showing invasive ductal carcinoma Castlewood score 6 out of 9 grade 2, ER 5% 1+ positive OH 50% 1-2+ positive, HER-2/ashley 100% 3+ positive.  -12/13/2019 MRI breasts revealed 2.2 cm right breast mass consistent with biopsy proven cancer with unsuspected adjacent area's of non-mass enhancement worrisome for DCIS.  Non-mass enhancement in the subareolar left breast worrisome for DCIS.  Dominant focus left central portion left breast indeterminate BI-RADS 4.  Cancer next panel negative  -1/15/2020 CMP unremarkable save for glucose 200 with hemoglobin 10.6 normochromic normocytic indices  -1/21/2020 right skin sparing mastectomy  with right sentinel lymph node injection and right deep subfascial sentinel lymph node biopsy with contralateral prophylactic left skin sparing mastectomy.  Right breast 1.8 cm Bloom Dias 8 out of 9, total of 4 lymph nodes examined 2 sentinel nodes all negative.  Pathological T1 cN0 M0 stage Ia.  Left breast benign with sclerosing adenosis.  -2/26/2020 started Herceptin Taxol weekly  -3/2/2020 port removed due to MRSA bacteremia with port infection/purulence.  -3/2/2020 through 3/6/2020 hospitalized for MRSA bacteremia from port.  -3/31/2020 [facility] tele-visit: Patient still on IV antibiotics for MRSA port infection.  Decision made to go with Kanjinti alone every 3 weeks along with Arimidex.  -4/13/2020 per ID, patient has completed IV antibiotics and is now on oral antibiotics.  -3/23/2021 completed 1 year Kanjinti.  Recommendation to complete 10 years of Arimidex if can tolerate.  5-7 years if not.    -6/16/2021 [facility] follow-up:  Intolerant of Arimidex with hot flashes and mood swings.  Therapy switched to Letrozole.      -9/15/2021 [facility] follow-up:  She tolerated therapy with Letrozole better than she did with Arimidex but she still is having significant hot flashes and mood disturbance with easy agitation.  She is finding it difficult to work as stress makes these issues worse.  She is going to apply for disability correction.  She is on Wellbutrin and Lexapro.  We discussed at length her options for hormonal blockade and she wants to continue trying.  At this time I will switch her to tamoxifen.  I spoke with our pharmacist Irma Arechiga, Pharm.D. about changing her antidepressants as her current antidepressants can interfere with tamoxifen.  We will have her stop Wellbutrin, she will start Effexor 37.5 mg daily for 7 days and if tolerated will increase to 75 mg daily, if this is tolerated then we will consider increasing up to 150 mg daily.  I am hoping that  Effexor will help mitigate some of her hot flashes along with helping her depression.  She will taper off of her Lexapro over 2 weeks and then stop.  We discussed counseling as I think this will be greatly beneficial to Luana with her dysthymia and helping to deal with stress at work and living with breast cancer.  She did not want me to make the referral but I did give her a brochure and she assures me that she will call and make an appointment.  I plan to call and check on her in about 3 weeks to see how she is tolerating the change in her antidepressants, we will also see if she has made an appointment with CECI Vega for counseling and to see how she is tolerating tamoxifen.  She is going to stop her Lexapro and wait about a week before starting tamoxifen so that she can tell if she is going to have any side effects with it.  I will see her back in 2 months for follow-up and sooner if needed.  We had planned on 5-7 years of therapy with hormonal blockade, I discussed with her today that with her ongoing side effects we would likely only do 5 years and hopefully we can get through that.  She was only weakly ER positive.    -10/14/2021 Did not tolerate Tamoxifen, went back to Letrozole    -11/17/2021 [facility] clinic follow-up: Luana is tolerating letrozole much better than tamoxifen.  She continues to have significant hot flashes, she is not sure the Effexor is helping.  She did have to go back to the 37.5 mg dose as she did not tolerate the 75 mg dose.  I discussed with her that she could switch to a different antidepressant as far as we were concerned, there is no interaction with the letrozole, we had only switched her to Effexor when we were trying tamoxifen and also we had hoped it would help with her hot flashes.  She is following with Emeli Suero and will discuss with her at her next appointment.  I also recommended acupuncture for her hot flashes as there is data showing that this may be  helpful.  We also discussed adding gabapentin but when we reviewed the potential side effects of constipation and lethargy she did not want to try that.  She continues to complain of low back pain with some neuropathy in her left lower extremity.  She does have a history of spinal stenosis, I will get an MRI of her lumbar spine for further evaluation and call her with the results.  Assuming there is no involvement of metastatic disease but just symptoms from her spinal stenosis she will need to follow-up with her neurosurgeon.  She has a history of laminectomy.    -5/18/2022 [facility] oncology clinic follow-up: Luana continues to tolerate adjuvant therapy with letrozole, she was previously intolerant of anastrozole and tamoxifen.  She continues to have hot flashes but these seem to be more tolerable.  She has no new worrisome symptoms.  We plan on 5 years of adjuvant therapy with an AI.  She has had mastectomies therefore does not need mammograms.  She will need bone density repeated this fall for 2-year follow-up, previous bone density showed osteopenia.  She may benefit from bisphosphonate but we will see what her next bone density scan shows.  She does take calcium and vitamin D and remains active and walks regularly.  Her diabetes is under good control, she follows with endocrinology.  Her back pain is better, she is now on gabapentin under the direction of her surgeon.    -9/29/2022 [facility] On[facility] clinic follow-up: Luana called for an appointment due to not feeling well with upper back pain, fatigue and chest pain.  CT chest, bone scan, CBC, CMP ordered for evaluation.    -10/4/2022 total body bone scan negative for osseous metastatic disease, benign/arthritic/degenerative/posttraumatic changes in the cervical spine, shoulders, acromioclavicular joints (moderate uptake), sternoclavicular joints, right greater than left, elbows, wrists and hands, thoracic spine, lumbosacral spine, sacroiliac joints, hips, knees,  ankles and feet.  CT chest with small 3-4 mm bilateral pulmonary nodules.  Right apical groundglass and consolidative opacities could be posttreatment change versus infectious.  -10/6/2022 [facility] follow-up: Reviewed the above with the patient.  No signs of recurrent or metastatic breast cancer.  Will treat with Augmentin empirically.  Suspect most of her pain is due to her arthritis/degenerative changes, recommended she follow-up with PCP if no improvement, may benefit from rheumatology referral.  We will repeat CT chest in 3 months.    -1/17/2023: CT chest shows stable size of previously noted bilateral pulmonary nodules.  She does have what appears to be new finding of multiple focal nodular opacities in the bilateral upper lobes, favoring combination of postradiation change and/or infection however given his masslike appearance malignancy cannot be excluded.  I called Luana and discussed the results with her.  We will get a PET scan for further evaluation.  She also reports she is still having a daily headache and was actually going to see her primary care provider tomorrow as she feels she may have a sinus infection.  We will also get MRI of the brain for further evaluation.  I will send in a prescription for a Z-Vaughn.  She has a cough but no fever, no shortness of breath.  We will follow-up after her PET scan and MRI to go over the results and further plan of care.  If the PET is unrevealing Dr. Gomez recommends  referral to pulmonology for evaluation and possible bronchoscopy. Of note, she did not have radiation for her breast cancer which was 1.8 cm and node negative with clear margins.    -1/30/2023 PET scan shows multiple irregular consolidative and subsolid mostly subpleural based densities in both lungs with corresponding hypermetabolism.  The appearance is most suggestive of infectious or inflammatory process, including entities such as organizing pneumonia.  The appearance would be  atypical for lung metastasis.  There is also conspicuous focal hypermetabolism in the left medial clavicle at the sternoclavicular joint.  This is likely some underlying subchondral cystic change suggesting likely degenerative uptake, although the degree of hypermetabolism is slightly greater than would be expected for normal degenerative change and a solitary bony metastasis would be difficult to exclude.      -2/2/2023 Chest x-ray showed the multifocal ill-defined opacities in the lung suspicious for infection.  CT angiogram chest showed multifocal airspace opacities not changed from 1/30/2023 consistent with infection/inflammation with stable multiple small pulmonary nodules.  Duplex venous imaging left upper extremity normal.  Hemoglobin 11.4 with MCV 89.9 and platelets 569,000 with normal white count 10,370.  CMP unremarkable.  Normal lipase.  Normal proBNP.  Low uric acid 2.3.  Troponin negative.  EKG normal.    - 2/7/2023 MRI brain with and without contrast nonspecific small vessel changes and altered bone mineral density.  Left calvarial hemangioma.  MRI left clavicle shows osteoarthrosis of the sternoclavicular, acromioclavicular, and glenohumeral joints and a suspected rotator cuff tear.  Lipoma supraspinatus intramuscular.    -3/30/2023 CT chest without contrast shows near complete resolution of previous identified rounded areas of consolidation in both lungs.  Residual minimal groundglass opacity and small semisolid nodules unchanged in the lower lungs.  Recommend 6-month follow-up chest CT.    -4/19/2023 [facility] follow-up: Luana overall is doing well on letrozole.  She has no evidence of recurrent breast cancer on clinical exam.  She began AI therapy March 2020 and we plan on at least 5 years adjuvant therapy.  I would consider BCI testing around that time to see whether or not she would benefit from extended adjuvant therapy as it has been challenging for her to take.  Atypical  inflammation/infection in her lungs has cleared after Augmentin, CT of the chest on 3/30/2023 showed near complete resolution.  Recommendation to follow-up in 6 months.  Since we saw her last she also was diagnosed with rheumatoid arthritis and is following with Dr. Natividad Martinez.  She has been placed on methotrexate, she also was taking diclofenac as needed.  She has had an increase in acid reflux and dyspepsia, she does take omeprazole and also an occasional Tums.  We discussed that the diclofenac could be upsetting her stomach, she is going to hold that and will try topical diclofenac and she has this at home.  If she continues to have issues I have asked her to notify her primary care provider as she may need a different PPI or possibly referral to GI for consideration of EGD.    -10/18/2023 [facility] medical oncology follow-up: She continues on letrozole and tolerating it fairly well.  She has no evidence of recurrent cancer on clinical exam or worrisome symptoms.  She began AI therapy March 2020 and we plan on at least 5 years adjuvant therapy.  We will consider BCI testing around that time to see whether or not she would benefit from extended adjuvant therapy.  She had atypical inflammation/infection in her lungs with near complete resolution on CT of chest on 3/30/2023.  Recommendation to follow-up in 6 months.  I have ordered repeat CT chest today.  She continues to follow closely with Dr. Martinez for rheumatoid arthritis in which she is taking methotrexate.  She is having significant left shoulder pain due to cervical arthritis.  She has tried cervical collar and physical therapy.  She also does meditation and yoga.  She has been referred to pain management.  She had DEXA scan December 2022 that showed osteopenia.  She is taking calcium and vitamin D daily.  We will repeat DEXA scan December 2024.  We will see her back in 6 months.  She will notify us sooner if any issues or concerns.    -4/17/2024 [facility]  "oncology clinic follow-up: Luana overall is doing well, she has no evidence of recurrent breast cancer on clinical exam and no new worrisome symptoms.  She is tolerating letrozole fairly well, she began in March 2020 on AI therapy.  She admits to looking forward to when she can stop AI therapy as she thinks that her quality of life will improve as far as her arthralgias.  She does have RA and follows with rheumatology and is currently on therapy with Orencia.  We discussed today whether or not we would want to consider doing BCI testing to see whether or not she would benefit from extended adjuvant therapy or if she has a major \"hate\" factor with AI therapy then I would be comfortable with stopping at 5 years.  I think she will likely want to do BCI testing to help put her mind at ease.  In the meantime for now she will continue letrozole unchanged.  She has had bilateral mastectomies therefore no need for mammograms.  She will be due for DEXA scan again sometime after December.  Her heart rate seems irregular but she is completely asymptomatic.  I did review previous ECGs available in epic and she has a history of PACs on prior ECG in February 2023 that was resolved on follow-up ECG that same day.  I discussed with her that she should make sure she mentions to her primary care provider on follow-up. She had CT of the chest in November 2023 for follow-up on previous scarring and inflammation, according to evaluation with pulmonary they felt this was residual scarring from areas of inflammation but there was nothing new or worrisome.  No further follow-up was recommended.    -10/16/2024 [facility] oncology clinic follow-up: Luana stopped her letrozole approximately July 3, 2024. She started AI March 2020.  She is feeling much better off of the letrozole.  She agrees to breast cancer index testing to determine if there would be benefit of extended endocrine therapy.  She would prefer not to restart the letrozole but if " breast cancer index shows statistical benefit then she will consider restarting.  She does have generalized arthralgias but the arthralgias are much improved off the letrozole.  She continues to be followed by rheumatology for RA and is currently on Orencia.  She has had bilateral mastectomy therefore no need for mammograms.  Order placed for bone density scan due December 2024.  We will contact her with results of breast cancer index testing when available.  Return to clinic in 6 months for follow-up.    - 11/1/2024 breast cancer index results showed positive predictive benefit from extended endocrine therapy, 5 years adjuvant endocrine therapy prognostic result 9.7%, with 10 years adjuvant endocrine therapy prognostic result 3.2% - 4.1%.  -11/5/2024 I called and discussed BCI results with Luana, she is willing to try endocrine therapy again and will resume her letrozole.    - 4/8/2025 DEXA bone density scan shows osteoporosis of the left femoral neck, osteopenia of the right femoral neck and total hips bilaterally.     Malignant neoplasm of upper-outer quadrant of right breast in female, estrogen receptor positive   1/16/2019 Cancer Staged    Staging form: Breast, AJCC 8th Edition  - Clinical stage from 1/16/2019: Stage IB (cT2, cN0, cM0, G2, ER+, DC+, HER2+) - Signed by Lisa Herman MD on 1/27/2020 1/21/2020 Initial Diagnosis    Malignant neoplasm of upper-outer quadrant of right female breast (CMS/HCC)     2/11/2020 Cancer Staged    Staging form: Breast, AJCC 8th Edition  - Pathologic: Stage IA (pT1c, pN0, cM0, G3, ER+, DC+, HER2+) - Signed by Hero Gomez MD on 2/11/2020 2/18/2020 -  Other Event    Echocardiogram  Left ventricular systolic function is normal. Estimated EF = 55%.  The global longitudinal strain was -19.5%.     2/25/2020 - 2/26/2020 Chemotherapy    OP CENTRAL VENOUS ACCESS DEVICE ACCESS, CARE, AND MAINTENANCE (CVAD)     2/26/2020 - 3/10/2020 Chemotherapy    OP BREAST PACLitaxel  / Trastuzumab-anns (Weekly X 12)     3/2/2020 Adverse Reaction    MRSA bacteremia due to port infection with port removal after day 1 course 1 Herceptin Taxol     3/31/2020 -  Hormonal Therapy    3/31/2020 began Arimidex    6/16/2021 therapy changed to Letrozole due to intolerance to Arimidex.  9/15/2021 changed to Tamoxifen kruse to intolerance of letrozole.  10/14/2021 changed back to Letrozole due to intolerance of tamoxifen.  11/1/2024 BCI index shows predictive benefit from extended endocrine therapy beyond 5 years, discussed with patient, she will continue letrozole     4/2/2020 - 3/23/2021 Chemotherapy    Completed 3/23/2021  OP BREAST Trastuzumab-anns Q21D (maintenance)     5/27/2020 -  Other Event    5/27/2020 echocardiogram EF 55%      7/23/2020 Procedure    Colonoscopy with Dr. Marte, normal     8/26/2020 -  Other Event    Echocardiogram   This was a limited echocardiogram to assess left ventricular ejection fraction in the setting of chemotherapy.  Left ventricular systolic function is normal. Calculated EF = 55.1%. Estimated EF = 55%. Global Longitudinal LV strain = -18.2%.  Estimated EF = 55%.     11/9/2020 Imaging    DEXA bone density IMPRESSION:  Osteopenia of the femoral necks bilaterally, and total hips  bilaterally.  Lowest T score -2.0 of the right femoral neck.     11/10/2020 -  Other Event    Echocardogram   This was a limited echocardiogram to assess left ventricular function only.  Left ventricular systolic function is normal. Left ventricular ejection fraction appears to be 56 - 60%.  Global longitudinal LV strain (GLS) = 19.7%.     2/23/2021 Imaging    -2/23/2021 left hip 2 view x-ray negative     12/1/2021 Imaging    MRI of the lumbar spine with and without contrast: Overall no significant interval change in the appearance of the lumbar spine since previous 5/7/2020 comparison study.  There are postoperative changes at L5-S1 with enhancing epidural scar in the left lateral recess and there are  multilevel degenerative changes with canal in foraminal encroachment.     12/20/2022 Imaging    DEXA bone density scan with osteopenia of the right femoral neck and total right hip.  Lowest T score -2.4 of the right femoral neck.  Compared to previous there was an increase in bone mineral density of the L1-L4 vertebrae by 1.8% and a decrease in the bone mineral density of the total right hip by 4.4%.         HISTORY OF PRESENT ILLNESS:  The patient is a 64 y.o. female, here for follow up on management of history of right breast cancer.  Luana states that she did go back on letrozole when we called her in November and told her what her breast cancer index score was and that she would benefit from extended endocrine therapy however she states that she felt so much better off of the letrozole and since going back on it her arthralgias returned with a vengeance and she admits that she is only taking it every other day.  She reports that she is under a lot of stress currently dealing with family health issues.  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  She attends [employer] regularly and finds this helpful.  She has no new or concerning bony aches or pains, no new or concerning findings on chest wall exam.  No unusual shortness of breath or cough or other respiratory concerns.  She has been having some reflux.    Past Medical History:   Diagnosis Date    Arthritis     Benign hypertension 10/22/2020    Breast cancer     Cholelithiasis     Chronic pain disorder     Diabetes mellitus     Diverticulitis     GERD (gastroesophageal reflux disease)     Hypokalemia     Joint pain     Kidney stone     Lichen sclerosus     Long term (current) use of insulin     Lumbosacral disc disease     Malignant neoplasm of upper-outer quadrant of right female breast 01/21/2020    Microalbuminuria     Neck pain     Nephrolithiasis     Osteoarthritis     Osteopenia     Osteoporosis  without current pathological fracture 04/30/2025    Osteoporosis without current pathological fracture 4/30/2025    Rheumatoid arthritis with positive rheumatoid factor 02/03/2024    Sciatica     Septic discitis of lumbar region 03/05/2020    Spinal stenosis     Type 2 diabetes mellitus, uncontrolled     Wears contact lenses      Past Surgical History:   Procedure Laterality Date    APPENDECTOMY      BREAST BIOPSY Right     BREAST EXCISIONAL BIOPSY Right     CHOLECYSTECTOMY      COLON SURGERY      COLONOSCOPY      HYSTERECTOMY      LEG SURGERY      leg fracture surgery-Abstracted from [employer]    LUMBAR LAMINECTOMY DISCECTOMY DECOMPRESSION N/A 05/24/2019    Procedure: LUMBAR LAMINECTOMY L4-5 AND L5-S1;  Surgeon: Hipolito Kahn MD;  Location:  [facility];  Service: Orthopedic Spine    LYMPH NODE BIOPSY      Jan 2020    MASTECTOMY Bilateral     MASTECTOMY W/ SENTINEL NODE BIOPSY Bilateral 01/21/2020    Procedure: SKIN SPARING MASTECTOMIES BILATERAL, SENTINEL NODE BIOPSY RIGHT;  Surgeon: Silvio Howard MD;  Location:  [facility];  Service: General    SPINE SURGERY      May 2019    TONSILLECTOMY      TRIGGER POINT INJECTION  8/24    TUBAL ABDOMINAL LIGATION      VENOUS ACCESS DEVICE (PORT) REMOVAL N/A 03/03/2020    Procedure: REMOVAL PORT;  Surgeon: Silvio Howard MD;  Location:  [facility];  Service: General;  Laterality: N/A;       Allergies   Allergen Reactions    Bactrim [Sulfamethoxazole-Trimethoprim] Rash     RASH, SKIN PEELING        Family History and Social History reviewed and changed as necessary    REVIEW OF SYSTEM:   Life stressors  Chronic back pain and arthralgias not new and not worsening with time    PHYSICAL EXAM:  Petite, well-developed, well-nourished appearing female in no distress  No cervical, supraclavicular or axillary nodes palpable on exam  Respirations regular and unlabored, lungs clear to auscultation bilaterally  Heart regular rate and rhythm    Vitals:    04/30/25 0947   BP:  "130/82   Pulse: 81   Resp: 18   Temp: 97.5 °F (36.4 °C)   SpO2: 97%   Weight: 66.2 kg (146 lb)   Height: 167.6 cm (66\")     Vitals:    04/30/25 0947   PainSc: 8    PainLoc: Generalized          ECOG score: 0           Vitals reviewed.  Results from 4/8/2025 DEXA scan that showed osteoporosis of the left femoral neck reviewed at time of visit    ECOG: (0) Fully Active - Able to Carry On All Pre-disease Performance Without Restriction    Lab Results   Component Value Date    HGB 11.4 (L) 02/02/2023    HCT 33.1 (L) 02/02/2023    MCV 89.9 02/02/2023     (H) 02/02/2023    WBC 10.37 02/02/2023    NEUTROABS 8.22 (H) 02/02/2023    LYMPHSABS 1.06 02/02/2023    MONOSABS 0.86 02/02/2023    EOSABS 0.15 02/02/2023    BASOSABS 0.04 02/02/2023       Lab Results   Component Value Date    GLUCOSE 86 03/21/2025    BUN 13 03/21/2025    CREATININE 0.55 (L) 03/21/2025     03/21/2025    K 4.3 03/21/2025     03/21/2025    CO2 25.7 03/21/2025    CALCIUM 9.2 03/21/2025    PROTEINTOT 6.6 03/21/2025    ALBUMIN 4.2 03/21/2025    BILITOT 0.3 03/21/2025    ALKPHOS 89 03/21/2025    AST 14 03/21/2025    ALT 17 03/21/2025             ASSESSMENT & PLAN:    1.  History of invasive ductal carcinoma of the right breast  2.  Osteoporosis  3.  Diabetes mellitus type 2 insulin-dependent  4.  Spinal stenosis status post laminectomy May 2019  5.  Rheumatoid arthritis  6.  History of atypical inflammatory infectious process of the chest    Discussion: Luana admits to only taking her letrozole every other day.  We discussed that there is no data supporting every other day dosing and I cannot guarantee its effectiveness.  She has significant arthralgias on AI therapy and during the times that she has held it she feels much better and her quality of life improves.  She has tried anastrozole and tamoxifen in the past but did not tolerate these either.  I discussed with her today that we could try exemestane as her BCI returned positive " predicted benefit from extended endocrine therapy beyond 5 years.  She wants to hold her letrozole for at least a few weeks and see how she feels and she will think about it and let me know.  She may ultimately decide that she does not want to continue taking an AI.  Her current DEXA scan on 4/8/2025 does show osteoporosis of the left femoral neck, osteopenia of the right femoral neck and total hips bilaterally.  We will go ahead and put her on Reclast annually.  We will get her CBC, CMP, magnesium and phosphorus today.  We will schedule the Reclast for a few weeks from now.  We discussed potential side effects including but not limited to osteonecrosis of the jaw (she is up-to-date on routine dental care), hypocalcemia, transient bone pain.  She does have some acid reflux therefore I think she would not be a good candidate for oral bisphosphonate therapy.  She is going to make sure she is taking calcium supplement with recommend 1200 mg daily along with vitamin D 800-1000 IU daily.  She continues to follow with endocrinology for management of her diabetes.  She follows with rheumatology for her RA.    Return to clinic in 6 months for follow-up    I spent 34 minutes caring for Luana on this date of service. This time includes time spent by me in the following activities: preparing for the visit, reviewing tests, performing a medically appropriate examination and/or evaluation, counseling and educating the patient/family/caregiver, ordering medications, tests, or procedures, documenting information in the medical record, independently interpreting results and communicating that information with the patient/family/caregiver, and care coordination.     Almita Pruitt, APRN    04/30/2025

## 2025-05-01 LAB
ALBUMIN SERPL-MCNC: 4.6 G/DL (ref 3.5–5.2)
ALBUMIN/GLOB SERPL: 2.1 G/DL
ALP SERPL-CCNC: 104 U/L (ref 39–117)
ALT SERPL-CCNC: 19 U/L (ref 1–33)
AST SERPL-CCNC: 21 U/L (ref 1–32)
BASOPHILS # BLD AUTO: 0.03 10*3/MM3 (ref 0–0.2)
BASOPHILS NFR BLD AUTO: 0.6 % (ref 0–1.5)
BILIRUB SERPL-MCNC: 0.6 MG/DL (ref 0–1.2)
BUN SERPL-MCNC: 10 MG/DL (ref 8–23)
BUN/CREAT SERPL: 12.8 (ref 7–25)
CALCIUM SERPL-MCNC: 9.8 MG/DL (ref 8.6–10.5)
CHLORIDE SERPL-SCNC: 100 MMOL/L (ref 98–107)
CO2 SERPL-SCNC: 24 MMOL/L (ref 22–29)
CREAT SERPL-MCNC: 0.78 MG/DL (ref 0.57–1)
EGFRCR SERPLBLD CKD-EPI 2021: 84.4 ML/MIN/1.73
EOSINOPHIL # BLD AUTO: 0.05 10*3/MM3 (ref 0–0.4)
EOSINOPHIL NFR BLD AUTO: 1 % (ref 0.3–6.2)
ERYTHROCYTE [DISTWIDTH] IN BLOOD BY AUTOMATED COUNT: 13.7 % (ref 12.3–15.4)
GLOBULIN SER CALC-MCNC: 2.2 GM/DL
GLUCOSE SERPL-MCNC: 210 MG/DL (ref 65–99)
HCT VFR BLD AUTO: 39.5 % (ref 34–46.6)
HGB BLD-MCNC: 13.6 G/DL (ref 12–15.9)
IMM GRANULOCYTES # BLD AUTO: 0.02 10*3/MM3 (ref 0–0.05)
IMM GRANULOCYTES NFR BLD AUTO: 0.4 % (ref 0–0.5)
LYMPHOCYTES # BLD AUTO: 1.71 10*3/MM3 (ref 0.7–3.1)
LYMPHOCYTES NFR BLD AUTO: 35.1 % (ref 19.6–45.3)
MAGNESIUM SERPL-MCNC: 1.9 MG/DL (ref 1.6–2.4)
MCH RBC QN AUTO: 33.4 PG (ref 26.6–33)
MCHC RBC AUTO-ENTMCNC: 34.4 G/DL (ref 31.5–35.7)
MCV RBC AUTO: 97.1 FL (ref 79–97)
MONOCYTES # BLD AUTO: 0.55 10*3/MM3 (ref 0.1–0.9)
MONOCYTES NFR BLD AUTO: 11.3 % (ref 5–12)
NEUTROPHILS # BLD AUTO: 2.51 10*3/MM3 (ref 1.7–7)
NEUTROPHILS NFR BLD AUTO: 51.6 % (ref 42.7–76)
NRBC BLD AUTO-RTO: 0 /100 WBC (ref 0–0.2)
PHOSPHATE SERPL-MCNC: 3.9 MG/DL (ref 2.5–4.5)
PLATELET # BLD AUTO: 420 10*3/MM3 (ref 140–450)
POTASSIUM SERPL-SCNC: 4.7 MMOL/L (ref 3.5–5.2)
PROT SERPL-MCNC: 6.8 G/DL (ref 6–8.5)
RBC # BLD AUTO: 4.07 10*6/MM3 (ref 3.77–5.28)
SODIUM SERPL-SCNC: 137 MMOL/L (ref 136–145)
WBC # BLD AUTO: 4.87 10*3/MM3 (ref 3.4–10.8)

## 2025-05-08 RX ORDER — ESCITALOPRAM OXALATE 20 MG/1
20 TABLET ORAL DAILY
Qty: 90 TABLET | Refills: 1 | Status: SHIPPED | OUTPATIENT
Start: 2025-05-08

## 2025-05-09 ENCOUNTER — EXTERNAL PBMM DATA (OUTPATIENT)
Dept: PHARMACY | Facility: OTHER | Age: 65
End: 2025-05-09
Payer: MEDICARE

## 2025-05-09 ENCOUNTER — OFFICE VISIT (OUTPATIENT)
Dept: FAMILY MEDICINE CLINIC | Facility: CLINIC | Age: 65
End: 2025-05-09
Payer: MEDICARE

## 2025-05-09 VITALS
DIASTOLIC BLOOD PRESSURE: 82 MMHG | HEIGHT: 66 IN | HEART RATE: 93 BPM | SYSTOLIC BLOOD PRESSURE: 140 MMHG | BODY MASS INDEX: 22.88 KG/M2 | OXYGEN SATURATION: 97 % | WEIGHT: 142.4 LBS

## 2025-05-09 DIAGNOSIS — F41.9 ANXIETY: ICD-10-CM

## 2025-05-09 DIAGNOSIS — M81.8 OTHER OSTEOPOROSIS WITHOUT CURRENT PATHOLOGICAL FRACTURE: ICD-10-CM

## 2025-05-09 DIAGNOSIS — R06.02 SHORTNESS OF BREATH: ICD-10-CM

## 2025-05-09 DIAGNOSIS — Z91.81 AT LOW RISK FOR FALL: ICD-10-CM

## 2025-05-09 DIAGNOSIS — Z00.00 INITIAL MEDICARE ANNUAL WELLNESS VISIT: Primary | ICD-10-CM

## 2025-05-09 RX ORDER — SEMAGLUTIDE 1.34 MG/ML
2 INJECTION, SOLUTION SUBCUTANEOUS WEEKLY
COMMUNITY

## 2025-05-09 NOTE — PROGRESS NOTES
Subjective   The ABCs of the Annual Wellness Visit  Medicare Wellness Visit      Luana Chawla is a 64 y.o. patient who presents for a Medicare Wellness Visit.    The following portions of the patient's history were reviewed and   updated as appropriate: allergies, current medications, past family history, past medical history, past social history, past surgical history, and problem list.    Compared to one year ago, the patient's physical   health is worse.  Compared to one year ago, the patient's mental   health is the same.    Recent Hospitalizations:  She was not admitted to the hospital during the last year.     Current Medical Providers:  Patient Care Team:  Adilene Bonner DO as PCP - General (Family Medicine)  Silvio Howard MD as Consulting Physician (General Surgery)  Jose Cummings MD as Consulting Physician (Endocrinology)  Dutch Chávez MD as Consulting Physician (Infectious Diseases)  Kandis Suero APRN as Nurse Practitioner (Psychiatry)  Carole Lai DO as Consulting Physician (Pulmonary Disease)  Natividad Martinez MD as Consulting Physician (Rheumatology)  Kari Orozco APRN as Nurse Practitioner (Gynecologic Oncology)  Hero Gomez MD as Consulting Physician (Hematology and Oncology)    Outpatient Medications Prior to Visit   Medication Sig Dispense Refill    ALPRAZolam (Xanax) 0.25 MG tablet Take 1 tablet by mouth 2 (Two) Times a Day As Needed for Anxiety. 60 tablet 1    ascorbic acid (VITAMIN C) 100 MG tablet Take 1 tablet by mouth Daily.      Calcium Carb-Cholecalciferol (CALCIUM 1000 + D PO) Take 1 tablet by mouth Daily.      cyclobenzaprine (FLEXERIL) 10 MG tablet Take 1 tablet by mouth 3 (Three) Times a Day As Needed for Muscle Spasms. 90 tablet 3    Dietary Management Product (Rheumate) capsule Take 1 capsule by mouth Daily. 90 capsule 0    escitalopram (LEXAPRO) 20 MG tablet TAKE 1 TABLET BY MOUTH DAILY 90 tablet 1    folic acid  (FOLVITE) 1 MG tablet       glucose blood (Accu-Chek Sasha Plus) test strip USE ONE STRIP TO TEST BLOOD SUGAR DAILY. 50 each 4    hydrocortisone 2.5 % cream Apply 1 Application topically to the appropriate area as directed 2 (Two) Times a Day. 30 g 0    Insulin Pen Needle (BD Pen Needle Radha 2nd Gen) 32G X 4 MM misc USE 1 PEN NEEDLE DAILY 100 each 2    letrozole (FEMARA) 2.5 MG tablet Take 1 tablet by mouth Daily. 30 tablet 11    loratadine (CLARITIN) 10 MG tablet Take 1 tablet by mouth Daily.      Magnesium 400 MG tablet Take 1 tablet by mouth Daily.      metFORMIN ER (GLUCOPHAGE-XR) 500 MG 24 hr tablet Take 2 tablets by mouth 2 (Two) Times a Day. 360 tablet 3    methotrexate 2.5 MG tablet       omeprazole (priLOSEC) 40 MG capsule Take 1 capsule by mouth Every Morning Before Breakfast. 90 capsule 0    Rinvoq 15 MG tablet sustained-release 24 hour Take 1 tablet by mouth Daily.      Saccharomyces boulardii (Probiotic) 250 MG capsule Take  by mouth.      Semaglutide, 2 MG/DOSE, (Ozempic, 2 MG/DOSE,) 8 MG/3ML solution pen-injector Inject 2 mg under the skin into the appropriate area as directed Every 7 (Seven) Days. 9 mL 3    Turmeric 500 MG tablet Take  by mouth.      Insulin Glargine (BASAGLAR KWIKPEN) 100 UNIT/ML injection pen USE DAILY TO TITRATE TA FASTING GLUCOSE LESS THAN 120  UP TO MAX DAILY DOSE OF 50 UNITS 45 mL 3    clobetasol (TEMOVATE) 0.05 % ointment Apply  topically to the appropriate area as directed 2 (Two) Times a Day. 60 g 2    Semaglutide,0.25 or 0.5MG/DOS, (Ozempic, 0.25 or 0.5 MG/DOSE,) 2 MG/1.5ML solution pen-injector Inject 2 mg under the skin into the appropriate area as directed 1 (One) Time Per Week.      albuterol sulfate  (90 Base) MCG/ACT inhaler INHALE TWO PUFFS BY MOUTH EVERY 4 HOURS AS NEEDED 8.5 g 0    Azelastine-Fluticasone 137-50 MCG/ACT suspension Administer 1 spray into the nostril(s) as directed by provider Daily. (Patient not taking: Reported on 5/9/2025) 23 g 1     hydrOXYzine (ATARAX) 25 MG tablet Take 1 tablet by mouth 3 (Three) Times a Day As Needed for Anxiety. (Patient not taking: Reported on 5/9/2025) 30 tablet 1    lidocaine (LIDODERM) 5 % Place 1 patch on the skin as directed by provider Daily. Remove & Discard patch within 12 hours or as directed by MD (Patient not taking: Reported on 5/9/2025) 60 patch 0    lidocaine-prilocaine (EMLA) 2.5-2.5 % cream Apply 1 Application topically to the appropriate area as directed Every 2 (Two) Hours As Needed for Mild Pain. (Patient not taking: Reported on 5/9/2025) 1 g 1     No facility-administered medications prior to visit.     No opioid medication identified on active medication list. I have reviewed chart for other potential  high risk medication/s and harmful drug interactions in the elderly.      Aspirin is not on active medication list.  Aspirin use is not indicated based on review of current medical condition/s. Risk of harm outweighs potential benefits.  .    Patient Active Problem List   Diagnosis    Malignant neoplasm of upper-outer quadrant of right breast in female, estrogen receptor positive    Type 2 diabetes mellitus    GERD (gastroesophageal reflux disease)    Back pain    Thrombus    Insulin long-term use    Benign hypertension    Uncontrolled type 2 diabetes mellitus with hyperglycemia    Hot flashes related to aromatase inhibitor therapy    Dysthymia    Lichen sclerosus of female genitalia    History of bilateral mastectomy    Cervicalgia    Nocturnal leg cramps    Rheumatoid arthritis with positive rheumatoid factor    Tear film insufficiency    SPK (superficial punctate keratitis), bilateral    Cervical radiculopathy    Connective tissue stenosis of neural canal of cervical region    Osseous and subluxation stenosis of intervertebral foramina of cervical region    Diabetes mellitus type 2, insulin dependent    Urinary tract infectious disease    Seasonal allergic rhinitis due to pollen    Anxiety     "Osteoporosis without current pathological fracture    Aromatase inhibitor use     Advance Care Planning Advance Directive is on file.  ACP discussion was held with the patient during this visit. Patient has an advance directive in EMR which is still valid.             Objective   Vitals:    25 1150   BP: 140/82   BP Location: Left arm   Patient Position: Sitting   Cuff Size: Adult   Pulse: 93   SpO2: 97%   Weight: 64.6 kg (142 lb 6.4 oz)   Height: 167.6 cm (65.98\")   PainSc: 9    PainLoc: Hip       Estimated body mass index is 23 kg/m² as calculated from the following:    Height as of this encounter: 167.6 cm (65.98\").    Weight as of this encounter: 64.6 kg (142 lb 6.4 oz).    BMI is within normal parameters. No other follow-up for BMI required.           Does the patient have evidence of cognitive impairment? No  Lab Results   Component Value Date    TRIG 43 2025    HDL 81 (H) 2025    LDL 85 2025    VLDL 9 2025    HGBA1C 7.9 (A) 2025                                                                                                Health  Risk Assessment    Smoking Status:  Social History     Tobacco Use   Smoking Status Former    Current packs/day: 0.00    Average packs/day: 1 pack/day for 27.2 years (27.2 ttl pk-yrs)    Types: Cigarettes, Electronic Cigarette    Start date: 3/13/1990    Quit date: 2017    Years since quittin.0    Passive exposure: Past   Smokeless Tobacco Never   Tobacco Comments    currently use nicotine e-cig.     Alcohol Consumption:  Social History     Substance and Sexual Activity   Alcohol Use Not Currently    Comment: sober nine years       Fall Risk Screen  STEADI Fall Risk Assessment was completed, and patient is at LOW risk for falls.Assessment completed on:2025    Depression Screening   Little interest or pleasure in doing things? Not at all   Feeling down, depressed, or hopeless? Not at all   PHQ-2 Total Score 0      Health Habits and " Functional and Cognitive Screenin/2/2025     8:34 AM   Functional & Cognitive Status   Do you have difficulty preparing food and eating? No   Do you have difficulty bathing yourself, getting dressed or grooming yourself? No   Do you have difficulty using the toilet? No   Do you have difficulty moving around from place to place? No   Do you have trouble with steps or getting out of a bed or a chair? No   Current Diet Well Balanced Diet   Dental Exam Up to date   Eye Exam Up to date   Exercise (times per week) 5 times per week   Current Exercises Include Gardening;Home Fitness Gym;House Cleaning;Other;Treadmill;Walking   Do you need help using the phone?  No   Are you deaf or do you have serious difficulty hearing?  No   Do you need help to go to places out of walking distance? No   Do you need help shopping? No   Do you need help preparing meals?  No   Do you need help with housework?  No   Do you need help with laundry? No   Do you need help taking your medications? No   Do you need help managing money? No   Do you ever drive or ride in a car without wearing a seat belt? No   Have you felt unusual stress, anger or loneliness in the last month? No   Who do you live with? Alone   If you need help, do you have trouble finding someone available to you? No   Have you been bothered in the last four weeks by sexual problems? No   Do you have difficulty concentrating, remembering or making decisions? No           Age-appropriate Screening Schedule:  Refer to the list below for future screening recommendations based on patient's age, sex and/or medical conditions. Orders for these recommended tests are listed in the plan section. The patient has been provided with a written plan.    Health Maintenance List  Health Maintenance   Topic Date Due    ZOSTER VACCINE (2 of 2) 2021    DIABETIC FOOT EXAM  2023    DIABETIC EYE EXAM  2025    COVID-19 Vaccine ( season) 2025    INFLUENZA  "VACCINE  07/01/2025    HEMOGLOBIN A1C  09/21/2025    LUNG CANCER SCREENING  11/20/2025    URINE MICROALBUMIN-CREATININE RATIO (uACR)  03/10/2026    ANNUAL WELLNESS VISIT  05/09/2026    DXA SCAN  04/08/2027    COLORECTAL CANCER SCREENING  07/23/2030    TDAP/TD VACCINES (4 - Td or Tdap) 04/14/2035    HEPATITIS C SCREENING  Completed    Pneumococcal Vaccine 50+  Completed                                                                                                                                                CMS Preventative Services Quick Reference  Risk Factors Identified During Encounter  Immunizations Discussed/Encouraged: Shingrix and COVID19    The above risks/problems have been discussed with the patient.  Pertinent information has been shared with the patient in the After Visit Summary.  An After Visit Summary and PPPS were made available to the patient.    Follow Up:   Next Medicare Wellness visit to be scheduled in 1 year.         Additional E&M Note during same encounter follows:  Patient has additional, significant, and separately identifiable condition(s)/problem(s) that require work above and beyond the Medicare Wellness Visit     Chief Complaint  Medicare Wellness-subsequent    Subjective   HPI          Anxiety  -uses xanax as needed and it is effective for her     Osteoporosis  -does take calcium and vitamin D    Allergic rhinitis  -claritin daily  -otc steroid nasal spray    Objective   Vital Signs:  /82 (BP Location: Left arm, Patient Position: Sitting, Cuff Size: Adult)   Pulse 93   Ht 167.6 cm (65.98\")   Wt 64.6 kg (142 lb 6.4 oz)   SpO2 97%   BMI 23.00 kg/m²   Physical Exam  Constitutional:       General: She is not in acute distress.     Appearance: Normal appearance.   HENT:      Head: Normocephalic and atraumatic.   Eyes:      Conjunctiva/sclera: Conjunctivae normal.   Cardiovascular:      Rate and Rhythm: Normal rate and regular rhythm.   Pulmonary:      Effort: Pulmonary effort " is normal.      Breath sounds: Normal breath sounds.   Skin:     General: Skin is warm and dry.   Neurological:      Mental Status: She is alert.   Psychiatric:         Mood and Affect: Mood normal.         Behavior: Behavior normal.         Thought Content: Thought content normal.                Assessment and Plan      At low risk for fall         Shortness of breath    Orders:    albuterol sulfate  (90 Base) MCG/ACT inhaler; Inhale 2 puffs Every 4 (Four) Hours As Needed for Shortness of Air.    Anxiety    Orders:    LORazepam (Ativan) 0.5 MG tablet; Take 1 tablet by mouth 2 (Two) Times a Day As Needed for Anxiety.  Patient has previously taken Xanax for anxiety.  Discussed that lorazepam or clonazepam would be safer benzodiazepine options and she is agreeable to trying this.  PDMP reviewed and appropriate.  Initial Medicare annual wellness visit         Other osteoporosis without current pathological fracture  Would recommend considering medication to treat osteoporosis.  She is currently doing vitamin D and calcium but would benefit from alendronate               Follow Up   Return in about 3 months (around 8/9/2025) for Next scheduled follow up.  Patient was given instructions and counseling regarding her condition or for health maintenance advice. Please see specific information pulled into the AVS if appropriate.

## 2025-05-09 NOTE — PATIENT INSTRUCTIONS
You are due for Shingrix vaccination series ( the newest shingles vaccine).  It is a two shot series spaced 2-6 months apart. Please get this vaccine series started at your earliest convenience at your local pharmacy to help avoid shingles outbreak. It is more effective than the old Zostavax vaccine and is recommended even if you have had the Zostavax vaccine in the past.  Once the Shingrix series is completed, it does not need to be repeated.   For more information, please look at the website below:  https://www.cdc.gov/vaccines/vpd/shingles/public/shingrix/index.html    You are due for a Covid 19 vaccination. (provides protection against Covid 19 Viral Infection) Please  talk to your pharmacist and get the immunization at your local pharmacy at your earliest convenience. Please click on the link for more information about this vaccine.   https://www.cdc.gov/coronavirus/2019-ncov/vaccines/stay-up-to-date.html        Medicare Wellness  Personal Prevention Plan of Service     Date of Office Visit:    Encounter Provider:  Adilene Bonner DO  Place of Service:  [facility]  Patient Name: Luana Chawla  :  1960    As part of the Medicare Wellness portion of your visit today, we are providing you with this personalized preventive plan of services (PPPS). This plan is based upon recommendations of the [place] Preventive Services Task Force (USPSTF) and the Advisory Committee on Immunization Practices (ACIP).    This lists the preventive care services that should be considered, and provides dates of when you are due. Items listed as completed are up-to-date and do not require any further intervention.    Health Maintenance   Topic Date Due   • ANNUAL WELLNESS VISIT  Never done   • ZOSTER VACCINE (2 of 2) 2021   • DIABETIC FOOT EXAM  2023   • DIABETIC EYE EXAM  2025   • COVID-19 Vaccine ( season) 2025   • INFLUENZA VACCINE  2025    • HEMOGLOBIN A1C  09/21/2025   • LUNG CANCER SCREENING  11/20/2025   • URINE MICROALBUMIN-CREATININE RATIO (uACR)  03/10/2026   • DXA SCAN  04/08/2027   • COLORECTAL CANCER SCREENING  07/23/2030   • TDAP/TD VACCINES (4 - Td or Tdap) 04/14/2035   • HEPATITIS C SCREENING  Completed   • Pneumococcal Vaccine 50+  Completed       No orders of the defined types were placed in this encounter.      No follow-ups on file.          Fall Prevention in the Home, Adult  Falls can cause injuries and affect people of all ages. There are many simple things that you can do to make your home safe and to help prevent falls.  If you need it, ask for help making these changes.  What actions can I take to prevent falls?  General information  Use good lighting in all rooms. Make sure to:  Replace any light bulbs that burn out.  Turn on lights if it is dark and use night-lights.  Keep items that you use often in easy-to-reach places. Lower the shelves around your home if needed.  Move furniture so that there are clear paths around it.  Do not keep throw rugs or other things on the floor that can make you trip.  If any of your floors are uneven, fix them.  Add color or contrast paint or tape to clearly john paul and help you see:  Grab bars or handrails.  First and last steps of staircases.  Where the edge of each step is.  If you use a ladder or stepladder:  Make sure that it is fully opened. Do not climb a closed ladder.  Make sure the sides of the ladder are locked in place.  Have someone hold the ladder while you use it.  Know where your pets are as you move through your home.  What can I do in the bathroom?         Keep the floor dry. Clean up any water that is on the floor right away.  Remove soap buildup in the bathtub or shower. Buildup makes bathtubs and showers slippery.  Use non-skid mats or decals on the floor of the bathtub or shower.  Attach bath mats securely with double-sided, non-slip rug tape.  If you need to sit down while  you are in the shower, use a non-slip stool.  Install grab bars by the toilet and in the bathtub and shower. Do not use towel bars as grab bars.  What can I do in the bedroom?  Make sure that you have a light by your bed that is easy to reach.  Do not use any sheets or blankets on your bed that hang to the floor.  Have a firm bench or chair with side arms that you can use for support when you get dressed.  What can I do in the kitchen?  Clean up any spills right away.  If you need to reach something above you, use a sturdy step stool that has a grab bar.  Keep electrical cables out of the way.  Do not use floor polish or wax that makes floors slippery.  What can I do with my stairs?  Do not leave anything on the stairs.  Make sure that you have a light switch at the top and the bottom of the stairs. Have them installed if you do not have them.  Make sure that there are handrails on both sides of the stairs. Fix handrails that are broken or loose. Make sure that handrails are as long as the staircases.  Install non-slip stair treads on all stairs in your home if they do not have carpet.  Avoid having throw rugs at the top or bottom of stairs, or secure the rugs with carpet tape to prevent them from moving.  Choose a carpet design that does not hide the edge of steps on the stairs. Make sure that carpet is firmly attached to the stairs. Fix any carpet that is loose or worn.  What can I do on the outside of my home?  Use bright outdoor lighting.  Repair the edges of walkways and driveways and fix any cracks. Clear paths of anything that can make you trip, such as tools or rocks.  Add color or contrast paint or tape to clearly john paul and help you see high doorway thresholds.  Trim any bushes or trees on the main path into your home.  Check that handrails are securely fastened and in good repair. Both sides of all steps should have handrails.  Install guardrails along the edges of any raised decks or porches.  Have leaves,  snow, and ice cleared regularly. Use sand, salt, or ice melt on walkways during winter months if you live where there is ice and snow.  In the garage, clean up any spills right away, including grease or oil spills.  What other actions can I take?  Review your medicines with your health care provider. Some medicines can make you confused or feel dizzy. This can increase your chance of falling.  Wear closed-toe shoes that fit well and support your feet. Wear shoes that have rubber soles and low heels.  Use a cane, walker, scooter, or crutches that help you move around if needed.  Talk with your provider about other ways that you can decrease your risk of falls. This may include seeing a physical therapist to learn to do exercises to improve movement and strength.  Where to find more information  Centers for Disease Control and PreventionAIDEN: cdc.gov  National [place] on Aging: jorge.nih.gov  National [place] on Aging: jorge.nih.gov  Contact a health care provider if:  You are afraid of falling at home.  You feel weak, drowsy, or dizzy at home.  You fall at home.  Get help right away if you:  Lose consciousness or have trouble moving after a fall.  Have a fall that causes a head injury.  These symptoms may be an emergency. Get help right away. Call 911.  Do not wait to see if the symptoms will go away.  Do not drive yourself to the hospital.  This information is not intended to replace advice given to you by your health care provider. Make sure you discuss any questions you have with your health care provider.  Document Revised: 08/21/2023 Document Reviewed: 08/21/2023  [employer] Patient Education © 2024 [employer]  Exercising to Stay Healthy  To become healthy and stay healthy, it is recommended that you do moderate-intensity and vigorous-intensity exercise. You can tell that you are exercising at a moderate intensity if your heart starts beating faster and you start breathing faster but can still hold a  conversation. You can tell that you are exercising at a vigorous intensity if you are breathing much harder and faster and cannot hold a conversation while exercising.  How can exercise benefit me?  Exercising regularly is important. It has many health benefits, such as:  Improving overall fitness, flexibility, and endurance.  Increasing bone density.  Helping with weight control.  Decreasing body fat.  Increasing muscle strength and endurance.  Reducing stress and tension, anxiety, depression, or anger.  Improving overall health.  What guidelines should I follow while exercising?  Before you start a new exercise program, talk with your health care provider.  Do not exercise so much that you hurt yourself, feel dizzy, or get very short of breath.  Wear comfortable clothes and wear shoes with good support.  Drink plenty of water while you exercise to prevent dehydration or heat stroke.  Work out until your breathing and your heartbeat get faster (moderate intensity).  How often should I exercise?  Choose an activity that you enjoy, and set realistic goals. Your health care provider can help you make an activity plan that is individually designed and works best for you.  Exercise regularly as told by your health care provider. This may include:  Doing strength training two times a week, such as:  Lifting weights.  Using resistance bands.  Push-ups.  Sit-ups.  Yoga.  Doing a certain intensity of exercise for a given amount of time. Choose from these options:  A total of 150 minutes of moderate-intensity exercise every week.  A total of 75 minutes of vigorous-intensity exercise every week.  A mix of moderate-intensity and vigorous-intensity exercise every week.  Children, pregnant women, people who have not exercised regularly, people who are overweight, and older adults may need to talk with a health care provider about what activities are safe to perform. If you have a medical condition, be sure to talk with your  health care provider before you start a new exercise program.  What are some exercise ideas?  Moderate-intensity exercise ideas include:  Walking 1 mile (1.6 km) in about 15 minutes.  Biking.  Hiking.  Golfing.  Dancing.  Water aerobics.  Vigorous-intensity exercise ideas include:  Walking 4.5 miles (7.2 km) or more in about 1 hour.  Jogging or running 5 miles (8 km) in about 1 hour.  Biking 10 miles (16.1 km) or more in about 1 hour.  Lap swimming.  Roller-skating or in-line skating.  Cross-country skiing.  Vigorous competitive sports, such as football, basketball, and soccer.  Jumping rope.  Aerobic dancing.  What are some everyday activities that can help me get exercise?  Yard work, such as:  Pushing a .  Raking and bagging leaves.  Washing your car.  Pushing a stroller.  Shoveling snow.  Gardening.  Washing windows or floors.  How can I be more active in my day-to-day activities?  Use stairs instead of an elevator.  Take a walk during your lunch break.  If you drive, park your car farther away from your work or school.  If you take public transportation, get off one stop early and walk the rest of the way.  Stand up or walk around during all of your indoor phone calls.  Get up, stretch, and walk around every 30 minutes throughout the day.  Enjoy exercise with a friend. Support to continue exercising will help you keep a regular routine of activity.  Where to find more information  You can find more information about exercising to stay healthy from:  U.S. Department of Health and Human Services: www.hhs.gov  Centers for Disease Control and Prevention (CDC): www.cdc.gov  Summary  Exercising regularly is important. It will improve your overall fitness, flexibility, and endurance.  Regular exercise will also improve your overall health. It can help you control your weight, reduce stress, and improve your bone density.  Do not exercise so much that you hurt yourself, feel dizzy, or get very short of  breath.  Before you start a new exercise program, talk with your health care provider.  This information is not intended to replace advice given to you by your health care provider. Make sure you discuss any questions you have with your health care provider.  Document Revised: 04/15/2022 Document Reviewed: 04/15/2022  [employer] Patient Education © 2024 [employer]

## 2025-05-12 ENCOUNTER — TELEPHONE (OUTPATIENT)
Dept: FAMILY MEDICINE CLINIC | Facility: CLINIC | Age: 65
End: 2025-05-12
Payer: MEDICARE

## 2025-05-12 RX ORDER — LORAZEPAM 0.5 MG/1
0.5 TABLET ORAL 2 TIMES DAILY PRN
Qty: 30 TABLET | Refills: 0 | Status: SHIPPED | OUTPATIENT
Start: 2025-05-12

## 2025-05-12 RX ORDER — ALBUTEROL SULFATE 90 UG/1
2 INHALANT RESPIRATORY (INHALATION) EVERY 4 HOURS PRN
Qty: 8.5 G | Refills: 1 | Status: SHIPPED | OUTPATIENT
Start: 2025-05-12

## 2025-05-15 ENCOUNTER — INFUSION (OUTPATIENT)
Dept: ONCOLOGY | Facility: HOSPITAL | Age: 65
End: 2025-05-15
Payer: MEDICARE

## 2025-05-15 VITALS
RESPIRATION RATE: 18 BRPM | DIASTOLIC BLOOD PRESSURE: 69 MMHG | SYSTOLIC BLOOD PRESSURE: 114 MMHG | HEART RATE: 91 BPM | TEMPERATURE: 97.5 F

## 2025-05-15 DIAGNOSIS — M81.8 OTHER OSTEOPOROSIS WITHOUT CURRENT PATHOLOGICAL FRACTURE: ICD-10-CM

## 2025-05-15 DIAGNOSIS — Z79.811 AROMATASE INHIBITOR USE: Primary | ICD-10-CM

## 2025-05-15 PROCEDURE — 25010000002 ZOLEDRONIC ACID 5 MG/100ML SOLUTION: Performed by: NURSE PRACTITIONER

## 2025-05-15 PROCEDURE — 96374 THER/PROPH/DIAG INJ IV PUSH: CPT

## 2025-05-15 PROCEDURE — 96365 THER/PROPH/DIAG IV INF INIT: CPT

## 2025-05-15 RX ORDER — ZOLEDRONIC ACID 0.05 MG/ML
5 INJECTION, SOLUTION INTRAVENOUS ONCE
Status: COMPLETED | OUTPATIENT
Start: 2025-05-15 | End: 2025-05-15

## 2025-05-15 RX ORDER — SODIUM CHLORIDE 9 MG/ML
20 INJECTION, SOLUTION INTRAVENOUS ONCE
Status: DISCONTINUED | OUTPATIENT
Start: 2025-05-15 | End: 2025-05-15 | Stop reason: HOSPADM

## 2025-05-15 RX ADMIN — ZOLEDRONIC ACID 5 MG: 5 INJECTION INTRAVENOUS at 10:18

## 2025-05-19 RX ORDER — INSULIN GLARGINE 100 [IU]/ML
INJECTION, SOLUTION SUBCUTANEOUS
Qty: 45 ML | Refills: 3 | Status: SHIPPED | OUTPATIENT
Start: 2025-05-19

## 2025-05-19 NOTE — TELEPHONE ENCOUNTER
Rx Refill Note  Requested Prescriptions     Pending Prescriptions Disp Refills    Insulin Glargine (BASAGLAR KWIKPEN) 100 UNIT/ML injection pen [Pharmacy Med Name: BASAGLAR 100 UNIT/ML KWIKPEN] 45 mL 0     Sig: USE DAILY TO TITRATE TA FASTING GLUCOSE LESS THAN 120  UP TO MAX DAILY DOSE OF 50 UNITS      Last office visit with prescribing clinician: 3/21/2025   Last telemedicine visit with prescribing clinician: Visit date not found   Next office visit with prescribing clinician: 6/25/2025                         Would you like a call back once the refill request has been completed: [] Yes [] No    If the office needs to give you a call back, can they leave a voicemail: [] Yes [] No    Roseanne Quiroz MA  05/19/25, 09:59 EDT

## 2025-05-20 NOTE — ASSESSMENT & PLAN NOTE
Orders:    LORazepam (Ativan) 0.5 MG tablet; Take 1 tablet by mouth 2 (Two) Times a Day As Needed for Anxiety.  Patient has previously taken Xanax for anxiety.  Discussed that lorazepam or clonazepam would be safer benzodiazepine options and she is agreeable to trying this.  PDMP reviewed and appropriate.

## 2025-05-20 NOTE — ASSESSMENT & PLAN NOTE
Would recommend considering medication to treat osteoporosis.  She is currently doing vitamin D and calcium but would benefit from alendronate

## 2025-05-26 RX ORDER — INSULIN GLARGINE 100 [IU]/ML
INJECTION, SOLUTION SUBCUTANEOUS
Qty: 45 ML | Refills: 3 | Status: CANCELLED | OUTPATIENT
Start: 2025-05-26

## 2025-05-27 ENCOUNTER — TELEPHONE (OUTPATIENT)
Dept: ENDOCRINOLOGY | Facility: CLINIC | Age: 65
End: 2025-05-27
Payer: MEDICARE

## 2025-05-27 RX ORDER — INSULIN GLARGINE 100 [IU]/ML
INJECTION, SOLUTION SUBCUTANEOUS
Qty: 45 ML | Refills: 3 | Status: SHIPPED | OUTPATIENT
Start: 2025-05-27

## 2025-05-27 NOTE — TELEPHONE ENCOUNTER
PATIENT CALLED BACK WITH [facility]IN WITH A REFERENCE NUMBER FOR THE PRIOR AUTHORIZATION [id number]. THEY WOULD LIKE A CALL BACK [phone]

## 2025-05-27 NOTE — TELEPHONE ENCOUNTER
PATIENT IS STATING HER INSURANCE IS REQUIRING PRIOR AUTH FOR BASAGLAR. SHE IS COMPLETELY OUT OF MEDICATION AND NEEDS US TO GET PRIOR AUTH FOR THIS PRESCRIPTION. SHE STATES PHARMACY IS WAITING ON US TO GET PRIOR AUTH. PATIENTS NUMBER [phone]

## 2025-05-27 NOTE — TELEPHONE ENCOUNTER
Pt called has insurance change no longer covered Basaglar kwikpen Insurance prefers  Lantus. Pt is out of medicaition. Please send to [facility] pharmacy [place], [place]

## 2025-06-16 RX ORDER — METFORMIN HYDROCHLORIDE 500 MG/1
1000 TABLET, EXTENDED RELEASE ORAL 2 TIMES DAILY
Qty: 360 TABLET | Refills: 0 | Status: SHIPPED | OUTPATIENT
Start: 2025-06-16

## 2025-06-16 NOTE — TELEPHONE ENCOUNTER
Rx Refill Note  Requested Prescriptions     Pending Prescriptions Disp Refills    metFORMIN ER (GLUCOPHAGE-XR) 500 MG 24 hr tablet [Pharmacy Med Name: METFORMIN HCL  MG TABLET] 360 tablet 3     Sig: TAKE 2 TABLETS BY MOUTH TWICE A DAY      Last office visit with prescribing clinician: 3/21/2025     Next office visit with prescribing clinician: 6/25/2025

## 2025-06-25 ENCOUNTER — OFFICE VISIT (OUTPATIENT)
Dept: ENDOCRINOLOGY | Facility: CLINIC | Age: 65
End: 2025-06-25
Payer: MEDICARE

## 2025-06-25 ENCOUNTER — TELEPHONE (OUTPATIENT)
Dept: ENDOCRINOLOGY | Facility: CLINIC | Age: 65
End: 2025-06-25

## 2025-06-25 VITALS
DIASTOLIC BLOOD PRESSURE: 64 MMHG | BODY MASS INDEX: 22.5 KG/M2 | OXYGEN SATURATION: 99 % | HEIGHT: 66 IN | WEIGHT: 140 LBS | SYSTOLIC BLOOD PRESSURE: 120 MMHG | HEART RATE: 60 BPM

## 2025-06-25 DIAGNOSIS — I10 BENIGN HYPERTENSION: ICD-10-CM

## 2025-06-25 DIAGNOSIS — E11.69 TYPE 2 DIABETES MELLITUS WITH OTHER SPECIFIED COMPLICATION, WITH LONG-TERM CURRENT USE OF INSULIN: Primary | ICD-10-CM

## 2025-06-25 DIAGNOSIS — Z79.4 TYPE 2 DIABETES MELLITUS WITH OTHER SPECIFIED COMPLICATION, WITH LONG-TERM CURRENT USE OF INSULIN: Primary | ICD-10-CM

## 2025-06-25 LAB
EXPIRATION DATE: ABNORMAL
EXPIRATION DATE: ABNORMAL
GLUCOSE BLDC GLUCOMTR-MCNC: 151 MG/DL (ref 70–130)
HBA1C MFR BLD: 7.2 % (ref 4.5–5.7)
Lab: ABNORMAL
Lab: ABNORMAL

## 2025-06-25 PROCEDURE — 1160F RVW MEDS BY RX/DR IN RCRD: CPT | Performed by: INTERNAL MEDICINE

## 2025-06-25 PROCEDURE — 3051F HG A1C>EQUAL 7.0%<8.0%: CPT | Performed by: INTERNAL MEDICINE

## 2025-06-25 PROCEDURE — 82947 ASSAY GLUCOSE BLOOD QUANT: CPT | Performed by: INTERNAL MEDICINE

## 2025-06-25 PROCEDURE — 99213 OFFICE O/P EST LOW 20 MIN: CPT | Performed by: INTERNAL MEDICINE

## 2025-06-25 PROCEDURE — 83036 HEMOGLOBIN GLYCOSYLATED A1C: CPT | Performed by: INTERNAL MEDICINE

## 2025-06-25 PROCEDURE — 3078F DIAST BP <80 MM HG: CPT | Performed by: INTERNAL MEDICINE

## 2025-06-25 PROCEDURE — 3074F SYST BP LT 130 MM HG: CPT | Performed by: INTERNAL MEDICINE

## 2025-06-25 PROCEDURE — 1159F MED LIST DOCD IN RCRD: CPT | Performed by: INTERNAL MEDICINE

## 2025-06-25 RX ORDER — ABATACEPT 125 MG/ML
125 INJECTION, SOLUTION SUBCUTANEOUS WEEKLY
COMMUNITY
Start: 2025-06-19

## 2025-06-25 NOTE — ASSESSMENT & PLAN NOTE
Diabetes is improving with treatment.   Continue current treatment regimen.  Diabetes will be reassessed in 3 months.    She said DexCom is too expensive.  Will send for FreeStyle to see if this is covered.

## 2025-06-25 NOTE — PROGRESS NOTES
"     Office Note      Date: 2025  Patient Name: Luana Chawla  MRN: [id number]  : 1960    Chief Complaint   Patient presents with    Diabetes     Type 2 diabetes mellitus with other specified complication, with long-term current use of insulin       History of Present Illness:   Luana Chawla is a 64 y.o. female who presents for Diabetes type 2. Diagnosed in: . Treated in past with insulin. Current treatments: metformin, ozempic and basal insulin. Number of insulin shots per day: 1. Checks blood sugar 1 time a day.  Has low blood sugar: occasional. Aspirin use: No - due to easy bruising/nosebleeds. Statin use: No - lipids have been okay. ACE-I/ARB use: No. Changes in health since last visit: diagnosed with RA and osteoporosis. Last eye exam 3/2025.      She stopped the letrozole last month.  She has felt better since stopping it.      Subjective      Diabetic Complications:  Eyes: No  Kidneys: No  Feet: No  Heart: No    Diet and Exercise:  Meals per day: 3  Minutes of exercise per week: 150 mins.    Review of Systems:   Review of Systems   Constitutional: Negative.    Cardiovascular: Negative.    Gastrointestinal: Negative.    Endocrine: Negative.        The following portions of the patient's history were reviewed and updated as appropriate: allergies, current medications, past family history, past medical history, past social history, past surgical history, and problem list.    Objective     Visit Vitals  /64 (BP Location: Left arm, Patient Position: Sitting, Cuff Size: Adult)   Pulse 60   Ht 167.4 cm (65.9\")   Wt 63.5 kg (140 lb)   LMP  (LMP Unknown)   SpO2 99%   BMI 22.67 kg/m²       Physical Exam:  Physical Exam  Constitutional:       Appearance: Normal appearance.   Neurological:      Mental Status: She is alert.         Labs:    HbA1c  Lab Results   Component Value Date    HGBA1C 7.2 (A) 2025       CMP  Lab Results   Component Value Date    GLUCOSE 210 (H) " "04/30/2025    BUN 10 04/30/2025    CREATININE 0.78 04/30/2025    EGFRIFNONA 117 05/05/2021    EGFRIFAFRI 110 02/25/2020    BCR 12.8 04/30/2025    K 4.7 04/30/2025    CO2 24.0 04/30/2025    CALCIUM 9.8 04/30/2025    AST 21 04/30/2025    ALT 19 04/30/2025        Lipid Panel  Lab Results   Component Value Date    HDL Cholesterol 81 (H) 03/21/2025    LDL Cholesterol  85 03/21/2025    LDL/HDL Ratio 1.05 03/21/2025    Triglycerides 43 03/21/2025        TSH  Lab Results   Component Value Date    TSH 1.630 03/21/2025        Hemoglobin A1C  No components found for: \"HGBA1C\"     Microalbumin/Creatinine  Lab Results   Component Value Date    MALBCRERATIO <5 03/10/2025    CREATINIURIN 58.3 03/10/2025    MICROALBUR <3.0 03/10/2025           Assessment / Plan      Assessment & Plan:  Diagnoses and all orders for this visit:    1. Type 2 diabetes mellitus with other specified complication, with long-term current use of insulin (Primary)  Assessment & Plan:  Diabetes is improving with treatment.   Continue current treatment regimen.  Diabetes will be reassessed in 3 months.    She said DexCom is too expensive.  Will send for FreeStyle to see if this is covered.    Orders:  -     POC Glucose, Blood  -     POC Glycosylated Hemoglobin (Hb A1C)    2. Benign hypertension  Assessment & Plan:  Hypertension is stable and controlled.  Continue current treatment regimen.  Blood pressure will be reassessed in 3 months.        Current Outpatient Medications   Medication Instructions    albuterol sulfate  (90 Base) MCG/ACT inhaler 2 puffs, Inhalation, Every 4 Hours PRN    ALPRAZolam (XANAX) 0.25 mg, Oral, 2 Times Daily PRN    ascorbic acid (VITAMIN C) 100 mg, Daily    Calcium Carb-Cholecalciferol (CALCIUM 1000 + D PO) 1 tablet, Daily    clobetasol (TEMOVATE) 0.05 % ointment Topical, 2 Times Daily    cyclobenzaprine (FLEXERIL) 10 mg, Oral, 3 Times Daily PRN    Dietary Management Product (Rheumate) capsule 1 capsule, Oral, Daily    " escitalopram (LEXAPRO) 20 mg, Oral, Daily    folic acid (FOLVITE) 1 MG tablet No dose, route, or frequency recorded.    glucose blood (Accu-Chek Sasha Plus) test strip USE ONE STRIP TO TEST BLOOD SUGAR DAILY.    hydrocortisone 2.5 % cream 1 Application, Topical, 2 Times Daily    Insulin Glargine (Lantus SoloStar) 100 UNIT/ML injection pen Use daily to titrate to fasting blood sugar less than 120 up to mdd 50 units in place of basaglar    Insulin Pen Needle (BD Pen Needle Radha 2nd Gen) 32G X 4 MM misc USE 1 PEN NEEDLE DAILY    letrozole (FEMARA) 2.5 mg, Oral, Daily    loratadine (CLARITIN) 10 mg, Daily    LORazepam (ATIVAN) 0.5 mg, Oral, 2 Times Daily PRN    Magnesium 400 MG tablet 1 tablet, Daily    metFORMIN ER (GLUCOPHAGE-XR) 1,000 mg, Oral, 2 Times Daily    methotrexate 2.5 MG tablet No dose, route, or frequency recorded.    omeprazole (PRILOSEC) 40 mg, Oral, Every Morning Before Breakfast    Orencia ClickJect 125 mg, Weekly    Ozempic (0.25 or 0.5 MG/DOSE) 2 mg, Weekly    Ozempic (2 MG/DOSE) 2 mg, Subcutaneous, Every 7 Days    Saccharomyces boulardii (Probiotic) 250 MG capsule Take  by mouth.    Turmeric 500 MG tablet Take  by mouth.      Return in about 3 months (around 9/25/2025) for Recheck with A1c.    Electronically signed by: Jose Cummings MD  06/25/2025

## 2025-06-26 ENCOUNTER — PATIENT MESSAGE (OUTPATIENT)
Dept: FAMILY MEDICINE CLINIC | Facility: CLINIC | Age: 65
End: 2025-06-26
Payer: MEDICARE

## 2025-06-26 DIAGNOSIS — K21.9 GASTROESOPHAGEAL REFLUX DISEASE, UNSPECIFIED WHETHER ESOPHAGITIS PRESENT: ICD-10-CM

## 2025-06-26 RX ORDER — OMEPRAZOLE 40 MG/1
40 CAPSULE, DELAYED RELEASE ORAL
Qty: 90 CAPSULE | Refills: 1 | Status: SHIPPED | OUTPATIENT
Start: 2025-06-26

## 2025-07-22 ENCOUNTER — TELEPHONE (OUTPATIENT)
Dept: FAMILY MEDICINE CLINIC | Facility: CLINIC | Age: 65
End: 2025-07-22
Payer: MEDICARE

## 2025-07-22 NOTE — TELEPHONE ENCOUNTER
HUB TO RELAY    PLEASE LET PATIENT KNOW HER APPOINTMENT WITH DR. WHITMAN IS NEEDING TO BE RESCHEDULED. I LEFT PATIENT A VOICEMAIL MAKING HER AWARE.     PLEASE RESCHEDULE PATIENT

## 2025-07-23 ENCOUNTER — TELEPHONE (OUTPATIENT)
Dept: FAMILY MEDICINE CLINIC | Facility: CLINIC | Age: 65
End: 2025-07-23
Payer: MEDICARE

## 2025-07-23 NOTE — TELEPHONE ENCOUNTER
HUB TO RELAY    ATTEMPTED TO CONTACT PT AND LET HER KNOW DR. WHITMAN DOES NOT HAVE ANY AVAILABILITY ON AUGUST 11TH OR 15TH

## 2025-08-04 ENCOUNTER — OFFICE VISIT (OUTPATIENT)
Dept: FAMILY MEDICINE CLINIC | Facility: CLINIC | Age: 65
End: 2025-08-04
Payer: MEDICARE

## 2025-08-04 VITALS
WEIGHT: 140 LBS | SYSTOLIC BLOOD PRESSURE: 122 MMHG | DIASTOLIC BLOOD PRESSURE: 76 MMHG | BODY MASS INDEX: 22.5 KG/M2 | HEIGHT: 66 IN | HEART RATE: 93 BPM | OXYGEN SATURATION: 99 %

## 2025-08-04 DIAGNOSIS — F41.9 ANXIETY: ICD-10-CM

## 2025-08-04 DIAGNOSIS — I10 BENIGN HYPERTENSION: ICD-10-CM

## 2025-08-04 DIAGNOSIS — K21.9 GASTROESOPHAGEAL REFLUX DISEASE, UNSPECIFIED WHETHER ESOPHAGITIS PRESENT: Primary | ICD-10-CM

## 2025-08-04 PROCEDURE — 3074F SYST BP LT 130 MM HG: CPT | Performed by: STUDENT IN AN ORGANIZED HEALTH CARE EDUCATION/TRAINING PROGRAM

## 2025-08-04 PROCEDURE — 1159F MED LIST DOCD IN RCRD: CPT | Performed by: STUDENT IN AN ORGANIZED HEALTH CARE EDUCATION/TRAINING PROGRAM

## 2025-08-04 PROCEDURE — 99214 OFFICE O/P EST MOD 30 MIN: CPT | Performed by: STUDENT IN AN ORGANIZED HEALTH CARE EDUCATION/TRAINING PROGRAM

## 2025-08-04 PROCEDURE — 1160F RVW MEDS BY RX/DR IN RCRD: CPT | Performed by: STUDENT IN AN ORGANIZED HEALTH CARE EDUCATION/TRAINING PROGRAM

## 2025-08-04 PROCEDURE — 3051F HG A1C>EQUAL 7.0%<8.0%: CPT | Performed by: STUDENT IN AN ORGANIZED HEALTH CARE EDUCATION/TRAINING PROGRAM

## 2025-08-04 PROCEDURE — 3078F DIAST BP <80 MM HG: CPT | Performed by: STUDENT IN AN ORGANIZED HEALTH CARE EDUCATION/TRAINING PROGRAM

## 2025-08-04 PROCEDURE — 1126F AMNT PAIN NOTED NONE PRSNT: CPT | Performed by: STUDENT IN AN ORGANIZED HEALTH CARE EDUCATION/TRAINING PROGRAM

## 2025-08-04 RX ORDER — CYCLOSPORINE 0.5 MG/ML
1 EMULSION OPHTHALMIC 2 TIMES DAILY
COMMUNITY

## 2025-08-04 RX ORDER — PANTOPRAZOLE SODIUM 40 MG/1
40 TABLET, DELAYED RELEASE ORAL
Qty: 90 TABLET | Refills: 3 | Status: SHIPPED | OUTPATIENT
Start: 2025-08-04

## 2025-08-27 RX ORDER — PEN NEEDLE, DIABETIC 31 GX5/16"
NEEDLE, DISPOSABLE MISCELLANEOUS
Qty: 100 EACH | Refills: 2 | Status: SHIPPED | OUTPATIENT
Start: 2025-08-27

## (undated) DEVICE — ANTIBACTERIAL UNDYED BRAIDED (POLYGLACTIN 910), SYNTHETIC ABSORBABLE SUTURE: Brand: COATED VICRYL

## (undated) DEVICE — 450 ML BOTTLE OF 0.05% CHLORHEXIDINE GLUCONATE IN 99.95% STERILE WATER FOR IRRIGATION, USP AND APPLICATOR.: Brand: IRRISEPT ANTIMICROBIAL WOUND LAVAGE

## (undated) DEVICE — ADHESIVE ISLAND DRESSING: Brand: TELFA

## (undated) DEVICE — INTENDED USE FOR SURGICAL MARKING ON INTACT SKIN, ALSO PROVIDES A PERMANENT METHOD OF IDENTIFYING OBJECTS IN THE OPERATING ROOM: Brand: WRITESITE® REGULAR TIP SKIN MARKER

## (undated) DEVICE — SPNG GZ WOVN 4X4IN 12PLY 10/BX STRL

## (undated) DEVICE — SPNG LAP PREWSH SFTPK 18X18IN STRL PK/5

## (undated) DEVICE — ADHS LIQ MASTISOL 2/3ML

## (undated) DEVICE — DIFFUSER: Brand: CORE, MAESTRO

## (undated) DEVICE — PAD ARMBRD SURG CONVOL 7.5X20X2IN

## (undated) DEVICE — SUCTION CANISTER, 2500CC, RIGID: Brand: DEROYAL

## (undated) DEVICE — 3.0MM PRECISION NEURO (MATCH HEAD)

## (undated) DEVICE — 2963 MEDIPORE SOFT CLOTH TAPE 3 IN X 10 YD 12 RLS/CS: Brand: 3M™ MEDIPORE™

## (undated) DEVICE — JACKSON-PRATT 100CC BULB RESERVOIR: Brand: CARDINAL HEALTH

## (undated) DEVICE — SUT SILK 2/0 PS 18IN 1588H

## (undated) DEVICE — DRAIN JACKSON PRATT ROUND 15FR: Brand: CARDINAL HEALTH

## (undated) DEVICE — 3M™ STERI-STRIP™ REINFORCED ADHESIVE SKIN CLOSURES, R1547, 1/2 IN X 4 IN (12 MM X 100 MM), 6 STRIPS/ENVELOPE: Brand: 3M™ STERI-STRIP™

## (undated) DEVICE — DISH PETRI 3.5IN MD STRL LF

## (undated) DEVICE — GLV SURG SENSICARE W/ALOE PF LF 9 STRL

## (undated) DEVICE — HDRST INTUB GENTLETOUCH SLOT 7IN RT

## (undated) DEVICE — PREVENA DUO PEEL & PLACE SYSTEM KIT- 13 CM: Brand: PREVENA DUO™ PEEL & PLACE™

## (undated) DEVICE — SUT SILK 3/0 TIES 18IN A184H

## (undated) DEVICE — OIL CARTRIDGE: Brand: CORE, MAESTRO

## (undated) DEVICE — GOWN,NON-REINFORCED,SIRUS,SET IN SLV,XL: Brand: MEDLINE

## (undated) DEVICE — INTRAOPERATIVE COVER KIT, 10 PACK: Brand: SITE-RITE

## (undated) DEVICE — NDL HYPO ECLPS SFTY 22G 1 1/2IN

## (undated) DEVICE — PK MINOR SPLT 10

## (undated) DEVICE — BANDAGE,GAUZE,BULKEE II,4.5"X4.1YD,STRL: Brand: MEDLINE

## (undated) DEVICE — POSTN ARM CRDL LAMIN

## (undated) DEVICE — GLV SURG TRIUMPH ORTHO W/ALOE PF LTX 7.5 STRL

## (undated) DEVICE — COVER,LIGHT HANDLE,FLX,1/PK: Brand: MEDLINE INDUSTRIES, INC.

## (undated) DEVICE — GOWN,REINF,POLY,ECL,PP SLV,3XL,XLONG: Brand: MEDLINE

## (undated) DEVICE — PROXIMATE RH ROTATING HEAD SKIN STAPLERS (35 WIDE) CONTAINS 35 STAINLESS STEEL STAPLES: Brand: PROXIMATE

## (undated) DEVICE — LEX GENERAL BREAST: Brand: MEDLINE INDUSTRIES, INC.

## (undated) DEVICE — SUT MNCRYL PLS ANTIB UD 4/0 PS2 18IN

## (undated) DEVICE — CVR HNDL LT SURG ACCSSRY BLU STRL

## (undated) DEVICE — PK SPINE ORTHO 10